# Patient Record
Sex: FEMALE | Race: WHITE | NOT HISPANIC OR LATINO | Employment: OTHER | URBAN - METROPOLITAN AREA
[De-identification: names, ages, dates, MRNs, and addresses within clinical notes are randomized per-mention and may not be internally consistent; named-entity substitution may affect disease eponyms.]

---

## 2018-03-01 ENCOUNTER — AMBULATORY CONVERSION ENCOUNTER (OUTPATIENT)
Dept: OBSTETRICS AND GYNECOLOGY | Facility: CLINIC | Age: 73
End: 2018-03-01

## 2018-09-12 ENCOUNTER — OFFICE VISIT (OUTPATIENT)
Dept: GYNECOLOGIC ONCOLOGY | Facility: CLINIC | Age: 73
End: 2018-09-12
Attending: OBSTETRICS & GYNECOLOGY
Payer: MEDICARE

## 2018-09-12 ENCOUNTER — OFFICE VISIT (OUTPATIENT)
Dept: OBSTETRICS AND GYNECOLOGY | Facility: CLINIC | Age: 73
End: 2018-09-12
Payer: MEDICARE

## 2018-09-12 VITALS
RESPIRATION RATE: 18 BRPM | DIASTOLIC BLOOD PRESSURE: 91 MMHG | HEIGHT: 60 IN | TEMPERATURE: 98 F | SYSTOLIC BLOOD PRESSURE: 156 MMHG | HEART RATE: 94 BPM | BODY MASS INDEX: 36.79 KG/M2 | WEIGHT: 187.4 LBS

## 2018-09-12 VITALS
HEIGHT: 60 IN | BODY MASS INDEX: 36.71 KG/M2 | DIASTOLIC BLOOD PRESSURE: 100 MMHG | WEIGHT: 187 LBS | SYSTOLIC BLOOD PRESSURE: 160 MMHG

## 2018-09-12 DIAGNOSIS — N89.8 VAGINAL MASS: ICD-10-CM

## 2018-09-12 DIAGNOSIS — N95.0 POSTMENOPAUSAL BLEEDING: Primary | ICD-10-CM

## 2018-09-12 DIAGNOSIS — N90.89 VULVAR MASS: Primary | ICD-10-CM

## 2018-09-12 PROCEDURE — 10021 FNA BX W/O IMG GDN 1ST LES: CPT | Performed by: OBSTETRICS & GYNECOLOGY

## 2018-09-12 PROCEDURE — 88305 TISSUE EXAM BY PATHOLOGIST: CPT | Performed by: OBSTETRICS & GYNECOLOGY

## 2018-09-12 PROCEDURE — 99213 OFFICE O/P EST LOW 20 MIN: CPT | Performed by: OBSTETRICS & GYNECOLOGY

## 2018-09-12 PROCEDURE — 99205 OFFICE O/P NEW HI 60 MIN: CPT | Mod: 25 | Performed by: OBSTETRICS & GYNECOLOGY

## 2018-09-12 RX ORDER — PANTOPRAZOLE SODIUM 40 MG/1
40 TABLET, DELAYED RELEASE ORAL DAILY
COMMUNITY
Start: 2016-06-07 | End: 2022-05-02 | Stop reason: ENTERED-IN-ERROR

## 2018-09-12 RX ORDER — BENAZEPRIL HYDROCHLORIDE 10 MG/1
10 TABLET ORAL DAILY
COMMUNITY
End: 2023-03-22 | Stop reason: ALTCHOICE

## 2018-09-12 RX ORDER — BUMETANIDE 1 MG/1
1 TABLET ORAL DAILY
COMMUNITY
End: 2018-12-12 | Stop reason: ENTERED-IN-ERROR

## 2018-09-12 RX ORDER — OMEGA-3-ACID ETHYL ESTERS 1 G/1
CAPSULE, LIQUID FILLED ORAL
COMMUNITY
Start: 2016-06-07 | End: 2021-07-06 | Stop reason: ENTERED-IN-ERROR

## 2018-09-12 RX ORDER — LEVOTHYROXINE SODIUM 88 UG/1
CAPSULE ORAL
COMMUNITY
Start: 2016-06-07 | End: 2018-11-07 | Stop reason: SDUPTHER

## 2018-09-12 RX ORDER — AMLODIPINE AND BENAZEPRIL HYDROCHLORIDE 5; 10 MG/1; MG/1
CAPSULE ORAL
COMMUNITY
Start: 2016-06-07 | End: 2018-09-12 | Stop reason: ENTERED-IN-ERROR

## 2018-09-12 NOTE — PROGRESS NOTES
Raegan Young presents to the office for  second opinion.     Problem List Items Addressed This Visit     Vaginal mass - Primary    Overview     1 year left vulvar mass, no pain, no itching. Spotting with valsalva (BM).          Current Assessment & Plan     4-5 cm L vaginal mass with normal overlying epithelium, but friable. Biopsy take.  Concerning for primary Bartholin's malignancy. Will send for MRI pelvis to assist in diagnosis.  Frontal diagnosis however includes rectal carcinomas.         Relevant Orders    Cytology, Fine Needle Aspiration    MRI PELVIS WITH AND WITHOUT CONTRAST            Past Medical History:   Diagnosis Date   • Disease of thyroid gland    • Diverticulitis    • Hypertension      Past Surgical History:   Procedure Laterality Date   • CHOLECYSTECTOMY     • DILATION AND CURETTAGE OF UTERUS  02/2018   • ROTATOR CUFF REPAIR Right        Current Outpatient Prescriptions:   •  benazepril (LOTENSIN) 10 mg tablet, Take 10 mg by mouth daily., Disp: , Rfl:   •  bumetanide (BUMEX) 1 mg tablet, Take 1 mg by mouth daily., Disp: , Rfl:   •  cranberry conc-C-bacillus coag (AZO CRANBERRY + PROBIOTIC) 250-30-50 mg-mg-million tablet, Take by mouth daily., Disp: , Rfl:   •  levothyroxine sodium (TIROSINT) 88 mcg capsule, take 1 capsule by oral route  every day, Disp: , Rfl:   •  omega-3 acid ethyl esters (LOVAZA) 1 gram capsule, take 2 capsule by oral route 2 times every day, Disp: , Rfl:   •  pantoprazole (PROTONIX) 40 mg EC tablet, 40 mg., Disp: , Rfl:   No known allergies  Cancer-related family history is negative for Breast cancer, Cancer, Colon cancer, and Ovarian cancer.   reports that she quit smoking about 20 years ago. She started smoking about 61 years ago. She has a 40.00 pack-year smoking history. She has never used smokeless tobacco. She reports that she does not drink alcohol or use drugs.  ROS: negative in all systems with the exception of the above    Physical examination: Well-developed  female in no apparent distress  Vitals:   Vitals:    09/12/18 1250   BP: (!) 156/91   Pulse: 94   Resp: 18   Temp: 36.7 °C (98 °F)     Body mass index is 36.6 kg/m².    HEENT: Normocephalic, atraumatic, nonicteric sclera  Neck: Supple no masses, thyroid normal nontender  Lymphatic: No inguinal or supraclavicular adenopathy  Heart: Regular rate no murmurs  Lungs: Clear to auscultation with good respiratory effort  Extremities: No clubbing, cyanosis, edema  Neuro: Oriented ×3 with normal mood and affect  Abdomen: Soft, nontender, nondistended. No hepatosplenomegaly. No rebound or guarding.  Gynecologic: Normal vulva urethra meatus bladder.  Normal cervix uterus neymar l, no cervical motion tenderness, normal bilateral adnexa 5 cm fixed firm area left posterior vaginal area and introitus.  Overlying epithelium appears normal, but bleeding ensued with speculum examination.  Core needle biopsy with 18-gauge needle was performed.  Rectal: no masses      Assessment/Plan   Assesment:  Problem List Items Addressed This Visit     Vaginal mass - Primary    Overview     1 year left vulvar mass, no pain, no itching. Spotting with valsalva (BM).          Current Assessment & Plan     4-5 cm L vaginal mass with normal overlying epithelium, but friable. Biopsy take.  Concerning for primary Bartholin's malignancy. Will send for MRI pelvis to assist in diagnosis.  Frontal diagnosis however includes rectal carcinomas.         Relevant Orders    Cytology, Fine Needle Aspiration    MRI PELVIS WITH AND WITHOUT CONTRAST

## 2018-09-12 NOTE — PATIENT INSTRUCTIONS
See Dr. Trev Jimenez today at 3 PM.  I called for an appointment.  He has swelling and tenderness in the left Bartholin gland.  Possible you may have a hematoma or extension from the rectum.

## 2018-09-12 NOTE — LETTER
September 17, 2018    Patient: Raegan Young   YOB: 1945   Date of Visit: 9/12/2018       Dear Dr. Travis:    The patient is seen back at the [facility] today in follow up with regard to her   1. Vulvar mass    2. Vaginal mass    . Below is my assessment and plan of care.             Assesment:  Problem List Items Addressed This Visit     Vaginal mass - Primary    Overview     1 year left vulvar mass, no pain, no itching. Spotting with valsalva (BM).          Current Assessment & Plan     4-5 cm L vaginal mass with normal overlying epithelium, but friable. Biopsy take.  Concerning for primary Bartholin's malignancy. Will send for MRI pelvis to assist in diagnosis.  Frontal diagnosis however includes rectal carcinomas.         Relevant Orders    Cytology, Fine Needle Aspiration    MRI PELVIS WITH AND WITHOUT CONTRAST                 Sincerely,    Trev Jimenez MD  CC: Luan Lockhart MD

## 2018-09-12 NOTE — ASSESSMENT & PLAN NOTE
4-5 cm L vaginal mass with normal overlying epithelium, but friable. Biopsy take.  Concerning for primary Bartholin's malignancy. Will send for MRI pelvis to assist in diagnosis.  Frontal diagnosis however includes rectal carcinomas.

## 2018-09-12 NOTE — PROGRESS NOTES
Patient has undergone various D&Cs for postmenopausal bleeding but now complains that when she bears down during a bowel movement she sees some light blood when she puts her finger into the vagina the blood is on the finger..  On examination there is no blood in the vagina but on digital exam there is a thickness about 2 cm wide and 3 cm long with tenderness in the Bartholin gland area.  This is on the left side of the patient.  This may be a hematoma in the Bartholin gland but also could be cancer of the Bartholin gland.    I called Dr. Trev Jimenez office and the patient  will be seen today at 3 PM.  Patient was given appropriate instructions how to get the and she will call if she needs any other directions.

## 2018-09-17 ENCOUNTER — TELEPHONE (OUTPATIENT)
Dept: GYNECOLOGIC ONCOLOGY | Facility: CLINIC | Age: 73
End: 2018-09-17

## 2018-09-17 LAB
CASE RPRT: NORMAL
IMMUNE STAIN STUDY: NORMAL
PATH REPORT.FINAL DX SPEC: NORMAL
PATH REPORT.FINAL DX SPEC: NORMAL
PATH REPORT.GROSS SPEC: NORMAL

## 2018-09-18 ENCOUNTER — TELEPHONE (OUTPATIENT)
Dept: GYNECOLOGIC ONCOLOGY | Facility: CLINIC | Age: 73
End: 2018-09-18

## 2018-09-18 DIAGNOSIS — C52 VAGINAL CANCER (CMS/HCC): Primary | ICD-10-CM

## 2018-09-18 DIAGNOSIS — C77.2 SECONDARY MALIGNANT NEOPLASM OF RETROPERITONEAL LYMPH NODE (CMS/HCC): ICD-10-CM

## 2018-09-19 ENCOUNTER — TELEPHONE (OUTPATIENT)
Dept: OBSTETRICS AND GYNECOLOGY | Facility: CLINIC | Age: 73
End: 2018-09-19

## 2018-09-19 NOTE — TELEPHONE ENCOUNTER
Telephone conversation with the patient.  She did have an MRI and does have some lymph node involvement.  I do not have the results.  Patient is scheduled for a PET scan on Thursday.  She will call me with the results.  Biopsy did show squamous cell cancer.

## 2018-09-21 ENCOUNTER — APPOINTMENT (OUTPATIENT)
Dept: LAB | Facility: HOSPITAL | Age: 73
End: 2018-09-21
Attending: OBSTETRICS & GYNECOLOGY
Payer: MEDICARE

## 2018-09-21 ENCOUNTER — HOSPITAL ENCOUNTER (OUTPATIENT)
Dept: RADIATION ONCOLOGY | Facility: HOSPITAL | Age: 73
Setting detail: RADIATION/ONCOLOGY SERIES
Discharge: HOME | End: 2018-09-21
Attending: RADIOLOGY
Payer: MEDICARE

## 2018-09-21 ENCOUNTER — APPOINTMENT (OUTPATIENT)
Dept: RADIATION ONCOLOGY | Facility: HOSPITAL | Age: 73
Setting detail: RADIATION/ONCOLOGY SERIES
End: 2018-09-21
Payer: MEDICARE

## 2018-09-21 ENCOUNTER — OFFICE VISIT (OUTPATIENT)
Dept: GYNECOLOGIC ONCOLOGY | Facility: CLINIC | Age: 73
End: 2018-09-21
Attending: OBSTETRICS & GYNECOLOGY
Payer: MEDICARE

## 2018-09-21 VITALS
HEART RATE: 96 BPM | SYSTOLIC BLOOD PRESSURE: 164 MMHG | BODY MASS INDEX: 35.74 KG/M2 | WEIGHT: 183 LBS | DIASTOLIC BLOOD PRESSURE: 82 MMHG

## 2018-09-21 VITALS
BODY MASS INDEX: 36.01 KG/M2 | HEIGHT: 60 IN | DIASTOLIC BLOOD PRESSURE: 82 MMHG | RESPIRATION RATE: 16 BRPM | WEIGHT: 183.4 LBS | HEART RATE: 89 BPM | SYSTOLIC BLOOD PRESSURE: 157 MMHG

## 2018-09-21 DIAGNOSIS — E03.8 OTHER SPECIFIED HYPOTHYROIDISM: Primary | ICD-10-CM

## 2018-09-21 DIAGNOSIS — C52 VAGINAL CANCER (CMS/HCC): ICD-10-CM

## 2018-09-21 DIAGNOSIS — E03.8 OTHER SPECIFIED HYPOTHYROIDISM: ICD-10-CM

## 2018-09-21 DIAGNOSIS — C52 VAGINAL CANCER (CMS/HCC): Primary | ICD-10-CM

## 2018-09-21 DIAGNOSIS — D64.81 ANTINEOPLASTIC CHEMOTHERAPY INDUCED ANEMIA: Primary | ICD-10-CM

## 2018-09-21 DIAGNOSIS — T45.1X5A ANTINEOPLASTIC CHEMOTHERAPY INDUCED ANEMIA: Primary | ICD-10-CM

## 2018-09-21 PROBLEM — N89.8 VAGINAL MASS: Status: RESOLVED | Noted: 2018-09-12 | Resolved: 2018-09-21

## 2018-09-21 LAB
ALBUMIN SERPL-MCNC: 4 G/DL (ref 3.4–5)
ALP SERPL-CCNC: 65 U/L (ref 35–126)
ALT SERPL-CCNC: 19 U/L (ref 11–54)
ANION GAP SERPL CALC-SCNC: 12 MEQ/L (ref 3–15)
AST SERPL-CCNC: 22 U/L (ref 15–41)
BASOPHILS # BLD: 0.04 K/UL (ref 0.01–0.1)
BASOPHILS NFR BLD: 0.3 %
BILIRUB SERPL-MCNC: 0.7 MG/DL (ref 0.3–1.2)
BUN SERPL-MCNC: 9 MG/DL (ref 8–20)
CALCIUM SERPL-MCNC: 9.4 MG/DL (ref 8.9–10.3)
CHLORIDE SERPL-SCNC: 106 MEQ/L (ref 98–109)
CO2 SERPL-SCNC: 23 MEQ/L (ref 22–32)
CREAT SERPL-MCNC: 0.8 MG/DL (ref 0.6–1.1)
DIFFERENTIAL METHOD BLD: ABNORMAL
EOSINOPHIL # BLD: 0.05 K/UL (ref 0.04–0.36)
EOSINOPHIL NFR BLD: 0.4 %
ERYTHROCYTE [DISTWIDTH] IN BLOOD BY AUTOMATED COUNT: 12.9 % (ref 11.7–14.4)
GFR SERPL CREATININE-BSD FRML MDRD: >60 ML/MIN/1.73M*2
GLUCOSE SERPL-MCNC: 75 MG/DL (ref 70–99)
HCT VFR BLDCO AUTO: 41.6 % (ref 35–45)
HGB BLD-MCNC: 14.3 G/DL (ref 11.8–15.7)
IMM GRANULOCYTES # BLD AUTO: 0.05 K/UL (ref 0–0.08)
IMM GRANULOCYTES NFR BLD AUTO: 0.4 %
LYMPHOCYTES # BLD: 3.14 K/UL (ref 1.2–3.5)
LYMPHOCYTES NFR BLD: 24.6 %
MCH RBC QN AUTO: 30.6 PG (ref 28–33.2)
MCHC RBC AUTO-ENTMCNC: 34.4 G/DL (ref 32.2–35.5)
MCV RBC AUTO: 88.9 FL (ref 83–98)
MONOCYTES # BLD: 0.87 K/UL (ref 0.28–0.8)
MONOCYTES NFR BLD: 6.8 %
NEUTROPHILS # BLD: 8.59 K/UL (ref 1.7–7)
NEUTS SEG NFR BLD: 67.5 %
NRBC BLD-RTO: 0 %
PDW BLD AUTO: 9.6 FL (ref 9.4–12.3)
PLATELET # BLD AUTO: 332 K/UL (ref 150–369)
POTASSIUM SERPL-SCNC: 4.6 MEQ/L (ref 3.6–5.1)
PROT SERPL-MCNC: 6.9 G/DL (ref 6–8.2)
RBC # BLD AUTO: 4.68 M/UL (ref 3.93–5.22)
SODIUM SERPL-SCNC: 141 MEQ/L (ref 136–144)
T3 SERPL-MCNC: 79 NG/DL (ref 87–178)
T4 FREE SERPL-MCNC: 1.11 NG/DL (ref 0.58–1.64)
TSH SERPL DL<=0.05 MIU/L-ACNC: 1.03 MIU/L (ref 0.34–5.6)
WBC # BLD AUTO: 12.74 K/UL (ref 3.8–10.5)

## 2018-09-21 PROCEDURE — 80053 COMPREHEN METABOLIC PANEL: CPT

## 2018-09-21 PROCEDURE — 99215 OFFICE O/P EST HI 40 MIN: CPT | Performed by: OBSTETRICS & GYNECOLOGY

## 2018-09-21 PROCEDURE — 84480 ASSAY TRIIODOTHYRONINE (T3): CPT

## 2018-09-21 PROCEDURE — 84439 ASSAY OF FREE THYROXINE: CPT

## 2018-09-21 PROCEDURE — 84443 ASSAY THYROID STIM HORMONE: CPT

## 2018-09-21 PROCEDURE — 36415 COLL VENOUS BLD VENIPUNCTURE: CPT

## 2018-09-21 PROCEDURE — 85027 COMPLETE CBC AUTOMATED: CPT

## 2018-09-21 RX ORDER — FAMOTIDINE 10 MG/ML
20 INJECTION INTRAVENOUS ONCE AS NEEDED
Status: CANCELLED | OUTPATIENT
Start: 2018-09-25

## 2018-09-21 RX ORDER — DIPHENHYDRAMINE HCL 50 MG/ML
25 VIAL (ML) INJECTION ONCE AS NEEDED
Status: CANCELLED | OUTPATIENT
Start: 2018-09-25

## 2018-09-21 RX ORDER — PALONOSETRON 0.05 MG/ML
250 INJECTION, SOLUTION INTRAVENOUS ONCE
Status: CANCELLED | OUTPATIENT
Start: 2018-09-25

## 2018-09-21 RX ORDER — PROCHLORPERAZINE MALEATE 10 MG
10 TABLET ORAL EVERY 6 HOURS PRN
Qty: 30 TABLET | Refills: 2 | Status: SHIPPED | OUTPATIENT
Start: 2018-09-21 | End: 2018-12-12 | Stop reason: ENTERED-IN-ERROR

## 2018-09-21 RX ORDER — SODIUM CHLORIDE 9 MG/ML
500 INJECTION, SOLUTION INTRAVENOUS ONCE
Status: CANCELLED | OUTPATIENT
Start: 2018-09-25

## 2018-09-21 ASSESSMENT — PAIN SCALES - GENERAL: PAINLEVEL: 2

## 2018-09-21 NOTE — PROGRESS NOTES
Patient ID:  Raegan Young is a 72 y.o. female.  1945    Referring Physician:   Trev Jimenez MD  [address]  [place] [id number]    Primary Care Provider:  Luan Lockhart MD     Cancer Staging Information:  Cancer Staging  Vaginal cancer (CMS/HCC) (Formerly [facility])  Staging form: Vagina, AJCC 8th Edition  - Clinical stage from 9/12/2018: FIGO Stage III (cT3, cN1, cM0) - Signed by Trev Jimenez MD on 9/21/2018      Problem List:  Patient Active Problem List    Diagnosis Date Noted   • Vaginal cancer (CMS/HCC) (Formerly [facility]) 09/21/2018       Chief Complaint  Chief Complaint   Patient presents with   • Cervical Cancer   • Consult       Subjective      History of Present Illness:     HPI  Raegan  Is being sent from Dr. Jimenez office . Raegan has had vaginal spotting off and on x 1 year. D and C x  were neg. the patient had seen Dr. Travis on examination and found to have extensive left-sided vaginal cancer with palpable groin nodes on the left.Raegan had a pet scan positive for left vaginal cancer also with 4 cm necrotic left groin node.S/P BX on 9/14.  I had demonstrated a 4.6 x 2.4 x 3.5 cm enhancing mass in left inguinal adenopathy measuring 3.9 x 3.7 cm.  Examination your office demonstrated a fixed tumor to the left pelvic sidewall.  Is now being sent here to discuss potential concurrent radiation chemo as definitive therapy.  Denies bleeding pain or discharge.  She had biopsy/cytology showing squamous cell carcinoma      Review of Systems:  Review of Systems - Oncology     Past Medical/Surgical History  Past Medical History:   Diagnosis Date   • Disease of thyroid gland    • Diverticulitis    • Hypertension      Past Surgical History:   Procedure Laterality Date   • CHOLECYSTECTOMY     • DILATION AND CURETTAGE OF UTERUS  02/2018   • ROTATOR CUFF REPAIR Right         Current Medications  Current Outpatient Prescriptions   Medication Sig Dispense Refill   • benazepril (LOTENSIN) 10 mg tablet Take 10  mg by mouth daily.     • bumetanide (BUMEX) 1 mg tablet Take 1 mg by mouth daily.     • cranberry conc-C-bacillus coag (AZO CRANBERRY + PROBIOTIC) 250-30-50 mg-mg-million tablet Take by mouth daily.     • levothyroxine sodium (TIROSINT) 88 mcg capsule take 1 capsule by oral route  every day     • omega-3 acid ethyl esters (LOVAZA) 1 gram capsule take 2 capsule by oral route 2 times every day     • pantoprazole (PROTONIX) 40 mg EC tablet 40 mg.       No current facility-administered medications for this encounter.        Allergies  Allergies   Allergen Reactions   • No Known Allergies        Social History  Social History     Social History   • Marital status:      Spouse name: rom   • Number of children: 4   • Years of education: N/A     Occupational History   • retired/        Social History Main Topics   • Smoking status: Former Smoker     Packs/day: 1.00     Years: 40.00     Start date: 1957     Quit date: 1998   • Smokeless tobacco: Never Used   • Alcohol use No   • Drug use: No   • Sexual activity: No     Other Topics Concern   • None     Social History Narrative   • None       Family History  Family History   Problem Relation Age of Onset   • Breast cancer Neg Hx    • Cancer Neg Hx    • Colon cancer Neg Hx    • Ovarian cancer Neg Hx       Family Status   Relation Status   • Neg Hx (Not Specified)               Objective      Physical Exam:  Vital Signs for this encounter:  BP: 164/82  Heart Rate: 96  Resp: 16  Height: 152.4 cm (5')  Weight: 83 kg (183 lb) (09/21 0854-09/21 0940)    Physical Exam  Performance Status:     PAIN ASSESSMENT:  Score Two  Location VAGINA  Pain Intervention      LABS:  Recent Results (from the past 1008 hour(s))   Cytology, Fine Needle Aspiration    Collection Time: 09/12/18  4:02 PM   Result Value Ref Range Status    Case Report  No results found Final     Non-gynecologic Cytology                          Case: [id number]                                   Authorizing  Provider:  Trev Jimenez MD         Collected:           09/12/2018 1602              Ordering Location:     [facility]       Received:            09/12/2018 2315                                     Gynecologic Oncology at                                                                             [facility]                                                      Pathologist:           Maciel Saunders DO                                                         Specimen:    Other (add Src to Desc/Comment), left vagina                                               INTERPRETATION  No results found Final     A. Left vagina, FNA With Cell Block:     Squamous cell carcinoma.  See comment.          Comment  No results found Final     p40 is strongly and diffusely positive, supporting the diagnosis.    Dr Jimenez was notified on September 17, 2018.      Immunohistochemistry  No results found Final     The positive control is adequate.    Some immunohistochemical tests used in this lab were developed and their performance characteristics determined by [facility].  They have not been cleared or approved by the U.S. Food and Drug Administration.  The FDA has determined that such clearance or approval is not necessary.  These tests are used for clinical purposes.  Control slide(s) are reviewed and are adequate.  Immunostains are performed in the Immunohistochemistry Lab at [facility], [address] [place], [place] [address], Lexa Hu MD, Medical Director.      Gross Description  No results found Final     Hazy peach-tinged aspriate with coarse particulate in CytoLyt  Slides: 1 ThinPrep, 1 Cell Block             Assessment/Plan     Diagnoses and Orders Associated With This Visit:  There are no diagnoses linked to this encounter.  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  The patient will be treated over several weeks to a dose of 7000 cGy along with weekly cisplatin.  The logistics goals risks benefits transient permanent side effects were discussed and she is agreeable.  I discussed the potential complete response rate of 80% with a 60% recurrence rate.  We will evaluate her posttreatment for potential surgery to remove any persistent these.  The consultation

## 2018-09-21 NOTE — PROGRESS NOTES
35 minutes of face-to-face time were spent 100% in consultation and reviewing the images from patient's pelvic MRI and PET.  She has a PET positive, biopsy positive left vaginal cancer versus Bartholin's cancer.  MRI shows a 4 cm necrotic left groin node.  This node is PET positive.    We discussed that vaginal cancers that are not stage I or treated with radiation therapy.  I discussed with Dr. Peng consultation and treatment.  She would need a medical oncologist closer to home to provide concurrent cis-platinum.    We also signed consent for left groin node excision if Dr. Peng feels that the groin node should be resected prior radiation therapy.

## 2018-09-21 NOTE — LETTER
September 21, 2018    Patient: Raegan Young   YOB: 1945   Date of Visit: 9/21/2018       Dear Dr. Romero:    The patient is seen back at the [facility] today in follow up with regard to her No diagnosis found.. Below is my assessment and plan of care.     35 minutes of face-to-face time were spent 100% in consultation and reviewing the images from patient's pelvic MRI and PET.  She has a PET positive, biopsy positive left vaginal cancer versus Bartholin's cancer.  MRI shows a 4 cm necrotic left groin node.  This node is PET positive.     We discussed that vaginal cancers that are not stage I or treated with radiation therapy.  I discussed with Dr. Peng consultation and treatment.  She would need a medical oncologist closer to home to provide concurrent cis-platinum.     We also signed consent for left groin node excision if Dr. Peng feels that the groin node should be resected prior radiation therapy.               Sincerely,    Trev Jimenez MD  CC: MD Venkatesh Ruiz MD

## 2018-09-24 PROBLEM — C77.4: Status: ACTIVE | Noted: 2018-09-24

## 2018-09-24 PROCEDURE — 77470 SPECIAL RADIATION TREATMENT: CPT | Performed by: RADIOLOGY

## 2018-09-25 ENCOUNTER — OFFICE VISIT (OUTPATIENT)
Dept: GYNECOLOGIC ONCOLOGY | Facility: CLINIC | Age: 73
End: 2018-09-25
Payer: MEDICARE

## 2018-09-25 ENCOUNTER — HOSPITAL ENCOUNTER (OUTPATIENT)
Dept: RADIATION ONCOLOGY | Facility: HOSPITAL | Age: 73
Setting detail: RADIATION/ONCOLOGY SERIES
Discharge: HOME | End: 2018-09-25
Attending: RADIOLOGY
Payer: MEDICARE

## 2018-09-25 ENCOUNTER — TELEPHONE (OUTPATIENT)
Dept: OBSTETRICS AND GYNECOLOGY | Facility: CLINIC | Age: 73
End: 2018-09-25

## 2018-09-25 ENCOUNTER — HOSPITAL ENCOUNTER (OUTPATIENT)
Dept: INPATIENT UNIT | Facility: HOSPITAL | Age: 73
Setting detail: INFUSION SERIES
Discharge: HOME | End: 2018-09-25
Attending: OBSTETRICS & GYNECOLOGY
Payer: MEDICARE

## 2018-09-25 VITALS
WEIGHT: 184 LBS | HEIGHT: 60 IN | SYSTOLIC BLOOD PRESSURE: 147 MMHG | BODY MASS INDEX: 36.12 KG/M2 | RESPIRATION RATE: 18 BRPM | TEMPERATURE: 97.7 F | HEART RATE: 82 BPM | DIASTOLIC BLOOD PRESSURE: 72 MMHG

## 2018-09-25 VITALS
OXYGEN SATURATION: 96 % | TEMPERATURE: 97.5 F | RESPIRATION RATE: 18 BRPM | HEART RATE: 79 BPM | BODY MASS INDEX: 36.12 KG/M2 | WEIGHT: 184 LBS | HEIGHT: 60 IN | DIASTOLIC BLOOD PRESSURE: 69 MMHG | SYSTOLIC BLOOD PRESSURE: 142 MMHG

## 2018-09-25 DIAGNOSIS — C77.4 SECONDARY MALIGNANCY OF INGUINAL LYMPH NODES (CMS/HCC): ICD-10-CM

## 2018-09-25 DIAGNOSIS — Z51.11 CHEMOTHERAPY MANAGEMENT, ENCOUNTER FOR: Primary | ICD-10-CM

## 2018-09-25 DIAGNOSIS — T45.1X5A ANTINEOPLASTIC CHEMOTHERAPY INDUCED ANEMIA: Primary | ICD-10-CM

## 2018-09-25 DIAGNOSIS — C52 VAGINAL CANCER (CMS/HCC): ICD-10-CM

## 2018-09-25 DIAGNOSIS — D64.81 ANTINEOPLASTIC CHEMOTHERAPY INDUCED ANEMIA: Primary | ICD-10-CM

## 2018-09-25 PROCEDURE — 25800000 HC PHARMACY IV SOLUTIONS: Performed by: PHYSICIAN ASSISTANT

## 2018-09-25 PROCEDURE — C9463 INJECTION, APREPITANT: HCPCS | Performed by: PHYSICIAN ASSISTANT

## 2018-09-25 PROCEDURE — 63600000 HC DRUGS/DETAIL CODE: Performed by: PHYSICIAN ASSISTANT

## 2018-09-25 PROCEDURE — 99215 OFFICE O/P EST HI 40 MIN: CPT | Performed by: OBSTETRICS & GYNECOLOGY

## 2018-09-25 PROCEDURE — 77300 RADIATION THERAPY DOSE PLAN: CPT

## 2018-09-25 PROCEDURE — 96361 HYDRATE IV INFUSION ADD-ON: CPT

## 2018-09-25 PROCEDURE — 77301 RADIOTHERAPY DOSE PLAN IMRT: CPT | Performed by: RADIOLOGY

## 2018-09-25 PROCEDURE — 96375 TX/PRO/DX INJ NEW DRUG ADDON: CPT

## 2018-09-25 PROCEDURE — 96360 HYDRATION IV INFUSION INIT: CPT

## 2018-09-25 PROCEDURE — 77338 DESIGN MLC DEVICE FOR IMRT: CPT | Performed by: RADIOLOGY

## 2018-09-25 PROCEDURE — 96374 THER/PROPH/DIAG INJ IV PUSH: CPT

## 2018-09-25 PROCEDURE — 96413 CHEMO IV INFUSION 1 HR: CPT

## 2018-09-25 RX ORDER — PALONOSETRON 0.05 MG/ML
250 INJECTION, SOLUTION INTRAVENOUS ONCE
Status: COMPLETED | OUTPATIENT
Start: 2018-09-25 | End: 2018-09-25

## 2018-09-25 RX ORDER — SODIUM CHLORIDE 9 MG/ML
500 INJECTION, SOLUTION INTRAVENOUS ONCE
Status: COMPLETED | OUTPATIENT
Start: 2018-09-25 | End: 2018-09-25

## 2018-09-25 RX ADMIN — POTASSIUM CHLORIDE 500 ML/HR: 2 INJECTION, SOLUTION, CONCENTRATE INTRAVENOUS at 10:17

## 2018-09-25 RX ADMIN — APREPITANT 130 MG: 130 INJECTION, EMULSION INTRAVENOUS at 12:15

## 2018-09-25 RX ADMIN — SODIUM CHLORIDE 500 ML: 9 INJECTION, SOLUTION INTRAVENOUS at 14:00

## 2018-09-25 RX ADMIN — CISPLATIN 75 MG: 1 INJECTION, SOLUTION INTRAVENOUS at 12:53

## 2018-09-25 RX ADMIN — DEXAMETHASONE SODIUM PHOSPHATE 12 MG: 4 INJECTION, SOLUTION INTRA-ARTICULAR; INTRALESIONAL; INTRAMUSCULAR; INTRAVENOUS; SOFT TISSUE at 12:15

## 2018-09-25 RX ADMIN — PALONOSETRON 250 MCG: 0.05 INJECTION, SOLUTION INTRAVENOUS at 12:15

## 2018-09-25 NOTE — PROGRESS NOTES
(0900) Pt to floor to receive cisplatin (concurrent w/ XRT) for vaginal CA/ vulvar mass. VSS, pt was assessed by RN. Darnell #22 placed @ LFA, w/ +flush/ +blood return noted. Labs drawn on 9/21/18, results reviewed, wdl as ok to proceed w/ tx as is ordered. Pre-chemo IVF, premeds, and chemo ordered via pharmacy.     Chemo education reinforced, including potential s/e, indications to alert RN for during tx, and indications to seek medical attention for after D/C. Pt verbalizes understanding, and received copy of reviewed info from [employer] website to take home as reference.    (1017) Pre-chemo IVF arrived to floor, 1L NSS w/ 20 mEq KCl and 1gram mag up/2hrs, per pre-hydration orders.    (1215) Pt received ordered premeds: Aloxi 0.25mg IV, Cinvanti 130mg IV, and Decadron 12mg IV.    (1253) Pre-chemo IVF complete and line flushed thoroughly. Cisplatin 75mg/ NSS up per orders, set to infuse over 1hr. Prior to infusing, +F/ +BR noted via ML, and pt/ BSA/ chemo/ dose/ orders/ consent were verified by 2 chemo RNS. Pt reminded to alert RN for any discomfort/ change in sensation @ IV ML site, or for any change in condition, s/a pruritis, flushing, SOB, pain.    (1400) Cisplatin infusion complete w/o incident, pt w/o c/o. No s/sx of adverse reaction noted. Pt ambulated to BR multiple times t/o tx, w/ adequate urine output noted. +F/ +BR noted via ML. 500cc NSS (post chemo IVF) up/ 1hr, per orders.    (1510) Post chemo IVF complete w/o incident. +F/ +BR noted via ML. Darnell flushed and removed, gauze w/ tape placed @ site, and pressure applied until site CDI.    Chemo D/C instructions were reviewed, pt verbalizes understanding, and received copy of AVS to take home as reference. Pt D/C'd to home, w/ her daughter, in stable condirtion and w/o c/o.

## 2018-09-25 NOTE — TELEPHONE ENCOUNTER
Message was left for the patient that I am aware of chemotherapy treatment plan.  She can call me anytime with questions.

## 2018-09-25 NOTE — PROGRESS NOTES
Ms. Raegan Young is a 72 y.o.yo lady with Cancer Staging  Vaginal cancer (CMS/HCC) (Formerly [facility])  Staging form: Vagina, AJCC 8th Edition  - Clinical stage from 9/12/2018: FIGO Stage III (cT3, cN1, cM0) - Signed by Trev Jimenez MD on 9/21/2018  . She presents for chemotherapy.    No history exists.       She denies nausea/vomiting, denies neuropathy, denies excessive fatigue, denies constipation.    CHEMOTHERAPY REGIMEN: Weekly Cisplatin                CYCLE 1     PROBLEM LIST:  Patient Active Problem List    Diagnosis Date Noted   • Chemotherapy management, encounter for 09/25/2018   • Secondary malignancy of inguinal lymph nodes (CMS/HCC) (Formerly [facility]) 09/24/2018   • Vaginal cancer (CMS/HCC) (Formerly [facility]) 09/21/2018        Medical History:   Past Medical History:   Diagnosis Date   • Disease of thyroid gland    • Diverticulitis    • Hypertension        Surgical History:   Past Surgical History:   Procedure Laterality Date   • CHOLECYSTECTOMY     • DILATION AND CURETTAGE OF UTERUS  02/2018   • ROTATOR CUFF REPAIR Right        Social History:   Social History     Social History   • Marital status:      Spouse name: rom   • Number of children: 4   • Years of education: N/A     Occupational History   • retired/        Social History Main Topics   • Smoking status: Former Smoker     Packs/day: 1.00     Years: 40.00     Start date: 1957     Quit date: 1998   • Smokeless tobacco: Never Used   • Alcohol use No   • Drug use: No   • Sexual activity: No     Other Topics Concern   • Not on file     Social History Narrative   • No narrative on file       Family History:   Family History   Problem Relation Age of Onset   • Breast cancer Neg Hx    • Cancer Neg Hx    • Colon cancer Neg Hx    • Ovarian cancer Neg Hx        Allergies: No known allergies    Medications:      Medication List          Accurate as of 9/25/18  8:34 AM. If you have any questions, ask your nurse or doctor.               CONTINUE taking these medications     benazepril 10 mg tablet  Commonly known as:  LOTENSIN  Take 10 mg by mouth daily.     bumetanide 1 mg tablet  Commonly known as:  BUMEX  Take 1 mg by mouth daily.     cranberry conc-C-bacillus coag 250-30-50 mg-mg-million tablet  Commonly known as:  AZO CRANBERRY + PROBIOTIC  Take by mouth daily.     LOVAZA 1 gram capsule  Generic drug:  omega-3 acid ethyl esters  take 2 capsule by oral route 2 times every day     prochlorperazine 10 mg tablet  Commonly known as:  COMPAZINE  Take 1 tablet (10 mg total) by mouth every 6 (six) hours as needed for nausea or vomiting for up to 7 days.     PROTONIX 40 mg EC tablet  Generic drug:  pantoprazole  40 mg.     TIROSINT 88 mcg capsule  Generic drug:  levothyroxine sodium  take 1 capsule by oral route  every day            Review of Systems  All other systems reviewed and negative except as noted in the HPI.    Labs  I have reviewed the patient's labs.  Current labs are within normal limits.      Physical Exam:  Vitals:    09/25/18 0818   BP: (!) 147/72   Pulse: 82   Resp: 18   Temp: 36.5 °C (97.7 °F)     Body mass index is 35.94 kg/m².  General: well-developed, well-nourished, no apparent distress  Neck: supple, no masses  Lymphatic: no supraclavicular, cervical, or inguinal adenopathy  Respiratory: lungs clear to auscultation bilaterally, normal respiratory effort  Cardiovascular: regular rate and rhythm, no murmurs, rubs, gallops.   Extremity: no clubbing, cyanosis, edema  GI: abdomen soft, non-distended, non-tender, no hepatosplenomegaly, no masses  Neurologic: alert & oriented x3, no gross deficits  Psychiatric: mood and affect normal  Musculoskeletal: no deformity or gross strength deficit  Gynecologic: deferred      ASSESSMENT/PLAN:  Assessment   72 y.o. lady with  Cancer Staging  Vaginal cancer (CMS/HCC) (HCC)  Staging form: Vagina, AJCC 8th Edition  - Clinical stage from 9/12/2018: FIGO Stage III (cT3, cN1, cM0) - Signed by Trev Jimenez MD on 9/21/2018   who  presents for chemotherapy.  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

## 2018-09-25 NOTE — PATIENT INSTRUCTIONS
**When to notify your Doctor**    Contact your doctor if you experience any of the following:    Pain, redness, swelling, or blistering near or at your administration site    Fever 100.4 or higher with or without chills    Nausea/ vomiting/ diarrhea not relieved with meds or is persistent    Develop mouth sores that prevent you from eating or drinking    Have black bowel movements, bruise easily, new rash, new headache, abdominal pain, swelling, or any signs of bleeding or new onset/ worsening shortness of breath    Have any new or worsening pain, numbness or tingling    Have any changes in your ability to perform daily activities, change in memory, or dizziness    Have any unexplained problems, questions, or concerns    Chemotherapy is in your body fluids for 48 hours- use precautions    It is safe for your family to have contact with you during treatment    Flush toilet twice after using, with lid closed    Your caregiver should wear gloves if handling your body fluids for 48 hours after your treatment, followed by thorough handwashing    Special precautions may be necessary if you are sexually active during your treatment (see additional info under “Chemotherapy” section)    [employer] is a helpful website for information    Patient Education     Chemotherapy  Chemotherapy is the use of medicines to stop or slow the growth of cancer cells. Depending on the type and stage of your cancer, you may have chemotherapy to:  · Cure your cancer.  · Slow the progression of your cancer.  · Ease your cancer symptoms.  · Improve the benefits of radiation treatment.  · Shrink a tumor before surgery.  · Rid the body of cancer cells that remain after a tumor is surgically removed.  How is chemotherapy given?  Chemotherapy may be given:  · By mouth in liquid or pill form.  · Through a thin tube that is inserted into a vein or artery.  · By getting a shot.  · By rubbing a cream or ointment on your skin.  · Through liquids that  are placed directly into various areas of the body, such as the abdomen, chest, or bladder.  How often is chemotherapy given?  Chemotherapy may be given continuously over time, or it may be given in cycles. For example, you may take the medicine for one week out of every month.  For how long will I need chemotherapy treatments?  The length of treatment depends on many factors, including:  · The type of cancer.  · Whether the cancer has spread.  · How you respond to the chemotherapy.  · Whether you develop side effects.  Some types of chemotherapy medicine are given only one time. Others are given for months, years, or for life.  What safety precautions must I take while on chemotherapy?  Chemotherapy medicines are very strong. They will be in all of your bodily fluids, including your urine, stool, saliva, sweat, tears, vaginal secretions, and semen. You must carefully follow some safety precautions to prevent harm to others while you are using these medicines. Here are some recommended precautions:  · Make sure that people who help care for you wear disposable gloves if they are going to come into contact with any of your bodily fluids. Women who are pregnant or breastfeeding should not handle any of your bodily fluids.  · Wash any clothes, towels, and linens that may have your bodily fluids on them twice in a washing machine using very hot water.  · Dispose of adult diapers, tampons, and sanitary napkins by first sealing them in a plastic bag.  · Use a condom when having sex for at least 2 weeks after receiving your chemotherapy.  · Do not share beverages or food.  · Keep your chemotherapy medicines in their original bottles. Keep them in a high, safe location, away from children. Do not expose them to heat or moisture. Do not put them in containers with other types of medicines.  · Dispose of all wrappers for your chemotherapy medicines by sealing them in a separate plastic bag.  · Do not throw away extra  medicine, and do not flush it down the toilet. Take medicine that you are not going to use to your health care provider's office where it can be disposed of properly.  · Follow your health care provider’s directions for the proper disposal of needles, IV tubing, and other medical supplies that have come into contact with your chemotherapy medicines.  · If you are issued a hazardous waste container, make sure you understand the directions for using it.  · Wash your hands thoroughly with warm water and soap after using the bathroom. Dry your hands with disposable paper towels.  · When using the toilet:  ¨ Flush it twice after each use, including after vomiting.  ¨ Close the lid of the toilet prior to flushing. This helps to avoid splashing.  ¨ Both men and women should sit to use the toilet. This helps avoid splashing.  What are the side effects of chemotherapy?  Side effects depend on a variety of factors, including:  · The specific type of chemotherapy medicine used.  · The dosage.  · How long the medicine is used for.  · Your overall health.  Some of the side effects you may experience include:  · Fatigue and decreased energy.  · Decreased appetite.  · Changes in your sense of smell or taste.  · Nausea.  · Vomiting.  · Constipation or diarrhea.  · Hair loss.  · Increased susceptibility to infection.  · Easy bleeding.  · Mouth sores.  · Burning or tingling in the hands or feet.  · Memory problems.  This information is not intended to replace advice given to you by your health care provider. Make sure you discuss any questions you have with your health care provider.  Document Released: 10/14/2008 Document Revised: 07/07/2017 Document Reviewed: 05/26/2015  Elsevier Interactive Patient Education © 2018 [employer]       Patient Education   Cisplatin injection  What is this medicine?  CISPLATIN (SIS broderick tin) is a chemotherapy drug. It targets fast dividing cells, like cancer cells, and causes these cells to die. This  medicine is used to treat many types of cancer like bladder, ovarian, and testicular cancers.  This medicine may be used for other purposes; ask your health care provider or pharmacist if you have questions.  COMMON BRAND NAME(S): Platinol, Platinol -AQ  What should I tell my health care provider before I take this medicine?  They need to know if you have any of these conditions:  -blood disorders  -hearing problems  -kidney disease  -recent or ongoing radiation therapy  -an unusual or allergic reaction to cisplatin, carboplatin, other chemotherapy, other medicines, foods, dyes, or preservatives  -pregnant or trying to get pregnant  -breast-feeding  How should I use this medicine?  This drug is given as an infusion into a vein. It is administered in a hospital or clinic by a specially trained health care professional.  Talk to your pediatrician regarding the use of this medicine in children. Special care may be needed.  Overdosage: If you think you have taken too much of this medicine contact a poison control center or emergency room at once.  NOTE: This medicine is only for you. Do not share this medicine with others.  What if I miss a dose?  It is important not to miss a dose. Call your doctor or health care professional if you are unable to keep an appointment.  What may interact with this medicine?  -dofetilide  -foscarnet  -medicines for seizures  -medicines to increase blood counts like filgrastim, pegfilgrastim, sargramostim  -probenecid  -pyridoxine used with altretamine  -rituximab  -some antibiotics like amikacin, gentamicin, neomycin, polymyxin B, streptomycin, tobramycin  -sulfinpyrazone  -vaccines  -zalcitabine  Talk to your doctor or health care professional before taking any of these medicines:  -acetaminophen  -aspirin  -ibuprofen  -ketoprofen  -naproxen  This list may not describe all possible interactions. Give your health care provider a list of all the medicines, herbs, non-prescription drugs, or  dietary supplements you use. Also tell them if you smoke, drink alcohol, or use illegal drugs. Some items may interact with your medicine.  What should I watch for while using this medicine?  Your condition will be monitored carefully while you are receiving this medicine. You will need important blood work done while you are taking this medicine.  This drug may make you feel generally unwell. This is not uncommon, as chemotherapy can affect healthy cells as well as cancer cells. Report any side effects. Continue your course of treatment even though you feel ill unless your doctor tells you to stop.  In some cases, you may be given additional medicines to help with side effects. Follow all directions for their use.  Call your doctor or health care professional for advice if you get a fever, chills or sore throat, or other symptoms of a cold or flu. Do not treat yourself. This drug decreases your body's ability to fight infections. Try to avoid being around people who are sick.  This medicine may increase your risk to bruise or bleed. Call your doctor or health care professional if you notice any unusual bleeding.  Be careful brushing and flossing your teeth or using a toothpick because you may get an infection or bleed more easily. If you have any dental work done, tell your dentist you are receiving this medicine.  Avoid taking products that contain aspirin, acetaminophen, ibuprofen, naproxen, or ketoprofen unless instructed by your doctor. These medicines may hide a fever.  Do not become pregnant while taking this medicine. Women should inform their doctor if they wish to become pregnant or think they might be pregnant. There is a potential for serious side effects to an unborn child. Talk to your health care professional or pharmacist for more information. Do not breast-feed an infant while taking this medicine.  Drink fluids as directed while you are taking this medicine. This will help protect your  kidneys.  Call your doctor or health care professional if you get diarrhea. Do not treat yourself.  What side effects may I notice from receiving this medicine?  Side effects that you should report to your doctor or health care professional as soon as possible:  -allergic reactions like skin rash, itching or hives, swelling of the face, lips, or tongue  -signs of infection - fever or chills, cough, sore throat, pain or difficulty passing urine  -signs of decreased platelets or bleeding - bruising, pinpoint red spots on the skin, black, tarry stools, nosebleeds  -signs of decreased red blood cells - unusually weak or tired, fainting spells, lightheadedness  -breathing problems  -changes in hearing  -gout pain  -low blood counts - This drug may decrease the number of white blood cells, red blood cells and platelets. You may be at increased risk for infections and bleeding.  -nausea and vomiting  -pain, swelling, redness or irritation at the injection site  -pain, tingling, numbness in the hands or feet  -problems with balance, movement  -trouble passing urine or change in the amount of urine  Side effects that usually do not require medical attention (report to your doctor or health care professional if they continue or are bothersome):  -changes in vision  -loss of appetite  -metallic taste in the mouth or changes in taste  This list may not describe all possible side effects. Call your doctor for medical advice about side effects. You may report side effects to FDA at 4-045-FDA-2750.  Where should I keep my medicine?  This drug is given in a hospital or clinic and will not be stored at home.  NOTE: This sheet is a summary. It may not cover all possible information. If you have questions about this medicine, talk to your doctor, pharmacist, or health care provider.  © 2018 Elsevier/[employer] (2009-03-24 14:40:54)

## 2018-09-25 NOTE — LETTER
September 25, 2018    Patient: Raegan Young   YOB: 1945   Date of Visit: 9/25/2018       Dear Dr. Lockhart:    The patient is seen back at the [facility] today in follow up with regard to her   1. Chemotherapy management, encounter for    2. Vaginal cancer (CMS/HCC) (HCC)    3. 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 Below is my assessment and plan of care.        72 y.o. lady with  Cancer Staging  Vaginal cancer (CMS/HCC) (HCC)  Staging form: Vagina, AJCC 8th Edition  - Clinical stage from 9/12/2018: FIGO Stage III (cT3, cN1, cM0) - Signed by Trev Jimenez MD on 9/21/2018   who presents for chemotherapy.  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

## 2018-09-26 ENCOUNTER — HOSPITAL ENCOUNTER (OUTPATIENT)
Dept: RADIATION ONCOLOGY | Facility: HOSPITAL | Age: 73
Setting detail: RADIATION/ONCOLOGY SERIES
Discharge: HOME | End: 2018-09-26
Attending: RADIOLOGY
Payer: MEDICARE

## 2018-09-26 LAB
ARIA ZRC COURSE ID: 1
ARIA ZRC COURSE INTENT: NORMAL
ARIA ZRC COURSE START DATE: NORMAL
ARIA ZRC TREATMENT DATES: NORMAL
ARIA ZRC TREATMENT ELAPSED DAYS: NORMAL
ARIA ZRP FRACTIONS TREATED TO DATE: NORMAL
ARIA ZRP PLAN ID: NORMAL
ARIA ZRP PLAN ID: NORMAL
ARIA ZRP PRESCRIBED DOSE CGY: 2000
ARIA ZRP PRESCRIBED DOSE CGY: 5000
ARIA ZRP PRESCRIBED DOSE PER FRACTION: 2
ARIA ZRP PRESCRIBED DOSE PER FRACTION: 2
ARIA ZRR DOSAGE GIVEN TO DATE: 2
ARIA ZRR REFERENCE POINT ID: NORMAL
ARIA ZRR SESSION DOSAGE GIVEN: 2

## 2018-09-26 PROCEDURE — 77386 HC IMRT DELIVERY COMPLEX: CPT | Performed by: RADIOLOGY

## 2018-09-27 ENCOUNTER — HOSPITAL ENCOUNTER (OUTPATIENT)
Dept: RADIATION ONCOLOGY | Facility: HOSPITAL | Age: 73
Setting detail: RADIATION/ONCOLOGY SERIES
Discharge: HOME | End: 2018-09-27
Attending: RADIOLOGY
Payer: MEDICARE

## 2018-09-27 LAB
ARIA ZRC COURSE ID: 1
ARIA ZRC COURSE INTENT: NORMAL
ARIA ZRC COURSE START DATE: NORMAL
ARIA ZRC TREATMENT DATES: NORMAL
ARIA ZRC TREATMENT ELAPSED DAYS: NORMAL
ARIA ZRP FRACTIONS TREATED TO DATE: NORMAL
ARIA ZRP PLAN ID: NORMAL
ARIA ZRP PLAN ID: NORMAL
ARIA ZRP PRESCRIBED DOSE CGY: 2000
ARIA ZRP PRESCRIBED DOSE CGY: 5000
ARIA ZRP PRESCRIBED DOSE PER FRACTION: 2
ARIA ZRP PRESCRIBED DOSE PER FRACTION: 2
ARIA ZRR DOSAGE GIVEN TO DATE: 4
ARIA ZRR REFERENCE POINT ID: NORMAL
ARIA ZRR SESSION DOSAGE GIVEN: 2

## 2018-09-27 PROCEDURE — 77386 HC IMRT DELIVERY COMPLEX: CPT

## 2018-09-28 ENCOUNTER — HOSPITAL ENCOUNTER (OUTPATIENT)
Dept: RADIATION ONCOLOGY | Facility: HOSPITAL | Age: 73
Setting detail: RADIATION/ONCOLOGY SERIES
Discharge: HOME | End: 2018-09-28
Attending: RADIOLOGY
Payer: MEDICARE

## 2018-09-28 LAB
ARIA ZRC COURSE ID: 1
ARIA ZRC COURSE INTENT: NORMAL
ARIA ZRC COURSE START DATE: NORMAL
ARIA ZRC TREATMENT DATES: NORMAL
ARIA ZRC TREATMENT ELAPSED DAYS: NORMAL
ARIA ZRP FRACTIONS TREATED TO DATE: NORMAL
ARIA ZRP PLAN ID: NORMAL
ARIA ZRP PLAN ID: NORMAL
ARIA ZRP PRESCRIBED DOSE CGY: 2000
ARIA ZRP PRESCRIBED DOSE CGY: 5000
ARIA ZRP PRESCRIBED DOSE PER FRACTION: 2
ARIA ZRP PRESCRIBED DOSE PER FRACTION: 2
ARIA ZRR DOSAGE GIVEN TO DATE: 6
ARIA ZRR REFERENCE POINT ID: NORMAL
ARIA ZRR SESSION DOSAGE GIVEN: 2

## 2018-09-28 PROCEDURE — 77386 HC IMRT DELIVERY COMPLEX: CPT | Performed by: RADIOLOGY

## 2018-10-01 ENCOUNTER — HOSPITAL ENCOUNTER (OUTPATIENT)
Dept: RADIATION ONCOLOGY | Facility: HOSPITAL | Age: 73
Setting detail: RADIATION/ONCOLOGY SERIES
Discharge: HOME | End: 2018-10-01
Attending: RADIOLOGY
Payer: MEDICARE

## 2018-10-01 LAB
ARIA ZRC COURSE ID: 1
ARIA ZRC COURSE INTENT: NORMAL
ARIA ZRC COURSE START DATE: NORMAL
ARIA ZRC TREATMENT DATES: NORMAL
ARIA ZRC TREATMENT ELAPSED DAYS: NORMAL
ARIA ZRP FRACTIONS TREATED TO DATE: NORMAL
ARIA ZRP PLAN ID: NORMAL
ARIA ZRP PLAN ID: NORMAL
ARIA ZRP PRESCRIBED DOSE CGY: 2000
ARIA ZRP PRESCRIBED DOSE CGY: 5000
ARIA ZRP PRESCRIBED DOSE PER FRACTION: 2
ARIA ZRP PRESCRIBED DOSE PER FRACTION: 2
ARIA ZRR DOSAGE GIVEN TO DATE: 8
ARIA ZRR REFERENCE POINT ID: NORMAL
ARIA ZRR SESSION DOSAGE GIVEN: 2

## 2018-10-01 PROCEDURE — 77386 HC IMRT DELIVERY COMPLEX: CPT | Performed by: RADIOLOGY

## 2018-10-02 ENCOUNTER — HOSPITAL ENCOUNTER (OUTPATIENT)
Dept: RADIATION ONCOLOGY | Facility: HOSPITAL | Age: 73
Setting detail: RADIATION/ONCOLOGY SERIES
Discharge: HOME | End: 2018-10-02
Attending: RADIOLOGY
Payer: MEDICARE

## 2018-10-02 ENCOUNTER — HOSPITAL ENCOUNTER (OUTPATIENT)
Dept: INPATIENT UNIT | Facility: HOSPITAL | Age: 73
Setting detail: INFUSION SERIES
Discharge: HOME | End: 2018-10-02
Attending: OBSTETRICS & GYNECOLOGY
Payer: MEDICARE

## 2018-10-02 ENCOUNTER — OFFICE VISIT (OUTPATIENT)
Dept: GYNECOLOGIC ONCOLOGY | Facility: CLINIC | Age: 73
End: 2018-10-02
Payer: MEDICARE

## 2018-10-02 VITALS
WEIGHT: 182 LBS | RESPIRATION RATE: 16 BRPM | TEMPERATURE: 98.3 F | HEART RATE: 90 BPM | DIASTOLIC BLOOD PRESSURE: 81 MMHG | HEIGHT: 60 IN | BODY MASS INDEX: 35.73 KG/M2 | SYSTOLIC BLOOD PRESSURE: 134 MMHG

## 2018-10-02 VITALS
RESPIRATION RATE: 18 BRPM | TEMPERATURE: 98.1 F | SYSTOLIC BLOOD PRESSURE: 143 MMHG | HEART RATE: 88 BPM | OXYGEN SATURATION: 96 % | DIASTOLIC BLOOD PRESSURE: 78 MMHG | BODY MASS INDEX: 35.73 KG/M2 | WEIGHT: 182 LBS | HEIGHT: 60 IN

## 2018-10-02 DIAGNOSIS — R30.0 DYSURIA: ICD-10-CM

## 2018-10-02 DIAGNOSIS — Z51.11 CHEMOTHERAPY MANAGEMENT, ENCOUNTER FOR: Primary | ICD-10-CM

## 2018-10-02 DIAGNOSIS — C52 VAGINAL CANCER (CMS/HCC): ICD-10-CM

## 2018-10-02 DIAGNOSIS — C77.4 SECONDARY MALIGNANCY OF INGUINAL LYMPH NODES (CMS/HCC): ICD-10-CM

## 2018-10-02 LAB
ARIA ZRC COURSE ID: 1
ARIA ZRC COURSE INTENT: NORMAL
ARIA ZRC COURSE START DATE: NORMAL
ARIA ZRC TREATMENT DATES: NORMAL
ARIA ZRC TREATMENT ELAPSED DAYS: NORMAL
ARIA ZRP FRACTIONS TREATED TO DATE: NORMAL
ARIA ZRP PLAN ID: NORMAL
ARIA ZRP PLAN ID: NORMAL
ARIA ZRP PRESCRIBED DOSE CGY: 2000
ARIA ZRP PRESCRIBED DOSE CGY: 5000
ARIA ZRP PRESCRIBED DOSE PER FRACTION: 2
ARIA ZRP PRESCRIBED DOSE PER FRACTION: 2
ARIA ZRR DOSAGE GIVEN TO DATE: 10
ARIA ZRR REFERENCE POINT ID: NORMAL
ARIA ZRR SESSION DOSAGE GIVEN: 2
BACTERIA URNS QL MICRO: ABNORMAL /HPF
BILIRUB UR QL STRIP.AUTO: NEGATIVE MG/DL
CLARITY UR REFRACT.AUTO: CLEAR
COLOR UR AUTO: YELLOW
GLUCOSE UR STRIP.AUTO-MCNC: NEGATIVE MG/DL
HGB UR QL STRIP.AUTO: ABNORMAL
HYALINE CASTS #/AREA URNS LPF: ABNORMAL /LPF
KETONES UR STRIP.AUTO-MCNC: NEGATIVE MG/DL
LEUKOCYTE ESTERASE UR QL STRIP.AUTO: 1
NITRITE UR QL STRIP.AUTO: NEGATIVE
PH UR STRIP.AUTO: 6 [PH]
PROT UR QL STRIP.AUTO: NEGATIVE
RBC #/AREA URNS HPF: ABNORMAL /HPF
SP GR UR REFRACT.AUTO: 1.01
SQUAMOUS URNS QL MICRO: 1 /HPF
UROBILINOGEN UR STRIP-ACNC: 0.2 EU/DL
WBC #/AREA URNS HPF: ABNORMAL /HPF

## 2018-10-02 PROCEDURE — 25800000 HC PHARMACY IV SOLUTIONS: Performed by: OBSTETRICS & GYNECOLOGY

## 2018-10-02 PROCEDURE — 96374 THER/PROPH/DIAG INJ IV PUSH: CPT

## 2018-10-02 PROCEDURE — 96375 TX/PRO/DX INJ NEW DRUG ADDON: CPT

## 2018-10-02 PROCEDURE — 96413 CHEMO IV INFUSION 1 HR: CPT

## 2018-10-02 PROCEDURE — C9463 INJECTION, APREPITANT: HCPCS | Performed by: OBSTETRICS & GYNECOLOGY

## 2018-10-02 PROCEDURE — 99215 OFFICE O/P EST HI 40 MIN: CPT | Performed by: OBSTETRICS & GYNECOLOGY

## 2018-10-02 PROCEDURE — 96365 THER/PROPH/DIAG IV INF INIT: CPT

## 2018-10-02 PROCEDURE — 81001 URINALYSIS AUTO W/SCOPE: CPT | Performed by: OBSTETRICS & GYNECOLOGY

## 2018-10-02 PROCEDURE — 96361 HYDRATE IV INFUSION ADD-ON: CPT

## 2018-10-02 PROCEDURE — 87086 URINE CULTURE/COLONY COUNT: CPT | Performed by: OBSTETRICS & GYNECOLOGY

## 2018-10-02 PROCEDURE — 96360 HYDRATION IV INFUSION INIT: CPT

## 2018-10-02 PROCEDURE — 63600000 HC DRUGS/DETAIL CODE: Performed by: OBSTETRICS & GYNECOLOGY

## 2018-10-02 PROCEDURE — 77386 HC IMRT DELIVERY COMPLEX: CPT | Performed by: RADIOLOGY

## 2018-10-02 RX ORDER — SODIUM CHLORIDE 9 MG/ML
500 INJECTION, SOLUTION INTRAVENOUS ONCE
Status: CANCELLED | OUTPATIENT
Start: 2018-10-02

## 2018-10-02 RX ORDER — SODIUM CHLORIDE 9 MG/ML
500 INJECTION, SOLUTION INTRAVENOUS ONCE
Status: COMPLETED | OUTPATIENT
Start: 2018-10-02 | End: 2018-10-02

## 2018-10-02 RX ORDER — FAMOTIDINE 10 MG/ML
20 INJECTION INTRAVENOUS ONCE AS NEEDED
Status: CANCELLED | OUTPATIENT
Start: 2018-10-02

## 2018-10-02 RX ORDER — PALONOSETRON 0.05 MG/ML
250 INJECTION, SOLUTION INTRAVENOUS ONCE
Status: COMPLETED | OUTPATIENT
Start: 2018-10-02 | End: 2018-10-02

## 2018-10-02 RX ORDER — PALONOSETRON 0.05 MG/ML
250 INJECTION, SOLUTION INTRAVENOUS ONCE
Status: CANCELLED | OUTPATIENT
Start: 2018-10-02

## 2018-10-02 RX ADMIN — POTASSIUM CHLORIDE 500 ML/HR: 2 INJECTION, SOLUTION, CONCENTRATE INTRAVENOUS at 08:42

## 2018-10-02 RX ADMIN — DEXAMETHASONE SODIUM PHOSPHATE 12 MG: 4 INJECTION, SOLUTION INTRA-ARTICULAR; INTRALESIONAL; INTRAMUSCULAR; INTRAVENOUS; SOFT TISSUE at 10:56

## 2018-10-02 RX ADMIN — PALONOSETRON 250 MCG: 0.05 INJECTION, SOLUTION INTRAVENOUS at 10:56

## 2018-10-02 RX ADMIN — CISPLATIN 75 MG: 1 INJECTION, SOLUTION INTRAVENOUS at 11:36

## 2018-10-02 RX ADMIN — APREPITANT 130 MG: 130 INJECTION, EMULSION INTRAVENOUS at 10:53

## 2018-10-02 RX ADMIN — SODIUM CHLORIDE 250 ML: 9 INJECTION, SOLUTION INTRAVENOUS at 13:27

## 2018-10-02 NOTE — PROGRESS NOTES
0800 Patient arrived to [facility] for chemotherapy treatment for cervical cancer. IV placed left forearm 22g positive blood return. No labs required. MD ordered UA with C&S. Urine sent. IV fluids started NSS with 20 meq KCL and Mag 1 gram @ 500ml/hr over two hours. Patient resting comfortably. Will continue to monitor.     1050 Patient pre-medicated with Cinvanti 130 mg IV, decadron 12mg IV, Aloxi 0.25mg. Will monitor.     1140 Patient now receiving Cisplatin 75 mg in NSS over one hour. IV flushed with NSS and with positive blood return. Chemotherapy checked and verified by two RN's, consent and order also verified. Patient resting comfortably. Will continue to monitor.     1240 Patient tolerated Cisplatin without difficulty.  Patient is now receiving post hydration NSS 500ml over one hour. Will continue to monitor.     1400 Post hydration complete. Patient tolerated without difficulty. IV flushed and discontinued. Patient teaching complete and instructions given. Patient discharged to home with her son.

## 2018-10-02 NOTE — PATIENT INSTRUCTIONS
Patient Education     Chemotherapy  Chemotherapy is the use of medicines to stop or slow the growth of cancer cells. Depending on the type and stage of your cancer, you may have chemotherapy to:  · Cure your cancer.  · Slow the progression of your cancer.  · Ease your cancer symptoms.  · Improve the benefits of radiation treatment.  · Shrink a tumor before surgery.  · Rid the body of cancer cells that remain after a tumor is surgically removed.  How is chemotherapy given?  Chemotherapy may be given:  · By mouth in liquid or pill form.  · Through a thin tube that is inserted into a vein or artery.  · By getting a shot.  · By rubbing a cream or ointment on your skin.  · Through liquids that are placed directly into various areas of the body, such as the abdomen, chest, or bladder.  How often is chemotherapy given?  Chemotherapy may be given continuously over time, or it may be given in cycles. For example, you may take the medicine for one week out of every month.  For how long will I need chemotherapy treatments?  The length of treatment depends on many factors, including:  · The type of cancer.  · Whether the cancer has spread.  · How you respond to the chemotherapy.  · Whether you develop side effects.  Some types of chemotherapy medicine are given only one time. Others are given for months, years, or for life.  What safety precautions must I take while on chemotherapy?  Chemotherapy medicines are very strong. They will be in all of your bodily fluids, including your urine, stool, saliva, sweat, tears, vaginal secretions, and semen. You must carefully follow some safety precautions to prevent harm to others while you are using these medicines. Here are some recommended precautions:  · Make sure that people who help care for you wear disposable gloves if they are going to come into contact with any of your bodily fluids. Women who are pregnant or breastfeeding should not handle any of your bodily fluids.  · Wash any  clothes, towels, and linens that may have your bodily fluids on them twice in a washing machine using very hot water.  · Dispose of adult diapers, tampons, and sanitary napkins by first sealing them in a plastic bag.  · Use a condom when having sex for at least 2 weeks after receiving your chemotherapy.  · Do not share beverages or food.  · Keep your chemotherapy medicines in their original bottles. Keep them in a high, safe location, away from children. Do not expose them to heat or moisture. Do not put them in containers with other types of medicines.  · Dispose of all wrappers for your chemotherapy medicines by sealing them in a separate plastic bag.  · Do not throw away extra medicine, and do not flush it down the toilet. Take medicine that you are not going to use to your health care provider's office where it can be disposed of properly.  · Follow your health care provider’s directions for the proper disposal of needles, IV tubing, and other medical supplies that have come into contact with your chemotherapy medicines.  · If you are issued a hazardous waste container, make sure you understand the directions for using it.  · Wash your hands thoroughly with warm water and soap after using the bathroom. Dry your hands with disposable paper towels.  · When using the toilet:  ¨ Flush it twice after each use, including after vomiting.  ¨ Close the lid of the toilet prior to flushing. This helps to avoid splashing.  ¨ Both men and women should sit to use the toilet. This helps avoid splashing.  What are the side effects of chemotherapy?  Side effects depend on a variety of factors, including:  · The specific type of chemotherapy medicine used.  · The dosage.  · How long the medicine is used for.  · Your overall health.  Some of the side effects you may experience include:  · Fatigue and decreased energy.  · Decreased appetite.  · Changes in your sense of smell or taste.  · Nausea.  · Vomiting.  · Constipation or  diarrhea.  · Hair loss.  · Increased susceptibility to infection.  · Easy bleeding.  · Mouth sores.  · Burning or tingling in the hands or feet.  · Memory problems.  This information is not intended to replace advice given to you by your health care provider. Make sure you discuss any questions you have with your health care provider.  Document Released: 10/14/2008 Document Revised: 07/07/2017 Document Reviewed: 05/26/2015  [employer] Interactive Patient Education © 2018 [employer]

## 2018-10-02 NOTE — PROGRESS NOTES
Ms. Raegan Young is a 72 y.o.yo lady with Cancer Staging  Vaginal cancer (CMS/HCC) ([facility])  Staging form: Vagina, AJCC 8th Edition  - Clinical stage from 9/12/2018: FIGO Stage III (cT3, cN1, cM0) - Signed by Trev Jimenez MD on 9/21/2018  . She presents for chemotherapy.    No history exists.       She denies nausea/vomiting, denies neuropathy, denies excessive fatigue, denies constipation.  Burning with urniation, L vaginal pain    CHEMOTHERAPY REGIMEN: Weekly Cisplatin                CYCLE 1     PROBLEM LIST:  Patient Active Problem List    Diagnosis Date Noted   • Dysuria 10/02/2018   • Chemotherapy management, encounter for 09/25/2018   • Secondary malignancy of inguinal lymph nodes (CMS/HCC) ([facility]) 09/24/2018   • Vaginal cancer (CMS/[facility]) ([facility]) 09/21/2018        Medical History:   Past Medical History:   Diagnosis Date   • Disease of thyroid gland    • Diverticulitis    • Hypertension        Surgical History:   Past Surgical History:   Procedure Laterality Date   • CHOLECYSTECTOMY     • DILATION AND CURETTAGE OF UTERUS  02/2018   • ROTATOR CUFF REPAIR Right        Social History:   Social History     Social History   • Marital status:      Spouse name: rom   • Number of children: 4   • Years of education: N/A     Occupational History   • retired/        Social History Main Topics   • Smoking status: Former Smoker     Packs/day: 1.00     Years: 40.00     Start date: 1957     Quit date: 1998   • Smokeless tobacco: Never Used   • Alcohol use No   • Drug use: No   • Sexual activity: No     Other Topics Concern   • Not on file     Social History Narrative   • No narrative on file       Family History:   Family History   Problem Relation Age of Onset   • Breast cancer Neg Hx    • Cancer Neg Hx    • Colon cancer Neg Hx    • Ovarian cancer Neg Hx        Allergies: No known allergies    Medications:      Medication List          Accurate as of 10/2/18  7:29 AM. If you have any questions, ask your  nurse or doctor.               CONTINUE taking these medications    benazepril 10 mg tablet  Commonly known as:  LOTENSIN  Take 10 mg by mouth daily.     BIOFLEX ORAL  Take 2 capsules by mouth daily.     bumetanide 1 mg tablet  Commonly known as:  BUMEX  Take 1 mg by mouth daily.     cranberry conc-C-bacillus coag 250-30-50 mg-mg-million tablet  Commonly known as:  AZO CRANBERRY + PROBIOTIC  Take by mouth daily.     LOVAZA 1 gram capsule  Generic drug:  omega-3 acid ethyl esters  take 2 capsule by oral route 2 times every day     PROTONIX 40 mg EC tablet  Generic drug:  pantoprazole  40 mg.     TIROSINT 88 mcg capsule  Generic drug:  levothyroxine sodium  take 1 capsule by oral route  every day            Review of Systems  All other systems reviewed and negative except as noted in the HPI.    Labs  I have reviewed the patient's labs.  Current labs are within normal limits.      Physical Exam:  Vitals:    10/02/18 0721   BP: 134/81   Pulse: 90   Resp: 16   Temp: 36.8 °C (98.3 °F)     Body mass index is 35.54 kg/m².  General: well-developed, well-nourished, no apparent distress  Neck: supple, no masses  Lymphatic: no supraclavicular, cervical, or inguinal adenopathy  Respiratory: lungs clear to auscultation bilaterally, normal respiratory effort  Cardiovascular: regular rate and rhythm, no murmurs, rubs, gallops.   Extremity: no clubbing, cyanosis, edema  GI: abdomen soft, non-distended, non-tender, no hepatosplenomegaly, no masses  Neurologic: alert & oriented x3, no gross deficits  Psychiatric: mood and affect normal  Musculoskeletal: no deformity or gross strength deficit  Gynecologic: mild erythema, tend at biopsy sight.No abcess      ASSESSMENT/PLAN:  Assessment   72 y.o. lady with  Cancer Staging  Vaginal cancer (CMS/HCC) (HCC)  Staging form: Vagina, AJCC 8th Edition  - Clinical stage from 9/12/2018: FIGO Stage III (cT3, cN1, cM0) - Signed by Trev Jimenez MD on 9/21/2018   who presents for  chemotherapy.  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               Trev Jimenez MD

## 2018-10-03 ENCOUNTER — HOSPITAL ENCOUNTER (OUTPATIENT)
Dept: RADIATION ONCOLOGY | Facility: HOSPITAL | Age: 73
Setting detail: RADIATION/ONCOLOGY SERIES
Discharge: HOME | End: 2018-10-03
Attending: RADIOLOGY
Payer: MEDICARE

## 2018-10-03 VITALS
DIASTOLIC BLOOD PRESSURE: 77 MMHG | BODY MASS INDEX: 36.13 KG/M2 | SYSTOLIC BLOOD PRESSURE: 152 MMHG | WEIGHT: 185 LBS | HEART RATE: 101 BPM

## 2018-10-03 DIAGNOSIS — C52 VAGINAL CANCER (CMS/HCC): Primary | ICD-10-CM

## 2018-10-03 LAB
ARIA ZRC COURSE ID: 1
ARIA ZRC COURSE INTENT: NORMAL
ARIA ZRC COURSE START DATE: NORMAL
ARIA ZRC TREATMENT DATES: NORMAL
ARIA ZRC TREATMENT ELAPSED DAYS: NORMAL
ARIA ZRP FRACTIONS TREATED TO DATE: NORMAL
ARIA ZRP PLAN ID: NORMAL
ARIA ZRP PLAN ID: NORMAL
ARIA ZRP PRESCRIBED DOSE CGY: 2000
ARIA ZRP PRESCRIBED DOSE CGY: 5000
ARIA ZRP PRESCRIBED DOSE PER FRACTION: 2
ARIA ZRP PRESCRIBED DOSE PER FRACTION: 2
ARIA ZRR DOSAGE GIVEN TO DATE: 12
ARIA ZRR REFERENCE POINT ID: NORMAL
ARIA ZRR SESSION DOSAGE GIVEN: 2
BACTERIA UR CULT: NORMAL

## 2018-10-03 PROCEDURE — 77386 HC IMRT DELIVERY COMPLEX: CPT | Performed by: RADIOLOGY

## 2018-10-03 ASSESSMENT — PAIN SCALES - GENERAL: PAINLEVEL: 0-NO PAIN

## 2018-10-03 NOTE — PROGRESS NOTES
Patient ID:  Raeagn Young is a 72 y.o. female.    Referring Physician:   Trev Jimenez MD    Primary Care Provider:  Luan Lockhart MD     [facility]   Patient Care Team     Relationship Specialty Notifications Start End   Luan Lockhart MD PCP - General   12/30/17    Venkatesh Travis MD Consulting Physician Gynecology  9/12/18    Akbar Romero MD Radiation Oncologist Radiation Oncology  9/21/18    Trev Jimenez MD Consulting Physician Gynecologic Oncology  9/21/18    Tereza Dhillon RN Registered Nurse   10/1/18          Cancer Staging Information:  Cancer Staging  Vaginal cancer (CMS/HCC) ([facility])  Staging form: Vagina, AJCC 8th Edition  - Clinical stage from 9/12/2018: FIGO Stage III (cT3, cN1, cM0) - Signed by Trev Jimenez MD on 9/21/2018            TREATMENT PLAN SUMMARY:    Radiation Therapy   Component Value Date    ARIATXDATES  10/03/2018     Course End Date: : @ --  First Treatment Date: 2018-09-26 @ 10:38  Last Treatment Date: 2018-10-03 @ 10:48      TXELAPSEDDAY 7 days as of [id number] 10/03/2018    COURSEID 1 10/03/2018    PLANID PELVIS 10/03/2018    PLANID CD PELVIS 10/03/2018    PRESCRIBEDDO 2 10/03/2018    PRESCRIBEDDO 2 10/03/2018    DOSAGEGIVENT 12.0000 10/03/2018    FRACTIONSTRE 6 of 25 10/03/2018       Subjective:      Raegan is tolerating chemo and radiation very well.  Weekly cisplatin every Teusday. She was seen by Dr. Jimenez yesterday and he did a visual exam. There is no erythema or desquamation to the vagina.          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     • bumetanide (BUMEX) 1 mg tablet Take 1 mg by mouth daily.     • cranberry conc-C-bacillus coag (AZO CRANBERRY + PROBIOTIC) 250-30-50 mg-mg-million tablet Take by mouth daily.     • levothyroxine sodium (TIROSINT) 88 mcg capsule take 1 capsule by oral route  every day     • omega-3 acid ethyl esters (LOVAZA) 1 gram capsule take 2 capsule by oral route 2 times every day     • pantoprazole (PROTONIX) 40 mg EC tablet 40 mg.     • rutin/hesp/bioflav/C/zslorg528 (BIOFLEX ORAL) Take 2 capsules by mouth daily.       No current facility-administered medications for this encounter.        Allergies  Allergies   Allergen Reactions   • No Known Allergies        Physical Exam:    BP (!) 152/77 (BP Location: Left upper arm, Patient Position: Sitting)   Pulse (!) 101   Wt 83.9 kg (185 lb)   BMI 36.13 kg/m²       Performance Status:       PAIN ASSESSMENT:  Score Zero  Location    Pain Intervention        Plan:    Continue per plan    Akbar Romero MD

## 2018-10-04 ENCOUNTER — HOSPITAL ENCOUNTER (OUTPATIENT)
Dept: RADIATION ONCOLOGY | Facility: HOSPITAL | Age: 73
Setting detail: RADIATION/ONCOLOGY SERIES
Discharge: HOME | End: 2018-10-04
Attending: RADIOLOGY
Payer: MEDICARE

## 2018-10-04 LAB
ARIA ZRC COURSE ID: 1
ARIA ZRC COURSE INTENT: NORMAL
ARIA ZRC COURSE START DATE: NORMAL
ARIA ZRC TREATMENT DATES: NORMAL
ARIA ZRC TREATMENT ELAPSED DAYS: NORMAL
ARIA ZRP FRACTIONS TREATED TO DATE: NORMAL
ARIA ZRP PLAN ID: NORMAL
ARIA ZRP PLAN ID: NORMAL
ARIA ZRP PRESCRIBED DOSE CGY: 2000
ARIA ZRP PRESCRIBED DOSE CGY: 5000
ARIA ZRP PRESCRIBED DOSE PER FRACTION: 2
ARIA ZRP PRESCRIBED DOSE PER FRACTION: 2
ARIA ZRR DOSAGE GIVEN TO DATE: 14
ARIA ZRR REFERENCE POINT ID: NORMAL
ARIA ZRR SESSION DOSAGE GIVEN: 2

## 2018-10-04 PROCEDURE — 77386 HC IMRT DELIVERY COMPLEX: CPT | Performed by: RADIOLOGY

## 2018-10-04 PROCEDURE — 77336 RADIATION PHYSICS CONSULT: CPT | Performed by: RADIOLOGY

## 2018-10-05 ENCOUNTER — HOSPITAL ENCOUNTER (OUTPATIENT)
Dept: RADIATION ONCOLOGY | Facility: HOSPITAL | Age: 73
Setting detail: RADIATION/ONCOLOGY SERIES
Discharge: HOME | End: 2018-10-05
Attending: RADIOLOGY
Payer: MEDICARE

## 2018-10-05 LAB
ARIA ZRC COURSE ID: 1
ARIA ZRC COURSE INTENT: NORMAL
ARIA ZRC COURSE START DATE: NORMAL
ARIA ZRC TREATMENT DATES: NORMAL
ARIA ZRC TREATMENT ELAPSED DAYS: NORMAL
ARIA ZRP FRACTIONS TREATED TO DATE: NORMAL
ARIA ZRP PLAN ID: NORMAL
ARIA ZRP PLAN ID: NORMAL
ARIA ZRP PRESCRIBED DOSE CGY: 2000
ARIA ZRP PRESCRIBED DOSE CGY: 5000
ARIA ZRP PRESCRIBED DOSE PER FRACTION: 2
ARIA ZRP PRESCRIBED DOSE PER FRACTION: 2
ARIA ZRR DOSAGE GIVEN TO DATE: 16
ARIA ZRR REFERENCE POINT ID: NORMAL
ARIA ZRR SESSION DOSAGE GIVEN: 2

## 2018-10-05 PROCEDURE — 77386 HC IMRT DELIVERY COMPLEX: CPT | Performed by: RADIOLOGY

## 2018-10-08 ENCOUNTER — DOCUMENTATION (OUTPATIENT)
Dept: RADIATION ONCOLOGY | Facility: HOSPITAL | Age: 73
End: 2018-10-08

## 2018-10-08 ENCOUNTER — APPOINTMENT (OUTPATIENT)
Dept: LAB | Facility: HOSPITAL | Age: 73
End: 2018-10-08
Attending: OBSTETRICS & GYNECOLOGY
Payer: MEDICARE

## 2018-10-08 ENCOUNTER — HOSPITAL ENCOUNTER (OUTPATIENT)
Dept: RADIATION ONCOLOGY | Facility: HOSPITAL | Age: 73
Setting detail: RADIATION/ONCOLOGY SERIES
End: 2018-10-08
Attending: RADIOLOGY
Payer: MEDICARE

## 2018-10-08 DIAGNOSIS — D64.81 ANTINEOPLASTIC CHEMOTHERAPY INDUCED ANEMIA: ICD-10-CM

## 2018-10-08 DIAGNOSIS — T45.1X5A ANTINEOPLASTIC CHEMOTHERAPY INDUCED ANEMIA: ICD-10-CM

## 2018-10-08 LAB
ALBUMIN SERPL-MCNC: 3.7 G/DL (ref 3.4–5)
ALP SERPL-CCNC: 62 IU/L (ref 35–126)
ALT SERPL-CCNC: 17 IU/L (ref 11–54)
ANION GAP SERPL CALC-SCNC: 9 MEQ/L (ref 3–15)
AST SERPL-CCNC: 21 IU/L (ref 15–41)
BASOPHILS # BLD: 0.02 K/UL (ref 0.01–0.1)
BASOPHILS NFR BLD: 0.2 %
BILIRUB SERPL-MCNC: 0.5 MG/DL (ref 0.3–1.2)
BUN SERPL-MCNC: 14 MG/DL (ref 8–20)
CALCIUM SERPL-MCNC: 8.8 MG/DL (ref 8.9–10.3)
CHLORIDE SERPL-SCNC: 103 MEQ/L (ref 98–109)
CO2 SERPL-SCNC: 24 MEQ/L (ref 22–32)
CREAT SERPL-MCNC: 0.9 MG/DL (ref 0.6–1.1)
DIFFERENTIAL METHOD BLD: ABNORMAL
EOSINOPHIL # BLD: 0.22 K/UL (ref 0.04–0.36)
EOSINOPHIL NFR BLD: 2.3 %
ERYTHROCYTE [DISTWIDTH] IN BLOOD BY AUTOMATED COUNT: 12.4 % (ref 11.7–14.4)
GFR SERPL CREATININE-BSD FRML MDRD: >60 ML/MIN/1.73M*2
GLUCOSE SERPL-MCNC: 115 MG/DL (ref 70–99)
HCT VFR BLDCO AUTO: 39.8 % (ref 35–45)
HGB BLD-MCNC: 13.6 G/DL (ref 11.8–15.7)
IMM GRANULOCYTES # BLD AUTO: 0.04 K/UL (ref 0–0.08)
IMM GRANULOCYTES NFR BLD AUTO: 0.4 %
LYMPHOCYTES # BLD: 0.72 K/UL (ref 1.2–3.5)
LYMPHOCYTES NFR BLD: 7.4 %
MAGNESIUM SERPL-MCNC: 2 MG/DL (ref 1.8–2.5)
MCH RBC QN AUTO: 30.1 PG (ref 28–33.2)
MCHC RBC AUTO-ENTMCNC: 34.2 G/DL (ref 32.2–35.5)
MCV RBC AUTO: 88.1 FL (ref 83–98)
MONOCYTES # BLD: 0.9 K/UL (ref 0.28–0.8)
MONOCYTES NFR BLD: 9.3 %
NEUTROPHILS # BLD: 7.82 K/UL (ref 1.7–7)
NEUTS SEG NFR BLD: 80.4 %
NRBC BLD-RTO: 0 %
PDW BLD AUTO: 9.2 FL (ref 9.4–12.3)
PLATELET # BLD AUTO: 184 K/UL (ref 150–369)
POTASSIUM SERPL-SCNC: 3.7 MEQ/L (ref 3.6–5.1)
PROT SERPL-MCNC: 7 G/DL (ref 6–8.2)
RBC # BLD AUTO: 4.52 M/UL (ref 3.93–5.22)
SODIUM SERPL-SCNC: 136 MEQ/L (ref 136–144)
WBC # BLD AUTO: 9.72 K/UL (ref 3.8–10.5)

## 2018-10-08 PROCEDURE — 85025 COMPLETE CBC W/AUTO DIFF WBC: CPT

## 2018-10-08 PROCEDURE — 80053 COMPREHEN METABOLIC PANEL: CPT

## 2018-10-08 PROCEDURE — 83735 ASSAY OF MAGNESIUM: CPT

## 2018-10-08 PROCEDURE — 36415 COLL VENOUS BLD VENIPUNCTURE: CPT

## 2018-10-08 NOTE — PROGRESS NOTES
Patient called in this am with significant dairrhea over the weekend. She took 4 Immodium yesterday, and has been up since 4 am moving her bowels. She is pushing fluids as best as she can, but is nauseous and has no taste buds. RN called script for Lomotil, and instructed patient on correct usage.  RN contacted Dr. Romero to see if treatment should be held.

## 2018-10-09 ENCOUNTER — HOSPITAL ENCOUNTER (OUTPATIENT)
Dept: RADIATION ONCOLOGY | Facility: HOSPITAL | Age: 73
Setting detail: RADIATION/ONCOLOGY SERIES
Discharge: HOME | End: 2018-10-09
Attending: RADIOLOGY
Payer: MEDICARE

## 2018-10-09 ENCOUNTER — OFFICE VISIT (OUTPATIENT)
Dept: GYNECOLOGIC ONCOLOGY | Facility: CLINIC | Age: 73
End: 2018-10-09
Attending: OBSTETRICS & GYNECOLOGY
Payer: MEDICARE

## 2018-10-09 ENCOUNTER — HOSPITAL ENCOUNTER (OUTPATIENT)
Dept: INPATIENT UNIT | Facility: HOSPITAL | Age: 73
Setting detail: INFUSION SERIES
Discharge: HOME | End: 2018-10-09
Attending: OBSTETRICS & GYNECOLOGY
Payer: MEDICARE

## 2018-10-09 VITALS
DIASTOLIC BLOOD PRESSURE: 74 MMHG | HEIGHT: 60 IN | RESPIRATION RATE: 16 BRPM | TEMPERATURE: 98 F | WEIGHT: 178.6 LBS | HEART RATE: 90 BPM | SYSTOLIC BLOOD PRESSURE: 119 MMHG | BODY MASS INDEX: 35.06 KG/M2

## 2018-10-09 VITALS
SYSTOLIC BLOOD PRESSURE: 119 MMHG | RESPIRATION RATE: 16 BRPM | TEMPERATURE: 97.6 F | HEART RATE: 82 BPM | OXYGEN SATURATION: 99 % | DIASTOLIC BLOOD PRESSURE: 57 MMHG

## 2018-10-09 DIAGNOSIS — Z51.11 CHEMOTHERAPY MANAGEMENT, ENCOUNTER FOR: Primary | ICD-10-CM

## 2018-10-09 DIAGNOSIS — C77.4 SECONDARY MALIGNANCY OF INGUINAL LYMPH NODES (CMS/HCC): ICD-10-CM

## 2018-10-09 DIAGNOSIS — C52 VAGINAL CANCER (CMS/HCC): ICD-10-CM

## 2018-10-09 LAB
ARIA ZRC COURSE ID: 1
ARIA ZRC COURSE INTENT: NORMAL
ARIA ZRC COURSE START DATE: NORMAL
ARIA ZRC TREATMENT DATES: NORMAL
ARIA ZRC TREATMENT ELAPSED DAYS: NORMAL
ARIA ZRP FRACTIONS TREATED TO DATE: NORMAL
ARIA ZRP PLAN ID: NORMAL
ARIA ZRP PLAN ID: NORMAL
ARIA ZRP PRESCRIBED DOSE CGY: 2000
ARIA ZRP PRESCRIBED DOSE CGY: 5000
ARIA ZRP PRESCRIBED DOSE PER FRACTION: 2
ARIA ZRP PRESCRIBED DOSE PER FRACTION: 2
ARIA ZRR REFERENCE POINT ID: NORMAL

## 2018-10-09 PROCEDURE — 99215 OFFICE O/P EST HI 40 MIN: CPT | Performed by: OBSTETRICS & GYNECOLOGY

## 2018-10-09 PROCEDURE — 63600000 HC DRUGS/DETAIL CODE: Performed by: OBSTETRICS & GYNECOLOGY

## 2018-10-09 PROCEDURE — 25800000 HC PHARMACY IV SOLUTIONS: Performed by: OBSTETRICS & GYNECOLOGY

## 2018-10-09 PROCEDURE — C9463 INJECTION, APREPITANT: HCPCS | Performed by: OBSTETRICS & GYNECOLOGY

## 2018-10-09 PROCEDURE — 96375 TX/PRO/DX INJ NEW DRUG ADDON: CPT

## 2018-10-09 PROCEDURE — 96374 THER/PROPH/DIAG INJ IV PUSH: CPT

## 2018-10-09 PROCEDURE — 96360 HYDRATION IV INFUSION INIT: CPT

## 2018-10-09 PROCEDURE — 96361 HYDRATE IV INFUSION ADD-ON: CPT

## 2018-10-09 PROCEDURE — 77386 HC IMRT DELIVERY COMPLEX: CPT | Performed by: RADIOLOGY

## 2018-10-09 PROCEDURE — 96413 CHEMO IV INFUSION 1 HR: CPT

## 2018-10-09 RX ORDER — SODIUM CHLORIDE 9 MG/ML
500 INJECTION, SOLUTION INTRAVENOUS ONCE
Status: COMPLETED | OUTPATIENT
Start: 2018-10-09 | End: 2018-10-09

## 2018-10-09 RX ORDER — FAMOTIDINE 10 MG/ML
20 INJECTION INTRAVENOUS ONCE AS NEEDED
Status: CANCELLED | OUTPATIENT
Start: 2018-10-09

## 2018-10-09 RX ORDER — PALONOSETRON 0.05 MG/ML
250 INJECTION, SOLUTION INTRAVENOUS ONCE
Status: CANCELLED | OUTPATIENT
Start: 2018-10-09

## 2018-10-09 RX ORDER — SODIUM CHLORIDE 9 MG/ML
500 INJECTION, SOLUTION INTRAVENOUS ONCE
Status: CANCELLED | OUTPATIENT
Start: 2018-10-09

## 2018-10-09 RX ORDER — PALONOSETRON 0.05 MG/ML
250 INJECTION, SOLUTION INTRAVENOUS ONCE
Status: COMPLETED | OUTPATIENT
Start: 2018-10-09 | End: 2018-10-09

## 2018-10-09 RX ADMIN — POTASSIUM CHLORIDE 500 ML/HR: 2 INJECTION, SOLUTION, CONCENTRATE INTRAVENOUS at 10:55

## 2018-10-09 RX ADMIN — DEXAMETHASONE SODIUM PHOSPHATE 12 MG: 4 INJECTION, SOLUTION INTRA-ARTICULAR; INTRALESIONAL; INTRAMUSCULAR; INTRAVENOUS; SOFT TISSUE at 12:02

## 2018-10-09 RX ADMIN — APREPITANT 130 MG: 130 INJECTION, EMULSION INTRAVENOUS at 12:02

## 2018-10-09 RX ADMIN — PALONOSETRON HYDROCHLORIDE 250 MCG: 0.25 INJECTION, SOLUTION INTRAVENOUS at 12:02

## 2018-10-09 RX ADMIN — CISPLATIN 75 MG: 1 INJECTION, SOLUTION INTRAVENOUS at 13:19

## 2018-10-09 RX ADMIN — SODIUM CHLORIDE 500 ML: 9 INJECTION, SOLUTION INTRAVENOUS at 14:27

## 2018-10-09 NOTE — PATIENT INSTRUCTIONS
**When to notify your Doctor**    Contact your doctor if you experience any of the following:    Pain, redness, swelling, or blistering near or at your administration site    Fever 100.4 or higher with or without chills    Nausea/ vomiting/ diarrhea not relieved with meds or is persistent    Develop mouth sores that prevent you from eating or drinking    Have black bowel movements, bruise easily, new rash, new headache, abdominal pain, swelling, or any signs of bleeding or new onset/ worsening shortness of breath    Have any new or worsening pain, numbness or tingling    Have any changes in your ability to perform daily activities, change in memory, or dizziness    Have any unexplained problems, questions, or concerns    Chemotherapy is in your body fluids for 48 hours- use precautions    It is safe for your family to have contact with you during treatment    Flush toilet twice after using, with lid closed    Your caregiver should wear gloves if handling your body fluids for 48 hours after your treatment, followed by thorough handwashing    Special precautions may be necessary if you are sexually active during your treatment (see additional info under “Chemotherapy” section)    [email or web address] is a helpful website for information    Patient Education   Cisplatin injection  What is this medicine?  CISPLATIN (SIS broderick tin) is a chemotherapy drug. It targets fast dividing cells, like cancer cells, and causes these cells to die. This medicine is used to treat many types of cancer like bladder, ovarian, and testicular cancers.  This medicine may be used for other purposes; ask your health care provider or pharmacist if you have questions.  COMMON BRAND NAME(S): Platinol, Platinol -AQ  What should I tell my health care provider before I take this medicine?  They need to know if you have any of these conditions:  -blood disorders  -hearing problems  -kidney disease  -recent or ongoing radiation therapy  -an unusual or  allergic reaction to cisplatin, carboplatin, other chemotherapy, other medicines, foods, dyes, or preservatives  -pregnant or trying to get pregnant  -breast-feeding  How should I use this medicine?  This drug is given as an infusion into a vein. It is administered in a hospital or clinic by a specially trained health care professional.  Talk to your pediatrician regarding the use of this medicine in children. Special care may be needed.  Overdosage: If you think you have taken too much of this medicine contact a poison control center or emergency room at once.  NOTE: This medicine is only for you. Do not share this medicine with others.  What if I miss a dose?  It is important not to miss a dose. Call your doctor or health care professional if you are unable to keep an appointment.  What may interact with this medicine?  -dofetilide  -foscarnet  -medicines for seizures  -medicines to increase blood counts like filgrastim, pegfilgrastim, sargramostim  -probenecid  -pyridoxine used with altretamine  -rituximab  -some antibiotics like amikacin, gentamicin, neomycin, polymyxin B, streptomycin, tobramycin  -sulfinpyrazone  -vaccines  -zalcitabine  Talk to your doctor or health care professional before taking any of these medicines:  -acetaminophen  -aspirin  -ibuprofen  -ketoprofen  -naproxen  This list may not describe all possible interactions. Give your health care provider a list of all the medicines, herbs, non-prescription drugs, or dietary supplements you use. Also tell them if you smoke, drink alcohol, or use illegal drugs. Some items may interact with your medicine.  What should I watch for while using this medicine?  Your condition will be monitored carefully while you are receiving this medicine. You will need important blood work done while you are taking this medicine.  This drug may make you feel generally unwell. This is not uncommon, as chemotherapy can affect healthy cells as well as cancer cells.  Report any side effects. Continue your course of treatment even though you feel ill unless your doctor tells you to stop.  In some cases, you may be given additional medicines to help with side effects. Follow all directions for their use.  Call your doctor or health care professional for advice if you get a fever, chills or sore throat, or other symptoms of a cold or flu. Do not treat yourself. This drug decreases your body's ability to fight infections. Try to avoid being around people who are sick.  This medicine may increase your risk to bruise or bleed. Call your doctor or health care professional if you notice any unusual bleeding.  Be careful brushing and flossing your teeth or using a toothpick because you may get an infection or bleed more easily. If you have any dental work done, tell your dentist you are receiving this medicine.  Avoid taking products that contain aspirin, acetaminophen, ibuprofen, naproxen, or ketoprofen unless instructed by your doctor. These medicines may hide a fever.  Do not become pregnant while taking this medicine. Women should inform their doctor if they wish to become pregnant or think they might be pregnant. There is a potential for serious side effects to an unborn child. Talk to your health care professional or pharmacist for more information. Do not breast-feed an infant while taking this medicine.  Drink fluids as directed while you are taking this medicine. This will help protect your kidneys.  Call your doctor or health care professional if you get diarrhea. Do not treat yourself.  What side effects may I notice from receiving this medicine?  Side effects that you should report to your doctor or health care professional as soon as possible:  -allergic reactions like skin rash, itching or hives, swelling of the face, lips, or tongue  -signs of infection - fever or chills, cough, sore throat, pain or difficulty passing urine  -signs of decreased platelets or bleeding -  bruising, pinpoint red spots on the skin, black, tarry stools, nosebleeds  -signs of decreased red blood cells - unusually weak or tired, fainting spells, lightheadedness  -breathing problems  -changes in hearing  -gout pain  -low blood counts - This drug may decrease the number of white blood cells, red blood cells and platelets. You may be at increased risk for infections and bleeding.  -nausea and vomiting  -pain, swelling, redness or irritation at the injection site  -pain, tingling, numbness in the hands or feet  -problems with balance, movement  -trouble passing urine or change in the amount of urine  Side effects that usually do not require medical attention (report to your doctor or health care professional if they continue or are bothersome):  -changes in vision  -loss of appetite  -metallic taste in the mouth or changes in taste  This list may not describe all possible side effects. Call your doctor for medical advice about side effects. You may report side effects to FDA at 3-877-FDA-9885.  Where should I keep my medicine?  This drug is given in a hospital or clinic and will not be stored at home.  NOTE: This sheet is a summary. It may not cover all possible information. If you have questions about this medicine, talk to your doctor, pharmacist, or health care provider.  © 2018 Elsevier/Gold Standard (2009-03-24 14:40:54)     Patient Education     Chemotherapy  Chemotherapy is the use of medicines to stop or slow the growth of cancer cells. Depending on the type and stage of your cancer, you may have chemotherapy to:  · Cure your cancer.  · Slow the progression of your cancer.  · Ease your cancer symptoms.  · Improve the benefits of radiation treatment.  · Shrink a tumor before surgery.  · Rid the body of cancer cells that remain after a tumor is surgically removed.  How is chemotherapy given?  Chemotherapy may be given:  · By mouth in liquid or pill form.  · Through a thin tube that is inserted into a  vein or artery.  · By getting a shot.  · By rubbing a cream or ointment on your skin.  · Through liquids that are placed directly into various areas of the body, such as the abdomen, chest, or bladder.  How often is chemotherapy given?  Chemotherapy may be given continuously over time, or it may be given in cycles. For example, you may take the medicine for one week out of every month.  For how long will I need chemotherapy treatments?  The length of treatment depends on many factors, including:  · The type of cancer.  · Whether the cancer has spread.  · How you respond to the chemotherapy.  · Whether you develop side effects.  Some types of chemotherapy medicine are given only one time. Others are given for months, years, or for life.  What safety precautions must I take while on chemotherapy?  Chemotherapy medicines are very strong. They will be in all of your bodily fluids, including your urine, stool, saliva, sweat, tears, vaginal secretions, and semen. You must carefully follow some safety precautions to prevent harm to others while you are using these medicines. Here are some recommended precautions:  · Make sure that people who help care for you wear disposable gloves if they are going to come into contact with any of your bodily fluids. Women who are pregnant or breastfeeding should not handle any of your bodily fluids.  · Wash any clothes, towels, and linens that may have your bodily fluids on them twice in a washing machine using very hot water.  · Dispose of adult diapers, tampons, and sanitary napkins by first sealing them in a plastic bag.  · Use a condom when having sex for at least 2 weeks after receiving your chemotherapy.  · Do not share beverages or food.  · Keep your chemotherapy medicines in their original bottles. Keep them in a high, safe location, away from children. Do not expose them to heat or moisture. Do not put them in containers with other types of medicines.  · Dispose of all wrappers  for your chemotherapy medicines by sealing them in a separate plastic bag.  · Do not throw away extra medicine, and do not flush it down the toilet. Take medicine that you are not going to use to your health care provider's office where it can be disposed of properly.  · Follow your health care provider’s directions for the proper disposal of needles, IV tubing, and other medical supplies that have come into contact with your chemotherapy medicines.  · If you are issued a hazardous waste container, make sure you understand the directions for using it.  · Wash your hands thoroughly with warm water and soap after using the bathroom. Dry your hands with disposable paper towels.  · When using the toilet:  ¨ Flush it twice after each use, including after vomiting.  ¨ Close the lid of the toilet prior to flushing. This helps to avoid splashing.  ¨ Both men and women should sit to use the toilet. This helps avoid splashing.  What are the side effects of chemotherapy?  Side effects depend on a variety of factors, including:  · The specific type of chemotherapy medicine used.  · The dosage.  · How long the medicine is used for.  · Your overall health.  Some of the side effects you may experience include:  · Fatigue and decreased energy.  · Decreased appetite.  · Changes in your sense of smell or taste.  · Nausea.  · Vomiting.  · Constipation or diarrhea.  · Hair loss.  · Increased susceptibility to infection.  · Easy bleeding.  · Mouth sores.  · Burning or tingling in the hands or feet.  · Memory problems.  This information is not intended to replace advice given to you by your health care provider. Make sure you discuss any questions you have with your health care provider.  Document Released: 10/14/2008 Document Revised: 07/07/2017 Document Reviewed: 05/26/2015  light Interactive Patient Education © 2018 [employer]     Patient Education     Diarrhea, Adult  Diarrhea is frequent loose and watery bowel movements.  Diarrhea can make you feel weak and cause you to become dehydrated. Dehydration can make you tired and thirsty, cause you to have a dry mouth, and decrease how often you urinate. Diarrhea typically lasts 2-3 days. However, it can last longer if it is a sign of something more serious. It is important to treat your diarrhea as told by your health care provider.  Follow these instructions at home:  Eating and drinking    Follow these recommendations as told by your health care provider:  · Take an oral rehydration solution (ORS). This is a drink that is sold at pharmacies and retail stores.  · Drink clear fluids, such as water, ice chips, diluted fruit juice, and low-calorie sports drinks.  · Eat bland, easy-to-digest foods in small amounts as you are able. These foods include bananas, applesauce, rice, lean meats, toast, and crackers.  · Avoid drinking fluids that contain a lot of sugar or caffeine, such as energy drinks, sports drinks, and soda.  · Avoid alcohol.  · Avoid spicy or fatty foods.  General instructions  · Drink enough fluid to keep your urine clear or pale yellow.  · Wash your hands often. If soap and water are not available, use hand .  · Make sure that all people in your household wash their hands well and often.  · Take over-the-counter and prescription medicines only as told by your health care provider.  · Rest at home while you recover.  · Watch your condition for any changes.  · Take a warm bath to relieve any burning or pain from frequent diarrhea episodes.  · Keep all follow-up visits as told by your health care provider. This is important.  Contact a health care provider if:  · You have a fever.  · Your diarrhea gets worse.  · You have new symptoms.  · You cannot keep fluids down.  · You feel light-headed or dizzy.  · You have a headache  · You have muscle cramps.  Get help right away if:  · You have chest pain.  · You feel extremely weak or you faint.  · You have bloody or black  stools or stools that look like tar.  · You have severe pain, cramping, or bloating in your abdomen.  · You have trouble breathing or you are breathing very quickly.  · Your heart is beating very quickly.  · Your skin feels cold and clammy.  · You feel confused.  · You have signs of dehydration, such as:  ¨ Dark urine, very little urine, or no urine.  ¨ Cracked lips.  ¨ Dry mouth.  ¨ Sunken eyes.  ¨ Sleepiness.  ¨ Weakness.  This information is not intended to replace advice given to you by your health care provider. Make sure you discuss any questions you have with your health care provider.  Document Released: 12/08/2003 Document Revised: 04/27/2017 Document Reviewed: 08/23/2016  [employer]vier Interactive Patient Education © 2017 [employer]

## 2018-10-09 NOTE — PROGRESS NOTES
(1010) Pt to floor to receive cisplatin (concurrent w/ XRT) for vaginal CA. VSS, pt was assessed by RN. Darnell #22 placed @ RFA, w/ +flush/ +blood return noted. Pre-chemo IVF, premeds, and chemo ordered via pharmacy.     (1055) 1L NSS w/ 20mEqKCl and 1gram mag up/2hrs, per pre-hydration orders.    (1202) Pt received ordered premeds: Aloxi 0.25mg IV, Cinvanti 130mg IV, and Decadron 12mg IV.    (1319)  Pre-chemo IVF complete, line flushed thoroughly. Cisplatin 75mg/ NSS up per orders, set to infuse over 1hr. Prior to infusing, +F/ +BR noted via ML, and pt/ BSA/ chemo/ dose/ orders/ consent were verified by 2 chemo RNS.     (1427) Cisplatin infusion complete w/o incident, pt w/o c/o. +F/ +BR noted via ML. 500cc NSS (post chemo IVF) up/ 1hr, per orders.    (1535) Post chemo IVF complete w/o incident. +F/ +BR noted via ML. Darnell flushed and removed, gauze w/ tape placed @ site, and pressure applied until site CDI.    Chemo D/C instructions were reviewed, and pt verbalizes understanding, and received copy of AVS to take home as reference. Pt D/C'd to home, in stable condition and w/o c/o.

## 2018-10-09 NOTE — PROGRESS NOTES
Ms. Raegan Young is a 72 y.o.yo lady with Cancer Staging  Vaginal cancer (CMS/HCC) ([facility])  Staging form: Vagina, AJCC 8th Edition  - Clinical stage from 9/12/2018: FIGO Stage III (cT3, cN1, cM0) - Signed by Trev Jimenez MD on 9/21/2018  . She presents for chemotherapy.    No history exists.     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         Current Assessment & Plan     KS by exam  Labs reviewed OK for weekly cisplatin                 She denies nausea/vomiting, denies neuropathy, denies excessive fatigue, denies constipation.        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[facility]) 09/24/2018   • Vaginal cancer (CMS/HCC) ([facility]) 09/21/2018        Medical History:   Past Medical History:   Diagnosis Date   • Disease of thyroid gland    • Diverticulitis    • Hypertension        Surgical History:   Past Surgical History:   Procedure Laterality Date   • CHOLECYSTECTOMY     • DILATION AND CURETTAGE OF UTERUS  02/2018   • ROTATOR CUFF REPAIR Right        Social History:   Social History     Social History   • Marital status:      Spouse name: don   • Number of children: 4   • Years of education: N/A     Occupational History   • retired/        Social History Main Topics   • Smoking status: Former  Smoker     Packs/day: 1.00     Years: 40.00     Start date: 1957     Quit date: 1998   • Smokeless tobacco: Never Used   • Alcohol use No   • Drug use: No   • Sexual activity: No     Other Topics Concern   • Not on file     Social History Narrative   • No narrative on file       Family History:   Family History   Problem Relation Age of Onset   • Breast cancer Neg Hx    • Cancer Neg Hx    • Colon cancer Neg Hx    • Ovarian cancer Neg Hx        Allergies: No known allergies    Medications:      Medication List          Accurate as of 10/9/18  9:27 AM. If you have any questions, ask your nurse or doctor.               CONTINUE taking these medications    benazepril 10 mg tablet  Commonly known as:  LOTENSIN  Take 10 mg by mouth daily.     BIOFLEX ORAL  Take 2 capsules by mouth daily.     bumetanide 1 mg tablet  Commonly known as:  BUMEX  Take 1 mg by mouth daily.     cranberry conc-C-bacillus coag 250-30-50 mg-mg-million tablet  Commonly known as:  AZO CRANBERRY + PROBIOTIC  Take by mouth daily.     LOVAZA 1 gram capsule  Generic drug:  omega-3 acid ethyl esters  take 2 capsule by oral route 2 times every day     PROTONIX 40 mg EC tablet  Generic drug:  pantoprazole  40 mg.     TIROSINT 88 mcg capsule  Generic drug:  levothyroxine sodium  take 1 capsule by oral route  every day            Review of Systems  All other systems reviewed and negative except as noted in the HPI.    Labs  I have reviewed the patient's labs.  Current labs are within normal limits.      Physical Exam:  Vitals:    10/09/18 0853   BP: 119/74   Pulse: 90   Resp: 16   Temp: 36.7 °C (98 °F)     Body mass index is 34.88 kg/m².  General: well-developed, well-nourished, no apparent distress  Neck: supple, no masses  Lymphatic: no supraclavicular, cervical, or inguinal adenopathy  Respiratory: lungs clear to auscultation bilaterally, normal respiratory effort  Cardiovascular: regular rate and rhythm, no murmurs, rubs, gallops.   Extremity: no  clubbing, cyanosis, edema  GI: abdomen soft, non-distended, non-tender, no hepatosplenomegaly, no masses  Neurologic: alert & oriented x3, no gross deficits  Psychiatric: mood and affect normal  Musculoskeletal: no deformity or gross strength deficit  Gynecologic: mild erythema, tend at biopsy sight.No abcess. Lef vulvar mass 3-4cm      ASSESSMENT/PLAN:  Assessment   72 y.o. lady with  Cancer Staging  Vaginal cancer (CMS/HCC) ([facility])  Staging form: Vagina, AJCC 8th Edition  - Clinical stage from 9/12/2018: FIGO Stage III (cT3, cN1, cM0) - Signed by Trev Jimenez MD on 9/21/2018   who presents for chemotherapy.  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         Current Assessment & Plan     DC by exam  Labs reviewed OK for weekly cisplatin               Trev Jimenez MD

## 2018-10-10 ENCOUNTER — HOSPITAL ENCOUNTER (OUTPATIENT)
Dept: RADIATION ONCOLOGY | Facility: HOSPITAL | Age: 73
Setting detail: RADIATION/ONCOLOGY SERIES
Discharge: HOME | End: 2018-10-10
Attending: RADIOLOGY
Payer: MEDICARE

## 2018-10-10 VITALS
WEIGHT: 183 LBS | HEART RATE: 81 BPM | DIASTOLIC BLOOD PRESSURE: 67 MMHG | BODY MASS INDEX: 35.74 KG/M2 | SYSTOLIC BLOOD PRESSURE: 146 MMHG

## 2018-10-10 DIAGNOSIS — C52 VAGINAL CANCER (CMS/HCC): Primary | ICD-10-CM

## 2018-10-10 PROCEDURE — 77386 HC IMRT DELIVERY COMPLEX: CPT | Performed by: RADIOLOGY

## 2018-10-10 ASSESSMENT — PAIN SCALES - GENERAL: PAINLEVEL: 0-NO PAIN

## 2018-10-10 NOTE — PROGRESS NOTES
Patient ID:  Raegan Young is a 72 y.o. female.    Referring Physician:   Trev Jimenez MD    Primary Care Provider:  Luan Lockhart MD     [facility]   Patient Care Team     Relationship Specialty Notifications Start End   Luan Lockhart MD PCP - General   12/30/17    Venkatesh Travis MD Consulting Physician Gynecology  9/12/18    Akbar Romero MD Radiation Oncologist Radiation Oncology  9/21/18    Trev Jimenez MD Consulting Physician Gynecologic Oncology  9/21/18    Tereza Dhillon RN Registered Nurse   10/1/18          Cancer Staging Information:  Cancer Staging  Vaginal cancer (CMS/HCC) ([facility])  Staging form: Vagina, AJCC 8th Edition  - Clinical stage from 9/12/2018: FIGO Stage III (cT3, cN1, cM0) - Signed by Trev Jimenez MD on 9/21/2018            TREATMENT PLAN SUMMARY:    Radiation Therapy   Component Value Date    ARIATXDATES  10/10/2018     Course End Date: : @ --  First Treatment Date: 2018-09-26 @ 10:38  Last Treatment Date: 2018-10-10 @ 08:23      TXELAPSEDDAY 14 days as of [id number] 10/10/2018    COURSEID 1 10/10/2018    PLANID PELVIS 10/10/2018    PLANID CD PELVIS 10/10/2018    PRESCRIBEDDO 2 10/10/2018    PRESCRIBEDDO 2 10/10/2018    DOSAGEGIVENT 16.0000 10/05/2018    FRACTIONSTRE 10 of 25 10/10/2018       Subjective:    Raegan had a bout of bad diarrhea which is now resolved with Immodium. She received chemo yesterday, and was seen and examined by Dr. Jimenez.  She received full dose  The vaginal skin is without significant redness or irritation.        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     • bumetanide (BUMEX) 1 mg tablet Take 1 mg by mouth daily.     • cranberry conc-C-bacillus coag (AZO CRANBERRY + PROBIOTIC) 250-30-50 mg-mg-million tablet Take by mouth daily.     • levothyroxine sodium (TIROSINT) 88 mcg capsule take 1 capsule by oral route  every day     • omega-3 acid ethyl esters (LOVAZA) 1 gram capsule take 2 capsule by oral route 2 times every day     • pantoprazole (PROTONIX) 40 mg EC tablet 40 mg.     • rutin/hesp/bioflav/C/[id number] (BIOFLEX ORAL) Take 2 capsules by mouth daily.       No current facility-administered medications for this encounter.        Allergies  Allergies   Allergen Reactions   • No Known Allergies        Physical Exam:    BP (!) 146/67 (BP Location: Left upper arm, Patient Position: Sitting)   Pulse 81   Wt 83 kg (183 lb)   BMI 35.74 kg/m²       Performance Status:       PAIN ASSESSMENT:  Score Zero  Location    Pain Intervention        Plan:    Continue per plan.    Akbar Romero MD

## 2018-10-11 ENCOUNTER — HOSPITAL ENCOUNTER (OUTPATIENT)
Dept: RADIATION ONCOLOGY | Facility: HOSPITAL | Age: 73
Setting detail: RADIATION/ONCOLOGY SERIES
Discharge: HOME | End: 2018-10-11
Attending: RADIOLOGY
Payer: MEDICARE

## 2018-10-11 PROCEDURE — 77336 RADIATION PHYSICS CONSULT: CPT | Performed by: RADIOLOGY

## 2018-10-11 PROCEDURE — 77386 HC IMRT DELIVERY COMPLEX: CPT | Performed by: RADIOLOGY

## 2018-10-12 ENCOUNTER — HOSPITAL ENCOUNTER (OUTPATIENT)
Dept: RADIATION ONCOLOGY | Facility: HOSPITAL | Age: 73
Setting detail: RADIATION/ONCOLOGY SERIES
Discharge: HOME | End: 2018-10-12
Attending: RADIOLOGY
Payer: MEDICARE

## 2018-10-12 PROCEDURE — 77386 HC IMRT DELIVERY COMPLEX: CPT | Performed by: RADIOLOGY

## 2018-10-15 ENCOUNTER — HOSPITAL ENCOUNTER (OUTPATIENT)
Dept: RADIATION ONCOLOGY | Facility: HOSPITAL | Age: 73
Setting detail: RADIATION/ONCOLOGY SERIES
Discharge: HOME | End: 2018-10-15
Attending: RADIOLOGY
Payer: MEDICARE

## 2018-10-15 PROCEDURE — 77386 HC IMRT DELIVERY COMPLEX: CPT | Performed by: RADIOLOGY

## 2018-10-15 PROCEDURE — 77338 DESIGN MLC DEVICE FOR IMRT: CPT | Performed by: RADIOLOGY

## 2018-10-16 ENCOUNTER — HOSPITAL ENCOUNTER (OUTPATIENT)
Dept: RADIATION ONCOLOGY | Facility: HOSPITAL | Age: 73
Setting detail: RADIATION/ONCOLOGY SERIES
Discharge: HOME | End: 2018-10-16
Attending: RADIOLOGY
Payer: MEDICARE

## 2018-10-16 ENCOUNTER — HOSPITAL ENCOUNTER (OUTPATIENT)
Dept: INPATIENT UNIT | Facility: HOSPITAL | Age: 73
Setting detail: INFUSION SERIES
Discharge: HOME | End: 2018-10-16
Attending: OBSTETRICS & GYNECOLOGY
Payer: MEDICARE

## 2018-10-16 VITALS
OXYGEN SATURATION: 98 % | WEIGHT: 183 LBS | HEART RATE: 96 BPM | TEMPERATURE: 96.2 F | SYSTOLIC BLOOD PRESSURE: 149 MMHG | RESPIRATION RATE: 18 BRPM | HEIGHT: 60 IN | BODY MASS INDEX: 35.93 KG/M2 | DIASTOLIC BLOOD PRESSURE: 81 MMHG

## 2018-10-16 DIAGNOSIS — C52 VAGINAL CANCER (CMS/HCC): ICD-10-CM

## 2018-10-16 PROCEDURE — 96360 HYDRATION IV INFUSION INIT: CPT

## 2018-10-16 PROCEDURE — 96374 THER/PROPH/DIAG INJ IV PUSH: CPT

## 2018-10-16 PROCEDURE — C9463 INJECTION, APREPITANT: HCPCS | Performed by: OBSTETRICS & GYNECOLOGY

## 2018-10-16 PROCEDURE — 96375 TX/PRO/DX INJ NEW DRUG ADDON: CPT

## 2018-10-16 PROCEDURE — 25800000 HC PHARMACY IV SOLUTIONS: Performed by: OBSTETRICS & GYNECOLOGY

## 2018-10-16 PROCEDURE — 63600000 HC DRUGS/DETAIL CODE: Performed by: OBSTETRICS & GYNECOLOGY

## 2018-10-16 PROCEDURE — 96413 CHEMO IV INFUSION 1 HR: CPT

## 2018-10-16 PROCEDURE — 96361 HYDRATE IV INFUSION ADD-ON: CPT

## 2018-10-16 PROCEDURE — 77386 HC IMRT DELIVERY COMPLEX: CPT | Performed by: RADIOLOGY

## 2018-10-16 RX ORDER — PALONOSETRON 0.05 MG/ML
0.25 INJECTION, SOLUTION INTRAVENOUS ONCE
Status: COMPLETED | OUTPATIENT
Start: 2018-10-16 | End: 2018-10-16

## 2018-10-16 RX ORDER — PALONOSETRON 0.05 MG/ML
250 INJECTION, SOLUTION INTRAVENOUS ONCE
Status: DISCONTINUED | OUTPATIENT
Start: 2018-10-16 | End: 2018-10-16

## 2018-10-16 RX ORDER — SODIUM CHLORIDE 9 MG/ML
1000 INJECTION, SOLUTION INTRAVENOUS CONTINUOUS
Status: DISCONTINUED | OUTPATIENT
Start: 2018-10-16 | End: 2018-10-17 | Stop reason: HOSPADM

## 2018-10-16 RX ORDER — SODIUM CHLORIDE 9 MG/ML
500 INJECTION, SOLUTION INTRAVENOUS ONCE
Status: COMPLETED | OUTPATIENT
Start: 2018-10-16 | End: 2018-10-16

## 2018-10-16 RX ADMIN — SODIUM CHLORIDE 500 ML: 9 INJECTION, SOLUTION INTRAVENOUS at 12:43

## 2018-10-16 RX ADMIN — DEXAMETHASONE SODIUM PHOSPHATE 12 MG: 4 INJECTION, SOLUTION INTRA-ARTICULAR; INTRALESIONAL; INTRAMUSCULAR; INTRAVENOUS; SOFT TISSUE at 10:56

## 2018-10-16 RX ADMIN — CISPLATIN 75 MG: 1 INJECTION, SOLUTION INTRAVENOUS at 11:34

## 2018-10-16 RX ADMIN — SODIUM CHLORIDE 1000 ML: 9 INJECTION, SOLUTION INTRAVENOUS at 09:37

## 2018-10-16 RX ADMIN — PALONOSETRON 250 MCG: 0.05 INJECTION, SOLUTION INTRAVENOUS at 10:56

## 2018-10-16 RX ADMIN — APREPITANT 130 MG: 130 INJECTION, EMULSION INTRAVENOUS at 10:57

## 2018-10-16 ASSESSMENT — ENCOUNTER SYMPTOMS
OCCASIONAL FEELINGS OF UNSTEADINESS: 0
DEPRESSION: 0
LOSS OF SENSATION IN FEET: 0

## 2018-10-16 NOTE — PATIENT INSTRUCTIONS
**When to notify your Doctor**    Contact your doctor if you experience any of the following:    Pain, redness, swelling, or blistering near or at your administration site    Fever 100.4 or higher with or without chills    Nausea/ vomiting/ diarrhea not relieved with meds or is persistent    Develop mouth sores that prevent you from eating or drinking    Have black bowel movements, bruise easily, new rash, new headache, abdominal pain, swelling, or any signs of bleeding or new onset/ worsening shortness of breath    Have any new or worsening pain, numbness or tingling    Have any changes in your ability to perform daily activities, change in memory, or dizziness    Have any unexplained problems, questions, or concerns    Chemotherapy is in your body fluids for 48 hours- use precautions    It is safe for your family to have contact with you during treatment    Flush toilet twice after using, with lid closed    Your caregiver should wear gloves if handling your body fluids for 48 hours after your treatment, followed by thorough handwashing    Special precautions may be necessary if you are sexually active during your treatment (see additional info under “Chemotherapy” section)    [employer]com is a helpful website for information    Patient Education   Cisplatin injection  What is this medicine?  CISPLATIN (SIS broderick tin) is a chemotherapy drug. It targets fast dividing cells, like cancer cells, and causes these cells to die. This medicine is used to treat many types of cancer like bladder, ovarian, and testicular cancers.  This medicine may be used for other purposes; ask your health care provider or pharmacist if you have questions.  COMMON BRAND NAME(S): Platinol, Platinol -AQ  What should I tell my health care provider before I take this medicine?  They need to know if you have any of these conditions:  -blood disorders  -hearing problems  -kidney disease  -recent or ongoing radiation therapy  -an unusual or  allergic reaction to cisplatin, carboplatin, other chemotherapy, other medicines, foods, dyes, or preservatives  -pregnant or trying to get pregnant  -breast-feeding  How should I use this medicine?  This drug is given as an infusion into a vein. It is administered in a hospital or clinic by a specially trained health care professional.  Talk to your pediatrician regarding the use of this medicine in children. Special care may be needed.  Overdosage: If you think you have taken too much of this medicine contact a poison control center or emergency room at once.  NOTE: This medicine is only for you. Do not share this medicine with others.  What if I miss a dose?  It is important not to miss a dose. Call your doctor or health care professional if you are unable to keep an appointment.  What may interact with this medicine?  -dofetilide  -foscarnet  -medicines for seizures  -medicines to increase blood counts like filgrastim, pegfilgrastim, sargramostim  -probenecid  -pyridoxine used with altretamine  -rituximab  -some antibiotics like amikacin, gentamicin, neomycin, polymyxin B, streptomycin, tobramycin  -sulfinpyrazone  -vaccines  -zalcitabine  Talk to your doctor or health care professional before taking any of these medicines:  -acetaminophen  -aspirin  -ibuprofen  -ketoprofen  -naproxen  This list may not describe all possible interactions. Give your health care provider a list of all the medicines, herbs, non-prescription drugs, or dietary supplements you use. Also tell them if you smoke, drink alcohol, or use illegal drugs. Some items may interact with your medicine.  What should I watch for while using this medicine?  Your condition will be monitored carefully while you are receiving this medicine. You will need important blood work done while you are taking this medicine.  This drug may make you feel generally unwell. This is not uncommon, as chemotherapy can affect healthy cells as well as cancer cells.  Report any side effects. Continue your course of treatment even though you feel ill unless your doctor tells you to stop.  In some cases, you may be given additional medicines to help with side effects. Follow all directions for their use.  Call your doctor or health care professional for advice if you get a fever, chills or sore throat, or other symptoms of a cold or flu. Do not treat yourself. This drug decreases your body's ability to fight infections. Try to avoid being around people who are sick.  This medicine may increase your risk to bruise or bleed. Call your doctor or health care professional if you notice any unusual bleeding.  Be careful brushing and flossing your teeth or using a toothpick because you may get an infection or bleed more easily. If you have any dental work done, tell your dentist you are receiving this medicine.  Avoid taking products that contain aspirin, acetaminophen, ibuprofen, naproxen, or ketoprofen unless instructed by your doctor. These medicines may hide a fever.  Do not become pregnant while taking this medicine. Women should inform their doctor if they wish to become pregnant or think they might be pregnant. There is a potential for serious side effects to an unborn child. Talk to your health care professional or pharmacist for more information. Do not breast-feed an infant while taking this medicine.  Drink fluids as directed while you are taking this medicine. This will help protect your kidneys.  Call your doctor or health care professional if you get diarrhea. Do not treat yourself.  What side effects may I notice from receiving this medicine?  Side effects that you should report to your doctor or health care professional as soon as possible:  -allergic reactions like skin rash, itching or hives, swelling of the face, lips, or tongue  -signs of infection - fever or chills, cough, sore throat, pain or difficulty passing urine  -signs of decreased platelets or bleeding -  bruising, pinpoint red spots on the skin, black, tarry stools, nosebleeds  -signs of decreased red blood cells - unusually weak or tired, fainting spells, lightheadedness  -breathing problems  -changes in hearing  -gout pain  -low blood counts - This drug may decrease the number of white blood cells, red blood cells and platelets. You may be at increased risk for infections and bleeding.  -nausea and vomiting  -pain, swelling, redness or irritation at the injection site  -pain, tingling, numbness in the hands or feet  -problems with balance, movement  -trouble passing urine or change in the amount of urine  Side effects that usually do not require medical attention (report to your doctor or health care professional if they continue or are bothersome):  -changes in vision  -loss of appetite  -metallic taste in the mouth or changes in taste  This list may not describe all possible side effects. Call your doctor for medical advice about side effects. You may report side effects to FDA at 4-731-KBG-4110.  Where should I keep my medicine?  This drug is given in a hospital or clinic and will not be stored at home.  NOTE: This sheet is a summary. It may not cover all possible information. If you have questions about this medicine, talk to your doctor, pharmacist, or health care provider.  © 2018 Elsevier/Gold Standard (2009-03-24 14:40:54)       Patient Education     Diarrhea, Adult  Diarrhea is frequent loose and watery bowel movements. Diarrhea can make you feel weak and cause you to become dehydrated. Dehydration can make you tired and thirsty, cause you to have a dry mouth, and decrease how often you urinate. Diarrhea typically lasts 2-3 days. However, it can last longer if it is a sign of something more serious. It is important to treat your diarrhea as told by your health care provider.  Follow these instructions at home:  Eating and drinking    Follow these recommendations as told by your health care  provider:  · Take an oral rehydration solution (ORS). This is a drink that is sold at pharmacies and retail stores.  · Drink clear fluids, such as water, ice chips, diluted fruit juice, and low-calorie sports drinks.  · Eat bland, easy-to-digest foods in small amounts as you are able. These foods include bananas, applesauce, rice, lean meats, toast, and crackers.  · Avoid drinking fluids that contain a lot of sugar or caffeine, such as energy drinks, sports drinks, and soda.  · Avoid alcohol.  · Avoid spicy or fatty foods.  General instructions  · Drink enough fluid to keep your urine clear or pale yellow.  · Wash your hands often. If soap and water are not available, use hand .  · Make sure that all people in your household wash their hands well and often.  · Take over-the-counter and prescription medicines only as told by your health care provider.  · Rest at home while you recover.  · Watch your condition for any changes.  · Take a warm bath to relieve any burning or pain from frequent diarrhea episodes.  · Keep all follow-up visits as told by your health care provider. This is important.  Contact a health care provider if:  · You have a fever.  · Your diarrhea gets worse.  · You have new symptoms.  · You cannot keep fluids down.  · You feel light-headed or dizzy.  · You have a headache  · You have muscle cramps.  Get help right away if:  · You have chest pain.  · You feel extremely weak or you faint.  · You have bloody or black stools or stools that look like tar.  · You have severe pain, cramping, or bloating in your abdomen.  · You have trouble breathing or you are breathing very quickly.  · Your heart is beating very quickly.  · Your skin feels cold and clammy.  · You feel confused.  · You have signs of dehydration, such as:  ¨ Dark urine, very little urine, or no urine.  ¨ Cracked lips.  ¨ Dry mouth.  ¨ Sunken eyes.  ¨ Sleepiness.  ¨ Weakness.  This information is not intended to replace advice  given to you by your health care provider. Make sure you discuss any questions you have with your health care provider.  Document Released: 12/08/2003 Document Revised: 04/27/2017 Document Reviewed: 08/23/2016  Elsevier Interactive Patient Education © 2017 Elsevier Inc.

## 2018-10-16 NOTE — PROGRESS NOTES
(7646) Pt to floor to receive cisplatin (concurrent w/ XRT) for vaginal CA. VSS, pt was assessed by RN. Darnell #22 placed @ LFA, w/ +flush/ +blood return noted. Ordered labs drawn on 10/8/18: results reviewed: within defined parameters, and is ok to proceed w/ tx as is ordered. Premeds, pre chemo IVF,  and chemo ordered via pharmacy.    (4109) RN s/w Dr Trev Jimenez: received verbal order to change pre chemo IVF to 1L NSS (no K+, no Mg++), to infuse over 1hr. Prechemo IVF up per orders.    (4027) Pre chemo IVF complete. Pt received ordered premeds: Aloxi 0.25mg IV, Cinvanti 130mg IV, and Decadron 12mg IV.    (5904)  Cisplatin 75mg/ NSS up per orders, set to infuse over 1hr. Prior to infusing, +F/ +BR noted via ML, and pt/ BSA/ chemo/ dose/ orders/ consent were verified by 2 chemo RNS.     (0913) Cisplatin infusion complete w/o incident, pt w/o c/o. +F/ +BR noted via ML. 500cc NSS (post chemo IVF) up/ 1hr, per orders.    (0960) Post chemo IVF complete w/o incident. +F/ +BR noted via ML. Darnell flushed and removed, gauze w/ tape placed @ site, and pressure applied until site CDI.    Chemo D/C instructions were reviewed, and pt verbalizes understanding, and received copy of AVS to take home as reference. Pt D/C'd to home, w/ her granddaughter, in stable condition and w/o c/o.

## 2018-10-17 ENCOUNTER — HOSPITAL ENCOUNTER (OUTPATIENT)
Dept: RADIATION ONCOLOGY | Facility: HOSPITAL | Age: 73
Setting detail: RADIATION/ONCOLOGY SERIES
Discharge: HOME | End: 2018-10-17
Attending: RADIOLOGY
Payer: MEDICARE

## 2018-10-17 VITALS
DIASTOLIC BLOOD PRESSURE: 80 MMHG | WEIGHT: 183 LBS | HEART RATE: 93 BPM | SYSTOLIC BLOOD PRESSURE: 149 MMHG | BODY MASS INDEX: 35.74 KG/M2

## 2018-10-17 DIAGNOSIS — Z51.11 ENCOUNTER FOR ANTINEOPLASTIC CHEMOTHERAPY: Primary | ICD-10-CM

## 2018-10-17 DIAGNOSIS — C52 VAGINAL CANCER (CMS/HCC): Primary | ICD-10-CM

## 2018-10-17 PROCEDURE — 77386 HC IMRT DELIVERY COMPLEX: CPT | Performed by: RADIOLOGY

## 2018-10-17 ASSESSMENT — PAIN SCALES - GENERAL: PAINLEVEL: 2

## 2018-10-17 NOTE — PROGRESS NOTES
Patient ID:  Raegan Young is a 72 y.o. female.    Referring Physician:   Trev Jimenez MD    Primary Care Provider:  Luan Lockhart MD     [facility]   Patient Care Team     Relationship Specialty Notifications Start End   Luan Lockhart MD PCP - General   12/30/17    Venkatesh Travis MD Consulting Physician Gynecology  9/12/18    Akbar Romero MD Radiation Oncologist Radiation Oncology  9/21/18    Trev Jimenez MD Consulting Physician Gynecologic Oncology  9/21/18    Tereza Dhillon, RN Registered Nurse   10/1/18    Cary Oliver RN Registered Nurse   10/16/18          Cancer Staging Information:  Cancer Staging  Vaginal cancer (CMS/HCC) ([facility])  Staging form: Vagina, AJCC 8th Edition  - Clinical stage from 9/12/2018: FIGO Stage III (cT3, cN1, cM0) - Signed by Trev Jimenez MD on 9/21/2018            TREATMENT PLAN SUMMARY:    Radiation Therapy   Component Value Date    ARIATXDATES  10/17/2018     Course End Date: : @ --  First Treatment Date: 2018-09-26 @ 10:38  Last Treatment Date: 2018-10-17 @ 09:24      TXELAPSEDDAY 21 days as of [id number] 10/17/2018    COURSEID 1 10/17/2018    PLANID PELVIS 10/17/2018    PLANID CD PELVIS 10/17/2018    PRESCRIBEDDO 2 10/17/2018    PRESCRIBEDDO 2 10/17/2018    DOSAGEGIVENT 16.0000 10/05/2018    FRACTIONSTRE 15 of 25 10/17/2018       Subjective:    See Flowsheet for skin assessment        Problem List:  Patient Active Problem List    Diagnosis Date Noted   • Dysuria 10/02/2018   • Chemotherapy management, encounter for 09/25/2018   • 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     • bumetanide (BUMEX) 1 mg tablet Take 1 mg by mouth daily.     • cranberry conc-C-bacillus coag (AZO CRANBERRY + PROBIOTIC) 250-30-50 mg-mg-million tablet Take by mouth daily.     • levothyroxine sodium (TIROSINT) 88 mcg capsule take 1 capsule by oral route  every day     • omega-3 acid ethyl esters (LOVAZA) 1 gram capsule take 2 capsule by oral route 2 times every day     • pantoprazole (PROTONIX) 40 mg EC tablet 40 mg.     • rutin/hesp/bioflav/C/[id number] (BIOFLEX ORAL) Take 2 capsules by mouth daily.       No current facility-administered medications for this encounter.        Allergies  Allergies   Allergen Reactions   • No Known Allergies        Physical Exam:    BP (!) 149/80 (BP Location: Left upper arm, Patient Position: Sitting)   Pulse 93   Wt 83 kg (183 lb)   BMI 35.74 kg/m²       Performance Status:       PAIN ASSESSMENT:  Score Three  Location VAGINA  Pain Intervention Medication (See MAR)      Plan:    Pt doing well    Continue per plan    Akbar Romero MD

## 2018-10-18 ENCOUNTER — HOSPITAL ENCOUNTER (OUTPATIENT)
Dept: RADIATION ONCOLOGY | Facility: HOSPITAL | Age: 73
Setting detail: RADIATION/ONCOLOGY SERIES
Discharge: HOME | End: 2018-10-18
Attending: RADIOLOGY
Payer: MEDICARE

## 2018-10-18 PROCEDURE — 77386 HC IMRT DELIVERY COMPLEX: CPT | Performed by: RADIOLOGY

## 2018-10-19 ENCOUNTER — HOSPITAL ENCOUNTER (OUTPATIENT)
Dept: RADIATION ONCOLOGY | Facility: HOSPITAL | Age: 73
Setting detail: RADIATION/ONCOLOGY SERIES
Discharge: HOME | End: 2018-10-19
Attending: RADIOLOGY
Payer: MEDICARE

## 2018-10-19 PROCEDURE — 77386 HC IMRT DELIVERY COMPLEX: CPT | Performed by: RADIOLOGY

## 2018-10-19 PROCEDURE — 77336 RADIATION PHYSICS CONSULT: CPT | Performed by: RADIOLOGY

## 2018-10-22 ENCOUNTER — APPOINTMENT (OUTPATIENT)
Dept: LAB | Facility: HOSPITAL | Age: 73
End: 2018-10-22
Attending: PHYSICIAN ASSISTANT
Payer: MEDICARE

## 2018-10-22 ENCOUNTER — HOSPITAL ENCOUNTER (OUTPATIENT)
Dept: RADIATION ONCOLOGY | Facility: HOSPITAL | Age: 73
Setting detail: RADIATION/ONCOLOGY SERIES
Discharge: HOME | End: 2018-10-22
Attending: RADIOLOGY
Payer: MEDICARE

## 2018-10-22 DIAGNOSIS — Z51.11 ENCOUNTER FOR ANTINEOPLASTIC CHEMOTHERAPY: ICD-10-CM

## 2018-10-22 LAB
ALBUMIN SERPL-MCNC: 3.5 G/DL (ref 3.4–5)
ALP SERPL-CCNC: 57 IU/L (ref 35–126)
ALT SERPL-CCNC: 26 IU/L (ref 11–54)
ANION GAP SERPL CALC-SCNC: 12 MEQ/L (ref 3–15)
ARIA ZRC COURSE ID: 1
ARIA ZRC COURSE INTENT: NORMAL
ARIA ZRC COURSE START DATE: NORMAL
ARIA ZRC TREATMENT DATES: NORMAL
ARIA ZRC TREATMENT ELAPSED DAYS: NORMAL
ARIA ZRP FRACTIONS TREATED TO DATE: NORMAL
ARIA ZRP PLAN ID: NORMAL
ARIA ZRP PLAN ID: NORMAL
ARIA ZRP PRESCRIBED DOSE CGY: 2000
ARIA ZRP PRESCRIBED DOSE CGY: 5000
ARIA ZRP PRESCRIBED DOSE PER FRACTION: 2
ARIA ZRP PRESCRIBED DOSE PER FRACTION: 2
ARIA ZRR REFERENCE POINT ID: NORMAL
AST SERPL-CCNC: 24 IU/L (ref 15–41)
BASOPHILS # BLD: 0 K/UL (ref 0.01–0.1)
BASOPHILS NFR BLD: 0 %
BILIRUB SERPL-MCNC: 0.6 MG/DL (ref 0.3–1.2)
BUN SERPL-MCNC: 10 MG/DL (ref 8–20)
CALCIUM SERPL-MCNC: 8.4 MG/DL (ref 8.9–10.3)
CHLORIDE SERPL-SCNC: 98 MEQ/L (ref 98–109)
CO2 SERPL-SCNC: 27 MEQ/L (ref 22–32)
CREAT SERPL-MCNC: 1.3 MG/DL (ref 0.6–1.1)
DIFFERENTIAL METHOD BLD: ABNORMAL
EOSINOPHIL # BLD: 0.08 K/UL (ref 0.04–0.36)
EOSINOPHIL NFR BLD: 2 %
ERYTHROCYTE [DISTWIDTH] IN BLOOD BY AUTOMATED COUNT: 12.1 % (ref 11.7–14.4)
GFR SERPL CREATININE-BSD FRML MDRD: 40.2 ML/MIN/1.73M*2
GLUCOSE SERPL-MCNC: 89 MG/DL (ref 70–99)
HCT VFR BLDCO AUTO: 33.2 % (ref 35–45)
HGB BLD-MCNC: 11.6 G/DL (ref 11.8–15.7)
IMM GRANULOCYTES # BLD AUTO: 0.01 K/UL (ref 0–0.08)
IMM GRANULOCYTES NFR BLD AUTO: 0.3 %
LYMPHOCYTES # BLD: 0.43 K/UL (ref 1.2–3.5)
LYMPHOCYTES NFR BLD: 10.8 %
MAGNESIUM SERPL-MCNC: 1.3 MG/DL (ref 1.8–2.5)
MCH RBC QN AUTO: 30.1 PG (ref 28–33.2)
MCHC RBC AUTO-ENTMCNC: 34.9 G/DL (ref 32.2–35.5)
MCV RBC AUTO: 86 FL (ref 83–98)
MONOCYTES # BLD: 0.62 K/UL (ref 0.28–0.8)
MONOCYTES NFR BLD: 15.6 %
NEUTROPHILS # BLD: 2.83 K/UL (ref 1.7–7)
NEUTS SEG NFR BLD: 71.3 %
NRBC BLD-RTO: 0 %
PDW BLD AUTO: 9.1 FL (ref 9.4–12.3)
PLAT MORPH BLD: NORMAL
PLATELET # BLD AUTO: 101 K/UL (ref 150–369)
PLATELET # BLD EST: ABNORMAL 10*3/UL
POTASSIUM SERPL-SCNC: 3.7 MEQ/L (ref 3.6–5.1)
PROT SERPL-MCNC: 6 G/DL (ref 6–8.2)
RBC # BLD AUTO: 3.86 M/UL (ref 3.93–5.22)
RBC MORPH BLD: NORMAL
SODIUM SERPL-SCNC: 137 MEQ/L (ref 136–144)
WBC # BLD AUTO: 3.97 K/UL (ref 3.8–10.5)

## 2018-10-22 PROCEDURE — 80053 COMPREHEN METABOLIC PANEL: CPT

## 2018-10-22 PROCEDURE — 83735 ASSAY OF MAGNESIUM: CPT

## 2018-10-22 PROCEDURE — 77300 RADIATION THERAPY DOSE PLAN: CPT | Performed by: RADIOLOGY

## 2018-10-22 PROCEDURE — 85025 COMPLETE CBC W/AUTO DIFF WBC: CPT

## 2018-10-22 PROCEDURE — 36415 COLL VENOUS BLD VENIPUNCTURE: CPT

## 2018-10-22 PROCEDURE — 77386 HC IMRT DELIVERY COMPLEX: CPT | Performed by: RADIOLOGY

## 2018-10-22 RX ORDER — FAMOTIDINE 10 MG/ML
20 INJECTION INTRAVENOUS ONCE AS NEEDED
Status: CANCELLED | OUTPATIENT
Start: 2018-10-23

## 2018-10-22 RX ORDER — PALONOSETRON 0.05 MG/ML
250 INJECTION, SOLUTION INTRAVENOUS ONCE
Status: CANCELLED | OUTPATIENT
Start: 2018-10-23

## 2018-10-22 RX ORDER — SODIUM CHLORIDE 9 MG/ML
500 INJECTION, SOLUTION INTRAVENOUS ONCE
Status: CANCELLED | OUTPATIENT
Start: 2018-10-23

## 2018-10-23 ENCOUNTER — OFFICE VISIT (OUTPATIENT)
Dept: GYNECOLOGIC ONCOLOGY | Facility: CLINIC | Age: 73
End: 2018-10-23
Payer: MEDICARE

## 2018-10-23 ENCOUNTER — HOSPITAL ENCOUNTER (OUTPATIENT)
Dept: INPATIENT UNIT | Facility: HOSPITAL | Age: 73
Setting detail: INFUSION SERIES
Discharge: HOME | End: 2018-10-23
Attending: OBSTETRICS & GYNECOLOGY
Payer: MEDICARE

## 2018-10-23 ENCOUNTER — HOSPITAL ENCOUNTER (OUTPATIENT)
Dept: RADIATION ONCOLOGY | Facility: HOSPITAL | Age: 73
Setting detail: RADIATION/ONCOLOGY SERIES
Discharge: HOME | End: 2018-10-23
Attending: RADIOLOGY
Payer: MEDICARE

## 2018-10-23 VITALS
SYSTOLIC BLOOD PRESSURE: 131 MMHG | DIASTOLIC BLOOD PRESSURE: 74 MMHG | HEART RATE: 91 BPM | WEIGHT: 175.4 LBS | BODY MASS INDEX: 34.44 KG/M2 | TEMPERATURE: 98.5 F | HEIGHT: 60 IN

## 2018-10-23 VITALS
BODY MASS INDEX: 34.44 KG/M2 | TEMPERATURE: 96.3 F | SYSTOLIC BLOOD PRESSURE: 126 MMHG | WEIGHT: 175.4 LBS | HEART RATE: 91 BPM | DIASTOLIC BLOOD PRESSURE: 56 MMHG | RESPIRATION RATE: 18 BRPM | HEIGHT: 60 IN | OXYGEN SATURATION: 95 %

## 2018-10-23 DIAGNOSIS — B37.89 CANDIDA RASH OF GROIN: ICD-10-CM

## 2018-10-23 DIAGNOSIS — C52 VAGINAL CANCER (CMS/HCC): ICD-10-CM

## 2018-10-23 DIAGNOSIS — T50.905A DRUG-INDUCED NAUSEA AND VOMITING: Primary | ICD-10-CM

## 2018-10-23 DIAGNOSIS — Z51.11 CHEMOTHERAPY MANAGEMENT, ENCOUNTER FOR: ICD-10-CM

## 2018-10-23 DIAGNOSIS — R11.2 DRUG-INDUCED NAUSEA AND VOMITING: Primary | ICD-10-CM

## 2018-10-23 DIAGNOSIS — R79.0 LOW MAGNESIUM LEVEL: ICD-10-CM

## 2018-10-23 PROBLEM — R79.89 ELEVATED SERUM CREATININE: Status: ACTIVE | Noted: 2018-10-23

## 2018-10-23 PROCEDURE — 96361 HYDRATE IV INFUSION ADD-ON: CPT

## 2018-10-23 PROCEDURE — 96374 THER/PROPH/DIAG INJ IV PUSH: CPT

## 2018-10-23 PROCEDURE — 63600000 HC DRUGS/DETAIL CODE: Performed by: OBSTETRICS & GYNECOLOGY

## 2018-10-23 PROCEDURE — 96375 TX/PRO/DX INJ NEW DRUG ADDON: CPT

## 2018-10-23 PROCEDURE — 25800000 HC PHARMACY IV SOLUTIONS: Performed by: PHYSICIAN ASSISTANT

## 2018-10-23 PROCEDURE — 63600000 HC DRUGS/DETAIL CODE: Performed by: PHYSICIAN ASSISTANT

## 2018-10-23 PROCEDURE — C9463 INJECTION, APREPITANT: HCPCS | Performed by: OBSTETRICS & GYNECOLOGY

## 2018-10-23 PROCEDURE — 25800000 HC PHARMACY IV SOLUTIONS: Performed by: OBSTETRICS & GYNECOLOGY

## 2018-10-23 PROCEDURE — 77386 HC IMRT DELIVERY COMPLEX: CPT | Performed by: RADIOLOGY

## 2018-10-23 PROCEDURE — 96413 CHEMO IV INFUSION 1 HR: CPT

## 2018-10-23 PROCEDURE — 99215 OFFICE O/P EST HI 40 MIN: CPT | Performed by: OBSTETRICS & GYNECOLOGY

## 2018-10-23 PROCEDURE — 96360 HYDRATION IV INFUSION INIT: CPT

## 2018-10-23 RX ORDER — ONDANSETRON 8 MG/1
8 TABLET, ORALLY DISINTEGRATING ORAL EVERY 8 HOURS PRN
Qty: 20 TABLET | Refills: 0 | Status: ON HOLD | OUTPATIENT
Start: 2018-10-23 | End: 2018-11-05

## 2018-10-23 RX ORDER — SODIUM CHLORIDE 9 MG/ML
500 INJECTION, SOLUTION INTRAVENOUS ONCE
Status: COMPLETED | OUTPATIENT
Start: 2018-10-23 | End: 2018-10-23

## 2018-10-23 RX ORDER — SODIUM CHLORIDE 9 MG/ML
1000 INJECTION, SOLUTION INTRAVENOUS CONTINUOUS
Status: DISCONTINUED | OUTPATIENT
Start: 2018-10-23 | End: 2018-10-23

## 2018-10-23 RX ORDER — NYSTATIN AND TRIAMCINOLONE ACETONIDE 100000; 1 [USP'U]/G; MG/G
CREAM TOPICAL
Qty: 30 G | Refills: 0 | Status: SHIPPED | OUTPATIENT
Start: 2018-10-23 | End: 2018-12-12 | Stop reason: ENTERED-IN-ERROR

## 2018-10-23 RX ORDER — PALONOSETRON 0.05 MG/ML
250 INJECTION, SOLUTION INTRAVENOUS ONCE
Status: COMPLETED | OUTPATIENT
Start: 2018-10-23 | End: 2018-10-23

## 2018-10-23 RX ADMIN — SODIUM CHLORIDE 500 ML: 9 INJECTION, SOLUTION INTRAVENOUS at 14:25

## 2018-10-23 RX ADMIN — DEXAMETHASONE SODIUM PHOSPHATE 12 MG: 4 INJECTION, SOLUTION INTRA-ARTICULAR; INTRALESIONAL; INTRAMUSCULAR; INTRAVENOUS; SOFT TISSUE at 11:49

## 2018-10-23 RX ADMIN — APREPITANT 130 MG: 130 INJECTION, EMULSION INTRAVENOUS at 11:49

## 2018-10-23 RX ADMIN — POTASSIUM CHLORIDE: 2 INJECTION, SOLUTION, CONCENTRATE INTRAVENOUS at 10:50

## 2018-10-23 RX ADMIN — PALONOSETRON HYDROCHLORIDE 250 MCG: 0.25 INJECTION, SOLUTION INTRAVENOUS at 11:49

## 2018-10-23 RX ADMIN — CISPLATIN 75 MG: 1 INJECTION, SOLUTION INTRAVENOUS at 13:19

## 2018-10-23 NOTE — PROGRESS NOTES
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 VSS, pt was assessed by RN. Darnell #[id number] placed @ LFA, w/ +flush/ +blood return noted. Ordered labs drawn on 10/22/18: results reviewed by Dr Trev Jimenez (creat=1.3, GFR=40.2, mag=1.3), RN s/w ASAD Cummings: OK to proceed w/ Tx as ordered and an additional gram mag sulfate to be added to pre-chemo IVF (=1l NSS w/ 20mEqKCl and 2 grams Mag Sulfate/ 2hrs). Pre-chemo IVF, premeds, and chemo ordered via pharmacy.    (1050) 1L NSS w/ 20mEqKCl and 2gram mag up/2hrs, per orders. Additionally, [employer] lists of K+ and Mg++ rich foods reviewed w/ pt, who verbalizes understanding and received copy of lists to take home as reference.    (2226) Pt received ordered premeds: Aloxi 0.25mg IV, Cinvanti 130mg IV, and Decadron 12mg IV.    (1319) Pre chemo IVF completed. Cisplatin 75mg/ NSS up per orders, set to infuse over 1hr. Prior to infusing, +F/ +BR noted via ML, and pt/ BSA/ chemo/ dose/ orders/ consent were verified by 2 chemo RNS.     (1735) Cisplatin infusion complete w/o incident, pt w/o c/o. +F/ +BR noted via ML. 500cc NSS (post chemo IVF) up/ 1hr, per orders.    (1535) Post chemo IVF complete w/o incident. +F/ +BR noted via ML. Darnell flushed and removed, gauze w/ tape placed @ site, and pressure applied until site CDI.    Chemo D/C instructions were reviewed, including interventions to maintain hydration/ prevent dehydration, and pt verbalizes understanding. Pt received copy of AVS to take home as reference. Pt D/C'd to home, w/ her daughter, in stable condition and w/o c/o.

## 2018-10-23 NOTE — PATIENT INSTRUCTIONS
**When to notify your Doctor**    Contact your doctor if you experience any of the following:    Pain, redness, swelling, or blistering near or at your administration site    Fever 100.4 or higher with or without chills    Nausea/ vomiting/ diarrhea not relieved with meds or is persistent    Develop mouth sores that prevent you from eating or drinking    Have black bowel movements, bruise easily, new rash, new headache, abdominal pain, swelling, or any signs of bleeding or new onset/ worsening shortness of breath    Have any new or worsening pain, numbness or tingling    Have any changes in your ability to perform daily activities, change in memory, or dizziness    Have any unexplained problems, questions, or concerns    Chemotherapy is in your body fluids for 48 hours- use precautions    It is safe for your family to have contact with you during treatment    Flush toilet twice after using, with lid closed    Your caregiver should wear gloves if handling your body fluids for 48 hours after your treatment, followed by thorough handwashing    Special precautions may be necessary if you are sexually active during your treatment (see additional info under “Chemotherapy” section)    [employer] is a helpful website for information    Patient Education   Patient Education     Dehydration, Adult  Dehydration is a condition in which there is not enough fluid or water in the body. This happens when you lose more fluids than you take in. Important organs, such as the kidneys, brain, and heart, cannot function without a proper amount of fluids. Any loss of fluids from the body can lead to dehydration.  Dehydration can range from mild to severe. This condition should be treated right away to prevent it from becoming severe.  What are the causes?  This condition may be caused by:  · Vomiting.  · Diarrhea.  · Excessive sweating, such as from heat exposure or exercise.  · Not drinking enough fluid, especially:  ¨ When  ill.  ¨ While doing activity that requires a lot of energy.  · Excessive urination.  · Fever.  · Infection.  · Certain medicines, such as medicines that cause the body to lose excess fluid (diuretics).  · Inability to access safe drinking water.  · Reduced physical ability to get adequate water and food.  What increases the risk?  This condition is more likely to develop in people:  · Who have a poorly controlled long-term (chronic) illness, such as diabetes, heart disease, or kidney disease.  · Who are age 65 or older.  · Who are disabled.  · Who live in a place with high altitude.  · Who play endurance sports.  What are the signs or symptoms?  Symptoms of mild dehydration may include:  · Thirst.  · Dry lips.  · Slightly dry mouth.  · Dry, warm skin.  · Dizziness.  Symptoms of moderate dehydration may include:  · Very dry mouth.  · Muscle cramps.  · Dark urine. Urine may be the color of tea.  · Decreased urine production.  · Decreased tear production.  · Heartbeat that is irregular or faster than normal (palpitations).  · Headache.  · Light-headedness, especially when you stand up from a sitting position.  · Fainting (syncope).  Symptoms of severe dehydration may include:  · Changes in skin, such as:  ¨ Cold and clammy skin.  ¨ Blotchy (mottled) or pale skin.  ¨ Skin that does not quickly return to normal after being lightly pinched and released (poor skin turgor).  · Changes in body fluids, such as:  ¨ Extreme thirst.  ¨ No tear production.  ¨ Inability to sweat when body temperature is high, such as in hot weather.  ¨ Very little urine production.  · Changes in vital signs, such as:  ¨ Weak pulse.  ¨ Pulse that is more than 100 beats a minute when sitting still.  ¨ Rapid breathing.  ¨ Low blood pressure.  · Other changes, such as:  ¨ Sunken eyes.  ¨ Cold hands and feet.  ¨ Confusion.  ¨ Lack of energy (lethargy).  ¨ Difficulty waking up from sleep.  ¨ Short-term weight loss.  ¨ Unconsciousness.  How is this  diagnosed?  This condition is diagnosed based on your symptoms and a physical exam. Blood and urine tests may be done to help confirm the diagnosis.  How is this treated?  Treatment for this condition depends on the severity. Mild or moderate dehydration can often be treated at home. Treatment should be started right away. Do not wait until dehydration becomes severe. Severe dehydration is an emergency and it needs to be treated in a hospital.  Treatment for mild dehydration may include:  · Drinking more fluids.  · Replacing salts and minerals in your blood (electrolytes) that you may have lost.  Treatment for moderate dehydration may include:  · Drinking an oral rehydration solution (ORS). This is a drink that helps you replace fluids and electrolytes (rehydrate). It can be found at pharmacies and retail stores.  Treatment for severe dehydration may include:  · Receiving fluids through an IV tube.  · Receiving an electrolyte solution through a feeding tube that is passed through your nose and into your stomach (nasogastric tube, or NG tube).  · Correcting any abnormalities in electrolytes.  · Treating the underlying cause of dehydration.  Follow these instructions at home:  · If directed by your health care provider, drink an ORS:  ¨ Make an ORS by following instructions on the package.  ¨ Start by drinking small amounts, about ½ cup (120 mL) every 5-10 minutes.  ¨ Slowly increase how much you drink until you have taken the amount recommended by your health care provider.  · Drink enough clear fluid to keep your urine clear or pale yellow. If you were told to drink an ORS, finish the ORS first, then start slowly drinking other clear fluids. Drink fluids such as:  ¨ Water. Do not drink only water. Doing that can lead to having too little salt (sodium) in the body (hyponatremia).  ¨ Ice chips.  ¨ Fruit juice that you have added water to (diluted fruit juice).  ¨ Low-calorie sports  drinks.  · Avoid:  ¨ Alcohol.  ¨ Drinks that contain a lot of sugar. These include high-calorie sports drinks, fruit juice that is not diluted, and soda.  ¨ Caffeine.  ¨ Foods that are greasy or contain a lot of fat or sugar.  · Take over-the-counter and prescription medicines only as told by your health care provider.  · Do not take sodium tablets. This can lead to having too much sodium in the body (hypernatremia).  · Eat foods that contain a healthy balance of electrolytes, such as bananas, oranges, potatoes, tomatoes, and spinach.  · Keep all follow-up visits as told by your health care provider. This is important.  Contact a health care provider if:  · You have abdominal pain that:  ¨ Gets worse.  ¨ Stays in one area (localizes).  · You have a rash.  · You have a stiff neck.  · You are more irritable than usual.  · You are sleepier or more difficult to wake up than usual.  · You feel weak or dizzy.  · You feel very thirsty.  · You have urinated only a small amount of very dark urine over 6-8 hours.  Get help right away if:  · You have symptoms of severe dehydration.  · You cannot drink fluids without vomiting.  · Your symptoms get worse with treatment.  · You have a fever.  · You have a severe headache.  · You have vomiting or diarrhea that:  ¨ Gets worse.  ¨ Does not go away.  · You have blood or green matter (bile) in your vomit.  · You have blood in your stool. This may cause stool to look black and tarry.  · You have not urinated in 6-8 hours.  · You faint.  · Your heart rate while sitting still is over 100 beats a minute.  · You have trouble breathing.  This information is not intended to replace advice given to you by your health care provider. Make sure you discuss any questions you have with your health care provider.  Document Released: 12/18/2006 Document Revised: 07/14/2017 Document Reviewed: 02/10/2017  [employer] Interactive Patient Education © 2018 [email or web address]       Chemotherapy  Chemotherapy is  the use of medicines to stop or slow the growth of cancer cells. Depending on the type and stage of your cancer, you may have chemotherapy to:  · Cure your cancer.  · Slow the progression of your cancer.  · Ease your cancer symptoms.  · Improve the benefits of radiation treatment.  · Shrink a tumor before surgery.  · Rid the body of cancer cells that remain after a tumor is surgically removed.  How is chemotherapy given?  Chemotherapy may be given:  · By mouth in liquid or pill form.  · Through a thin tube that is inserted into a vein or artery.  · By getting a shot.  · By rubbing a cream or ointment on your skin.  · Through liquids that are placed directly into various areas of the body, such as the abdomen, chest, or bladder.  How often is chemotherapy given?  Chemotherapy may be given continuously over time, or it may be given in cycles. For example, you may take the medicine for one week out of every month.  For how long will I need chemotherapy treatments?  The length of treatment depends on many factors, including:  · The type of cancer.  · Whether the cancer has spread.  · How you respond to the chemotherapy.  · Whether you develop side effects.  Some types of chemotherapy medicine are given only one time. Others are given for months, years, or for life.  What safety precautions must I take while on chemotherapy?  Chemotherapy medicines are very strong. They will be in all of your bodily fluids, including your urine, stool, saliva, sweat, tears, vaginal secretions, and semen. You must carefully follow some safety precautions to prevent harm to others while you are using these medicines. Here are some recommended precautions:  · Make sure that people who help care for you wear disposable gloves if they are going to come into contact with any of your bodily fluids. Women who are pregnant or breastfeeding should not handle any of your bodily fluids.  · Wash any clothes, towels, and linens that may have your  bodily fluids on them twice in a washing machine using very hot water.  · Dispose of adult diapers, tampons, and sanitary napkins by first sealing them in a plastic bag.  · Use a condom when having sex for at least 2 weeks after receiving your chemotherapy.  · Do not share beverages or food.  · Keep your chemotherapy medicines in their original bottles. Keep them in a high, safe location, away from children. Do not expose them to heat or moisture. Do not put them in containers with other types of medicines.  · Dispose of all wrappers for your chemotherapy medicines by sealing them in a separate plastic bag.  · Do not throw away extra medicine, and do not flush it down the toilet. Take medicine that you are not going to use to your health care provider's office where it can be disposed of properly.  · Follow your health care provider’s directions for the proper disposal of needles, IV tubing, and other medical supplies that have come into contact with your chemotherapy medicines.  · If you are issued a hazardous waste container, make sure you understand the directions for using it.  · Wash your hands thoroughly with warm water and soap after using the bathroom. Dry your hands with disposable paper towels.  · When using the toilet:  ¨ Flush it twice after each use, including after vomiting.  ¨ Close the lid of the toilet prior to flushing. This helps to avoid splashing.  ¨ Both men and women should sit to use the toilet. This helps avoid splashing.  What are the side effects of chemotherapy?  Side effects depend on a variety of factors, including:  · The specific type of chemotherapy medicine used.  · The dosage.  · How long the medicine is used for.  · Your overall health.  Some of the side effects you may experience include:  · Fatigue and decreased energy.  · Decreased appetite.  · Changes in your sense of smell or taste.  · Nausea.  · Vomiting.  · Constipation or diarrhea.  · Hair loss.  · Increased susceptibility  to infection.  · Easy bleeding.  · Mouth sores.  · Burning or tingling in the hands or feet.  · Memory problems.  This information is not intended to replace advice given to you by your health care provider. Make sure you discuss any questions you have with your health care provider.  Document Released: 10/14/2008 Document Revised: 07/07/2017 Document Reviewed: 05/26/2015  Else[employer] Interactive Patient Education © 2018 [employer]

## 2018-10-23 NOTE — PROGRESS NOTES
PATIENT ID:  Raegan Young is a 72 y.o. female.      CHEMOTHERAPY REGIMEN: weekly cisplatin                CYCLE 4    Ms. Raegan Young is a 72 y.o.yo lady with Cancer Staging  Vaginal cancer (CMS/HCC) (Formerly [facility])  Staging form: Vagina, AJCC 8th Edition  - Clinical stage from 9/12/2018: FIGO Stage III (cT3, cN1, cM0) - Signed by Trev Jimenez MD on 9/21/2018  . She presents for chemotherapy. She denies nausea/vomiting( however does state at times she feels that she doesn't have as great as an appetite as she used to., denies neuropathy, denies excessive fatigue, denies constipation. Ms. Young does complain of left groin irritation and pruritus x 1 week.    PROBLEM LIST:  Patient Active Problem List    Diagnosis Date Noted   • Candida rash of groin 10/23/2018   • Elevated serum creatinine 10/23/2018   • Low magnesium level 10/23/2018   • Dysuria 10/02/2018   • Chemotherapy management, encounter for 09/25/2018   • Secondary malignancy of inguinal lymph nodes (CMS/HCC) ([facility]ly [facility]) 09/24/2018   • Vaginal cancer (CMS/HCC) (Formerly [facility]) 09/21/2018        Medical History:   Past Medical History:   Diagnosis Date   • Disease of thyroid gland    • Diverticulitis    • Hypertension        Surgical History:   Past Surgical History:   Procedure Laterality Date   • CHOLECYSTECTOMY     • DILATION AND CURETTAGE OF UTERUS  02/2018   • ROTATOR CUFF REPAIR Right        Social History:   Social History     Social History   • Marital status:      Spouse name: rom   • Number of children: 4   • Years of education: N/A     Occupational History   • retired/        Social History Main Topics   • Smoking status: Former Smoker     Packs/day: 1.00     Years: 40.00     Start date: 1957     Quit date: 1998   • Smokeless tobacco: Never Used   • Alcohol use No   • Drug use: No   • Sexual activity: No     Other Topics Concern   • None     Social History Narrative   • None       Family History:   Family History   Problem Relation Age of  Onset   • Breast cancer Neg Hx    • Cancer Neg Hx    • Colon cancer Neg Hx    • Ovarian cancer Neg Hx        Allergies: No known allergies    Medications: [unfilled]     Medication List          Accurate as of 10/23/18  9:04 AM. If you have any questions, ask your nurse or doctor.               START taking these medications    nystatin-triamcinolone 100,000-0.1 unit/g-% cream  Commonly known as:  MYCOLOG II  Apply to affected area twice a day for 7-14 days, then as needed for rash.  Started by:  Trev Jimenez MD        CONTINUE taking these medications    benazepril 10 mg tablet  Commonly known as:  LOTENSIN  Take 10 mg by mouth daily.     BIOFLEX ORAL  Take 2 capsules by mouth daily.     bumetanide 1 mg tablet  Commonly known as:  BUMEX  Take 1 mg by mouth daily.     cranberry conc-C-bacillus coag 250-30-50 mg-mg-million tablet  Commonly known as:  AZO CRANBERRY + PROBIOTIC  Take by mouth daily.     LOVAZA 1 gram capsule  Generic drug:  omega-3 acid ethyl esters  take 2 capsule by oral route 2 times every day     PROTONIX 40 mg EC tablet  Generic drug:  pantoprazole  40 mg.     TIROSINT 88 mcg capsule  Generic drug:  levothyroxine sodium  take 1 capsule by oral route  every day            Review of Systems  All other systems reviewed and negative except as noted in the HPI.    Labs  I have reviewed the patient's labs.  Current labs are within normal limits.      Physical Exam:  Vital Signs for the last 24 hours:  Vitals:    10/23/18 0844   BP: 131/74   Pulse: 91   Temp: 36.9 °C (98.5 °F)     Temp:  [36.9 °C (98.5 °F)] 36.9 °C (98.5 °F)  Heart Rate:  [91] 91  BP: (131)/(74) 131/74    General: well-developed, well-nourished, no apparent distress  Neck: supple, no masses  Lymphatic: no supraclavicular, cervical, or inguinal adenopathy  Respiratory: lungs clear to auscultation bilaterally, normal respiratory effort  Cardiovascular: regular rate and rhythm, no murmurs, rubs, gallops.   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 Will follow up in 10 days for Irina labs.         Relevant Orders    Clinic Appointment Request Chemo follow up (Completed)    Infusion Appointment Request (Completed)    Chemotherapy management, encounter for    Overview     Weekly Cisplatin concurrent with RT  Since Friday, diarrhea 6x/day improved with immodium 4 tab/day.         Candida rash of groin - Primary    Current Assessment & Plan     Appears to be related to fungal infection rx given for triamcinolone/nystatin ointment- patient to continue to monitor and follow up if no resolution.         Relevant Medications    nystatin-triamcinolone (MYCOLOG II) 100,000-0.1 unit/g-% cream    Low magnesium level    Current Assessment & Plan     Will rx 2g mag with treatment today and continue to monitor with irina labs                 ASSESSMENT/PLAN:    Assessment   72 y.o. lady with  Cancer Staging  Vaginal cancer (CMS/[facility]) ([facility])  Staging form: Vagina, AJCC 8th Edition  - Clinical stage from 9/12/2018: FIGO Stage III (cT3, cN1, cM0) - Signed by Trev Jimenez MD on 9/21/2018   who presents for chemotherapy.  ECOG performance status is :2    1. Cancer Staging  Vaginal cancer (CMS/[facility]) ([facility])  Staging form: Vagina, AJCC 8th Edition  - Clinical stage from 9/12/2018: FIGO Stage III (cT3, cN1, cM0) - Signed by Tony  Trev CHANG MD on 9/21/2018     Cycle 4 chemo today, labs reviewed and cleared for treatment. Check irina CBC w/diff in 10 days. Patient will return to office for follow up in one week.           ASAD Ramirez C

## 2018-10-23 NOTE — ASSESSMENT & PLAN NOTE
Appears to be related to fungal infection rx given for triamcinolone/nystatin ointment- patient to continue to monitor and follow up if no resolution.

## 2018-10-23 NOTE — ASSESSMENT & PLAN NOTE
Patient is here today for weekly Cisplatin with concurrent RT and is cleared for her therapy today. Will follow up in 10 days for Dain labs.

## 2018-10-24 ENCOUNTER — HOSPITAL ENCOUNTER (OUTPATIENT)
Dept: RADIATION ONCOLOGY | Facility: HOSPITAL | Age: 73
Setting detail: RADIATION/ONCOLOGY SERIES
Discharge: HOME | End: 2018-10-24
Attending: RADIOLOGY
Payer: MEDICARE

## 2018-10-24 VITALS
SYSTOLIC BLOOD PRESSURE: 153 MMHG | HEART RATE: 83 BPM | WEIGHT: 175 LBS | DIASTOLIC BLOOD PRESSURE: 83 MMHG | BODY MASS INDEX: 34.18 KG/M2

## 2018-10-24 DIAGNOSIS — C52 VAGINAL CANCER (CMS/HCC): Primary | ICD-10-CM

## 2018-10-24 PROCEDURE — 77386 HC IMRT DELIVERY COMPLEX: CPT | Performed by: RADIOLOGY

## 2018-10-24 ASSESSMENT — PAIN SCALES - GENERAL: PAINLEVEL: 0-NO PAIN

## 2018-10-24 NOTE — PROGRESS NOTES
Patient ID:  Raegan Young is a 72 y.o. female.    Referring Physician:   Trev Jimenez MD    Primary Care Provider:  Luan Lockhart MD     [facility]   Patient Care Team     Relationship Specialty Notifications Start End   Luan Lockhart MD PCP - General   12/30/17    Venkatesh Travis MD Consulting Physician Gynecology  9/12/18    Akbar Romero MD Radiation Oncologist Radiation Oncology  9/21/18    Trev Jimenez MD Consulting Physician Gynecologic Oncology  9/21/18    Tereza Dhillon RN Registered Nurse   10/1/18    Cary Oliver RN Registered Nurse   10/16/18          Cancer Staging Information:  Cancer Staging  Vaginal cancer (CMS/HCC) ([facility])  Staging form: Vagina, AJCC 8th Edition  - Clinical stage from 9/12/2018: FIGO Stage III (cT3, cN1, cM0) - Signed by Trev Jimenez MD on 9/21/2018            TREATMENT PLAN SUMMARY:    Radiation Therapy   Component Value Date    ARIATXDATES  10/24/2018     Course End Date: : @ --  First Treatment Date: 2018-09-26 @ 10:38  Last Treatment Date: 2018-10-24 @ 09:27      TXELAPSEDDAY 28 days as of [id number] 10/24/2018    COURSEID 1 10/24/2018    PLANID PELVIS 10/24/2018    PLANID CD PELVIS 10/24/2018    PRESCRIBEDDO 2 10/24/2018    PRESCRIBEDDO 2 10/24/2018    DOSAGEGIVENT 16.0000 10/05/2018    FRACTIONSTRE 20 of 25 10/24/2018       Subjective:    Raegan continues to tolerate the radiation well. She is having grade one dermititis and small area desquamation in left groin fold. She was given Mycolog cream by Dr. Jimenez.  Some nausea which is controlled.        Problem List:  Patient Active Problem List    Diagnosis Date Noted   • Candida rash of groin 10/23/2018   • Elevated serum creatinine 10/23/2018   • Low magnesium level 10/23/2018   • Dysuria 10/02/2018   • Chemotherapy management, encounter for 09/25/2018   • Secondary malignancy of inguinal lymph nodes (CMS/HCC) (HCC) 09/24/2018   • Vaginal cancer (CMS/HCC) ([facility]) 09/21/2018          Past  Medical/Surgical History  Past Medical History:   Diagnosis Date   • Disease of thyroid gland    • Diverticulitis    • Hypertension      Past Surgical History:   Procedure Laterality Date   • CHOLECYSTECTOMY     • DILATION AND CURETTAGE OF UTERUS  02/2018   • ROTATOR CUFF REPAIR Right         Current Medications  Current Outpatient Prescriptions   Medication Sig Dispense Refill   • benazepril (LOTENSIN) 10 mg tablet Take 10 mg by mouth daily.     • bumetanide (BUMEX) 1 mg tablet Take 1 mg by mouth daily.     • cranberry conc-C-bacillus coag (AZO CRANBERRY + PROBIOTIC) 250-30-50 mg-mg-million tablet Take by mouth daily.     • levothyroxine sodium (TIROSINT) 88 mcg capsule take 1 capsule by oral route  every day     • nystatin-triamcinolone (MYCOLOG II) 100,000-0.1 unit/g-% cream Apply to affected area twice a day for 7-14 days, then as needed for rash. 30 g 0   • omega-3 acid ethyl esters (LOVAZA) 1 gram capsule take 2 capsule by oral route 2 times every day     • ondansetron ODT (ZOFRAN ODT) 8 mg disintegrating tablet Take 1 tablet (8 mg total) by mouth every 8 (eight) hours as needed for nausea or vomiting for up to 7 days. 20 tablet 0   • pantoprazole (PROTONIX) 40 mg EC tablet 40 mg.     • rutin/hesp/bioflav/C/upyqmp672 (BIOFLEX ORAL) Take 2 capsules by mouth daily.       No current facility-administered medications for this encounter.        Allergies  Allergies   Allergen Reactions   • No Known Allergies        Physical Exam:    BP (!) 153/83 (BP Location: Left upper arm, Patient Position: Sitting)   Pulse 83   Wt 79.4 kg (175 lb)   BMI 34.18 kg/m²       Performance Status:       PAIN ASSESSMENT:  Score Zero  Location    Pain Intervention        Plan:    Pt tired but doing well  Continue per plan    Akbar Romero MD

## 2018-10-25 ENCOUNTER — HOSPITAL ENCOUNTER (OUTPATIENT)
Dept: RADIATION ONCOLOGY | Facility: HOSPITAL | Age: 73
Setting detail: RADIATION/ONCOLOGY SERIES
Discharge: HOME | End: 2018-10-25
Attending: RADIOLOGY
Payer: MEDICARE

## 2018-10-25 PROCEDURE — 77336 RADIATION PHYSICS CONSULT: CPT | Performed by: RADIOLOGY

## 2018-10-25 PROCEDURE — 77386 HC IMRT DELIVERY COMPLEX: CPT | Performed by: RADIOLOGY

## 2018-10-26 ENCOUNTER — HOSPITAL ENCOUNTER (OUTPATIENT)
Dept: RADIATION ONCOLOGY | Facility: HOSPITAL | Age: 73
Setting detail: RADIATION/ONCOLOGY SERIES
Discharge: HOME | End: 2018-10-26
Attending: RADIOLOGY
Payer: MEDICARE

## 2018-10-26 PROCEDURE — 77386 HC IMRT DELIVERY COMPLEX: CPT | Performed by: RADIOLOGY

## 2018-10-29 ENCOUNTER — HOSPITAL ENCOUNTER (INPATIENT)
Dept: INPATIENT UNIT | Facility: HOSPITAL | Age: 73
LOS: 6 days | Discharge: HOME | DRG: 640 | End: 2018-11-05
Attending: OBSTETRICS & GYNECOLOGY | Admitting: OBSTETRICS & GYNECOLOGY
Payer: MEDICARE

## 2018-10-29 ENCOUNTER — HOSPITAL ENCOUNTER (OUTPATIENT)
Dept: RADIATION ONCOLOGY | Facility: HOSPITAL | Age: 73
Setting detail: RADIATION/ONCOLOGY SERIES
Discharge: HOME | End: 2018-10-29
Attending: RADIOLOGY
Payer: MEDICARE

## 2018-10-29 ENCOUNTER — DOCUMENTATION (OUTPATIENT)
Dept: OBSTETRICS AND GYNECOLOGY | Facility: HOSPITAL | Age: 73
End: 2018-10-29

## 2018-10-29 ENCOUNTER — OFFICE VISIT (OUTPATIENT)
Dept: GYNECOLOGIC ONCOLOGY | Facility: CLINIC | Age: 73
End: 2018-10-29
Attending: OBSTETRICS & GYNECOLOGY
Payer: MEDICARE

## 2018-10-29 VITALS
RESPIRATION RATE: 18 BRPM | SYSTOLIC BLOOD PRESSURE: 132 MMHG | BODY MASS INDEX: 33.32 KG/M2 | HEART RATE: 86 BPM | DIASTOLIC BLOOD PRESSURE: 71 MMHG | WEIGHT: 170.6 LBS

## 2018-10-29 DIAGNOSIS — C52 VAGINAL CANCER (CMS/HCC): Primary | ICD-10-CM

## 2018-10-29 DIAGNOSIS — D64.81 ANTINEOPLASTIC CHEMOTHERAPY INDUCED ANEMIA: Primary | ICD-10-CM

## 2018-10-29 DIAGNOSIS — E86.0 DEHYDRATION: ICD-10-CM

## 2018-10-29 DIAGNOSIS — Z51.11 CHEMOTHERAPY MANAGEMENT, ENCOUNTER FOR: ICD-10-CM

## 2018-10-29 DIAGNOSIS — R11.2 DRUG-INDUCED NAUSEA AND VOMITING: ICD-10-CM

## 2018-10-29 DIAGNOSIS — T45.1X5A ANTINEOPLASTIC CHEMOTHERAPY INDUCED ANEMIA: Primary | ICD-10-CM

## 2018-10-29 DIAGNOSIS — T50.905A DRUG-INDUCED NAUSEA AND VOMITING: ICD-10-CM

## 2018-10-29 DIAGNOSIS — C52 VAGINAL CANCER (CMS/HCC): ICD-10-CM

## 2018-10-29 LAB
ALBUMIN SERPL-MCNC: 3.5 G/DL (ref 3.4–5)
ALP SERPL-CCNC: 58 IU/L (ref 35–126)
ALT SERPL-CCNC: 26 IU/L (ref 11–54)
ANION GAP SERPL CALC-SCNC: 13 MEQ/L (ref 3–15)
ARIA ZRC COURSE ID: 1
ARIA ZRC COURSE INTENT: NORMAL
ARIA ZRC COURSE START DATE: NORMAL
ARIA ZRC TREATMENT DATES: NORMAL
ARIA ZRC TREATMENT ELAPSED DAYS: NORMAL
ARIA ZRP FRACTIONS TREATED TO DATE: NORMAL
ARIA ZRP PLAN ID: NORMAL
ARIA ZRP PLAN ID: NORMAL
ARIA ZRP PRESCRIBED DOSE CGY: 2000
ARIA ZRP PRESCRIBED DOSE CGY: 5000
ARIA ZRP PRESCRIBED DOSE PER FRACTION: 2
ARIA ZRP PRESCRIBED DOSE PER FRACTION: 2
ARIA ZRR REFERENCE POINT ID: NORMAL
AST SERPL-CCNC: 28 IU/L (ref 15–41)
BASOPHILS # BLD: 0 K/UL (ref 0.01–0.1)
BASOPHILS NFR BLD: 0 %
BILIRUB SERPL-MCNC: 0.7 MG/DL (ref 0.3–1.2)
BUN SERPL-MCNC: 20 MG/DL (ref 8–20)
CALCIUM SERPL-MCNC: 7.5 MG/DL (ref 8.9–10.3)
CHLORIDE SERPL-SCNC: 93 MEQ/L (ref 98–109)
CO2 SERPL-SCNC: 27 MEQ/L (ref 22–32)
CREAT SERPL-MCNC: 1.8 MG/DL (ref 0.6–1.1)
DIFFERENTIAL METHOD BLD: ABNORMAL
EOSINOPHIL # BLD: 0.02 K/UL (ref 0.04–0.36)
EOSINOPHIL NFR BLD: 1 %
ERYTHROCYTE [DISTWIDTH] IN BLOOD BY AUTOMATED COUNT: 11.8 % (ref 11.7–14.4)
GFR SERPL CREATININE-BSD FRML MDRD: 27.6 ML/MIN/1.73M*2
GLUCOSE SERPL-MCNC: 122 MG/DL (ref 70–99)
HCT VFR BLDCO AUTO: 27.9 % (ref 35–45)
HGB BLD-MCNC: 10.3 G/DL (ref 11.8–15.7)
LYMPHOCYTES # BLD: 0.27 K/UL (ref 1.2–3.5)
LYMPHOCYTES NFR BLD: 11 %
MAGNESIUM SERPL-MCNC: 0.9 MG/DL (ref 1.8–2.5)
MCH RBC QN AUTO: 30.1 PG (ref 28–33.2)
MCHC RBC AUTO-ENTMCNC: 36.9 G/DL (ref 32.2–35.5)
MCV RBC AUTO: 81.6 FL (ref 83–98)
MONOCYTES # BLD: 0.17 K/UL (ref 0.28–0.8)
MONOCYTES NFR BLD: 7 %
NEUTS BAND # BLD: 0.12 K/UL
NEUTS BAND # BLD: 1.85 K/UL (ref 1.7–7)
NEUTS BAND NFR BLD: 5 %
NEUTS SEG NFR BLD: 76 %
PDW BLD AUTO: 10 FL (ref 9.4–12.3)
PLAT MORPH BLD: NORMAL
PLATELET # BLD AUTO: 38 K/UL (ref 150–369)
PLATELET # BLD EST: ABNORMAL 10*3/UL
POLYCHROMASIA BLD QL SMEAR: ABNORMAL
POTASSIUM SERPL-SCNC: 3.2 MEQ/L (ref 3.6–5.1)
PROT SERPL-MCNC: 5.9 G/DL (ref 6–8.2)
RBC # BLD AUTO: 3.42 M/UL (ref 3.93–5.22)
SODIUM SERPL-SCNC: 133 MEQ/L (ref 136–144)
TOXIC GRANULES BLD QL SMEAR: ABNORMAL
WBC # BLD AUTO: 2.43 K/UL (ref 3.8–10.5)

## 2018-10-29 PROCEDURE — 36415 COLL VENOUS BLD VENIPUNCTURE: CPT | Performed by: OBSTETRICS & GYNECOLOGY

## 2018-10-29 PROCEDURE — 96374 THER/PROPH/DIAG INJ IV PUSH: CPT

## 2018-10-29 PROCEDURE — 25800000 HC PHARMACY IV SOLUTIONS: Performed by: OBSTETRICS & GYNECOLOGY

## 2018-10-29 PROCEDURE — 99214 OFFICE O/P EST MOD 30 MIN: CPT | Performed by: OBSTETRICS & GYNECOLOGY

## 2018-10-29 PROCEDURE — G0378 HOSPITAL OBSERVATION PER HR: HCPCS

## 2018-10-29 PROCEDURE — 77386 HC IMRT DELIVERY COMPLEX: CPT | Performed by: RADIOLOGY

## 2018-10-29 PROCEDURE — 63700000 HC SELF-ADMINISTRABLE DRUG: Performed by: OBSTETRICS & GYNECOLOGY

## 2018-10-29 PROCEDURE — 85025 COMPLETE CBC W/AUTO DIFF WBC: CPT | Performed by: OBSTETRICS & GYNECOLOGY

## 2018-10-29 PROCEDURE — 63600000 HC DRUGS/DETAIL CODE: Performed by: OBSTETRICS & GYNECOLOGY

## 2018-10-29 PROCEDURE — 96361 HYDRATE IV INFUSION ADD-ON: CPT

## 2018-10-29 PROCEDURE — 63600000 HC DRUGS/DETAIL CODE: Performed by: PHYSICIAN ASSISTANT

## 2018-10-29 PROCEDURE — 96360 HYDRATION IV INFUSION INIT: CPT

## 2018-10-29 PROCEDURE — 80053 COMPREHEN METABOLIC PANEL: CPT | Performed by: OBSTETRICS & GYNECOLOGY

## 2018-10-29 PROCEDURE — 83735 ASSAY OF MAGNESIUM: CPT | Performed by: OBSTETRICS & GYNECOLOGY

## 2018-10-29 PROCEDURE — 25800000 HC PHARMACY IV SOLUTIONS: Performed by: PHYSICIAN ASSISTANT

## 2018-10-29 PROCEDURE — 96366 THER/PROPH/DIAG IV INF ADDON: CPT

## 2018-10-29 RX ORDER — DEXTROSE 40 %
15-30 GEL (GRAM) ORAL AS NEEDED
Status: DISCONTINUED | OUTPATIENT
Start: 2018-10-29 | End: 2018-10-29

## 2018-10-29 RX ORDER — METOCLOPRAMIDE HYDROCHLORIDE 5 MG/ML
10 INJECTION INTRAMUSCULAR; INTRAVENOUS EVERY 6 HOURS PRN
Status: DISCONTINUED | OUTPATIENT
Start: 2018-10-29 | End: 2018-10-31

## 2018-10-29 RX ORDER — DEXTROSE 50 % IN WATER (D50W) INTRAVENOUS SYRINGE
25 AS NEEDED
Status: DISCONTINUED | OUTPATIENT
Start: 2018-10-29 | End: 2018-10-29

## 2018-10-29 RX ORDER — ONDANSETRON HYDROCHLORIDE 2 MG/ML
INJECTION, SOLUTION INTRAVENOUS
Status: DISPENSED
Start: 2018-10-29 | End: 2018-10-30

## 2018-10-29 RX ORDER — LOPERAMIDE HYDROCHLORIDE 2 MG/1
4 CAPSULE ORAL ONCE
Status: COMPLETED | OUTPATIENT
Start: 2018-10-29 | End: 2018-10-29

## 2018-10-29 RX ORDER — DEXTROSE 50 % IN WATER (D50W) INTRAVENOUS SYRINGE
25 AS NEEDED
Status: DISCONTINUED | OUTPATIENT
Start: 2018-10-29 | End: 2018-11-05 | Stop reason: HOSPADM

## 2018-10-29 RX ORDER — IBUPROFEN 200 MG
16-32 TABLET ORAL AS NEEDED
Status: DISCONTINUED | OUTPATIENT
Start: 2018-10-29 | End: 2018-10-29

## 2018-10-29 RX ORDER — METOCLOPRAMIDE 10 MG/1
10 TABLET ORAL EVERY 6 HOURS PRN
Status: DISCONTINUED | OUTPATIENT
Start: 2018-10-29 | End: 2018-10-31

## 2018-10-29 RX ORDER — IBUPROFEN 200 MG
16-32 TABLET ORAL AS NEEDED
Status: DISCONTINUED | OUTPATIENT
Start: 2018-10-29 | End: 2018-11-05 | Stop reason: HOSPADM

## 2018-10-29 RX ORDER — SODIUM CHLORIDE 9 MG/ML
1000 INJECTION, SOLUTION INTRAVENOUS CONTINUOUS
Status: DISCONTINUED | OUTPATIENT
Start: 2018-10-29 | End: 2018-11-02

## 2018-10-29 RX ORDER — DEXTROSE 40 %
15-30 GEL (GRAM) ORAL AS NEEDED
Status: DISCONTINUED | OUTPATIENT
Start: 2018-10-29 | End: 2018-11-05 | Stop reason: HOSPADM

## 2018-10-29 RX ADMIN — PROMETHAZINE HYDROCHLORIDE 25 MG: 25 INJECTION INTRAMUSCULAR; INTRAVENOUS at 16:20

## 2018-10-29 RX ADMIN — ONDANSETRON HYDROCHLORIDE: 2 INJECTION, SOLUTION INTRAMUSCULAR; INTRAVENOUS at 13:22

## 2018-10-29 RX ADMIN — POTASSIUM CHLORIDE 500 ML/HR: 2 INJECTION, SOLUTION, CONCENTRATE INTRAVENOUS at 12:11

## 2018-10-29 RX ADMIN — POTASSIUM CHLORIDE: 2 INJECTION, SOLUTION, CONCENTRATE INTRAVENOUS at 22:24

## 2018-10-29 RX ADMIN — PROMETHAZINE HYDROCHLORIDE 12.5 MG: 25 INJECTION INTRAMUSCULAR; INTRAVENOUS at 22:23

## 2018-10-29 RX ADMIN — SODIUM CHLORIDE 1000 ML: 9 INJECTION, SOLUTION INTRAVENOUS at 16:20

## 2018-10-29 RX ADMIN — LOPERAMIDE HYDROCHLORIDE 4 MG: 2 CAPSULE ORAL at 14:52

## 2018-10-29 NOTE — PROGRESS NOTES
Gynecology History and Physical    HPI     Patient is a 72 y.o. female with a stage 3 vaginal cancer, receiving chemotherapy and radiation therapy, admitted with emesis, diarrhea and electrolyte abnormalities. She was seen in the office today were severe nausea and emesis were noted. She was then sent to the infusion center where labs were drawn and she received IVF's. Ms. Young continued to suffer from emesis while in the infusion center after receiving zofran. As labs also revealed multiple electrolyte abnormalities, she will be admitted to the GYN ONC service.     OB History:   Obstetric History       T0      L4     SAB0   TAB0   Ectopic0   Multiple0   Live Births4       # Outcome Date GA Lbr Pérez/2nd Weight Sex Delivery Anes PTL Lv   4 Para      Vag-Spont      3 Para      Vag-Spont      2 Para      Vag-Spont      1 Para      Vag-Spont             Medical History:   Past Medical History:   Diagnosis Date   • Disease of thyroid gland    • Diverticulitis    • Hypertension        Surgical History:   Past Surgical History:   Procedure Laterality Date   • CHOLECYSTECTOMY     • DILATION AND CURETTAGE OF UTERUS  2018   • ROTATOR CUFF REPAIR Right        Social History:   Social History     Social History Narrative   • No narrative on file       Family History:   Family History   Problem Relation Age of Onset   • Breast cancer Neg Hx    • Cancer Neg Hx    • Colon cancer Neg Hx    • Ovarian cancer Neg Hx        Allergies: No known allergies    Prior to Admission medications    Medication Sig Start Date End Date Taking? Authorizing Provider   benazepril (LOTENSIN) 10 mg tablet Take 10 mg by mouth daily.    ProviderPadma MD   bumetanide (BUMEX) 1 mg tablet Take 1 mg by mouth daily.    ProviderPadma MD   cranberry conc-C-bacillus coag (AZO CRANBERRY + PROBIOTIC) 250-30-50 mg-mg-million tablet Take by mouth daily.    ProviderPadma MD   levothyroxine sodium (TIROSINT) 88 mcg capsule  take 1 capsule by oral route  every day 6/7/16   Padma Mccarty MD   magnesium chloride 71.5 mg tablet,delayed release (DR/EC) Take 1 tablet by mouth 2 (two) times a day. 10/29/18   Trev Jimenez MD   nystatin-triamcinolone (MYCOLOG II) 100,000-0.1 unit/g-% cream Apply to affected area twice a day for 7-14 days, then as needed for rash. 10/23/18 11/17/18  Edda Cummings PA C   omega-3 acid ethyl esters (LOVAZA) 1 gram capsule take 2 capsule by oral route 2 times every day 6/7/16   Padma Mccarty MD   ondansetron ODT (ZOFRAN ODT) 8 mg disintegrating tablet Take 1 tablet (8 mg total) by mouth every 8 (eight) hours as needed for nausea or vomiting for up to 7 days. 10/23/18 10/30/18  Edda Cummings PA C   pantoprazole (PROTONIX) 40 mg EC tablet 40 mg. 6/7/16   Padma Mccarty MD   prochlorperazine (COMPAZINE) 10 mg tablet Take 1 tablet (10 mg total) by mouth every 6 (six) hours as needed for nausea or vomiting for up to 7 days. 9/21/18 9/28/18  Trev Jimenez MD   rutin/hesp/bioflav/C/xcdmfy597 (BIOFLEX ORAL) Take 2 capsules by mouth daily.    Corey Simmons,        Review of Systems  Gastrointestinal: positive for diarrhea, nausea and vomiting    Objective     Vital Signs for the last 24 hours:  Temp:  [35.7 °C (96.2 °F)] 35.7 °C (96.2 °F)  Heart Rate:  [80-86] 80  Resp:  [18] 18  BP: (114-132)/(60-71) 114/60    Exam  General Appearance: Alert, cooperative, no acute distress  Head: Normocephalic, without obvious abnormality, atraumatic  Throat: Dry mucosa  Neck: no adenopathy  Thyroid: no enlargement/tenderness/nodules  Lungs: Clear to auscultation bilaterally, respirations unlabored  Heart: Regular rate and rhythm, S1 and S2 normal, no murmur, rub or gallop  Breast: Deferred  Abdomen: Soft, nontender, nondistended,   Extremities: no edema   Musculoskeletal: No injury or deformity  Neurologic: Deferred    Labs  I have reviewed the patient's labs.  Significant abnormals are listed  below.     CBC Results       10/29/18 10/22/18 10/08/18                    1119 1237 0945         WBC 2.43 (LL) 3.97 9.72         RBC 3.42 (L) 3.86 (L) 4.52         HGB 10.3 (L) 11.6 (L) 13.6         HCT 27.9 (L) 33.2 (L) 39.8         MCV 81.6 (L) 86.0 88.1         MCH 30.1 30.1 30.1         MCHC 36.9 (H) 34.9 34.2         PLT 38 (LL) 101 (L) 184         Comment for WBC at 1119 on 10/29/18:  ALL RESULTS HAVE BEEN CHECKED. This result has been called to JOSTIN BEARD by Camila Grewal on 10 29 18 at 12:51, and has been read back.     Comment for PLT at 1119 on 10/29/18:  SPECIMEN QUALITY CHECKED. RESULTS OBTAINED AFTER VORTEXING TO ELIMINATE PLT CLUMPS. This result has been called to JOSTIN BEARD by Camila Grewal on 10 29 18 at 12:51, and has been read back.         BMP Results       10/29/18 10/22/18 10/08/18                    1119 1237 0945          (L) 137 136         K 3.2 (L) 3.7 3.7         Cl 93 (L) 98 103         CO2 27 27 24         Glucose 122 (H) 89 115 (H)         BUN 20 10 14         Creatinine 1.8 (H) 1.3 (H) 0.9         Calcium 7.5 (L) 8.4 (L) 8.8 (L)         Anion Gap 13 12 9         EGFR 27.6 (L) 40.2 (L) &gt;60.0                             Assessment/Plan     71yo F w/ stage 3 vaginal cancer, suffering from persistent emesis and diarrhea, resolving in electrolyte abnormalities.   - AFVSS  - Electrolyte abnormalities as above: Secondary to persistent emesis and diarrhea. EKG on admission does not demonstrate any abnormalities. Telemetry not indicated at this time. Patient asymptomatic. Will intravascularly replete w/ normal saline with K and Mg repletion. Patient states she already feels some improvement in nausea after receiving phenergan. Will plan to continue with phenergan or compazine for antiemetics as zofran provided little relief. Can order imodium if diarrhea persists. She received it once in the infusion center but believes it may of been expelled in emesis. Will order  CBC/CMP/Mg for the AM. Patient to remain NPO at this time. Could consider clears if nausea resolves. Monitor I/O's. If severe emesis persists, would consider C/A/P imaging and potentially brain imaging to rule out metastatic disease.  - VTE ppx: SCDs, ambulation as tolerated.   - Plan was discussed with Dr. Jimenez

## 2018-10-29 NOTE — H&P
Raegan Young presents to the office for followup of nausea and vomiting x 6 days.     Medical History        Past Medical History:   Diagnosis Date   • Disease of thyroid gland     • Diverticulitis     • Hypertension           Surgical History         Past Surgical History:   Procedure Laterality Date   • CHOLECYSTECTOMY       • DILATION AND CURETTAGE OF UTERUS   02/2018   • ROTATOR CUFF REPAIR Right              Current Outpatient Prescriptions:   •  benazepril (LOTENSIN) 10 mg tablet, Take 10 mg by mouth daily., Disp: , Rfl:   •  bumetanide (BUMEX) 1 mg tablet, Take 1 mg by mouth daily., Disp: , Rfl:   •  cranberry conc-C-bacillus coag (AZO CRANBERRY + PROBIOTIC) 250-30-50 mg-mg-million tablet, Take by mouth daily., Disp: , Rfl:   •  levothyroxine sodium (TIROSINT) 88 mcg capsule, take 1 capsule by oral route  every day, Disp: , Rfl:   •  nystatin-triamcinolone (MYCOLOG II) 100,000-0.1 unit/g-% cream, Apply to affected area twice a day for 7-14 days, then as needed for rash., Disp: 30 g, Rfl: 0  •  omega-3 acid ethyl esters (LOVAZA) 1 gram capsule, take 2 capsule by oral route 2 times every day, Disp: , Rfl:   •  ondansetron ODT (ZOFRAN ODT) 8 mg disintegrating tablet, Take 1 tablet (8 mg total) by mouth every 8 (eight) hours as needed for nausea or vomiting for up to 7 days., Disp: 20 tablet, Rfl: 0  •  pantoprazole (PROTONIX) 40 mg EC tablet, 40 mg., Disp: , Rfl:   •  rutin/hesp/bioflav/C/kzngta803 (BIOFLEX ORAL), Take 2 capsules by mouth daily., Disp: , Rfl:   No known allergies  Cancer-related family history is negative for Breast cancer, Cancer, Colon cancer, and Ovarian cancer.   reports that she quit smoking about 20 years ago. She started smoking about 61 years ago. She has a 40.00 pack-year smoking history. She has never used smokeless tobacco. She reports that she does not drink alcohol or use drugs.  ROS:negative in all systems with the exception of the above     Physical examination:  Well-developed female in no apparent distress  Vitals:     10/29/18 1016   BP: 132/71   Pulse: 86   Resp: 18      Body mass index is 33.32 kg/m².     Vitals: BP: 132/71  Heart Rate: 86  Resp: 18  Weight: 77.4 kg (170 lb 9.6 oz) (10/29 1016)  HEENT: Normocephalic, atraumatic, nonicteric sclera  Neck: Supple no masses, thyroid normal nontender  Lymphatic: No inguinal or supraclavicular adenopathy  Heart: Regular rate no murmurs  Lungs: Clear to auscultation with good respiratory effort  Extremities: No clubbing, cyanosis, edema  Neuro: Oriented ×3 with normal mood and affect  Abdomen: Soft, nontender, nondistended. No hepatosplenomegaly. No rebound or guarding.  Gynecologic: Decreased mass left vulva, some redness, no induration or tenderness            Results for orders placed or performed in visit on 10/29/18   Rad Onc Aria Session Summary   Result Value Ref Range     Course ID 1       Course Start Date 9/21/2018 12:47 PM       Treatment Elapsed Days 33 days as of [id number]       Course Intent Unknown       Treatment Dates           Course End Date: : @ --  First Treatment Date: 2018-09-26 @ 10:38  Last Treatment Date: 2018-10-29 @ 09:34        Reference Point ID PELVIS       PLAN ID CD PELVIS       Prescribed Dose Per Fraction Gy 2       Prescribed Dose cGy 2,000       PLAN ID PELVIS       Fractions Treated To Date 23 of 25       Prescribed Dose Per Fraction Gy 2       Prescribed Dose cGy 5,000        Assessment/Plan            Assessment/Plan       Assesment:       Problem List Items Addressed This Visit      Vaginal cancer (CMS/HCC) (HCC) - Primary     Overview       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 WIll admit to observation for hydration,  antiemetics. CT abd/pelvis pos with brain if vomiting persists.

## 2018-10-29 NOTE — PROGRESS NOTES
(1050) Pt to floor to receive IVF/ antiemetics/ labs, has had poor appetite w/ very low intake since this past Tuesday. VSS, pt was assessed by RN.    Darnell #22 placed @ [facility], w/ +flush/ +blood return noted. Ordered labs drawn/ sent. IVF and zofran were ordered via pharmacy.    (1211) IVF arrived to floor: 1L NSS w/ 20mEqKCl and 1 gram mag sulfate up, per orders, set to infuse over 2hrs.     (1322) Zofran 16mg IV/ 50ml NSS up per orders, set to infuse over 20min. Pt able to tolerate some chocolate pudding, ginger ale for lunch.    (1330) Labs back, RN s/w Dr Trev Jimenez: MD aware Mg++=0.9, K+=3.2, creat=1.8, Ca++=7.5, platelets=38,000, and WBC=2.43. Per orders, pt will return to [facility]W tomorrow for additional labs, & IVF w/ electrolytes, and MD ordered for OTC mag supplements. No further orders @ this time.    (1430) IVF complete. Pt ambulated to , w/ one episode diarrhea. (Pt states had an additional episode earlier this am). Dr Tony arthur, RN received VO for Immodium 4mg PO X 1.    (1452) Pt received ordered immodium, and w/in approx 2minutes, vomited approx 40cc (appears to be chocolate pudding/ clear fluid). Dr Jimenez called, awaiting further orders.    (1530) RN s/w Dr Jimenez/ Dr Jimenez to see pt: pt will be admitted. Per orders, pt will receive Phenergan 25mg IV, which was ordered via pharmacy.    (1620) Phenergan arrived to floor: Phenergan 25mg IV up/ 30min. RN s/w admittting resident (Jennifer): EKG taken per order, showing NSR @ 76BPM, w/ KHi=716an. 1L NSS up @ 120cc/hr.     RN s/w bed coordinator: pt will go to room 288, which is being STAT cleaned. Report signed off to evening shift RN. Pt in stable condition.

## 2018-10-29 NOTE — PROGRESS NOTES
Raegan Young presents to the office for followup of nausea and vomiting x 6 days.    Past Medical History:   Diagnosis Date   • Disease of thyroid gland    • Diverticulitis    • Hypertension      Past Surgical History:   Procedure Laterality Date   • CHOLECYSTECTOMY     • DILATION AND CURETTAGE OF UTERUS  02/2018   • ROTATOR CUFF REPAIR Right        Current Outpatient Prescriptions:   •  benazepril (LOTENSIN) 10 mg tablet, Take 10 mg by mouth daily., Disp: , Rfl:   •  bumetanide (BUMEX) 1 mg tablet, Take 1 mg by mouth daily., Disp: , Rfl:   •  cranberry conc-C-bacillus coag (AZO CRANBERRY + PROBIOTIC) 250-30-50 mg-mg-million tablet, Take by mouth daily., Disp: , Rfl:   •  levothyroxine sodium (TIROSINT) 88 mcg capsule, take 1 capsule by oral route  every day, Disp: , Rfl:   •  nystatin-triamcinolone (MYCOLOG II) 100,000-0.1 unit/g-% cream, Apply to affected area twice a day for 7-14 days, then as needed for rash., Disp: 30 g, Rfl: 0  •  omega-3 acid ethyl esters (LOVAZA) 1 gram capsule, take 2 capsule by oral route 2 times every day, Disp: , Rfl:   •  ondansetron ODT (ZOFRAN ODT) 8 mg disintegrating tablet, Take 1 tablet (8 mg total) by mouth every 8 (eight) hours as needed for nausea or vomiting for up to 7 days., Disp: 20 tablet, Rfl: 0  •  pantoprazole (PROTONIX) 40 mg EC tablet, 40 mg., Disp: , Rfl:   •  rutin/hesp/bioflav/C/ylheea982 (BIOFLEX ORAL), Take 2 capsules by mouth daily., Disp: , Rfl:   No known allergies  Cancer-related family history is negative for Breast cancer, Cancer, Colon cancer, and Ovarian cancer.   reports that she quit smoking about 20 years ago. She started smoking about 61 years ago. She has a 40.00 pack-year smoking history. She has never used smokeless tobacco. She reports that she does not drink alcohol or use drugs.  ROS:negative in all systems with the exception of the above    Physical examination: Well-developed female in no apparent distress  Vitals:    10/29/18 1016   BP:  132/71   Pulse: 86   Resp: 18     Body mass index is 33.32 kg/m².    Vitals: BP: 132/71  Heart Rate: 86  Resp: 18  Weight: 77.4 kg (170 lb 9.6 oz) (10/29 1016)  HEENT: Normocephalic, atraumatic, nonicteric sclera  Neck: Supple no masses, thyroid normal nontender  Lymphatic: No inguinal or supraclavicular adenopathy  Heart: Regular rate no murmurs  Lungs: Clear to auscultation with good respiratory effort  Extremities: No clubbing, cyanosis, edema  Neuro: Oriented ×3 with normal mood and affect  Abdomen: Soft, nontender, nondistended. No hepatosplenomegaly. No rebound or guarding.  Gynecologic: Decreased mass left vulva, some redness, no induration or tenderness    Results for orders placed or performed in visit on 10/29/18   Rad Onc Aria Session Summary   Result Value Ref Range    Course ID 1     Course Start Date 9/21/2018 12:47 PM     Treatment Elapsed Days 33 days as of [id number]     Course Intent Unknown     Treatment Dates       Course End Date: : @ --  First Treatment Date: 2018-09-26 @ 10:38  Last Treatment Date: 2018-10-29 @ 09:34      Reference Point ID PELVIS     PLAN ID CD PELVIS     Prescribed Dose Per Fraction Gy 2     Prescribed Dose cGy 2,000     PLAN ID PELVIS     Fractions Treated To Date 23 of 25     Prescribed Dose Per Fraction Gy 2     Prescribed Dose cGy 5,000    Assessment/Plan     Assessment/Plan    Assesment:   Problem List Items Addressed This Visit     Vaginal cancer (CMS/HCC) (HCC) - Primary    Overview     9/12/18 biopsy L vagina squamous cell ca  MRI pelvis shows L vaginal cancer with 4-5cm L inguinal node necrotic  PET shows uptake in vagina and L inguinal node         Dehydration    Current Assessment & Plan     Appears to be dehydrated today will rx iv fluids 1l and return tomorrow for another hydration session. Patient is aware to follow up if no improvement.      Edda Cummings PA-C           I personally examined the patient and formulated the plan as transcribed by my  PA.  Trev Jimenez MD

## 2018-10-29 NOTE — ASSESSMENT & PLAN NOTE
Appears to be dehydrated today will rx iv fluids 1l and return tomorrow for another hydration session. Patient is aware to follow up if no improvement.      Edda Cummings PA-C

## 2018-10-29 NOTE — PATIENT INSTRUCTIONS
Discharge Instructions [facility]    1. Review your medication information sheet and call your provider if you have any questions    2. Contact your provider if signs of redness, drainage, swelling, or new onset of pain at the site of your IV.    3.  If you experience any of the following symptoms, you should call your family physician of if he/she is unavailable, go to the Emergency Room:     A) Trouble breathing   B) Rash/hives   C) Chills or temperature above 100.5 degrees   D) Pain, redness or drainage at any injection site    Patient Education     Dehydration, Adult  Dehydration is a condition in which there is not enough fluid or water in the body. This happens when you lose more fluids than you take in. Important organs, such as the kidneys, brain, and heart, cannot function without a proper amount of fluids. Any loss of fluids from the body can lead to dehydration.  Dehydration can range from mild to severe. This condition should be treated right away to prevent it from becoming severe.  What are the causes?  This condition may be caused by:  · Vomiting.  · Diarrhea.  · Excessive sweating, such as from heat exposure or exercise.  · Not drinking enough fluid, especially:  ¨ When ill.  ¨ While doing activity that requires a lot of energy.  · Excessive urination.  · Fever.  · Infection.  · Certain medicines, such as medicines that cause the body to lose excess fluid (diuretics).  · Inability to access safe drinking water.  · Reduced physical ability to get adequate water and food.  What increases the risk?  This condition is more likely to develop in people:  · Who have a poorly controlled long-term (chronic) illness, such as diabetes, heart disease, or kidney disease.  · Who are age 65 or older.  · Who are disabled.  · Who live in a place with high altitude.  · Who play endurance sports.  What are the signs or symptoms?  Symptoms of mild dehydration may include:  · Thirst.  · Dry lips.  · Slightly dry  mouth.  · Dry, warm skin.  · Dizziness.  Symptoms of moderate dehydration may include:  · Very dry mouth.  · Muscle cramps.  · Dark urine. Urine may be the color of tea.  · Decreased urine production.  · Decreased tear production.  · Heartbeat that is irregular or faster than normal (palpitations).  · Headache.  · Light-headedness, especially when you stand up from a sitting position.  · Fainting (syncope).  Symptoms of severe dehydration may include:  · Changes in skin, such as:  ¨ Cold and clammy skin.  ¨ Blotchy (mottled) or pale skin.  ¨ Skin that does not quickly return to normal after being lightly pinched and released (poor skin turgor).  · Changes in body fluids, such as:  ¨ Extreme thirst.  ¨ No tear production.  ¨ Inability to sweat when body temperature is high, such as in hot weather.  ¨ Very little urine production.  · Changes in vital signs, such as:  ¨ Weak pulse.  ¨ Pulse that is more than 100 beats a minute when sitting still.  ¨ Rapid breathing.  ¨ Low blood pressure.  · Other changes, such as:  ¨ Sunken eyes.  ¨ Cold hands and feet.  ¨ Confusion.  ¨ Lack of energy (lethargy).  ¨ Difficulty waking up from sleep.  ¨ Short-term weight loss.  ¨ Unconsciousness.  How is this diagnosed?  This condition is diagnosed based on your symptoms and a physical exam. Blood and urine tests may be done to help confirm the diagnosis.  How is this treated?  Treatment for this condition depends on the severity. Mild or moderate dehydration can often be treated at home. Treatment should be started right away. Do not wait until dehydration becomes severe. Severe dehydration is an emergency and it needs to be treated in a hospital.  Treatment for mild dehydration may include:  · Drinking more fluids.  · Replacing salts and minerals in your blood (electrolytes) that you may have lost.  Treatment for moderate dehydration may include:  · Drinking an oral rehydration solution (ORS). This is a drink that helps you replace  fluids and electrolytes (rehydrate). It can be found at pharmacies and retail stores.  Treatment for severe dehydration may include:  · Receiving fluids through an IV tube.  · Receiving an electrolyte solution through a feeding tube that is passed through your nose and into your stomach (nasogastric tube, or NG tube).  · Correcting any abnormalities in electrolytes.  · Treating the underlying cause of dehydration.  Follow these instructions at home:  · If directed by your health care provider, drink an ORS:  ¨ Make an ORS by following instructions on the package.  ¨ Start by drinking small amounts, about ½ cup (120 mL) every 5-10 minutes.  ¨ Slowly increase how much you drink until you have taken the amount recommended by your health care provider.  · Drink enough clear fluid to keep your urine clear or pale yellow. If you were told to drink an ORS, finish the ORS first, then start slowly drinking other clear fluids. Drink fluids such as:  ¨ Water. Do not drink only water. Doing that can lead to having too little salt (sodium) in the body (hyponatremia).  ¨ Ice chips.  ¨ Fruit juice that you have added water to (diluted fruit juice).  ¨ Low-calorie sports drinks.  · Avoid:  ¨ Alcohol.  ¨ Drinks that contain a lot of sugar. These include high-calorie sports drinks, fruit juice that is not diluted, and soda.  ¨ Caffeine.  ¨ Foods that are greasy or contain a lot of fat or sugar.  · Take over-the-counter and prescription medicines only as told by your health care provider.  · Do not take sodium tablets. This can lead to having too much sodium in the body (hypernatremia).  · Eat foods that contain a healthy balance of electrolytes, such as bananas, oranges, potatoes, tomatoes, and spinach.  · Keep all follow-up visits as told by your health care provider. This is important.  Contact a health care provider if:  · You have abdominal pain that:  ¨ Gets worse.  ¨ Stays in one area (localizes).  · You have a rash.  · You  have a stiff neck.  · You are more irritable than usual.  · You are sleepier or more difficult to wake up than usual.  · You feel weak or dizzy.  · You feel very thirsty.  · You have urinated only a small amount of very dark urine over 6-8 hours.  Get help right away if:  · You have symptoms of severe dehydration.  · You cannot drink fluids without vomiting.  · Your symptoms get worse with treatment.  · You have a fever.  · You have a severe headache.  · You have vomiting or diarrhea that:  ¨ Gets worse.  ¨ Does not go away.  · You have blood or green matter (bile) in your vomit.  · You have blood in your stool. This may cause stool to look black and tarry.  · You have not urinated in 6-8 hours.  · You faint.  · Your heart rate while sitting still is over 100 beats a minute.  · You have trouble breathing.  This information is not intended to replace advice given to you by your health care provider. Make sure you discuss any questions you have with your health care provider.  Document Released: 12/18/2006 Document Revised: 07/14/2017 Document Reviewed: 02/10/2017  [employer] Interactive Patient Education © 2018 [employer]       Patient Education     Nausea, Adult  Nausea is the feeling of an upset stomach or having to vomit. Nausea on its own is not usually a serious concern, but it may be an early sign of a more serious medical problem. As nausea gets worse, it can lead to vomiting. If vomiting develops, or if you are not able to drink enough fluids, you are at risk of becoming dehydrated. Dehydration can make you tired and thirsty, cause you to have a dry mouth, and decrease how often you urinate. Older adults and people with other diseases or a weak immune system are at higher risk for dehydration. The main goals of treating your nausea are:  · To limit repeated nausea episodes.  · To prevent vomiting and dehydration.  Follow these instructions at home:  Follow instructions from your health care provider about  how to care for yourself at home.  Eating and drinking  Follow these recommendations as told by your health care provider:  · Take an oral rehydration solution (ORS). This is a drink that is sold at pharmacies and retail stores.  · Drink clear fluids in small amounts as you are able. Clear fluids include water, ice chips, diluted fruit juice, and low-calorie sports drinks.  · Eat bland, easy-to-digest foods in small amounts as you are able. These foods include bananas, applesauce, rice, lean meats, toast, and crackers.  · Avoid drinking fluids that contain a lot of sugar or caffeine, such as energy drinks, sports drinks, and soda.  · Avoid alcohol.  · Avoid spicy or fatty foods.  General instructions  · Drink enough fluid to keep your urine clear or pale yellow.  · Wash your hands often. If soap and water are not available, use hand .  · Make sure that all people in your household wash their hands well and often.  · Rest at home while you recover.  · Take over-the-counter and prescription medicines only as told by your health care provider.  · Breathe slowly and deeply when you feel nauseous.  · Watch your condition for any changes.  · Keep all follow-up visits as told by your health care provider. This is important.  Contact a health care provider if:  · You have a headache.  · You have new symptoms.  · Your nausea gets worse.  · You have a fever.  · You feel light-headed or dizzy.  · You vomit.  · You cannot keep fluids down.  Get help right away if:  · You have pain in your chest, neck, arm, or jaw.  · You feel extremely weak or you faint.  · You have vomit that is bright red or looks like coffee grounds.  · You have bloody or black stools or stools that look like tar.  · You have a severe headache, a stiff neck, or both.  · You have severe pain, cramping, or bloating in your abdomen.  · You have a rash.  · You have difficulty breathing or are breathing very quickly.  · Your heart is beating very  quickly.  · Your skin feels cold and clammy.  · You feel confused.  · You have pain when you urinate.  · You have signs of dehydration, such as:  ¨ Dark urine, very little, or no urine.  ¨ Cracked lips.  ¨ Dry mouth.  ¨ Sunken eyes.  ¨ Sleepiness.  ¨ Weakness.  These symptoms may represent a serious problem that is an emergency. Do not wait to see if the symptoms will go away. Get medical help right away. Call your local emergency services (911 in the [place] Do not drive yourself to the hospital.  This information is not intended to replace advice given to you by your health care provider. Make sure you discuss any questions you have with your health care provider.  Document Released: 01/25/2006 Document Revised: 05/22/2017 Document Reviewed: 08/23/2016  [employer] Interactive Patient Education © 2017 [employer]     Patient Education   Hypomagnesemia (LOW MAGNESIUM)  Hypomagnesemia is a condition in which the level of magnesium in the blood is low. Magnesium is a mineral that is found in many foods. It is used in many different processes in the body. Hypomagnesemia can affect every organ in the body. It can cause life-threatening problems.  What are the causes?  Causes of hypomagnesemia include:  Not getting enough magnesium in your diet.  Malnutrition.  Problems with absorbing magnesium from the intestines.  Dehydration.  Alcohol abuse.  Vomiting.  Severe diarrhea.  Some medicines, including medicines that make you urinate more.  Certain diseases, such as kidney disease, diabetes, and overactive thyroid.  What are the signs or symptoms?  Involuntary shaking or trembling of a body part (tremor).  Confusion.  Muscle weakness.  Sensitivity to light, sound, and touch.  Psychiatric issues, such as depression, irritability, or psychosis.  Sudden tightening of muscles (muscle spasms).  Tingling in the arms and legs.  A feeling of fluttering of the heart.  These symptoms are more severe if magnesium levels drop  suddenly.  How is this diagnosed?  To make a diagnosis, your health care provider will do a physical exam and order blood and urine tests.  How is this treated?  Treatment will depend on the cause and the severity of your condition. It may involve:  A magnesium supplement. This can be taken in pill form. It can also be given through an IV tube. This is usually done if the condition is severe.  Changes to your diet. You may be directed to eat foods that have a lot of magnesium, such as green leafy vegetables, peas, beans, and nuts.  Eliminating alcohol from your diet.  Follow these instructions at home:  Include foods with magnesium in your diet. Foods that are rich in magnesium include green vegetables, beans, nuts and seeds, and whole grains.  Take medicines only as directed by your health care provider.  Take magnesium supplements if your health care provider instructs you to do that. Take them as directed.  Have your magnesium levels monitored as directed by your health care provider.  When you are active, drink fluids that contain electrolytes.  Keep all follow-up visits as directed by your health care provider. This is important.  Contact a health care provider if:  You get worse instead of better.  Your symptoms return.  Get help right away if:  Your symptoms are severe.  This information is not intended to replace advice given to you by your health care provider. Make sure you discuss any questions you have with your health care provider.  Document Released: 09/13/2006 Document Revised: 05/25/2017 Document Reviewed: 08/03/2015  [employer] Interactive Patient Education © 2018 [employer]     Patient Education     Thrombocytopenia (LOW PLATELETS)  Thrombocytopenia is a condition in which you have an abnormally decreased number of platelets in your blood. Platelets are also called thrombocytes. Platelets are needed for blood clotting. Some cases of thrombocytopenia are mild while others are more severe.  What  are the causes?  This condition may be caused by:  · Decreased production of platelets. This can be caused by:  ¨ Aplastic anemia, in which your bone marrow quits making blood cells.  ¨ Cancer in the bone marrow.  ¨ Use of certain medicines, including chemotherapy.  ¨ Infection in the bone marrow.  ¨ Heavy alcohol consumption.  · Increased destruction of platelets. This can be caused by:  ¨ Certain immune diseases.  ¨ Use of certain drugs.  ¨ Certain blood clotting disorders.  ¨ Certain inherited disorders.  ¨ Certain bleeding disorders.  ¨ Pregnancy.  · Having an enlarged spleen (hypersplenism). In hypersplenism, the spleen gathers up platelets from circulation. This means that the platelets are not available to help with blood clotting. The spleen can be enlarged because of cirrhosis or other conditions.  What are the signs or symptoms?  Symptoms of this condition are side effects of poor blood clotting. They will vary depending on how low the platelet counts are. Symptoms may include:  · Abnormal bleeding.  · Nosebleeds.  · Heavy menstrual periods.  · Blood in the urine or stool (feces).  · A purplish discoloration in the skin (purpura).  · Bruising.  · A rash that looks like pinpoint, purplish-red spots (petechiae) on the skin and mucous membranes.  How is this diagnosed?  This condition may be diagnosed with blood tests and a physical exam. Sometimes, a sample of bone marrow may be removed to look for the original cells (megakaryocytes) that make platelets.  How is this treated?  Treatment for this condition depends on the cause. Treatment options may include:  · Treatment of another condition that is causing the low platelet count.  · Medicines to help protect your platelets from being destroyed.  · A replacement (transfusion) of platelets to stop or prevent bleeding.  · Surgery to remove the spleen.  Follow these instructions at home:  General instructions  · Check your skin and the linings inside your mouth  for bruising or bleeding as told by your health care provider.  · Check your sputum, urine, and stool for blood as told by your health care provider.  · Ask your health care provider if it is okay for you to drink alcohol.  · Take over-the-counter and prescription medicines only as told by your health care provider.  · Tell all of your health care providers, including dentists and eye doctors, about your condition.  Activity  · Until your health care provider says it is okay.  ¨ Do not return to any activities that could cause bumps or bruises.  · Take extra care not to cut yourself when you shave or when you use scissors, needles, knives, and other tools.  · Take extra care not to burn yourself when ironing or cooking.  Contact a health care provider if:  · You have unexplained bruising.  Get help right away if:  · You have active bleeding from anywhere on your body.  · You have blood in your sputum, urine, or stool.  This information is not intended to replace advice given to you by your health care provider. Make sure you discuss any questions you have with your health care provider.  Document Released: 12/18/2006 Document Revised: 08/20/2017 Document Reviewed: 06/20/2016  [employer] Interactive Patient Education © 2018 [employer]

## 2018-10-30 LAB
ALBUMIN SERPL-MCNC: 2.9 G/DL (ref 3.4–5)
ALP SERPL-CCNC: 50 IU/L (ref 35–126)
ALT SERPL-CCNC: 20 IU/L (ref 11–54)
ANION GAP SERPL CALC-SCNC: 8 MEQ/L (ref 3–15)
AST SERPL-CCNC: 18 IU/L (ref 15–41)
BASOPHILS # BLD: 0 K/UL (ref 0.01–0.1)
BASOPHILS NFR BLD: 0 %
BILIRUB SERPL-MCNC: 0.5 MG/DL (ref 0.3–1.2)
BUN SERPL-MCNC: 14 MG/DL (ref 8–20)
BURR CELLS BLD QL SMEAR: ABNORMAL
CALCIUM SERPL-MCNC: 6.7 MG/DL (ref 8.9–10.3)
CHLORIDE SERPL-SCNC: 105 MEQ/L (ref 98–109)
CO2 SERPL-SCNC: 23 MEQ/L (ref 22–32)
CREAT SERPL-MCNC: 1.4 MG/DL (ref 0.6–1.1)
DIFFERENTIAL METHOD BLD: ABNORMAL
EOSINOPHIL # BLD: 0.03 K/UL (ref 0.04–0.36)
EOSINOPHIL NFR BLD: 1.7 %
ERYTHROCYTE [DISTWIDTH] IN BLOOD BY AUTOMATED COUNT: 11.8 % (ref 11.7–14.4)
GFR SERPL CREATININE-BSD FRML MDRD: 36.9 ML/MIN/1.73M*2
GLUCOSE SERPL-MCNC: 113 MG/DL (ref 70–99)
HCT VFR BLDCO AUTO: 24.5 % (ref 35–45)
HGB BLD-MCNC: 9 G/DL (ref 11.8–15.7)
IMM GRANULOCYTES # BLD AUTO: 0.01 K/UL (ref 0–0.08)
IMM GRANULOCYTES NFR BLD AUTO: 0.6 %
LYMPHOCYTES # BLD: 0.14 K/UL (ref 1.2–3.5)
LYMPHOCYTES NFR BLD: 7.7 %
MAGNESIUM SERPL-MCNC: 1.7 MG/DL (ref 1.8–2.5)
MCH RBC QN AUTO: 30.2 PG (ref 28–33.2)
MCHC RBC AUTO-ENTMCNC: 36.7 G/DL (ref 32.2–35.5)
MCV RBC AUTO: 82.2 FL (ref 83–98)
MONOCYTES # BLD: 0.17 K/UL (ref 0.28–0.8)
MONOCYTES NFR BLD: 9.4 %
NEUTROPHILS # BLD: 1.46 K/UL (ref 1.7–7)
NEUTS SEG NFR BLD: 80.6 %
NRBC BLD-RTO: 0 %
OVALOCYTES BLD QL SMEAR: ABNORMAL
PDW BLD AUTO: 10 FL (ref 9.4–12.3)
PLATELET # BLD AUTO: 30 K/UL (ref 150–369)
PLATELET # BLD EST: ABNORMAL 10*3/UL
POTASSIUM SERPL-SCNC: 3.5 MEQ/L (ref 3.6–5.1)
PROT SERPL-MCNC: 4.9 G/DL (ref 6–8.2)
RBC # BLD AUTO: 2.98 M/UL (ref 3.93–5.22)
SODIUM SERPL-SCNC: 136 MEQ/L (ref 136–144)
WBC # BLD AUTO: 1.76 K/UL (ref 3.8–10.5)

## 2018-10-30 PROCEDURE — 63600000 HC DRUGS/DETAIL CODE: Performed by: OBSTETRICS & GYNECOLOGY

## 2018-10-30 PROCEDURE — 99225 PR SBSQ OBSERVATION CARE/DAY 25 MINUTES: CPT | Performed by: OBSTETRICS & GYNECOLOGY

## 2018-10-30 PROCEDURE — 85027 COMPLETE CBC AUTOMATED: CPT | Performed by: OBSTETRICS & GYNECOLOGY

## 2018-10-30 PROCEDURE — 25800000 HC PHARMACY IV SOLUTIONS: Performed by: OBSTETRICS & GYNECOLOGY

## 2018-10-30 PROCEDURE — G0378 HOSPITAL OBSERVATION PER HR: HCPCS

## 2018-10-30 PROCEDURE — 63700000 HC SELF-ADMINISTRABLE DRUG: Performed by: OBSTETRICS & GYNECOLOGY

## 2018-10-30 PROCEDURE — 83735 ASSAY OF MAGNESIUM: CPT | Performed by: OBSTETRICS & GYNECOLOGY

## 2018-10-30 PROCEDURE — 36415 COLL VENOUS BLD VENIPUNCTURE: CPT | Performed by: OBSTETRICS & GYNECOLOGY

## 2018-10-30 PROCEDURE — 80053 COMPREHEN METABOLIC PANEL: CPT | Performed by: OBSTETRICS & GYNECOLOGY

## 2018-10-30 PROCEDURE — 12000000 HC ROOM AND CARE MED/SURG

## 2018-10-30 RX ORDER — ENOXAPARIN SODIUM 100 MG/ML
40 INJECTION SUBCUTANEOUS
Status: DISCONTINUED | OUTPATIENT
Start: 2018-10-30 | End: 2018-10-31

## 2018-10-30 RX ORDER — LANOLIN ALCOHOL/MO/W.PET/CERES
200 CREAM (GRAM) TOPICAL DAILY
Status: DISCONTINUED | OUTPATIENT
Start: 2018-10-30 | End: 2018-10-30

## 2018-10-30 RX ORDER — LOPERAMIDE HYDROCHLORIDE 2 MG/1
2 CAPSULE ORAL AS NEEDED
Status: DISCONTINUED | OUTPATIENT
Start: 2018-10-30 | End: 2018-11-05 | Stop reason: HOSPADM

## 2018-10-30 RX ADMIN — WHITE PETROLATUM: 1.75 OINTMENT TOPICAL at 14:30

## 2018-10-30 RX ADMIN — PROMETHAZINE HYDROCHLORIDE 12.5 MG: 25 INJECTION INTRAMUSCULAR; INTRAVENOUS at 12:46

## 2018-10-30 RX ADMIN — POTASSIUM CHLORIDE: 2 INJECTION, SOLUTION, CONCENTRATE INTRAVENOUS at 14:30

## 2018-10-30 RX ADMIN — SODIUM CHLORIDE 1000 ML: 9 INJECTION, SOLUTION INTRAVENOUS at 05:15

## 2018-10-30 RX ADMIN — PROMETHAZINE HYDROCHLORIDE 12.5 MG: 25 INJECTION INTRAMUSCULAR; INTRAVENOUS at 05:15

## 2018-10-30 RX ADMIN — SODIUM CHLORIDE 1000 ML: 9 INJECTION, SOLUTION INTRAVENOUS at 23:51

## 2018-10-30 RX ADMIN — LOPERAMIDE HYDROCHLORIDE 2 MG: 2 CAPSULE ORAL at 20:47

## 2018-10-30 RX ADMIN — ENOXAPARIN SODIUM 40 MG: 100 INJECTION SUBCUTANEOUS at 18:32

## 2018-10-30 RX ADMIN — PROMETHAZINE HYDROCHLORIDE 12.5 MG: 25 INJECTION INTRAMUSCULAR; INTRAVENOUS at 18:31

## 2018-10-30 ASSESSMENT — COGNITIVE AND FUNCTIONAL STATUS - GENERAL
CLIMB 3 TO 5 STEPS WITH RAILING: 4 - NONE
DRESSING REGULAR UPPER BODY CLOTHING: 4 - NONE
TOILETING: 4 - NONE
MOVING TO AND FROM BED TO CHAIR: 4 - NONE
WALKING IN HOSPITAL ROOM: 4 - NONE
HELP NEEDED FOR BATHING: 4 - NONE
STANDING UP FROM CHAIR USING ARMS: 4 - NONE
EATING MEALS: 4 - NONE
DRESSING REGULAR LOWER BODY CLOTHING: 4 - NONE
HELP NEEDED FOR PERSONAL GROOMING: 4 - NONE

## 2018-10-30 NOTE — PLAN OF CARE
Problem: Patient Care Overview  Goal: Plan of Care Review  Outcome: Ongoing (interventions implemented as appropriate)   10/30/18 0810   Coping/Psychosocial   Plan Of Care Reviewed With patient   Plan of Care Review   Progress improving   Outcome Summary Pt feels better this AM. denies nausea. Ready to try to eat breakfast this Am. Pt due for radiation therpy this AM. Labs completed. May need some replacement and most likely d/c later tody.        Problem: Nausea/Vomiting (Adult)  Goal: Symptom Relief  Outcome: Ongoing (interventions implemented as appropriate)    Goal: Adequate Hydration  Outcome: Ongoing (interventions implemented as appropriate)

## 2018-10-30 NOTE — PROGRESS NOTES
Patient seen with Dr. Jimenez this afternoon. As patient is not tolerating much PO and labs continue to demonstrate electrolyte imbalances, will keep overnight with plans to continue IV hydration and check labs in AM. Lovenox was ordered (CrCl >30) for VTE ppx. Differential was added on to today's CBC, to determine if neutropenic precautions are indicated. Plan for CBC/BMP/Mg tomorrow AM.

## 2018-10-30 NOTE — [facility] PHYSICIAN REVIEW NOTE
Utilization Secondary Review Note      Patient Name: Raegan Young      MRN: [id number]  Insurance: MEDICARE  Admission date: 10/29/2018  Initial order:    Inpatient  Planned admission: No  Post Discharge Review: No            Observation services are appropriate for this 72 y.o. year old female who presented with nausea and vomiting and dehydration requiring IVF. Still on IVF. Observation currently appropriate.     Yin Alegre,   10/30/2018

## 2018-10-30 NOTE — PLAN OF CARE
Problem: Patient Care Overview  Goal: Plan of Care Review  Outcome: Ongoing (interventions implemented as appropriate)   10/30/18 0141   Coping/Psychosocial   Plan Of Care Reviewed With patient   Plan of Care Review   Progress improving   Outcome Summary PT reports decreased nausea this shift. VSS. IVF's infusing.     Goal: Individualization & Mutuality  Outcome: Ongoing (interventions implemented as appropriate)      Problem: Nausea/Vomiting (Adult)  Goal: Identify Related Risk Factors and Signs and Symptoms  Outcome: Outcome(s) Achieved Date Met: 10/30/18    Goal: Symptom Relief  Outcome: Ongoing (interventions implemented as appropriate)    Goal: Adequate Hydration  Outcome: Ongoing (interventions implemented as appropriate)

## 2018-10-30 NOTE — PROGRESS NOTES
Late Entry Note    Patient seen by myself at 2000. Feeling significant improved. She reports no episodes of emesis since arrival to the floor (last episode at 1530), and her nausea has resolved. She has also had no further episodes of diarrhea. Denies CP, SOB, HA. No ongoing complaints or concerns at this time.     /61 (Patient Position: Sitting)   Pulse 76   Temp 36.8 °C (98.2 °F) (Oral)   Resp 18   Ht 1.524 m (5')   Wt 77.1 kg (170 lb)   SpO2 99%   BMI 33.20 kg/m²   NAD, RRR CTAB Abd: soft, ND, NT Ext No edema    Plan to keep NPO overnight, continue electrolyte repletion with IVF (D5NS with 40K, 2gMG) @ 125cc/hr overnight. Plan to repeat CBC, CMP, Mg in am    Pat Moise MD

## 2018-10-30 NOTE — ASSESSMENT & PLAN NOTE
- Patient did well overnight. No further episodes of emesis or diarrhea.   - Tolerated sips and chips overnight, will plan to advance diet to clears this AM.   - D5NS @ 125 cc/hr w/ 40 K + 2g Mg for repletion. UOP yesterday evening wnl (130cc/hr). Not yet recorded for the overnight shift, but patient denies abnormalities in UOP.   - CMP, Mg pending this AM. Labs yesterday demonstrated hypokalemia and hypomagnesium. Patient remains asymptomatic. EKG on admission NSR without abnormalities.   - Thrombocytopenia 2/2 radiation. Repeat CBC pending this AM.   - Awaiting labs this AM, will replete as necessary. Will also continue to advance diet as tolerated as patient denies recurrent emesis or nausea.   - Will discuss plan of care with Dr. Jimenez.

## 2018-10-30 NOTE — [facility] PHYSICIAN REVIEW NOTE
Pt. Still not tolerating PO, given this and lyte abnormalities and worsening pancytopenia, plan is for second MN for ongoing lyte repletion and lab monitoring and advanced diet.    IP is appropriate.    Hayley Eldridge,   10/30/2018  2:35 PM

## 2018-10-30 NOTE — PROGRESS NOTES
Gynecologic Oncology Daily Progress Note    HD #2, admitted for emesis, diarrhea, dehydration    Subjective     Patient did well overnight. Denied any further episodes of emesis since admission, states nausea has resolved. Denies any episodes of diarrhea. Tolerated sips and chips overnight. Reports minimal vaginal bleeding from cancer. Denies CP/SOB/heart palpitations.     Objective     Vital signs in last 24 hours:  Temp:  [35.7 °C (96.2 °F)-37.5 °C (99.5 °F)] 37.5 °C (99.5 °F)  Heart Rate:  [76-86] 86  Resp:  [18] 18  BP: (109-133)/(55-71) 109/55      Intake/Output Summary (Last 24 hours) at 10/30/18 0631  Last data filed at 10/29/18 2300   Gross per 24 hour   Intake              850 ml   Output              650 ml   Net              200 ml     Intake/Output this shift:  I/O this shift:  In: 850 [I.V.:800; IV Piggyback:50]  Out: 650 [Urine:650]    General: well-developed, well-nourished, no apparent distress  Respiratory: lungs clear to auscultation bilaterally, normal respiratory effort  Cardiovascular: regular rate and rhythm, no murmurs, rubs, gallops.   Extremity: no clubbing, cyanosis, edema  GI: abdomen soft, non-distended, non-tender  Neurologic: alert & oriented x3, no gross deficits  Psychiatric: mood and affect normal  Musculoskeletal: no deformity or gross strength deficit    Labs - pending        Assessment/Plan    Dehydration  - Patient did well overnight. No further episodes of emesis or diarrhea.   - Tolerated sips and chips overnight, will plan to advance diet to clears this AM.   - D5NS @ 125 cc/hr w/ 40 K + 2g Mg for repletion. UOP yesterday evening wnl (130cc/hr). Not yet recorded for the overnight shift, but patient denies abnormalities in UOP.   - CMP, Mg pending this AM. Labs yesterday demonstrated hypokalemia and hypomagnesium. Patient remains asymptomatic. EKG on admission NSR without abnormalities.   - Thrombocytopenia 2/2 radiation. Repeat CBC pending this AM.   - Awaiting labs this AM,  will replete as necessary. Will also continue to advance diet as tolerated as patient denies recurrent emesis or nausea.   - Will discuss plan of care with Dr. Jimenez.         Jennifer Renteria MD

## 2018-10-30 NOTE — PROGRESS NOTES
Pt discussed @ the Care Progression Rounds today. Pt lives with spouse/2 story home. Discussed D/C plan home with pt. D/C plan home; no skilled needs.

## 2018-10-31 ENCOUNTER — TRANSCRIBE ORDERS (OUTPATIENT)
Dept: GYNECOLOGIC ONCOLOGY | Facility: CLINIC | Age: 73
End: 2018-10-31

## 2018-10-31 ENCOUNTER — APPOINTMENT (OUTPATIENT)
Dept: RADIATION ONCOLOGY | Facility: HOSPITAL | Age: 73
Setting detail: RADIATION/ONCOLOGY SERIES
End: 2018-10-31
Attending: RADIOLOGY
Payer: MEDICARE

## 2018-10-31 LAB
ABO + RH BLD: NORMAL
ANION GAP SERPL CALC-SCNC: 9 MEQ/L (ref 3–15)
BACTERIA URNS QL MICRO: 3 /HPF
BASOPHILS # BLD: 0 K/UL (ref 0.01–0.1)
BASOPHILS # BLD: 0.01 K/UL (ref 0.01–0.1)
BASOPHILS NFR BLD: 0 %
BASOPHILS NFR BLD: 1 %
BILIRUB UR QL STRIP.AUTO: NEGATIVE MG/DL
BLD GP AB SCN SERPL QL: NEGATIVE
BUN SERPL-MCNC: 10 MG/DL (ref 8–20)
CALCIUM SERPL-MCNC: 7.1 MG/DL (ref 8.9–10.3)
CHLORIDE SERPL-SCNC: 106 MEQ/L (ref 98–109)
CLARITY UR REFRACT.AUTO: ABNORMAL
CO2 SERPL-SCNC: 20 MEQ/L (ref 22–32)
COLOR UR AUTO: YELLOW
CREAT SERPL-MCNC: 1.4 MG/DL (ref 0.6–1.1)
D AG BLD QL: NEGATIVE
DIFFERENTIAL METHOD BLD: ABNORMAL
DIFFERENTIAL METHOD BLD: ABNORMAL
EOSINOPHIL # BLD: 0 K/UL (ref 0.04–0.36)
EOSINOPHIL # BLD: 0.03 K/UL (ref 0.04–0.36)
EOSINOPHIL NFR BLD: 0 %
EOSINOPHIL NFR BLD: 2 %
ERYTHROCYTE [DISTWIDTH] IN BLOOD BY AUTOMATED COUNT: 11.8 % (ref 11.7–14.4)
ERYTHROCYTE [DISTWIDTH] IN BLOOD BY AUTOMATED COUNT: 11.9 % (ref 11.7–14.4)
GFR SERPL CREATININE-BSD FRML MDRD: 36.9 ML/MIN/1.73M*2
GLUCOSE SERPL-MCNC: 101 MG/DL (ref 70–99)
GLUCOSE UR STRIP.AUTO-MCNC: NEGATIVE MG/DL
HCT VFR BLDCO AUTO: 23.4 % (ref 35–45)
HCT VFR BLDCO AUTO: 23.5 % (ref 35–45)
HGB BLD-MCNC: 8.1 G/DL (ref 11.8–15.7)
HGB BLD-MCNC: 8.5 G/DL (ref 11.8–15.7)
HGB UR QL STRIP.AUTO: 3
HYALINE CASTS #/AREA URNS LPF: ABNORMAL /LPF
HYPOCHROMIA BLD QL SMEAR: ABNORMAL
KETONES UR STRIP.AUTO-MCNC: NEGATIVE MG/DL
LABORATORY COMMENT REPORT: NORMAL
LEUKOCYTE ESTERASE UR QL STRIP.AUTO: 2
LYMPHOCYTES # BLD: 0.14 K/UL (ref 1.2–3.5)
LYMPHOCYTES # BLD: 0.17 K/UL (ref 1.2–3.5)
LYMPHOCYTES NFR BLD: 10 %
LYMPHOCYTES NFR BLD: 12 %
MAGNESIUM SERPL-MCNC: 1.6 MG/DL (ref 1.8–2.5)
MCH RBC QN AUTO: 28.9 PG (ref 28–33.2)
MCH RBC QN AUTO: 30.1 PG (ref 28–33.2)
MCHC RBC AUTO-ENTMCNC: 34.6 G/DL (ref 32.2–35.5)
MCHC RBC AUTO-ENTMCNC: 36.2 G/DL (ref 32.2–35.5)
MCV RBC AUTO: 83.3 FL (ref 83–98)
MCV RBC AUTO: 83.6 FL (ref 83–98)
MONOCYTES # BLD: 0.06 K/UL (ref 0.28–0.8)
MONOCYTES # BLD: 0.16 K/UL (ref 0.28–0.8)
MONOCYTES NFR BLD: 12 %
MONOCYTES NFR BLD: 4 %
NEUTS BAND # BLD: 0.08 K/UL
NEUTS BAND # BLD: 0.12 K/UL
NEUTS BAND # BLD: 0.9 K/UL (ref 1.7–7)
NEUTS BAND # BLD: 1.09 K/UL (ref 1.7–7)
NEUTS BAND NFR BLD: 6 %
NEUTS BAND NFR BLD: 9 %
NEUTS SEG NFR BLD: 66 %
NEUTS SEG NFR BLD: 78 %
NITRITE UR QL STRIP.AUTO: POSITIVE
PDW BLD AUTO: 9.5 FL (ref 9.4–12.3)
PDW BLD AUTO: 9.5 FL (ref 9.4–12.3)
PH UR STRIP.AUTO: 6 [PH]
PLAT MORPH BLD: NORMAL
PLAT MORPH BLD: NORMAL
PLATELET # BLD AUTO: 20 K/UL (ref 150–369)
PLATELET # BLD AUTO: 23 K/UL (ref 150–369)
PLATELET # BLD EST: ABNORMAL 10*3/UL
PLATELET # BLD EST: ABNORMAL 10*3/UL
POTASSIUM SERPL-SCNC: 3.9 MEQ/L (ref 3.6–5.1)
PROT UR QL STRIP.AUTO: 1
RBC # BLD AUTO: 2.8 M/UL (ref 3.93–5.22)
RBC # BLD AUTO: 2.82 M/UL (ref 3.93–5.22)
RBC #/AREA URNS HPF: ABNORMAL /HPF
RBC MORPH BLD: NORMAL
SODIUM SERPL-SCNC: 135 MEQ/L (ref 136–144)
SP GR UR REFRACT.AUTO: 1.01
SQUAMOUS URNS QL MICRO: ABNORMAL /HPF
TOXIC GRANULES BLD QL SMEAR: ABNORMAL
UROBILINOGEN UR STRIP-ACNC: 0.2 EU/DL
WBC # BLD AUTO: 1.36 K/UL (ref 3.8–10.5)
WBC # BLD AUTO: 1.4 K/UL (ref 3.8–10.5)
WBC #/AREA URNS HPF: ABNORMAL /HPF

## 2018-10-31 PROCEDURE — 87186 SC STD MICRODIL/AGAR DIL: CPT | Performed by: OBSTETRICS & GYNECOLOGY

## 2018-10-31 PROCEDURE — 86850 RBC ANTIBODY SCREEN: CPT

## 2018-10-31 PROCEDURE — 63600000 HC DRUGS/DETAIL CODE: Performed by: OBSTETRICS & GYNECOLOGY

## 2018-10-31 PROCEDURE — 80048 BASIC METABOLIC PNL TOTAL CA: CPT | Performed by: OBSTETRICS & GYNECOLOGY

## 2018-10-31 PROCEDURE — 99238 HOSP IP/OBS DSCHRG MGMT 30/<: CPT | Performed by: OBSTETRICS & GYNECOLOGY

## 2018-10-31 PROCEDURE — 63700000 HC SELF-ADMINISTRABLE DRUG: Performed by: OBSTETRICS & GYNECOLOGY

## 2018-10-31 PROCEDURE — 25800000 HC PHARMACY IV SOLUTIONS: Performed by: OBSTETRICS & GYNECOLOGY

## 2018-10-31 PROCEDURE — P9035 PLATELET PHERES LEUKOREDUCED: HCPCS

## 2018-10-31 PROCEDURE — 81001 URINALYSIS AUTO W/SCOPE: CPT | Performed by: OBSTETRICS & GYNECOLOGY

## 2018-10-31 PROCEDURE — 25000000 HC PHARMACY GENERAL: Performed by: OBSTETRICS & GYNECOLOGY

## 2018-10-31 PROCEDURE — 85025 COMPLETE CBC W/AUTO DIFF WBC: CPT | Performed by: OBSTETRICS & GYNECOLOGY

## 2018-10-31 PROCEDURE — 86920 COMPATIBILITY TEST SPIN: CPT

## 2018-10-31 PROCEDURE — 36415 COLL VENOUS BLD VENIPUNCTURE: CPT | Performed by: OBSTETRICS & GYNECOLOGY

## 2018-10-31 PROCEDURE — 87040 BLOOD CULTURE FOR BACTERIA: CPT | Performed by: OBSTETRICS & GYNECOLOGY

## 2018-10-31 PROCEDURE — 83735 ASSAY OF MAGNESIUM: CPT | Performed by: OBSTETRICS & GYNECOLOGY

## 2018-10-31 PROCEDURE — 12000000 HC ROOM AND CARE MED/SURG

## 2018-10-31 PROCEDURE — 87150 DNA/RNA AMPLIFIED PROBE: CPT | Performed by: OBSTETRICS & GYNECOLOGY

## 2018-10-31 RX ORDER — METRONIDAZOLE 500 MG/100ML
500 INJECTION, SOLUTION INTRAVENOUS
Status: DISCONTINUED | OUTPATIENT
Start: 2018-10-31 | End: 2018-11-01

## 2018-10-31 RX ORDER — ACETAMINOPHEN 325 MG/1
975 TABLET ORAL ONCE
Status: ACTIVE | OUTPATIENT
Start: 2018-10-31 | End: 2018-11-01

## 2018-10-31 RX ORDER — SULFAMETHOXAZOLE AND TRIMETHOPRIM 800; 160 MG/1; MG/1
1 TABLET ORAL EVERY 12 HOURS
Status: DISCONTINUED | OUTPATIENT
Start: 2018-10-31 | End: 2018-10-31

## 2018-10-31 RX ORDER — ACETAMINOPHEN 650 MG/20.3ML
650 LIQUID ORAL ONCE
Status: COMPLETED | OUTPATIENT
Start: 2018-10-31 | End: 2018-10-31

## 2018-10-31 RX ORDER — SODIUM CHLORIDE 9 MG/ML
5 INJECTION, SOLUTION INTRAVENOUS AS NEEDED
Status: ACTIVE | OUTPATIENT
Start: 2018-10-31 | End: 2018-11-01

## 2018-10-31 RX ADMIN — LOPERAMIDE HYDROCHLORIDE 2 MG: 2 CAPSULE ORAL at 08:36

## 2018-10-31 RX ADMIN — METRONIDAZOLE 500 MG: 500 INJECTION, SOLUTION INTRAVENOUS at 20:27

## 2018-10-31 RX ADMIN — CEFEPIME 2 G: 1 INJECTION, POWDER, FOR SOLUTION INTRAMUSCULAR; INTRAVENOUS at 20:27

## 2018-10-31 RX ADMIN — ACETAMINOPHEN 650 MG: 650 SOLUTION ORAL at 20:28

## 2018-10-31 RX ADMIN — SODIUM CHLORIDE 1000 ML: 9 INJECTION, SOLUTION INTRAVENOUS at 20:28

## 2018-10-31 NOTE — CONSULTS
Akbar Romero MD   Radiation Oncology   Expand All Collapse All    []Hide copied text  Patient ID:  Raegan Young is a 72 y.o. female.  1945     Referring Physician:   Trev Jimenez MD  [address]  [place] [address]     Primary Care Provider:  Luan Lockhart MD      Cancer Staging Information:  Cancer Staging  Vaginal cancer (CMS/HCC) ([facility])  Staging form: Vagina, AJCC 8th Edition  - Clinical stage from 9/12/2018: FIGO Stage III (cT3, cN1, cM0) - Signed by Trev Jimenez MD on 9/21/2018        Problem List:       Patient Active Problem List     Diagnosis Date Noted   • Vaginal cancer (CMS/[facility]) ([facility]) 09/21/2018         Chief Complaint      Chief Complaint   Patient presents with   • Cervical Cancer   • Consult         Subjective       History of Present Illness:     HPI  Raegan  Is being sent from Dr. Jimenez office . Raegan has had vaginal spotting off and on x 1 year. D and C x  were neg. the patient had seen Dr. Travis on examination and found to have extensive left-sided vaginal cancer with palpable groin nodes on the left.Raegan had a pet scan positive for left vaginal cancer also with 4 cm necrotic left groin node.S/P BX on 9/14.  I had demonstrated a 4.6 x 2.4 x 3.5 cm enhancing mass in left inguinal adenopathy measuring 3.9 x 3.7 cm.  Examination your office demonstrated a fixed tumor to the left pelvic sidewall.  Is now being sent here to discuss potential concurrent radiation chemo as definitive therapy.  Denies bleeding pain or discharge.  She had biopsy/cytology showing squamous cell carcinoma.  She has been receiving radiation chemotherapy and routine blood draw in our office had shown decreasing blood cell count since she has been admitted.  Today her platelets are still trending downward at 28,000 from 38,000 yesterday.        Review of Systems:  Review of Systems - Oncology      Past Medical/Surgical History  Medical History        Past Medical History:    Diagnosis Date   • Disease of thyroid gland     • Diverticulitis     • Hypertension           Surgical History         Past Surgical History:   Procedure Laterality Date   • CHOLECYSTECTOMY       • DILATION AND CURETTAGE OF UTERUS   02/2018   • ROTATOR CUFF REPAIR Right                         Current Medications  Current Medications          Current Outpatient Prescriptions   Medication Sig Dispense Refill   • benazepril (LOTENSIN) 10 mg tablet Take 10 mg by mouth daily.       • bumetanide (BUMEX) 1 mg tablet Take 1 mg by mouth daily.       • cranberry conc-C-bacillus coag (AZO CRANBERRY + PROBIOTIC) 250-30-50 mg-mg-million tablet Take by mouth daily.       • levothyroxine sodium (TIROSINT) 88 mcg capsule take 1 capsule by oral route  every day       • omega-3 acid ethyl esters (LOVAZA) 1 gram capsule take 2 capsule by oral route 2 times every day       • pantoprazole (PROTONIX) 40 mg EC tablet 40 mg.          No current facility-administered medications for this encounter.             Allergies       Allergies   Allergen Reactions   • No Known Allergies           Social History  Social History   Social History            Social History   • Marital status:        Spouse name: rom   • Number of children: 4   • Years of education: N/A           Occupational History   • retired/                Social History Main Topics   • Smoking status: Former Smoker       Packs/day: 1.00       Years: 40.00       Start date: 1957       Quit date: 1998   • Smokeless tobacco: Never Used   • Alcohol use No   • Drug use: No   • Sexual activity: No           Other Topics Concern   • None          Social History Narrative   • None            Family History        Family History   Problem Relation Age of Onset   • Breast cancer Neg Hx     • Cancer Neg Hx     • Colon cancer Neg Hx     • Ovarian cancer Neg Hx             Family Status   Relation Status   • Neg Hx (Not Specified)                                       Objective       Physical Exam:  Vital Signs for this encounter:  BP: 164/82  Heart Rate: 96  Resp: 16  Height: 152.4 cm (5')  Weight: 83 kg (183 lb) (09/21 0854-09/21 0940)     Physical Exam  Performance Status:  PAIN ASSESSMENT:  Score Two  Location VAGINA  Pain Intervention       LABS:          Recent Results (from the past 1008 hour(s))   Cytology, Fine Needle Aspiration     Collection Time: 09/12/18  4:02 PM   Result Value Ref Range Status     Case Report   No results found Final       Non-gynecologic Cytology                          Case: [id number]                                   Authorizing Provider:  Trev Jimenez MD         Collected:           09/12/2018 1602              Ordering Location:     [facility]       Received:            09/12/2018 2316                                     Gynecologic Oncology at                                                                             [facility]                                                      Pathologist:           Maciel Saunders DO                                                         Specimen:    Other (add Src to Desc/Comment), left vagina                                                 INTERPRETATION   No results found Final       A. Left vagina, FNA With Cell Block:      Squamous cell carcinoma.  See comment.              Comment   No results found Final       p40 is strongly and diffusely positive, supporting the diagnosis.     Dr Jimenez was notified on September 17, 2018.        Immunohistochemistry   No results found Final       The positive control is adequate.     Some immunohistochemical tests used in this lab were developed and their performance characteristics determined by [facility].  They have not been cleared or approved by the U.S. Food and Drug Administration.  The FDA has determined that such clearance or approval is not necessary.  These tests are used for clinical purposes.  Control  slide(s) are reviewed and are adequate.  Immunostains are performed in the Immunohistochemistry Lab at [facility], [address] [place], [place] [address], Lexa Hu MD, Medical Director.        Gross Description   No results found Final       Hazy peach-tinged aspriate with coarse particulate in CytoLyt  Slides: 1 ThinPrep, 1 Cell Block             Assessment/Plan      Diagnoses and Orders Associated With This Visit:  There are no diagnoses linked to this encounter.  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  The patient will be treated over several weeks to a dose of 7000 cGy along with weekly cisplatin.    She will be evaluated posttreatment for potential surgery to remove any persistent these.    We will resume radiation once the platelets start trending upwards           Akbar Zabala MD

## 2018-10-31 NOTE — PROGRESS NOTES
R4 Gyn Note    Patient now afebrile T 99.8    CBC with Diff resulted - ANC is 0.90. Therefore, suspect likely neutropenic fever.  Will start Cefepime 2g q8 (dose adjusted for a calculated CrCl of 44) and Flagyl 500q8. ID consulted placed.  Plt count 20,000, will additionally transfuse 1U Plt    dw Dr Tony Moise MD

## 2018-10-31 NOTE — PLAN OF CARE
Problem: Patient Care Overview  Goal: Plan of Care Review  Outcome: Ongoing (interventions implemented as appropriate)   10/31/18 1147   Coping/Psychosocial   Plan Of Care Reviewed With patient   Plan of Care Review   Progress improving   Outcome Summary no complaints of nausea, tolerating diet       Problem: Nausea/Vomiting (Adult)  Goal: Symptom Relief  Outcome: Ongoing (interventions implemented as appropriate)   10/31/18 1147   Nausea/Vomiting (Adult)   Symptom Relief making progress toward outcome       Problem: Fall Risk (Adult)  Goal: Absence of Falls  Outcome: Ongoing (interventions implemented as appropriate)   10/31/18 1147   Fall Risk (Adult)   Absence of Falls making progress toward outcome

## 2018-10-31 NOTE — PROGRESS NOTES
Patient seen with Dr. Jimenez. Given drop in plts (30-->23 this AM), will plan to keep patient overnight and recheck CBC in AM. If plts <20,000, will plan to transfuse. Will discontinue lovenox at this time given drop in Plts, SCDs ordered instead. Bactrim started as UA suggestive of UTI, will plan for 7 days total. Continue to follow Ucx. Patient is tolerating small amount of regular diet + fluids. Will switch phenergan to PRN rather than standing.  Finally, I notified Dr. Zabala of our plans to keep Ms. Young overnight. Plan d/w Dr. Jimenez.

## 2018-10-31 NOTE — PROGRESS NOTES
Notified patient is febrile is 102.2. On evaluation, patient only reports urinary complaints of which she mentioned this morning including dysuria and frequency. Denies CP/SOB/cough/myaglias.     /62 (Patient Position: Sitting)   Pulse 89   Temp (!) 39 °C (102.2 °F) (Oral)   Resp 18   Ht 1.524 m (5')   Wt 77.1 kg (170 lb)   SpO2 96%   BMI 33.20 kg/m²       NAD  RRR  CTAB, no wheezes, rales or crackles.   Abd: soft, NT, ND. No suprapubic pain.   MSK: No calf tenderness b/l. No CVAT.  Extremities: No TEJ b/l    CBC Results       10/31/18 10/30/18 10/29/18                    0534 0612 1119         WBC 1.40 (LL) 1.76 (LL) 2.43 (LL)         RBC 2.82 (L) 2.98 (L) 3.42 (L)         HGB 8.5 (L) 9.0 (L) 10.3 (L)         HCT 23.5 (L) 24.5 (L) 27.9 (L)         MCV 83.3 82.2 (L) 81.6 (L)         MCH 30.1 30.2 30.1         MCHC 36.2 (H) 36.7 (H) 36.9 (H)         PLT 23 (LL) 30 (LL) 38 (LL)         Comment for WBC at 0534 on 10/31/18:  ALL RESULTS HAVE BEEN CHECKED. This result has been called to MARK ANTHONY HUSSEIN by Meryl Ruiz on 10 31 18 at 06:38, and has been read back.     Comment for WBC at 0612 on 10/30/18:  ALL RESULTS HAVE BEEN CHECKED. This result has been called to DANIELLA REYES by Lucero Aleln on 10 30 18 at 07:30, and has been read back.     Comment for WBC at 1119 on 10/29/18:  ALL RESULTS HAVE BEEN CHECKED. This result has been called to JOSTIN BEARD by Camila Grewal on 10 29 18 at 12:51, and has been read back.     Comment for PLT at 0534 on 10/31/18:  RESULTS OBTAINED AFTER VORTEXING TO ELIMINATE PLT CLUMPS. This result has been called to MARK ANTHONY HUSSEIN by Meryl Ruiz on 10 31 18 at 06:38, and has been read back.     Comment for PLT at 0612 on 10/30/18:  This result has been called to DANIELLA REYES by Lucero Allen on 10 30 18 at 07:30, and has been read back.     Comment for PLT at 1119 on 10/29/18:  SPECIMEN QUALITY CHECKED. RESULTS OBTAINED AFTER VORTEXING TO ELIMINATE PLT CLUMPS. This  result has been called to JOSTIN BEARD by Camila Grewal on 10 29 18 at 12:51, and has been read back.               A/P: 73 yo F admitted with dehydration (resolved), thrombocytopenia and leukopenia, with newly diagnosed UTI, now febrile  1. Febrile to 102.2. Other vital signs stable.  2. Fever: Suspect urine etiology given patient's symptoms. Physical exam reveals no evidence of pulmonary or other source of infection.  Will obtain stat cbc w/ differential + blood cultures x 2. Will hold on cxray as pulmonary exam is benign and patient is asymptomatic in that regard. If neutropenic (ANC <1.0), then plan to treat as neutropenic fever with plan to start cefepime and flagyl. Will also consult ID at that time to confirm antibiotic regimen. Plan was D/W Dr. Jimenez, who will also be notified when CBC w/ diff results.

## 2018-10-31 NOTE — PROGRESS NOTES
Gynecologic Oncology Daily Progress Note    HD #3, admitted for dehydration, electrolyte imbalance    Subjective      Patient tolerated water ice for dinner yesterday. Denies any episodes of emesis or diarrhea. Does complain of dysuria and frequency, is concerning she has a UTI.       Objective     Vital signs in last 24 hours:  Temp:  [36.9 °C (98.5 °F)-37.4 °C (99.4 °F)] 37.4 °C (99.4 °F)  Heart Rate:  [78-84] 84  Resp:  [18] 18  BP: (115-136)/(55-63) 115/55      Intake/Output Summary (Last 24 hours) at 10/31/18 0638  Last data filed at 10/30/18 1449   Gross per 24 hour   Intake          1937.33 ml   Output                0 ml   Net          1937.33 ml     Intake/Output this shift:  No intake/output data recorded.    General: well-developed, well-nourished, no apparent distress  Respiratory: lungs clear to auscultation bilaterally, normal respiratory effort  Cardiovascular: regular rate and rhythm, no murmurs, rubs, gallops.   Extremity: no clubbing, cyanosis, edema  GI: abdomen soft, non-distended, non-tender  Neurologic: alert & oriented x3, no gross deficits  Psychiatric: mood and affect normal  Musculoskeletal: no deformity or gross strength deficit    Labs  CBC Results       10/31/18 10/30/18 10/29/18                    0534 0612 1119         WBC 1.40 (LL) 1.76 (LL) 2.43 (LL)         RBC 2.82 (L) 2.98 (L) 3.42 (L)         HGB 8.5 (L) 9.0 (L) 10.3 (L)         HCT 23.5 (L) 24.5 (L) 27.9 (L)         MCV 83.3 82.2 (L) 81.6 (L)         MCH 30.1 30.2 30.1         MCHC 36.2 (H) 36.7 (H) 36.9 (H)         PLT 23 (LL) 30 (LL) 38 (LL)         Comment for WBC at 0534 on 10/31/18:  ALL RESULTS HAVE BEEN CHECKED. This result has been called to MARK ANTHONY HUSSEIN by Meryl Ruiz on 10 31 18 at 06:38, and has been read back.     Comment for WBC at 0612 on 10/30/18:  ALL RESULTS HAVE BEEN CHECKED. This result has been called to DANIELLA REYES by Lucero Allen on 10 30 18 at 07:30, and has been read back.     Comment for WBC at  1119 on 10/29/18:  ALL RESULTS HAVE BEEN CHECKED. This result has been called to JOSTIN BEARD by Camila Grewal on 10 29 18 at 12:51, and has been read back.     Comment for PLT at 0534 on 10/31/18:  RESULTS OBTAINED AFTER VORTEXING TO ELIMINATE PLT CLUMPS. This result has been called to MARK ANTHONY HUSSEIN by Meryl Ruiz on 10 31 18 at 06:38, and has been read back.     Comment for PLT at 0612 on 10/30/18:  This result has been called to DANIELLA REYES by Lucero Allen on 10 30 18 at 07:30, and has been read back.     Comment for PLT at 1119 on 10/29/18:  SPECIMEN QUALITY CHECKED. RESULTS OBTAINED AFTER VORTEXING TO ELIMINATE PLT CLUMPS. This result has been called to JOSTIN BEARD by Camila Grewal on 10 29 18 at 12:51, and has been read back.         BMP Results       10/31/18 10/30/18 10/29/18                    0534 0612 1119          (L) 136 133 (L)         K 3.9 3.5 (L) 3.2 (L)         Cl 106 105 93 (L)         CO2 20 (L) 23 27         Glucose 101 (H) 113 (H) 122 (H)         BUN 10 14 20         Creatinine 1.4 (H) 1.4 (H) 1.8 (H)         Calcium 7.1 (L) 6.7 (L) 7.5 (L)         Anion Gap 9 8 13         EGFR 36.9 (L) 36.9 (L) 27.6 (L)                     Magnesium 10/29 0.9-->10/30 1.7--> 10/31 1.6          Assessment/Plan    Dehydration  - Patient tolerating water ice thus far, has not attempt solid foods. No further episodes of emesis or diarrhea. Declines standing phenergan as she feels like she is too dry and it's making it difficult to swallow. Encouraged her to attempt more regular diet for breakfast.   - s/p NS @ 100 cc/hr w/ 40 K + 2g Mg for repletion.    - BMP and Mg demonstrated improved hypomagnesium and hypokalemia, stable on AM labs.  Patient remains asymptomatic. EKG on admission NSR without abnormalities.   - Leukopenia: 10/30 ANC 1.4. Differential pending this AM. Does not necessitate neutropenic precautions at this time.   - Thrombocytopenia 2/2 radiation. Plt 30-->23.  - VTE ppx:  Lovenox 40 Qday while hospitalized.   - Awaiting labs this AM, will replete as necessary. Encouraged patient to continue to advance diet as tolerated as patient denies recurrent emesis or nausea.       Dysuria  - Will order UA w/ reflex culture and treat appropriately.       - Will discuss plan of care with Dr. Jimenez.          Jennifer Renteria MD

## 2018-10-31 NOTE — PLAN OF CARE
Problem: Patient Care Overview  Goal: Plan of Care Review   10/31/18 0029   Coping/Psychosocial   Plan Of Care Reviewed With patient   Plan of Care Review   Progress improving   Outcome Summary VSS. Reports decreased nausea. Diarrhea this shift. Immodium given       Problem: Nausea/Vomiting (Adult)  Goal: Symptom Relief  Outcome: Ongoing (interventions implemented as appropriate)      Problem: Fall Risk (Adult)  Goal: Identify Related Risk Factors and Signs and Symptoms  Outcome: Outcome(s) Achieved Date Met: 10/31/18    Goal: Absence of Falls  Outcome: Ongoing (interventions implemented as appropriate)

## 2018-11-01 ENCOUNTER — APPOINTMENT (OUTPATIENT)
Dept: RADIATION ONCOLOGY | Facility: HOSPITAL | Age: 73
Setting detail: RADIATION/ONCOLOGY SERIES
End: 2018-11-01
Attending: RADIOLOGY
Payer: MEDICARE

## 2018-11-01 PROBLEM — D70.9 NEUTROPENIC FEVER (CMS/HCC)  (CMS/HCC): Status: ACTIVE | Noted: 2018-11-01

## 2018-11-01 PROBLEM — R50.81 NEUTROPENIC FEVER (CMS/HCC)  (CMS/HCC): Status: ACTIVE | Noted: 2018-11-01

## 2018-11-01 PROBLEM — D64.9 ANEMIA: Status: ACTIVE | Noted: 2018-11-01

## 2018-11-01 PROBLEM — D69.6 THROMBOCYTOPENIA (CMS/HCC): Status: ACTIVE | Noted: 2018-11-01

## 2018-11-01 LAB
A BAUMANNII DNA SPEC QL NAA+PROBE: NOT DETECTED
ANION GAP SERPL CALC-SCNC: 8 MEQ/L (ref 3–15)
ANISOCYTOSIS BLD QL SMEAR: ABNORMAL
ARIA ZRC COURSE ID: 1
ARIA ZRC COURSE INTENT: NORMAL
ARIA ZRC COURSE START DATE: NORMAL
ARIA ZRC TREATMENT DATES: NORMAL
ARIA ZRC TREATMENT ELAPSED DAYS: NORMAL
ARIA ZRP FRACTIONS TREATED TO DATE: NORMAL
ARIA ZRP PLAN ID: NORMAL
ARIA ZRP PLAN ID: NORMAL
ARIA ZRP PRESCRIBED DOSE CGY: 2000
ARIA ZRP PRESCRIBED DOSE CGY: 5000
ARIA ZRP PRESCRIBED DOSE PER FRACTION: 2
ARIA ZRP PRESCRIBED DOSE PER FRACTION: 2
ARIA ZRR REFERENCE POINT ID: NORMAL
BASO STIPL BLD QL SMEAR: ABNORMAL
BASOPHILS # BLD: 0.01 K/UL (ref 0.01–0.1)
BASOPHILS NFR BLD: 1 %
BUN SERPL-MCNC: 13 MG/DL (ref 8–20)
C ALBICANS DNA BLD QL NAA+PROBE: NOT DETECTED
C DIFF STL QL: NEGATIVE
C GLABRATA DNA BLD QL NAA+PROBE: NOT DETECTED
C KRUSEI DNA BLD QL NAA+PROBE: NOT DETECTED
C PARAP DNA BLD QL NAA+PROBE: NOT DETECTED
C TROPICLS DNA BLD QL NAA+PROBE: NOT DETECTED
CALCIUM SERPL-MCNC: 7.7 MG/DL (ref 8.9–10.3)
CHLORIDE SERPL-SCNC: 106 MEQ/L (ref 98–109)
CO2 SERPL-SCNC: 23 MEQ/L (ref 22–32)
CPR GENES ISLT NAA+PROBE: NOT DETECTED
CREAT SERPL-MCNC: 1.5 MG/DL (ref 0.6–1.1)
DIFFERENTIAL METHOD BLD: ABNORMAL
DOHLE BOD BLD QL SMEAR: ABNORMAL
E CLOAC COMP DNA BLD POS NAA+NON-PROBE: NOT DETECTED
E COLI DNA SPEC QL NAA+PROBE: DETECTED
ENTEROCOC DNA SPEC QL NAA+PROBE: NOT DETECTED
EOSINOPHIL # BLD: 0 K/UL (ref 0.04–0.36)
EOSINOPHIL NFR BLD: 0 %
ERYTHROCYTE [DISTWIDTH] IN BLOOD BY AUTOMATED COUNT: 11.8 % (ref 11.7–14.4)
GFR SERPL CREATININE-BSD FRML MDRD: 33.9 ML/MIN/1.73M*2
GLUCOSE SERPL-MCNC: 83 MG/DL (ref 70–99)
GP B STREP DNA SPEC QL NAA+PROBE: NOT DETECTED
HAEM INFLU DNA SPEC QL NAA+PROBE: NOT DETECTED
HCT VFR BLDCO AUTO: 20.4 % (ref 35–45)
HGB BLD-MCNC: 7.6 G/DL (ref 11.8–15.7)
K OXYTOCA DNA BLD QL NAA+PROBE: NOT DETECTED
K PNEUMON DNA SPEC QL NAA+PROBE: NOT DETECTED
L MONOCYTOG DNA SPEC QL NAA+PROBE: NOT DETECTED
LYMPHOCYTES # BLD: 0.07 K/UL (ref 1.2–3.5)
LYMPHOCYTES NFR BLD: 8 %
MCH RBC QN AUTO: 30 PG (ref 28–33.2)
MCHC RBC AUTO-ENTMCNC: 37.3 G/DL (ref 32.2–35.5)
MCV RBC AUTO: 80.6 FL (ref 83–98)
METAMYELOCYTES # BLD MANUAL: 0.01 K/UL
METAMYELOCYTES NFR BLD MANUAL: 1 %
MONOCYTES # BLD: 0.03 K/UL (ref 0.28–0.8)
MONOCYTES NFR BLD: 3 %
N MEN DNA BLD QL NAA+PROBE: NOT DETECTED
NEUTS BAND # BLD: 0.02 K/UL
NEUTS BAND # BLD: 0.77 K/UL (ref 1.7–7)
NEUTS BAND NFR BLD: 2 %
NEUTS SEG NFR BLD: 85 %
P AERUGINOSA DNA SPEC QL NAA+PROBE: NOT DETECTED
PDW BLD AUTO: 10.3 FL (ref 9.4–12.3)
PLAT MORPH BLD: NORMAL
PLATELET # BLD AUTO: 48 K/UL (ref 150–369)
PLATELET # BLD EST: ABNORMAL 10*3/UL
POLYCHROMASIA BLD QL SMEAR: ABNORMAL
POTASSIUM SERPL-SCNC: 3.8 MEQ/L (ref 3.6–5.1)
PROTEUS SP DNA BLD POS QL NAA+NON-PROBE: NOT DETECTED
RBC # BLD AUTO: 2.53 M/UL (ref 3.93–5.22)
RBC MORPH BLD: NORMAL
S AUREUS DNA SPEC QL NAA+PROBE: NOT DETECTED
S AUREUS+CONS DNA BLD POS NAA+NON-PROBE: NOT DETECTED
S MARCESCENS DNA SPEC QL NAA+PROBE: NOT DETECTED
S PNEUM DNA BLD QL NAA+PROBE: NOT DETECTED
S PYO DNA SPEC NAA+PROBE: NOT DETECTED
SODIUM SERPL-SCNC: 137 MEQ/L (ref 136–144)
STREPTOCOCCUS DNA BLD QL NAA+PROBE: NOT DETECTED
TEST PERFORMANCE INFO SPEC: ABNORMAL
TOXIC GRANULES BLD QL SMEAR: ABNORMAL
WBC # BLD AUTO: 0.9 K/UL (ref 3.8–10.5)

## 2018-11-01 PROCEDURE — 63700000 HC SELF-ADMINISTRABLE DRUG: Performed by: OBSTETRICS & GYNECOLOGY

## 2018-11-01 PROCEDURE — 80048 BASIC METABOLIC PNL TOTAL CA: CPT | Performed by: OBSTETRICS & GYNECOLOGY

## 2018-11-01 PROCEDURE — 25800000 HC PHARMACY IV SOLUTIONS: Performed by: OBSTETRICS & GYNECOLOGY

## 2018-11-01 PROCEDURE — 87324 CLOSTRIDIUM AG IA: CPT | Performed by: OBSTETRICS & GYNECOLOGY

## 2018-11-01 PROCEDURE — 63600000 HC DRUGS/DETAIL CODE: Performed by: OBSTETRICS & GYNECOLOGY

## 2018-11-01 PROCEDURE — 36430 TRANSFUSION BLD/BLD COMPNT: CPT

## 2018-11-01 PROCEDURE — 87040 BLOOD CULTURE FOR BACTERIA: CPT | Performed by: OBSTETRICS & GYNECOLOGY

## 2018-11-01 PROCEDURE — 25000000 HC PHARMACY GENERAL: Performed by: OBSTETRICS & GYNECOLOGY

## 2018-11-01 PROCEDURE — 36415 COLL VENOUS BLD VENIPUNCTURE: CPT | Performed by: OBSTETRICS & GYNECOLOGY

## 2018-11-01 PROCEDURE — P9016 RBC LEUKOCYTES REDUCED: HCPCS

## 2018-11-01 PROCEDURE — 77386 HC IMRT DELIVERY COMPLEX: CPT | Performed by: RADIOLOGY

## 2018-11-01 PROCEDURE — 12000000 HC ROOM AND CARE MED/SURG

## 2018-11-01 PROCEDURE — 85025 COMPLETE CBC W/AUTO DIFF WBC: CPT | Performed by: OBSTETRICS & GYNECOLOGY

## 2018-11-01 PROCEDURE — 99223 1ST HOSP IP/OBS HIGH 75: CPT | Performed by: OBSTETRICS & GYNECOLOGY

## 2018-11-01 RX ORDER — NYSTATIN 100000 [USP'U]/G
POWDER TOPICAL 2 TIMES DAILY
Status: DISCONTINUED | OUTPATIENT
Start: 2018-11-01 | End: 2018-11-05 | Stop reason: HOSPADM

## 2018-11-01 RX ORDER — PROMETHAZINE HYDROCHLORIDE 25 MG/ML
INJECTION, SOLUTION INTRAMUSCULAR; INTRAVENOUS
Status: DISPENSED
Start: 2018-11-01 | End: 2018-11-02

## 2018-11-01 RX ORDER — SODIUM CHLORIDE 9 MG/ML
5 INJECTION, SOLUTION INTRAVENOUS AS NEEDED
Status: ACTIVE | OUTPATIENT
Start: 2018-11-01 | End: 2018-11-02

## 2018-11-01 RX ADMIN — CEFEPIME 2 G: 1 INJECTION, POWDER, FOR SOLUTION INTRAMUSCULAR; INTRAVENOUS at 20:45

## 2018-11-01 RX ADMIN — METRONIDAZOLE 500 MG: 500 INJECTION, SOLUTION INTRAVENOUS at 13:25

## 2018-11-01 RX ADMIN — PROMETHAZINE HYDROCHLORIDE 12.5 MG: 25 INJECTION INTRAMUSCULAR; INTRAVENOUS at 23:30

## 2018-11-01 RX ADMIN — LOPERAMIDE HYDROCHLORIDE 2 MG: 2 CAPSULE ORAL at 16:29

## 2018-11-01 RX ADMIN — PROMETHAZINE HYDROCHLORIDE 12.5 MG: 25 INJECTION INTRAMUSCULAR; INTRAVENOUS at 03:16

## 2018-11-01 RX ADMIN — LOPERAMIDE HYDROCHLORIDE 2 MG: 2 CAPSULE ORAL at 00:24

## 2018-11-01 RX ADMIN — METRONIDAZOLE 500 MG: 500 INJECTION, SOLUTION INTRAVENOUS at 04:45

## 2018-11-01 RX ADMIN — NYSTATIN: 100000 POWDER TOPICAL at 13:25

## 2018-11-01 RX ADMIN — SODIUM CHLORIDE 1000 ML: 9 INJECTION, SOLUTION INTRAVENOUS at 23:33

## 2018-11-01 RX ADMIN — SODIUM CHLORIDE 1000 ML: 9 INJECTION, SOLUTION INTRAVENOUS at 13:25

## 2018-11-01 RX ADMIN — NYSTATIN: 100000 POWDER TOPICAL at 20:40

## 2018-11-01 RX ADMIN — LOPERAMIDE HYDROCHLORIDE 2 MG: 2 CAPSULE ORAL at 03:16

## 2018-11-01 RX ADMIN — CEFEPIME 2 G: 1 INJECTION, POWDER, FOR SOLUTION INTRAMUSCULAR; INTRAVENOUS at 07:53

## 2018-11-01 RX ADMIN — TBO-FILGRASTIM 480 MCG: 480 INJECTION, SOLUTION SUBCUTANEOUS at 15:50

## 2018-11-01 NOTE — PROGRESS NOTES
Gynecologic Oncology Daily Progress Note    HD #4, admitted for dehydration, neutropenic fever    Subjective     Patient afebrile overnight, last fever @ 1552 10/31 (102.2). Had one episode of emesis and diarrhea overnight. Patient believes emesis was triggered by eating a warm, soft banana which caused her to gag. Denies recent antibiotic use prior to hospital admission. Denies any vaginal bleeding. Denies CP/SOB/cough. States UTI symptoms are resolving.       Objective     Vital signs in last 24 hours:  Temp:  [36.3 °C (97.3 °F)-39 °C (102.2 °F)] 37.2 °C (98.9 °F)  Heart Rate:  [78-91] 91  Resp:  [16-18] 18  BP: ()/(52-67) 94/52      Intake/Output Summary (Last 24 hours) at 11/01/18 0651  Last data filed at 11/01/18 0604   Gross per 24 hour   Intake             1685 ml   Output                0 ml   Net             1685 ml     Intake/Output this shift:  I/O this shift:  In: 1445 [I.V.:800; Blood:295; IV Piggyback:350]  Out: -     General: well-developed, well-nourished, no apparent distress  Respiratory: lungs clear to auscultation bilaterally, normal respiratory effort  Cardiovascular: regular rate and rhythm, no murmurs, rubs, gallops.   Extremity: no clubbing, cyanosis, edema  GI: abdomen soft, non-distended, non-tender, no hepatosplenomegaly, no masses  Neurologic: alert & oriented x3, no gross deficits  Psychiatric: mood and affect normal  Musculoskeletal: no deformity or gross strength deficit    Labs    CBC Results       11/01/18 10/31/18 10/31/18                    0510 1732 0534         WBC 0.90 (LL) 1.36 (LL) 1.40 (LL)         RBC 2.53 (L) 2.80 (L) 2.82 (L)         HGB 7.6 (L) 8.1 (L) 8.5 (L)         HCT 20.4 (L) 23.4 (L) 23.5 (L)         MCV 80.6 (L) 83.6 83.3         MCH 30.0 28.9 30.1         MCHC 37.3 (H) 34.6 36.2 (H)         PLT 48 (LL) 20 (LL) 23 (LL)         Comment for WBC at 0510 on 11/01/18:  ALL RESULTS HAVE BEEN CHECKED. This result has been called to MARK ANTHONY REMY by Joe Tafoya on  11 01 18 at 06:06, and has been read back.     Comment for WBC at 1732 on 10/31/18:  ALL RESULTS HAVE BEEN CHECKED. This result has been called to DANIELLA LY by Praveen Wade on 10 31 18 at 17:58, and has been read back.     Comment for WBC at 0534 on 10/31/18:  ALL RESULTS HAVE BEEN CHECKED. This result has been called to MARK ANTHONY HUSSEIN by Meryl Ruiz on 10 31 18 at 06:38, and has been read back.     Comment for PLT at 0510 on 11/01/18:  CONFIRMED WITH SMEAR ESTIMATE. RESULTS OBTAINED AFTER VORTEXING TO ELIMINATE PLT CLUMPS. This result has been called to MARK ANTHONY REMY by Joe Tafoya on 11 01 18 at 06:06, and has been read back.     Comment for PLT at 1732 on 10/31/18:  This result has been called to DANIELLA LY by Praveen Wade on 10 31 18 at 17:58, and has been read back.     Comment for PLT at 0534 on 10/31/18:  RESULTS OBTAINED AFTER VORTEXING TO ELIMINATE PLT CLUMPS. This result has been called to MARK ANTHONY HUSSEIN by Meryl Ruiz on 10 31 18 at 06:38, and has been read back.             Assessment/Plan      Neutropenic fever (CMS/HCC) ([facility])  - Afebrile overnight, last fever yesterday afternoon.   - ANC yesterday 0.9. Differential pending this AM.  - Currently receiving Cefepime/flagyl - day 1  - ID consult placed, appreciate recommendation.  - BCx and UCx pending. UA suspicious for UTI, which will be covered by cefepime.   - Patient does report diarrhea but denies any recent antibiotic use. Will order c. Diff.   - Continue to monitor closely and follow up on differential     Thrombocytopenia (CMS/HCC) ([facility])  - After receiving 1u plts, improvement from 20,000 --> 48,000; which is appropriate.  - Likely secondary to radiation  - No signs of active bleed  - Holding lovenox at this time in setting of thrombocytopenia. Continue with SCDs for VTE ppx.     Anemia  - Hgb 10.3 on admission, now 7.6 this AM.  - Patient was likely heme-concentrated on admission secondary to dehydration. Has since received 4.5L of  IVF, therefore anemia is likely secondary to a dilutional and radiation effect.   - Patient is asymptomatic at this time. Bps 's/50's overnight. HR wnl. Will likely plan to transfuse 1upRBC, irradiated today.     Dehydration  - Reports another episode of emesis and diarrhea overnight.  - Phenergan + imodium prn.   - Hypomagnesium and hypokalemia improved yesterday. Consider repeat BMP, IV if emesis continues.   - Continues to tolerate clears.       - Will discuss plan of care with Dr. Jimenez.         Jennifer Renteria MD

## 2018-11-01 NOTE — PROGRESS NOTES
Father Aimee visited pt and provided Anointing of the sick.- Rev. Curt Chacko, Spiritual Care Coordinator   [phone]

## 2018-11-01 NOTE — ASSESSMENT & PLAN NOTE
- After receiving 1u plts, improvement from 20,000 --> 48,000; which is appropriate.  - Likely secondary to radiation  - No signs of active bleed  - Holding lovenox at this time in setting of thrombocytopenia. Continue with SCDs for VTE ppx.

## 2018-11-01 NOTE — ASSESSMENT & PLAN NOTE
- Afebrile overnight, last fever yesterday afternoon.   - ANC yesterday 0.9. Differential pending this AM.  - Currently receiving Cefepime/flagyl - day 1  - ID consult placed, appreciate recommendation.  - BCx and UCx pending. UA suspicious for UTI, which will be covered by cefepime.   - Patient does report diarrhea but denies any recent antibiotic use. Will defer to ID with regards to stool culture, etc.   - Continue to monitor closely and follow up on differential

## 2018-11-01 NOTE — CONSULTS
Infectious Disease Consult Note    Patient Name: Raegan Young  MR#: [id number]  : 1945  Admission Date: 10/29/2018  Consult Date: 18 1:47 PM   Consultant: Kamlesh Rizzo MD    Reason for Consult: neutropenic fever  Referring Provider: Trev Jimenez MD    History of Present Illness     Raegan Young is a 73 y.o. female who was admitted on 10/29/2018.  She has a past medical history significant for vaginal cancer on therapy with weekly cisplatin and XRT.  Last dose of cisplatin was on 10/23/18.  She came to hospital after she was referred by Dr. Jimenez after she was found to have persistent nausea and vomiting.  On admission, she was found dehydrated, pancytopenic and febrile.  She was a started empirically on therapy with cefepime and Flagyl and we were consulted to provide additional recommendations.    She received platelets transfusion last night and describes episode of Reiger's soon after.  She also described a brief episode of bilateral hand tingling which resolved.  She denies cough, sputum production, shortness of breath, abdominal pain, diarrhea, vaginal bleeding.  She describes some urinary symptoms consistent with frequency and dysuria for a couple of days before admission.  Of note, she has been receiving radiotherapy and developed skin lesions in both inguinal folds.    Outside records were reviewed.    Allergies:   Allergies   Allergen Reactions   • No Known Allergies        Medical History:   Past Medical History:   Diagnosis Date   • Disease of thyroid gland    • Diverticulitis    • Hypertension        Surgical History:   Past Surgical History:   Procedure Laterality Date   • CHOLECYSTECTOMY     • DILATION AND CURETTAGE OF UTERUS  2018   • ROTATOR CUFF REPAIR Right        Social History:   Social History     Social History   • Marital status:      Spouse name: don   • Number of children: 4   • Years of education: N/A     Occupational History   • retired/         Social History Main Topics   • Smoking status: Former Smoker     Packs/day: 1.00     Years: 40.00     Start date: 1957     Quit date: 1998   • Smokeless tobacco: Never Used   • Alcohol use No   • Drug use: No   • Sexual activity: No     Other Topics Concern   • None     Social History Narrative   • None          Travel Exposure:   Travel and Exposure Screening      Most Recent Value   Travel Screening   Traveled outside the [place] in the last month?  No Filed On: 10/30/2018 1512   Exposure Screening   Contact with someone with a communicable disease in the last month?  No Filed On: 10/30/2018 1512   Symptoms          Animal Exposure: Denies    Other Exposure: Denies    Family History:   Family History   Problem Relation Age of Onset   • Breast cancer Neg Hx    • Cancer Neg Hx    • Colon cancer Neg Hx    • Ovarian cancer Neg Hx        Review of Systems    All other systems reviewed and negative except as noted in the HPI.    Medications:    Current IP Meds         Frequency     nystatin (MYCOSTATIN) 100,000 unit/gram topical powder      2 times daily     sodium chloride 0.9 % infusion      As needed     sulfamethoxazole-trimethoprim (BACTRIM DS) 800-160 mg per tablet 1 tablet  Status:  Discontinued      Every 12 hours     cefepime (MAXIPIME) 2 g in sodium chloride 0.9 % 100 mL IVPB  (cefepime (MAXIPIME) IV and consult to infectious disease)      Every 12 hours interval     metroNIDAZOLE in NaCl (iso-os) (FLAGYL) IVPB 500 mg  Status:  Discontinued      Every 8 hours interval     acetaminophen (TYLENOL) 650 mg/20.3 mL solution 650 mg      Once     sodium chloride 0.9 % infusion      As needed     acetaminophen (TYLENOL) tablet 975 mg      Once     promethazine (PHENERGAN) 12.5 mg in sodium chloride 0.9 % 50 mL IVPB      Every 6 hours PRN     enoxaparin (LOVENOX) syringe 40 mg  (Medical and Surgical VTE Prophylaxis Orders)  Status:  Discontinued      Daily (6p)     sodium chloride 0.9 % 1,000 mL with  magnesium sulfate 2 g, potassium chloride 40 mEq infusion      Continuous     white petrolatum (AQUAPHOR) ointment      As needed     loperamide (IMODIUM) capsule 2 mg      As needed     promethazine (PHENERGAN) 12.5 mg in sodium chloride 0.9 % 50 mL IVPB  Status:  Discontinued      Every 6 hours     glucose chewable tablet 16-32 g of dextrose  (Hypoglycemia Treatment Protocol and Hyperglycemia Validation Protocol)      As needed     dextrose 40 % oral gel 15-30 g of dextrose  (Hypoglycemia Treatment Protocol and Hyperglycemia Validation Protocol)      As needed     glucagon (GLUCAGEN) injection 1 mg  (Hypoglycemia Treatment Protocol and Hyperglycemia Validation Protocol)      As needed     dextrose in water injection 12.5 g  (Hypoglycemia Treatment Protocol and Hyperglycemia Validation Protocol)      As needed     metoclopramide (REGLAN) tablet 10 mg  (Nausea/Vomiting)  Status:  Discontinued      Every 6 hours PRN     metoclopramide (REGLAN) injection 10 mg  (Nausea/Vomiting)  Status:  Discontinued      Every 6 hours PRN     sodium chloride 0.9 % infusion 1,000 mL      Continuous     promethazine (PHENERGAN) 25 mg in sodium chloride 0.9 % 50 mL IVPB      Every 6 hours PRN          Anti-infectives     Start     Dose/Rate Route Frequency Ordered Stop    18 1115  nystatin (MYCOSTATIN) 100,000 unit/gram topical powder       Topical 2 times daily 18 1015      10/31/18 2015  cefepime (MAXIPIME) 2 g in sodium chloride 0.9 % 100 mL IVPB      2 g  200 mL/hr over 30 Minutes intravenous Every 12 hours interval 10/31/18 1927              Objective     Vital Signs:    BP (!) 106/59 (BP Location: Left upper arm, Patient Position: Lying)   Pulse 74   Temp 36.8 °C (98.3 °F) (Oral)   Resp 18   Ht 1.524 m (5')   Wt 77.1 kg (170 lb)   SpO2 96%   BMI 33.20 kg/m²   Temp (72hrs), Av.3 °C (99.1 °F), Min:36.3 °C (97.3 °F), Max:39 °C (102.2 °F)      Physical Exam:    General: Ill-appearing, alert and oriented,  well-hydrated   HEENT: NC/AT/ anicteric sclera, pharynx clear, poor dentition with some carious teeth  Neck: supple, no meningismus or lymphadenopathy  Cardiovascular: regular S1/S2  No murmur, rub , or gallop  Respiratory: clear to auscultation, no rales  GI/Abdomen: Obese, soft, NT/ND,  no peritoneal signs no hepatosplenomegaly  Extremities: no clubbing, cyanosis, or edema, there are diffuse ecchymotic lesions  JOINTS:  No swelling, erythema, or pain  Lymphatics: no lymphadenopathy  Neurology: no focal deficits  Psych: cooperative with exam  Skin: Diffuse ecchymotic lesions but there is bilateral inguinal erythema and skin ulceration  G.U.: no lesions no CVA tenderness    Lines, Drains, Airways, Wounds:  Peripheral IV 10/29/18 Left Forearm (Active)   Number of days: 3       Labs:    CBC Results       11/01/18 10/31/18 10/31/18                    0510 1732 0534         WBC 0.90 (LL) 1.36 (LL) 1.40 (LL)         RBC 2.53 (L) 2.80 (L) 2.82 (L)         HGB 7.6 (L) 8.1 (L) 8.5 (L)         HCT 20.4 (L) 23.4 (L) 23.5 (L)         MCV 80.6 (L) 83.6 83.3         MCH 30.0 28.9 30.1         MCHC 37.3 (H) 34.6 36.2 (H)         PLT 48 (LL) 20 (LL) 23 (LL)         Comment for WBC at 0510 on 11/01/18:  ALL RESULTS HAVE BEEN CHECKED. This result has been called to MARK ANTHONY REMY by Joe Tafoya on 11 01 18 at 06:06, and has been read back.     Comment for WBC at 1732 on 10/31/18:  ALL RESULTS HAVE BEEN CHECKED. This result has been called to DANIELLA LY by rPaveen Wade on 10 31 18 at 17:58, and has been read back.     Comment for WBC at 0534 on 10/31/18:  ALL RESULTS HAVE BEEN CHECKED. This result has been called to MARK ANTHONY HUSSEIN by Meryl Ruiz on 10 31 18 at 06:38, and has been read back.     Comment for PLT at 0510 on 11/01/18:  CONFIRMED WITH SMEAR ESTIMATE. RESULTS OBTAINED AFTER VORTEXING TO ELIMINATE PLT CLUMPS. This result has been called to MARK ANTHONY REMY by Joe Tafoya on 11 01 18 at 06:06, and has been read back.      Comment for PLT at 1732 on 10/31/18:  This result has been called to DANIELLA LY by Praveen Wade on 10 31 18 at 17:58, and has been read back.     Comment for PLT at 0534 on 10/31/18:  RESULTS OBTAINED AFTER VORTEXING TO ELIMINATE PLT CLUMPS. This result has been called to MARK ANTHONY HUSSEIN by Meryl Ruiz on 10 31 18 at 06:38, and has been read back.       BMP Results       10/31/18 10/30/18 10/29/18                    0534 0612 1119          (L) 136 133 (L)         K 3.9 3.5 (L) 3.2 (L)         Cl 106 105 93 (L)         CO2 20 (L) 23 27         Glucose 101 (H) 113 (H) 122 (H)         BUN 10 14 20         Creatinine 1.4 (H) 1.4 (H) 1.8 (H)         Calcium 7.1 (L) 6.7 (L) 7.5 (L)         Anion Gap 9 8 13         EGFR 36.9 (L) 36.9 (L) 27.6 (L)                   PT/PTT Results     No lab values to display.      UA Results       10/31/18                          0701           Color Yellow           Clarity Cloudy (A)           Glucose Negative           Bilirubin Negative           Ketones Negative           Sp Grav 1.010           Blood +3 (A)           Ph 6.0           Protein +1 (A)           Urobilinogen 0.2           Nitrite Positive (A)           Leuk Est +2 (A)           WBC Too Numerous To Count (A)           RBC 20 TO 35 (A)           Bacteria +3 (A)           Comment for Blood at 0701 on 10/31/18:    The sensitivity of the occult blood test is equivalent to approximately 4 intact RBC/HPF.      Lactate Results     No lab values to display.          Microbiology Results     Procedure Component Value Units Date/Time    Blood Culture [[id number]]  (Abnormal) Collected:  10/31/18 1847    Specimen:  Blood from Blood, Venous Updated:  11/01/18 1056     Culture **Positive Culture** (AA)      Escherichia coli     Gram Stain Result Gram negative bacilli      Gram negative bacilli    Blood Culture PCR Panel [[id number]]  (Abnormal) Collected:  10/31/18 1847    Specimen:  Blood from Blood, Venous Updated:   11/01/18 1052     Acinetobacter baumannii Not Detected     Candida albicans Not Detected     Candida glabrata Not Detected     Candida krusei Not Detected     Candida parapsilosis Not Detected     Candida tropicalis Not Detected     Escherichia coli Detected (A)     Enterobacter cloacae complex Not Detected     Pseudomonas aeruginosa Not Detected     Klebsiella oxytoca Not Detected     Klebsiella pneumoniae Not Detected     Proteus Not Detected     Serratia marcescens Not Detected     Haemophilus influenzae Not Detected     Neiseria meningitidis Not Detected     Listeria monocytogenes Not Detected     Staphylococcus Not Detected     Staphylococcus aureus Not Detected     Enterococcus Not Detected     Streptococcus Not Detected     Streptococcus agalactiae (Group B) Not Detected     Streptococcus pneumoniae Not Detected     Streptococcus pyogenes (Group A) Not Detected     KPC (Carbapenem Resistance Gene) Not Detected     Comment: See below    Narrative:       This positive blood culture was tested with a rapid molecular panel that detects Enterococcus species, Listeria monocytogenes, Staphylococcus species, Staphylococcus aureus, Streptococcus agalactiae (Group B), Streptococcus pneumonia, Streptococcus pyogenes (Group A), Acinetobacter baumannii, Haemophilus influenza, Neisseria meningitides, Pseudomonas aeruginosa, Enterobacter cloacae complex, Escherichia coli, Klebsiella oxytoca, Klebsiella pneumonia, Proteus species, Serratia marcescens, Candida albicans, Candida glabrata, Candida krusei, Candida parapsilosis, and Candida tropicalis.          Pathology Results     ** No results found for the last 720 hours. **          Assessment   73 years old white female with    1.  Neutropenic fevers  2.  E. coli bacteremia  3.  Dysuria and concern for UTI  4.  Bilateral inguinal Candida intertrigo           Plan   1.  Continue therapy with cefepime  2.  Discontinue Flagyl  3.  Start therapy with topical nystatin powder in  inguinal areas  4.  Follow blood culture for final susceptibilities  5.  Follow CBC/ANC, consider G-CSF  6.  Repeat blood cultures  7.  Discussed with gynecology service.  8.  We will follow with you.

## 2018-11-01 NOTE — ASSESSMENT & PLAN NOTE
- Hgb 10.3 on admission, now 7.6 this AM.  - Patient was likely heme-concentrated on admission secondary to dehydration. Has since received 4.5L of IVF, therefore anemia is likely dilutional.   - Patient is asymptomatic at this time. Bps 's/50's overnight. HR wnl. If patient becomes symptomatic, or Hgb <7, will consider transfusion of pRBC.

## 2018-11-01 NOTE — PROGRESS NOTES
Pt discussed @ the Care Progression Rounds today. Discussed D/C plan home with pt. D/C plan home; no skilled needs.

## 2018-11-01 NOTE — PLAN OF CARE
Problem: Patient Care Overview  Goal: Plan of Care Review  Outcome: Ongoing (interventions implemented as appropriate)   11/01/18 0512   Coping/Psychosocial   Plan Of Care Reviewed With patient   Plan of Care Review   Progress improving   Outcome Summary Platelets infused overnight. Phenergan x1. Loose stools persist.     Goal: Individualization & Mutuality  Outcome: Ongoing (interventions implemented as appropriate)      Problem: Nausea/Vomiting (Adult)  Goal: Symptom Relief  Outcome: Ongoing (interventions implemented as appropriate)      Problem: Fall Risk (Adult)  Goal: Absence of Falls  Outcome: Ongoing (interventions implemented as appropriate)

## 2018-11-02 ENCOUNTER — APPOINTMENT (INPATIENT)
Dept: RADIOLOGY | Facility: HOSPITAL | Age: 73
DRG: 640 | End: 2018-11-02
Attending: OBSTETRICS & GYNECOLOGY
Payer: MEDICARE

## 2018-11-02 ENCOUNTER — APPOINTMENT (OUTPATIENT)
Dept: RADIATION ONCOLOGY | Facility: HOSPITAL | Age: 73
Setting detail: RADIATION/ONCOLOGY SERIES
End: 2018-11-02
Attending: RADIOLOGY
Payer: MEDICARE

## 2018-11-02 LAB
ARIA ZRC COURSE ID: 1
ARIA ZRC COURSE INTENT: NORMAL
ARIA ZRC COURSE START DATE: NORMAL
ARIA ZRC TREATMENT DATES: NORMAL
ARIA ZRC TREATMENT ELAPSED DAYS: NORMAL
ARIA ZRP FRACTIONS TREATED TO DATE: NORMAL
ARIA ZRP PLAN ID: NORMAL
ARIA ZRP PLAN ID: NORMAL
ARIA ZRP PRESCRIBED DOSE CGY: 2000
ARIA ZRP PRESCRIBED DOSE CGY: 5000
ARIA ZRP PRESCRIBED DOSE PER FRACTION: 2
ARIA ZRP PRESCRIBED DOSE PER FRACTION: 2
ARIA ZRR REFERENCE POINT ID: NORMAL
BACTERIA UR CULT: ABNORMAL
BACTERIA UR CULT: ABNORMAL
BASOPHILS # BLD: 0 K/UL (ref 0.01–0.1)
BASOPHILS NFR BLD: 0 %
DIFFERENTIAL METHOD BLD: ABNORMAL
DOHLE BOD BLD QL SMEAR: ABNORMAL
EOSINOPHIL # BLD: 0 K/UL (ref 0.04–0.36)
EOSINOPHIL NFR BLD: 0 %
ERYTHROCYTE [DISTWIDTH] IN BLOOD BY AUTOMATED COUNT: 12.3 % (ref 11.7–14.4)
HCT VFR BLDCO AUTO: 22.8 % (ref 35–45)
HGB BLD-MCNC: 7.9 G/DL (ref 11.8–15.7)
ISBT CODE: 6200
LYMPHOCYTES # BLD: 0.27 K/UL (ref 1.2–3.5)
LYMPHOCYTES NFR BLD: 21 %
MCH RBC QN AUTO: 29.2 PG (ref 28–33.2)
MCHC RBC AUTO-ENTMCNC: 34.6 G/DL (ref 32.2–35.5)
MCV RBC AUTO: 84.1 FL (ref 83–98)
METAMYELOCYTES # BLD MANUAL: 0.01 K/UL
METAMYELOCYTES NFR BLD MANUAL: 1 %
MONOCYTES # BLD: 0.15 K/UL (ref 0.28–0.8)
MONOCYTES NFR BLD: 12 %
NEUTS BAND # BLD: 0.1 K/UL
NEUTS BAND # BLD: 0.74 K/UL (ref 1.7–7)
NEUTS BAND NFR BLD: 8 %
NEUTS SEG NFR BLD: 58 %
NEUTS VAC BLD QL SMEAR: ABNORMAL
PDW BLD AUTO: 11.3 FL (ref 9.4–12.3)
PLAT MORPH BLD: NORMAL
PLATELET # BLD AUTO: 31 K/UL (ref 150–369)
PLATELET # BLD EST: ABNORMAL 10*3/UL
POLYCHROMASIA BLD QL SMEAR: ABNORMAL
PRODUCT CODE: NORMAL
PRODUCT STATUS: NORMAL
RBC # BLD AUTO: 2.71 M/UL (ref 3.93–5.22)
RBC MORPH BLD: NORMAL
SPECIMEN EXP DATE BLD: NORMAL
TOXIC GRANULES BLD QL SMEAR: ABNORMAL
UNIT ABO: NORMAL
UNIT ID: NORMAL
UNIT RH: POSITIVE
WBC # BLD AUTO: 1.28 K/UL (ref 3.8–10.5)

## 2018-11-02 PROCEDURE — 36415 COLL VENOUS BLD VENIPUNCTURE: CPT | Performed by: OBSTETRICS & GYNECOLOGY

## 2018-11-02 PROCEDURE — 25000000 HC PHARMACY GENERAL: Performed by: OBSTETRICS & GYNECOLOGY

## 2018-11-02 PROCEDURE — 63600000 HC DRUGS/DETAIL CODE: Performed by: OBSTETRICS & GYNECOLOGY

## 2018-11-02 PROCEDURE — 77386 HC IMRT DELIVERY COMPLEX: CPT | Performed by: RADIOLOGY

## 2018-11-02 PROCEDURE — 85025 COMPLETE CBC W/AUTO DIFF WBC: CPT | Performed by: OBSTETRICS & GYNECOLOGY

## 2018-11-02 PROCEDURE — 99233 SBSQ HOSP IP/OBS HIGH 50: CPT | Performed by: OBSTETRICS & GYNECOLOGY

## 2018-11-02 PROCEDURE — 74176 CT ABD & PELVIS W/O CONTRAST: CPT

## 2018-11-02 PROCEDURE — 77336 RADIATION PHYSICS CONSULT: CPT | Performed by: RADIOLOGY

## 2018-11-02 PROCEDURE — 25800000 HC PHARMACY IV SOLUTIONS: Performed by: OBSTETRICS & GYNECOLOGY

## 2018-11-02 PROCEDURE — 12000000 HC ROOM AND CARE MED/SURG

## 2018-11-02 PROCEDURE — 63700000 HC SELF-ADMINISTRABLE DRUG: Performed by: OBSTETRICS & GYNECOLOGY

## 2018-11-02 RX ORDER — DIATRIZOATE MEGLUMINE AND DIATRIZOATE SODIUM 660; 100 MG/ML; MG/ML
120 SOLUTION ORAL; RECTAL ONCE
Status: DISCONTINUED | OUTPATIENT
Start: 2018-11-02 | End: 2018-11-02

## 2018-11-02 RX ADMIN — NYSTATIN: 100000 POWDER TOPICAL at 08:18

## 2018-11-02 RX ADMIN — CEFEPIME 2 G: 1 INJECTION, POWDER, FOR SOLUTION INTRAMUSCULAR; INTRAVENOUS at 21:31

## 2018-11-02 RX ADMIN — TBO-FILGRASTIM 480 MCG: 480 INJECTION, SOLUTION SUBCUTANEOUS at 16:37

## 2018-11-02 RX ADMIN — SODIUM CHLORIDE 1000 ML: 9 INJECTION, SOLUTION INTRAVENOUS at 09:53

## 2018-11-02 RX ADMIN — LOPERAMIDE HYDROCHLORIDE 2 MG: 2 CAPSULE ORAL at 15:31

## 2018-11-02 RX ADMIN — CEFEPIME 2 G: 1 INJECTION, POWDER, FOR SOLUTION INTRAMUSCULAR; INTRAVENOUS at 08:11

## 2018-11-02 RX ADMIN — WATER: 1 IRRIGANT IRRIGATION at 14:08

## 2018-11-02 RX ADMIN — NYSTATIN: 100000 POWDER TOPICAL at 21:31

## 2018-11-02 RX ADMIN — LOPERAMIDE HYDROCHLORIDE 2 MG: 2 CAPSULE ORAL at 08:10

## 2018-11-02 NOTE — PLAN OF CARE
Problem: Patient Care Overview  Goal: Plan of Care Review   11/02/18 1415   Plan of Care Review   Outcome Summary no complaints of nausea this shift       Problem: Nausea/Vomiting (Adult)  Goal: Symptom Relief  Outcome: Ongoing (interventions implemented as appropriate)   11/02/18 1415   Nausea/Vomiting (Adult)   Symptom Relief making progress toward outcome       Problem: Fall Risk (Adult)  Goal: Absence of Falls   11/02/18 1415   Fall Risk (Adult)   Absence of Falls making progress toward outcome

## 2018-11-02 NOTE — PROGRESS NOTES
Gynecologic Oncology Daily Progress Note    HD #5, admitted for dehydration,  neutropenic fever, urosepsis     Subjective     Patient afebrile overnight, last fever @ 1552 10/31 (102.2). No episodes of emesis or diarrhea overnight. Continues to tolerate clears, and was able to keep down 2 bits of a sandwich last night. States it is difficult to swallow solid foods at times because her mouth feels so dry. Denies any vaginal bleeding. Denies CP/SOB/cough. States UTI symptoms are resolving.       Objective     Vital signs in last 24 hours:  Temp:  [36.8 °C (98.2 °F)-37.2 °C (98.9 °F)] 37.2 °C (98.9 °F)  Heart Rate:  [74-96] 96  Resp:  [16-18] 16  BP: (100-137)/(43-80) 112/80      Intake/Output Summary (Last 24 hours) at 11/02/18 0611  Last data filed at 11/01/18 1845   Gross per 24 hour   Intake             1300 ml   Output                0 ml   Net             1300 ml     Intake/Output this shift:  No intake/output data recorded.    General: well-developed, well-nourished, no apparent distress  Respiratory: lungs clear to auscultation bilaterally, normal respiratory effort  Cardiovascular: regular rate and rhythm, no murmurs, rubs, gallops.   Extremity: no clubbing, cyanosis, edema  GI: abdomen soft, non-distended, non-tender, no hepatosplenomegaly, no masses  Neurologic: alert & oriented x3, no gross deficits  Psychiatric: mood and affect normal  Musculoskeletal: no deformity or gross strength deficit    Labs  CBC w/ diff for today needs to be collected.   CBC Results       11/01/18 10/31/18 10/31/18                    0510 1732 0534         WBC 0.90 (LL) 1.36 (LL) 1.40 (LL)         RBC 2.53 (L) 2.80 (L) 2.82 (L)         HGB 7.6 (L) 8.1 (L) 8.5 (L)         HCT 20.4 (L) 23.4 (L) 23.5 (L)         MCV 80.6 (L) 83.6 83.3         MCH 30.0 28.9 30.1         MCHC 37.3 (H) 34.6 36.2 (H)         PLT 48 (LL) 20 (LL) 23 (LL)         Comment for WBC at 0510 on 11/01/18:  ALL RESULTS HAVE BEEN CHECKED. This result has been called  to MARK ANTHONY REMY by Joe Tafoya on 11 01 18 at 06:06, and has been read back.     Comment for WBC at 1732 on 10/31/18:  ALL RESULTS HAVE BEEN CHECKED. This result has been called to DANIELLA LY by Praveen Wade on 10 31 18 at 17:58, and has been read back.     Comment for WBC at 0534 on 10/31/18:  ALL RESULTS HAVE BEEN CHECKED. This result has been called to MARK ANTHONY HUSSEIN by Meryl Ruiz on 10 31 18 at 06:38, and has been read back.     Comment for PLT at 0510 on 11/01/18:  CONFIRMED WITH SMEAR ESTIMATE. RESULTS OBTAINED AFTER VORTEXING TO ELIMINATE PLT CLUMPS. This result has been called to MARK ANTHONY REMY by Joe Tafoya on 11 01 18 at 06:06, and has been read back.     Comment for PLT at 1732 on 10/31/18:  This result has been called to DANIELLA LY by Praveen Wade on 10 31 18 at 17:58, and has been read back.     Comment for PLT at 0534 on 10/31/18:  RESULTS OBTAINED AFTER VORTEXING TO ELIMINATE PLT CLUMPS. This result has been called to MARK ANTHONY HUSSEIN by Meryl Ruiz on 10 31 18 at 06:38, and has been read back.             Assessment/Plan      Neutropenic fever (CMS/HCC) (HCC) 2/2 Urosepsis  - Afebrile overnight, last fever 10/31 @ 1545  - ANC yesterday 0.8. CBC w/ Differential pending this AM.  - Currently receiving Cefepime- day 2  - Also receiving nystatin powder for inguinal yeast per ID recs - Day 2  - Continue neupogen qday while in hospital   - ID consulted, appreciate recommendationd.  - BCx 10/31 and UCx - E.coli. Sensitivities pending. BCx 11/1 pending.  .   - C Diff negative   - Continue to monitor closely and follow up on differential     Thrombocytopenia (CMS/HCC) (HCC)  - After receiving 1u plts, improvement from 20,000 --> 48,000 yesterday; which was appropriate. Will follow up on plt count today.  - Likely secondary to radiation  - No signs of active bleed  - Holding lovenox at this time in setting of thrombocytopenia. Continue with SCDs for VTE ppx.     Anemia  - Hgb 10.3-->7.6 on  11/1. Received 1upRBC yesterday. CBC pending  - Vital signs remain stable Patient remains asymptomatic   - Patient was likely heme-concentrated on admission secondary to dehydration. Has since received 4.5L of IVF, therefore anemia is likely secondary to a dilutional and radiation effect.       Dehydration  - No further episodes of diarrhea or emesis overnight, but not tolerating much of a regular diet.   - Phenergan + imodium prn.   - Hypomagnesium and hypokalemia resolved yesterday.    - Continues to tolerate clears, some regular diet  - Will order CT w/ Gastrogaffin today. Hold on IV contrast given Cr of 1.5 yesterday.     Vaginal Carcinoma  - s/p rad onc consult and radiation yesterday      - Will discuss plan of care with Dr. Jimenez.         Jennifer Renteria MD

## 2018-11-02 NOTE — PLAN OF CARE
Problem: Patient Care Overview  Goal: Plan of Care Review  Outcome: Ongoing (interventions implemented as appropriate)   11/01/18 0512 11/01/18 2200 11/02/18 0311   Coping/Psychosocial   Plan Of Care Reviewed With --  patient --    Plan of Care Review   Progress improving --  --    Outcome Summary --  --  Phenergan x1 this shift. Nausea improved but no appetite. Slept well overnight.       Problem: Nausea/Vomiting (Adult)  Goal: Symptom Relief  Outcome: Ongoing (interventions implemented as appropriate)      Problem: Fall Risk (Adult)  Goal: Absence of Falls  Outcome: Ongoing (interventions implemented as appropriate)

## 2018-11-02 NOTE — PROGRESS NOTES
Infectious Disease Progress Note    Patient Name: Raegan Young  MR#: [id number]  : 1945  Admission Date: 10/29/2018  Date: 18   Time: 2:32 PM   Author: Kamlesh Rizzo MD        Antibiotics:    Anti-infectives     Start     Dose/Rate Route Frequency Ordered Stop    18 1115  nystatin (MYCOSTATIN) 100,000 unit/gram topical powder       Topical 2 times daily 18 1015      10/31/18 2015  cefepime (MAXIPIME) 2 g in sodium chloride 0.9 % 100 mL IVPB      2 g  200 mL/hr over 30 Minutes intravenous Every 12 hours interval 10/31/18 1927            Subjective   Feeling much better today.  Afebrile for the last 24 hours.  Received Granix yesterday.    Objective     Vital Signs:    BP (!) 143/70 (BP Location: Left upper arm, Patient Position: Lying)   Pulse 89   Temp 37.2 °C (99 °F) (Oral)   Resp 18   Ht 1.524 m (5')   Wt 77.1 kg (170 lb)   SpO2 96%   BMI 33.20 kg/m²     Temp (72hrs), Av.2 °C (99 °F), Min:36.3 °C (97.3 °F), Max:39 °C (102.2 °F)      Physical Exam:  General:  In no acute distress, alert and oriented.    HEENT: NC/AT/ anicteric sclera, pharynx clear, poor dentition with some carious teeth  Neck: supple, no meningismus or lymphadenopathy  Cardiovascular: regular S1/S2  No murmur, rub , or gallop  Respiratory: clear to auscultation, no rales  GI/Abdomen: Obese, soft, NT/ND,  no peritoneal signs no hepatosplenomegaly  Extremities: no clubbing, cyanosis, or edema, there are diffuse ecchymotic lesions  JOINTS:  No swelling, erythema, or pain  Lymphatics: no lymphadenopathy  Neurology: no focal deficits  Psych: cooperative with exam  Skin: Diffuse ecchymotic lesions but there is bilateral inguinal erythema and skin ulceration  G.U.: no lesions no CVA tenderness    Lines, Drains, Airways, Wounds:  Peripheral IV 10/29/18 Left Forearm (Active)   Number of days: 4       Labs:    CBC Results       11/02/18 11/01/18 10/31/18                    0612 0510 1732         WBC 1.28  (LL) 0.90 (LL) 1.36 (LL)         RBC 2.71 (L) 2.53 (L) 2.80 (L)         HGB 7.9 (L) 7.6 (L) 8.1 (L)         HCT 22.8 (L) 20.4 (L) 23.4 (L)         MCV 84.1 80.6 (L) 83.6         MCH 29.2 30.0 28.9         MCHC 34.6 37.3 (H) 34.6         PLT 31 (LL) 48 (LL) 20 (LL)         Comment for WBC at 0612 on 11/02/18:  This result has been called to RUKHSANA BLACK by Karlie Ojeda on 11 02 18 at 07:42, and has been read back.     Comment for WBC at 0510 on 11/01/18:  ALL RESULTS HAVE BEEN CHECKED. This result has been called to MARK ANTHONY REMY by Joe Tafoya on 11 01 18 at 06:06, and has been read back.     Comment for WBC at 1732 on 10/31/18:  ALL RESULTS HAVE BEEN CHECKED. This result has been called to DANIELLA LY by Praveen Wade on 10 31 18 at 17:58, and has been read back.     Comment for PLT at 0612 on 11/02/18:  CONSISTENT WITH PREVIOUS RESULTS. This result has been called to RUKHSANA BLACK by aKrlie Ojeda on 11 02 18 at 07:42, and has been read back.     Comment for PLT at 0510 on 11/01/18:  CONFIRMED WITH SMEAR ESTIMATE. RESULTS OBTAINED AFTER VORTEXING TO ELIMINATE PLT CLUMPS. This result has been called to MARK ANTHONY REMY by Joe Tafoya on 11 01 18 at 06:06, and has been read back.     Comment for PLT at 1732 on 10/31/18:  This result has been called to DANIELLA LY by Praveen Wade on 10 31 18 at 17:58, and has been read back.         CMP Results       11/01/18 10/31/18 10/30/18                    1439 0534 0612          135 (L) 136         K 3.8 3.9 3.5 (L)         Cl 106 106 105         CO2 23 20 (L) 23         Glucose 83 101 (H) 113 (H)         BUN 13 10 14         Creatinine 1.5 (H) 1.4 (H) 1.4 (H)         Calcium 7.7 (L) 7.1 (L) 6.7 (L)         Anion Gap 8 9 8         AST - - 18         ALT - - 20         Albumin - - 2.9 (L)         EGFR 33.9 (L) 36.9 (L) 36.9 (L)                         Micro:  10/31 urine culture-E. Coli  10/31 blood cultures-E. coli  11/1 culture-no growth to date  11/1 C.  difficile-negative    Assessment   73 years old white female with     1.  Neutropenic fevers  2.  E. coli bacteremia  3.    Urinary tract infection  4.  Bilateral inguinal Candida intertrigo          Plan   1.  Continue therapy with cefepime.  Trend ANC  2.  She will need continue therapy with cefepime until ANC more than 1000 and afebrile for 24 hours.  3.  Plan will be to transition to p.o. ciprofloxacin and complete 2 weeks of antibiotic therapy from last positive blood culture once afebrile and is seen more than 1000.  4.  Continue therapy with topical nystatin powder.  5.  We will follow with you.

## 2018-11-03 LAB
AMYLASE SERPL-CCNC: 31 U/L (ref 28–100)
ANION GAP SERPL CALC-SCNC: 11 MEQ/L (ref 3–15)
BACTERIA BLD CULT: ABNORMAL
BACTERIA BLD CULT: ABNORMAL
BASOPHILS # BLD: 0 K/UL (ref 0.01–0.1)
BASOPHILS NFR BLD: 0 %
BUN SERPL-MCNC: 9 MG/DL (ref 8–20)
CALCIUM SERPL-MCNC: 7.3 MG/DL (ref 8.9–10.3)
CHLORIDE SERPL-SCNC: 106 MEQ/L (ref 98–109)
CO2 SERPL-SCNC: 20 MEQ/L (ref 22–32)
CREAT SERPL-MCNC: 1.3 MG/DL (ref 0.6–1.1)
CROSSMATCH: NORMAL
DIFFERENTIAL METHOD BLD: ABNORMAL
EOSINOPHIL # BLD: 0 K/UL (ref 0.04–0.36)
EOSINOPHIL NFR BLD: 0 %
ERYTHROCYTE [DISTWIDTH] IN BLOOD BY AUTOMATED COUNT: 12.1 % (ref 11.7–14.4)
GFR SERPL CREATININE-BSD FRML MDRD: 40 ML/MIN/1.73M*2
GLUCOSE SERPL-MCNC: 82 MG/DL (ref 70–99)
GRAM STN SPEC: ABNORMAL
GRAM STN SPEC: ABNORMAL
HCT VFR BLDCO AUTO: 23 % (ref 35–45)
HGB BLD-MCNC: 8.5 G/DL (ref 11.8–15.7)
ISBT CODE: 9500
LIPASE SERPL-CCNC: 19 U/L (ref 20–51)
LYMPHOCYTES # BLD: 0.47 K/UL (ref 1.2–3.5)
LYMPHOCYTES NFR BLD: 31 %
MCH RBC QN AUTO: 30 PG (ref 28–33.2)
MCHC RBC AUTO-ENTMCNC: 37 G/DL (ref 32.2–35.5)
MCV RBC AUTO: 81.3 FL (ref 83–98)
MONOCYTES # BLD: 0.05 K/UL (ref 0.28–0.8)
MONOCYTES NFR BLD: 3 %
MYELOCYTES # BLD MANUAL: 0.02 K/UL
MYELOCYTES NFR BLD MANUAL: 1 %
NEUTS BAND # BLD: 0.08 K/UL
NEUTS BAND # BLD: 0.91 K/UL (ref 1.7–7)
NEUTS BAND NFR BLD: 5 %
NEUTS SEG NFR BLD: 60 %
PDW BLD AUTO: 11.1 FL (ref 9.4–12.3)
PLAT MORPH BLD: NORMAL
PLATELET # BLD AUTO: 31 K/UL (ref 150–369)
PLATELET # BLD EST: ABNORMAL 10*3/UL
POLYCHROMASIA BLD QL SMEAR: ABNORMAL
POTASSIUM SERPL-SCNC: 3.2 MEQ/L (ref 3.6–5.1)
PRODUCT CODE: NORMAL
PRODUCT STATUS: NORMAL
RBC # BLD AUTO: 2.83 M/UL (ref 3.93–5.22)
SODIUM SERPL-SCNC: 137 MEQ/L (ref 136–144)
SPECIMEN EXP DATE BLD: NORMAL
TOXIC GRANULES BLD QL SMEAR: ABNORMAL
UNIT ABO: NORMAL
UNIT ID: NORMAL
UNIT RH: NEGATIVE
WBC # BLD AUTO: 1.52 K/UL (ref 3.8–10.5)

## 2018-11-03 PROCEDURE — 80048 BASIC METABOLIC PNL TOTAL CA: CPT | Performed by: OBSTETRICS & GYNECOLOGY

## 2018-11-03 PROCEDURE — 63700000 HC SELF-ADMINISTRABLE DRUG: Performed by: OBSTETRICS & GYNECOLOGY

## 2018-11-03 PROCEDURE — 25800000 HC PHARMACY IV SOLUTIONS: Performed by: OBSTETRICS & GYNECOLOGY

## 2018-11-03 PROCEDURE — 63600000 HC DRUGS/DETAIL CODE: Performed by: OBSTETRICS & GYNECOLOGY

## 2018-11-03 PROCEDURE — 99232 SBSQ HOSP IP/OBS MODERATE 35: CPT | Performed by: OBSTETRICS & GYNECOLOGY

## 2018-11-03 PROCEDURE — 82150 ASSAY OF AMYLASE: CPT | Performed by: OBSTETRICS & GYNECOLOGY

## 2018-11-03 PROCEDURE — 85025 COMPLETE CBC W/AUTO DIFF WBC: CPT | Performed by: OBSTETRICS & GYNECOLOGY

## 2018-11-03 PROCEDURE — 36415 COLL VENOUS BLD VENIPUNCTURE: CPT | Performed by: OBSTETRICS & GYNECOLOGY

## 2018-11-03 PROCEDURE — 12000000 HC ROOM AND CARE MED/SURG

## 2018-11-03 PROCEDURE — 83690 ASSAY OF LIPASE: CPT | Performed by: OBSTETRICS & GYNECOLOGY

## 2018-11-03 RX ORDER — POTASSIUM CHLORIDE 14.9 MG/ML
20 INJECTION INTRAVENOUS AS NEEDED
Status: DISCONTINUED | OUTPATIENT
Start: 2018-11-03 | End: 2018-11-05 | Stop reason: HOSPADM

## 2018-11-03 RX ORDER — POTASSIUM CHLORIDE 1.5 G/1.58G
20 POWDER, FOR SOLUTION ORAL AS NEEDED
Status: DISCONTINUED | OUTPATIENT
Start: 2018-11-03 | End: 2018-11-05 | Stop reason: HOSPADM

## 2018-11-03 RX ORDER — POTASSIUM CHLORIDE 750 MG/1
40 TABLET, FILM COATED, EXTENDED RELEASE ORAL AS NEEDED
Status: DISCONTINUED | OUTPATIENT
Start: 2018-11-03 | End: 2018-11-05 | Stop reason: HOSPADM

## 2018-11-03 RX ORDER — POTASSIUM CHLORIDE 1.5 G/1.58G
40 POWDER, FOR SOLUTION ORAL AS NEEDED
Status: DISCONTINUED | OUTPATIENT
Start: 2018-11-03 | End: 2018-11-05 | Stop reason: HOSPADM

## 2018-11-03 RX ORDER — POTASSIUM CHLORIDE 750 MG/1
20 TABLET, FILM COATED, EXTENDED RELEASE ORAL AS NEEDED
Status: DISCONTINUED | OUTPATIENT
Start: 2018-11-03 | End: 2018-11-05 | Stop reason: HOSPADM

## 2018-11-03 RX ADMIN — CEFEPIME 2 G: 1 INJECTION, POWDER, FOR SOLUTION INTRAMUSCULAR; INTRAVENOUS at 20:33

## 2018-11-03 RX ADMIN — NYSTATIN: 100000 POWDER TOPICAL at 20:40

## 2018-11-03 RX ADMIN — LOPERAMIDE HYDROCHLORIDE 2 MG: 2 CAPSULE ORAL at 10:20

## 2018-11-03 RX ADMIN — LOPERAMIDE HYDROCHLORIDE 2 MG: 2 CAPSULE ORAL at 05:41

## 2018-11-03 RX ADMIN — TBO-FILGRASTIM 480 MCG: 480 INJECTION, SOLUTION SUBCUTANEOUS at 17:58

## 2018-11-03 RX ADMIN — NYSTATIN: 100000 POWDER TOPICAL at 09:14

## 2018-11-03 RX ADMIN — CEFEPIME 2 G: 1 INJECTION, POWDER, FOR SOLUTION INTRAMUSCULAR; INTRAVENOUS at 09:08

## 2018-11-03 NOTE — PROGRESS NOTES
Gynecology Progress Note    Subjective     Patient continues to remain afebrile overnight. Still having intermittent bouts of diarrhea with PRN imodium overnight. Denies abdominal pain, nausea or vomiting. Tolerating diet. Continues to note improvement in her symptoms of UTI. Denies any new onset of chest pain, shortness of breath or subjective fevers.    Objective     Vital signs in last 24 hours:  Temp:  [37.2 °C (99 °F)] 37.2 °C (99 °F)  Heart Rate:  [82-89] 85  Resp:  [18] 18  BP: (116-165)/(58-76) 116/58    Exam  General appearance: alert, appears stated age and cooperative  Lungs: clear to auscultation bilaterally  Heart: regular rate and rhythm  Abdomen: soft, non distended, non tender. +BS x 4 quadrants  Extremities: No LE edema edema    Labs  CBC, BMP, Amylase, lipase pending this morning    Assessment/Plan     72yo with diagnosis of stage 3 vaginal cancer admitted with nausea, diarrhea, urosepsis and neutropenic fever     1. Neutropenic Fever:   - Continues to remain afebrile since 10/31 at 1545  - Continue cefipime 2g BID for urosepsis --> Blood and urine cultures + E Coli  - AM CBC with differential pending  - Last ANC 0.74, continue daily Neupogen while inpatient  - Continue nystatin daily for inguinal yeast infection  - Remains on neutropenic precautions and await result of morning labs    2. Nausea, Emesis, diarrhea  - Improving since admission, starting to have some increase in appetite and tolerating small amount of intake   - No antiemetic requirements overnight, episodes of diarrhea spacing  - CT scan performed 11/2 showed no evidence of bowel obstruction but noted pancreatic fat stranding consistent with possible mild pancreatitis. Amylase, lipase pending this AM   - Holding IV fluids while she is tolerating PO intake, but should her symptoms worsen may consider restarting IV hydration     3. Anemia:   - AM CBC pending, last Hgb 7.6. May need addition transfusion of PRBCs if symptomatic or <7.    - Currently no signs or symptoms of hemodynamic instability    4. Thrombocytopenia:  - Last platelet count 31, pending morning labs  - Consider transfusion of additional platelets if <20 today  - As above, attributed to bone marrow suppression related to chemo and radiotherapy  - Holding lovenox in setting of thrombocytopenia    Will discuss plan of care today with Dr. Nadeen Montemayor MD

## 2018-11-03 NOTE — PLAN OF CARE
Problem: Patient Care Overview  Goal: Plan of Care Review  Outcome: Ongoing (interventions implemented as appropriate)   11/03/18 0632   Coping/Psychosocial   Plan Of Care Reviewed With patient   Plan of Care Review   Progress improving   Outcome Summary Pt slept well and only had a couple episodes of diarrhea overnight.       Problem: Nausea/Vomiting (Adult)  Goal: Symptom Relief  Outcome: Ongoing (interventions implemented as appropriate)    Goal: Adequate Hydration  Outcome: Ongoing (interventions implemented as appropriate)      Problem: Fall Risk (Adult)  Goal: Absence of Falls  Outcome: Outcome(s) Achieved Date Met: 11/03/18

## 2018-11-04 LAB
ANION GAP SERPL CALC-SCNC: 8 MEQ/L (ref 3–15)
BASOPHILS # BLD: 0 K/UL (ref 0.01–0.1)
BASOPHILS NFR BLD: 0 %
BUN SERPL-MCNC: 7 MG/DL (ref 8–20)
CALCIUM SERPL-MCNC: 7.3 MG/DL (ref 8.9–10.3)
CHLORIDE SERPL-SCNC: 106 MEQ/L (ref 98–109)
CO2 SERPL-SCNC: 23 MEQ/L (ref 22–32)
CREAT SERPL-MCNC: 1.3 MG/DL (ref 0.6–1.1)
DIFFERENTIAL METHOD BLD: ABNORMAL
DOHLE BOD BLD QL SMEAR: ABNORMAL
EOSINOPHIL # BLD: 0.02 K/UL (ref 0.04–0.36)
EOSINOPHIL NFR BLD: 1 %
ERYTHROCYTE [DISTWIDTH] IN BLOOD BY AUTOMATED COUNT: 12.1 % (ref 11.7–14.4)
GFR SERPL CREATININE-BSD FRML MDRD: 40 ML/MIN/1.73M*2
GLUCOSE SERPL-MCNC: 89 MG/DL (ref 70–99)
HCT VFR BLDCO AUTO: 22.7 % (ref 35–45)
HGB BLD-MCNC: 8.1 G/DL (ref 11.8–15.7)
LYMPHOCYTES # BLD: 0.48 K/UL (ref 1.2–3.5)
LYMPHOCYTES NFR BLD: 28 %
MCH RBC QN AUTO: 29.2 PG (ref 28–33.2)
MCHC RBC AUTO-ENTMCNC: 35.7 G/DL (ref 32.2–35.5)
MCV RBC AUTO: 81.9 FL (ref 83–98)
MONOCYTES # BLD: 0.19 K/UL (ref 0.28–0.8)
MONOCYTES NFR BLD: 11 %
NEUTS BAND # BLD: 0.14 K/UL
NEUTS BAND # BLD: 0.89 K/UL (ref 1.7–7)
NEUTS BAND NFR BLD: 8 %
NEUTS SEG NFR BLD: 52 %
PDW BLD AUTO: 10.8 FL (ref 9.4–12.3)
PLAT MORPH BLD: NORMAL
PLATELET # BLD AUTO: 27 K/UL (ref 150–369)
PLATELET # BLD EST: ABNORMAL 10*3/UL
POTASSIUM SERPL-SCNC: 3.5 MEQ/L (ref 3.6–5.1)
RBC # BLD AUTO: 2.77 M/UL (ref 3.93–5.22)
SODIUM SERPL-SCNC: 137 MEQ/L (ref 136–144)
TOXIC GRANULES BLD QL SMEAR: ABNORMAL
WBC # BLD AUTO: 1.72 K/UL (ref 3.8–10.5)

## 2018-11-04 PROCEDURE — 63600000 HC DRUGS/DETAIL CODE: Performed by: OBSTETRICS & GYNECOLOGY

## 2018-11-04 PROCEDURE — 99231 SBSQ HOSP IP/OBS SF/LOW 25: CPT | Performed by: OBSTETRICS & GYNECOLOGY

## 2018-11-04 PROCEDURE — 36415 COLL VENOUS BLD VENIPUNCTURE: CPT | Performed by: OBSTETRICS & GYNECOLOGY

## 2018-11-04 PROCEDURE — 63700000 HC SELF-ADMINISTRABLE DRUG: Performed by: OBSTETRICS & GYNECOLOGY

## 2018-11-04 PROCEDURE — 85025 COMPLETE CBC W/AUTO DIFF WBC: CPT | Performed by: OBSTETRICS & GYNECOLOGY

## 2018-11-04 PROCEDURE — 12000000 HC ROOM AND CARE MED/SURG

## 2018-11-04 PROCEDURE — 25800000 HC PHARMACY IV SOLUTIONS: Performed by: OBSTETRICS & GYNECOLOGY

## 2018-11-04 PROCEDURE — 80048 BASIC METABOLIC PNL TOTAL CA: CPT | Performed by: OBSTETRICS & GYNECOLOGY

## 2018-11-04 RX ORDER — POTASSIUM CHLORIDE 750 MG/1
TABLET, FILM COATED, EXTENDED RELEASE ORAL
Status: DISPENSED
Start: 2018-11-04 | End: 2018-11-04

## 2018-11-04 RX ADMIN — NYSTATIN: 100000 POWDER TOPICAL at 08:16

## 2018-11-04 RX ADMIN — LOPERAMIDE HYDROCHLORIDE 2 MG: 2 CAPSULE ORAL at 02:37

## 2018-11-04 RX ADMIN — NYSTATIN: 100000 POWDER TOPICAL at 21:29

## 2018-11-04 RX ADMIN — POTASSIUM CHLORIDE 40 MEQ: 750 TABLET, FILM COATED, EXTENDED RELEASE ORAL at 02:17

## 2018-11-04 RX ADMIN — POTASSIUM CHLORIDE 20 MEQ: 750 TABLET, FILM COATED, EXTENDED RELEASE ORAL at 21:31

## 2018-11-04 RX ADMIN — CEFEPIME 2 G: 1 INJECTION, POWDER, FOR SOLUTION INTRAMUSCULAR; INTRAVENOUS at 08:04

## 2018-11-04 RX ADMIN — LOPERAMIDE HYDROCHLORIDE 2 MG: 2 CAPSULE ORAL at 09:12

## 2018-11-04 RX ADMIN — CEFEPIME 2 G: 1 INJECTION, POWDER, FOR SOLUTION INTRAMUSCULAR; INTRAVENOUS at 21:28

## 2018-11-04 RX ADMIN — TBO-FILGRASTIM 480 MCG: 480 INJECTION, SOLUTION SUBCUTANEOUS at 16:21

## 2018-11-04 NOTE — PLAN OF CARE
Problem: Patient Care Overview  Goal: Plan of Care Review  Outcome: Ongoing (interventions implemented as appropriate)   11/04/18 1102   Coping/Psychosocial   Plan Of Care Reviewed With patient   Plan of Care Review   Progress improving   Outcome Summary no complaints of nausea or pain, poor apetite will start boost with meals       Problem: Nausea/Vomiting (Adult)  Goal: Symptom Relief  Outcome: Ongoing (interventions implemented as appropriate)   11/04/18 1102   Nausea/Vomiting (Adult)   Symptom Relief making progress toward outcome

## 2018-11-04 NOTE — PLAN OF CARE
Problem: Patient Care Overview  Goal: Plan of Care Review  Outcome: Ongoing (interventions implemented as appropriate)   11/04/18 0224   Coping/Psychosocial   Plan Of Care Reviewed With patient   Plan of Care Review   Progress improving   Outcome Summary Pt denies pain. +BS, pt reports loose stools. 40meq K replacement for low K+ of 3.2. Labwork ordered for this am. Will continue to monitor pt status.

## 2018-11-04 NOTE — PROGRESS NOTES
Progress Note    Events  Pt seen/examined. No acute events overnight.     Subjective  Patient feeling well this morning. She denies N/V but has a minimal appetite. She is tolerating soft foods and liquids. She stills complains of diarrhea and continues to use immodium PRN.     Vitals  Temp:  [37 °C (98.6 °F)-37.2 °C (98.9 °F)] 37.1 °C (98.7 °F)  Heart Rate:  [74-78] 74  Resp:  [18-20] 18  BP: (134-136)/(62-67) 135/62    I&O    Intake/Output Summary (Last 24 hours) at 11/04/18 0453  Last data filed at 11/04/18 0217   Gross per 24 hour   Intake              740 ml   Output                0 ml   Net              740 ml       Physical Exam  General: A&Ox3 and NAD   Heart: Regular rate and rhythm  Lungs: Clear to auscultation bilaterally  Abdomen: soft, nondistended, non-tender, no rebound/rigidity/guarding.  Extremities: symmetric and no edema      Assessment/Plan   Problem-based Assessment and Plan    72yo with diagnosis of stage 3 vaginal cancer admitted with nausea, diarrhea, urosepsis and neutropenic fever      1. Neutropenic Fever:   - Continues to remain afebrile since 10/31 at 1545  - Continue cefipime 2g BID for urosepsis --> Blood and urine cultures + E Coli  - ID following, recommend transition to po abx when ANC >1.0 and patient afebrile  - AM CBC with differential pending  - Last ANC 0.91, continue daily Neupogen while inpatient  - Continue nystatin daily for inguinal yeast infection  - Remains on neutropenic precautions and await result of morning labs     2. Nausea, Emesis, diarrhea  - Improving since admission, starting to have some increase in appetite and tolerating small amount of intake   - No antiemetic requirements overnight, episodes of diarrhea spacing  - CT scan performed 11/2 showed no evidence of bowel obstruction but noted pancreatic fat stranding consistent with possible mild pancreatitis. Amylase, lipase wnl  - Holding IV fluids while she is tolerating PO intake, but should her symptoms  worsen may consider restarting IV hydration  - Will order Boost for nutrition supplementation     3. Anemia:   - AM CBC pending, last Hgb 8.5. May need addition transfusion of PRBCs if symptomatic or <7.   - Currently no signs or symptoms of hemodynamic instability     4. Thrombocytopenia:  - Platelets stable at 31 yesterday, pending morning labs  - Consider transfusion of additional platelets if <20 today  - As above, attributed to bone marrow suppression related to chemo and radiotherapy  - Holding lovenox in setting of thrombocytopenia     Will discuss plan of care today with Dr. Senior.    Bianca Montemayor MD

## 2018-11-05 VITALS
SYSTOLIC BLOOD PRESSURE: 158 MMHG | BODY MASS INDEX: 33.38 KG/M2 | HEIGHT: 60 IN | HEART RATE: 89 BPM | TEMPERATURE: 98.6 F | DIASTOLIC BLOOD PRESSURE: 83 MMHG | WEIGHT: 170 LBS | RESPIRATION RATE: 18 BRPM | OXYGEN SATURATION: 96 %

## 2018-11-05 PROBLEM — E87.6 HYPOKALEMIA: Status: ACTIVE | Noted: 2018-11-05

## 2018-11-05 LAB
ANION GAP SERPL CALC-SCNC: 10 MEQ/L (ref 3–15)
BACTERIA BLD CULT: NORMAL
BASOPHILS # BLD: 0 K/UL (ref 0.01–0.1)
BASOPHILS NFR BLD: 0 %
BUN SERPL-MCNC: 8 MG/DL (ref 8–20)
BURR CELLS BLD QL SMEAR: ABNORMAL
CALCIUM SERPL-MCNC: 7.3 MG/DL (ref 8.9–10.3)
CHLORIDE SERPL-SCNC: 106 MEQ/L (ref 98–109)
CO2 SERPL-SCNC: 22 MEQ/L (ref 22–32)
CREAT SERPL-MCNC: 1.2 MG/DL (ref 0.6–1.1)
DIFFERENTIAL METHOD BLD: ABNORMAL
EOSINOPHIL # BLD: 0.09 K/UL (ref 0.04–0.36)
EOSINOPHIL NFR BLD: 4 %
ERYTHROCYTE [DISTWIDTH] IN BLOOD BY AUTOMATED COUNT: 12.3 % (ref 11.7–14.4)
GFR SERPL CREATININE-BSD FRML MDRD: 43.9 ML/MIN/1.73M*2
GLUCOSE SERPL-MCNC: 85 MG/DL (ref 70–99)
HCT VFR BLDCO AUTO: 24.5 % (ref 35–45)
HGB BLD-MCNC: 8.6 G/DL (ref 11.8–15.7)
LYMPHOCYTES # BLD: 0.71 K/UL (ref 1.2–3.5)
LYMPHOCYTES NFR BLD: 32 %
MCH RBC QN AUTO: 29.3 PG (ref 28–33.2)
MCHC RBC AUTO-ENTMCNC: 35.1 G/DL (ref 32.2–35.5)
MCV RBC AUTO: 83.3 FL (ref 83–98)
METAMYELOCYTES # BLD MANUAL: 0.02 K/UL
METAMYELOCYTES NFR BLD MANUAL: 1 %
MONOCYTES # BLD: 0.2 K/UL (ref 0.28–0.8)
MONOCYTES NFR BLD: 9 %
NEUTS BAND # BLD: 0.29 K/UL
NEUTS BAND # BLD: 0.91 K/UL (ref 1.7–7)
NEUTS BAND NFR BLD: 13 %
NEUTS SEG NFR BLD: 41 %
PDW BLD AUTO: 10.8 FL (ref 9.4–12.3)
PLAT MORPH BLD: NORMAL
PLATELET # BLD AUTO: 25 K/UL (ref 150–369)
PLATELET # BLD EST: ABNORMAL 10*3/UL
POTASSIUM SERPL-SCNC: 3.3 MEQ/L (ref 3.6–5.1)
RBC # BLD AUTO: 2.94 M/UL (ref 3.93–5.22)
SODIUM SERPL-SCNC: 138 MEQ/L (ref 136–144)
WBC # BLD AUTO: 2.22 K/UL (ref 3.8–10.5)

## 2018-11-05 PROCEDURE — 63700000 HC SELF-ADMINISTRABLE DRUG: Performed by: OBSTETRICS & GYNECOLOGY

## 2018-11-05 PROCEDURE — 85025 COMPLETE CBC W/AUTO DIFF WBC: CPT | Performed by: OBSTETRICS & GYNECOLOGY

## 2018-11-05 PROCEDURE — 63600000 HC DRUGS/DETAIL CODE: Performed by: OBSTETRICS & GYNECOLOGY

## 2018-11-05 PROCEDURE — 99222 1ST HOSP IP/OBS MODERATE 55: CPT | Performed by: OBSTETRICS & GYNECOLOGY

## 2018-11-05 PROCEDURE — 25800000 HC PHARMACY IV SOLUTIONS: Performed by: OBSTETRICS & GYNECOLOGY

## 2018-11-05 PROCEDURE — 36415 COLL VENOUS BLD VENIPUNCTURE: CPT | Performed by: OBSTETRICS & GYNECOLOGY

## 2018-11-05 PROCEDURE — 80048 BASIC METABOLIC PNL TOTAL CA: CPT | Performed by: OBSTETRICS & GYNECOLOGY

## 2018-11-05 RX ORDER — ONDANSETRON 8 MG/1
8 TABLET, ORALLY DISINTEGRATING ORAL EVERY 8 HOURS PRN
Qty: 20 TABLET | Refills: 0 | Status: SHIPPED | OUTPATIENT
Start: 2018-11-05 | End: 2018-12-12 | Stop reason: ENTERED-IN-ERROR

## 2018-11-05 RX ORDER — CIPROFLOXACIN 500 MG/1
500 TABLET ORAL EVERY 12 HOURS
Qty: 19 TABLET | Refills: 0 | Status: SHIPPED | OUTPATIENT
Start: 2018-11-05 | End: 2018-12-12 | Stop reason: ENTERED-IN-ERROR

## 2018-11-05 RX ORDER — CIPROFLOXACIN 500 MG/1
500 TABLET ORAL EVERY 12 HOURS
Status: DISCONTINUED | OUTPATIENT
Start: 2018-11-05 | End: 2018-11-05 | Stop reason: HOSPADM

## 2018-11-05 RX ORDER — ACETAMINOPHEN 650 MG/20.3ML
650 LIQUID ORAL ONCE
Status: DISCONTINUED | OUTPATIENT
Start: 2018-11-05 | End: 2018-11-05

## 2018-11-05 RX ORDER — ACETAMINOPHEN 650 MG/20.3ML
650 LIQUID ORAL EVERY 6 HOURS PRN
Status: DISCONTINUED | OUTPATIENT
Start: 2018-11-05 | End: 2018-11-05 | Stop reason: HOSPADM

## 2018-11-05 RX ORDER — POTASSIUM CHLORIDE 20 MEQ/1
20 TABLET, EXTENDED RELEASE ORAL 2 TIMES DAILY
Qty: 60 TABLET | Refills: 0 | Status: SHIPPED | OUTPATIENT
Start: 2018-11-05 | End: 2018-12-12 | Stop reason: ENTERED-IN-ERROR

## 2018-11-05 RX ORDER — ACETAMINOPHEN 650 MG/20.3ML
650 LIQUID ORAL ONCE
Status: DISPENSED | OUTPATIENT
Start: 2018-11-05 | End: 2018-11-05

## 2018-11-05 RX ORDER — LOPERAMIDE HYDROCHLORIDE 2 MG/1
2 CAPSULE ORAL AS NEEDED
Qty: 30 CAPSULE | Refills: 0 | Status: SHIPPED | OUTPATIENT
Start: 2018-11-05 | End: 2019-01-09 | Stop reason: ENTERED-IN-ERROR

## 2018-11-05 RX ORDER — PROMETHAZINE HYDROCHLORIDE 12.5 MG/1
12.5 TABLET ORAL EVERY 6 HOURS PRN
Qty: 30 TABLET | Refills: 0 | Status: SHIPPED | OUTPATIENT
Start: 2018-11-05 | End: 2018-12-12 | Stop reason: ENTERED-IN-ERROR

## 2018-11-05 RX ADMIN — NYSTATIN: 100000 POWDER TOPICAL at 08:06

## 2018-11-05 RX ADMIN — LOPERAMIDE HYDROCHLORIDE 2 MG: 2 CAPSULE ORAL at 11:54

## 2018-11-05 RX ADMIN — CEFEPIME 2 G: 1 INJECTION, POWDER, FOR SOLUTION INTRAMUSCULAR; INTRAVENOUS at 08:05

## 2018-11-05 RX ADMIN — POTASSIUM CHLORIDE 40 MEQ: 750 TABLET, FILM COATED, EXTENDED RELEASE ORAL at 10:07

## 2018-11-05 RX ADMIN — TBO-FILGRASTIM 480 MCG: 480 INJECTION, SOLUTION SUBCUTANEOUS at 10:39

## 2018-11-05 ASSESSMENT — ENCOUNTER SYMPTOMS
DIARRHEA: 1
NAUSEA: 1
FREQUENCY: 1
FEVER: 1
DYSURIA: 1

## 2018-11-05 NOTE — PROGRESS NOTES
R3GYN     AM labs resulted.   - BMP demonstrates hypokalemia, will replete with K 40mEQ PO. Continue repletion as an outpatient with a plan for repeat BMP as an outpatient next week.   - CBC demonstrates ANC of 1.2 and plts of 25,000 (stable from prior 27,000 yesterday). As Ms. Young has been afebrile since 10/31/2018 and ANC>1, will plan for discharge home with PO antibiotics and close follow up as an outpatient. On day 5/14 of antibiotic therapy. Patient to receive neupogen this AM instead of scheduled time at 1550.  Plan was d/w Dr. Jimenez and relayed to ASHLEY Torres.

## 2018-11-05 NOTE — PLAN OF CARE
Problem: Patient Care Overview  Goal: Plan of Care Review  Outcome: Ongoing (interventions implemented as appropriate)   11/05/18 0019   Coping/Psychosocial   Plan Of Care Reviewed With patient   Plan of Care Review   Progress improving

## 2018-11-05 NOTE — PLAN OF CARE
Problem: Patient Care Overview  Goal: Plan of Care Review  Outcome: Ongoing (interventions implemented as appropriate)   11/05/18 1306   Coping/Psychosocial   Plan Of Care Reviewed With patient   Plan of Care Review   Progress improving   Outcome Summary For discharge after 5p.       Problem: Nausea/Vomiting (Adult)  Goal: Symptom Relief  Outcome: Ongoing (interventions implemented as appropriate)    Goal: Adequate Hydration  Outcome: Ongoing (interventions implemented as appropriate)

## 2018-11-05 NOTE — PROGRESS NOTES
Infectious Disease Progress Note    Patient Name: Raegan Young  MR#: [id number]  : 1945  Admission Date: 10/29/2018  Date: 18   Time: 11:27 AM   Author: Kavon Lara MD      Antibiotics:    Anti-infectives     Start     Dose/Rate Route Frequency Ordered Stop    18 2100  ciprofloxacin (CIPRO) tablet 500 mg      500 mg oral Every 12 hours 18 0821      18 1115  nystatin (MYCOSTATIN) 100,000 unit/gram topical powder       Topical 2 times daily 18 1015            Subjective   No new complaints. Feels better.     Objective     Vital Signs:    BP (!) 150/70 (BP Location: Right upper arm, Patient Position: Lying)   Pulse 80   Temp 36.9 °C (98.4 °F) (Oral)   Resp 18   Ht 1.524 m (5')   Wt 77.1 kg (170 lb)   SpO2 96%   BMI 33.20 kg/m²     Temp (72hrs), Av.1 °C (98.7 °F), Min:36.9 °C (98.4 °F), Max:37.2 °C (99 °F)      Physical Exam:  General: non toxic, alert  HEENT: NC/AT/ anicteric sclera, pharynx clear   Neck: supple  Cardiovascular: regular S1/S2    Respiratory: clear to auscultation  GI/Abdomen: soft, NT/ND,  no peritoneal signs  Extremities: no  edema  MSK/JOINTS:  No swelling, erythema, or pain  Lymphatics:   Neurology and psych: no focal deficits, cooperative with exam  Skin: no rashes, lines clean dry intact  G.U.:     .  Lines, Drains, Airways, Wounds:  Peripheral IV 10/29/18 Left Forearm (Active)   Number of days: 7       Labs:    CBC Results       18                    0729 0614 0825         WBC 2.22 (LL) 1.72 (LL) 1.52 (LL)         RBC 2.94 (L) 2.77 (L) 2.83 (L)         HGB 8.6 (L) 8.1 (L) 8.5 (L)         HCT 24.5 (L) 22.7 (L) 23.0 (L)         MCV 83.3 81.9 (L) 81.3 (L)         MCH 29.3 29.2 30.0         MCHC 35.1 35.7 (H) 37.0 (H)         PLT 25 (LL) 27 (LL) 31 (LL)         Comment for WBC at 0729 on 18:  This result has been called to KIANA ODONNELL by Lucero Allen on 18 at 09:18, and has been read back.     Comment  for WBC at 0614 on 11/04/18:  ALL RESULTS HAVE BEEN CHECKED. CONSISTENT WITH PREVIOUS RESULTS. This result has been called to RY ESPARZA by Teresa Frias on 11 04 18 at 07:37, and has been read back.     Comment for WBC at 0825 on 11/03/18:  ALL RESULTS HAVE BEEN CHECKED. This result has been called to MICA GONZALEZ by Alyssa Manuel on 11 03 18 at 08:59, and has been read back.     Comment for PLT at 0729 on 11/05/18:  This result has been called to KIANA ODONNELL by Lucero Allen on 11 05 18 at 09:18, and has been read back.     Comment for PLT at 0614 on 11/04/18:  CONSISTENT WITH PREVIOUS RESULTS. This result has been called to RY ESPARZA by Teresa Frias on 11 04 18 at 07:37, and has been read back.     Comment for PLT at 0825 on 11/03/18:  CONSISTENT WITH PREVIOUS RESULTS. This result has been called to MICA GONZALEZ by Alyssa Manuel on 11 03 18 at 08:59, and has been read back.           CMP Results       11/05/18 11/04/18 11/03/18                    0729 0614 0825          137 137         K 3.3 (L) 3.5 (L) 3.2 (L)         Cl 106 106 106         CO2 22 23 20 (L)         Glucose 85 89 82         BUN 8 7 (L) 9         Creatinine 1.2 (H) 1.3 (H) 1.3 (H)         Calcium 7.3 (L) 7.3 (L) 7.3 (L)         Anion Gap 10 8 11         EGFR 43.9 (L) 40.0 (L) 40.0 (L)                       Microbiology Results     Procedure Component Value Units Date/Time    Clostridium difficile tox screen [[id number]]  (Normal) Collected:  11/01/18 1543    Specimen:  Stool from Stool Updated:  11/01/18 1633     C difficile Toxin Screen Negative    Blood Culture [[id number]]  (Normal) Collected:  11/01/18 1439    Specimen:  Blood from Blood, Venous Updated:  11/04/18 1501     Culture No growth at 72 hours    Blood Culture [[id number]]  (Normal) Collected:  11/01/18 1439    Specimen:  Blood from Blood, Venous Updated:  11/04/18 1501     Culture No growth at 72 hours    Blood Culture [[id number]] Collected:  10/31/18 1845     Specimen:  Blood from Blood, Venous Updated:  11/03/18 0743    Narrative:       The following orders were created for panel order Blood Culture.  Procedure                               Abnormality         Status                     ---------                               -----------         ------                     Blood Culture PCR Panel[[id number]]       Abnormal            Final result               Blood Culture[[id number]]                 Abnormal            Final result                 Please view results for these tests on the individual orders.    Blood Culture [[id number]]  (Abnormal)  (Susceptibility) Collected:  10/31/18 1847    Specimen:  Blood from Blood, Venous Updated:  11/03/18 0743     Culture **Positive Culture** (AA)      Escherichia coli     Gram Stain Result Gram negative bacilli      Gram negative bacilli    Blood Culture PCR Panel [[id number]]  (Abnormal) Collected:  10/31/18 1847    Specimen:  Blood from Blood, Venous Updated:  11/01/18 1052     Acinetobacter baumannii Not Detected     Candida albicans Not Detected     Candida glabrata Not Detected     Candida krusei Not Detected     Candida parapsilosis Not Detected     Candida tropicalis Not Detected     Escherichia coli Detected (A)     Enterobacter cloacae complex Not Detected     Pseudomonas aeruginosa Not Detected     Klebsiella oxytoca Not Detected     Klebsiella pneumoniae Not Detected     Proteus Not Detected     Serratia marcescens Not Detected     Haemophilus influenzae Not Detected     Neiseria meningitidis Not Detected     Listeria monocytogenes Not Detected     Staphylococcus Not Detected     Staphylococcus aureus Not Detected     Enterococcus Not Detected     Streptococcus Not Detected     Streptococcus agalactiae (Group B) Not Detected     Streptococcus pneumoniae Not Detected     Streptococcus pyogenes (Group A) Not Detected     KPC (Carbapenem Resistance Gene) Not Detected     Comment: See below    Narrative:       This positive  blood culture was tested with a rapid molecular panel that detects Enterococcus species, Listeria monocytogenes, Staphylococcus species, Staphylococcus aureus, Streptococcus agalactiae (Group B), Streptococcus pneumonia, Streptococcus pyogenes (Group A), Acinetobacter baumannii, Haemophilus influenza, Neisseria meningitides, Pseudomonas aeruginosa, Enterobacter cloacae complex, Escherichia coli, Klebsiella oxytoca, Klebsiella pneumonia, Proteus species, Serratia marcescens, Candida albicans, Candida glabrata, Candida krusei, Candida parapsilosis, and Candida tropicalis.    Blood Culture [[id number]]  (Normal) Collected:  10/31/18 1732    Specimen:  Blood from Blood, Venous Updated:  11/04/18 1800     Culture No growth at 96 hours    Urine culture [[id number]]  (Abnormal)  (Susceptibility) Collected:  10/31/18 0701    Specimen:  Urine from Urine, Clean Catch Updated:  11/02/18 0921     Urine Culture **Positive Culture** (A)      >1 x 10^5 CFU/mL Escherichia coli          Imaging:      Assessment   This is a 73 y.o. female  · Neutropenic sepsis  · UTI with ecoli and bacteremia  · qtc 430s              Plan   · Cipro 500 mg PO q12h. Side effects discussed at length.  · Day 5 of 14 of antibiotics.  · Thank you for allowing us to participate in her care. I will sign off. Call with questions        Plan Discussed with: primary team and patient

## 2018-11-05 NOTE — DISCHARGE SUMMARY
Inpatient Discharge Summary    BRIEF OVERVIEW  Admitting Provider:  H&P Notes 10/6/2018 to 11/5/2018     Date of Service Author Author Type Status Note Type File Time    10/29/18 1844 Trev Jimenez MD Physician Signed H&P 10/29/18 1847          Attending Provider: Trev Jimenez MD Attending phys phone: [phone]  Primary Care Physician at Discharge: Luan Lockhart [phone]    Admission Date: 10/29/2018     Discharge Date: 11/5/2018    Primary Discharge Diagnosis  Neutropenic fever (CMS/HCC) (HCC)    Secondary Discharge Diagnosis  Active Hospital Problems    Diagnosis Date Noted   • Hypokalemia 11/05/2018   • Neutropenic fever (CMS/HCC) (HCC) 11/01/2018   • Thrombocytopenia (CMS/HCC) (HCC) 11/01/2018   • Anemia 11/01/2018   • Dehydration 10/29/2018   • Dehydration due to radiation 10/29/2018   • Dysuria 10/02/2018      Resolved Hospital Problems    Diagnosis Date Noted Date Resolved   No resolved problems to display.       DETAILS OF HOSPITAL STAY    Operative Procedures Performed      Consults:   Consult Notes 10/6/2018 to 11/5/2018     Date of Service Author Author Type Status Note Type File Time    11/01/18 1346 Kamlesh Rizzo MD Physician Signed Consults 11/01/18 1424    10/31/18 0852 Akbar Romero MD Physician Signed Consults 10/31/18 0856          Consult Orders During Admission:  IP CONSULT TO RADIATION ONCOLOGY  IP CONSULT TO INFECTIOUS DISEASE  IP CONSULT TO NUTRITION SERVICES     Procedures: Radiation therapy  Pertinent Test Results: labs: see hospital notes for details regarding pancytopenia, hypokalemia and hypomagnesium.     Imaging  Ct Abdomen Pelvis Without Iv Contrast    Result Date: 11/2/2018  IMPRESSION: 1.  Enlarged 2.3 cm left inguinal lymph node, presumably malignant and sequela of patient's reported vaginal carcinoma, but should be correlated with tissue sampling.  No evidence of otherwise abdominopelvic metastatic disease (given the limitations of an  examination without intravenous contrast). 2.  Colonic diverticulosis without evidence of diverticulitis or bowel obstruction. 3.  Possible mild acute pancreatitis of the pancreatic tail, which can be correlated with serum amylase and lipase levels. COMMENT: LOWER CHEST: Mild linear left lower lobe subsegmental atelectasis and/or scarring without consolidation or pleural effusion.  Coronary artery atherosclerotic calcification. ABDOMEN: LIVER: normal morphology without suspicious mass. BILE DUCTS: normal caliber. GALLBLADDER: Surgically absent. PANCREAS: Mild fatty stranding about the pancreatic tail extending to the anterior left pararenal fascia, which may be seen as sequela of mild acute pancreatitis.  Correlation with serum amylase and lipase levels suggested. SPLEEN: within normal limits. ADRENALS: within normal limits. KIDNEYS: Two adjacent exophytic right upper pole cysts, the larger measuring 2.7 cm. PELVIS: REPRODUCTIVE ORGANS: Slight fullness of the vaginal region, concordant with patient's history. URETERS: within normal limits. BLADDER: within normal limits. BOWEL/PERITONEUM: Bowel demonstrates normal caliber without evidence of obstruction.  Sigmoid and descending colonic diverticulosis without evidence of acute diverticulitis.  Normal appendix.  Terminal ileum is normal.  Moderate hiatal hernia. No enlarged mesenteric lymph nodes. No ascites or free air, no fluid collection. VESSELS: atherosclerotic changes, without aneurysmal dilation. RETROPERITONEUM: no enlarged retroperitoneal or pelvic sidewall nodes. ABDOMINAL WALL: No ventral or inguinal hernia, noting 2.3 cm enlarged left inguinal lymph node, most compatible with a malignant lymph node from patient's reported vaginal carcinoma, but should be correlated with tissue sampling. BONES: degenerative changes, no suspicious lytic or blastic lesions.           Presenting Problem/History of Present Illness  Dehydration       Exam on Day of  Discharge  Patient seen and examined on day of discharge.      Hospital Course  Ms. Young was admitted on 10/29/2018 with complaints of emesis, diarrhea and dehydration. She was treated with antiemetics, imodium and IV hydration. She is now tolerating soft foods and has had no recent episodes of emesis. Her episodes of diarrhea have become less frequent.     Her hospital course was complicated by pancytopenia, and neutropenic fever associated with UTI/bacteremia. She was started on IV antibiotics (cefepime/flagyl), and narrowed to ciprofloxacin. Infectious disease was consulted to assist in management. She will be continued on ciprofloxacin and nystatin cream as an outpatient with close follow up by Dr. Jimenez. With regards to her neutropenia, she received Neupogen during her hospitalization. She also received one unit of platelets as well as packed red blood cells during her stay.     Ms. Young continued to receive radiation therapy during her hospitalization.     Discharge Orders     Medication List      START taking these medications    ciprofloxacin 500 mg tablet  Commonly known as:  CIPRO  Take 1 tablet (500 mg total) by mouth every 12 (twelve) hours.     loperamide 2 mg capsule  Commonly known as:  IMODIUM  Take 1 capsule (2 mg total) by mouth as needed for diarrhea.     potassium chloride 20 mEq CR tablet  Commonly known as:  KLOR-CON  Take 1 tablet (20 mEq total) by mouth 2 (two) times a day.     promethazine 12.5 mg tablet  Commonly known as:  PHENERGAN  Take 1 tablet (12.5 mg total) by mouth every 6 (six) hours as needed for nausea or vomiting for up to 7 days.        CONTINUE taking these medications    benazepril 10 mg tablet  Commonly known as:  LOTENSIN  Take 10 mg by mouth daily.     BIOFLEX ORAL  Take 2 capsules by mouth daily.     bumetanide 1 mg tablet  Commonly known as:  BUMEX  Take 1 mg by mouth daily.     cranberry conc-C-bacillus coag 250-30-50 mg-mg-million tablet  Commonly known as:   AZO CRANBERRY + PROBIOTIC  Take by mouth daily.     LOVAZA 1 gram capsule  Generic drug:  omega-3 acid ethyl esters  take 2 capsule by oral route 2 times every day     magnesium chloride 71.5 mg tablet,delayed release (DR/EC)  Take 1 tablet by mouth 2 (two) times a day.     nystatin-triamcinolone 100,000-0.1 unit/g-% cream  Commonly known as:  MYCOLOG II  Apply to affected area twice a day for 7-14 days, then as needed for rash.     ondansetron ODT 8 mg disintegrating tablet  Commonly known as:  ZOFRAN ODT  Take 1 tablet (8 mg total) by mouth every 8 (eight) hours as needed for nausea or vomiting for up to 7 days.     PROTONIX 40 mg EC tablet  Generic drug:  pantoprazole  40 mg.     TIROSINT 88 mcg capsule  Generic drug:  levothyroxine sodium  take 1 capsule by oral route  every day                  Outpatient Follow-Ups            Tomorrow Akbar Romero MD; Radiation [facility] Radiation Therapy    In 2 days Radiation [facility] Radiation Therapy    In 3 days Radiation [facility] Radiation Therapy    In 4 days Radiation [facility] Radiation Therapy    In 1 week Radiation [facility]iation Therapy    In 1 week Radiation [facility] Radiation Therapy    In 1 week Radiation [facility] Radiation Therapy    In 1 week Radiation [facility] Radiation Therapy    In 1 week Radiation [facility] Radiation Therapy    In 2 weeks Radiation [place] [facility] Radiation Therapy        Referrals:  No orders of the defined types were placed in this encounter.      Active Issues Requiring Follow-up  Issue: therapy follow up  What is Needed: Radiation therapy on 11/6/2018, follow up with Dr. Jimenez in one week      Test Results Pending at Discharge  Unresulted Labs     Start     Ordered    11/01/18 1338  Blood Culture  STAT      11/01/18 1339    11/01/18 1338  Blood Culture  STAT       11/01/18 1339    10/31/18 1628  Blood Culture  STAT      10/31/18 1627              Discharge Disposition  Final discharge disposition not confirmed  Code Status at Discharge: Full Code  Physician Order for Life-Sustaining Treatment Document Status      No documents found

## 2018-11-05 NOTE — DISCHARGE INSTRUCTIONS
[facility]   [phone]    Please report to Radiation Oncology tomorrow (11/6/2018) morning at 7:30am for labs and possible radiation (dependent on lab results).       Nausea/Vomiting/Diarrhea:     Here is some information about your condition as well as tips.    Symptoms of dehydration include:   Urinating less often than usual   Having dark yellow urine  Feeling dizzy when standing up  Weight loss    To feel better, you can try the following:  - Eat as soon as you feel hungry, or even before you feel hungry.  - Snack often and eat small meals. The best foods to eat are high in protein or carbohydrates, and low in fat. These include crackers, bread, pretzels, nuts, and low-fat yogurt. Although, whatever food/drink you can tolerate is ok.  - Avoid foods that are spicy, greasy, or acidic (such as oranges).   - Drink cold, clear beverages, such as sports drinks and ginger ale. Avoid coffee. Also, try to drink between meals, rather than with a meal.   - Suck on popsicles or ginger-flavored lollipops.  - Brush your teeth right after you eat.  - Avoid lying down right after you eat.g  - Avoid things in your environment that upset your stomach, such as stuffy rooms, strong smells, hot places, or loud noises.  - Have someone make your meals for you.      You will be discharged with an anti-nausea medication that you also received in the hospital called Phenergan. Please take as needed, as directed. You can continue to take imodium for relief of diarrhea. Both of these medications were sent to your pharmacy, although imodium is an over the counter medication if needed.       Neutropenic Diet (foods to avoid because your white blood cell count is low)    Basic Guidelines to Follow  Avoid raw or rare meat and fish and uncooked or undercooked eggs. Cook meat until it's well-done.   Thoroughly cook eggs (no runny yolks) and avoid foods containing raw eggs such as raw cookie dough or homemade mayonnaise.  "  Avoid unpasteurized beverages, such as fruit juice, milk and raw milk yogurt.   Avoid salad bars and buffets.   Refrigerate clark, cold hot dog or deli meat (including dry-cured salami and deli prepared salads containing these items), eggs or seafood.   Consume only pasteurized milk, yogurt, cheese and other dairy products.   Avoid soft mold-ripened and blue-veined cheeses such as Brie, Camembert, Roquefort, Stilton, Gorgonzola and Kevin or other soft, unpasteurized cheeses.   Avoid raw sprouts, such as alfalfa sprouts.   Wash fresh fruits and vegetables before peeling.   Avoid well water unless it has been tested, filtered, or boiled for one minute before drinking. At home, it's okay to drink tap water or bottled water.    It is important to remember, especially as we enter the \"flu season\", to ensure you are not around friends or family that are currently sick.     Neutropenic Fever (fever in setting of low white blood cell count) + UTI  You were started on antibiotics for a neutropenic fever while in the hospital. You will be discharged with ciprofloxacin, and that prescription was already sent to your pharmacy. Please complete the entire antibiotic course, last dose on 11/4/2018.     Low potassium  Continue to take potassium 20mEQ twice a day, a prescription was sent to your pharmacy.       Please call the office - [phone] - to schedule a follow up appointment in 1 week, and with any questions or concerns. This includes but is not limited to: fevers, chills, severe abdominal pain, persistent vomiting without the ability to keep down liquids, etc.   "

## 2018-11-05 NOTE — PROGRESS NOTES
Progress Note    Events  Pt seen/examined. No acute events overnight.     Subjective  Patient feeling well this morning. C/o headache last night that has since spontaneously resolved with sleep. Reports diarrhea yesterday that resolved with imodium. Tolerating soft diet, such as pudding, boost, etc. Denies nausea/emesis. Reports decreased appetite. Remains afebrile.     Vitals  Temp:  [37 °C (98.6 °F)-37.2 °C (99 °F)] 37.2 °C (99 °F)  Heart Rate:  [80-89] 89  Resp:  [18] 18  BP: (109-147)/(60-73) 132/60    I&O    Intake/Output Summary (Last 24 hours) at 11/05/18 0704  Last data filed at 11/04/18 1430   Gross per 24 hour   Intake              360 ml   Output                0 ml   Net              360 ml       Physical Exam  General: A&Ox3 and NAD   Heart: Regular rate and rhythm  Lungs: Clear to auscultation bilaterally  Abdomen: soft, nondistended, non-tender, no rebound/rigidity/guarding.  Extremities: symmetric and no edema      Assessment/Plan   Problem-based Assessment and Plan    74yo with diagnosis of stage 3 vaginal cancer admitted with nausea, diarrhea, urosepsis and neutropenic fever      1. Neutropenic Fever:   - Continues to remain afebrile since 10/31 at 1545  - Continue cefipime 2g BID for neutropenic fever + urosepsis, day 6 --> Blood and urine cultures + E Coli  - ID following, recommend transition to po abx when ANC >1.0 and patient afebrile  - AM CBC with differential pending  - Last ANC 0.89, continue daily Neupogen while inpatient  - Continue nystatin daily for inguinal yeast infection  - Remains on neutropenic precautions and await result of morning labs     2. Nausea, Emesis, diarrhea  - Improving since admission,appetite improved since admission and now tolerating small amount of intake   - No antiemetic requirements overnight, episodes of diarrhea spacing  - CT scan performed 11/2 showed no evidence of bowel obstruction but noted pancreatic fat stranding consistent with possible mild  pancreatitis. Amylase, lipase wnl  - Holding IV fluids while she is tolerating PO intake, but should her symptoms worsen may consider restarting IV hydration  -  Boost ordered for nutrition supplementation  - Nutrition consult today.      3. Anemia:   - AM CBC pending, last Hgb 8.5. May need addition transfusion of PRBCs if symptomatic or <7.   - Currently no signs or symptoms of hemodynamic instability  - s/p 1upRBC 10/31     4. Thrombocytopenia:  - Platelets stable at 27 yesterday, pending morning labs  - Consider transfusion of additional platelets if <20 today  - As above, attributed to bone marrow suppression related to chemo and radiotherapy  - Holding lovenox in setting of thrombocytopenia  - s/p 1u plts 10/30      5. Hypokalemia  - K 3.5 yesterday, received 60mEq yesterday  - For repeat BMP this AM    6. Increased Cr  - Cr peaked at 1.5, resolved, most recently 1.3 which appears to be patient's baseline Cr found in Epic system.   - BNP pending this AM.     Plan of care discussed with Dr. Tony Renteria MD

## 2018-11-06 ENCOUNTER — TELEPHONE (OUTPATIENT)
Dept: GYNECOLOGIC ONCOLOGY | Facility: CLINIC | Age: 73
End: 2018-11-06

## 2018-11-06 ENCOUNTER — HOSPITAL ENCOUNTER (OUTPATIENT)
Dept: RADIATION ONCOLOGY | Facility: HOSPITAL | Age: 73
Setting detail: RADIATION/ONCOLOGY SERIES
Discharge: HOME | End: 2018-11-06
Attending: RADIOLOGY
Payer: MEDICARE

## 2018-11-06 ENCOUNTER — DOCUMENTATION (OUTPATIENT)
Dept: RADIATION ONCOLOGY | Facility: HOSPITAL | Age: 73
End: 2018-11-06

## 2018-11-06 ENCOUNTER — TELEPHONE (OUTPATIENT)
Dept: RADIATION ONCOLOGY | Facility: HOSPITAL | Age: 73
End: 2018-11-06

## 2018-11-06 DIAGNOSIS — D70.9 NEUTROPENIC FEVER (CMS/HCC)  (CMS/HCC): Primary | ICD-10-CM

## 2018-11-06 DIAGNOSIS — D69.6 THROMBOCYTOPENIA (CMS/HCC): ICD-10-CM

## 2018-11-06 DIAGNOSIS — R50.81 NEUTROPENIC FEVER (CMS/HCC)  (CMS/HCC): Primary | ICD-10-CM

## 2018-11-06 LAB
BACTERIA BLD CULT: NORMAL
BACTERIA BLD CULT: NORMAL
BASOPHILS # BLD: 0 K/UL (ref 0.01–0.1)
BASOPHILS NFR BLD: 0 %
DIFFERENTIAL METHOD BLD: ABNORMAL
DOHLE BOD BLD QL SMEAR: ABNORMAL
EOSINOPHIL # BLD: 0.12 K/UL (ref 0.04–0.36)
EOSINOPHIL NFR BLD: 3 %
ERYTHROCYTE [DISTWIDTH] IN BLOOD BY AUTOMATED COUNT: 12.5 % (ref 11.7–14.4)
GIANT PLATELETS BLD QL SMEAR: ABNORMAL
HCT VFR BLDCO AUTO: 24.4 % (ref 35–45)
HGB BLD-MCNC: 8.9 G/DL (ref 11.8–15.7)
HYPOCHROMIA BLD QL SMEAR: ABNORMAL
LYMPHOCYTES # BLD: 1.1 K/UL (ref 1.2–3.5)
LYMPHOCYTES NFR BLD: 27 %
MANUAL DIF COMMENT BLD-IMP: ABNORMAL
MCH RBC QN AUTO: 29.9 PG (ref 28–33.2)
MCHC RBC AUTO-ENTMCNC: 36.5 G/DL (ref 32.2–35.5)
MCV RBC AUTO: 81.9 FL (ref 83–98)
MICROCYTES BLD QL SMEAR: ABNORMAL
MONOCYTES # BLD: 0.57 K/UL (ref 0.28–0.8)
MONOCYTES NFR BLD: 14 %
NEUTS BAND # BLD: 0.41 K/UL
NEUTS BAND # BLD: 1.87 K/UL (ref 1.7–7)
NEUTS BAND NFR BLD: 10 %
NEUTS SEG NFR BLD: 46 %
OVALOCYTES BLD QL SMEAR: ABNORMAL
PDW BLD AUTO: 11 FL (ref 9.4–12.3)
PLATELET # BLD AUTO: 24 K/UL (ref 150–369)
PLATELET # BLD EST: ABNORMAL 10*3/UL
POLYCHROMASIA BLD QL SMEAR: ABNORMAL
RBC # BLD AUTO: 2.98 M/UL (ref 3.93–5.22)
TOXIC GRANULES BLD QL SMEAR: ABNORMAL
WBC # BLD AUTO: 4.07 K/UL (ref 3.8–10.5)

## 2018-11-06 PROCEDURE — 85025 COMPLETE CBC W/AUTO DIFF WBC: CPT | Performed by: RADIOLOGY

## 2018-11-06 PROCEDURE — 36415 COLL VENOUS BLD VENIPUNCTURE: CPT | Performed by: RADIOLOGY

## 2018-11-06 NOTE — PROGRESS NOTES
Patient has been discharged from hospital.   STAT CBC drawn in department.  Platelets still only 24,000. Hold XRT today and re-check in am

## 2018-11-06 NOTE — TELEPHONE ENCOUNTER
Called patient and left message about the need for repeat cbc tomorrow on 11/7/2018 and the need for fluids as well. Patient to call back to discuss next steps    Edda Cummings PA-C

## 2018-11-06 NOTE — TELEPHONE ENCOUNTER
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

## 2018-11-07 ENCOUNTER — HOSPITAL ENCOUNTER (OUTPATIENT)
Dept: RADIATION ONCOLOGY | Facility: HOSPITAL | Age: 73
Setting detail: RADIATION/ONCOLOGY SERIES
Discharge: HOME | End: 2018-11-07
Attending: RADIOLOGY
Payer: MEDICARE

## 2018-11-07 ENCOUNTER — HOSPITAL ENCOUNTER (OUTPATIENT)
Dept: INPATIENT UNIT | Facility: HOSPITAL | Age: 73
Discharge: HOME | End: 2018-11-07
Attending: INTERNAL MEDICINE
Payer: MEDICARE

## 2018-11-07 VITALS
HEIGHT: 60 IN | SYSTOLIC BLOOD PRESSURE: 122 MMHG | HEART RATE: 79 BPM | BODY MASS INDEX: 34.36 KG/M2 | TEMPERATURE: 97.7 F | RESPIRATION RATE: 16 BRPM | WEIGHT: 175 LBS | OXYGEN SATURATION: 97 % | DIASTOLIC BLOOD PRESSURE: 58 MMHG

## 2018-11-07 VITALS
SYSTOLIC BLOOD PRESSURE: 142 MMHG | WEIGHT: 175 LBS | DIASTOLIC BLOOD PRESSURE: 80 MMHG | HEART RATE: 91 BPM | BODY MASS INDEX: 34.18 KG/M2

## 2018-11-07 DIAGNOSIS — R79.89 ELEVATED SERUM CREATININE: ICD-10-CM

## 2018-11-07 DIAGNOSIS — C52 VAGINAL CANCER (CMS/HCC): Primary | ICD-10-CM

## 2018-11-07 DIAGNOSIS — R30.0 DYSURIA: ICD-10-CM

## 2018-11-07 DIAGNOSIS — C77.4 SECONDARY MALIGNANCY OF INGUINAL LYMPH NODES (CMS/HCC): ICD-10-CM

## 2018-11-07 DIAGNOSIS — Z51.11 CHEMOTHERAPY MANAGEMENT, ENCOUNTER FOR: ICD-10-CM

## 2018-11-07 DIAGNOSIS — E86.0 DEHYDRATION: ICD-10-CM

## 2018-11-07 DIAGNOSIS — D69.6 THROMBOCYTOPENIA (CMS/HCC): ICD-10-CM

## 2018-11-07 DIAGNOSIS — C52 VAGINAL CANCER (CMS/HCC): ICD-10-CM

## 2018-11-07 LAB
ALBUMIN SERPL-MCNC: 3.1 G/DL (ref 3.4–5)
ALP SERPL-CCNC: 66 IU/L (ref 35–126)
ALT SERPL-CCNC: 12 IU/L (ref 11–54)
ANION GAP SERPL CALC-SCNC: 10 MEQ/L (ref 3–15)
ARIA ZRC COURSE ID: 1
ARIA ZRC COURSE INTENT: NORMAL
ARIA ZRC COURSE START DATE: NORMAL
ARIA ZRC TREATMENT DATES: NORMAL
ARIA ZRC TREATMENT ELAPSED DAYS: NORMAL
ARIA ZRP FRACTIONS TREATED TO DATE: NORMAL
ARIA ZRP FRACTIONS TREATED TO DATE: NORMAL
ARIA ZRP PLAN ID: NORMAL
ARIA ZRP PLAN ID: NORMAL
ARIA ZRP PRESCRIBED DOSE CGY: 2000
ARIA ZRP PRESCRIBED DOSE CGY: 5000
ARIA ZRP PRESCRIBED DOSE PER FRACTION: 2
ARIA ZRP PRESCRIBED DOSE PER FRACTION: 2
ARIA ZRR REFERENCE POINT ID: NORMAL
AST SERPL-CCNC: 20 IU/L (ref 15–41)
BASOPHILS # BLD: 0 K/UL (ref 0.01–0.1)
BASOPHILS NFR BLD: 0 %
BILIRUB SERPL-MCNC: 0.5 MG/DL (ref 0.3–1.2)
BUN SERPL-MCNC: 9 MG/DL (ref 8–20)
CALCIUM SERPL-MCNC: 7.5 MG/DL (ref 8.9–10.3)
CHLORIDE SERPL-SCNC: 102 MEQ/L (ref 98–109)
CO2 SERPL-SCNC: 25 MEQ/L (ref 22–32)
CREAT SERPL-MCNC: 1.4 MG/DL (ref 0.6–1.1)
DIFFERENTIAL METHOD BLD: ABNORMAL
EOSINOPHIL # BLD: 0 K/UL (ref 0.04–0.36)
EOSINOPHIL NFR BLD: 0 %
ERYTHROCYTE [DISTWIDTH] IN BLOOD BY AUTOMATED COUNT: 12.7 % (ref 11.7–14.4)
GFR SERPL CREATININE-BSD FRML MDRD: 36.8 ML/MIN/1.73M*2
GLUCOSE SERPL-MCNC: 116 MG/DL (ref 70–99)
HCT VFR BLDCO AUTO: 25 % (ref 35–45)
HGB BLD-MCNC: 9 G/DL (ref 11.8–15.7)
LYMPHOCYTES # BLD: 0.96 K/UL (ref 1.2–3.5)
LYMPHOCYTES NFR BLD: 17 %
MCH RBC QN AUTO: 29.8 PG (ref 28–33.2)
MCHC RBC AUTO-ENTMCNC: 36 G/DL (ref 32.2–35.5)
MCV RBC AUTO: 82.8 FL (ref 83–98)
MONOCYTES # BLD: 0.17 K/UL (ref 0.28–0.8)
MONOCYTES NFR BLD: 3 %
MYELOCYTES # BLD MANUAL: 0.06 K/UL
MYELOCYTES NFR BLD MANUAL: 1 %
NEUTS BAND # BLD: 0.39 K/UL
NEUTS BAND # BLD: 4.06 K/UL (ref 1.7–7)
NEUTS BAND NFR BLD: 7 %
NEUTS SEG NFR BLD: 72 %
OVALOCYTES BLD QL SMEAR: ABNORMAL
PDW BLD AUTO: 11.5 FL (ref 9.4–12.3)
PLAT MORPH BLD: NORMAL
PLATELET # BLD AUTO: 26 K/UL (ref 150–369)
PLATELET # BLD EST: ABNORMAL 10*3/UL
POTASSIUM SERPL-SCNC: 3.8 MEQ/L (ref 3.6–5.1)
PROT SERPL-MCNC: 6.1 G/DL (ref 6–8.2)
RBC # BLD AUTO: 3.02 M/UL (ref 3.93–5.22)
SODIUM SERPL-SCNC: 137 MEQ/L (ref 136–144)
WBC # BLD AUTO: 5.64 K/UL (ref 3.8–10.5)

## 2018-11-07 PROCEDURE — 77386 HC IMRT DELIVERY COMPLEX: CPT | Performed by: RADIOLOGY

## 2018-11-07 PROCEDURE — 36415 COLL VENOUS BLD VENIPUNCTURE: CPT | Performed by: RADIOLOGY

## 2018-11-07 PROCEDURE — 85025 COMPLETE CBC W/AUTO DIFF WBC: CPT | Performed by: RADIOLOGY

## 2018-11-07 PROCEDURE — 96361 HYDRATE IV INFUSION ADD-ON: CPT

## 2018-11-07 PROCEDURE — 96366 THER/PROPH/DIAG IV INF ADDON: CPT

## 2018-11-07 PROCEDURE — 25800000 HC PHARMACY IV SOLUTIONS: Performed by: PHYSICIAN ASSISTANT

## 2018-11-07 PROCEDURE — 63600000 HC DRUGS/DETAIL CODE: Performed by: PHYSICIAN ASSISTANT

## 2018-11-07 PROCEDURE — 96367 TX/PROPH/DG ADDL SEQ IV INF: CPT

## 2018-11-07 PROCEDURE — 96365 THER/PROPH/DIAG IV INF INIT: CPT

## 2018-11-07 PROCEDURE — 80053 COMPREHEN METABOLIC PANEL: CPT | Performed by: RADIOLOGY

## 2018-11-07 RX ORDER — LEVOTHYROXINE SODIUM 88 UG/1
88 TABLET ORAL
Refills: 3 | COMMUNITY
Start: 2018-10-22 | End: 2024-08-08 | Stop reason: DRUGHIGH

## 2018-11-07 RX ORDER — DIPHENOXYLATE HYDROCHLORIDE AND ATROPINE SULFATE 2.5; .025 MG/1; MG/1
TABLET ORAL
Refills: 0 | COMMUNITY
Start: 2018-10-08 | End: 2019-01-09 | Stop reason: ENTERED-IN-ERROR

## 2018-11-07 RX ADMIN — POTASSIUM CHLORIDE 500 ML/HR: 2 INJECTION, SOLUTION, CONCENTRATE INTRAVENOUS at 08:43

## 2018-11-07 RX ADMIN — ONDANSETRON HYDROCHLORIDE: 2 INJECTION, SOLUTION INTRAMUSCULAR; INTRAVENOUS at 08:43

## 2018-11-07 ASSESSMENT — PAIN SCALES - GENERAL: PAINLEVEL: 0-NO PAIN

## 2018-11-07 NOTE — PATIENT INSTRUCTIONS
Discharge Instructions [facility]    1. Review your medication information sheet and call your provider if you have any questions    2. If you have the following--MED lock, PICC line or Port: contact your provider if signs of redness, drainage, swelling, or new onset of pain at the site.    3. If you experience any of the following symptoms, you should call your family physician of if he/she is unavailable, go to the Emergency Room:     A) Trouble breathing   B) Rash/hives   C) Chills or temperature above 100.5 degrees   D) Pain, redness or drainage at any injection sitePatient Education     Dehydration, Adult  Dehydration is a condition in which there is not enough fluid or water in the body. This happens when you lose more fluids than you take in. Important organs, such as the kidneys, brain, and heart, cannot function without a proper amount of fluids. Any loss of fluids from the body can lead to dehydration.  Dehydration can range from mild to severe. This condition should be treated right away to prevent it from becoming severe.  What are the causes?  This condition may be caused by:  · Vomiting.  · Diarrhea.  · Excessive sweating, such as from heat exposure or exercise.  · Not drinking enough fluid, especially:  ¨ When ill.  ¨ While doing activity that requires a lot of energy.  · Excessive urination.  · Fever.  · Infection.  · Certain medicines, such as medicines that cause the body to lose excess fluid (diuretics).  · Inability to access safe drinking water.  · Reduced physical ability to get adequate water and food.  What increases the risk?  This condition is more likely to develop in people:  · Who have a poorly controlled long-term (chronic) illness, such as diabetes, heart disease, or kidney disease.  · Who are age 65 or older.  · Who are disabled.  · Who live in a place with high altitude.  · Who play endurance sports.  What are the signs or symptoms?  Symptoms of mild dehydration may  include:  · Thirst.  · Dry lips.  · Slightly dry mouth.  · Dry, warm skin.  · Dizziness.  Symptoms of moderate dehydration may include:  · Very dry mouth.  · Muscle cramps.  · Dark urine. Urine may be the color of tea.  · Decreased urine production.  · Decreased tear production.  · Heartbeat that is irregular or faster than normal (palpitations).  · Headache.  · Light-headedness, especially when you stand up from a sitting position.  · Fainting (syncope).  Symptoms of severe dehydration may include:  · Changes in skin, such as:  ¨ Cold and clammy skin.  ¨ Blotchy (mottled) or pale skin.  ¨ Skin that does not quickly return to normal after being lightly pinched and released (poor skin turgor).  · Changes in body fluids, such as:  ¨ Extreme thirst.  ¨ No tear production.  ¨ Inability to sweat when body temperature is high, such as in hot weather.  ¨ Very little urine production.  · Changes in vital signs, such as:  ¨ Weak pulse.  ¨ Pulse that is more than 100 beats a minute when sitting still.  ¨ Rapid breathing.  ¨ Low blood pressure.  · Other changes, such as:  ¨ Sunken eyes.  ¨ Cold hands and feet.  ¨ Confusion.  ¨ Lack of energy (lethargy).  ¨ Difficulty waking up from sleep.  ¨ Short-term weight loss.  ¨ Unconsciousness.  How is this diagnosed?  This condition is diagnosed based on your symptoms and a physical exam. Blood and urine tests may be done to help confirm the diagnosis.  How is this treated?  Treatment for this condition depends on the severity. Mild or moderate dehydration can often be treated at home. Treatment should be started right away. Do not wait until dehydration becomes severe. Severe dehydration is an emergency and it needs to be treated in a hospital.  Treatment for mild dehydration may include:  · Drinking more fluids.  · Replacing salts and minerals in your blood (electrolytes) that you may have lost.  Treatment for moderate dehydration may include:  · Drinking an oral rehydration solution  (ORS). This is a drink that helps you replace fluids and electrolytes (rehydrate). It can be found at pharmacies and retail stores.  Treatment for severe dehydration may include:  · Receiving fluids through an IV tube.  · Receiving an electrolyte solution through a feeding tube that is passed through your nose and into your stomach (nasogastric tube, or NG tube).  · Correcting any abnormalities in electrolytes.  · Treating the underlying cause of dehydration.  Follow these instructions at home:  · If directed by your health care provider, drink an ORS:  ¨ Make an ORS by following instructions on the package.  ¨ Start by drinking small amounts, about ½ cup (120 mL) every 5-10 minutes.  ¨ Slowly increase how much you drink until you have taken the amount recommended by your health care provider.  · Drink enough clear fluid to keep your urine clear or pale yellow. If you were told to drink an ORS, finish the ORS first, then start slowly drinking other clear fluids. Drink fluids such as:  ¨ Water. Do not drink only water. Doing that can lead to having too little salt (sodium) in the body (hyponatremia).  ¨ Ice chips.  ¨ Fruit juice that you have added water to (diluted fruit juice).  ¨ Low-calorie sports drinks.  · Avoid:  ¨ Alcohol.  ¨ Drinks that contain a lot of sugar. These include high-calorie sports drinks, fruit juice that is not diluted, and soda.  ¨ Caffeine.  ¨ Foods that are greasy or contain a lot of fat or sugar.  · Take over-the-counter and prescription medicines only as told by your health care provider.  · Do not take sodium tablets. This can lead to having too much sodium in the body (hypernatremia).  · Eat foods that contain a healthy balance of electrolytes, such as bananas, oranges, potatoes, tomatoes, and spinach.  · Keep all follow-up visits as told by your health care provider. This is important.  Contact a health care provider if:  · You have abdominal pain that:  ¨ Gets worse.  ¨ Stays in one  area (localizes).  · You have a rash.  · You have a stiff neck.  · You are more irritable than usual.  · You are sleepier or more difficult to wake up than usual.  · You feel weak or dizzy.  · You feel very thirsty.  · You have urinated only a small amount of very dark urine over 6-8 hours.  Get help right away if:  · You have symptoms of severe dehydration.  · You cannot drink fluids without vomiting.  · Your symptoms get worse with treatment.  · You have a fever.  · You have a severe headache.  · You have vomiting or diarrhea that:  ¨ Gets worse.  ¨ Does not go away.  · You have blood or green matter (bile) in your vomit.  · You have blood in your stool. This may cause stool to look black and tarry.  · You have not urinated in 6-8 hours.  · You faint.  · Your heart rate while sitting still is over 100 beats a minute.  · You have trouble breathing.  This information is not intended to replace advice given to you by your health care provider. Make sure you discuss any questions you have with your health care provider.  Document Released: 12/18/2006 Document Revised: 07/14/2017 Document Reviewed: 02/10/2017  [employer] Interactive Patient Education © 2018 [employer]

## 2018-11-07 NOTE — PROGRESS NOTES
0815 pt received in room 603 for IV hydration; assessment, VSS, daughter at bedside; labs drawn in radiation this am to determine if pt can receive treatment; RFA 22g ML inserted per policy with good blood return, no other labs needed. Pt c/o dry mouth, po fluids encouraged as tolerated and mouth moisturizer tubes given, which pt stated helped with dryness.  0843 1L NSS with 20 meq KCl and Magnesium sulfate 1 gram started via RFA ML, to infuse over 2 hours. Pt also given Zofran 16 mg IVPB for nausea. Resting in bed, denies any other complaints. Daughter at bedside.  0930 IVF infusing, pt resting in bed, denies complaints; stated relief of nausea.   1030 IVF infusing, pt watching TV in bed; platelet count 26K today, Edda Cummings PA-C aware and stated pt does not need platelet transfusion today. Pt and daughter aware.  1120 IVF complete; VSS; good blood return RFA ML, flushed with 10cc NSS and d/c'd. Pressure held at site and dry gauze dressing applied. Pt voided in BR. AVS printed and reviewed with pt and daughter, all questions answered. D/C'd with steady gait to XRT for treatment, and then home with daughter. Pt denied any complaints at time of d/c.

## 2018-11-07 NOTE — PROGRESS NOTES
Patient ID:  Raegan Young is a 73 y.o. female.    Referring Physician:   Trev Jimenez MD    Primary Care Provider:  Luan Lockhart MD     [facility]   Patient Care Team     Relationship Specialty Notifications Start End   Luan Lockhart MD PCP - General   12/30/17    Venkatesh Travis MD Consulting Physician Gynecology  9/12/18    Akbar Romero MD Radiation Oncologist Radiation Oncology  9/21/18    Trev Jimenez MD Consulting Physician Gynecologic Oncology  9/21/18    Tereza Dhillon, RN Registered Nurse   10/1/18    Cary Oliver, RN Registered Nurse   10/16/18    Radha Ko    11/6/18          Cancer Staging Information:  Cancer Staging  Vaginal cancer (CMS/HCC) ([facility])  Staging form: Vagina, AJCC 8th Edition  - Clinical stage from 9/12/2018: FIGO Stage III (cT3, cN1, cM0) - Signed by Trev Jimenez MD on 9/21/2018            TREATMENT PLAN SUMMARY:    Radiation Therapy   Component Value Date    ARIATXDATES  11/02/2018     Course End Date: : @ --  First Treatment Date: 2018-09-26 @ 10:38  Last Treatment Date: 2018-11-02 @ 13:31      TXELAPSEDDAY 37 days as of [id number] 11/02/2018    COURSEID 1 11/02/2018    PLANID PELVIS 11/02/2018    PLANID CD PELVIS 11/02/2018    PRESCRIBEDDO 2 11/02/2018    PRESCRIBEDDO 2 11/02/2018    DOSAGEGIVENT 16.0000 10/05/2018    FRACTIONSTRE 25 of 25 11/02/2018       Subjective:    Raegan continues to have intermittent diarrhea and thrombocytopenia.  She is scheduled for IVF's today, and we await results of STAT CBC and Comprehensive.  She has had radiation held for 2 days secondary to continued low platelets.   She states her vaginal area feels much better from the treatment break.      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     • bumetanide (BUMEX) 1 mg tablet Take 1 mg by mouth daily.     • ciprofloxacin (CIPRO) 500 mg tablet Take 1 tablet (500 mg total) by mouth every 12 (twelve) hours. 19 tablet 0   • cranberry conc-C-bacillus coag (AZO CRANBERRY + PROBIOTIC) 250-30-50 mg-mg-million tablet Take by mouth daily.     • levothyroxine sodium (TIROSINT) 88 mcg capsule take 1 capsule by oral route  every day     • loperamide (IMODIUM) 2 mg capsule Take 1 capsule (2 mg total) by mouth as needed for diarrhea. 30 capsule 0   • magnesium chloride 71.5 mg tablet,delayed release (DR/EC) Take 1 tablet by mouth 2 (two) times a day. 60 tablet 1   • nystatin-triamcinolone (MYCOLOG II) 100,000-0.1 unit/g-% cream Apply to affected area twice a day for 7-14 days, then as needed for rash. 30 g 0   • omega-3 acid ethyl esters (LOVAZA) 1 gram capsule take 2 capsule by oral route 2 times every day     • ondansetron ODT (ZOFRAN ODT) 8 mg disintegrating tablet Take 1 tablet (8 mg total) by mouth every 8 (eight) hours as needed for nausea or vomiting for up to 7 days. 20 tablet 0   • pantoprazole (PROTONIX) 40 mg EC tablet 40 mg.     • potassium chloride (KLOR-CON) 20 mEq CR  tablet Take 1 tablet (20 mEq total) by mouth 2 (two) times a day. 60 tablet 0   • promethazine (PHENERGAN) 12.5 mg tablet Take 1 tablet (12.5 mg total) by mouth every 6 (six) hours as needed for nausea or vomiting for up to 7 days. 30 tablet 0   • rutin/hesp/bioflav/C/ryecri915 (BIOFLEX ORAL) Take 2 capsules by mouth daily.       No current facility-administered medications for this encounter.      Facility-Administered Medications Ordered in Other Encounters   Medication Dose Route Frequency Provider Last Rate Last Dose   • sodium chloride 0.9 % 1,000 mL with potassium chloride 20 mEq, magnesium sulfate 1 g infusion  500 mL/hr intravenous Continuous PRN Edda Cummings PA C 500 mL/hr at 11/07/18 0843 500 mL/hr at 11/07/18 0843       Allergies  Allergies   Allergen Reactions   • No Known Allergies        Physical Exam:    BP (!) 142/80 (BP Location: Left upper arm, Patient Position: Sitting)   Pulse 91   Wt 79.4 kg (175 lb)   BMI 34.18 kg/m²       Performance Status:       PAIN ASSESSMENT:  Score Zero  Location    Pain Intervention        Plan:    Patient's platelets today were 26,000 and trending upward.  We had treated the patient today    Akbar Romero MD

## 2018-11-08 ENCOUNTER — HOSPITAL ENCOUNTER (OUTPATIENT)
Dept: RADIATION ONCOLOGY | Facility: HOSPITAL | Age: 73
Setting detail: RADIATION/ONCOLOGY SERIES
Discharge: HOME | End: 2018-11-08
Attending: RADIOLOGY
Payer: MEDICARE

## 2018-11-08 DIAGNOSIS — D69.6 THROMBOCYTOPENIA (CMS/HCC): Primary | ICD-10-CM

## 2018-11-08 LAB
ARIA ZRC COURSE ID: 1
ARIA ZRC COURSE INTENT: NORMAL
ARIA ZRC COURSE START DATE: NORMAL
ARIA ZRC TREATMENT DATES: NORMAL
ARIA ZRC TREATMENT ELAPSED DAYS: NORMAL
ARIA ZRP FRACTIONS TREATED TO DATE: NORMAL
ARIA ZRP FRACTIONS TREATED TO DATE: NORMAL
ARIA ZRP PLAN ID: NORMAL
ARIA ZRP PLAN ID: NORMAL
ARIA ZRP PRESCRIBED DOSE CGY: 2000
ARIA ZRP PRESCRIBED DOSE CGY: 5000
ARIA ZRP PRESCRIBED DOSE PER FRACTION: 2
ARIA ZRP PRESCRIBED DOSE PER FRACTION: 2
ARIA ZRR REFERENCE POINT ID: NORMAL
BASOPHILS # BLD: 0 K/UL (ref 0.01–0.1)
BASOPHILS NFR BLD: 0 %
DIFFERENTIAL METHOD BLD: ABNORMAL
EOSINOPHIL # BLD: 0.06 K/UL (ref 0.04–0.36)
EOSINOPHIL NFR BLD: 1 %
ERYTHROCYTE [DISTWIDTH] IN BLOOD BY AUTOMATED COUNT: 13 % (ref 11.7–14.4)
HCT VFR BLDCO AUTO: 24.3 % (ref 35–45)
HGB BLD-MCNC: 8.5 G/DL (ref 11.8–15.7)
HYPOCHROMIA BLD QL SMEAR: ABNORMAL
LYMPHOCYTES # BLD: 1.27 K/UL (ref 1.2–3.5)
LYMPHOCYTES NFR BLD: 22 %
MCH RBC QN AUTO: 29.6 PG (ref 28–33.2)
MCHC RBC AUTO-ENTMCNC: 35 G/DL (ref 32.2–35.5)
MCV RBC AUTO: 84.7 FL (ref 83–98)
MONOCYTES # BLD: 0.35 K/UL (ref 0.28–0.8)
MONOCYTES NFR BLD: 6 %
NEUTS BAND # BLD: 0.52 K/UL
NEUTS BAND # BLD: 3.58 K/UL (ref 1.7–7)
NEUTS BAND NFR BLD: 9 %
NEUTS SEG NFR BLD: 62 %
PDW BLD AUTO: 11.3 FL (ref 9.4–12.3)
PLAT MORPH BLD: NORMAL
PLATELET # BLD AUTO: 29 K/UL (ref 150–369)
PLATELET # BLD EST: ABNORMAL 10*3/UL
RBC # BLD AUTO: 2.87 M/UL (ref 3.93–5.22)
TOXIC GRANULES BLD QL SMEAR: ABNORMAL
WBC # BLD AUTO: 5.78 K/UL (ref 3.8–10.5)

## 2018-11-08 PROCEDURE — 77386 HC IMRT DELIVERY COMPLEX: CPT | Performed by: RADIOLOGY

## 2018-11-08 PROCEDURE — 36415 COLL VENOUS BLD VENIPUNCTURE: CPT

## 2018-11-08 PROCEDURE — 85025 COMPLETE CBC W/AUTO DIFF WBC: CPT

## 2018-11-09 ENCOUNTER — HOSPITAL ENCOUNTER (OUTPATIENT)
Dept: RADIATION ONCOLOGY | Facility: HOSPITAL | Age: 73
Setting detail: RADIATION/ONCOLOGY SERIES
Discharge: HOME | End: 2018-11-09
Attending: RADIOLOGY
Payer: MEDICARE

## 2018-11-09 LAB
ARIA ZRC COURSE ID: 1
ARIA ZRC COURSE INTENT: NORMAL
ARIA ZRC COURSE START DATE: NORMAL
ARIA ZRC TREATMENT DATES: NORMAL
ARIA ZRC TREATMENT ELAPSED DAYS: NORMAL
ARIA ZRP FRACTIONS TREATED TO DATE: NORMAL
ARIA ZRP FRACTIONS TREATED TO DATE: NORMAL
ARIA ZRP PLAN ID: NORMAL
ARIA ZRP PLAN ID: NORMAL
ARIA ZRP PRESCRIBED DOSE CGY: 2000
ARIA ZRP PRESCRIBED DOSE CGY: 5000
ARIA ZRP PRESCRIBED DOSE PER FRACTION: 2
ARIA ZRP PRESCRIBED DOSE PER FRACTION: 2
ARIA ZRR REFERENCE POINT ID: NORMAL

## 2018-11-09 PROCEDURE — 77336 RADIATION PHYSICS CONSULT: CPT | Performed by: RADIOLOGY

## 2018-11-09 PROCEDURE — 77386 HC IMRT DELIVERY COMPLEX: CPT | Performed by: RADIOLOGY

## 2018-11-12 ENCOUNTER — HOSPITAL ENCOUNTER (OUTPATIENT)
Dept: RADIATION ONCOLOGY | Facility: HOSPITAL | Age: 73
Setting detail: RADIATION/ONCOLOGY SERIES
Discharge: HOME | End: 2018-11-12
Attending: RADIOLOGY
Payer: MEDICARE

## 2018-11-12 DIAGNOSIS — D69.6 THROMBOCYTOPENIA (CMS/HCC): ICD-10-CM

## 2018-11-12 DIAGNOSIS — E87.6 HYPOKALEMIA: ICD-10-CM

## 2018-11-12 DIAGNOSIS — E86.0 DEHYDRATION: Primary | ICD-10-CM

## 2018-11-12 LAB
ALBUMIN SERPL-MCNC: 3.4 G/DL (ref 3.4–5)
ALP SERPL-CCNC: 79 IU/L (ref 35–126)
ALT SERPL-CCNC: 12 IU/L (ref 11–54)
ANION GAP SERPL CALC-SCNC: 10 MEQ/L (ref 3–15)
ANISOCYTOSIS BLD QL SMEAR: ABNORMAL
ARIA ZRC COURSE ID: 1
ARIA ZRC COURSE INTENT: NORMAL
ARIA ZRC COURSE START DATE: NORMAL
ARIA ZRC TREATMENT DATES: NORMAL
ARIA ZRC TREATMENT ELAPSED DAYS: NORMAL
ARIA ZRP FRACTIONS TREATED TO DATE: NORMAL
ARIA ZRP FRACTIONS TREATED TO DATE: NORMAL
ARIA ZRP PLAN ID: NORMAL
ARIA ZRP PLAN ID: NORMAL
ARIA ZRP PRESCRIBED DOSE CGY: 2000
ARIA ZRP PRESCRIBED DOSE CGY: 5000
ARIA ZRP PRESCRIBED DOSE PER FRACTION: 2
ARIA ZRP PRESCRIBED DOSE PER FRACTION: 2
ARIA ZRR REFERENCE POINT ID: NORMAL
AST SERPL-CCNC: 22 IU/L (ref 15–41)
BASOPHILS # BLD: 0 K/UL (ref 0.01–0.1)
BASOPHILS NFR BLD: 0 %
BILIRUB SERPL-MCNC: 0.6 MG/DL (ref 0.3–1.2)
BUN SERPL-MCNC: <5 MG/DL (ref 8–20)
CALCIUM SERPL-MCNC: 7.5 MG/DL (ref 8.9–10.3)
CHLORIDE SERPL-SCNC: 101 MEQ/L (ref 98–109)
CO2 SERPL-SCNC: 24 MEQ/L (ref 22–32)
CREAT SERPL-MCNC: 1.4 MG/DL (ref 0.6–1.1)
DIFFERENTIAL METHOD BLD: ABNORMAL
EOSINOPHIL # BLD: 0 K/UL (ref 0.04–0.36)
EOSINOPHIL NFR BLD: 0 %
ERYTHROCYTE [DISTWIDTH] IN BLOOD BY AUTOMATED COUNT: 14 % (ref 11.7–14.4)
GFR SERPL CREATININE-BSD FRML MDRD: 36.8 ML/MIN/1.73M*2
GIANT PLATELETS BLD QL SMEAR: ABNORMAL
GLUCOSE SERPL-MCNC: 105 MG/DL (ref 70–99)
HCT VFR BLDCO AUTO: 25.7 % (ref 35–45)
HGB BLD-MCNC: 9.1 G/DL (ref 11.8–15.7)
LYMPHOCYTES # BLD: 2.1 K/UL (ref 1.2–3.5)
LYMPHOCYTES NFR BLD: 30 %
MAGNESIUM SERPL-MCNC: 0.9 MG/DL (ref 1.8–2.5)
MCH RBC QN AUTO: 30.2 PG (ref 28–33.2)
MCHC RBC AUTO-ENTMCNC: 35.4 G/DL (ref 32.2–35.5)
MCV RBC AUTO: 85.4 FL (ref 83–98)
MONOCYTES # BLD: 1.26 K/UL (ref 0.28–0.8)
MONOCYTES NFR BLD: 18 %
MYELOCYTES # BLD MANUAL: 0.07 K/UL
MYELOCYTES NFR BLD MANUAL: 1 %
NEUTS BAND # BLD: 0.21 K/UL
NEUTS BAND # BLD: 3.36 K/UL (ref 1.7–7)
NEUTS BAND NFR BLD: 3 %
NEUTS SEG NFR BLD: 48 %
PDW BLD AUTO: 11.1 FL (ref 9.4–12.3)
PLAT MORPH BLD: NORMAL
PLATELET # BLD AUTO: 81 K/UL (ref 150–369)
PLATELET # BLD EST: ABNORMAL 10*3/UL
POLYCHROMASIA BLD QL SMEAR: ABNORMAL
POTASSIUM SERPL-SCNC: 4.4 MEQ/L (ref 3.6–5.1)
PROT SERPL-MCNC: 6.3 G/DL (ref 6–8.2)
RBC # BLD AUTO: 3.01 M/UL (ref 3.93–5.22)
RBC MORPH BLD: NORMAL
SODIUM SERPL-SCNC: 135 MEQ/L (ref 136–144)
TOXIC GRANULES BLD QL SMEAR: ABNORMAL
WBC # BLD AUTO: 6.99 K/UL (ref 3.8–10.5)

## 2018-11-12 PROCEDURE — 80053 COMPREHEN METABOLIC PANEL: CPT

## 2018-11-12 PROCEDURE — 83735 ASSAY OF MAGNESIUM: CPT

## 2018-11-12 PROCEDURE — 85025 COMPLETE CBC W/AUTO DIFF WBC: CPT

## 2018-11-12 PROCEDURE — 77386 HC IMRT DELIVERY COMPLEX: CPT | Performed by: RADIOLOGY

## 2018-11-12 PROCEDURE — 36415 COLL VENOUS BLD VENIPUNCTURE: CPT

## 2018-11-13 ENCOUNTER — HOSPITAL ENCOUNTER (OUTPATIENT)
Dept: RADIATION ONCOLOGY | Facility: HOSPITAL | Age: 73
Setting detail: RADIATION/ONCOLOGY SERIES
Discharge: HOME | End: 2018-11-13
Attending: RADIOLOGY
Payer: MEDICARE

## 2018-11-13 ENCOUNTER — HOSPITAL ENCOUNTER (OUTPATIENT)
Dept: INPATIENT UNIT | Facility: HOSPITAL | Age: 73
Setting detail: INFUSION SERIES
Discharge: HOME | End: 2018-11-13
Attending: OBSTETRICS & GYNECOLOGY
Payer: MEDICARE

## 2018-11-13 VITALS
OXYGEN SATURATION: 96 % | SYSTOLIC BLOOD PRESSURE: 124 MMHG | RESPIRATION RATE: 18 BRPM | HEART RATE: 80 BPM | TEMPERATURE: 98.5 F | HEIGHT: 60 IN | BODY MASS INDEX: 33.38 KG/M2 | DIASTOLIC BLOOD PRESSURE: 62 MMHG | WEIGHT: 170 LBS

## 2018-11-13 VITALS
BODY MASS INDEX: 33.2 KG/M2 | HEART RATE: 96 BPM | DIASTOLIC BLOOD PRESSURE: 65 MMHG | WEIGHT: 170 LBS | SYSTOLIC BLOOD PRESSURE: 130 MMHG

## 2018-11-13 DIAGNOSIS — E86.0 DEHYDRATION: ICD-10-CM

## 2018-11-13 DIAGNOSIS — C52 VAGINAL CANCER (CMS/HCC): ICD-10-CM

## 2018-11-13 DIAGNOSIS — C52 VAGINAL CANCER (CMS/HCC): Primary | ICD-10-CM

## 2018-11-13 PROCEDURE — 96367 TX/PROPH/DG ADDL SEQ IV INF: CPT

## 2018-11-13 PROCEDURE — 77386 HC IMRT DELIVERY COMPLEX: CPT | Performed by: RADIOLOGY

## 2018-11-13 PROCEDURE — 96360 HYDRATION IV INFUSION INIT: CPT

## 2018-11-13 PROCEDURE — 96374 THER/PROPH/DIAG INJ IV PUSH: CPT

## 2018-11-13 PROCEDURE — 63600000 HC DRUGS/DETAIL CODE: Performed by: PHYSICIAN ASSISTANT

## 2018-11-13 PROCEDURE — 96365 THER/PROPH/DIAG IV INF INIT: CPT

## 2018-11-13 PROCEDURE — 96361 HYDRATE IV INFUSION ADD-ON: CPT

## 2018-11-13 PROCEDURE — 25800000 HC PHARMACY IV SOLUTIONS: Performed by: PHYSICIAN ASSISTANT

## 2018-11-13 RX ADMIN — POTASSIUM CHLORIDE 500 ML/HR: 2 INJECTION, SOLUTION, CONCENTRATE INTRAVENOUS at 11:10

## 2018-11-13 RX ADMIN — ONDANSETRON HYDROCHLORIDE: 2 INJECTION, SOLUTION INTRAMUSCULAR; INTRAVENOUS at 11:10

## 2018-11-13 ASSESSMENT — PAIN SCALES - GENERAL: PAINLEVEL: 0-NO PAIN

## 2018-11-13 NOTE — PROGRESS NOTES
(1030) Pt to floor to receive IVF ordered for dehydration 2/2 XRT for Dx: vaginal CA. VSS, pt assessed by Rn. Lona #22 placed @ LAC, w/ +flush/ + blood return noted. Lab results from 11/12/18 noted. IVF and zofran ordered via pharmacy.    (1110) Zofran 16mg IV up per orders, set to infuse over 15 minutes. Prior to infusing, + flush/ + blood return noted via lona. Pt w/o c/o. Following zofran, 1L NSS w/ 20mEqKCL and 1 gram mag sulfate up/ 2hrs.    (1125) Zofran complete.    (1325) IVF infusion completed w/o incident, and line flushed thoroughly. +flush/ + blood return noted via loan. Lona flushed/ removed, gauze w/ tape place @ site, and pressure applied until site CDI.     D/C instructions reviewed w/ pt, including interventions for mgmnt of nausea and dehydration, and indications to contact Md/ seek medical attention. Pt verbalizes understanding, and received copy of reviewed info to take home as reference. Pt reminded to return for additional ordered labs and IVF this Friday 11/16/18. Pt was D/C'd to home, w/ her daughter, in stable condition, w/o c/o.

## 2018-11-13 NOTE — PATIENT INSTRUCTIONS
Discharge Instructions Medical [facility]    1. Review your medication information sheet and call your provider if you have any questions    2. If you have the following--MED lock, PICC line or Port: contact your provider if signs of redness, drainage, swelling, or new onset of pain at the site.    3. If you experience any of the following symptoms, you should call your family physician of if he/she is unavailable, go to the Emergency Room:     A) Trouble breathing   B) Rash/hives   C) Chills or temperature above 100.5 degrees   D) Pain, redness or drainage at any injection site    Patient Education     Dehydration, Adult  Dehydration is a condition in which there is not enough fluid or water in the body. This happens when you lose more fluids than you take in. Important organs, such as the kidneys, brain, and heart, cannot function without a proper amount of fluids. Any loss of fluids from the body can lead to dehydration.  Dehydration can range from mild to severe. This condition should be treated right away to prevent it from becoming severe.  What are the causes?  This condition may be caused by:  · Vomiting.  · Diarrhea.  · Excessive sweating, such as from heat exposure or exercise.  · Not drinking enough fluid, especially:  ¨ When ill.  ¨ While doing activity that requires a lot of energy.  · Excessive urination.  · Fever.  · Infection.  · Certain medicines, such as medicines that cause the body to lose excess fluid (diuretics).  · Inability to access safe drinking water.  · Reduced physical ability to get adequate water and food.  What increases the risk?  This condition is more likely to develop in people:  · Who have a poorly controlled long-term (chronic) illness, such as diabetes, heart disease, or kidney disease.  · Who are age 65 or older.  · Who are disabled.  · Who live in a place with high altitude.  · Who play endurance sports.  What are the signs or symptoms?  Symptoms of mild dehydration may  include:  · Thirst.  · Dry lips.  · Slightly dry mouth.  · Dry, warm skin.  · Dizziness.  Symptoms of moderate dehydration may include:  · Very dry mouth.  · Muscle cramps.  · Dark urine. Urine may be the color of tea.  · Decreased urine production.  · Decreased tear production.  · Heartbeat that is irregular or faster than normal (palpitations).  · Headache.  · Light-headedness, especially when you stand up from a sitting position.  · Fainting (syncope).  Symptoms of severe dehydration may include:  · Changes in skin, such as:  ¨ Cold and clammy skin.  ¨ Blotchy (mottled) or pale skin.  ¨ Skin that does not quickly return to normal after being lightly pinched and released (poor skin turgor).  · Changes in body fluids, such as:  ¨ Extreme thirst.  ¨ No tear production.  ¨ Inability to sweat when body temperature is high, such as in hot weather.  ¨ Very little urine production.  · Changes in vital signs, such as:  ¨ Weak pulse.  ¨ Pulse that is more than 100 beats a minute when sitting still.  ¨ Rapid breathing.  ¨ Low blood pressure.  · Other changes, such as:  ¨ Sunken eyes.  ¨ Cold hands and feet.  ¨ Confusion.  ¨ Lack of energy (lethargy).  ¨ Difficulty waking up from sleep.  ¨ Short-term weight loss.  ¨ Unconsciousness.  How is this diagnosed?  This condition is diagnosed based on your symptoms and a physical exam. Blood and urine tests may be done to help confirm the diagnosis.  How is this treated?  Treatment for this condition depends on the severity. Mild or moderate dehydration can often be treated at home. Treatment should be started right away. Do not wait until dehydration becomes severe. Severe dehydration is an emergency and it needs to be treated in a hospital.  Treatment for mild dehydration may include:  · Drinking more fluids.  · Replacing salts and minerals in your blood (electrolytes) that you may have lost.  Treatment for moderate dehydration may include:  · Drinking an oral rehydration solution  (ORS). This is a drink that helps you replace fluids and electrolytes (rehydrate). It can be found at pharmacies and retail stores.  Treatment for severe dehydration may include:  · Receiving fluids through an IV tube.  · Receiving an electrolyte solution through a feeding tube that is passed through your nose and into your stomach (nasogastric tube, or NG tube).  · Correcting any abnormalities in electrolytes.  · Treating the underlying cause of dehydration.  Follow these instructions at home:  · If directed by your health care provider, drink an ORS:  ¨ Make an ORS by following instructions on the package.  ¨ Start by drinking small amounts, about ½ cup (120 mL) every 5-10 minutes.  ¨ Slowly increase how much you drink until you have taken the amount recommended by your health care provider.  · Drink enough clear fluid to keep your urine clear or pale yellow. If you were told to drink an ORS, finish the ORS first, then start slowly drinking other clear fluids. Drink fluids such as:  ¨ Water. Do not drink only water. Doing that can lead to having too little salt (sodium) in the body (hyponatremia).  ¨ Ice chips.  ¨ Fruit juice that you have added water to (diluted fruit juice).  ¨ Low-calorie sports drinks.  · Avoid:  ¨ Alcohol.  ¨ Drinks that contain a lot of sugar. These include high-calorie sports drinks, fruit juice that is not diluted, and soda.  ¨ Caffeine.  ¨ Foods that are greasy or contain a lot of fat or sugar.  · Take over-the-counter and prescription medicines only as told by your health care provider.  · Do not take sodium tablets. This can lead to having too much sodium in the body (hypernatremia).  · Eat foods that contain a healthy balance of electrolytes, such as bananas, oranges, potatoes, tomatoes, and spinach.  · Keep all follow-up visits as told by your health care provider. This is important.  Contact a health care provider if:  · You have abdominal pain that:  ¨ Gets worse.  ¨ Stays in one  area (localizes).  · You have a rash.  · You have a stiff neck.  · You are more irritable than usual.  · You are sleepier or more difficult to wake up than usual.  · You feel weak or dizzy.  · You feel very thirsty.  · You have urinated only a small amount of very dark urine over 6-8 hours.  Get help right away if:  · You have symptoms of severe dehydration.  · You cannot drink fluids without vomiting.  · Your symptoms get worse with treatment.  · You have a fever.  · You have a severe headache.  · You have vomiting or diarrhea that:  ¨ Gets worse.  ¨ Does not go away.  · You have blood or green matter (bile) in your vomit.  · You have blood in your stool. This may cause stool to look black and tarry.  · You have not urinated in 6-8 hours.  · You faint.  · Your heart rate while sitting still is over 100 beats a minute.  · You have trouble breathing.  This information is not intended to replace advice given to you by your health care provider. Make sure you discuss any questions you have with your health care provider.  Document Released: 12/18/2006 Document Revised: 07/14/2017 Document Reviewed: 02/10/2017  [employer] Interactive Patient Education © 2018 [employer]

## 2018-11-14 ENCOUNTER — HOSPITAL ENCOUNTER (OUTPATIENT)
Dept: RADIATION ONCOLOGY | Facility: HOSPITAL | Age: 73
Setting detail: RADIATION/ONCOLOGY SERIES
Discharge: HOME | End: 2018-11-14
Attending: RADIOLOGY
Payer: MEDICARE

## 2018-11-14 PROCEDURE — 77386 HC IMRT DELIVERY COMPLEX: CPT | Performed by: RADIOLOGY

## 2018-11-15 ENCOUNTER — HOSPITAL ENCOUNTER (OUTPATIENT)
Dept: RADIATION ONCOLOGY | Facility: HOSPITAL | Age: 73
Setting detail: RADIATION/ONCOLOGY SERIES
Discharge: HOME | End: 2018-11-15
Attending: RADIOLOGY
Payer: MEDICARE

## 2018-11-15 PROCEDURE — 77386 HC IMRT DELIVERY COMPLEX: CPT | Performed by: RADIOLOGY

## 2018-11-16 ENCOUNTER — HOSPITAL ENCOUNTER (OUTPATIENT)
Dept: INPATIENT UNIT | Facility: HOSPITAL | Age: 73
Setting detail: INFUSION SERIES
Discharge: HOME | End: 2018-11-16
Attending: OBSTETRICS & GYNECOLOGY
Payer: MEDICARE

## 2018-11-16 ENCOUNTER — TELEPHONE (OUTPATIENT)
Dept: GYNECOLOGIC ONCOLOGY | Facility: CLINIC | Age: 73
End: 2018-11-16

## 2018-11-16 ENCOUNTER — HOSPITAL ENCOUNTER (OUTPATIENT)
Dept: RADIATION ONCOLOGY | Facility: HOSPITAL | Age: 73
Discharge: HOME | End: 2018-11-16
Payer: MEDICARE

## 2018-11-16 VITALS
OXYGEN SATURATION: 98 % | WEIGHT: 170 LBS | RESPIRATION RATE: 18 BRPM | HEIGHT: 60 IN | HEART RATE: 100 BPM | TEMPERATURE: 96.1 F | BODY MASS INDEX: 33.38 KG/M2 | DIASTOLIC BLOOD PRESSURE: 74 MMHG | SYSTOLIC BLOOD PRESSURE: 149 MMHG

## 2018-11-16 DIAGNOSIS — E86.0 DEHYDRATION DUE TO RADIATION: ICD-10-CM

## 2018-11-16 LAB
ANION GAP SERPL CALC-SCNC: 11 MEQ/L (ref 3–15)
ARIA ZRC COURSE ID: 1
ARIA ZRC COURSE INTENT: NORMAL
ARIA ZRC COURSE START DATE: NORMAL
ARIA ZRC TREATMENT DATES: NORMAL
ARIA ZRC TREATMENT ELAPSED DAYS: NORMAL
ARIA ZRP FRACTIONS TREATED TO DATE: NORMAL
ARIA ZRP FRACTIONS TREATED TO DATE: NORMAL
ARIA ZRP PLAN ID: NORMAL
ARIA ZRP PLAN ID: NORMAL
ARIA ZRP PRESCRIBED DOSE CGY: 2000
ARIA ZRP PRESCRIBED DOSE CGY: 5000
ARIA ZRP PRESCRIBED DOSE PER FRACTION: 2
ARIA ZRP PRESCRIBED DOSE PER FRACTION: 2
ARIA ZRR REFERENCE POINT ID: NORMAL
BUN SERPL-MCNC: 10 MG/DL (ref 8–20)
CALCIUM SERPL-MCNC: 8.3 MG/DL (ref 8.9–10.3)
CHLORIDE SERPL-SCNC: 104 MEQ/L (ref 98–109)
CO2 SERPL-SCNC: 24 MEQ/L (ref 22–32)
CREAT SERPL-MCNC: 1.7 MG/DL (ref 0.6–1.1)
GFR SERPL CREATININE-BSD FRML MDRD: 29.4 ML/MIN/1.73M*2
GLUCOSE SERPL-MCNC: 98 MG/DL (ref 70–99)
MAGNESIUM SERPL-MCNC: 1.2 MG/DL (ref 1.8–2.5)
POTASSIUM SERPL-SCNC: 4.7 MEQ/L (ref 3.6–5.1)
SODIUM SERPL-SCNC: 139 MEQ/L (ref 136–144)

## 2018-11-16 PROCEDURE — 96361 HYDRATE IV INFUSION ADD-ON: CPT

## 2018-11-16 PROCEDURE — 63600000 HC DRUGS/DETAIL CODE: Performed by: PHYSICIAN ASSISTANT

## 2018-11-16 PROCEDURE — 96365 THER/PROPH/DIAG IV INF INIT: CPT

## 2018-11-16 PROCEDURE — 25800000 HC PHARMACY IV SOLUTIONS: Performed by: PHYSICIAN ASSISTANT

## 2018-11-16 PROCEDURE — 83735 ASSAY OF MAGNESIUM: CPT | Performed by: PHYSICIAN ASSISTANT

## 2018-11-16 PROCEDURE — 77336 RADIATION PHYSICS CONSULT: CPT | Performed by: RADIOLOGY

## 2018-11-16 PROCEDURE — 77386 HC IMRT DELIVERY COMPLEX: CPT | Performed by: RADIOLOGY

## 2018-11-16 PROCEDURE — 36415 COLL VENOUS BLD VENIPUNCTURE: CPT | Performed by: PHYSICIAN ASSISTANT

## 2018-11-16 PROCEDURE — 80048 BASIC METABOLIC PNL TOTAL CA: CPT | Performed by: PHYSICIAN ASSISTANT

## 2018-11-16 PROCEDURE — 96374 THER/PROPH/DIAG INJ IV PUSH: CPT

## 2018-11-16 PROCEDURE — 96367 TX/PROPH/DG ADDL SEQ IV INF: CPT

## 2018-11-16 PROCEDURE — 96360 HYDRATION IV INFUSION INIT: CPT

## 2018-11-16 RX ADMIN — POTASSIUM CHLORIDE 500 ML/HR: 2 INJECTION, SOLUTION, CONCENTRATE INTRAVENOUS at 11:25

## 2018-11-16 RX ADMIN — ONDANSETRON HYDROCHLORIDE: 2 INJECTION, SOLUTION INTRAMUSCULAR; INTRAVENOUS at 11:25

## 2018-11-16 NOTE — PROGRESS NOTES
(1000) Pt to floor to receive IVF ordered for dehydration 2/2 XRT for Dx: vaginal CA. VSS, pt assessed by Rn. Darnell #22 placed @ [facility], w/ +flush/ + blood return noted. Labs drawn/sent per orders. IVF and zofran ordered via pharmacy.    (1059) Labs back, reviewed w/ Dr Trev Jimenez (Mg++=1.2, creat=1.7). No further orders for today. Pt will return on Monday for further labs and IVF/ lytes. Pt aware, verbalizes willingness to comply. 6W Appointment scheduled s/p pt's scheduled XRT.    (1125) Zofran 16mg IV up per orders, set to infuse over 15 minutes. Prior to infusing, + flush/ + blood return noted via darnell. Pt w/o c/o. Following zofran, 1L NSS w/ 20mEqKCL and 1 gram mag sulfate up/ 2hrs.    (1140) Zofran complete.    (1352) IVF infusion completed w/o incident, and line flushed thoroughly. +flush/ + blood return noted via darnell. Darnell flushed/ removed, gauze w/ tape place @ site, and pressure applied until site CDI.     [employer] Soft lists of  Mg++ rich foods reviewed w/ pt, who verbalizes understanding and received copy of list to take home as reference.     D/C instructions reviewed w/ pt, including interventions for mgmnt of nausea and dehydration, and indications to contact Md/ seek medical attention. Pt verbalizes understanding, and received copy of reviewed info to take home as reference. Pt reminded to return for additional ordered labs and IVF this coming Monday 11/19/18. Pt was D/C'd to home, in stable condition, w/o c/o.

## 2018-11-16 NOTE — PATIENT INSTRUCTIONS
Discharge Instructions Medical [facility]    1. Review your medication information sheet and call your provider if you have any questions    2. Contact your provider if signs of redness, drainage, swelling, or new onset of pain at the site of your iv infusion.    3.  If you experience any of the following symptoms, you should call your family physician of if he/she is unavailable, go to the Emergency Room:     A) Trouble breathing   B) Rash/hives   C) Chills or temperature above 100.5 degrees   D) Pain, redness or drainage at any injection site    Patient Education     Dehydration, Adult  Dehydration is a condition in which there is not enough fluid or water in the body. This happens when you lose more fluids than you take in. Important organs, such as the kidneys, brain, and heart, cannot function without a proper amount of fluids. Any loss of fluids from the body can lead to dehydration.  Dehydration can range from mild to severe. This condition should be treated right away to prevent it from becoming severe.  What are the causes?  This condition may be caused by:  · Vomiting.  · Diarrhea.  · Excessive sweating, such as from heat exposure or exercise.  · Not drinking enough fluid, especially:  ¨ When ill.  ¨ While doing activity that requires a lot of energy.  · Excessive urination.  · Fever.  · Infection.  · Certain medicines, such as medicines that cause the body to lose excess fluid (diuretics).  · Inability to access safe drinking water.  · Reduced physical ability to get adequate water and food.  What increases the risk?  This condition is more likely to develop in people:  · Who have a poorly controlled long-term (chronic) illness, such as diabetes, heart disease, or kidney disease.  · Who are age 65 or older.  · Who are disabled.  · Who live in a place with high altitude.  · Who play endurance sports.  What are the signs or symptoms?  Symptoms of mild dehydration may include:  · Thirst.  · Dry lips.  · Slightly  dry mouth.  · Dry, warm skin.  · Dizziness.  Symptoms of moderate dehydration may include:  · Very dry mouth.  · Muscle cramps.  · Dark urine. Urine may be the color of tea.  · Decreased urine production.  · Decreased tear production.  · Heartbeat that is irregular or faster than normal (palpitations).  · Headache.  · Light-headedness, especially when you stand up from a sitting position.  · Fainting (syncope).  Symptoms of severe dehydration may include:  · Changes in skin, such as:  ¨ Cold and clammy skin.  ¨ Blotchy (mottled) or pale skin.  ¨ Skin that does not quickly return to normal after being lightly pinched and released (poor skin turgor).  · Changes in body fluids, such as:  ¨ Extreme thirst.  ¨ No tear production.  ¨ Inability to sweat when body temperature is high, such as in hot weather.  ¨ Very little urine production.  · Changes in vital signs, such as:  ¨ Weak pulse.  ¨ Pulse that is more than 100 beats a minute when sitting still.  ¨ Rapid breathing.  ¨ Low blood pressure.  · Other changes, such as:  ¨ Sunken eyes.  ¨ Cold hands and feet.  ¨ Confusion.  ¨ Lack of energy (lethargy).  ¨ Difficulty waking up from sleep.  ¨ Short-term weight loss.  ¨ Unconsciousness.  How is this diagnosed?  This condition is diagnosed based on your symptoms and a physical exam. Blood and urine tests may be done to help confirm the diagnosis.  How is this treated?  Treatment for this condition depends on the severity. Mild or moderate dehydration can often be treated at home. Treatment should be started right away. Do not wait until dehydration becomes severe. Severe dehydration is an emergency and it needs to be treated in a hospital.  Treatment for mild dehydration may include:  · Drinking more fluids.  · Replacing salts and minerals in your blood (electrolytes) that you may have lost.  Treatment for moderate dehydration may include:  · Drinking an oral rehydration solution (ORS). This is a drink that helps you  replace fluids and electrolytes (rehydrate). It can be found at pharmacies and retail stores.  Treatment for severe dehydration may include:  · Receiving fluids through an IV tube.  · Receiving an electrolyte solution through a feeding tube that is passed through your nose and into your stomach (nasogastric tube, or NG tube).  · Correcting any abnormalities in electrolytes.  · Treating the underlying cause of dehydration.  Follow these instructions at home:  · If directed by your health care provider, drink an ORS:  ¨ Make an ORS by following instructions on the package.  ¨ Start by drinking small amounts, about ½ cup (120 mL) every 5-10 minutes.  ¨ Slowly increase how much you drink until you have taken the amount recommended by your health care provider.  · Drink enough clear fluid to keep your urine clear or pale yellow. If you were told to drink an ORS, finish the ORS first, then start slowly drinking other clear fluids. Drink fluids such as:  ¨ Water. Do not drink only water. Doing that can lead to having too little salt (sodium) in the body (hyponatremia).  ¨ Ice chips.  ¨ Fruit juice that you have added water to (diluted fruit juice).  ¨ Low-calorie sports drinks.  · Avoid:  ¨ Alcohol.  ¨ Drinks that contain a lot of sugar. These include high-calorie sports drinks, fruit juice that is not diluted, and soda.  ¨ Caffeine.  ¨ Foods that are greasy or contain a lot of fat or sugar.  · Take over-the-counter and prescription medicines only as told by your health care provider.  · Do not take sodium tablets. This can lead to having too much sodium in the body (hypernatremia).  · Eat foods that contain a healthy balance of electrolytes, such as bananas, oranges, potatoes, tomatoes, and spinach.  · Keep all follow-up visits as told by your health care provider. This is important.  Contact a health care provider if:  · You have abdominal pain that:  ¨ Gets worse.  ¨ Stays in one area (localizes).  · You have a  rash.  · You have a stiff neck.  · You are more irritable than usual.  · You are sleepier or more difficult to wake up than usual.  · You feel weak or dizzy.  · You feel very thirsty.  · You have urinated only a small amount of very dark urine over 6-8 hours.  Get help right away if:  · You have symptoms of severe dehydration.  · You cannot drink fluids without vomiting.  · Your symptoms get worse with treatment.  · You have a fever.  · You have a severe headache.  · You have vomiting or diarrhea that:  ¨ Gets worse.  ¨ Does not go away.  · You have blood or green matter (bile) in your vomit.  · You have blood in your stool. This may cause stool to look black and tarry.  · You have not urinated in 6-8 hours.  · You faint.  · Your heart rate while sitting still is over 100 beats a minute.  · You have trouble breathing.  This information is not intended to replace advice given to you by your health care provider. Make sure you discuss any questions you have with your health care provider.  Document Released: 12/18/2006 Document Revised: 07/14/2017 Document Reviewed: 02/10/2017  [employer] Interactive Patient Education © 2018 [employer]       Patient Education   Hypomagnesemia (Low Magnesium level)  Hypomagnesemia is a condition in which the level of magnesium in the blood is low. Magnesium is a mineral that is found in many foods. It is used in many different processes in the body. Hypomagnesemia can affect every organ in the body. It can cause life-threatening problems.  What are the causes?  Causes of hypomagnesemia include:  · Not getting enough magnesium in your diet.  · Malnutrition.  · Problems with absorbing magnesium from the intestines.  · Dehydration.  · Alcohol abuse.  · Vomiting.  · Severe diarrhea.  · Some medicines, including medicines that make you urinate more.  · Certain diseases, such as kidney disease, diabetes, and overactive thyroid.  What are the signs or symptoms?  · Involuntary shaking or  trembling of a body part (tremor).  · Confusion.  · Muscle weakness.  · Sensitivity to light, sound, and touch.  · Psychiatric issues, such as depression, irritability, or psychosis.  · Sudden tightening of muscles (muscle spasms).  · Tingling in the arms and legs.  · A feeling of fluttering of the heart.  These symptoms are more severe if magnesium levels drop suddenly.  How is this diagnosed?  To make a diagnosis, your health care provider will do a physical exam and order blood and urine tests.  How is this treated?  Treatment will depend on the cause and the severity of your condition. It may involve:  · A magnesium supplement. This can be taken in pill form. It can also be given through an IV tube. This is usually done if the condition is severe.  · Changes to your diet. You may be directed to eat foods that have a lot of magnesium, such as green leafy vegetables, peas, beans, and nuts.  · Eliminating alcohol from your diet.  Follow these instructions at home:  · Include foods with magnesium in your diet. Foods that are rich in magnesium include green vegetables, beans, nuts and seeds, and whole grains.  · Take medicines only as directed by your health care provider.  · Take magnesium supplements if your health care provider instructs you to do that. Take them as directed.  · Have your magnesium levels monitored as directed by your health care provider.  · When you are active, drink fluids that contain electrolytes.  · Keep all follow-up visits as directed by your health care provider. This is important.  Contact a health care provider if:  · You get worse instead of better.  · Your symptoms return.  Get help right away if:  · Your symptoms are severe.  This information is not intended to replace advice given to you by your health care provider. Make sure you discuss any questions you have with your health care provider.  Document Released: 09/13/2006 Document Revised: 05/25/2017 Document Reviewed:  08/03/2015  [employer] Interactive Patient Education © 2018 [employer]

## 2018-11-16 NOTE — TELEPHONE ENCOUNTER
Called patient with rising creatinine levels. Patient will get fluids next week again.      Edda Cummings PA-C

## 2018-11-18 PROBLEM — D70.9 NEUTROPENIC FEVER (CMS/HCC)  (CMS/HCC): Status: RESOLVED | Noted: 2018-11-01 | Resolved: 2018-11-18

## 2018-11-18 PROBLEM — R50.81 NEUTROPENIC FEVER (CMS/HCC)  (CMS/HCC): Status: RESOLVED | Noted: 2018-11-01 | Resolved: 2018-11-18

## 2018-11-18 PROBLEM — E87.6 HYPOKALEMIA: Status: RESOLVED | Noted: 2018-11-05 | Resolved: 2018-11-18

## 2018-11-19 ENCOUNTER — TELEPHONE (OUTPATIENT)
Dept: GYNECOLOGIC ONCOLOGY | Facility: CLINIC | Age: 73
End: 2018-11-19

## 2018-11-19 ENCOUNTER — OFFICE VISIT (OUTPATIENT)
Dept: GYNECOLOGIC ONCOLOGY | Facility: CLINIC | Age: 73
End: 2018-11-19
Attending: OBSTETRICS & GYNECOLOGY
Payer: MEDICARE

## 2018-11-19 ENCOUNTER — HOSPITAL ENCOUNTER (OUTPATIENT)
Dept: INPATIENT UNIT | Facility: HOSPITAL | Age: 73
Setting detail: INFUSION SERIES
Discharge: HOME | End: 2018-11-19
Attending: OBSTETRICS & GYNECOLOGY
Payer: MEDICARE

## 2018-11-19 ENCOUNTER — HOSPITAL ENCOUNTER (OUTPATIENT)
Dept: RADIATION ONCOLOGY | Facility: HOSPITAL | Age: 73
Discharge: HOME | End: 2018-11-19
Payer: MEDICARE

## 2018-11-19 VITALS
OXYGEN SATURATION: 97 % | DIASTOLIC BLOOD PRESSURE: 77 MMHG | HEART RATE: 101 BPM | RESPIRATION RATE: 18 BRPM | SYSTOLIC BLOOD PRESSURE: 161 MMHG | TEMPERATURE: 97.9 F

## 2018-11-19 VITALS
WEIGHT: 171.4 LBS | BODY MASS INDEX: 33.65 KG/M2 | DIASTOLIC BLOOD PRESSURE: 84 MMHG | SYSTOLIC BLOOD PRESSURE: 150 MMHG | HEIGHT: 60 IN | RESPIRATION RATE: 18 BRPM | HEART RATE: 88 BPM

## 2018-11-19 DIAGNOSIS — D69.6 THROMBOCYTOPENIA (CMS/HCC): ICD-10-CM

## 2018-11-19 DIAGNOSIS — C52 VAGINAL CANCER (CMS/HCC): ICD-10-CM

## 2018-11-19 DIAGNOSIS — C77.4 SECONDARY MALIGNANCY OF INGUINAL LYMPH NODES (CMS/HCC): ICD-10-CM

## 2018-11-19 DIAGNOSIS — E86.0 DEHYDRATION DUE TO RADIATION: ICD-10-CM

## 2018-11-19 DIAGNOSIS — C52 VAGINAL CANCER (CMS/HCC): Primary | ICD-10-CM

## 2018-11-19 DIAGNOSIS — E86.0 DEHYDRATION: ICD-10-CM

## 2018-11-19 DIAGNOSIS — R79.89 ELEVATED SERUM CREATININE: ICD-10-CM

## 2018-11-19 DIAGNOSIS — R79.0 LOW MAGNESIUM LEVEL: ICD-10-CM

## 2018-11-19 LAB
ANION GAP SERPL CALC-SCNC: 12 MEQ/L (ref 3–15)
ARIA ZRC COURSE ID: 1
ARIA ZRC COURSE INTENT: NORMAL
ARIA ZRC COURSE START DATE: NORMAL
ARIA ZRC TREATMENT DATES: NORMAL
ARIA ZRC TREATMENT ELAPSED DAYS: NORMAL
ARIA ZRP FRACTIONS TREATED TO DATE: NORMAL
ARIA ZRP FRACTIONS TREATED TO DATE: NORMAL
ARIA ZRP PLAN ID: NORMAL
ARIA ZRP PLAN ID: NORMAL
ARIA ZRP PRESCRIBED DOSE CGY: 2000
ARIA ZRP PRESCRIBED DOSE CGY: 5000
ARIA ZRP PRESCRIBED DOSE PER FRACTION: 2
ARIA ZRP PRESCRIBED DOSE PER FRACTION: 2
ARIA ZRR REFERENCE POINT ID: NORMAL
BASOPHILS # BLD: 0.01 K/UL (ref 0.01–0.1)
BASOPHILS NFR BLD: 0.2 %
BUN SERPL-MCNC: 13 MG/DL (ref 8–20)
CALCIUM SERPL-MCNC: 8.6 MG/DL (ref 8.9–10.3)
CHLORIDE SERPL-SCNC: 103 MEQ/L (ref 98–109)
CO2 SERPL-SCNC: 22 MEQ/L (ref 22–32)
CREAT SERPL-MCNC: 1.5 MG/DL (ref 0.6–1.1)
DIFFERENTIAL METHOD BLD: ABNORMAL
EOSINOPHIL # BLD: 0 K/UL (ref 0.04–0.36)
EOSINOPHIL NFR BLD: 0 %
ERYTHROCYTE [DISTWIDTH] IN BLOOD BY AUTOMATED COUNT: 16 % (ref 11.7–14.4)
GFR SERPL CREATININE-BSD FRML MDRD: 33.9 ML/MIN/1.73M*2
GLUCOSE SERPL-MCNC: 110 MG/DL (ref 70–99)
HCT VFR BLDCO AUTO: 22.2 % (ref 35–45)
HGB BLD-MCNC: 7.7 G/DL (ref 11.8–15.7)
IMM GRANULOCYTES # BLD AUTO: 0.16 K/UL (ref 0–0.08)
IMM GRANULOCYTES NFR BLD AUTO: 2.6 %
LYMPHOCYTES # BLD: 0.87 K/UL (ref 1.2–3.5)
LYMPHOCYTES NFR BLD: 13.9 %
MAGNESIUM SERPL-MCNC: 1.3 MG/DL (ref 1.8–2.5)
MCH RBC QN AUTO: 30.6 PG (ref 28–33.2)
MCHC RBC AUTO-ENTMCNC: 34.7 G/DL (ref 32.2–35.5)
MCV RBC AUTO: 88.1 FL (ref 83–98)
MONOCYTES # BLD: 0.79 K/UL (ref 0.28–0.8)
MONOCYTES NFR BLD: 12.6 %
NEUTROPHILS # BLD: 4.42 K/UL (ref 1.7–7)
NEUTS SEG NFR BLD: 70.7 %
NRBC BLD-RTO: 0 %
PDW BLD AUTO: 9.5 FL (ref 9.4–12.3)
PLATELET # BLD AUTO: 250 K/UL (ref 150–369)
POTASSIUM SERPL-SCNC: 4.7 MEQ/L (ref 3.6–5.1)
RBC # BLD AUTO: 2.52 M/UL (ref 3.93–5.22)
SODIUM SERPL-SCNC: 137 MEQ/L (ref 136–144)
WBC # BLD AUTO: 6.25 K/UL (ref 3.8–10.5)

## 2018-11-19 PROCEDURE — 96365 THER/PROPH/DIAG IV INF INIT: CPT

## 2018-11-19 PROCEDURE — 63600000 HC DRUGS/DETAIL CODE: Performed by: PHYSICIAN ASSISTANT

## 2018-11-19 PROCEDURE — 25800000 HC PHARMACY IV SOLUTIONS: Performed by: PHYSICIAN ASSISTANT

## 2018-11-19 PROCEDURE — 80048 BASIC METABOLIC PNL TOTAL CA: CPT

## 2018-11-19 PROCEDURE — 77386 HC IMRT DELIVERY COMPLEX: CPT | Performed by: RADIOLOGY

## 2018-11-19 PROCEDURE — 99215 OFFICE O/P EST HI 40 MIN: CPT | Performed by: OBSTETRICS & GYNECOLOGY

## 2018-11-19 PROCEDURE — 96360 HYDRATION IV INFUSION INIT: CPT

## 2018-11-19 PROCEDURE — 83735 ASSAY OF MAGNESIUM: CPT

## 2018-11-19 PROCEDURE — 36415 COLL VENOUS BLD VENIPUNCTURE: CPT

## 2018-11-19 PROCEDURE — 85025 COMPLETE CBC W/AUTO DIFF WBC: CPT

## 2018-11-19 PROCEDURE — 96367 TX/PROPH/DG ADDL SEQ IV INF: CPT

## 2018-11-19 PROCEDURE — 96361 HYDRATE IV INFUSION ADD-ON: CPT

## 2018-11-19 RX ADMIN — ONDANSETRON HYDROCHLORIDE: 2 INJECTION, SOLUTION INTRAMUSCULAR; INTRAVENOUS at 09:36

## 2018-11-19 RX ADMIN — POTASSIUM CHLORIDE 500 ML/HR: 2 INJECTION, SOLUTION, CONCENTRATE INTRAVENOUS at 10:01

## 2018-11-19 NOTE — LETTER
November 19, 2018    Patient: Raegan Young   YOB: 1945   Date of Visit: 11/19/2018       Dear Dr. Lockhart:    The patient is seen back at the [facility] today in follow up with regard to her   1. Vaginal cancer (CMS/HCC) (HCC)    2. Thrombocytopenia (CMS/HCC) (HCC)    3. Dehydration due to radiation    4. Low magnesium level    5. Elevated serum creatinine    6. 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 Below is my assessment and plan of care.               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  Check basic metabolic panel today.         Relevant Orders    Basic metabolic panel    Thrombocytopenia (CMS/HCC) (HCC)    Overview     Platelets 81 on 11/12/18         Current Assessment & Plan     CBC today         Relevant Orders    CBC and differential                 Sincerely,  Trev Jimenez MD  CC: MD Akbar Mueller MD

## 2018-11-19 NOTE — PROGRESS NOTES
Patient ambulated to infusion room. Patients lab sent to lab. Pharmacy notified to release zofran and IV fluids. Zofran hung. IV fluids with potassium and magnesium hung, when bag scanned it scanned to infuse 500ml, order double checked and pharmacy called to inform that 1000mls were ordered. Pharmacy stated they would change order and send new label. Prachi gleason [facility] called back and stated order was correct. Fluids were hung to patient will receive 1000mls over two hours.

## 2018-11-19 NOTE — ASSESSMENT & PLAN NOTE
Complete resolution of left vulvar mass on exam.  Patient will to return in 2 weeks for follow-up exam and decision regarding excision of the left groin nodes per

## 2018-11-19 NOTE — PROGRESS NOTES
Raegan Young presents to the office for followup of        Vaginal cancer (CMS/HCC) (HCC)    9/21/2018 Initial Diagnosis     Vaginal cancer (CMS/HCC) (HCC)       9/27/2018 -  Radiation Therapy     IMRT concurrent Cisplatin complicated by N/V, dehydration, thrombocytopenia febrile neutropenia        Finishing RT tomorrow.  Still with loose bowel movements, no nausea and vomiting.  No fevers or chills.    Past Medical History:   Diagnosis Date   • Disease of thyroid gland    • Diverticulitis    • Hypertension    • Vaginal cancer (CMS/HCC) ([facility])      Past Surgical History:   Procedure Laterality Date   • CHOLECYSTECTOMY     • DILATION AND CURETTAGE OF UTERUS  02/2018   • ROTATOR CUFF REPAIR Right      Patient is here today for follow up and reports no acute problem at today's visit. Denies any nashua, vomiting, diarrhea, constipation, neuropathy, vaginal bleeding,fevers, chills,,coughing, wheezing, sob, chest pain, palpitations, abd/pelvic pain, bloating, reports mild burning on vulva and is relieved with Aquaphor. Patient reports she is drinking more water and has a good appetite.    Current Outpatient Prescriptions:   •  benazepril (LOTENSIN) 10 mg tablet, Take 10 mg by mouth daily., Disp: , Rfl:   •  bumetanide (BUMEX) 1 mg tablet, Take 1 mg by mouth daily., Disp: , Rfl:   •  ciprofloxacin (CIPRO) 500 mg tablet, Take 1 tablet (500 mg total) by mouth every 12 (twelve) hours., Disp: 19 tablet, Rfl: 0  •  cranberry conc-C-bacillus coag (AZO CRANBERRY + PROBIOTIC) 250-30-50 mg-mg-million tablet, Take by mouth daily., Disp: , Rfl:   •  diphenoxylate-atropine (LOMOTIL) 2.5-0.025 mg per tablet, TAKE 1 TABLET 4 TIMES DAILY AS NEEDED FOR SEVERE DIARRHEA, Disp: , Rfl: 0  •  levothyroxine (SYNTHROID) 88 mcg tablet, Take 88 mcg by mouth once daily., Disp: , Rfl: 3  •  loperamide (IMODIUM) 2 mg capsule, Take 1 capsule (2 mg total) by mouth as needed for diarrhea., Disp: 30 capsule, Rfl: 0  •  magnesium chloride 71.5 mg  tablet,delayed release (DR/EC), Take 1 tablet by mouth 2 (two) times a day., Disp: 60 tablet, Rfl: 1  •  omega-3 acid ethyl esters (LOVAZA) 1 gram capsule, take 2 capsule by oral route 2 times every day, Disp: , Rfl:   •  pantoprazole (PROTONIX) 40 mg EC tablet, 40 mg., Disp: , Rfl:   •  potassium chloride (KLOR-CON) 20 mEq CR tablet, Take 1 tablet (20 mEq total) by mouth 2 (two) times a day., Disp: 60 tablet, Rfl: 0  •  rutin/hesp/bioflav/C/[id number] (BIOFLEX ORAL), Take 2 capsules by mouth daily., Disp: , Rfl:   No known allergies  Cancer-related family history is negative for Breast cancer, Cancer, Colon cancer, and Ovarian cancer.   reports that she quit smoking about 20 years ago. She started smoking about 61 years ago. She has a 40.00 pack-year smoking history. She has never used smokeless tobacco. She reports that she does not drink alcohol or use drugs.  ROS: Negative in all systems with the exception of the above    Physical examination: Well-developed female in no apparent distress  Vitals:    11/19/18 0830   BP: (!) 150/84   Pulse: 88   Resp: 18     Body mass index is 33.47 kg/m².    Vitals: BP: 150/84  Temp: 35.6 °C (96.1 °F)  Temp Source: Oral  Heart Rate: 88  Resp: 18  SpO2: 98 %  Height: 152.4 cm (5')  Weight: 77.7 kg (171 lb 6.4 oz) (11/16 1000-11/19 0830)  HEENT: Normocephalic, atraumatic, nonicteric sclera  Neck: Supple no masses, thyroid normal nontender  Lymphatic: No supraclavicular adenopathy, no palpable masses right groin, left groin with 2 cm firm lymph node (was 4 cm)  Heart: Regular rate no murmurs  Lungs: Clear to auscultation with good respiratory effort  Extremities: No clubbing, cyanosis, edema  Neuro: Oriented ×3 with normal mood and affect  Abdomen: Soft, nontender, nondistended. No hepatosplenomegaly. No rebound or guarding.  Gynecologic: Normal vulva vagina urethra meatus bladder. Normal cervix, uterus, no cervical motion tenderness, normal bilateral adnexa  Rectovaginal: no  masses/nodularity    CBC Results       11/12/18 11/08/18 11/07/18                    0829 0801 0743         WBC 6.99 5.78 5.64         RBC 3.01 (L) 2.87 (L) 3.02 (L)         HGB 9.1 (L) 8.5 (L) 9.0 (L)         HCT 25.7 (L) 24.3 (L) 25.0 (L)         MCV 85.4 84.7 82.8 (L)         MCH 30.2 29.6 29.8         MCHC 35.4 35.0 36.0 (H)         PLT 81 (L) 29 (LL) 26 (LL)         Comment for PLT at 0829 on 11/12/18:  SPECIMEN QUALITY CHECKED. RESULTS OBTAINED AFTER VORTEXING TO ELIMINATE PLT CLUMPS    Comment for PLT at 0801 on 11/08/18:  CONSISTENT WITH PREVIOUS RESULTS. This result has been called to NAYELY ROPER by Alyssa Manuel on 11 08 18 at 08:15, and has been read back.     Comment for PLT at 0743 on 11/07/18:  This result has been called to CATALINO WEI by Camila Grewal on 11 07 18 at 08:43, and has been read back.             Results for orders placed or performed in visit on 11/19/18   Rad Onc Aria Session Summary   Result Value Ref Range    Course ID 1     Course Start Date 9/21/2018 12:47 PM     Treatment Elapsed Days 54 days as of [id number]     Course Intent Unknown     Treatment Dates       Course End Date: : @ --  First Treatment Date: 2018-09-26 @ 10:38  Last Treatment Date: 2018-11-19 @ 08:09      Reference Point ID PELVIS     PLAN ID CD PELVIS     Fractions Treated To Date 9 of 10     Prescribed Dose Per Fraction Gy 2     Prescribed Dose cGy 2,000     PLAN ID PELVIS     Fractions Treated To Date 25 of 25     Prescribed Dose Per Fraction Gy 2     Prescribed Dose cGy 5,000        Assessment/Plan     Assessment/Plan   Assesment:   Problem List Items Addressed This Visit     Vaginal cancer (CMS/HCC) (HCC) - Primary    Overview     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  Check basic metabolic panel today.         Relevant Orders    Basic metabolic panel    Thrombocytopenia (CMS/HCC) (HCC)    Overview     Platelets 81 on 11/12/18         Current Assessment & Plan     CBC today         Relevant Orders    CBC and differential

## 2018-11-19 NOTE — PROGRESS NOTES
Patients infusion completed without difficulties. Renee  From Dr. Jimenez's office notified of lab results from today draw. Discharge instructions reviewed with patient. Patient ambulated to first floor

## 2018-11-20 ENCOUNTER — HOSPITAL ENCOUNTER (OUTPATIENT)
Dept: RADIATION ONCOLOGY | Facility: HOSPITAL | Age: 73
Setting detail: RADIATION/ONCOLOGY SERIES
Discharge: HOME | End: 2018-11-20
Attending: RADIOLOGY
Payer: MEDICARE

## 2018-11-20 DIAGNOSIS — C52 VAGINAL CANCER (CMS/HCC): Primary | ICD-10-CM

## 2018-11-20 PROCEDURE — 77386 HC IMRT DELIVERY COMPLEX: CPT | Performed by: RADIOLOGY

## 2018-11-20 NOTE — PROGRESS NOTES
Patient ID:  Raegan Young is a 73 y.o. female.    Referring Physician:   Trev Jimenez MD    Primary Care Provider:  Luan Lockhart MD     [facility]   Patient Care Team     Relationship Specialty Notifications Start End   Luan Lockhart MD PCP - General   12/30/17    Venkatesh Travis MD Consulting Physician Gynecology  9/12/18    Akbar Romero MD Radiation Oncologist Radiation Oncology  9/21/18    Trev Jimenez MD Consulting Physician Gynecologic Oncology  9/21/18    Tereza Dhillon, RN Registered Nurse   10/1/18    Cary Oliver, RN Registered Nurse   10/16/18    Radha Ko    11/6/18          Cancer Staging Information:  Cancer Staging  Vaginal cancer (CMS/HCC) (HCC)  Staging form: Vagina, AJCC 8th Edition  - Clinical stage from 9/12/2018: FIGO Stage III (cT3, cN1, cM0) - Signed by Trev Jimenez MD on 9/21/2018      Raegan  Is being sent from Dr. Jimenez office . Raegan has had vaginal spotting off and on x 1 year. D and C x  were neg. the patient had seen Dr. Travis on examination and found to have extensive left-sided vaginal cancer with palpable groin nodes on the left.Raegan had a pet scan positive for left vaginal cancer also with 4 cm necrotic left groin node.S/P BX on 9/14.  I had demonstrated a 4.6 x 2.4 x 3.5 cm enhancing mass in left inguinal adenopathy measuring 3.9 x 3.7 cm.  Examination your office demonstrated a fixed tumor to the left pelvic sidewall.  Is now being sent here to discuss potential concurrent radiation chemo as definitive therapy.  Denies bleeding pain or discharge.  She had biopsy/cytology showing squamous cell carcinoma.        TREATMENT PLAN SUMMARY:    Radiation Therapy   Component Value Date    ARIATXDATES  11/20/2018     Course End Date: : @ --  First Treatment Date: 2018-09-26 @ 10:38  Last Treatment Date: 2018-11-20 @ 08:55      TXELAPSEDDAY 55 days as of [id number] 11/20/2018    COURSEID 1 11/20/2018    PLANID PELVIS 11/20/2018    PLANID CD  PELVIS 11/20/2018    PRESCRIBEDDO 2 11/20/2018    PRESCRIBEDDO 2 11/20/2018    DOSAGEGIVENT 16.0000 10/05/2018    FRACTIONSTRE 25 of 25 11/20/2018    FRACTIONSTRE 10 of 10 11/20/2018       Subjective:        Patient doing well.  Saw Dr. Chu yesterday for speculum examination there was a complete response in the vagina.  She has a small residual lymph node in the left and I am going to boost this area with 3 fractions of electrons    Problem List:  Patient Active Problem List    Diagnosis Date Noted   • Thrombocytopenia (CMS/HCC) ([facility]) 11/01/2018   • Anemia 11/01/2018   • Dehydration 10/29/2018   • Dehydration due to radiation 10/29/2018   • Candida rash of groin 10/23/2018   • Elevated serum creatinine 10/23/2018   • Low magnesium level 10/23/2018   • Dysuria 10/02/2018   • Chemotherapy management, encounter for 09/25/2018   • Secondary malignancy of inguinal lymph nodes (CMS/HCC) ([facility]) 09/24/2018   • Vaginal cancer (CMS/HCC) ([facility]) 09/21/2018          Past Medical/Surgical History  Past Medical History:   Diagnosis Date   • Disease of thyroid gland    • Diverticulitis    • Hypertension    • Vaginal cancer (CMS/HCC) (HCC)      Past Surgical History:   Procedure Laterality Date   • CHOLECYSTECTOMY     • DILATION AND CURETTAGE OF UTERUS  02/2018   • ROTATOR CUFF REPAIR Right         Current Medications  Current Outpatient Prescriptions   Medication Sig Dispense Refill   • benazepril (LOTENSIN) 10 mg tablet Take 10 mg by mouth daily.     • bumetanide (BUMEX) 1 mg tablet Take 1 mg by mouth daily.     • ciprofloxacin (CIPRO) 500 mg tablet Take 1 tablet (500 mg total) by mouth every 12 (twelve) hours. 19 tablet 0   • cranberry conc-C-bacillus coag (AZO CRANBERRY + PROBIOTIC) 250-30-50 mg-mg-million tablet Take by mouth daily.     • diphenoxylate-atropine (LOMOTIL) 2.5-0.025 mg per tablet TAKE 1 TABLET 4 TIMES DAILY AS NEEDED FOR SEVERE DIARRHEA  0   • levothyroxine (SYNTHROID) 88 mcg tablet Take 88 mcg by mouth once  daily.  3   • loperamide (IMODIUM) 2 mg capsule Take 1 capsule (2 mg total) by mouth as needed for diarrhea. 30 capsule 0   • magnesium chloride 71.5 mg tablet,delayed release (DR/EC) Take 1 tablet by mouth 2 (two) times a day. 60 tablet 1   • omega-3 acid ethyl esters (LOVAZA) 1 gram capsule take 2 capsule by oral route 2 times every day     • pantoprazole (PROTONIX) 40 mg EC tablet 40 mg.     • potassium chloride (KLOR-CON) 20 mEq CR tablet Take 1 tablet (20 mEq total) by mouth 2 (two) times a day. 60 tablet 0   • rutin/hesp/bioflav/C/uwbgcx847 (BIOFLEX ORAL) Take 2 capsules by mouth daily.       No current facility-administered medications for this encounter.        Allergies  Allergies   Allergen Reactions   • No Known Allergies        Physical Exam:    There were no vitals taken for this visit.      Performance Status:       PAIN ASSESSMENT:  Score    Location    Pain Intervention        Plan:    Continue for 3 more treatments before ending    Akbar Romero MD

## 2018-11-21 ENCOUNTER — HOSPITAL ENCOUNTER (OUTPATIENT)
Dept: INPATIENT UNIT | Facility: HOSPITAL | Age: 73
Setting detail: INFUSION SERIES
Discharge: HOME | End: 2018-11-21
Attending: OBSTETRICS & GYNECOLOGY
Payer: MEDICARE

## 2018-11-21 VITALS
SYSTOLIC BLOOD PRESSURE: 159 MMHG | TEMPERATURE: 96 F | HEIGHT: 60 IN | DIASTOLIC BLOOD PRESSURE: 76 MMHG | WEIGHT: 171.4 LBS | RESPIRATION RATE: 16 BRPM | BODY MASS INDEX: 33.65 KG/M2 | HEART RATE: 89 BPM | OXYGEN SATURATION: 98 %

## 2018-11-21 DIAGNOSIS — C52 VAGINAL CANCER (CMS/HCC): ICD-10-CM

## 2018-11-21 DIAGNOSIS — E86.0 DEHYDRATION: ICD-10-CM

## 2018-11-21 PROCEDURE — 77321 SPECIAL TELETX PORT PLAN: CPT | Performed by: RADIOLOGY

## 2018-11-21 PROCEDURE — 96367 TX/PROPH/DG ADDL SEQ IV INF: CPT

## 2018-11-21 PROCEDURE — 96365 THER/PROPH/DIAG IV INF INIT: CPT

## 2018-11-21 PROCEDURE — 96366 THER/PROPH/DIAG IV INF ADDON: CPT

## 2018-11-21 PROCEDURE — 25800000 HC PHARMACY IV SOLUTIONS: Performed by: PHYSICIAN ASSISTANT

## 2018-11-21 PROCEDURE — 63600000 HC DRUGS/DETAIL CODE: Performed by: PHYSICIAN ASSISTANT

## 2018-11-21 PROCEDURE — 77334 RADIATION TREATMENT AID(S): CPT | Performed by: RADIOLOGY

## 2018-11-21 PROCEDURE — 96361 HYDRATE IV INFUSION ADD-ON: CPT

## 2018-11-21 RX ORDER — METHYLPREDNISOLONE 4 MG/1
TABLET ORAL SEE ADMIN INSTRUCTIONS
Refills: 0 | COMMUNITY
Start: 2018-11-16 | End: 2019-01-09

## 2018-11-21 RX ADMIN — ONDANSETRON HYDROCHLORIDE: 2 INJECTION, SOLUTION INTRAMUSCULAR; INTRAVENOUS at 09:48

## 2018-11-21 RX ADMIN — POTASSIUM CHLORIDE 500 ML/HR: 2 INJECTION, SOLUTION, CONCENTRATE INTRAVENOUS at 09:48

## 2018-11-21 NOTE — PROGRESS NOTES
0820 pt received in room 603 for IV hydration and IV Zofran for dehydration due to XRT; assessment, VSS; pt difficult IV stick; L wrist 24g ML inserted per policy by AMINATA Crawford RN, no labs needed. Resting in bed, awaiting meds from pharmacy.  0948 IVF 1L NSS with 20meq KCl and Mag sulfate 1 gram started, to infuse over 2 hours, and 16 mg Zofran IVPB started as well. Pt resting in bed, c/o nausea.  1015 pt stated relief of nausea, resting in bed, IVF infusing.  1100 IVF infusing, pt sleeping in bed  1200 IVF complete, pt denies complaints and stated she feels much better; L wrist ML with good blood return, flushed with 10cc NSS and d/c'd. Voided in BR. VSS. AVS printed and reviewed with pt, all questions answered. Pt d/c'd to home with steady gait, denied any complaints at time of d/c.

## 2018-11-21 NOTE — PATIENT INSTRUCTIONS
Discharge Instructions Medical SPU    1. Review your medication information sheet and call your provider if you have any questions    2. If you have the following--MED lock, PICC line or Port: contact your provider if signs of redness, drainage, swelling, or new onset of pain at the site.    3. If you experience any of the following symptoms, you should call your family physician of if he/she is unavailable, go to the Emergency Room:     A) Trouble breathing   B) Rash/hives   C) Chills or temperature above 100.5 degrees   D) Pain, redness or drainage at any injection site

## 2018-11-26 ENCOUNTER — HOSPITAL ENCOUNTER (OUTPATIENT)
Dept: RADIATION ONCOLOGY | Facility: HOSPITAL | Age: 73
Setting detail: RADIATION/ONCOLOGY SERIES
Discharge: HOME | End: 2018-11-26
Attending: RADIOLOGY
Payer: MEDICARE

## 2018-11-26 LAB
ARIA ZRC COURSE ID: 1
ARIA ZRC COURSE INTENT: NORMAL
ARIA ZRC COURSE START DATE: NORMAL
ARIA ZRC TREATMENT DATES: NORMAL
ARIA ZRC TREATMENT ELAPSED DAYS: NORMAL
ARIA ZRP FRACTIONS TREATED TO DATE: NORMAL
ARIA ZRP PLAN ID: NORMAL
ARIA ZRP PRESCRIBED DOSE CGY: 900
ARIA ZRP PRESCRIBED DOSE PER FRACTION: 3
ARIA ZRR DOSAGE GIVEN TO DATE: 3
ARIA ZRR REFERENCE POINT ID: NORMAL
ARIA ZRR SESSION DOSAGE GIVEN: 3

## 2018-11-26 PROCEDURE — 77280 THER RAD SIMULAJ FIELD SMPL: CPT | Performed by: RADIOLOGY

## 2018-11-26 PROCEDURE — 77412 RADIATION TX DELIVERY LVL 3: CPT | Performed by: RADIOLOGY

## 2018-11-27 ENCOUNTER — HOSPITAL ENCOUNTER (OUTPATIENT)
Dept: RADIATION ONCOLOGY | Facility: HOSPITAL | Age: 73
Setting detail: RADIATION/ONCOLOGY SERIES
Discharge: HOME | End: 2018-11-27
Attending: RADIOLOGY
Payer: MEDICARE

## 2018-11-27 LAB
ARIA ZRC COURSE ID: 1
ARIA ZRC COURSE INTENT: NORMAL
ARIA ZRC COURSE START DATE: NORMAL
ARIA ZRC TREATMENT DATES: NORMAL
ARIA ZRC TREATMENT ELAPSED DAYS: NORMAL
ARIA ZRP FRACTIONS TREATED TO DATE: NORMAL
ARIA ZRP PLAN ID: NORMAL
ARIA ZRP PRESCRIBED DOSE CGY: 900
ARIA ZRP PRESCRIBED DOSE PER FRACTION: 3
ARIA ZRR DOSAGE GIVEN TO DATE: 6
ARIA ZRR REFERENCE POINT ID: NORMAL
ARIA ZRR SESSION DOSAGE GIVEN: 3

## 2018-11-27 PROCEDURE — 77386 HC IMRT DELIVERY COMPLEX: CPT | Performed by: RADIOLOGY

## 2018-11-27 PROCEDURE — 77412 RADIATION TX DELIVERY LVL 3: CPT | Performed by: RADIOLOGY

## 2018-11-28 ENCOUNTER — HOSPITAL ENCOUNTER (OUTPATIENT)
Dept: INPATIENT UNIT | Facility: HOSPITAL | Age: 73
Setting detail: INFUSION SERIES
Discharge: HOME | End: 2018-11-28
Attending: OBSTETRICS & GYNECOLOGY
Payer: MEDICARE

## 2018-11-28 ENCOUNTER — HOSPITAL ENCOUNTER (OUTPATIENT)
Dept: RADIATION ONCOLOGY | Facility: HOSPITAL | Age: 73
Setting detail: RADIATION/ONCOLOGY SERIES
Discharge: HOME | End: 2018-11-28
Attending: RADIOLOGY
Payer: MEDICARE

## 2018-11-28 VITALS
WEIGHT: 171 LBS | HEIGHT: 60 IN | HEART RATE: 76 BPM | BODY MASS INDEX: 33.57 KG/M2 | RESPIRATION RATE: 16 BRPM | DIASTOLIC BLOOD PRESSURE: 69 MMHG | SYSTOLIC BLOOD PRESSURE: 136 MMHG | OXYGEN SATURATION: 97 % | TEMPERATURE: 98 F

## 2018-11-28 DIAGNOSIS — C52 VAGINAL CANCER (CMS/HCC): ICD-10-CM

## 2018-11-28 DIAGNOSIS — C52 VAGINAL CANCER (CMS/HCC): Primary | ICD-10-CM

## 2018-11-28 DIAGNOSIS — E86.0 DEHYDRATION: ICD-10-CM

## 2018-11-28 LAB
ARIA ZRC COURSE ID: 1
ARIA ZRC COURSE INTENT: NORMAL
ARIA ZRC COURSE START DATE: NORMAL
ARIA ZRC TREATMENT DATES: NORMAL
ARIA ZRC TREATMENT ELAPSED DAYS: NORMAL
ARIA ZRP FRACTIONS TREATED TO DATE: NORMAL
ARIA ZRP PLAN ID: NORMAL
ARIA ZRP PRESCRIBED DOSE CGY: 900
ARIA ZRP PRESCRIBED DOSE PER FRACTION: 3
ARIA ZRR DOSAGE GIVEN TO DATE: 9
ARIA ZRR REFERENCE POINT ID: NORMAL
ARIA ZRR SESSION DOSAGE GIVEN: 3

## 2018-11-28 PROCEDURE — 96361 HYDRATE IV INFUSION ADD-ON: CPT

## 2018-11-28 PROCEDURE — 77386 HC IMRT DELIVERY COMPLEX: CPT | Performed by: RADIOLOGY

## 2018-11-28 PROCEDURE — 96375 TX/PRO/DX INJ NEW DRUG ADDON: CPT

## 2018-11-28 PROCEDURE — 63600000 HC DRUGS/DETAIL CODE: Performed by: PHYSICIAN ASSISTANT

## 2018-11-28 PROCEDURE — 96365 THER/PROPH/DIAG IV INF INIT: CPT

## 2018-11-28 PROCEDURE — 96367 TX/PROPH/DG ADDL SEQ IV INF: CPT

## 2018-11-28 PROCEDURE — 96366 THER/PROPH/DIAG IV INF ADDON: CPT

## 2018-11-28 PROCEDURE — 25800000 HC PHARMACY IV SOLUTIONS: Performed by: PHYSICIAN ASSISTANT

## 2018-11-28 RX ADMIN — POTASSIUM CHLORIDE 500 ML/HR: 2 INJECTION, SOLUTION, CONCENTRATE INTRAVENOUS at 11:29

## 2018-11-28 RX ADMIN — ONDANSETRON HYDROCHLORIDE: 2 INJECTION, SOLUTION INTRAMUSCULAR; INTRAVENOUS at 12:28

## 2018-11-28 NOTE — PROGRESS NOTES
Referring physician    Trev Jimenez MD    Primary Care Provider    Luan Lockhart MD    Patient Care Team     Relationship Specialty Notifications Start End   Luan Lockhart MD PCP - General   12/30/17    Venkatesh Travis MD Consulting Physician Gynecology  9/12/18    Akbar Romero MD Radiation Oncologist Radiation Oncology  9/21/18    Trev Jimenez MD Consulting Physician Gynecologic Oncology  9/21/18    Tereza Dhillon, RN Registered Nurse   10/1/18    Cary Oliver, RN Registered Nurse   10/16/18    Radha Ko    11/6/18      aRegan  Is being sent from Dr. Jimenez office . Raegan has had vaginal spotting off and on x 1 year. D and C x  were neg. the patient had seen Dr. Travis on examination and found to have extensive left-sided vaginal cancer with palpable groin nodes on the left.Raegan had a pet scan positive for left vaginal cancer also with 4 cm necrotic left groin node.S/P BX on 9/14.  I had demonstrated a 4.6 x 2.4 x 3.5 cm enhancing mass in left inguinal adenopathy measuring 3.9 x 3.7 cm.  Examination your office demonstrated a fixed tumor to the left pelvic sidewall.  Is now being sent here to discuss potential concurrent radiation chemo as definitive therapy.  Denies bleeding pain or discharge.  She had biopsy/cytology showing squamous cell carcinoma.         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)   • Chemotherapy management, encounter for   • Dysuria   • Candida rash of groin   • Elevated serum creatinine   • Low magnesium level   • Dehydration   • Dehydration due to radiation   • Thrombocytopenia (CMS/HCC) (HCC)   • Anemia         Stage: Cancer Staging  Vaginal cancer (CMS/HCC) (HCC)  Staging form: Vagina,  AJCC 8th Edition  - Clinical stage from 9/12/2018: FIGO Stage III (cT3, cN1, cM0) - Signed by Trev Jimenez MD on 9/21/2018         Performance Status at the End of Treatment: No Data Recorded     Radiation Delivered  Radiation Therapy   Component Value Date    COURSEID 1 11/28/2018    PLANID LEFT NODE 11/28/2018    PRESCRIBEDDO 3 11/28/2018    DOSAGEGIVENT 9.0000 11/28/2018    FRACTIONSTRE 3 of 3 11/28/2018       We completed treatment to this nice patient to all gross tumor volume plus margin.  We did use 9 fields given the field size to treat her.  We had specifically avoided the bladder rectum femoral heads and small bowel.  Pretreatment verification using cone beam CT was utilized.    LABS:  Tumor MarkerNo results found for: , , PSA, , CEA, HCGQUANT       Chemotherapy  Treatment Details- Chemotherapy   Treatment goal Curative   Plan Name CISplatin with Concurrent Radiation, 7 Day Cycles - Cervical   Status Inactive   Start Date 9/25/2018   End Date 10/30/2018 (Planned)   Provider Trev Jimenez MD   IV Chemotherapy sodium chloride 0.9 % infusion 500 mL, 500 mL, intravenous, Once, 5 of 6 cycles    alteplase (CATHFLO ACTIVASE) injection 2 mg, 2 mg, intra-catheter, As needed, 5 of 6 cycles    sodium chloride 0.9 % 1,000 mL with potassium chloride 20 mEq, magnesium sulfate 1 g infusion, 500 mL/hr, intravenous, Once, 5 of 6 cycles    CISplatin (PLATINOL) 75 mg in sodium chloride 0.9 % 625 mL chemo IVPB, 40 mg/m2 = 75 mg, intravenous, Once, 5 of 6 cycles                       Treatment Details- Chemotherapy   Treatment goal [No plan goal]   Plan Name GENERAL INFUSION ORDERS FOR HYDRATION   Status Active   Start Date [No treatment day found]   End Date 11/21/2018   Provider Trev Jimenez MD   IV Chemotherapy sodium chloride 0.9 % 1,000 mL with potassium chloride 20 mEq, magnesium sulfate 1 g infusion, 500 mL/hr, intravenous, Continuous PRN, 1 of 1 cycle    ondansetron (ZOFRAN) 16 mg in sodium  chloride 0.9 % 50 mL IVPB, , intravenous, Once as needed, 1 of 1 cycle                       Treatment Details- Chemotherapy   Treatment goal [No plan goal]   Plan Name GENERAL INFUSION ORDERS FOR HYDRATION   Status Inactive   Start Date [No treatment day found]   End Date [No treatment day found]   Provider Trev Jimenez MD   IV Chemotherapy [No matching medication found in this treatment plan]                     Treatment Details- Chemotherapy   Treatment goal [No plan goal]   Plan Name GENERAL INFUSION ORDERS FOR HYDRATION   Status Inactive   Start Date [No treatment day found]   End Date [No treatment day found]   Provider Trev Jimenez MD   IV Chemotherapy [No matching medication found in this treatment plan]                     Treatment Details- Chemotherapy   Treatment goal [No plan goal]   Plan Name GENERAL INFUSION ORDERS FOR HYDRATION   Status Active   Start Date [No treatment day found]   End Date 11/19/2018   Provider Trev Jimenez MD   IV Chemotherapy sodium chloride 0.9 % 1,000 mL with potassium chloride 20 mEq, magnesium sulfate 1 g infusion, 500 mL/hr, intravenous, Continuous PRN, 1 of 1 cycle    ondansetron (ZOFRAN) 16 mg in sodium chloride 0.9 % 50 mL IVPB, , intravenous, Once as needed, 1 of 1 cycle                       Treatment Details- Chemotherapy   Treatment goal [No plan goal]   Plan Name GENERAL INFUSION ORDERS FOR HYDRATION   Status Active   Start Date [No treatment day found]   End Date 11/16/2018   Provider Trev Jimenez MD   IV Chemotherapy sodium chloride 0.9 % 1,000 mL with potassium chloride 20 mEq, magnesium sulfate 1 g infusion, 500 mL/hr, intravenous, Continuous PRN, 1 of 1 cycle    ondansetron (ZOFRAN) 16 mg in sodium chloride 0.9 % 50 mL IVPB, , intravenous, Once as needed, 1 of 1 cycle                           Plan for Follow-up:    We will see her back in 2 weeks.  The patient had complete response in the vagina on speculum examination and the left groin node has  shrunk considerably and will continue to respond.         Akbar Romero MD  11/28/2018 10:28 AM

## 2018-11-28 NOTE — PROGRESS NOTES
1045 pt received in room 604 for IVF and Zofran infusion due to radiation induced nausea/poor appetite/dehydration. Assessment, VSS. Family at bedside, today was pt's final radiation treatment. Pt states she is tired and nauseous, has been trying to increase her po intake as tolerated. No labs needed today. Pt difficult IV stick, L wrist 22 g ML inserted per policy with good blood return by DESIRAE Harrington RN@ 1125. IVF 1L NSS with 20 meq KCl and 1 gram Mag Sulfate started, to infuse over 2 hours. Pt resting in bed.  1228 Zofran 16 mg IVPB received from pharmacy and infused via L wr ML. Pt resting in bed.  1300 Pt resting in bed, tolerating po fluids well; stated nausea is relieved.  1350 IVF complete, VSS; pt stated she is feeling better. L wr ML with good blood return, flushed with 10cc NSS and d/c'd. AVS printed and reviewed with pt, all questions answered. D/c'd to home with steady gait, denied any complaints at time of d/c.

## 2018-11-29 ENCOUNTER — HOSPITAL ENCOUNTER (OUTPATIENT)
Dept: RADIOLOGY | Facility: HOSPITAL | Age: 73
Discharge: HOME | End: 2018-11-29
Attending: OBSTETRICS & GYNECOLOGY
Payer: MEDICARE

## 2018-11-29 VITALS — WEIGHT: 178 LBS | HEIGHT: 60 IN | BODY MASS INDEX: 34.95 KG/M2

## 2018-11-29 DIAGNOSIS — C77.4 SECONDARY MALIGNANCY OF INGUINAL LYMPH NODES (CMS/HCC): ICD-10-CM

## 2018-11-29 DIAGNOSIS — C52 VAGINAL CANCER (CMS/HCC): ICD-10-CM

## 2018-11-29 RX ORDER — FLUDEOXYGLUCOSE F18 300 MCI/ML
12.2 INJECTION INTRAVENOUS ONCE
Status: COMPLETED | OUTPATIENT
Start: 2018-11-29 | End: 2018-11-29

## 2018-11-29 RX ADMIN — FLUDEOXYGLUCOSE F18 12.2 MILLICURIE: 300 INJECTION INTRAVENOUS at 11:12

## 2018-11-30 ENCOUNTER — HOSPITAL ENCOUNTER (OUTPATIENT)
Dept: INPATIENT UNIT | Facility: HOSPITAL | Age: 73
Setting detail: INFUSION SERIES
Discharge: HOME | End: 2018-11-30
Attending: OBSTETRICS & GYNECOLOGY
Payer: MEDICARE

## 2018-11-30 VITALS
RESPIRATION RATE: 18 BRPM | OXYGEN SATURATION: 99 % | WEIGHT: 178 LBS | TEMPERATURE: 95.7 F | SYSTOLIC BLOOD PRESSURE: 146 MMHG | HEIGHT: 60 IN | DIASTOLIC BLOOD PRESSURE: 74 MMHG | HEART RATE: 92 BPM | BODY MASS INDEX: 34.95 KG/M2

## 2018-11-30 DIAGNOSIS — E86.0 DEHYDRATION: ICD-10-CM

## 2018-11-30 DIAGNOSIS — C52 VAGINAL CANCER (CMS/HCC): ICD-10-CM

## 2018-11-30 PROCEDURE — 96360 HYDRATION IV INFUSION INIT: CPT | Performed by: NURSE PRACTITIONER

## 2018-11-30 PROCEDURE — 96374 THER/PROPH/DIAG INJ IV PUSH: CPT | Performed by: NURSE PRACTITIONER

## 2018-11-30 PROCEDURE — 96367 TX/PROPH/DG ADDL SEQ IV INF: CPT | Performed by: NURSE PRACTITIONER

## 2018-11-30 PROCEDURE — 96365 THER/PROPH/DIAG IV INF INIT: CPT | Performed by: NURSE PRACTITIONER

## 2018-11-30 PROCEDURE — 96361 HYDRATE IV INFUSION ADD-ON: CPT | Performed by: NURSE PRACTITIONER

## 2018-11-30 PROCEDURE — 63600000 HC DRUGS/DETAIL CODE: Performed by: PHYSICIAN ASSISTANT

## 2018-11-30 PROCEDURE — 25800000 HC PHARMACY IV SOLUTIONS: Performed by: PHYSICIAN ASSISTANT

## 2018-11-30 RX ADMIN — POTASSIUM CHLORIDE 500 ML/HR: 2 INJECTION, SOLUTION, CONCENTRATE INTRAVENOUS at 12:33

## 2018-11-30 RX ADMIN — ONDANSETRON HYDROCHLORIDE: 2 INJECTION, SOLUTION INTRAMUSCULAR; INTRAVENOUS at 12:33

## 2018-12-03 LAB
ARIA ZRC COURSE ID: 1
ARIA ZRC COURSE INTENT: NORMAL
ARIA ZRC COURSE START DATE: NORMAL
ARIA ZRC TREATMENT DATES: NORMAL
ARIA ZRC TREATMENT ELAPSED DAYS: NORMAL
ARIA ZRP FRACTIONS TREATED TO DATE: NORMAL
ARIA ZRP PLAN ID: NORMAL
ARIA ZRP PRESCRIBED DOSE CGY: 2000
ARIA ZRP PRESCRIBED DOSE CGY: 5000
ARIA ZRP PRESCRIBED DOSE CGY: 900
ARIA ZRP PRESCRIBED DOSE PER FRACTION: 2
ARIA ZRP PRESCRIBED DOSE PER FRACTION: 2
ARIA ZRP PRESCRIBED DOSE PER FRACTION: 3
ARIA ZRR DOSAGE GIVEN TO DATE: 9
ARIA ZRR REFERENCE POINT ID: NORMAL
ARIA ZRR REFERENCE POINT ID: NORMAL
ARIA ZRR SESSION DOSAGE GIVEN: 3

## 2018-12-12 ENCOUNTER — HOSPITAL ENCOUNTER (OUTPATIENT)
Dept: RADIATION ONCOLOGY | Facility: HOSPITAL | Age: 73
Setting detail: RADIATION/ONCOLOGY SERIES
Discharge: HOME | End: 2018-12-12
Attending: RADIOLOGY
Payer: MEDICARE

## 2018-12-12 ENCOUNTER — OFFICE VISIT (OUTPATIENT)
Dept: GYNECOLOGIC ONCOLOGY | Facility: CLINIC | Age: 73
End: 2018-12-12
Attending: OBSTETRICS & GYNECOLOGY
Payer: MEDICARE

## 2018-12-12 VITALS
HEART RATE: 94 BPM | HEIGHT: 60 IN | BODY MASS INDEX: 33.1 KG/M2 | SYSTOLIC BLOOD PRESSURE: 150 MMHG | WEIGHT: 168.6 LBS | DIASTOLIC BLOOD PRESSURE: 83 MMHG

## 2018-12-12 DIAGNOSIS — C52 VAGINAL CANCER (CMS/HCC): Primary | ICD-10-CM

## 2018-12-12 DIAGNOSIS — Z12.39 BREAST CANCER SCREENING: ICD-10-CM

## 2018-12-12 PROCEDURE — 99215 OFFICE O/P EST HI 40 MIN: CPT | Performed by: OBSTETRICS & GYNECOLOGY

## 2018-12-12 NOTE — PROGRESS NOTES
Raegan Young presents to the office for followup of        Vaginal cancer (CMS/HCC) (HCC)    9/21/2018 Initial Diagnosis     Vaginal cancer (CMS/HCC) (HCC)       9/27/2018 -  Radiation Therapy     IMRT concurrent Cisplatin complicated by N/V, dehydration, thrombocytopenia febrile neutropenia        Patient recently completed radiation on 11/28/2018 with Dr. Romero.     Past Medical History:   Diagnosis Date   • Disease of thyroid gland    • Diverticulitis    • Hypertension    • Vaginal cancer (CMS/HCC) (HCC)      Past Surgical History:   Procedure Laterality Date   • CHOLECYSTECTOMY     • DILATION AND CURETTAGE OF UTERUS  02/2018   • ROTATOR CUFF REPAIR Right      Patient is here today for follow up post completion of radiation with Dr. Romero. Ms Young reports no acute problems at today's visit. Denies any nasuea, vomiting, diarrhea, constipation, neuropathy, vaginal bleeding,fevers, chills,coughing, wheezing, sob, chest pain, palpitations, abd/pelvic pain, bloating, reports mild burning on vulva and is relieved with Aquaphor. Patient reports she is drinking more water and has a good appetite.    Current Outpatient Prescriptions:   •  benazepril (LOTENSIN) 10 mg tablet, Take 10 mg by mouth daily., Disp: , Rfl:   •  diphenoxylate-atropine (LOMOTIL) 2.5-0.025 mg per tablet, TAKE 1 TABLET 4 TIMES DAILY AS NEEDED FOR SEVERE DIARRHEA, Disp: , Rfl: 0  •  levothyroxine (SYNTHROID) 88 mcg tablet, Take 88 mcg by mouth once daily., Disp: , Rfl: 3  •  methylPREDNISolone (MEDROL DOSEPACK) 4 mg tablet, See admin instr., Disp: , Rfl: 0  •  omega-3 acid ethyl esters (LOVAZA) 1 gram capsule, take 2 capsule by oral route 2 times every day, Disp: , Rfl:   •  pantoprazole (PROTONIX) 40 mg EC tablet, 40 mg., Disp: , Rfl:   No known allergies  Cancer-related family history is negative for Breast cancer, Cancer, Colon cancer, and Ovarian cancer.   reports that she quit smoking about 20 years ago. She started smoking about  61 years ago. She has a 40.00 pack-year smoking history. She has never used smokeless tobacco. She reports that she does not drink alcohol or use drugs.  ROS: Negative in all systems with the exception of the above    Physical examination: Well-developed female in no apparent distress  Vitals:    12/12/18 0925   BP: (!) 150/83   Pulse: 94     Body mass index is 32.93 kg/m².    Vitals: BP: 150/83  Heart Rate: 94  Height: 152.4 cm (5')  Weight: 76.5 kg (168 lb 9.6 oz) (12/12 0925)  HEENT: Normocephalic, atraumatic, nonicteric sclera  Neck: Supple no masses, thyroid normal nontender  Lymphatic: No supraclavicular adenopathy, no palpable masses right groin, left groin with 2 cm firm lymph node (was 4 cm)- feels dramatically smaller in size (grapesize)  Heart: Regular rate no murmurs  Lungs: Clear to auscultation with good respiratory effort  Extremities: No clubbing, cyanosis, edema  Neuro: Oriented ×3 with normal mood and affect  Abdomen: Soft, nontender, nondistended. No hepatosplenomegaly. No rebound or guarding  Gynecologic: Normal vulva vagina urethra meatus bladder. Normal cervix, uterus, no cervical motion tenderness, normal bilateral adnexa  Rectovaginal: no masses/nodularity    Imaging:  PET Scan 11/29/2018    The documented enlarged left inguinal node has decreased in size.  There is low  level metabolic uptake within the node, lesser than that of mediastinal blood  pool.  Clinical follow-up recommended.  Incidental 6 mm left upper lobe nodule noted, without associated hypermetabolism  but too small for accurate PET/CT characterization.  Recommend dedicated CT  imaging of the chest for further characterization/documentation.  Moderate coronary artery calcification.    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 Young is doing well reports no acute problems at todays visit. Discussed the use of possible vaginal dilator.  She has had a complete response of her vulva, and her lymph node is down to 1 cm   At this point lymph node appears and feels smaller(grapesize). Will re-examine in 4 weeks to reevaluate and discuss what next steps pending on results.             Other Visit Diagnoses     Breast cancer screening        Relevant Orders    BI SCREENING MAMMOGRAM BILATERAL               Trev Jimenez MD

## 2018-12-12 NOTE — PROGRESS NOTES
Consult Note      Referring Provider    Trev Jimenez MD    Primary Care Provider    Luan Lockhart MD    Care Team    Patient Care Team     Relationship Specialty Notifications Start End   Luan Lockhart MD PCP - General   12/30/17    Venkatesh Travis MD Consulting Physician Gynecology  9/12/18    Akbar Romero MD Radiation Oncologist Radiation Oncology  9/21/18    Trev Jimenez MD Consulting Physician Gynecologic Oncology  9/21/18    Tereza Dhillon, RN Registered Nurse   10/1/18    Cary Oliver, RN Registered Nurse   10/16/18    Radha Ko    11/6/18        Subjective    Raegan is doing well. She still has some fatigue and decreased appetite, but overall improved.  PET reviewed with patient and daughter, great response  Seeing Dr. Jimenez today for pelvic exam.    Raegan Young is a 73 y.o. female who was referred for Cancer Staging  Vaginal cancer (CMS/HCC) ([facility])  Staging form: Vagina, AJCC 8th Edition  - Clinical stage from 9/12/2018: FIGO Stage III (cT3, cN1, cM0) - Signed by Trev Jimenez MD on 9/21/2018      Radiation Therapy   Component Value Date    COURSEID 1 12/03/2018    PLANID PELVIS 12/03/2018    PLANID LEFT NODE 12/03/2018    PLANID CD PELVIS 12/03/2018    PRESCRIBEDDO 2 12/03/2018    PRESCRIBEDDO 3 12/03/2018    PRESCRIBEDDO 2 12/03/2018    DOSAGEGIVENT 9.0000 12/03/2018    FRACTIONSTRE 25 of 25 12/03/2018    FRACTIONSTRE 3 of 3 12/03/2018    FRACTIONSTRE 10 of 10 12/03/2018       HPI:     Raegan  Is being sent from Dr. Jimenez office . Raegan has had vaginal spotting off and on x 1 year. D and C x  were neg. the patient had seen Dr. Travis on examination and found to have extensive left-sided vaginal cancer with palpable groin nodes on the left.Raegan had a pet scan positive for left vaginal cancer also with 4 cm necrotic left groin node.S/P BX on 9/14.  I had demonstrated a 4.6 x 2.4 x 3.5 cm enhancing mass in left inguinal adenopathy measuring 3.9 x 3.7 cm.   Examination your office demonstrated a fixed tumor to the left pelvic sidewall.  Is now being sent here to discuss potential concurrent radiation chemo as definitive therapy.  Denies bleeding pain or discharge.  She had biopsy/cytology showing squamous cell carcinoma.    Medical History:   Past Medical History:   Diagnosis Date   • Disease of thyroid gland    • Diverticulitis    • Hypertension    • Vaginal cancer (CMS/HCC) (HCC)        Surgical History:   Past Surgical History:   Procedure Laterality Date   • CHOLECYSTECTOMY     • DILATION AND CURETTAGE OF UTERUS  02/2018   • ROTATOR CUFF REPAIR Right         Allergies: No known allergies      Current Outpatient Prescriptions:   •  benazepril (LOTENSIN) 10 mg tablet, Take 10 mg by mouth daily., Disp: , Rfl:   •  bumetanide (BUMEX) 1 mg tablet, Take 1 mg by mouth daily., Disp: , Rfl:   •  ciprofloxacin (CIPRO) 500 mg tablet, Take 1 tablet (500 mg total) by mouth every 12 (twelve) hours., Disp: 19 tablet, Rfl: 0  •  cranberry conc-C-bacillus coag (AZO CRANBERRY + PROBIOTIC) 250-30-50 mg-mg-million tablet, Take by mouth daily., Disp: , Rfl:   •  diphenoxylate-atropine (LOMOTIL) 2.5-0.025 mg per tablet, TAKE 1 TABLET 4 TIMES DAILY AS NEEDED FOR SEVERE DIARRHEA, Disp: , Rfl: 0  •  levothyroxine (SYNTHROID) 88 mcg tablet, Take 88 mcg by mouth once daily., Disp: , Rfl: 3  •  magnesium chloride 71.5 mg tablet,delayed release (DR/EC), Take 1 tablet by mouth 2 (two) times a day., Disp: 60 tablet, Rfl: 1  •  methylPREDNISolone (MEDROL DOSEPACK) 4 mg tablet, See admin instr., Disp: , Rfl: 0  •  omega-3 acid ethyl esters (LOVAZA) 1 gram capsule, take 2 capsule by oral route 2 times every day, Disp: , Rfl:   •  pantoprazole (PROTONIX) 40 mg EC tablet, 40 mg., Disp: , Rfl:   •  rutin/hesp/bioflav/C/zhmels142 (BIOFLEX ORAL), Take 2 capsules by mouth daily., Disp: , Rfl:      Social History:   Social History     Social History   • Marital status:      Spouse name: don   •  Number of children: 4   • Years of education: N/A     Occupational History   • retired/        Social History Main Topics   • Smoking status: Former Smoker     Packs/day: 1.00     Years: 40.00     Start date: 1957     Quit date: 1998   • Smokeless tobacco: Never Used   • Alcohol use No   • Drug use: No   • Sexual activity: No     Other Topics Concern   • Not on file     Social History Narrative   • No narrative on file       Family History:   Family History   Problem Relation Age of Onset   • Breast cancer Neg Hx    • Cancer Neg Hx    • Colon cancer Neg Hx    • Ovarian cancer Neg Hx        Review of Systems   Constitutional: negative for generalized weakness, weight gain, weight loss, fatigue and anorexia  Eyes: negative for contacts/glasses, blurred vision and visual disturbance  Ears, nose, mouth, throat, and face: negative for ear drainage, ear pain, ringing in the ears and bleeding gums  Respiratory: negative for shortness of breath, chest pain/tightness and wheezing  Cardiovascular: negative for chest pain, fatigue, lightheadedness and irregular heart beat  Gastrointestinal: negative for abdominal pain, trouble swallowing, change in bowel habits and constipation  Hematologic/lymphatic: negative for easy bruising, bleeding gums and swelling  Musculoskeletal:negative for generalized joint pain, stiff joints and muscle weakness  Neurological: negative for confusion, memory problems, lightheadedness and numbness  Behavioral/Psych: negative for anxiety, depression, irritability and sleep disturbance  Endocrine: negative for dry skin, alopecia and changes in hair        Vital signs in last 24 hours:         Objective       Physical Exam   General appearance: alert, appears stated age and cooperative  Head: normocephalic, without obvious abnormality, atraumatic  Eyes: conjunctivae clear. PERRL, EOM's intact.  Ears: normal TM exam and normal external ear  Nose: Nares normal. Septum midline. Mucosa  normal.  Throat: normal oropharynx  Neck: no JVD, no adenopathy, no carotid bruit, supple, symmetrical, trachea midline and thyroid not enlarged, symmetric, no tenderness/mass/nodules  Back: symmetric, no curvature. ROM normal  Lungs: clear to auscultation bilaterally  Chest wall: no tenderness  Heart: regular rate and rhythm, S1, S2 normal, no murmur, click, rub or gallop  Abdomen: soft, non-tender; bowel sounds normal; no masses, no organomegaly  Extremities: extremities normal, warm and well-perfused; no cyanosis, clubbing, or edema  Skin: Skin color, texture, turgor normal. No rashes or lesions  Lymph nodes: Cervical and supraclavicular nodes normal.  Neurologic: Grossly normal       Impression/Plan    Patient looks great.  Seems to have had a complete clinical response on PET scan even here in her left groin node.  She will see Dr. Jimenez today and most likely be set up for surgery to prove pathologically she is responded and has a complete response.      Akbar Romero MD

## 2018-12-12 NOTE — PATIENT INSTRUCTIONS
[email or web address]/[email or web address]-[email or web address]  Discussed with patient on instructions on use of vaginal dilator    Patient to follow up in 4 weeks.

## 2018-12-12 NOTE — ASSESSMENT & PLAN NOTE
Ms. Young is doing well reports no acute problems at todays visit. Discussed the use of possible vaginal dilator.  She has had a complete response of her vulva, and her lymph node is down to 1 cm   At this point lymph node appears and feels smaller(grapesize). Will re-examine in 4 weeks to reevaluate and discuss what next steps pending on results.

## 2018-12-12 NOTE — LETTER
December 17, 2018    Patient: Raegan Young   YOB: 1945   Date of Visit: 12/12/2018       Dear Dr. Lockhart:    The patient is seen back at the [facility] today in follow up with regard to her   1. Vaginal cancer (CMS/HCC) (HCC)    2. Breast cancer screening    . Below is my assessment and plan of care.               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 Young is doing well reports no acute problems at todays visit. Discussed the use of possible vaginal dilator.  She has had a complete response of her vulva, and her lymph node is down to 1 cm   At this point lymph node appears and feels smaller(grapesize). Will re-examine in 4 weeks to reevaluate and discuss what next steps pending on results.             Other Visit Diagnoses     Breast cancer screening        Relevant Orders    BI SCREENING MAMMOGRAM BILATERAL                 Sincerely,        Trev Jimenez MD  CC: MD Akbar Mueller MD

## 2019-01-09 ENCOUNTER — APPOINTMENT (OUTPATIENT)
Dept: PREADMISSION TESTING | Facility: HOSPITAL | Age: 74
End: 2019-01-09
Attending: OBSTETRICS & GYNECOLOGY
Payer: MEDICARE

## 2019-01-09 ENCOUNTER — OFFICE VISIT (OUTPATIENT)
Dept: GYNECOLOGIC ONCOLOGY | Facility: CLINIC | Age: 74
End: 2019-01-09
Attending: OBSTETRICS & GYNECOLOGY
Payer: MEDICARE

## 2019-01-09 VITALS
RESPIRATION RATE: 18 BRPM | DIASTOLIC BLOOD PRESSURE: 76 MMHG | WEIGHT: 163.4 LBS | HEIGHT: 60 IN | HEART RATE: 93 BPM | BODY MASS INDEX: 32.08 KG/M2 | SYSTOLIC BLOOD PRESSURE: 127 MMHG

## 2019-01-09 VITALS
RESPIRATION RATE: 18 BRPM | TEMPERATURE: 97.4 F | SYSTOLIC BLOOD PRESSURE: 119 MMHG | OXYGEN SATURATION: 99 % | DIASTOLIC BLOOD PRESSURE: 57 MMHG | BODY MASS INDEX: 31.8 KG/M2 | HEART RATE: 84 BPM | HEIGHT: 60 IN | WEIGHT: 162 LBS

## 2019-01-09 DIAGNOSIS — C52 VAGINAL CANCER (CMS/HCC): Primary | ICD-10-CM

## 2019-01-09 DIAGNOSIS — C77.4 SECONDARY MALIGNANCY OF INGUINAL LYMPH NODES (CMS/HCC): ICD-10-CM

## 2019-01-09 DIAGNOSIS — R11.0 NAUSEA: ICD-10-CM

## 2019-01-09 DIAGNOSIS — Z01.818 PREOP EXAMINATION: Primary | ICD-10-CM

## 2019-01-09 PROBLEM — Z51.11 CHEMOTHERAPY MANAGEMENT, ENCOUNTER FOR: Status: RESOLVED | Noted: 2018-09-25 | Resolved: 2019-01-09

## 2019-01-09 PROBLEM — E86.0 DEHYDRATION: Status: RESOLVED | Noted: 2018-10-29 | Resolved: 2019-01-09

## 2019-01-09 PROBLEM — E86.0 DEHYDRATION DUE TO RADIATION: Status: RESOLVED | Noted: 2018-10-29 | Resolved: 2019-01-09

## 2019-01-09 PROBLEM — D69.6 THROMBOCYTOPENIA (CMS/HCC): Status: RESOLVED | Noted: 2018-11-01 | Resolved: 2019-01-09

## 2019-01-09 LAB
ANION GAP SERPL CALC-SCNC: 9 MEQ/L (ref 3–15)
BUN SERPL-MCNC: 12 MG/DL (ref 8–20)
CALCIUM SERPL-MCNC: 9.1 MG/DL (ref 8.9–10.3)
CHLORIDE SERPL-SCNC: 107 MEQ/L (ref 98–109)
CO2 SERPL-SCNC: 26 MEQ/L (ref 22–32)
CREAT SERPL-MCNC: 1.4 MG/DL
ERYTHROCYTE [DISTWIDTH] IN BLOOD BY AUTOMATED COUNT: 12.8 % (ref 11.7–14.4)
GFR SERPL CREATININE-BSD FRML MDRD: 36.8 ML/MIN/1.73M*2
GLUCOSE SERPL-MCNC: 117 MG/DL (ref 70–99)
HCT VFR BLDCO AUTO: 25.1 %
HGB BLD-MCNC: 8.3 G/DL
MCH RBC QN AUTO: 33.9 PG (ref 28–33.2)
MCHC RBC AUTO-ENTMCNC: 33.1 G/DL (ref 32.2–35.5)
MCV RBC AUTO: 102.4 FL (ref 83–98)
PDW BLD AUTO: 9.8 FL (ref 9.4–12.3)
PLATELET # BLD AUTO: 192 K/UL
POTASSIUM SERPL-SCNC: 4.2 MEQ/L (ref 3.6–5.1)
RBC # BLD AUTO: 2.45 M/UL (ref 3.93–5.22)
SODIUM SERPL-SCNC: 142 MEQ/L (ref 136–144)
WBC # BLD AUTO: 5.39 K/UL

## 2019-01-09 PROCEDURE — 80048 BASIC METABOLIC PNL TOTAL CA: CPT

## 2019-01-09 PROCEDURE — 85027 COMPLETE CBC AUTOMATED: CPT

## 2019-01-09 PROCEDURE — 99215 OFFICE O/P EST HI 40 MIN: CPT | Performed by: OBSTETRICS & GYNECOLOGY

## 2019-01-09 PROCEDURE — 36415 COLL VENOUS BLD VENIPUNCTURE: CPT

## 2019-01-09 RX ORDER — PROMETHAZINE HYDROCHLORIDE 12.5 MG/1
12.5 TABLET ORAL EVERY 6 HOURS PRN
Qty: 30 TABLET | Refills: 0 | Status: SHIPPED | OUTPATIENT
Start: 2019-01-09 | End: 2019-04-26 | Stop reason: ALTCHOICE

## 2019-01-09 ASSESSMENT — PAIN SCALES - GENERAL: PAINLEVEL: 0-NO PAIN

## 2019-01-09 NOTE — PRE-PROCEDURE INSTRUCTIONS
1. We will call you between 3 pm and 7 pm on January 14, 2019 to determine that arrival time for your procedure. If you do not hear by 6PM. Please call [phone] for arrival time.    2. Please report to [facility] [place], walk into main [place] and report to the admission desk on first floor on the day of your procedure.   3. Please follow the following fasting guidelines:   Nothing to eat or drink after midnight unless otherwise instructed by  your physician.    4. Early on the morning of the procedure please take your usual dose of the listed medications Levothyroxine, pantoprazolewith a sip of water:    Per anesthesiologist, do not take benazepril on the morning of surgery.    AVOID ASPIRIN, ADVIL,  ALEVE, MOTRIN, LOVAZA, VITAMIN E 1 WEEK PRIOR TO SURGERY.    YOU MAY TAKE TYLENOL FOR PAIN     5. Other Instructions:    6. If you develop a cold, cough, fever, rash, or other symptom prior to the data of the procedure, please report it to your physician immediately.   7. If you need to cancel the procedure for any reason, please contact your physician or call the unit listed above.   8. Make arrangements to have someone drive you home from the procedure. If you have not arranged for transportation home, your surgery may be cancelled.    9. You may not take public transportation unless accompanied by a responsible person.   10. You may not drive a car or operate complex or potentially dangerous machinery for 24 hours following anesthesia and/or sedation.   11. If it is medically necessary for you to have a longer stay, you will be informed as soon as the decision is made.   12. Do not wear or being anything of value to the hospital including jewelry of any kind. Do not wear make-up or contact lenses. DO bring your glasses and hearing aid.   13. No lotion, creams, powders, or oils on skin the morning of procedure    14. Dress in comfortable clothes.   15.  If instructed, please bring a copy of your Advanced  Directive (Living Will/Durable Power of ) on the day of your procedure.      Pre operative instructions given as per protocol.  Form explained by: VICKI Martell     I have read and understand the above information. I have had sufficient opportunity to ask questions I might have and they have been answered to my satisfaction. I agree to comply with the Patient Responsibilities listed above and have received a copy of this form.

## 2019-01-09 NOTE — LETTER
January 16, 2019    Patient: Raegan Young   YOB: 1945   Date of Visit: 1/9/2019       Dear Dr. Lockhart:    The patient is seen back at the [facility] today in follow up with regard to her   1. Vaginal cancer (CMS/HCC) (HCC)    2. 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 Nausea    . Below is my assessment and plan of care.            Vital Signs for the last 24 hours:  /76   Pulse 93   Resp 18   Ht 1.524 m (5')   Wt 74.1 kg (163 lb 6.4 oz)   BMI 31.91 kg/m²      Physical Exam From Office:  General: well-developed, well-nourished, no apparent distress  Neck: supple, no masses  Lymphatic: no supraclavicular, cervical, or rightinguinal adenopathy. 1cm firm L groin node  Respiratory: lungs clear to auscultation bilaterally, normal respiratory effort  Cardiovascular: regular rate and rhythm, no murmurs, rubs, gallops.   Extremity: no clubbing, cyanosis, edema  GI: abdomen soft, non-distended, non-tender, no hepatosplenomegaly, no masses  Neurologic: alert & oriented x3, no gross deficits  Psychiatric: mood and affect normal  Musculoskeletal: no deformity or gross strength deficit  Gynecologic: well healed scar at site of prior vulvectomy, no suspicious new lesions           Ms. Raegan Young is a 73 y.o. 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 Young about the appropriate management of left groin node. Discussed the surgical option of laparoscopically removing the left groin lymph node. Discussed all risks  of this surgery including risk of infection, injury, and possible bleeding. Answered all of Ms. Bass questions and consent was signed. Ms. Young will schedule for surgery and complete PATs prior to surgery.    Edda Cummings PA-C           Other Visit Diagnoses     Nausea        Relevant Medications    promethazine (PHENERGAN) 12.5 mg tablet        I personally examined the patient, signed consent with her, and agree with the plan as scribed above by my PA.  Trev Jimenez MD            Sincerely,        Trev Jimenez MD  CC: MD Akbar Mueller MD

## 2019-01-09 NOTE — PROGRESS NOTES
HPI: Ms. Raegan Young is a 73 y.o. lady with a history of      Vaginal cancer (CMS/HCC) (HCC)    9/21/2018 Initial Diagnosis     Vaginal cancer (CMS/HCC) (HCC)       9/27/2018 -  Radiation Therapy     IMRT concurrent Cisplatin complicated by N/V, dehydration, thrombocytopenia febrile neutropenia          She returns today for surveillance. She denies new vulvar itching/burning, pain, denies groin swelling/lumps, and denies other suspicious symptoms.    Medical History:   Past Medical History:   Diagnosis Date   • Disease of thyroid gland    • Diverticulitis    • Hypertension    • Vaginal cancer (CMS/HCC) (HCC)        Surgical History:   Past Surgical History:   Procedure Laterality Date   • CHOLECYSTECTOMY     • DILATION AND CURETTAGE OF UTERUS  02/2018   • ROTATOR CUFF REPAIR Right        Social History:   Social History     Social History   • Marital status:      Spouse name: orm   • Number of children: 4   • Years of education: N/A     Occupational History   • retired/        Social History Main Topics   • Smoking status: Former Smoker     Packs/day: 1.00     Years: 40.00     Start date: 1957     Quit date: 1998   • Smokeless tobacco: Never Used   • Alcohol use No   • Drug use: No   • Sexual activity: No     Other Topics Concern   • Not on file     Social History Narrative   • No narrative on file       Family History:   Family History   Problem Relation Age of Onset   • Breast cancer Neg Hx    • Cancer Neg Hx    • Colon cancer Neg Hx    • Ovarian cancer Neg Hx        Allergies: No known allergies    Medications:   Current Outpatient Prescriptions   Medication Sig Dispense Refill   • benazepril (LOTENSIN) 10 mg tablet Take 10 mg by mouth daily.     • levothyroxine (SYNTHROID) 88 mcg tablet Take 88 mcg by mouth once daily.  3   • omega-3 acid ethyl esters (LOVAZA) 1 gram capsule take 2 capsule by oral route 2 times every day     • pantoprazole (PROTONIX) 40 mg EC tablet 40 mg.     •  promethazine (PHENERGAN) 12.5 mg tablet Take 1 tablet (12.5 mg total) by mouth every 6 (six) hours as needed for nausea or vomiting for up to 7 days. 30 tablet 0   • methylPREDNISolone (MEDROL DOSEPACK) 4 mg tablet See admin instr.  0     No current facility-administered medications for this visit.        Review of Systems  All other systems reviewed and negative except as noted in the HPI.    Objective     Vital Signs for the last 24 hours:  /76   Pulse 93   Resp 18   Ht 1.524 m (5')   Wt 74.1 kg (163 lb 6.4 oz)   BMI 31.91 kg/m²     Physical Exam From Office:  General: well-developed, well-nourished, no apparent distress  Neck: supple, no masses  Lymphatic: no supraclavicular, cervical, or rightinguinal adenopathy. 1cm firm L groin node  Respiratory: lungs clear to auscultation bilaterally, normal respiratory effort  Cardiovascular: regular rate and rhythm, no murmurs, rubs, gallops.   Extremity: no clubbing, cyanosis, edema  GI: abdomen soft, non-distended, non-tender, no hepatosplenomegaly, no masses  Neurologic: alert & oriented x3, no gross deficits  Psychiatric: mood and affect normal  Musculoskeletal: no deformity or gross strength deficit  Gynecologic: well healed scar at site of prior vulvectomy, no suspicious new lesions    Assessment/Plan     Ms. Raegan Young is a 73 y.o. 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 Young about the appropriate management of left groin node. Discussed the surgical option of laparoscopically removing the left groin lymph node. Discussed all risks of this surgery  including risk of infection, injury, and possible bleeding. Answered all of Ms. Bass questions and consent was signed. Ms. Young will schedule for surgery and complete PATs prior to surgery.    Edda Cummings PA-C           Other Visit Diagnoses     Nausea        Relevant Medications    promethazine (PHENERGAN) 12.5 mg tablet        I personally examined the patient, signed consent with her, and agree with the plan as scribed above by my PA.  Trev Jimenez MD

## 2019-01-09 NOTE — PROGRESS NOTES
Raegan Young presents to the office for followup of        Vaginal cancer (CMS/HCC) (HCC)    9/21/2018 Initial Diagnosis     Vaginal cancer (CMS/HCC) (HCC)       9/27/2018 -  Radiation Therapy     IMRT concurrent Cisplatin complicated by N/V, dehydration, thrombocytopenia febrile neutropenia        Patient recently completed radiation on 11/28/2018 with Dr. Romero.     Past Medical History:   Diagnosis Date   • Disease of thyroid gland    • Diverticulitis    • Hypertension    • Vaginal cancer (CMS/HCC) (HCC)      Past Surgical History:   Procedure Laterality Date   • CHOLECYSTECTOMY     • DILATION AND CURETTAGE OF UTERUS  02/2018   • ROTATOR CUFF REPAIR Right      Patient is here today for follow up post completion of radiation and surveillance of left groin lymph node. At last visit the node was noticeably smaller than previous exams. Ms. Young does report chronic feeling of nausea and would like to have a refill of her anti-emetic. Ms. Young denies any symptoms of vomiting, diarrhea, constipation, neuropathy, vaginal bleeding,fevers, chills,coughing, wheezing, sob, chest pain, palpitations, abd/pelvic pain, bloating.   Patient reports she is drinking more water and has a good appetite.    Current Outpatient Prescriptions:   •  benazepril (LOTENSIN) 10 mg tablet, Take 10 mg by mouth daily., Disp: , Rfl:   •  levothyroxine (SYNTHROID) 88 mcg tablet, Take 88 mcg by mouth once daily., Disp: , Rfl: 3  •  omega-3 acid ethyl esters (LOVAZA) 1 gram capsule, take 2 capsule by oral route 2 times every day, Disp: , Rfl:   •  pantoprazole (PROTONIX) 40 mg EC tablet, 40 mg., Disp: , Rfl:   •  promethazine (PHENERGAN) 12.5 mg tablet, Take 1 tablet (12.5 mg total) by mouth every 6 (six) hours as needed for nausea or vomiting for up to 7 days., Disp: 30 tablet, Rfl: 0  •  methylPREDNISolone (MEDROL DOSEPACK) 4 mg tablet, See admin instr., Disp: , Rfl: 0  No known allergies  Cancer-related family history is negative  for Breast cancer, Cancer, Colon cancer, and Ovarian cancer.   reports that she quit smoking about 21 years ago. She started smoking about 62 years ago. She has a 40.00 pack-year smoking history. She has never used smokeless tobacco. She reports that she does not drink alcohol or use drugs.  ROS: Negative in all systems with the exception of the above    Physical examination: Well-developed female in no apparent distress  Vitals:    01/09/19 0959   BP: 127/76   Pulse: 93   Resp: 18     Body mass index is 31.91 kg/m².    Vitals: BP: 127/76  Heart Rate: 93  Resp: 18  Height: 152.4 cm (5')  Weight: 74.1 kg (163 lb 6.4 oz) (01/09 0959)  HEENT: Normocephalic, atraumatic, nonicteric sclera  Neck: Supple no masses, thyroid normal nontender  Lymphatic: No supraclavicular adenopathy, no palpable masses right groin, left groin with 1-2 cm firm lymph node (was 4 cm)- feels dramatically smaller in size since last physical exam  (related to olive in size)  Heart: Regular rate no murmurs  Lungs: Clear to auscultation with good respiratory effort  Extremities: No clubbing, cyanosis, edema  Neuro: Oriented ×3 with normal mood and affect  Abdomen: Soft, nontender, nondistended. No hepatosplenomegaly. No rebound or guarding, Gynecologic: Normal vulva vagina urethra meatus bladder. Normal cervix, uterus, no cervical motion tenderness, normal bilateral adnexa  Rectovaginal: no masses/nodularity    Imaging:  PET Scan 11/29/2018    The documented enlarged left inguinal node has decreased in size.  There is low  level metabolic uptake within the node, lesser than that of mediastinal blood  pool.  Clinical follow-up recommended.  Incidental 6 mm left upper lobe nodule noted, without associated hypermetabolism  but too small for accurate PET/CT characterization.  Recommend dedicated CT  imaging of the chest for further characterization/documentation.  Moderate coronary artery calcification.    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 Young about the appropriate management of left groin node. Discussed the surgical option of laparoscopically removing the left groin lymph node. Discussed all risks of this surgery including risk of infection, injury, and possible bleeding. Answered all of Ms. Youngs questions and consent was signed. Ms. Young will schedule for surgery and complete PATs prior to surgery.    Edda Cummings PA-C           Other Visit Diagnoses     Nausea        Relevant Medications    promethazine (PHENERGAN) 12.5 mg tablet               ASAD Ramirez

## 2019-01-09 NOTE — ASSESSMENT & PLAN NOTE
Long discussion with Ms. Young about the appropriate management of left groin node. Discussed the surgical option of laparoscopically removing the left groin lymph node. Discussed all risks of this surgery including risk of infection, injury, and possible bleeding. Answered all of MsJimmy Hannahs questions and consent was signed. Ms. Young will schedule for surgery and complete PATs prior to surgery.    Edda Cummings PA-C

## 2019-01-13 ENCOUNTER — PREP FOR CASE (OUTPATIENT)
Dept: GYNECOLOGIC ONCOLOGY | Facility: CLINIC | Age: 74
End: 2019-01-13

## 2019-01-13 RX ORDER — SODIUM CHLORIDE, SODIUM GLUCONATE, SODIUM ACETATE, POTASSIUM CHLORIDE AND MAGNESIUM CHLORIDE 30; 37; 368; 526; 502 MG/100ML; MG/100ML; MG/100ML; MG/100ML; MG/100ML
80 INJECTION, SOLUTION INTRAVENOUS CONTINUOUS
Status: CANCELLED | OUTPATIENT
Start: 2019-01-15 | End: 2019-01-16

## 2019-01-13 RX ORDER — CELECOXIB 100 MG/1
400 CAPSULE ORAL ONCE
Status: CANCELLED | OUTPATIENT
Start: 2019-01-13 | End: 2019-01-13

## 2019-01-13 RX ORDER — ACETAMINOPHEN 325 MG/1
975 TABLET ORAL ONCE
Status: CANCELLED | OUTPATIENT
Start: 2019-01-13 | End: 2019-01-13

## 2019-01-13 NOTE — H&P
HPI: Ms. Raegan Young is a 73 y.o. lady with a history of   Patient's Oncology History documentation            Vaginal cancer (CMS/HCC) (HCC)     9/21/2018 Initial Diagnosis       Vaginal cancer (CMS/HCC) (HCC)         9/27/2018 -  Radiation Therapy       IMRT concurrent Cisplatin complicated by N/V, dehydration, thrombocytopenia febrile neutropenia                She returns today for surveillance. She denies new vulvar itching/burning, pain, denies groin swelling/lumps, and denies other suspicious symptoms.     Medical History:   Medical History        Past Medical History:   Diagnosis Date   • Disease of thyroid gland     • Diverticulitis     • Hypertension     • Vaginal cancer (CMS/HCC) (HCC)              Surgical History:   Surgical History         Past Surgical History:   Procedure Laterality Date   • CHOLECYSTECTOMY       • DILATION AND CURETTAGE OF UTERUS   02/2018   • ROTATOR CUFF REPAIR Right             Social History:   Social History   Social History      Social History   • Marital status:        Spouse name: rom   • Number of children: 4   • Years of education: N/A           Occupational History   • retired/                Social History Main Topics   • Smoking status: Former Smoker       Packs/day: 1.00       Years: 40.00       Start date: 1957       Quit date: 1998   • Smokeless tobacco: Never Used   • Alcohol use No   • Drug use: No   • Sexual activity: No           Other Topics Concern   • Not on file          Social History Narrative   • No narrative on file            Family History:         Family History   Problem Relation Age of Onset   • Breast cancer Neg Hx     • Cancer Neg Hx     • Colon cancer Neg Hx     • Ovarian cancer Neg Hx           Allergies: No known allergies     Medications:   Current Medications          Current Outpatient Prescriptions   Medication Sig Dispense Refill   • benazepril (LOTENSIN) 10 mg tablet Take 10 mg by mouth daily.       • levothyroxine  (SYNTHROID) 88 mcg tablet Take 88 mcg by mouth once daily.   3   • omega-3 acid ethyl esters (LOVAZA) 1 gram capsule take 2 capsule by oral route 2 times every day       • pantoprazole (PROTONIX) 40 mg EC tablet 40 mg.       • promethazine (PHENERGAN) 12.5 mg tablet Take 1 tablet (12.5 mg total) by mouth every 6 (six) hours as needed for nausea or vomiting for up to 7 days. 30 tablet 0   • methylPREDNISolone (MEDROL DOSEPACK) 4 mg tablet See admin instr.   0      No current facility-administered medications for this visit.             Review of Systems  All other systems reviewed and negative except as noted in the HPI.        Objective         Vital Signs for the last 24 hours:  /76   Pulse 93   Resp 18   Ht 1.524 m (5')   Wt 74.1 kg (163 lb 6.4 oz)   BMI 31.91 kg/m²      Physical Exam From Office:  General: well-developed, well-nourished, no apparent distress  Neck: supple, no masses  Lymphatic: no supraclavicular, cervical, or rightinguinal adenopathy. 1cm firm L groin node  Respiratory: lungs clear to auscultation bilaterally, normal respiratory effort  Cardiovascular: regular rate and rhythm, no murmurs, rubs, gallops.   Extremity: no clubbing, cyanosis, edema  GI: abdomen soft, non-distended, non-tender, no hepatosplenomegaly, no masses  Neurologic: alert & oriented x3, no gross deficits  Psychiatric: mood and affect normal  Musculoskeletal: no deformity or gross strength deficit  Gynecologic: well healed scar at site of prior vulvectomy, no suspicious new lesions        Assessment/Plan         Ms. Raegan Young is a 73 y.o. 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 Young about the appropriate management of left groin node. Discussed the surgical option of laparoscopically removing the left groin lymph node. Discussed all risks of this surgery including risk of infection, injury, and possible bleeding. Answered all of Ms. Youngs questions and consent was signed. Ms. Young will schedule for surgery and complete PATs prior to surgery.     Edda Cummings PA-C                 Other Visit Diagnoses      Nausea         Relevant Medications     promethazine (PHENERGAN) 12.5 mg tablet

## 2019-01-15 ENCOUNTER — HOSPITAL ENCOUNTER (OUTPATIENT)
Facility: HOSPITAL | Age: 74
Setting detail: HOSPITAL OUTPATIENT SURGERY
Discharge: HOME | End: 2019-01-15
Attending: OBSTETRICS & GYNECOLOGY | Admitting: OBSTETRICS & GYNECOLOGY
Payer: MEDICARE

## 2019-01-15 ENCOUNTER — ANESTHESIA EVENT (OUTPATIENT)
Dept: OPERATING ROOM | Facility: HOSPITAL | Age: 74
Setting detail: HOSPITAL OUTPATIENT SURGERY
End: 2019-01-15
Payer: MEDICARE

## 2019-01-15 VITALS
DIASTOLIC BLOOD PRESSURE: 57 MMHG | TEMPERATURE: 97.3 F | SYSTOLIC BLOOD PRESSURE: 101 MMHG | OXYGEN SATURATION: 100 % | HEIGHT: 60 IN | BODY MASS INDEX: 31.8 KG/M2 | RESPIRATION RATE: 18 BRPM | HEART RATE: 85 BPM | WEIGHT: 162 LBS

## 2019-01-15 DIAGNOSIS — C77.4: ICD-10-CM

## 2019-01-15 PROCEDURE — 88307 TISSUE EXAM BY PATHOLOGIST: CPT | Performed by: OBSTETRICS & GYNECOLOGY

## 2019-01-15 PROCEDURE — 71000001 HC PACU PHASE 1 INITIAL 30MIN: Performed by: OBSTETRICS & GYNECOLOGY

## 2019-01-15 PROCEDURE — 07BJ0ZX EXCISION OF LEFT INGUINAL LYMPHATIC, OPEN APPROACH, DIAGNOSTIC: ICD-10-PCS | Performed by: OBSTETRICS & GYNECOLOGY

## 2019-01-15 PROCEDURE — 71000002 HC PACU PHASE 2 INITIAL 30MIN: Performed by: OBSTETRICS & GYNECOLOGY

## 2019-01-15 PROCEDURE — 71000012 HC PACU PHASE 2 EA ADDL MIN: Performed by: OBSTETRICS & GYNECOLOGY

## 2019-01-15 PROCEDURE — 25000000 HC PHARMACY GENERAL: Performed by: OBSTETRICS & GYNECOLOGY

## 2019-01-15 PROCEDURE — 25000000 HC PHARMACY GENERAL: Performed by: NURSE ANESTHETIST, CERTIFIED REGISTERED

## 2019-01-15 PROCEDURE — 38531 OPEN BX/EXC INGUINOFEM NODES: CPT | Mod: LT | Performed by: OBSTETRICS & GYNECOLOGY

## 2019-01-15 PROCEDURE — 36000004 HC OR LEVEL 4 INITIAL 30MIN: Performed by: OBSTETRICS & GYNECOLOGY

## 2019-01-15 PROCEDURE — 63600000 HC DRUGS/DETAIL CODE: Performed by: OBSTETRICS & GYNECOLOGY

## 2019-01-15 PROCEDURE — 27200000 HC STERILE SUPPLY: Performed by: OBSTETRICS & GYNECOLOGY

## 2019-01-15 PROCEDURE — 36000014 HC OR LEVEL 4 EA ADDL MIN: Performed by: OBSTETRICS & GYNECOLOGY

## 2019-01-15 PROCEDURE — 25800000 HC PHARMACY IV SOLUTIONS: Performed by: NURSE ANESTHETIST, CERTIFIED REGISTERED

## 2019-01-15 PROCEDURE — 63700000 HC SELF-ADMINISTRABLE DRUG: Performed by: OBSTETRICS & GYNECOLOGY

## 2019-01-15 PROCEDURE — 63600000 HC DRUGS/DETAIL CODE: Performed by: NURSE ANESTHETIST, CERTIFIED REGISTERED

## 2019-01-15 PROCEDURE — 71000011 HC PACU PHASE 1 EA ADDL MIN: Performed by: OBSTETRICS & GYNECOLOGY

## 2019-01-15 PROCEDURE — 37000002 HC ANESTHESIA MAC: Performed by: OBSTETRICS & GYNECOLOGY

## 2019-01-15 RX ORDER — TRAMADOL HYDROCHLORIDE 50 MG/1
50-100 TABLET ORAL EVERY 6 HOURS PRN
Qty: 12 TABLET | Refills: 0 | Status: SHIPPED | OUTPATIENT
Start: 2019-01-15 | End: 2019-04-26 | Stop reason: ALTCHOICE

## 2019-01-15 RX ORDER — ONDANSETRON HYDROCHLORIDE 2 MG/ML
4 INJECTION, SOLUTION INTRAVENOUS
Status: DISCONTINUED | OUTPATIENT
Start: 2019-01-15 | End: 2019-01-15 | Stop reason: HOSPADM

## 2019-01-15 RX ORDER — DIPHENHYDRAMINE HCL 50 MG/ML
12.5 VIAL (ML) INJECTION
Status: DISCONTINUED | OUTPATIENT
Start: 2019-01-15 | End: 2019-01-15 | Stop reason: HOSPADM

## 2019-01-15 RX ORDER — MEPERIDINE HYDROCHLORIDE 50 MG/ML
12.5 INJECTION INTRAMUSCULAR; INTRAVENOUS; SUBCUTANEOUS EVERY 10 MIN PRN
Status: DISCONTINUED | OUTPATIENT
Start: 2019-01-15 | End: 2019-01-15 | Stop reason: HOSPADM

## 2019-01-15 RX ORDER — PROPOFOL 10 MG/ML
INJECTION, EMULSION INTRAVENOUS CONTINUOUS PRN
Status: DISCONTINUED | OUTPATIENT
Start: 2019-01-15 | End: 2019-01-15 | Stop reason: SURG

## 2019-01-15 RX ORDER — MIDAZOLAM HYDROCHLORIDE 2 MG/2ML
1 INJECTION, SOLUTION INTRAMUSCULAR; INTRAVENOUS AS NEEDED
Status: DISCONTINUED | OUTPATIENT
Start: 2019-01-15 | End: 2019-01-15 | Stop reason: HOSPADM

## 2019-01-15 RX ORDER — DEXAMETHASONE SODIUM PHOSPHATE 4 MG/ML
INJECTION, SOLUTION INTRA-ARTICULAR; INTRALESIONAL; INTRAMUSCULAR; INTRAVENOUS; SOFT TISSUE AS NEEDED
Status: DISCONTINUED | OUTPATIENT
Start: 2019-01-15 | End: 2019-01-15 | Stop reason: SURG

## 2019-01-15 RX ORDER — LIDOCAINE HYDROCHLORIDE 10 MG/ML
INJECTION, SOLUTION EPIDURAL; INFILTRATION; INTRACAUDAL; PERINEURAL AS NEEDED
Status: DISCONTINUED | OUTPATIENT
Start: 2019-01-15 | End: 2019-01-15 | Stop reason: HOSPADM

## 2019-01-15 RX ORDER — SODIUM CHLORIDE, SODIUM GLUCONATE, SODIUM ACETATE, POTASSIUM CHLORIDE AND MAGNESIUM CHLORIDE 30; 37; 368; 526; 502 MG/100ML; MG/100ML; MG/100ML; MG/100ML; MG/100ML
80 INJECTION, SOLUTION INTRAVENOUS CONTINUOUS
Status: DISCONTINUED | OUTPATIENT
Start: 2019-01-15 | End: 2019-01-15 | Stop reason: HOSPADM

## 2019-01-15 RX ORDER — ONDANSETRON 4 MG/1
4 TABLET, ORALLY DISINTEGRATING ORAL EVERY 8 HOURS PRN
Status: DISCONTINUED | OUTPATIENT
Start: 2019-01-15 | End: 2019-01-15 | Stop reason: HOSPADM

## 2019-01-15 RX ORDER — SODIUM CHLORIDE 9 MG/ML
INJECTION, SOLUTION INTRAVENOUS CONTINUOUS PRN
Status: DISCONTINUED | OUTPATIENT
Start: 2019-01-15 | End: 2019-01-15 | Stop reason: SURG

## 2019-01-15 RX ORDER — DEXTROSE 40 %
15-30 GEL (GRAM) ORAL AS NEEDED
Status: DISCONTINUED | OUTPATIENT
Start: 2019-01-15 | End: 2019-01-15 | Stop reason: HOSPADM

## 2019-01-15 RX ORDER — ACETAMINOPHEN 325 MG/1
650 TABLET ORAL EVERY 4 HOURS PRN
Status: DISCONTINUED | OUTPATIENT
Start: 2019-01-15 | End: 2019-01-15 | Stop reason: HOSPADM

## 2019-01-15 RX ORDER — TRAMADOL HYDROCHLORIDE 50 MG/1
50 TABLET ORAL ONCE
Status: COMPLETED | OUTPATIENT
Start: 2019-01-15 | End: 2019-01-15

## 2019-01-15 RX ORDER — LIDOCAINE HYDROCHLORIDE 10 MG/ML
INJECTION, SOLUTION EPIDURAL; INFILTRATION; INTRACAUDAL; PERINEURAL AS NEEDED
Status: DISCONTINUED | OUTPATIENT
Start: 2019-01-15 | End: 2019-01-15 | Stop reason: SURG

## 2019-01-15 RX ORDER — BUPIVACAINE HYDROCHLORIDE AND EPINEPHRINE 5; 5 MG/ML; UG/ML
INJECTION, SOLUTION EPIDURAL; INTRACAUDAL; PERINEURAL AS NEEDED
Status: DISCONTINUED | OUTPATIENT
Start: 2019-01-15 | End: 2019-01-15 | Stop reason: HOSPADM

## 2019-01-15 RX ORDER — ACETAMINOPHEN 325 MG/1
975 TABLET ORAL ONCE
Status: COMPLETED | OUTPATIENT
Start: 2019-01-15 | End: 2019-01-15

## 2019-01-15 RX ORDER — DEXTROSE 50 % IN WATER (D50W) INTRAVENOUS SYRINGE
25 AS NEEDED
Status: DISCONTINUED | OUTPATIENT
Start: 2019-01-15 | End: 2019-01-15 | Stop reason: HOSPADM

## 2019-01-15 RX ORDER — IBUPROFEN 200 MG
16-32 TABLET ORAL AS NEEDED
Status: DISCONTINUED | OUTPATIENT
Start: 2019-01-15 | End: 2019-01-15 | Stop reason: HOSPADM

## 2019-01-15 RX ORDER — ONDANSETRON HYDROCHLORIDE 2 MG/ML
INJECTION, SOLUTION INTRAVENOUS AS NEEDED
Status: DISCONTINUED | OUTPATIENT
Start: 2019-01-15 | End: 2019-01-15 | Stop reason: SURG

## 2019-01-15 RX ORDER — MIDAZOLAM HYDROCHLORIDE 2 MG/2ML
INJECTION, SOLUTION INTRAMUSCULAR; INTRAVENOUS AS NEEDED
Status: DISCONTINUED | OUTPATIENT
Start: 2019-01-15 | End: 2019-01-15 | Stop reason: SURG

## 2019-01-15 RX ORDER — PROPOFOL 200MG/20ML
SYRINGE (ML) INTRAVENOUS AS NEEDED
Status: DISCONTINUED | OUTPATIENT
Start: 2019-01-15 | End: 2019-01-15 | Stop reason: SURG

## 2019-01-15 RX ORDER — FENTANYL CITRATE 50 UG/ML
50 INJECTION, SOLUTION INTRAMUSCULAR; INTRAVENOUS
Status: DISCONTINUED | OUTPATIENT
Start: 2019-01-15 | End: 2019-01-15 | Stop reason: HOSPADM

## 2019-01-15 RX ORDER — FENTANYL CITRATE 50 UG/ML
INJECTION, SOLUTION INTRAMUSCULAR; INTRAVENOUS AS NEEDED
Status: DISCONTINUED | OUTPATIENT
Start: 2019-01-15 | End: 2019-01-15 | Stop reason: SURG

## 2019-01-15 RX ORDER — CELECOXIB 200 MG/1
400 CAPSULE ORAL ONCE
Status: COMPLETED | OUTPATIENT
Start: 2019-01-15 | End: 2019-01-15

## 2019-01-15 RX ADMIN — TRAMADOL HYDROCHLORIDE 50 MG: 50 TABLET, COATED ORAL at 10:30

## 2019-01-15 RX ADMIN — Medication 2 G: at 08:00

## 2019-01-15 RX ADMIN — ONDANSETRON 4 MG: 2 INJECTION INTRAMUSCULAR; INTRAVENOUS at 08:13

## 2019-01-15 RX ADMIN — DEXAMETHASONE SODIUM PHOSPHATE 4 MG: 4 INJECTION, SOLUTION INTRAMUSCULAR; INTRAVENOUS at 08:13

## 2019-01-15 RX ADMIN — ACETAMINOPHEN 975 MG: 325 TABLET ORAL at 07:46

## 2019-01-15 RX ADMIN — MIDAZOLAM HYDROCHLORIDE 2 MG: 1 INJECTION, SOLUTION INTRAMUSCULAR; INTRAVENOUS at 07:57

## 2019-01-15 RX ADMIN — PROPOFOL 50 MG: 10 INJECTION, EMULSION INTRAVENOUS at 08:03

## 2019-01-15 RX ADMIN — FENTANYL CITRATE 100 MCG: 50 INJECTION, SOLUTION INTRAMUSCULAR; INTRAVENOUS at 08:00

## 2019-01-15 RX ADMIN — CELECOXIB 400 MG: 200 CAPSULE ORAL at 07:46

## 2019-01-15 RX ADMIN — PROPOFOL 50 MCG/KG/MIN: 10 INJECTION, EMULSION INTRAVENOUS at 08:03

## 2019-01-15 RX ADMIN — LIDOCAINE HYDROCHLORIDE 5 ML: 10 INJECTION, SOLUTION EPIDURAL; INFILTRATION; INTRACAUDAL; PERINEURAL at 08:03

## 2019-01-15 RX ADMIN — SODIUM CHLORIDE: 9 INJECTION, SOLUTION INTRAVENOUS at 07:56

## 2019-01-15 ASSESSMENT — PAIN - FUNCTIONAL ASSESSMENT: PAIN_FUNCTIONAL_ASSESSMENT: NO/DENIES PAIN

## 2019-01-15 NOTE — DISCHARGE INSTRUCTIONS
[facility]  Removal of Groin Lymph Node  Discharge Instructions      What to Expect    This area heals well usually.  Sutures are used to close the operative site, which will dissolve. You also have a thin layer of skin glue overlying the incision. A drain was placed into the incision in order to help with healing. Please record output from the drain daily. You should see Dr Jimenez in the office on Friday 1/18, and please bring your log of drain outputs with you. He will use this to help him decide when to pull the drain out.    You may shower normally.      Use over the counter pain relievers.  Tylenol works very well. Take 1000mg every 6 hours as needed. Take with food to avoid stomach upset.    PLEASE CALL THE OFFICE [phone] TO SCHEDULE A FOLLOW-UP EXAM IN THREE DAYS TIME, on Friday 1/18.    PLEASE CALL THE OFFICE IF YOU EXPERIENCE ANY OF THE FOLLOWING:  - Heavy bleeding or large amount of drainage.  Heavy bleeding is soaking through one maxi-pad an hour.  - Fever greater than 101.0.  - Increasing redness or swelling at incision.  - Worsening pain or any other concerns.

## 2019-01-15 NOTE — ANESTHESIA POSTPROCEDURE EVALUATION
Patient: Raegan Young    Procedure Summary     Date:  01/15/19 Room / Location:  [facility] OR [facility] / [facility]    Anesthesia Start:  0756 Anesthesia Stop:  0846    Procedure:  OPEN BIOPSY LEFT GROIN NODE (Left ) Diagnosis:       Cancer of inguinal/lower limb lymph nodes, secondary (CMS/HCC) (HCC)      (cx)    Surgeon:  Trev Jimenez MD Responsible Provider:  Corey Cohen MD    Anesthesia Type:  MAC ASA Status:  2          Anesthesia Type: MAC  PACU Vitals  1/15/2019 0839 - 1/15/2019 0939      1/15/2019 0845 1/15/2019 0900 1/15/2019 0915 1/15/2019 0939    BP: (!)  100/49 (!)  101/54 (!)  111/57 90/69    Temp: 36.3 °C (97.3 °F) - - -    Pulse: 88 82 82 84    Resp: 13 (!)  10 (!)  8 18    SpO2: 98 % 96 % 96 % 100 %            Anesthesia Post Evaluation    Pain management: adequate  Patient location during evaluation: PACU  Patient participation: complete - patient participated  Level of consciousness: awake and alert  Cardiovascular status: acceptable  Airway Patency: adequate  Respiratory status: acceptable  Hydration status: acceptable  Anesthetic complications: no

## 2019-01-15 NOTE — OP NOTE
Date of Procedure: 1/15/2019  Procedure: Open Removal of Left Inguinal Lymph Node  Surgeon: Dr. Trev Jimenez MD  Assist: Pat Moise, PGY-4  Anesthesia: MAC, Local  EBL: 10cc  Drains: straight catheterized for 100cc clear urine at the beginning of procedure  Specimen: left inguinal lymph node  Complications: none  Sponge and needle count: correct x2  Indications for procedure: Stage III Vaginal Cancer with enlarged PET+ left inguinal lymph node s/p 3 treatments with external beam radiation + concurrent cisplatin   Intraoperative findings: palpable left inguinal lymph node ~2cm in size.  Disposition: stable    The patient was brought to the operating room and given MAC anethesia. She was placed in the supine position. Her bladder was straight catheterized for 100cc clear urine. She was prepped and draped in a sterile manner. A timeout was performed.     A palpably enlarged ~2cm left inguinal lymph node was palpated transdermally. We then used a marking pen to demarkate the skin overlying this node. 10cc of 0.5% Marcaine with Epinephrine was injected subdermally underneath.  A scalpal was used to create a 4cm skin incision overlying this area. The incision was carried down using both the bovie and blunt dissection until we reached the depth of the node. The node was grasped with pickups. It was firm, rubbery, and well demarcated. It was carefully dissected off of the surrounding fatty tissue and handed off for pathology.     Bleeding was examined and noted to be stable at the dissection site. Due to the depth of the dissection, a 17-F drain was placed into dissection site and brought out through the skin. Permanent suture was used to tie the drain into place. The subcutaneous fat was then closed with 3-0 Vicryl in an interrupted fashion. The skin was then closed with a 4-0 Monacryl suture and dermabond.    The patient was then awoken from anesthesia. She was transferred to the PACU in stable condition    Dr Jimenez  was present, scrubbed, and performed the entire procedure.    Pat Moise MD

## 2019-01-15 NOTE — ANESTHESIA PREPROCEDURE EVALUATION
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 Authorizing Provider   benazepril (LOTENSIN) 10 mg tablet Take 10 mg by mouth daily.   Yes Padma Mccarty MD   levothyroxine (SYNTHROID) 88 mcg tablet Take 88 mcg by mouth once daily. 10/22/18  Yes Corey Simmons,    pantoprazole (PROTONIX) 40 mg EC tablet Take 40 mg by mouth daily.   6/7/16  Yes Padma Mccarty MD   promethazine (PHENERGAN) 12.5 mg tablet Take 1 tablet (12.5 mg total) by mouth every 6 (six) hours as needed for nausea or vomiting for up to 7 days. 1/9/19 1/16/19 Yes Edda Cummings PA C   omega-3 acid ethyl esters (LOVAZA) 1 gram capsule take 2 capsule by oral route 2 times every day 6/7/16   ProviderPadma MD       CBC Results       01/09/19 11/19/18 11/12/18                    1320 0933 0829         WBC 5.39 6.25 6.99         RBC 2.45 (L) 2.52 (L) 3.01 (L)         HGB 8.3 (L) 7.7 (L) 9.1 (L)         HCT 25.1 (L) 22.2 (L) 25.7 (L)         .4 (H) 88.1 85.4         MCH 33.9 (H) 30.6 30.2         MCHC 33.1 34.7 35.4          250 81 (L)         Comment for PLT at 0829 on 11/12/18:  SPECIMEN QUALITY CHECKED. RESULTS OBTAINED AFTER VORTEXING TO ELIMINATE PLT  CLUMPS          BMP Results       01/09/19 11/19/18 11/16/18                    1320 0933 1002          137 139         K 4.2 4.7 4.7         Cl 107 103 104         CO2 26 22 24         Glucose 117 (H) 110 (H) 98         BUN 12 13 10         Creatinine 1.4 (H) 1.5 (H) 1.7 (H)         Calcium 9.1 8.6 (L) 8.3 (L)         Anion Gap 9 12 11         EGFR 36.8 (L) 33.9 (L) 29.4 (L)                       No results found for: HCGPREGUR, PREGSERUM, HCG, HCGQUANT          No orders to display       Physical Exam    Airway   Mallampati: II   TM distance: >3 FB   Neck ROM: full  Cardiovascular - normal   Rhythm: regular   Rate: normal  Pulmonary - normal   clear to auscultation  Dental - normal        Anesthesia Plan    Plan: MAC    Technique: MAC     Lines and Monitors: PIV     Airway: natural airway / supplemental oxygen   ASA 2  Anesthetic plan and risks discussed with: patient  Induction:    intravenous   Postop Plan:   Patient Disposition: phase II then home   Pain Management: IV analgesics

## 2019-01-15 NOTE — OR SURGEON
Pre-Procedure patient identification:  I am the primary operating surgeon/proceduralist and I have identified the patient and confirmed laterality is left on 01/15/19 at 7:33 AM Trev Jimenez MD  Phone Number: [phone]

## 2019-01-17 ENCOUNTER — TELEPHONE (OUTPATIENT)
Dept: GYNECOLOGIC ONCOLOGY | Facility: CLINIC | Age: 74
End: 2019-01-17

## 2019-01-17 LAB
CASE RPRT: NORMAL
CLINICAL INFO: NORMAL
IMMUNE STAIN STUDY: NORMAL
PATH REPORT.FINAL DX SPEC: NORMAL
PATH REPORT.FINAL DX SPEC: NORMAL
PATH REPORT.GROSS SPEC: NORMAL

## 2019-01-17 NOTE — TELEPHONE ENCOUNTER
Called Ms. Young with benign pathology results. She states she is doing well and will be in for her post-op follow up tomorrow.    Edda Cummings PA-C

## 2019-01-18 ENCOUNTER — OFFICE VISIT (OUTPATIENT)
Dept: GYNECOLOGIC ONCOLOGY | Facility: CLINIC | Age: 74
End: 2019-01-18
Attending: OBSTETRICS & GYNECOLOGY
Payer: MEDICARE

## 2019-01-18 VITALS
RESPIRATION RATE: 16 BRPM | DIASTOLIC BLOOD PRESSURE: 71 MMHG | HEART RATE: 71 BPM | SYSTOLIC BLOOD PRESSURE: 151 MMHG | HEIGHT: 60 IN | BODY MASS INDEX: 32.12 KG/M2 | WEIGHT: 163.6 LBS

## 2019-01-18 DIAGNOSIS — C77.4 SECONDARY MALIGNANCY OF INGUINAL LYMPH NODES (CMS/HCC): Primary | ICD-10-CM

## 2019-01-18 DIAGNOSIS — Z09 FOLLOW UP: ICD-10-CM

## 2019-01-18 PROCEDURE — 99024 POSTOP FOLLOW-UP VISIT: CPT | Performed by: OBSTETRICS & GYNECOLOGY

## 2019-04-26 ENCOUNTER — OFFICE VISIT (OUTPATIENT)
Dept: GYNECOLOGIC ONCOLOGY | Facility: CLINIC | Age: 74
End: 2019-04-26
Attending: OBSTETRICS & GYNECOLOGY
Payer: MEDICARE

## 2019-04-26 VITALS
DIASTOLIC BLOOD PRESSURE: 64 MMHG | HEIGHT: 60 IN | WEIGHT: 153.6 LBS | SYSTOLIC BLOOD PRESSURE: 128 MMHG | HEART RATE: 60 BPM | RESPIRATION RATE: 16 BRPM | BODY MASS INDEX: 30.15 KG/M2

## 2019-04-26 DIAGNOSIS — Z85.44 HISTORY OF CANCER OF VULVA: ICD-10-CM

## 2019-04-26 DIAGNOSIS — R39.198 VOIDING DIFFICULTY: Primary | ICD-10-CM

## 2019-04-26 PROBLEM — C77.4: Status: RESOLVED | Noted: 2018-09-24 | Resolved: 2019-04-26

## 2019-04-26 PROBLEM — R30.0 DYSURIA: Status: RESOLVED | Noted: 2018-10-02 | Resolved: 2019-04-26

## 2019-04-26 PROCEDURE — 99214 OFFICE O/P EST MOD 30 MIN: CPT | Performed by: OBSTETRICS & GYNECOLOGY

## 2019-04-26 NOTE — ASSESSMENT & PLAN NOTE
Ms. Young is doing well. She denies any new concerning symptoms. Recommended patient to follow up in 3 months or sooner if any new symptoms should arise.    She should also follow up with Dr. Lee for her urinary symptoms.

## 2019-04-26 NOTE — PROGRESS NOTES
Raegan Young presents to the office for followup of her history of vaginal cancer.       Vaginal cancer (CMS/HCC) (HCC)    9/21/2018 Initial Diagnosis     Vaginal cancer (CMS/HCC) (HCC)       9/27/2018 -  Radiation Therapy     IMRT concurrent Cisplatin complicated by N/V, dehydration, thrombocytopenia febrile neutropenia         1/15/2019 Surgery     Left groin node excision benign          She is here today for follow up. She was seen last for DILLON removal on 01/18/2019. She had a benign pathology of the biopsy of left inguinal node that was completed on 01/15/2019.  She is here today for 3 month check up. She reports that she is having issues with urination x 4 weeks. She states that she has the sensation and urge to urinate but while she is urinating she reports that she cannot feel the urine voiding. She denies any burning with urination, urgency, frequency, pelvic pressure, vaginal bleeding, vaginal discharge, vaginal/vulvar irritation/pruitus/ burning/masses/lesions/ulcers, she denies any fevers,chills, nausea, vomiting, diarrhea, constipation      Past Medical History:   Diagnosis Date   • Disease of thyroid gland    • Diverticulitis    • Diverticulitis of colon    • History of snoring    • Hypertension    • Hypothyroidism    • Vaginal cancer (CMS/HCC) (HCC)      Past Surgical History:   Procedure Laterality Date   • CHOLECYSTECTOMY     • COLONOSCOPY     • DILATION AND CURETTAGE OF UTERUS  02/2018   • ROTATOR CUFF REPAIR Right        Current Outpatient Prescriptions:   •  benazepril (LOTENSIN) 10 mg tablet, Take 10 mg by mouth daily., Disp: , Rfl:   •  levothyroxine (SYNTHROID) 88 mcg tablet, Take 88 mcg by mouth once daily., Disp: , Rfl: 3  •  omega-3 acid ethyl esters (LOVAZA) 1 gram capsule, take 2 capsule by oral route 2 times every day, Disp: , Rfl:   •  pantoprazole (PROTONIX) 40 mg EC tablet, Take 40 mg by mouth daily.  , Disp: , Rfl:   No known allergies  Cancer-related family history includes Lung  cancer in her father. There is no history of Breast cancer, Cancer, Colon cancer, or Ovarian cancer.   reports that she quit smoking about 21 years ago. She started smoking about 62 years ago. She has a 40.00 pack-year smoking history. She has never used smokeless tobacco. She reports that she does not drink alcohol or use drugs.  ROS:[negative in all systems with the exception of the above]    Physical examination: [Well-developed female in no apparent distress]  Vitals:    04/26/19 0949   BP: 128/64   Pulse: 60   Resp: 16     Body mass index is 30 kg/m².    Vitals: BP: 128/64  Heart Rate: 60  Resp: 16  Height: 152.4 cm (5')  Weight: 69.7 kg (153 lb 9.6 oz) (04/26 0949)  HEENT: Normocephalic, atraumatic, nonicteric sclera  Neck: Supple no masses, thyroid normal nontender  Lymphatic: No inguinal or supraclavicular adenopathy  Heart: Regular rate no murmurs  Lungs: Clear to auscultation with good respiratory effort  Extremities: No clubbing, cyanosis, edema  Neuro: Oriented ×3 with normal mood and affect  Abdomen: Soft, nontender, nondistended. No hepatosplenomegaly. No rebound or guarding.  Gynecologic: Normal vulva vagina urethra meatus bladder. Normal cervix, uterus, no cervical motion tenderness, normal bilateral adnexa  Rectovaginal: no masses/nodularity  Breast exam: complete at next visit  Assessment/Plan      Assesment:     Problem List Items Addressed This Visit     Voiding difficulty - Primary    Current Assessment & Plan     She describes that she is unable to feel the sensation of voiding towards the end of her void.    Recommended her to follow up with Dr. Lee for further evaluation.         History of cancer of vulva    Current Assessment & Plan     Ms. Young is doing well. She denies any new concerning symptoms. Recommended patient to follow up in 3 months or sooner if any new symptoms should arise.    She should also follow up with Dr. Lee for her urinary symptoms.

## 2019-04-26 NOTE — LETTER
April 29, 2019    Patient: Raegan Young   YOB: 1945   Date of Visit: 4/26/2019       Dear Dr. Lockhart:    The patient is seen back at the [facility] today in follow up with regard to her   1. Voiding difficulty    2. History of cancer of vulva    . Below is my assessment and plan of care.                Assesment:     Problem List Items Addressed This Visit     Voiding difficulty - Primary    Current Assessment & Plan     She describes that she is unable to feel the sensation of voiding towards the end of her void.    Recommended her to follow up with Dr. Lee for further evaluation.         History of cancer of vulva    Current Assessment & Plan     Ms. Young is doing well. She denies any new concerning symptoms. Recommended patient to follow up in 3 months or sooner if any new symptoms should arise.    She should also follow up with Dr. Lee for her urinary symptoms.              Sincerely,      Trev Jimenez MD  CC: Akbar Romero MD

## 2019-04-26 NOTE — ASSESSMENT & PLAN NOTE
She describes that she is unable to feel the sensation of voiding towards the end of her void.    Recommended her to follow up with Dr. Lee for further evaluation.

## 2019-07-29 ENCOUNTER — OFFICE VISIT (OUTPATIENT)
Dept: GYNECOLOGIC ONCOLOGY | Facility: CLINIC | Age: 74
End: 2019-07-29
Attending: OBSTETRICS & GYNECOLOGY
Payer: MEDICARE

## 2019-07-29 VITALS
SYSTOLIC BLOOD PRESSURE: 134 MMHG | WEIGHT: 152 LBS | HEIGHT: 60 IN | BODY MASS INDEX: 29.84 KG/M2 | DIASTOLIC BLOOD PRESSURE: 75 MMHG | HEART RATE: 91 BPM

## 2019-07-29 DIAGNOSIS — Z85.44 HISTORY OF CANCER OF VULVA: Primary | ICD-10-CM

## 2019-07-29 PROCEDURE — 99214 OFFICE O/P EST MOD 30 MIN: CPT | Performed by: OBSTETRICS & GYNECOLOGY

## 2019-07-29 RX ORDER — ALLOPURINOL 300 MG/1
TABLET ORAL DAILY
COMMUNITY
Start: 2019-05-17 | End: 2021-07-06 | Stop reason: ENTERED-IN-ERROR

## 2019-07-29 NOTE — LETTER
July 31, 2019    Patient: Raegan Young   YOB: 1945   Date of Visit: 7/29/2019       Dear Dr. Lockhart:    The patient is seen back at the [facility] today in follow up with regard to her   1. History of cancer of vulva    . Below is my assessment and plan of care.    Vital Signs for the last 24 hours:  /75 (BP Location: Left upper arm, Patient Position: Sitting)   Pulse 91   Ht 1.524 m (5')   Wt 68.9 kg (152 lb)   BMI 29.69 kg/m²      Physical Exam From Office:  General: well-developed, well-nourished, no apparent distress  Neck: supple, no masses  Lymphatic: no supraclavicular, cervical, or inguinal adenopathy  Respiratory: lungs clear to auscultation bilaterally, normal respiratory effort  Cardiovascular: regular rate and rhythm, no murmurs, rubs, gallops.   Extremity: no clubbing, cyanosis, edema  GI: abdomen soft, non-distended, non-tender, no hepatosplenomegaly, no masses  Neurologic: alert & oriented x3, no gross deficits  Psychiatric: mood and affect normal  Musculoskeletal: no deformity or gross strength deficit  Gynecologic: well healed scar at site of prior vulvectomy, no suspicious new lesions                 Ms. Raegan Young is a 73 y.o. lady with  Problem List Items Addressed This Visit     History of cancer of vulva - Primary         She will return in 3 months for her next surveillance visit.Call if back pain no improvement 2 weeks    Trev Jimenez MD          Sincerely,      Trev Jimenez MD  CC: Akbar Romero MD

## 2019-07-29 NOTE — PROGRESS NOTES
HPI: Ms. Raegan Young is a 73 y.o. lady with a history of   Cancer Staging  Vaginal cancer (CMS/HCC) (HCC)  Staging form: Vagina, AJCC 8th Edition  - Clinical stage from 9/12/2018: FIGO Stage III (cT3, cN1, cM0) - Signed by Trev Jimenez MD on 9/21/2018         Vaginal cancer (CMS/HCC) (HCC)    9/21/2018 Initial Diagnosis     Vaginal cancer (CMS/HCC) (Formerly [facility])       9/27/2018 -  Radiation Therapy     IMRT concurrent Cisplatin complicated by N/V, dehydration, thrombocytopenia febrile neutropenia         1/15/2019 Surgery     Left groin node excision benign            Back pain and emesis (just started allopurinol)  She returns today for surveillance. She denies new vulvar itching/burning, pain, denies groin swelling/lumps, and denies other suspicious symptoms.    Medical History:   Past Medical History:   Diagnosis Date   • Disease of thyroid gland    • Diverticulitis    • Diverticulitis of colon    • History of snoring    • Hypertension    • Hypothyroidism    • Vaginal cancer (CMS/HCC) (Formerly [facility])        Surgical History:   Past Surgical History:   Procedure Laterality Date   • CHOLECYSTECTOMY     • COLONOSCOPY     • DILATION AND CURETTAGE OF UTERUS  02/2018   • ROTATOR CUFF REPAIR Right        Social History:   Social History     Social History   • Marital status:      Spouse name: rom   • Number of children: 4   • Years of education: N/A     Occupational History   • retired/        Social History Main Topics   • Smoking status: Former Smoker     Packs/day: 1.00     Years: 40.00     Start date: 1957     Quit date: 1998   • Smokeless tobacco: Never Used   • Alcohol use No   • Drug use: No   • Sexual activity: No     Other Topics Concern   • None     Social History Narrative    Lives with  in a 2 story home       Family History:   Family History   Problem Relation Age of Onset   • Heart attack Mother    • Lung cancer Father    • Breast cancer Neg Hx    • Cancer Neg Hx    • Colon cancer Neg Hx     • Ovarian cancer Neg Hx        Allergies: No known allergies    Medications:   Current Outpatient Prescriptions   Medication Sig Dispense Refill   • allopurinol (ZYLOPRIM) 300 mg tablet      • benazepril (LOTENSIN) 10 mg tablet Take 10 mg by mouth daily.     • levothyroxine (SYNTHROID) 88 mcg tablet Take 88 mcg by mouth once daily.  3   • omega-3 acid ethyl esters (LOVAZA) 1 gram capsule take 2 capsule by oral route 2 times every day     • pantoprazole (PROTONIX) 40 mg EC tablet Take 40 mg by mouth daily.         No current facility-administered medications for this visit.        Review of Systems  All other systems reviewed and negative except as noted in the HPI.    Objective     Vital Signs for the last 24 hours:  /75 (BP Location: Left upper arm, Patient Position: Sitting)   Pulse 91   Ht 1.524 m (5')   Wt 68.9 kg (152 lb)   BMI 29.69 kg/m²     Physical Exam From Office:  General: well-developed, well-nourished, no apparent distress  Neck: supple, no masses  Lymphatic: no supraclavicular, cervical, or inguinal adenopathy  Respiratory: lungs clear to auscultation bilaterally, normal respiratory effort  Cardiovascular: regular rate and rhythm, no murmurs, rubs, gallops.   Extremity: no clubbing, cyanosis, edema  GI: abdomen soft, non-distended, non-tender, no hepatosplenomegaly, no masses  Neurologic: alert & oriented x3, no gross deficits  Psychiatric: mood and affect normal  Musculoskeletal: no deformity or gross strength deficit  Gynecologic: well healed scar at site of prior vulvectomy, no suspicious new lesions          Assessment/Plan     Ms. Raegan Young is a 73 y.o. lady with  Problem List Items Addressed This Visit     History of cancer of vulva - Primary           She will return in 3 months for her next surveillance visit.Call if back pain no improvement 2 weeks    Trev Jimenez MD

## 2019-11-01 ENCOUNTER — OFFICE VISIT (OUTPATIENT)
Dept: GYNECOLOGIC ONCOLOGY | Facility: CLINIC | Age: 74
End: 2019-11-01
Attending: OBSTETRICS & GYNECOLOGY
Payer: MEDICARE

## 2019-11-01 ENCOUNTER — APPOINTMENT (OUTPATIENT)
Dept: LAB | Facility: HOSPITAL | Age: 74
End: 2019-11-01
Attending: OBSTETRICS & GYNECOLOGY
Payer: MEDICARE

## 2019-11-01 VITALS
HEART RATE: 87 BPM | BODY MASS INDEX: 29.64 KG/M2 | DIASTOLIC BLOOD PRESSURE: 72 MMHG | SYSTOLIC BLOOD PRESSURE: 134 MMHG | HEIGHT: 60 IN | WEIGHT: 151 LBS

## 2019-11-01 DIAGNOSIS — C52 VAGINAL CANCER (CMS/HCC): Primary | ICD-10-CM

## 2019-11-01 DIAGNOSIS — E86.0 DEHYDRATION DUE TO RADIATION: ICD-10-CM

## 2019-11-01 DIAGNOSIS — Z01.812 PRE-OPERATIVE LABORATORY EXAMINATION: ICD-10-CM

## 2019-11-01 DIAGNOSIS — R79.89 ELEVATED SERUM CREATININE: ICD-10-CM

## 2019-11-01 LAB
ANION GAP SERPL CALC-SCNC: 10 MEQ/L (ref 3–15)
BUN SERPL-MCNC: 28 MG/DL (ref 8–20)
CALCIUM SERPL-MCNC: 9.2 MG/DL (ref 8.9–10.3)
CHLORIDE SERPL-SCNC: 104 MEQ/L (ref 98–109)
CO2 SERPL-SCNC: 26 MEQ/L (ref 22–32)
CREAT SERPL-MCNC: 1.4 MG/DL
GFR SERPL CREATININE-BSD FRML MDRD: 36.7 ML/MIN/1.73M*2
GLUCOSE SERPL-MCNC: 113 MG/DL (ref 70–99)
POTASSIUM SERPL-SCNC: 4.2 MEQ/L (ref 3.6–5.1)
SODIUM SERPL-SCNC: 140 MEQ/L (ref 136–144)

## 2019-11-01 PROCEDURE — 87624 HPV HI-RISK TYP POOLED RSLT: CPT | Performed by: PHYSICIAN ASSISTANT

## 2019-11-01 PROCEDURE — 88175 CYTOPATH C/V AUTO FLUID REDO: CPT | Performed by: PHYSICIAN ASSISTANT

## 2019-11-01 PROCEDURE — 36415 COLL VENOUS BLD VENIPUNCTURE: CPT

## 2019-11-01 PROCEDURE — 80048 BASIC METABOLIC PNL TOTAL CA: CPT

## 2019-11-01 PROCEDURE — 99214 OFFICE O/P EST MOD 30 MIN: CPT | Performed by: OBSTETRICS & GYNECOLOGY

## 2019-11-01 NOTE — PROGRESS NOTES
HPI: Ms. Raegan Young is a 74 y.o. lady with a history of   Cancer Staging  Vaginal cancer (CMS/HCC)  Staging form: Vagina, AJCC 8th Edition  - Clinical stage from 9/12/2018: FIGO Stage III (cT3, cN1, cM0) - Signed by Trev Jimenez MD on 9/21/2018         Vaginal cancer (CMS/HCC)    9/12/2018 Cancer Staged     Staging form: Vagina, AJCC 8th Edition  - Clinical stage from 9/12/2018: FIGO Stage III (cT3, cN1, cM0)      9/21/2018 Initial Diagnosis     Vaginal cancer (CMS/HCC) (HCC)      9/21/2018 - 10/29/2018 Chemotherapy     CISplatin with Concurrent Radiation, 7 Day Cycles - Cervical  Plan Provider: Trev Jimenez MD  Treatment goal: Curative  Line of treatment: First Line      9/27/2018 - 11/28/2018 Radiation Therapy     IMRT concurrent Cisplatin complicated by N/V, dehydration, thrombocytopenia febrile neutropenia      1/15/2019 Surgery     Left groin node excision benign        She returns today for surveillance.  She just saw Ms. Young just saw Dr. Zabala for her surveillance visit and per patient everything went well. He recommends CT scan for surveillance.  She denies new vulvar itching/burning, pain, denies groin swelling/lumps, and denies other suspicious symptoms. All review of systems have been reviewed and are negative unless otherwise noted.     Medical History:   Past Medical History:   Diagnosis Date   • Disease of thyroid gland    • Diverticulitis    • Diverticulitis of colon    • History of snoring    • Hypertension    • Hypothyroidism    • Vaginal cancer (CMS/HCC)        Surgical History:   Past Surgical History:   Procedure Laterality Date   • CHOLECYSTECTOMY     • COLONOSCOPY     • DILATION AND CURETTAGE OF UTERUS  02/2018   • ROTATOR CUFF REPAIR Right        Social History:   Social History     Socioeconomic History   • Marital status:      Spouse name: don   • Number of children: 4   • Years of education: None   • Highest education level: None   Occupational History   •  Occupation: retired/     Social Needs   • Financial resource strain: None   • Food insecurity:     Worry: None     Inability: None   • Transportation needs:     Medical: None     Non-medical: None   Tobacco Use   • Smoking status: Former Smoker     Packs/day: 1.00     Years: 40.00     Pack years: 40.00     Start date:      Last attempt to quit:      Years since quittin.8   • Smokeless tobacco: Never Used   Substance and Sexual Activity   • Alcohol use: No   • Drug use: No   • Sexual activity: Never   Lifestyle   • Physical activity:     Days per week: None     Minutes per session: None   • Stress: None   Relationships   • Social connections:     Talks on phone: None     Gets together: None     Attends Jew service: None     Active member of club or organization: None     Attends meetings of clubs or organizations: None     Relationship status: None   • Intimate partner violence:     Fear of current or ex partner: None     Emotionally abused: None     Physically abused: None     Forced sexual activity: None   Other Topics Concern   • None   Social History Narrative    Lives with  in a 2 story home       Family History:   Family History   Problem Relation Age of Onset   • Heart attack Biological Mother    • Lung cancer Biological Father    • Breast cancer Neg Hx    • Cancer Neg Hx    • Colon cancer Neg Hx    • Ovarian cancer Neg Hx        Allergies: No known allergies    Medications:   Current Outpatient Medications   Medication Sig Dispense Refill   • allopurinol (ZYLOPRIM) 300 mg tablet      • benazepril (LOTENSIN) 10 mg tablet Take 10 mg by mouth daily.     • levothyroxine (SYNTHROID) 88 mcg tablet Take 88 mcg by mouth once daily.  3   • omega-3 acid ethyl esters (LOVAZA) 1 gram capsule take 2 capsule by oral route 2 times every day     • pantoprazole (PROTONIX) 40 mg EC tablet Take 40 mg by mouth daily.         No current facility-administered medications for this visit.         Review of Systems  All other systems reviewed and negative except as noted in the HPI.    Objective     Vital Signs for the last 24 hours:  Visit Vitals  /72 (BP Location: Left upper arm, Patient Position: Sitting)   Pulse 87   Ht 1.524 m (5')   Wt 68.5 kg (151 lb)   BMI 29.49 kg/m²       Physical Exam From Office:  General: well-developed, well-nourished, no apparent distress  Neck: supple, no masses  Lymphatic: no supraclavicular, cervical, or inguinal adenopathy  Respiratory: lungs clear to auscultation bilaterally, normal respiratory effort  Cardiovascular: regular rate and rhythm, no murmurs, rubs, gallops.   Extremity: no clubbing, cyanosis, edema  GI: abdomen soft, non-distended, non-tender, no hepatosplenomegaly, no masses  Neurologic: alert & oriented x3, no gross deficits  Psychiatric: mood and affect normal  Musculoskeletal: no deformity or gross strength deficit  Gynecologic: well healed scar at site of prior vulvectomy, no suspicious new lesions          Assessment/Plan     Ms. Raegan Young is a 74 y.o. 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 She was advised to call should she experience any concerning symptoms such as vaginal bleeding, pelvic or abdominal pain, bloating, vulvar/vaginal irritation,burning, new lesions/masses, pruritus, or any other concerns.   Follow-up in 3 months for surveillance.  She will complete a CT chest/abd/pelvis at earliest convenience. Collected PAP today , will call pending results.          Relevant Orders    CT CHEST/ABDOMEN/PELVIS W IV CONTRAST    Basic metabolic panel    Cytology, Thinprep Pap    THINPREP PAP ([facility])      Other Visit Diagnoses      Pre-operative laboratory examination        Relevant Orders    Basic metabolic panel    Cytology, Thinprep Pap    THINPREP PAP ([facility])              I Edda Cummings PA-C, am scribing for all of the services performed by Dr. Jimenez today. Edda Cummings PA-C, acted as scribe for this encounter on 11/01/19  I personally interviewed and examined the patient, developed the plan as scribed by my PA.    Trev Jimenez MD

## 2019-11-01 NOTE — ASSESSMENT & PLAN NOTE
SHARON on physical exam today. She was advised to call should she experience any concerning symptoms such as vaginal bleeding, pelvic or abdominal pain, bloating, vulvar/vaginal irritation,burning, new lesions/masses, pruritus, or any other concerns.   Follow-up in 3 months for surveillance.  She will complete a CT chest/abd/pelvis at earliest convenience. Collected PAP today , will call pending results.

## 2019-11-01 NOTE — LETTER
November 15, 2019    Patient: Raegan Young   YOB: 1945   Date of Visit: 11/1/2019       Dear Dr. Lockhart:    The patient is seen back at the [facility] today in follow up with regard to her   1. Vaginal cancer (CMS/HCC)    2. Pre-operative laboratory examination    . Below is my assessment and plan of care.         Vital Signs for the last 24 hours:  Visit Vitals  /72 (BP Location: Left upper arm, Patient Position: Sitting)   Pulse 87   Ht 1.524 m (5')   Wt 68.5 kg (151 lb)   BMI 29.49 kg/m²       Physical Exam From Office:  General: well-developed, well-nourished, no apparent distress  Neck: supple, no masses  Lymphatic: no supraclavicular, cervical, or inguinal adenopathy  Respiratory: lungs clear to auscultation bilaterally, normal respiratory effort  Cardiovascular: regular rate and rhythm, no murmurs, rubs, gallops.   Extremity: no clubbing, cyanosis, edema  GI: abdomen soft, non-distended, non-tender, no hepatosplenomegaly, no masses  Neurologic: alert & oriented x3, no gross deficits  Psychiatric: mood and affect normal  Musculoskeletal: no deformity or gross strength deficit  Gynecologic: well healed scar at site of prior vulvectomy, no suspicious new lesions            Ms. Raegan Young is a 74 y.o. 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 She was advised to call should she experience any concerning symptoms such as vaginal bleeding, pelvic or abdominal pain, bloating, vulvar/vaginal irritation,burning, new lesions/masses, pruritus, or any other concerns.   Follow-up in 3 months for surveillance.  She will  complete a CT chest/abd/pelvis at earliest convenience. Collected PAP today , will call pending results.          Relevant Orders    CT CHEST/ABDOMEN/PELVIS W IV CONTRAST    Basic metabolic panel    Cytology, Thinprep Pap    THINPREP PAP ([facility])      Other Visit Diagnoses     Pre-operative laboratory examination        Relevant Orders    Basic metabolic panel    Cytology, Thinprep Pap    THINPREP PAP ([facility])                   Sincerely,        Trev Jimenez MD  CC: Akbar Romero MD

## 2019-11-11 ENCOUNTER — TELEPHONE (OUTPATIENT)
Dept: GYNECOLOGIC ONCOLOGY | Facility: CLINIC | Age: 74
End: 2019-11-11

## 2019-11-11 LAB
CASE RPRT: NORMAL
CLINICAL INFO: NORMAL
CLINICAL INFO: NORMAL
LMP START DATE: NORMAL
SPECIMEN PROCESSING COMMENT: NORMAL
THIN PREP CVX: NORMAL

## 2019-11-11 NOTE — TELEPHONE ENCOUNTER
----- Message from Trev Jimenez MD sent at 11/11/2019 12:27 PM EST -----  Please call patient with normal result. Thanks    Left message stating normal results. Instructed pt to call w/ any issues/concerns.

## 2019-11-12 LAB
HPV HR 12 DNA CVX QL NAA+PROBE: NEGATIVE
HPV16 DNA SPEC QL NAA+PROBE: NEGATIVE
HPV18 DNA SPEC QL NAA+PROBE: NEGATIVE

## 2019-11-13 ENCOUNTER — TELEPHONE (OUTPATIENT)
Dept: GYNECOLOGIC ONCOLOGY | Facility: CLINIC | Age: 74
End: 2019-11-13

## 2019-11-13 ENCOUNTER — HOSPITAL ENCOUNTER (OUTPATIENT)
Dept: RADIOLOGY | Facility: HOSPITAL | Age: 74
Discharge: HOME | End: 2019-11-13
Attending: OBSTETRICS & GYNECOLOGY
Payer: MEDICARE

## 2019-11-13 DIAGNOSIS — C52 VAGINAL CANCER (CMS/HCC): ICD-10-CM

## 2019-11-13 PROCEDURE — 63600105 HC IODINE BASED CONTRAST: Performed by: OBSTETRICS & GYNECOLOGY

## 2019-11-13 PROCEDURE — 74177 CT ABD & PELVIS W/CONTRAST: CPT

## 2019-11-13 RX ADMIN — IOHEXOL 100 ML: 350 INJECTION, SOLUTION INTRAVENOUS at 09:24

## 2019-11-13 NOTE — TELEPHONE ENCOUNTER
Called patient with normal CT scan results. No answer left detailed message.    Note from 1:16pm is an error   Edda Cummings PA-C

## 2019-11-14 ENCOUNTER — TELEPHONE (OUTPATIENT)
Dept: GYNECOLOGIC ONCOLOGY | Facility: CLINIC | Age: 74
End: 2019-11-14

## 2019-11-14 NOTE — TELEPHONE ENCOUNTER
Called patient back regarding previous message. No answer left detailed message again explaining her scan was reviewed and  CT scan was normal.

## 2019-11-14 NOTE — TELEPHONE ENCOUNTER
Pt received message yesterday through voicemail  from Edda about [employer] results - played the message a few times but still cannot understand it.  Please call her at [phone].

## 2020-02-24 ENCOUNTER — OFFICE VISIT (OUTPATIENT)
Dept: GYNECOLOGIC ONCOLOGY | Facility: CLINIC | Age: 75
End: 2020-02-24
Attending: OBSTETRICS & GYNECOLOGY
Payer: MEDICARE

## 2020-02-24 VITALS
HEIGHT: 60 IN | WEIGHT: 153 LBS | OXYGEN SATURATION: 98 % | BODY MASS INDEX: 30.04 KG/M2 | DIASTOLIC BLOOD PRESSURE: 72 MMHG | SYSTOLIC BLOOD PRESSURE: 122 MMHG | HEART RATE: 106 BPM

## 2020-02-24 DIAGNOSIS — C52 VAGINAL CANCER (CMS/HCC): Primary | ICD-10-CM

## 2020-02-24 DIAGNOSIS — Z85.44 HISTORY OF CANCER OF VULVA: ICD-10-CM

## 2020-02-24 PROCEDURE — 99213 OFFICE O/P EST LOW 20 MIN: CPT | Performed by: OBSTETRICS & GYNECOLOGY

## 2020-02-24 NOTE — PROGRESS NOTES
HPI: Ms. Raegan Young is a 74 y.o. lady with a history of   Cancer Staging  Vaginal cancer (CMS/HCC)  Staging form: Vagina, AJCC 8th Edition  - Clinical stage from 9/12/2018: FIGO Stage III (cT3, cN1, cM0) - Signed by Trev Jimenez MD on 9/21/2018         Vaginal cancer (CMS/HCC)    9/12/2018 Cancer Staged     Staging form: Vagina, AJCC 8th Edition  - Clinical stage from 9/12/2018: FIGO Stage III (cT3, cN1, cM0)      9/21/2018 Initial Diagnosis     Vaginal cancer (CMS/HCC) (HCC)      9/21/2018 - 10/29/2018 Chemotherapy     CISplatin with Concurrent Radiation, 7 Day Cycles - Cervical  Plan Provider: Trev Jimenez MD  Treatment goal: Curative  Line of treatment: First Line      9/27/2018 - 11/28/2018 Radiation Therapy     IMRT concurrent Cisplatin complicated by N/V, dehydration, thrombocytopenia febrile neutropenia      1/15/2019 Surgery     Left groin node excision benign        She returns today for surveillance.  Dr. Romero has recommended CT scan for surveillance.  Her last CT was 11/2019 and was SHARON. She also had a pap smear done 11/1/19 that was normal, hpv negative. She denies new vulvar itching/burning, pain, denies groin swelling/lumps, and denies other suspicious symptoms. All review of systems have been reviewed and are negative unless otherwise noted.     Medical History:   Past Medical History:   Diagnosis Date   • Disease of thyroid gland    • Diverticulitis    • Diverticulitis of colon    • History of snoring    • Hypertension    • Hypothyroidism    • Vaginal cancer (CMS/HCC)        Surgical History:   Past Surgical History:   Procedure Laterality Date   • CHOLECYSTECTOMY     • COLONOSCOPY     • DILATION AND CURETTAGE OF UTERUS  02/2018   • ROTATOR CUFF REPAIR Right        Social History:   Social History     Socioeconomic History   • Marital status:      Spouse name: don   • Number of children: 4   • Years of education: None   • Highest education level: None   Occupational History   •  Occupation: retired/     Social Needs   • Financial resource strain: None   • Food insecurity:     Worry: None     Inability: None   • Transportation needs:     Medical: None     Non-medical: None   Tobacco Use   • Smoking status: Former Smoker     Packs/day: 1.00     Years: 40.00     Pack years: 40.00     Start date:      Last attempt to quit:      Years since quittin.1   • Smokeless tobacco: Never Used   Substance and Sexual Activity   • Alcohol use: No   • Drug use: No   • Sexual activity: Never   Lifestyle   • Physical activity:     Days per week: None     Minutes per session: None   • Stress: None   Relationships   • Social connections:     Talks on phone: None     Gets together: None     Attends Rastafari service: None     Active member of club or organization: None     Attends meetings of clubs or organizations: None     Relationship status: None   • Intimate partner violence:     Fear of current or ex partner: None     Emotionally abused: None     Physically abused: None     Forced sexual activity: None   Other Topics Concern   • None   Social History Narrative    Lives with  in a 2 story home       Family History:   Family History   Problem Relation Age of Onset   • Heart attack Biological Mother    • Lung cancer Biological Father    • Breast cancer Neg Hx    • Cancer Neg Hx    • Colon cancer Neg Hx    • Ovarian cancer Neg Hx        Allergies: No known allergies    Medications:   Current Outpatient Medications   Medication Sig Dispense Refill   • allopurinol (ZYLOPRIM) 300 mg tablet      • benazepril (LOTENSIN) 10 mg tablet Take 10 mg by mouth daily.     • levothyroxine (SYNTHROID) 88 mcg tablet Take 88 mcg by mouth once daily.  3   • omega-3 acid ethyl esters (LOVAZA) 1 gram capsule take 2 capsule by oral route 2 times every day     • pantoprazole (PROTONIX) 40 mg EC tablet Take 40 mg by mouth daily.         No current facility-administered medications for this visit.         Review of Systems  All other systems reviewed and negative except as noted in the HPI.    Objective     Vital Signs for the last 24 hours:  Visit Vitals  /72 (BP Location: Left upper arm, Patient Position: Sitting)   Pulse (!) 106   Ht 1.524 m (5')   Wt 69.4 kg (153 lb)   SpO2 98%   BMI 29.88 kg/m²       Physical Exam From Office:  General: well-developed, well-nourished, no apparent distress  Neck: supple, no masses  Lymphatic: no supraclavicular, cervical, or inguinal adenopathy  Respiratory: lungs clear to auscultation bilaterally, normal respiratory effort  Cardiovascular: regular rate and rhythm, no murmurs, rubs, gallops.   Extremity: no clubbing, cyanosis, edema  GI: abdomen soft, non-distended, non-tender, no hepatosplenomegaly, no masses  Neurologic: alert & oriented x3, no gross deficits  Psychiatric: mood and affect normal  Musculoskeletal: no deformity or gross strength deficit  Gynecologic: Normal vulva, vagina, cervix, uterus.  No adnexal masses  Urethra with dilated vessels, no masses.  Normal bladder  Rectal: No masses      Assessment/Plan     Ms. Raegan Young is a 74 y.o. 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  Discussed the signs and symptoms of recurrence.  She will return again in 3 months.      Trev Jimenez MD

## 2020-03-02 ENCOUNTER — TELEPHONE (OUTPATIENT)
Dept: GYNECOLOGIC ONCOLOGY | Facility: CLINIC | Age: 75
End: 2020-03-02

## 2020-03-02 DIAGNOSIS — K64.9 HEMORRHOIDS, UNSPECIFIED HEMORRHOID TYPE: Primary | ICD-10-CM

## 2020-03-02 NOTE — TELEPHONE ENCOUNTER
Pt called reporting bleeding this a.m. States it is from rectum - she says this is more than a hemorrhoid. Bleeding started before bowel movement and has continued. Has changed pad once. Has tried to reach out to Dr. Zabala who is in [place] today but has not heard back yet. I reviewed with Dr. Jimenez and we will send Rx hemorrhoid cream to attempt to control/improve symptoms. Rx sent Proctofoam. Pt notified of plan. To schedule.

## 2020-03-03 ENCOUNTER — TELEPHONE (OUTPATIENT)
Dept: GYNECOLOGIC ONCOLOGY | Facility: CLINIC | Age: 75
End: 2020-03-03

## 2020-03-03 DIAGNOSIS — K64.9 HEMORRHOIDS, UNSPECIFIED HEMORRHOID TYPE: Primary | ICD-10-CM

## 2020-03-03 RX ORDER — HYDROCORTISONE ACETATE 25 MG/1
25 SUPPOSITORY RECTAL 2 TIMES DAILY
Qty: 24 SUPPOSITORY | Refills: 0 | Status: SHIPPED | OUTPATIENT
Start: 2020-03-03 | End: 2020-03-03 | Stop reason: SDUPTHER

## 2020-03-03 RX ORDER — HYDROCORTISONE ACETATE 25 MG/1
25 SUPPOSITORY RECTAL 2 TIMES DAILY
Qty: 24 SUPPOSITORY | Refills: 0 | Status: SHIPPED | OUTPATIENT
Start: 2020-03-03 | End: 2021-05-25 | Stop reason: SDUPTHER

## 2020-07-17 NOTE — PROGRESS NOTES
"HPI: Ms. Raegan Young is a 74 y.o. lady with a history of   Cancer Staging  Vaginal cancer (CMS/HCC)  Staging form: Vagina, AJCC 8th Edition  - Clinical stage from 9/12/2018: FIGO Stage III (cT3, cN1, cM0) - Signed by Trev Jimenez MD on 9/21/2018         Vaginal cancer (CMS/HCC)    9/12/2018 Cancer Staged     Staging form: Vagina, AJCC 8th Edition  - Clinical stage from 9/12/2018: FIGO Stage III (cT3, cN1, cM0)      9/21/2018 Initial Diagnosis     Vaginal cancer (CMS/HCC) (HCC)      9/21/2018 - 10/29/2018 Chemotherapy     CISplatin with Concurrent Radiation, 7 Day Cycles - Cervical  Plan Provider: Trev Jimenez MD  Treatment goal: Curative  Line of treatment: First Line      9/27/2018 - 11/28/2018 Radiation Therapy     IMRT concurrent Cisplatin complicated by N/V, dehydration, thrombocytopenia febrile neutropenia      1/15/2019 Surgery     Left groin node excision benign        She returns today for surveillance.  Dr. Romero has recommended CT scan for surveillance.  Her last CT was 11/2019 and was SHARON. She also had a pap smear done 11/1/19 that was normal, hpv negative. She denies new vulvar itching/burning, pain, reports groin swelling/lumps right groin nodes, and denies other suspicious symptoms. She does report intermittent light pink bleeding on tissue after straining from Bowel movement. She saw Dr. Zabala who recommended colonoscopy. Patient has not been able to schedule due to Covid-19. She is planning to get this done within the next 2 months. Ms. Young also mentions briefly right before leaving her appointment that she does have a little \"firmness\" over her left groin area that she has noticed since surgery. She denies any pain around this area but reports that it is noticeable. She overall feels well and denies any other associated symptoms. All review of systems have been reviewed and are negative unless otherwise noted.       Medical History:   Past Medical History:   Diagnosis Date   • " Disease of thyroid gland    • Diverticulitis    • Diverticulitis of colon    • History of snoring    • Hypertension    • Hypothyroidism    • Vaginal cancer (CMS/HCC)        Surgical History:   Past Surgical History:   Procedure Laterality Date   • CHOLECYSTECTOMY     • COLONOSCOPY     • DILATION AND CURETTAGE OF UTERUS  2018   • ROTATOR CUFF REPAIR Right        Social History:   Social History     Socioeconomic History   • Marital status:      Spouse name: rom   • Number of children: 4   • Years of education: None   • Highest education level: None   Occupational History   • Occupation: retired/     Social Needs   • Financial resource strain: None   • Food insecurity:     Worry: None     Inability: None   • Transportation needs:     Medical: None     Non-medical: None   Tobacco Use   • Smoking status: Former Smoker     Packs/day: 1.00     Years: 40.00     Pack years: 40.00     Start date:      Last attempt to quit:      Years since quittin.   • Smokeless tobacco: Never Used   Substance and Sexual Activity   • Alcohol use: No   • Drug use: No   • Sexual activity: Never   Lifestyle   • Physical activity:     Days per week: None     Minutes per session: None   • Stress: None   Relationships   • Social connections:     Talks on phone: None     Gets together: None     Attends Islam service: None     Active member of club or organization: None     Attends meetings of clubs or organizations: None     Relationship status: None   • Intimate partner violence:     Fear of current or ex partner: None     Emotionally abused: None     Physically abused: None     Forced sexual activity: None   Other Topics Concern   • None   Social History Narrative    Lives with  in a 2 story home       Family History:   Family History   Problem Relation Age of Onset   • Heart attack Biological Mother    • Lung cancer Biological Father    • Breast cancer Neg Hx    • Cancer Neg Hx    • Colon cancer Neg  Hx    • Ovarian cancer Neg Hx        Allergies: No known allergies    Medications:   Current Outpatient Medications   Medication Sig Dispense Refill   • allopurinol (ZYLOPRIM) 300 mg tablet      • benazepril (LOTENSIN) 10 mg tablet Take 10 mg by mouth daily.     • hydrocortisone (ANUSOL-HC) 25 mg suppository Insert 1 suppository (25 mg total) into the rectum 2 (two) times a day for 12 days. 24 suppository 0   • levothyroxine (SYNTHROID) 88 mcg tablet Take 88 mcg by mouth once daily.  3   • omega-3 acid ethyl esters (LOVAZA) 1 gram capsule take 2 capsule by oral route 2 times every day     • pantoprazole (PROTONIX) 40 mg EC tablet Take 40 mg by mouth daily.         No current facility-administered medications for this visit.        Review of Systems  All other systems reviewed and negative except as noted in the HPI.    Objective     Vital Signs for the last 24 hours:  Visit Vitals  /73 (BP Location: Left upper arm, Patient Position: Sitting)   Pulse 100   Ht 1.524 m (5')   Wt 73.9 kg (163 lb)   SpO2 97%   BMI 31.83 kg/m²       Physical Exam From Office:  General: well-developed, well-nourished, no apparent distress  Neck: supple, no masses  Lymphatic: no supraclavicular, cervical, or inguinal adenopathy  Respiratory: lungs clear to auscultation bilaterally, normal respiratory effort  Cardiovascular: regular rate and rhythm, no murmurs, rubs, gallops.   Extremity: no clubbing, cyanosis, edema  GI: abdomen soft, non-distended, non-tender, no hepatosplenomegaly, no masses  Neurologic: alert & oriented x3, no gross deficits  Psychiatric: mood and affect normal  Musculoskeletal: no deformity or gross strength deficit  Gynecologic:  Firmness of left groin node, Normal vulva, vagina with vaginal stenosis, cervix, uterus.  No adnexal masses  Urethra with dilated vessels, no masses.  Normal bladder  Rectal: No masses      Assessment/Plan     Ms. Raegan Young is a 74 y.o. lady with  Problem List Items Addressed  This Visit        Unprioritized    Vaginal cancer (CMS/HCC) - Primary    Overview     9/12/18 biopsy L vagina squamous cell ca  MRI pelvis shows L vaginal cancer with 4-5cm L inguinal node necrotic  PET shows uptake in vagina and L inguinal node         Current Assessment & Plan     SHARON on physical exam today. She was advised to call should she experience any concerning symptoms such as vaginal bleeding, pelvic or abdominal pain, bloating,vaginal/vulvar irritation/pruritus, pain, discharge, bleeding, masses,lesions or any other concerns.   Follow-up in 3 months for surveillance.    On the way out patient reported new complaints of left groin firmness. Rx for CT chest/abd/pelvis to further evaluate. Patient was instructed to complete BMP prior to scheduling.            Relevant Orders    CT CHEST/ABDOMEN/PELVIS W IV CONTRAST    Basic metabolic panel        I Edda Cummings PA-C, am scribing for all of the services performed by Dr. Jimenez today. Edda Cummings PA-C, acted as scribe for this encounter on 07/24/20    I, Trev Jimenez MD, personally performed the services described in this document as scribed by ASAD Keating in my presence and it is both accurate and complete.    Trev Jimenez MD

## 2020-07-24 ENCOUNTER — OFFICE VISIT (OUTPATIENT)
Dept: GYNECOLOGIC ONCOLOGY | Facility: CLINIC | Age: 75
End: 2020-07-24
Attending: OBSTETRICS & GYNECOLOGY
Payer: MEDICARE

## 2020-07-24 VITALS
HEART RATE: 100 BPM | SYSTOLIC BLOOD PRESSURE: 136 MMHG | BODY MASS INDEX: 32 KG/M2 | WEIGHT: 163 LBS | DIASTOLIC BLOOD PRESSURE: 73 MMHG | OXYGEN SATURATION: 97 % | HEIGHT: 60 IN

## 2020-07-24 DIAGNOSIS — C52 VAGINAL CANCER (CMS/HCC): Primary | ICD-10-CM

## 2020-07-24 PROCEDURE — 99214 OFFICE O/P EST MOD 30 MIN: CPT | Performed by: OBSTETRICS & GYNECOLOGY

## 2020-07-24 NOTE — LETTER
July 24, 2020    Patient: Raegan Young   YOB: 1945   Date of Visit: 7/24/2020       Dear Dr. Lockhart:    The patient is seen back at the [facility] today in follow up with regard to her   1. Vaginal cancer (CMS/HCC)    . Below is my assessment and plan of care.         Vital Signs for the last 24 hours:  Visit Vitals  /73 (BP Location: Left upper arm, Patient Position: Sitting)   Pulse 100   Ht 1.524 m (5')   Wt 73.9 kg (163 lb)   SpO2 97%   BMI 31.83 kg/m²       Physical Exam From Office:  General: well-developed, well-nourished, no apparent distress  Neck: supple, no masses  Lymphatic: no supraclavicular, cervical, or inguinal adenopathy  Respiratory: lungs clear to auscultation bilaterally, normal respiratory effort  Cardiovascular: regular rate and rhythm, no murmurs, rubs, gallops.   Extremity: no clubbing, cyanosis, edema  GI: abdomen soft, non-distended, non-tender, no hepatosplenomegaly, no masses  Neurologic: alert & oriented x3, no gross deficits  Psychiatric: mood and affect normal  Musculoskeletal: no deformity or gross strength deficit  Gynecologic:  Firmness of left groin node, Normal vulva, vagina with vaginal stenosis, cervix, uterus.  No adnexal masses  Urethra with dilated vessels, no masses.  Normal bladder  Rectal: No masses        Ms. Raegan Young is a 74 y.o. 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 She was advised to call should she experience any concerning symptoms such as vaginal bleeding, pelvic or abdominal pain, bloating,vaginal/vulvar irritation/pruritus, pain, discharge, bleeding,  masses,lesions or any other concerns.   Follow-up in 3 months for surveillance.    On the way out patient reported new complaints of left groin firmness. Rx for CT chest/abd/pelvis to further evaluate. Patient was instructed to complete BMP prior to scheduling.            Relevant Orders    CT CHEST/ABDOMEN/PELVIS W IV CONTRAST    Basic metabolic panel        I Edda Cummings PA-C, am scribing for all of the services performed by Dr. Jimenez today. Edda Cummings PA-C, acted as scribe for this encounter on 07/24/20    I, Trev Jimenez MD, personally performed the services described in this document as scribed by ASAD Keating in my presence and it is both accurate and complete.    Trev Jimenez MD                 Sincerely,        Trev Jimenez MD  CC: MD Akbar Mueller MD

## 2020-07-24 NOTE — ASSESSMENT & PLAN NOTE
SHARON on physical exam today. She was advised to call should she experience any concerning symptoms such as vaginal bleeding, pelvic or abdominal pain, bloating,vaginal/vulvar irritation/pruritus, pain, discharge, bleeding, masses,lesions or any other concerns.   Follow-up in 3 months for surveillance.    On the way out patient reported new complaints of left groin firmness. Rx for CT chest/abd/pelvis to further evaluate. Patient was instructed to complete BMP prior to scheduling.

## 2020-09-01 ENCOUNTER — TELEPHONE (OUTPATIENT)
Dept: GYNECOLOGIC ONCOLOGY | Facility: CLINIC | Age: 75
End: 2020-09-01

## 2020-09-01 NOTE — TELEPHONE ENCOUNTER
Called Raegan to check in regarding labs that were copied to our office hemoglobin 7.3    No answer left detailed message for patient to please call the office back to discuss. Looks like labs were ordered by KEO Matamoros      No answer left detailed message on home machine; mobile mailbox is full

## 2020-09-02 ENCOUNTER — HOSPITAL ENCOUNTER (OUTPATIENT)
Dept: RADIOLOGY | Facility: HOSPITAL | Age: 75
Discharge: HOME | End: 2020-09-02
Attending: PHYSICIAN ASSISTANT
Payer: MEDICARE

## 2020-09-02 DIAGNOSIS — C52 VAGINAL CANCER (CMS/HCC): ICD-10-CM

## 2020-09-02 PROCEDURE — 74177 CT ABD & PELVIS W/CONTRAST: CPT

## 2020-09-02 PROCEDURE — 63600105 HC IODINE BASED CONTRAST: Performed by: PHYSICIAN ASSISTANT

## 2020-09-02 RX ADMIN — IOHEXOL 120 ML: 300 INJECTION, SOLUTION INTRAVENOUS at 09:58

## 2020-10-22 NOTE — PROGRESS NOTES
HPI: Ms. Raegan Young is a 74 y.o. lady with a history of   Cancer Staging  History of cancer of vagina  Staging form: Vagina, AJCC 8th Edition  - Clinical stage from 9/12/2018: FIGO Stage III (cT3, cN1, cM0) - Signed by Trev Jimenez MD on 9/21/2018    Oncology History   History of cancer of vagina   9/12/2018 Cancer Staged    Staging form: Vagina, AJCC 8th Edition  - Clinical stage from 9/12/2018: FIGO Stage III (cT3, cN1, cM0)     9/21/2018 Initial Diagnosis    Vaginal cancer (CMS/HCC) (HCC)     9/21/2018 - 10/29/2018 Chemotherapy    CISplatin with Concurrent Radiation,        9/27/2018 - 11/28/2018 Radiation Therapy    IMRT concurrent Cisplatin complicated by N/V, dehydration, thrombocytopenia febrile neutropenia     1/15/2019 Surgery    Left groin node excision benign     9/20/2020 Imaging Significant Findings    CT: She had CT scan on 9/2/20: Significant sigmoid colonic wall thickening from chronic diverticulosis. Significant size hiatal hernia present.  Left upper lobe 5 mm lung nodule is stable. There is no grossly enlarged lymph node noted in the left groin on the CT.         She returns today for surveillance.  Dr. Zabala has recommended CT scan for surveillance. She had CT scan on 9/2/20: Significant sigmoid colonic wall thickening from chronic diverticulosis. Significant size hiatal hernia present.  Left upper lobe 5 mm lung nodule is stable.  Right apical nodular scarring stable.There is no grossly enlarged lymph node noted in the left groin on the CT. Right-sided upper pole renal cysts. She also had a pap smear done 11/1/19 that was normal, hpv negative.     Today she returns for surveillance. She denies new vulvar itching/burning, pain, reports groin swelling/lumps right groin nodes, and denies other suspicious symptoms. She does report intermittent light pink bleeding on tissue after straining from Bowel movement. She was told to schedule for colonoscopy but has not done so as of yet. She  overall is feeling well today.    Medical History:   Past Medical History:   Diagnosis Date   • Disease of thyroid gland    • Diverticulitis    • Diverticulitis of colon    • History of snoring    • Hypertension    • Hypothyroidism    • Vaginal cancer (CMS/HCC)        Surgical History:   Past Surgical History:   Procedure Laterality Date   • CHOLECYSTECTOMY     • COLONOSCOPY     • DILATION AND CURETTAGE OF UTERUS  2018   • ROTATOR CUFF REPAIR Right        Social History:   Social History     Socioeconomic History   • Marital status:      Spouse name: rom   • Number of children: 4   • Years of education: None   • Highest education level: None   Occupational History   • Occupation: retired/     Social Needs   • Financial resource strain: None   • Food insecurity     Worry: None     Inability: None   • Transportation needs     Medical: None     Non-medical: None   Tobacco Use   • Smoking status: Former Smoker     Packs/day: 1.00     Years: 40.00     Pack years: 40.00     Start date:      Quit date:      Years since quittin.8   • Smokeless tobacco: Never Used   Substance and Sexual Activity   • Alcohol use: No   • Drug use: No   • Sexual activity: Never   Lifestyle   • Physical activity     Days per week: None     Minutes per session: None   • Stress: None   Relationships   • Social connections     Talks on phone: None     Gets together: None     Attends Church service: None     Active member of club or organization: None     Attends meetings of clubs or organizations: None     Relationship status: None   • Intimate partner violence     Fear of current or ex partner: None     Emotionally abused: None     Physically abused: None     Forced sexual activity: None   Other Topics Concern   • None   Social History Narrative    Lives with  in a 2 story home       Family History:   Family History   Problem Relation Age of Onset   • Heart attack Biological Mother    • Lung cancer  Biological Father    • Breast cancer Neg Hx    • Cancer Neg Hx    • Colon cancer Neg Hx    • Ovarian cancer Neg Hx        Allergies: No known allergies    Medications:   Current Outpatient Medications   Medication Sig Dispense Refill   • allopurinol (ZYLOPRIM) 300 mg tablet daily.      • benazepril (LOTENSIN) 10 mg tablet Take 10 mg by mouth daily.     • bumetanide (BUMEX) 1 mg tablet Take 1 mg by mouth once daily.     • levothyroxine (SYNTHROID) 88 mcg tablet Take 88 mcg by mouth once daily.  3   • omega-3 acid ethyl esters (LOVAZA) 1 gram capsule take 2 capsule by oral route 2 times every day     • pantoprazole (PROTONIX) 40 mg EC tablet Take 40 mg by mouth daily.       • hydrocortisone (ANUSOL-HC) 25 mg suppository Insert 1 suppository (25 mg total) into the rectum 2 (two) times a day for 12 days. 24 suppository 0     No current facility-administered medications for this visit.        Review of Systems  All other systems reviewed and negative except as noted in the HPI.    Objective     Vital Signs for the last 24 hours:  Visit Vitals  /76   Pulse 98   Wt 73 kg (161 lb)   SpO2 97%   BMI 31.44 kg/m²       Physical Exam From Office:  General: well-developed, well-nourished, no apparent distress  Neck: supple, no masses  Lymphatic: no supraclavicular, cervical, or inguinal adenopathy  Respiratory: lungs clear to auscultation bilaterally, normal respiratory effort  Cardiovascular: regular rate and rhythm, no murmurs, rubs, gallops.   Extremity: no clubbing, cyanosis, edema  GI: abdomen soft, non-distended, non-tender, no hepatosplenomegaly, no masses  Neurologic: alert & oriented x3, no gross deficits  Psychiatric: mood and affect normal  Musculoskeletal: no deformity or gross strength deficit  Gynecologic:  Firmness of left groin node, Normal vulva, vagina with vaginal stenosis, cervix, uterus.  No adnexal masses  Urethra with dilated vessels, no masses.  Normal bladder  Rectal: No masses      Assessment/Plan      Ms. Raegan Young is a 74 y.o. lady with  Problem List Items Addressed This Visit        Unprioritized    History of cancer of vagina - Primary    Overview     9/12/18 biopsy L vagina squamous cell ca  MRI pelvis shows L vaginal cancer with 4-5cm L inguinal node necrotic  PET shows uptake in vagina and L inguinal node         Current Assessment & Plan     Reviewed recent CT scan. SHARON on physical exam today. She was advised to call should she experience any concerning symptoms such as vaginal bleeding, vulvar/vaginal discharge/irritation/pruritus, masses/lesions, pelvic or abdominal pain, bloating, enlarged groin nodes, or any other concerns.   Vaginal Pap collected today; will call with the results.  Follow-up in 6 months for surveillance.  Instructed patient to schedule for colonoscopy                  Edda Cummings PA-C

## 2020-10-23 ENCOUNTER — OFFICE VISIT (OUTPATIENT)
Dept: GYNECOLOGIC ONCOLOGY | Facility: CLINIC | Age: 75
End: 2020-10-23
Attending: OBSTETRICS & GYNECOLOGY
Payer: MEDICARE

## 2020-10-23 VITALS
DIASTOLIC BLOOD PRESSURE: 76 MMHG | BODY MASS INDEX: 31.44 KG/M2 | WEIGHT: 161 LBS | SYSTOLIC BLOOD PRESSURE: 119 MMHG | HEART RATE: 98 BPM | OXYGEN SATURATION: 97 %

## 2020-10-23 DIAGNOSIS — Z85.44 HISTORY OF CANCER OF VAGINA: Primary | ICD-10-CM

## 2020-10-23 PROCEDURE — 99213 OFFICE O/P EST LOW 20 MIN: CPT | Performed by: OBSTETRICS & GYNECOLOGY

## 2020-10-23 RX ORDER — BUMETANIDE 1 MG/1
1 TABLET ORAL DAILY
COMMUNITY
Start: 2020-09-29

## 2020-10-23 NOTE — ASSESSMENT & PLAN NOTE
Reviewed recent CT scan. SHARON on physical exam today. She was advised to call should she experience any concerning symptoms such as vaginal bleeding, vulvar/vaginal discharge/irritation/pruritus, masses/lesions, pelvic or abdominal pain, bloating, enlarged groin nodes, or any other concerns.   Vaginal Pap collected today; will call with the results.  Follow-up in 6 months for surveillance.  Instructed patient to schedule for colonoscopy

## 2020-10-28 ENCOUNTER — TELEPHONE (OUTPATIENT)
Dept: GYNECOLOGIC ONCOLOGY | Facility: CLINIC | Age: 75
End: 2020-10-28

## 2020-10-28 LAB
CYTOLOGIST CVX/VAG CYTO: NORMAL
CYTOLOGY CVX/VAG DOC CYTO: NORMAL
CYTOLOGY CVX/VAG DOC THIN PREP: NORMAL
DX ICD CODE: NORMAL
LAB CORP NOTE:: NORMAL
LAB CORP QC REVIEWED BY:: NORMAL
OTHER STN SPEC: NORMAL
STAT OF ADQ CVX/VAG CYTO-IMP: NORMAL

## 2021-05-21 ENCOUNTER — OFFICE VISIT (OUTPATIENT)
Dept: GYNECOLOGIC ONCOLOGY | Facility: CLINIC | Age: 76
End: 2021-05-21
Attending: OBSTETRICS & GYNECOLOGY
Payer: MEDICARE

## 2021-05-21 VITALS
DIASTOLIC BLOOD PRESSURE: 78 MMHG | BODY MASS INDEX: 32.34 KG/M2 | SYSTOLIC BLOOD PRESSURE: 136 MMHG | WEIGHT: 165.6 LBS | OXYGEN SATURATION: 98 % | HEART RATE: 98 BPM

## 2021-05-21 DIAGNOSIS — Z12.11 SCREENING FOR COLORECTAL CANCER: ICD-10-CM

## 2021-05-21 DIAGNOSIS — Z12.12 SCREENING FOR COLORECTAL CANCER: ICD-10-CM

## 2021-05-21 DIAGNOSIS — Z85.44 HISTORY OF CANCER OF VAGINA: Primary | ICD-10-CM

## 2021-05-21 PROBLEM — B37.89 CANDIDA RASH OF GROIN: Status: RESOLVED | Noted: 2018-10-23 | Resolved: 2021-05-21

## 2021-05-21 PROBLEM — R39.198 VOIDING DIFFICULTY: Status: RESOLVED | Noted: 2019-04-26 | Resolved: 2021-05-21

## 2021-05-21 PROCEDURE — 99213 OFFICE O/P EST LOW 20 MIN: CPT | Performed by: OBSTETRICS & GYNECOLOGY

## 2021-05-21 NOTE — PROGRESS NOTES
"HPI: Ms. Raegan Young is a 75 y.o. lady with a history of   Cancer Staging  History of cancer of vagina  Staging form: Vagina, AJCC 8th Edition  - Clinical stage from 9/12/2018: FIGO Stage III (cT3, cN1, cM0) - Signed by Trev Jimenez MD on 9/21/2018    Oncology History   History of cancer of vagina   9/12/2018 Cancer Staged    Staging form: Vagina, AJCC 8th Edition  - Clinical stage from 9/12/2018: FIGO Stage III (cT3, cN1, cM0)     9/21/2018 Initial Diagnosis    Vaginal cancer (CMS/HCC) (HCC)     9/21/2018 - 10/29/2018 Chemotherapy    CISplatin with Concurrent Radiation,        9/27/2018 - 11/28/2018 Radiation Therapy    IMRT concurrent Cisplatin complicated by N/V, dehydration, thrombocytopenia febrile neutropenia     1/15/2019 Surgery    Left groin node excision benign     9/20/2020 Imaging Significant Findings    CT: She had CT scan on 9/2/20: Significant sigmoid colonic wall thickening from chronic diverticulosis. Significant size hiatal hernia present.  Left upper lobe 5 mm lung nodule is stable. There is no grossly enlarged lymph node noted in the left groin on the CT.         She returns today for surveillance.  Dr. Zabala has recommended CT scan for surveillance. She had CT scan on 9/2/20: Significant sigmoid colonic wall thickening from chronic diverticulosis. Significant size hiatal hernia present.  Left upper lobe 5 mm lung nodule is stable.  Right apical nodular scarring stable.There is no grossly enlarged lymph node noted in the left groin on the CT. Right-sided upper pole renal cysts. She also had a pap smear done 11/1/19 that was normal, hpv negative.     Today she returns for surveillance. Reports \"irritable bowel issues\" with intermittent abdominal bloating after certain foods, loose bowels, with some blood straining in stools. She denies new vulvar itching/burning, pain, denies groin swelling/lumps, she denies other suspicious symptoms. Patient is scheduling colonoscopy with Dr. Bruno " within the next few months. She is hesitant to have colonoscopy performed due to transportation and fear of bowel perforation. She otherwise reports feeling well.    Medical History:   Past Medical History:   Diagnosis Date   • Disease of thyroid gland    • Diverticulitis    • Diverticulitis of colon    • History of snoring    • Hypertension    • Hypothyroidism    • Vaginal cancer (CMS/HCC)        Surgical History:   Past Surgical History:   Procedure Laterality Date   • CHOLECYSTECTOMY     • COLONOSCOPY     • DILATION AND CURETTAGE OF UTERUS  02/2018   • ROTATOR CUFF REPAIR Right       reports that she quit smoking about 23 years ago. She started smoking about 64 years ago. She has a 40.00 pack-year smoking history. She has never used smokeless tobacco. She reports that she does not drink alcohol and does not use drugs.    Cancer-related family history includes Lung cancer in her biological father. There is no history of Breast cancer, Cancer, Colon cancer, or Ovarian cancer.    Allergies: No known allergies    Medications:   Current Outpatient Medications   Medication Sig Dispense Refill   • allopurinol (ZYLOPRIM) 300 mg tablet daily.      • benazepril (LOTENSIN) 10 mg tablet Take 10 mg by mouth daily.     • bumetanide (BUMEX) 1 mg tablet Take 1 mg by mouth once daily.     • hydrocortisone (ANUSOL-HC) 25 mg suppository Insert 1 suppository (25 mg total) into the rectum 2 (two) times a day for 12 days. 24 suppository 0   • levothyroxine (SYNTHROID) 88 mcg tablet Take 88 mcg by mouth once daily.  3   • omega-3 acid ethyl esters (LOVAZA) 1 gram capsule take 2 capsule by oral route 2 times every day     • pantoprazole (PROTONIX) 40 mg EC tablet Take 40 mg by mouth daily.         No current facility-administered medications for this visit.       Review of Systems  All other systems reviewed and negative except as noted in the HPI.    Objective     Vital Signs for the last 24 hours:  Visit Vitals  /78   Pulse 98    Wt 75.1 kg (165 lb 9.6 oz)   SpO2 98%   BMI 32.34 kg/m²       Physical Exam From Office:  General: well-developed, well-nourished, no apparent distress  Neck: supple, no masses  Lymphatic: no supraclavicular, cervical, or inguinal adenopathy  Respiratory: lungs clear to auscultation bilaterally, normal respiratory effort  Cardiovascular: regular rate and rhythm, no murmurs, rubs, gallops.   Extremity: no clubbing, cyanosis, edema  GI: abdomen soft, non-distended, non-tender, no hepatosplenomegaly, no masses  Neurologic: alert & oriented x3, no gross deficits  Psychiatric: mood and affect normal  Musculoskeletal: no deformity or gross strength deficit  Gynecologic:  Firmness of left groin node, Normal vulva, vagina with vaginal stenosis, cervix, uterus.  No adnexal masses  Urethra with dilated vessels, no masses.  Normal bladder  Rectal: No masses  Breast: no masses, skin changes, nipple discharge    Assessment/Plan     Ms. Raegan Young is a 75 y.o. 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 She was advised to call should she experience any concerning symptoms such as vaginal bleeding, vaginal/vulvar irritation, lumps,masses, pruritus, pelvic or abdominal pain, bloating, or any other concerns.  Follow-up in 6 months for surveillance. Instructed patient to complete CT chest/abd/pelvis for surveillance as well as follow-up on lung nodule.            Relevant Orders    CT CHEST/ABDOMEN/PELVIS W IV CONTRAST    Basic metabolic panel    Lung nodule < 6cm on CT    Current Assessment & Plan     Will plan to recheck CT chest/abd/pelvis          Relevant Orders    CT CHEST/ABDOMEN/PELVIS W IV CONTRAST    Basic  metabolic panel    Screening for colorectal cancer    Current Assessment & Plan     Encouraged patient to schedule colonoscopy for further evaluation.              Edda Cummings PA-C ;I spent time on this date of service performing the following activities: obtaining history, performing examination, entering orders, documenting, preparing for visit, obtaining / reviewing records, providing counseling and education, communicating results and coordinating care.

## 2021-05-21 NOTE — ASSESSMENT & PLAN NOTE
SHARON on physical exam today. She was advised to call should she experience any concerning symptoms such as vaginal bleeding, vaginal/vulvar irritation, lumps,masses, pruritus, pelvic or abdominal pain, bloating, or any other concerns.  Follow-up in 6 months for surveillance. Instructed patient to complete CT chest/abd/pelvis for surveillance as well as follow-up on lung nodule.

## 2021-05-25 ENCOUNTER — TELEPHONE (OUTPATIENT)
Dept: GYNECOLOGIC ONCOLOGY | Facility: CLINIC | Age: 76
End: 2021-05-25

## 2021-05-25 DIAGNOSIS — K64.9 HEMORRHOIDS, UNSPECIFIED HEMORRHOID TYPE: ICD-10-CM

## 2021-05-25 RX ORDER — HYDROCORTISONE ACETATE 25 MG/1
25 SUPPOSITORY RECTAL 2 TIMES DAILY
Qty: 24 SUPPOSITORY | Refills: 0 | Status: SHIPPED | OUTPATIENT
Start: 2021-05-25 | End: 2021-05-25 | Stop reason: SDUPTHER

## 2021-05-25 RX ORDER — HYDROCORTISONE ACETATE 25 MG/1
25 SUPPOSITORY RECTAL 2 TIMES DAILY
Qty: 24 SUPPOSITORY | Refills: 0 | Status: SHIPPED | OUTPATIENT
Start: 2021-05-25 | End: 2021-06-04

## 2021-05-25 NOTE — TELEPHONE ENCOUNTER
Called patient to check in; she reports pain after rectal examination Agreed to send over hydrocortisone suppository. Instructed her to call if no resolution by Friday.    Encouraged her to take stool softener + laxative as well.

## 2021-05-25 NOTE — TELEPHONE ENCOUNTER
Pt called asking for a prescription on hydrocortisone suppository. Pt reports discomfort after office visit on 5/21/21

## 2021-06-03 ENCOUNTER — TELEPHONE (OUTPATIENT)
Dept: GYNECOLOGIC ONCOLOGY | Facility: CLINIC | Age: 76
End: 2021-06-03

## 2021-06-03 NOTE — TELEPHONE ENCOUNTER
Called Raegan back. She reports diarrhea she is scheduled to be seen tomorrow at 930. She is afraid to take anything to improve diarrhea. Encouraged her to try imodium and call if symptoms worsen or do not improve with imodium. Denies any other associated symptoms.

## 2021-06-03 NOTE — TELEPHONE ENCOUNTER
Pt called stating she has diarrhea and needs to make it back from [place] to [place]. What can she take to help. Pt can be reached her mobile.   Please advise.

## 2021-06-04 ENCOUNTER — PREP FOR CASE (OUTPATIENT)
Dept: GYNECOLOGIC ONCOLOGY | Facility: CLINIC | Age: 76
End: 2021-06-04

## 2021-06-04 ENCOUNTER — OFFICE VISIT (OUTPATIENT)
Dept: GYNECOLOGIC ONCOLOGY | Facility: CLINIC | Age: 76
End: 2021-06-04
Attending: OBSTETRICS & GYNECOLOGY
Payer: MEDICARE

## 2021-06-04 VITALS
OXYGEN SATURATION: 97 % | HEART RATE: 109 BPM | SYSTOLIC BLOOD PRESSURE: 159 MMHG | WEIGHT: 160 LBS | DIASTOLIC BLOOD PRESSURE: 80 MMHG | BODY MASS INDEX: 31.25 KG/M2

## 2021-06-04 DIAGNOSIS — Z01.812 PRE-OPERATIVE LABORATORY EXAMINATION: ICD-10-CM

## 2021-06-04 DIAGNOSIS — Z11.59 SPECIAL SCREENING EXAMINATION FOR VIRAL DISEASE: ICD-10-CM

## 2021-06-04 DIAGNOSIS — N82.4 COLOVAGINAL FISTULA: ICD-10-CM

## 2021-06-04 DIAGNOSIS — K57.21 DIVERTICULITIS OF LARGE INTESTINE WITH PERFORATION AND ABSCESS WITH BLEEDING: Primary | ICD-10-CM

## 2021-06-04 DIAGNOSIS — Z85.44 HISTORY OF CANCER OF VAGINA: ICD-10-CM

## 2021-06-04 DIAGNOSIS — N82.4 FISTULA, COLOVAGINAL: Primary | ICD-10-CM

## 2021-06-04 PROBLEM — Z12.11 SCREENING FOR COLORECTAL CANCER: Status: RESOLVED | Noted: 2021-05-21 | Resolved: 2021-06-04

## 2021-06-04 PROBLEM — Z12.12 SCREENING FOR COLORECTAL CANCER: Status: RESOLVED | Noted: 2021-05-21 | Resolved: 2021-06-04

## 2021-06-04 PROCEDURE — 99214 OFFICE O/P EST MOD 30 MIN: CPT | Performed by: PHYSICIAN ASSISTANT

## 2021-06-04 RX ORDER — METRONIDAZOLE 250 MG/1
250 TABLET ORAL 3 TIMES DAILY
Qty: 21 TABLET | Refills: 0 | Status: SHIPPED | OUTPATIENT
Start: 2021-06-04 | End: 2021-06-11

## 2021-06-04 RX ORDER — PHENAZOPYRIDINE HYDROCHLORIDE 95 MG/1
95 TABLET ORAL ONCE
Status: CANCELLED | OUTPATIENT
Start: 2021-06-04 | End: 2021-06-04

## 2021-06-04 RX ORDER — LEVOFLOXACIN 750 MG/1
750 TABLET ORAL DAILY
Qty: 7 TABLET | Refills: 0 | Status: SHIPPED | OUTPATIENT
Start: 2021-06-04 | End: 2021-06-11

## 2021-06-04 RX ORDER — SODIUM CHLORIDE, SODIUM GLUCONATE, SODIUM ACETATE, POTASSIUM CHLORIDE AND MAGNESIUM CHLORIDE 30; 37; 368; 526; 502 MG/100ML; MG/100ML; MG/100ML; MG/100ML; MG/100ML
80 INJECTION, SOLUTION INTRAVENOUS CONTINUOUS
Status: CANCELLED | OUTPATIENT
Start: 2021-06-04 | End: 2021-06-05

## 2021-06-04 RX ORDER — ACETAMINOPHEN 325 MG/1
975 TABLET ORAL ONCE
Status: CANCELLED | OUTPATIENT
Start: 2021-06-04 | End: 2021-06-04

## 2021-06-04 NOTE — ASSESSMENT & PLAN NOTE
Will have the office upload patient's recent CT into the EMR system.  Will plan to perform EUA, possible biopsies at the time of colorectal surgery. Will coordinate with Dr. Mandel's office.

## 2021-06-04 NOTE — ASSESSMENT & PLAN NOTE
Confirmed on CT scan and pelvic examination today. Referral for patient to see Colorectal for further evaluation/ surgical management. Will coordinate with Dr. Mandel to perform EUA at the time of surgery. Refilled both flagyl + Levaquin for another week ( total of 2 weeks). Instructed patient to call with any new concerning symptoms such as fevers, chills, constipation, nausea/vomiting, severe abdominal pain. Encouraged her to remain on stool softener (colace daily).

## 2021-06-04 NOTE — LETTER
June 24, 2021    Patient: Raegan Young   YOB: 1945   Date of Visit: 6/4/2021       Dear Dr. Valadez:    The patient is seen back at the [facility] today in follow up with regard to her   1. Diverticulitis of large intestine with perforation and abscess with bleeding    2. Colovaginal fistula    3. History of cancer of vagina    . Below is my assessment and plan of care.         Vital Signs for the last 24 hours:  Visit Vitals  BP (!) 159/80   Pulse (!) 109   Wt 72.6 kg (160 lb)   SpO2 97%   BMI 31.25 kg/m²       Physical Exam From Office:  General: well-developed, well-nourished, no apparent distress  Neck: supple, no masses  Lymphatic: no supraclavicular, cervical, or inguinal adenopathy  Respiratory: lungs clear to auscultation bilaterally, normal respiratory effort  Cardiovascular: regular rate and rhythm, no murmurs, rubs, gallops.   Extremity: no clubbing, cyanosis, edema  GI: abdomen soft, non-distended, non-tender, no hepatosplenomegaly, no masses  Neurologic: alert & oriented x3, no gross deficits  Psychiatric: mood and affect normal  Musculoskeletal: no deformity or gross strength deficit  Gynecologic:  Firmness of left groin node, Normal vulva, vagina with vaginal stenosis, stool seen at vaginal apex.  No adnexal masses  Urethra with dilated vessels, no masses.  Normal bladder  Rectal: deferred; will plan to perform EUA      Ms. Raegan Young is a 75 y.o. lady with  Problem List Items Addressed This Visit        Unprioritized    Colovaginal fistula    Current Assessment & Plan     Confirmed on CT scan and pelvic examination today. Referral for patient to see Colorectal for further evaluation/ surgical management. Will coordinate with Dr. Mandel to perform EUA at the time of surgery. Refilled both flagyl + Levaquin for another week ( total of 2 weeks). Instructed patient to call with any new concerning symptoms  such as fevers, chills, constipation, nausea/vomiting, severe abdominal pain. Encouraged her to remain on stool softener (colace daily).          Relevant Orders    Ambulatory referral to Colorectal Surgery    Diverticulitis of large intestine with perforation and abscess with bleeding - Primary    Current Assessment & Plan     Referral to colorectal.          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  Will plan to perform EUA, possible biopsies at the time of colorectal surgery. Will coordinate with Dr. Mandel's office.         Relevant Orders    Ambulatory referral to Colorectal Surgery                 Sincerely,        Trev Jimenez MD  CC: MD Geovani Mac MD

## 2021-06-04 NOTE — PROGRESS NOTES
"HPI: Ms. Raegan Young is a 75 y.o. lady with a history of   Cancer Staging  History of cancer of vagina  Staging form: Vagina, AJCC 8th Edition  - Clinical stage from 9/12/2018: FIGO Stage III (cT3, cN1, cM0) - Signed by Trev Jimenez MD on 9/21/2018    Oncology History   History of cancer of vagina   9/12/2018 Cancer Staged    Staging form: Vagina, AJCC 8th Edition  - Clinical stage from 9/12/2018: FIGO Stage III (cT3, cN1, cM0)     9/21/2018 Initial Diagnosis    Vaginal cancer (CMS/HCC) (HCC)     9/21/2018 - 10/29/2018 Chemotherapy    CISplatin with Concurrent Radiation,        9/27/2018 - 11/28/2018 Radiation Therapy    IMRT concurrent Cisplatin complicated by N/V, dehydration, thrombocytopenia febrile neutropenia     1/15/2019 Surgery    Left groin node excision benign     9/20/2020 Imaging Significant Findings    CT: She had CT scan on 9/2/20: Significant sigmoid colonic wall thickening from chronic diverticulosis. Significant size hiatal hernia present.  Left upper lobe 5 mm lung nodule is stable. There is no grossly enlarged lymph node noted in the left groin on the CT.         She returns today for recent ED visit with findings of colitis possible diverticulitis and colonic uterine fistula. Patient went to ED on 5/30/2021 for diarrhea and vaginal discharge. CT done at this time showed concerns for colonic uterine fistula due to diverticulitis. Patient was place don 7 day course of lovofloxacin+ flagyl. She reports that she is very anxious today and continues to feel \"shakiness\" all over her body as well as bilateral lower extremity edema. She continues to report brown vaginal discharge. Denies any constipation, nausea/vomtiing, moderate abd/pelvic pain. She is accompanied by her daughter today.     Medical History:   Past Medical History:   Diagnosis Date   • Disease of thyroid gland    • Diverticulitis    • Diverticulitis of colon    • History of snoring    • Hypertension    • Hypothyroidism    • " Vaginal cancer (CMS/HCC)        Surgical History:   Past Surgical History:   Procedure Laterality Date   • CHOLECYSTECTOMY     • COLONOSCOPY     • DILATION AND CURETTAGE OF UTERUS  02/2018   • ROTATOR CUFF REPAIR Right       reports that she quit smoking about 23 years ago. She started smoking about 64 years ago. She has a 40.00 pack-year smoking history. She has never used smokeless tobacco. She reports that she does not drink alcohol and does not use drugs.    Cancer-related family history includes Lung cancer in her biological father. There is no history of Breast cancer, Cancer, Colon cancer, or Ovarian cancer.    Allergies: No known allergies    Medications:   Current Outpatient Medications   Medication Sig Dispense Refill   • allopurinol (ZYLOPRIM) 300 mg tablet daily.      • benazepril (LOTENSIN) 10 mg tablet Take 10 mg by mouth daily.     • bumetanide (BUMEX) 1 mg tablet Take 1 mg by mouth once daily.     • levoFLOXacin (LEVAQUIN) 750 mg tablet Take 1 tablet (750 mg total) by mouth daily for 7 days. 7 tablet 0   • levothyroxine (SYNTHROID) 88 mcg tablet Take 88 mcg by mouth once daily.  3   • metroNIDAZOLE (FLAGYL) 250 mg tablet Take 1 tablet (250 mg total) by mouth 3 (three) times a day for 7 days. 21 tablet 0   • omega-3 acid ethyl esters (LOVAZA) 1 gram capsule take 2 capsule by oral route 2 times every day     • pantoprazole (PROTONIX) 40 mg EC tablet Take 40 mg by mouth daily.         No current facility-administered medications for this visit.       Review of Systems  All other systems reviewed and negative except as noted in the HPI.    Objective     Vital Signs for the last 24 hours:  Visit Vitals  BP (!) 159/80   Pulse (!) 109   Wt 72.6 kg (160 lb)   SpO2 97%   BMI 31.25 kg/m²       Physical Exam From Office:  General: well-developed, well-nourished, no apparent distress  Neck: supple, no masses  Lymphatic: no supraclavicular, cervical, or inguinal adenopathy  Respiratory: lungs clear to  auscultation bilaterally, normal respiratory effort  Cardiovascular: regular rate and rhythm, no murmurs, rubs, gallops.   Extremity: no clubbing, cyanosis, edema  GI: abdomen soft, non-distended, non-tender, no hepatosplenomegaly, no masses  Neurologic: alert & oriented x3, no gross deficits  Psychiatric: mood and affect normal  Musculoskeletal: no deformity or gross strength deficit  Gynecologic:  Firmness of left groin node, Normal vulva, vagina with vaginal stenosis, stool seen at vaginal apex.  No adnexal masses  Urethra with dilated vessels, no masses.  Normal bladder  Rectal: deferred; will plan to perform EUA    Assessment/Plan     Ms. Raegan Young is a 75 y.o. lady with  Problem List Items Addressed This Visit        Unprioritized    Colovaginal fistula    Current Assessment & Plan     Confirmed on CT scan and pelvic examination today. Referral for patient to see Colorectal for further evaluation/ surgical management. Will coordinate with Dr. Mandel to perform EUA at the time of surgery. Refilled both flagyl + Levaquin for another week ( total of 2 weeks). Instructed patient to call with any new concerning symptoms such as fevers, chills, constipation, nausea/vomiting, severe abdominal pain. Encouraged her to remain on stool softener (colace daily).          Relevant Orders    Ambulatory referral to Colorectal Surgery    Diverticulitis of large intestine with perforation and abscess with bleeding - Primary    Current Assessment & Plan     Referral to colorectal.          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  Will plan to perform EUA, possible biopsies at the time of colorectal surgery. Will coordinate with Dr. Mandel's office.         Relevant Orders    Ambulatory referral to Colorectal Surgery             Edda Cummings PA-C  ;I spent time on this date of service performing the following activities: obtaining history, documenting and preparing for visit.  I, Trev Jimenez MD, personally performed the services described in this document as scribed by ASAD Keating in my presence and it is both accurate and complete.  New acute problem of uncertain prognosis with 1 chronic problem.  I personally reviewed images as well as her laboratory studies from her hospitalization at [facility] (greater than 3), moderate risk of complications from management  Trev Jimenez MD

## 2021-06-06 ENCOUNTER — TELEPHONE (OUTPATIENT)
Dept: GYNECOLOGIC ONCOLOGY | Facility: CLINIC | Age: 76
End: 2021-06-06

## 2021-06-06 NOTE — PROGRESS NOTES
Patient called answering service complaining of sore tongue, bilateral leg swelling, for the last few days. Started on levaquin and flagyl 5 days ago. Symptoms did not immediately begin after starting meds. Unable to quantity swelling. Denies tendon or joint pain. Tongue soreness associated with taking flagyl, while it is in the mouth. Also has dry mouth, possibly with palpitations. Could not specify timing of symptoms. Advised to go to  ER for eval. She did not want to go to [facility]. Wanted to go to [place]. Report called into [facility].

## 2021-06-07 ENCOUNTER — HOSPITAL ENCOUNTER (EMERGENCY)
Facility: HOSPITAL | Age: 76
Discharge: HOME | End: 2021-06-07
Attending: EMERGENCY MEDICINE
Payer: MEDICARE

## 2021-06-07 VITALS
WEIGHT: 160 LBS | OXYGEN SATURATION: 99 % | HEART RATE: 104 BPM | SYSTOLIC BLOOD PRESSURE: 148 MMHG | RESPIRATION RATE: 16 BRPM | DIASTOLIC BLOOD PRESSURE: 73 MMHG | BODY MASS INDEX: 31.41 KG/M2 | HEIGHT: 60 IN | TEMPERATURE: 98 F

## 2021-06-07 DIAGNOSIS — R53.1 GENERALIZED WEAKNESS: ICD-10-CM

## 2021-06-07 DIAGNOSIS — E86.0 DEHYDRATION: ICD-10-CM

## 2021-06-07 DIAGNOSIS — R42 LIGHTHEADEDNESS: Primary | ICD-10-CM

## 2021-06-07 LAB
ALBUMIN SERPL-MCNC: 3.3 G/DL (ref 3.4–5)
ALP SERPL-CCNC: 73 IU/L (ref 35–126)
ALT SERPL-CCNC: 12 IU/L (ref 11–54)
ANION GAP SERPL CALC-SCNC: 14 MEQ/L (ref 3–15)
AST SERPL-CCNC: 21 IU/L (ref 15–41)
ATRIAL RATE: 93
BACTERIA #/AREA URNS HPF: 1 /HPF
BASOPHILS # BLD: 0.04 K/UL (ref 0.01–0.1)
BASOPHILS NFR BLD: 0.3 %
BILIRUB SERPL-MCNC: 0.4 MG/DL (ref 0.3–1.2)
BILIRUB UR QL STRIP.AUTO: NEGATIVE MG/DL
BUN SERPL-MCNC: 12 MG/DL (ref 8–20)
CALCIUM SERPL-MCNC: 8.9 MG/DL (ref 8.9–10.3)
CHLORIDE SERPL-SCNC: 99 MEQ/L (ref 98–109)
CLARITY UR REFRACT.AUTO: ABNORMAL
CO2 SERPL-SCNC: 25 MEQ/L (ref 22–32)
COLOR UR AUTO: YELLOW
CREAT SERPL-MCNC: 1.2 MG/DL (ref 0.6–1.1)
DIFFERENTIAL METHOD BLD: ABNORMAL
EOSINOPHIL # BLD: 0.04 K/UL (ref 0.04–0.36)
EOSINOPHIL NFR BLD: 0.3 %
ERYTHROCYTE [DISTWIDTH] IN BLOOD BY AUTOMATED COUNT: 15.3 % (ref 11.7–14.4)
GFR SERPL CREATININE-BSD FRML MDRD: 43.7 ML/MIN/1.73M*2
GLUCOSE SERPL-MCNC: 91 MG/DL (ref 70–99)
GLUCOSE UR STRIP.AUTO-MCNC: NEGATIVE MG/DL
HCT VFR BLDCO AUTO: 33.1 % (ref 35–45)
HGB BLD-MCNC: 11.3 G/DL (ref 11.8–15.7)
HGB UR QL STRIP.AUTO: 3
HYALINE CASTS #/AREA URNS LPF: ABNORMAL /LPF
IMM GRANULOCYTES # BLD AUTO: 0.1 K/UL (ref 0–0.08)
IMM GRANULOCYTES NFR BLD AUTO: 0.9 %
KETONES UR STRIP.AUTO-MCNC: NEGATIVE MG/DL
LACTATE SERPL-SCNC: 1.3 MMOL/L (ref 0.4–2)
LEUKOCYTE ESTERASE UR QL STRIP.AUTO: 3
LYMPHOCYTES # BLD: 1.18 K/UL (ref 1.2–3.5)
LYMPHOCYTES NFR BLD: 10.2 %
MCH RBC QN AUTO: 30.9 PG (ref 28–33.2)
MCHC RBC AUTO-ENTMCNC: 34.1 G/DL (ref 32.2–35.5)
MCV RBC AUTO: 90.4 FL (ref 83–98)
MONOCYTES # BLD: 1.13 K/UL (ref 0.28–0.8)
MONOCYTES NFR BLD: 9.8 %
NEUTROPHILS # BLD: 9.03 K/UL (ref 1.7–7)
NEUTS SEG NFR BLD: 78.5 %
NITRITE UR QL STRIP.AUTO: NEGATIVE
NRBC BLD-RTO: 0 %
P AXIS: 93
PDW BLD AUTO: 10.5 FL (ref 9.4–12.3)
PH UR STRIP.AUTO: 6.5 [PH]
PLATELET # BLD AUTO: 429 K/UL (ref 150–369)
POTASSIUM SERPL-SCNC: 3.5 MEQ/L (ref 3.6–5.1)
PR INTERVAL: 148
PROT SERPL-MCNC: 7.5 G/DL (ref 6–8.2)
PROT UR QL STRIP.AUTO: 1
QRS DURATION: 66
QT INTERVAL: 350
QTC CALCULATION(BAZETT): 435
R AXIS: 29
RBC # BLD AUTO: 3.66 M/UL (ref 3.93–5.22)
RBC #/AREA URNS HPF: ABNORMAL /HPF
SODIUM SERPL-SCNC: 138 MEQ/L (ref 136–144)
SP GR UR REFRACT.AUTO: 1.01
SQUAMOUS #/AREA URNS HPF: 1 /HPF
T WAVE AXIS: 8
UROBILINOGEN UR STRIP-ACNC: 0.2 EU/DL
VENTRICULAR RATE: 93
WBC # BLD AUTO: 11.52 K/UL (ref 3.8–10.5)
WBC #/AREA URNS HPF: ABNORMAL /HPF

## 2021-06-07 PROCEDURE — 93010 ELECTROCARDIOGRAM REPORT: CPT | Performed by: INTERNAL MEDICINE

## 2021-06-07 PROCEDURE — 80053 COMPREHEN METABOLIC PANEL: CPT | Performed by: EMERGENCY MEDICINE

## 2021-06-07 PROCEDURE — 3E0337Z INTRODUCTION OF ELECTROLYTIC AND WATER BALANCE SUBSTANCE INTO PERIPHERAL VEIN, PERCUTANEOUS APPROACH: ICD-10-PCS | Performed by: EMERGENCY MEDICINE

## 2021-06-07 PROCEDURE — 87086 URINE CULTURE/COLONY COUNT: CPT | Performed by: EMERGENCY MEDICINE

## 2021-06-07 PROCEDURE — 25800000 HC PHARMACY IV SOLUTIONS: Performed by: EMERGENCY MEDICINE

## 2021-06-07 PROCEDURE — 93005 ELECTROCARDIOGRAM TRACING: CPT | Performed by: EMERGENCY MEDICINE

## 2021-06-07 PROCEDURE — 87040 BLOOD CULTURE FOR BACTERIA: CPT | Mod: 91 | Performed by: EMERGENCY MEDICINE

## 2021-06-07 PROCEDURE — 85025 COMPLETE CBC W/AUTO DIFF WBC: CPT | Performed by: EMERGENCY MEDICINE

## 2021-06-07 PROCEDURE — 83605 ASSAY OF LACTIC ACID: CPT | Performed by: EMERGENCY MEDICINE

## 2021-06-07 PROCEDURE — 99284 EMERGENCY DEPT VISIT MOD MDM: CPT | Mod: 25

## 2021-06-07 PROCEDURE — 96360 HYDRATION IV INFUSION INIT: CPT

## 2021-06-07 PROCEDURE — 81003 URINALYSIS AUTO W/O SCOPE: CPT | Performed by: EMERGENCY MEDICINE

## 2021-06-07 PROCEDURE — 36415 COLL VENOUS BLD VENIPUNCTURE: CPT | Performed by: EMERGENCY MEDICINE

## 2021-06-07 PROCEDURE — 87077 CULTURE AEROBIC IDENTIFY: CPT | Performed by: EMERGENCY MEDICINE

## 2021-06-07 RX ADMIN — SODIUM CHLORIDE 1000 ML: 9 INJECTION, SOLUTION INTRAVENOUS at 15:38

## 2021-06-07 ASSESSMENT — ENCOUNTER SYMPTOMS
ABDOMINAL PAIN: 0
VOMITING: 0
DIARRHEA: 1
BLOOD IN STOOL: 0
HEADACHES: 0
FEVER: 0
DYSURIA: 0
ABDOMINAL DISTENTION: 0
COUGH: 0
SHORTNESS OF BREATH: 0
DIZZINESS: 1
PALPITATIONS: 0
COLOR CHANGE: 0
CHILLS: 0
NAUSEA: 0
LIGHT-HEADEDNESS: 1
UNEXPECTED WEIGHT CHANGE: 0
CHEST TIGHTNESS: 0
FATIGUE: 1
DIAPHORESIS: 0
WEAKNESS: 0
FLANK PAIN: 0
SYNCOPE: 0

## 2021-06-07 NOTE — TELEPHONE ENCOUNTER
Called patient back this am to check in. No answer left detailed message for her to please give me a call back regarding symptoms over the weekend.

## 2021-06-07 NOTE — ED PROVIDER NOTES
Emergency Medicine Note  HPI   HISTORY OF PRESENT ILLNESS       History provided by:  Patient  Dizziness  Quality:  Lightheadedness (diarrhea)  Severity:  Moderate  Onset quality:  Gradual  Duration:  3 days  Timing:  Constant  Progression:  Partially resolved  Context comment:  Pt with hx vag CA, has been on antibiotics for 10 days for diverticulitis/colitis and concomitant uterine fistula and UTI.  Has scheduled consultation with colorectal surgery tomorrow at [facility] to plan a dual procedure with Gyn/onc for biopsy and repair.  Relieved by: rest.  Exacerbated by: activity.  Associated symptoms: diarrhea (last 3 days although resolved with imodium today.)    Associated symptoms: no blood in stool, no chest pain, no headaches, no nausea, no palpitations, no shortness of breath, no syncope, no vomiting and no weakness          Patient History   PAST HISTORY     Reviewed from Nursing Triage:      Past Medical History:   Diagnosis Date   • Disease of thyroid gland    • Diverticulitis    • Diverticulitis of colon    • History of snoring    • Hypertension    • Hypothyroidism    • Vaginal cancer (CMS/HCC)        Past Surgical History:   Procedure Laterality Date   • CHOLECYSTECTOMY     • COLONOSCOPY     • DILATION AND CURETTAGE OF UTERUS  2018   • ROTATOR CUFF REPAIR Right        Family History   Problem Relation Age of Onset   • Heart attack Biological Mother    • Lung cancer Biological Father    • Breast cancer Neg Hx    • Cancer Neg Hx    • Colon cancer Neg Hx    • Ovarian cancer Neg Hx        Social History     Tobacco Use   • Smoking status: Former Smoker     Packs/day: 1.00     Years: 40.00     Pack years: 40.00     Start date:      Quit date:      Years since quittin.4   • Smokeless tobacco: Never Used   Substance Use Topics   • Alcohol use: No   • Drug use: No         Review of Systems   REVIEW OF SYSTEMS     Review of Systems   Constitutional: Positive for fatigue. Negative for chills,  diaphoresis, fever and unexpected weight change.   Respiratory: Negative for cough, chest tightness and shortness of breath.    Cardiovascular: Negative for chest pain, palpitations, leg swelling and syncope.   Gastrointestinal: Positive for diarrhea (last 3 days although resolved with imodium today.). Negative for abdominal distention, abdominal pain, blood in stool, nausea and vomiting.   Genitourinary: Negative for dysuria and flank pain.        Foul smelling brownish urine (as expected with fistula)   Skin: Negative for color change, pallor and rash.   Neurological: Positive for light-headedness. Negative for weakness and headaches.         VITALS     ED Vitals    Date/Time Temp Pulse Resp BP SpO2 [facility]   06/07/21 1631 -- 104 16 148/73 99 %    06/07/21 1539 -- 102 17 150/72 98 % BM   06/07/21 1442 -- 93 21 150/72 98 %    06/07/21 1249 36.7 °C (98 °F) 93 18 135/67 99 % EW        Pulse Ox %: 99 % (06/07/21 1420)  Pulse Ox Interpretation: Normal (06/07/21 1420)  Heart Rate: 93 (06/07/21 1420)  Rhythm Strip Interpretation: Normal Sinus Rhythm (06/07/21 1420)     Physical Exam   PHYSICAL EXAM     Physical Exam  Vitals and nursing note reviewed.   Constitutional:       Appearance: Normal appearance.   HENT:      Head: Normocephalic.      Mouth/Throat:      Mouth: Mucous membranes are moist.      Pharynx: Oropharynx is clear.   Eyes:      General: No scleral icterus.     Conjunctiva/sclera: Conjunctivae normal.      Pupils: Pupils are equal, round, and reactive to light.   Cardiovascular:      Rate and Rhythm: Normal rate and regular rhythm.      Pulses: Normal pulses.      Heart sounds: Normal heart sounds. No murmur heard.     Pulmonary:      Effort: Pulmonary effort is normal. No respiratory distress.      Breath sounds: Normal breath sounds. No wheezing or rales.   Abdominal:      General: Abdomen is flat. Bowel sounds are normal. There is no distension.      Palpations: Abdomen is soft. There is no mass.       Tenderness: There is no abdominal tenderness. There is no right CVA tenderness or left CVA tenderness.      Hernia: No hernia is present.   Musculoskeletal:         General: No swelling or tenderness.      Cervical back: Neck supple.      Right lower leg: No edema.      Left lower leg: No edema.   Skin:     General: Skin is warm and dry.      Capillary Refill: Capillary refill takes less than 2 seconds.      Coloration: Skin is not jaundiced or pale.      Findings: No bruising, erythema, lesion or rash.   Neurological:      General: No focal deficit present.      Mental Status: She is alert.           PROCEDURES     Procedures     DATA     Results     Procedure Component Value Units Date/Time    UA with reflex culture [[id number]]  (Abnormal) Collected: 06/07/21 1538    Specimen: Urine, Clean Catch Updated: 06/07/21 1642    Narrative:      The following orders were created for panel order UA with reflex culture.  Procedure                               Abnormality         Status                     ---------                               -----------         ------                     UA Reflex to Culture (Ma...[[id number]]  Abnormal            Final result               UA Microscopic[[id number]]               Abnormal            Final result                 Please view results for these tests on the individual orders.    UA Microscopic [[id number]]  (Abnormal) Collected: 06/07/21 1538    Specimen: Urine, Clean Catch Updated: 06/07/21 1642     Hyaline Cast 3 TO 9 /lpf      RBC, Urine 5 TO 9 /HPF      WBC, Urine Too Numerous To Count /HPF      Squamous Epithelial +1 /hpf      Bacteria, Urine +1 /HPF     UA Reflex to Culture (Macroscopic) [[id number]]  (Abnormal) Collected: 06/07/21 1538    Specimen: Urine, Clean Catch Updated: 06/07/21 1546     Color, Urine Yellow     Clarity, Urine Turbid     Specific Gravity, Urine 1.009     pH, Urine 6.5     Leukocyte Esterase +3     Nitrite, Urine Negative     Protein, Urine +1      Glucose, Urine Negative mg/dL      Ketones, Urine Negative mg/dL      Urobilinogen, Urine 0.2 EU/dL      Bilirubin, Urine Negative mg/dL      Blood, Urine +3     Comment: The sensitivity of the occult blood test is equivalent to approximately 4 intact RBC/HPF.       CBC and differential [[id number]]  (Abnormal) Collected: 06/07/21 1439    Specimen: Blood, Venous Updated: 06/07/21 1534     WBC 11.52 K/uL      RBC 3.66 M/uL      Hemoglobin 11.3 g/dL      Hematocrit 33.1 %      MCV 90.4 fL      MCH 30.9 pg      MCHC 34.1 g/dL      RDW 15.3 %      Platelets 429 K/uL      Comment: ALL RESULTS HAVE BEEN RECHECKED. SPECIMEN QUALITY CHECKED        MPV 10.5 fL      Differential Type Auto     nRBC 0.0 %      Immature Granulocytes 0.9 %      Neutrophils 78.5 %      Lymphocytes 10.2 %      Monocytes 9.8 %      Eosinophils 0.3 %      Basophils 0.3 %      Immature Granulocytes, Absolute 0.10 K/uL      Neutrophils, Absolute 9.03 K/uL      Lymphocytes, Absolute 1.18 K/uL      Monocytes, Absolute 1.13 K/uL      Eosinophils, Absolute 0.04 K/uL      Basophils, Absolute 0.04 K/uL     Comprehensive metabolic panel [[id number]]  (Abnormal) Collected: 06/07/21 1439    Specimen: Blood, Venous Updated: 06/07/21 1513     Sodium 138 mEQ/L      Potassium 3.5 mEQ/L      Comment: Results obtained on plasma. Plasma Potassium values may be up to 0.4 mEQ/L less than serum values. The differences may be greater for patients with high platelet or white cell counts.        Chloride 99 mEQ/L      CO2 25 mEQ/L      BUN 12 mg/dL      Creatinine 1.2 mg/dL      Glucose 91 mg/dL      Calcium 8.9 mg/dL      AST (SGOT) 21 IU/L      ALT (SGPT) 12 IU/L      Alkaline Phosphatase 73 IU/L      Total Protein 7.5 g/dL      Comment: Test performed on plasma which typically contains approximately 0.4 g/dL more protein than serum.        Albumin 3.3 g/dL      Bilirubin, Total 0.4 mg/dL      eGFR 43.7 mL/min/1.73m*2      Anion Gap 14 mEQ/L     Blood Culture Blood, Venous  [[id number]] Collected: 06/07/21 1455    Specimen: Blood, Venous Updated: 06/07/21 1500    Blood Culture Blood, Venous [[id number]] Collected: 06/07/21 1455    Specimen: Blood, Venous Updated: 06/07/21 1500    Lactate, w/ reflex repeat if > 2.0 [[id number]]  (Normal) Collected: 06/07/21 1439    Specimen: Blood, Venous Updated: 06/07/21 1451     Lactate 1.3 mmol/L           Imaging Results    None         ECG 12 lead   ED Interpretation   Sinus rhythm 93Nonspecific ST-T wave abnormality                Scoring tools                                 ED Course & MDM   MDM / ED COURSE and CLINICAL IMPRESSIONS     MDM    ED Course as of Jun 07 1716   Mon Jun 07, 2021 1711 Patient reports feeling better.  Tolerating p.o.'s.  Vitals stable.  Abdomen benign.  Likely mild dehydration in setting of possibly antibiotic related diarrhea.  Patient unable to provide sample to send for C. difficile and culture.  No signs of sepsis or infection at this time other than mildly elevated white blood cell count.  Continue antibiotics as prescribed pending patient follow-up with colorectal surgery at [facility] in 2 days.  Return if symptoms progress/change/worsen prior to follow-up    [JK]      ED Course User Index  [JK] Antonio Flores MD         Clinical Impressions as of Jun 07 1716   Lightheadedness   Dehydration   Generalized weakness            Antonio Flores MD  06/07/21 1716

## 2021-06-07 NOTE — TELEPHONE ENCOUNTER
Called Raegan back she reports extreme shakiness, unsteadiness with diarrhea. She denies any fevers, chills, body aches, abd/pelvic pain, distension. Instructed patient to go to [place] ED immediately for further eval rule out infection, dehydration etc. Patient is aware and understands. She is going to [place] ED. Called ED to notify them.

## 2021-06-09 ENCOUNTER — OFFICE VISIT (OUTPATIENT)
Dept: COLORECTAL SURGERY | Facility: CLINIC | Age: 76
End: 2021-06-09
Payer: MEDICARE

## 2021-06-09 VITALS
BODY MASS INDEX: 31.41 KG/M2 | WEIGHT: 160 LBS | DIASTOLIC BLOOD PRESSURE: 80 MMHG | HEIGHT: 60 IN | SYSTOLIC BLOOD PRESSURE: 110 MMHG

## 2021-06-09 DIAGNOSIS — N82.4 COLOUTERINE FISTULA: Primary | ICD-10-CM

## 2021-06-09 DIAGNOSIS — N82.4 COLOVAGINAL FISTULA: ICD-10-CM

## 2021-06-09 LAB
BACTERIA UR CULT: ABNORMAL

## 2021-06-09 PROCEDURE — 99205 OFFICE O/P NEW HI 60 MIN: CPT | Performed by: COLON & RECTAL SURGERY

## 2021-06-09 RX ORDER — METRONIDAZOLE 500 MG/1
TABLET ORAL
COMMUNITY
Start: 2021-06-02 | End: 2021-07-06 | Stop reason: ENTERED-IN-ERROR

## 2021-06-09 RX ORDER — AMOXICILLIN AND CLAVULANATE POTASSIUM 875; 125 MG/1; MG/1
1 TABLET, FILM COATED ORAL 2 TIMES DAILY
Qty: 28 TABLET | Refills: 0 | Status: SHIPPED | OUTPATIENT
Start: 2021-06-09 | End: 2021-06-23

## 2021-06-09 NOTE — LETTER
June 9, 2021     Javon Valadez DO  [address]  [place] [address]    Patient: Raegan Young  YOB: 1945  Date of Visit: 6/9/2021      Dear Dr. Valadez:    Thank you for referring Raegan Young to me for evaluation. Below are my notes for this consultation.    If you have questions, please do not hesitate to call me. I look forward to following your patient along with you.         Sincerely,        Geovani Mandel MD        CC: MD Akbar Mueller MD David O Holtz, MD Xiaomang Stickles, MD Schoonyoung, Geovani CISNEROS MD  6/9/2021  9:38 PM  Signed  HISTORY & PHYSICAL EXAM      CC: Colouterine and colovaginal fistula    HPI: Patient is a very nice 75-year-old woman who was referred by Dr. 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  She has had diverticular disease that has been known for quite some time in the past seen on imaging.  Her last colonoscopy was age 49 which showed diverticulosis.  Recently she started having significant abdominal pain and then shortly thereafter within the last few weeks started having discharge from her vagina.  This ultimately prompted a trip to her local hospital in [place] where she had a CT scan and I reviewed the images personally today demonstrating significant diverticulitis of the sigmoid colon, a fistulous communication between the sigmoid colon and the uterus with air seen in the uterus as well as a fistulous communication between the distal sigmoid colon/upper rectum and the vagina.  The patient has been caring for the vagina with the discharge using frequent irrigations.  She has been having however significant yellow discharge on an ongoing basis.  She has not any fevers or chills.  She has no nausea however does have abdominal pain in the lower abdomen and has had a 5 pound weight loss she notes due to a severe metallic taste in her mouth from the antibiotics that she  is been.  She only has 3 bowel meds per day, has no fecal accidents or urinary issues.  She is  4 para 4, had 4 vaginal deliveries with children age 47, 49, 51, and 56.  She notes history of cervical cancer diagnosed in 2018 which was treated by radiation therapy.  Additionally she had a left inguinal lymph node that was excised and was reportedly normal.  She is currently retired, used to work as a  and [place] with her  Royal.    Past Medical History:   Diagnosis Date   • Disease of thyroid gland    • Diverticulitis    • Diverticulitis of colon    • History of snoring    • Hypertension    • Hypothyroidism    • Vaginal cancer (CMS/HCC)      Past Surgical History:   Procedure Laterality Date   • CHOLECYSTECTOMY     • COLONOSCOPY     • DILATION AND CURETTAGE OF UTERUS  2018   • ROTATOR CUFF REPAIR Right        Current Outpatient Medications:   •  benazepril (LOTENSIN) 10 mg tablet, Take 10 mg by mouth daily., Disp: , Rfl:   •  levoFLOXacin (LEVAQUIN) 750 mg tablet, Take 1 tablet (750 mg total) by mouth daily for 7 days., Disp: 7 tablet, Rfl: 0  •  levothyroxine (SYNTHROID) 88 mcg tablet, Take 88 mcg by mouth once daily., Disp: , Rfl: 3  •  metroNIDAZOLE (FLAGYL) 250 mg tablet, Take 1 tablet (250 mg total) by mouth 3 (three) times a day for 7 days., Disp: 21 tablet, Rfl: 0  •  metroNIDAZOLE (FLAGYL) 500 mg tablet, , Disp: , Rfl:   •  pantoprazole (PROTONIX) 40 mg EC tablet, Take 40 mg by mouth daily.  , Disp: , Rfl:   •  allopurinol (ZYLOPRIM) 300 mg tablet, daily. , Disp: , Rfl:   •  amoxicillin-pot clavulanate (AUGMENTIN) 875-125 mg per tablet, Take 1 tablet by mouth 2 (two) times a day for 14 days., Disp: 28 tablet, Rfl: 0  •  bumetanide (BUMEX) 1 mg tablet, Take 1 mg by mouth once daily., Disp: , Rfl:   •  omega-3 acid ethyl esters (LOVAZA) 1 gram capsule, take 2 capsule by oral route 2 times every day, Disp: , Rfl:    Allergies   Allergen  Reactions   • No Known Allergies      Social History     Socioeconomic History   • Marital status:      Spouse name: rom   • Number of children: 4   • Years of education: Not on file   • Highest education level: Not on file   Occupational History   • Occupation: retired/     Tobacco Use   • Smoking status: Former Smoker     Packs/day: 1.00     Years: 40.00     Pack years: 40.00     Start date:      Quit date:      Years since quittin.4   • Smokeless tobacco: Never Used   Substance and Sexual Activity   • Alcohol use: No   • Drug use: No   • Sexual activity: Never   Other Topics Concern   • Not on file   Social History Narrative    Lives with  in a 2 story home     Social Determinants of Health     Financial Resource Strain:    • Difficulty of Paying Living Expenses:    Food Insecurity: No Food Insecurity   • Worried About Running Out of Food in the Last Year: Never true   • Ran Out of Food in the Last Year: Never true   Transportation Needs:    • Lack of Transportation (Medical):    • Lack of Transportation (Non-Medical):    Physical Activity:    • Days of Exercise per Week:    • Minutes of Exercise per Session:    Stress:    • Feeling of Stress :    Social Connections:    • Frequency of Communication with Friends and Family:    • Frequency of Social Gatherings with Friends and Family:    • Attends [employer] Services:    • Active Member of Clubs or Organizations:    • Attends Club or Organization Meetings:    • Marital Status:    Intimate Partner Violence:    • Fear of Current or Ex-Partner:    • Emotionally Abused:    • Physically Abused:    • Sexually Abused:       Family History   Problem Relation Age of Onset   • Heart attack Biological Mother    • Lung cancer Biological Father    • Breast cancer Neg Hx    • Cancer Neg Hx    • Colon cancer Neg Hx    • Ovarian cancer Neg Hx        REVIEW OF SYSTEMS: A complete review of systems was performed.  Pertinent positive include:  Constitutional-weakness, tired; neurological deficit dizziness; HEENT-capped teeth; GI-abdominal pain, diarrhea, soilage; genitourinary-weeks menopause.  All other systems are reviewed and are negative except as noted with in HPI.    PHYSICAL EXAM:  GEN: On examination the patient is resting comfortably.  NEURO/PSYCH: Alert and oriented ×3  HEENT: Eyes: Sclera anicteric  CHEST: Equal rise and fall the chest.  No tenderness bilaterally.  SKIN: Warm and moist  NODES: No groin or cervical lymphadenopathy  EXT: No palpable edema of the bilateral lower extremities.  No clubbing.  GI: Abdomen soft, nontender, nondistended.  No palpable liver or spleen edge.  No ventral hernias, mass, or tenderness.  RECTAL: Perianal skin demonstrates significant irritation of the perineal skin and perivaginal skin.  Digital vaginal exam demonstrated no mass however I could not palpate all the way to the cervix.  Digital rectal exam revealed normal sphincter tone, again no masses are palpable.  However this was limited to the patient's discomfort.    XRAYS: I personally reviewed the current X rays and the pertinent findings are: CT images listed above in HPI images reviewed by me.      ASSESSMENT/PLAN:     Colouterine fistula  Patient has a colouterine fistula seen on most recent CT imaging which I reviewed today.  This is most likely representative of  a complication of diverticulitis.  We are going to treat her at this point with course of Augmentin, have her on a bland diet, and have her return to the office in 2 weeks time to reassess her progress.  Ultimately she will need to have an examination under anesthesia as well as combination case with .  At that point we will plan to examine matters, a colonoscopy to be performed, and decide how best to proceed surgically at managing this issue.    Colovaginal fistula  Separate fistulous seen in the distal sigmoid colon/upper rectum to the vagina.  Is unclear as to whether this is  related to previous radiation therapy for cervical cancer, whether this represents a complication of diverticulitis, or recurrent cervical cancer.  After discussing the case with Dr. Jimenez we will plan for examination under anesthesia in a combined fashion we will also perform a colonoscopy.  We will need to hold off until the patient recovers from the acute diverticular disease and diverticulitis that she has.  She is going to place on 2-week course of Augmentin and have close outpatient follow-up in our office in 2 weeks.

## 2021-06-10 NOTE — ASSESSMENT & PLAN NOTE
Patient has a colouterine fistula seen on most recent CT imaging which I reviewed today.  This is most likely representative of  a complication of diverticulitis.  We are going to treat her at this point with course of Augmentin, have her on a bland diet, and have her return to the office in 2 weeks time to reassess her progress.  Ultimately she will need to have an examination under anesthesia as well as combination case with .  At that point we will plan to examine matters, a colonoscopy to be performed, and decide how best to proceed surgically at managing this issue.

## 2021-06-10 NOTE — ASSESSMENT & PLAN NOTE
Separate fistulous seen in the distal sigmoid colon/upper rectum to the vagina.  Is unclear as to whether this is related to previous radiation therapy for cervical cancer, whether this represents a complication of diverticulitis, or recurrent cervical cancer.  After discussing the case with Dr. Jimenez we will plan for examination under anesthesia in a combined fashion we will also perform a colonoscopy.  We will need to hold off until the patient recovers from the acute diverticular disease and diverticulitis that she has.  She is going to place on 2-week course of Augmentin and have close outpatient follow-up in our office in 2 weeks.

## 2021-06-10 NOTE — PROGRESS NOTES
HISTORY & PHYSICAL EXAM      CC: Colouterine and colovaginal fistula    HPI: Patient is a very nice 75-year-old woman who was referred by Dr. 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  She has had diverticular disease that has been known for quite some time in the past seen on imaging.  Her last colonoscopy was age 49 which showed diverticulosis.  Recently she started having significant abdominal pain and then shortly thereafter within the last few weeks started having discharge from her vagina.  This ultimately prompted a trip to her local hospital in [place] where she had a CT scan and I reviewed the images personally today demonstrating significant diverticulitis of the sigmoid colon, a fistulous communication between the sigmoid colon and the uterus with air seen in the uterus as well as a fistulous communication between the distal sigmoid colon/upper rectum and the vagina.  The patient has been caring for the vagina with the discharge using frequent irrigations.  She has been having however significant yellow discharge on an ongoing basis.  She has not any fevers or chills.  She has no nausea however does have abdominal pain in the lower abdomen and has had a 5 pound weight loss she notes due to a severe metallic taste in her mouth from the antibiotics that she is been.  She only has 3 bowel meds per day, has no fecal accidents or urinary issues.  She is  4 para 4, had 4 vaginal deliveries with children age 47, 49, 51, and 56.  She notes history of cervical cancer diagnosed in 2018 which was treated by radiation therapy.  Additionally she had a left inguinal lymph node that was excised and was reportedly normal.  She is currently retired, used to work as a  and [place] with her  Royal.    Past Medical History:   Diagnosis Date   • Disease of thyroid gland    • Diverticulitis    • Diverticulitis of colon     • History of snoring    • Hypertension    • Hypothyroidism    • Vaginal cancer (CMS/HCC)      Past Surgical History:   Procedure Laterality Date   • CHOLECYSTECTOMY     • COLONOSCOPY     • DILATION AND CURETTAGE OF UTERUS  2018   • ROTATOR CUFF REPAIR Right        Current Outpatient Medications:   •  benazepril (LOTENSIN) 10 mg tablet, Take 10 mg by mouth daily., Disp: , Rfl:   •  levoFLOXacin (LEVAQUIN) 750 mg tablet, Take 1 tablet (750 mg total) by mouth daily for 7 days., Disp: 7 tablet, Rfl: 0  •  levothyroxine (SYNTHROID) 88 mcg tablet, Take 88 mcg by mouth once daily., Disp: , Rfl: 3  •  metroNIDAZOLE (FLAGYL) 250 mg tablet, Take 1 tablet (250 mg total) by mouth 3 (three) times a day for 7 days., Disp: 21 tablet, Rfl: 0  •  metroNIDAZOLE (FLAGYL) 500 mg tablet, , Disp: , Rfl:   •  pantoprazole (PROTONIX) 40 mg EC tablet, Take 40 mg by mouth daily.  , Disp: , Rfl:   •  allopurinol (ZYLOPRIM) 300 mg tablet, daily. , Disp: , Rfl:   •  amoxicillin-pot clavulanate (AUGMENTIN) 875-125 mg per tablet, Take 1 tablet by mouth 2 (two) times a day for 14 days., Disp: 28 tablet, Rfl: 0  •  bumetanide (BUMEX) 1 mg tablet, Take 1 mg by mouth once daily., Disp: , Rfl:   •  omega-3 acid ethyl esters (LOVAZA) 1 gram capsule, take 2 capsule by oral route 2 times every day, Disp: , Rfl:    Allergies   Allergen Reactions   • No Known Allergies      Social History     Socioeconomic History   • Marital status:      Spouse name: don   • Number of children: 4   • Years of education: Not on file   • Highest education level: Not on file   Occupational History   • Occupation: retired/     Tobacco Use   • Smoking status: Former Smoker     Packs/day: 1.00     Years: 40.00     Pack years: 40.00     Start date:      Quit date:      Years since quittin.4   • Smokeless tobacco: Never Used   Substance and Sexual Activity   • Alcohol use: No   • Drug use: No   • Sexual activity: Never   Other Topics Concern   •  Not on file   Social History Narrative    Lives with  in a 2 story home     Social Determinants of Health     Financial Resource Strain:    • Difficulty of Paying Living Expenses:    Food Insecurity: No Food Insecurity   • Worried About Running Out of Food in the Last Year: Never true   • Ran Out of Food in the Last Year: Never true   Transportation Needs:    • Lack of Transportation (Medical):    • Lack of Transportation (Non-Medical):    Physical Activity:    • Days of Exercise per Week:    • Minutes of Exercise per Session:    Stress:    • Feeling of Stress :    Social Connections:    • Frequency of Communication with Friends and Family:    • Frequency of Social Gatherings with Friends and Family:    • Attends Evangelical Services:    • Active Member of Clubs or Organizations:    • Attends Club or Organization Meetings:    • Marital Status:    Intimate Partner Violence:    • Fear of Current or Ex-Partner:    • Emotionally Abused:    • Physically Abused:    • Sexually Abused:       Family History   Problem Relation Age of Onset   • Heart attack Biological Mother    • Lung cancer Biological Father    • Breast cancer Neg Hx    • Cancer Neg Hx    • Colon cancer Neg Hx    • Ovarian cancer Neg Hx        REVIEW OF SYSTEMS: A complete review of systems was performed.  Pertinent positive include: Constitutional-weakness, tired; neurological deficit dizziness; HEENT-capped teeth; GI-abdominal pain, diarrhea, soilage; genitourinary-weeks menopause.  All other systems are reviewed and are negative except as noted with in HPI.    PHYSICAL EXAM:  GEN: On examination the patient is resting comfortably.  NEURO/PSYCH: Alert and oriented ×3  HEENT: Eyes: Sclera anicteric  CHEST: Equal rise and fall the chest.  No tenderness bilaterally.  SKIN: Warm and moist  NODES: No groin or cervical lymphadenopathy  EXT: No palpable edema of the bilateral lower extremities.  No clubbing.  GI: Abdomen soft, nontender, nondistended.  No  palpable liver or spleen edge.  No ventral hernias, mass, or tenderness.  RECTAL: Perianal skin demonstrates significant irritation of the perineal skin and perivaginal skin.  Digital vaginal exam demonstrated no mass however I could not palpate all the way to the cervix.  Digital rectal exam revealed normal sphincter tone, again no masses are palpable.  However this was limited to the patient's discomfort.    XRAYS: I personally reviewed the current X rays and the pertinent findings are: CT images listed above in HPI images reviewed by me.      ASSESSMENT/PLAN:     Colouterine fistula  Patient has a colouterine fistula seen on most recent CT imaging which I reviewed today.  This is most likely representative of  a complication of diverticulitis.  We are going to treat her at this point with course of Augmentin, have her on a bland diet, and have her return to the office in 2 weeks time to reassess her progress.  Ultimately she will need to have an examination under anesthesia as well as combination case with .  At that point we will plan to examine matters, a colonoscopy to be performed, and decide how best to proceed surgically at managing this issue.    Colovaginal fistula  Separate fistulous seen in the distal sigmoid colon/upper rectum to the vagina.  Is unclear as to whether this is related to previous radiation therapy for cervical cancer, whether this represents a complication of diverticulitis, or recurrent cervical cancer.  After discussing the case with Dr. Jimenez we will plan for examination under anesthesia in a combined fashion we will also perform a colonoscopy.  We will need to hold off until the patient recovers from the acute diverticular disease and diverticulitis that she has.  She is going to place on 2-week course of Augmentin and have close outpatient follow-up in our office in 2 weeks.

## 2021-06-12 LAB
BACTERIA BLD CULT: NORMAL
BACTERIA BLD CULT: NORMAL

## 2021-06-23 ENCOUNTER — DOCUMENTATION (OUTPATIENT)
Dept: COLORECTAL SURGERY | Facility: CLINIC | Age: 76
End: 2021-06-23

## 2021-06-23 NOTE — PROGRESS NOTES
1       2             3              4             5     LAST APPOINTMENT: 06/09/2021     TIME ARRIVED:     TIME ROOMED:     VISIT TYPE:   NP       POV     EPV     DISC:             YES         NO                                   PREPPED:  YES  NO     LABS:  [facility]  [facility]  [facility]  OTHER      PHARMACY ENTERED:  YES   NO     DIAGNOSIS:  Colouterine fistula   SURGERY  :     EUA with biopsies, colonoscopy                                                                                     DATE OF SURGERY:07/09/2021        PATHOLOGY STAGE:   RADIATION :    Yes   No                                           DOSAGE:      LOCATION:   Anterior   Right Lateral   Left Lateral   Posterior   Circumferential      LEVEL:                        SIZE:     CT SCAN:   FFC:    FFS:    CEA:    PET:    MRI:      OTHER:       FIBER:  NO  METAMUCIL  KONSYL  CITYRUCEL   FIBERCON  BENEFIBER OTHER  LOMOTIL:    NO    1  VS   2    QD    BID    TID    QID  ZANTAC:       Y / N  AMPHOGEL:    Y / N                                                                          START / STOP                                                 RAD ONC:                              N / A        MED ONC:                             N / A

## 2021-06-24 ENCOUNTER — DOCUMENTATION (OUTPATIENT)
Dept: COLORECTAL SURGERY | Facility: CLINIC | Age: 76
End: 2021-06-24

## 2021-06-24 NOTE — PROGRESS NOTES
1       2             3              4             5     LAST APPOINTMENT: 06/09/2021     TIME ARRIVED:     TIME ROOMED:     VISIT TYPE:   NP       POV     EPV     DISC:             YES         NO                                   PREPPED:  YES  NO     LABS:  [facility]  [facility]  [facility]  OTHER      PHARMACY ENTERED:  YES   NO     DIAGNOSIS:  Colouterine fistula     SURGERY  : EUA with biopsies, colonoscopy                                                                                             DATE OF SURGERY:07/09/2021        PATHOLOGY STAGE:   RADIATION :    Yes   No                                           DOSAGE:      LOCATION:   Anterior   Right Lateral   Left Lateral   Posterior   Circumferential      LEVEL:                        SIZE:     CT SCAN: 09/02/2020  FFC:    FFS:    CEA:    PET:    MRI:      OTHER:       FIBER:  NO  METAMUCIL  KONSYL  CITYRUCEL   FIBERCON  BENEFIBER OTHER  LOMOTIL:    NO    1  VS   2    QD    BID    TID    QID  ZANTAC:       Y / N  AMPHOGEL:    Y / N                                                                          START / STOP                                                 RAD ONC:                              N / A        MED ONC:                             N / A

## 2021-06-25 ENCOUNTER — PREP FOR CASE (OUTPATIENT)
Dept: COLORECTAL SURGERY | Facility: CLINIC | Age: 76
End: 2021-06-25

## 2021-06-25 DIAGNOSIS — Z11.59 SCREENING FOR VIRAL DISEASE: Primary | ICD-10-CM

## 2021-06-25 DIAGNOSIS — Z01.818 PRE-OP TESTING: ICD-10-CM

## 2021-06-28 ENCOUNTER — OFFICE VISIT (OUTPATIENT)
Dept: COLORECTAL SURGERY | Facility: CLINIC | Age: 76
End: 2021-06-28
Payer: MEDICARE

## 2021-06-28 VITALS — HEIGHT: 60 IN | BODY MASS INDEX: 31.41 KG/M2 | WEIGHT: 160 LBS

## 2021-06-28 DIAGNOSIS — K57.21 DIVERTICULITIS OF LARGE INTESTINE WITH PERFORATION AND ABSCESS WITH BLEEDING: ICD-10-CM

## 2021-06-28 DIAGNOSIS — N82.4 COLOVAGINAL FISTULA: ICD-10-CM

## 2021-06-28 DIAGNOSIS — N82.4 COLOUTERINE FISTULA: Primary | ICD-10-CM

## 2021-06-28 PROCEDURE — 99214 OFFICE O/P EST MOD 30 MIN: CPT | Performed by: COLON & RECTAL SURGERY

## 2021-06-28 RX ORDER — FLUCONAZOLE 150 MG/1
150 TABLET ORAL DAILY
Qty: 3 TABLET | Refills: 0 | Status: SHIPPED | OUTPATIENT
Start: 2021-06-28 | End: 2021-07-01

## 2021-06-28 RX ORDER — DOXYCYCLINE 100 MG/1
CAPSULE ORAL
Qty: 2 CAPSULE | Refills: 0 | Status: SHIPPED | OUTPATIENT
Start: 2021-06-28 | End: 2021-07-09 | Stop reason: HOSPADM

## 2021-06-28 RX ORDER — DICYCLOMINE HYDROCHLORIDE 10 MG/1
10 CAPSULE ORAL
Qty: 360 CAPSULE | Refills: 1 | Status: SHIPPED | OUTPATIENT
Start: 2021-06-28 | End: 2021-07-21

## 2021-06-28 NOTE — ASSESSMENT & PLAN NOTE
Fistula likely multifactorial related to diverticulitis and history of radiation for cervical cancer.  Plan EUA for biopsies to rule out recurrence.  Once this is done she will likely need to have a low anterior resection with ALYSSA/BSO and removal of portion of her vagina, takedown of fistula and repair of this.  This should be done robotically and a combination fashion with .  We will discuss this more once we have colonoscopy and biopsies.  I do asked the patient to obtain cardiac clearance and [facility] for [facility].

## 2021-06-28 NOTE — H&P (VIEW-ONLY)
HISTORY & PHYSICAL EXAM    HPI: Patient is here to be seen today for follow-up.  She is feeling much better, noted she is been having some gas from the vaginal side but has not had as much discharge.  She does feel like she has a yeast infection there however after the Augmentin finished.  Her energy level is better and her pain is diminished.    Past Medical History:   Diagnosis Date   • Disease of thyroid gland    • Diverticulitis    • Diverticulitis of colon    • History of snoring    • Hypertension    • Hypothyroidism    • Vaginal cancer (CMS/HCC)      Past Surgical History:   Procedure Laterality Date   • CHOLECYSTECTOMY     • COLONOSCOPY     • DILATION AND CURETTAGE OF UTERUS  2018   • ROTATOR CUFF REPAIR Right        Current Outpatient Medications:   •  allopurinol (ZYLOPRIM) 300 mg tablet, daily. , Disp: , Rfl:   •  benazepril (LOTENSIN) 10 mg tablet, Take 10 mg by mouth daily., Disp: , Rfl:   •  bumetanide (BUMEX) 1 mg tablet, Take 1 mg by mouth once daily., Disp: , Rfl:   •  levothyroxine (SYNTHROID) 88 mcg tablet, Take 88 mcg by mouth once daily., Disp: , Rfl: 3  •  metroNIDAZOLE (FLAGYL) 500 mg tablet, , Disp: , Rfl:   •  omega-3 acid ethyl esters (LOVAZA) 1 gram capsule, take 2 capsule by oral route 2 times every day, Disp: , Rfl:   •  pantoprazole (PROTONIX) 40 mg EC tablet, Take 40 mg by mouth daily.  , Disp: , Rfl:    Allergies   Allergen Reactions   • No Known Allergies      Social History     Socioeconomic History   • Marital status:      Spouse name: don   • Number of children: 4   • Years of education: Not on file   • Highest education level: Not on file   Occupational History   • Occupation: retired/     Tobacco Use   • Smoking status: Former Smoker     Packs/day: 1.00     Years: 40.00     Pack years: 40.00     Start date:      Quit date:      Years since quittin.5   • Smokeless tobacco: Never Used   Substance and Sexual Activity   • Alcohol use: No   • Drug use:  Writer spoke with pt. Pt stated that numbness is only in right hand. Has not taken more than 10 TUMS. Explained the importance of following d/c instructions of taking 5 TUMS every 5 minutes for 30min. Will f/up with pt in ~30min. If no improvement, recommend she get labs drawn. Pt expressed understanding.   No   • Sexual activity: Never   Other Topics Concern   • Not on file   Social History Narrative    Lives with  in a 2 story home     Social Determinants of Health     Financial Resource Strain:    • Difficulty of Paying Living Expenses:    Food Insecurity: No Food Insecurity   • Worried About Running Out of Food in the Last Year: Never true   • Ran Out of Food in the Last Year: Never true   Transportation Needs:    • Lack of Transportation (Medical):    • Lack of Transportation (Non-Medical):    Physical Activity:    • Days of Exercise per Week:    • Minutes of Exercise per Session:    Stress:    • Feeling of Stress :    Social Connections:    • Frequency of Communication with Friends and Family:    • Frequency of Social Gatherings with Friends and Family:    • Attends Voodoo Services:    • Active Member of Clubs or Organizations:    • Attends Club or Organization Meetings:    • Marital Status:    Intimate Partner Violence:    • Fear of Current or Ex-Partner:    • Emotionally Abused:    • Physically Abused:    • Sexually Abused:       Family History   Problem Relation Age of Onset   • Heart attack Biological Mother    • Lung cancer Biological Father    • Breast cancer Neg Hx    • Cancer Neg Hx    • Colon cancer Neg Hx    • Ovarian cancer Neg Hx        REVIEW OF SYSTEMS: Unchanged    PHYSICAL EXAM:  GEN: On examination the patient is resting comfortably.  NEURO/PSYCH: Alert and oriented ×3  HEENT: Eyes: Sclera anicteric  CHEST: Equal rise and fall the chest.  No tenderness bilaterally.  SKIN: Warm and moist  NODES: No groin or cervical lymphadenopathy  EXT: No palpable edema of the bilateral lower extremities.  No clubbing.  GI: Abdomen soft, nontender, nondistended.  No palpable liver or spleen edge.  No ventral hernias, mass, or rebound tenderness.  RECTAL: Deferred      ASSESSMENT/PLAN:     Colouterine fistula  Based on imaging: Uterine fistula likely related to radiation therapy and diverticulitis.   Will plan for exam under anesthesia and colonoscopy on July ninth.  I discussed with the patient if she has any further symptoms she should call right away.    Diverticulitis of large intestine with perforation and abscess with bleeding  Markedly improved symptoms basically resolved.  She has some cramps occasionally.  When I tried her on Bentyl for that occasionally as needed.  Additionally she feels that she has a yeast infection were going to give her Diflucan 150 mg tablets to help with this.  She will call the office if she feels any discomfort down her left lower quadrant.    Colovaginal fistula  Fistula likely multifactorial related to diverticulitis and history of radiation for cervical cancer.  Plan EUA for biopsies to rule out recurrence.  Once this is done she will likely need to have a low anterior resection with ALYSSA/BSO and removal of portion of her vagina, takedown of fistula and repair of this.  This should be done robotically and a combination fashion with .  We will discuss this more once we have colonoscopy and biopsies.  I do asked the patient to obtain cardiac clearance and refer [facility] for this.

## 2021-06-28 NOTE — LETTER
June 28, 2021     Javon Valadez DO  [address]  [facility] [address]    Patient: Raegan Young  YOB: 1945  Date of Visit: 6/28/2021      Dear Dr. Valadez:    Thank you for referring Raegan Young to me for evaluation. Below are my notes for this consultation.    If you have questions, please do not hesitate to call me. I look forward to following your patient along with you.         Sincerely,        Geovani Mandel MD        CC: MD Akbar Mueller MD David O Holtz, MD Schoonyoung, Geovani CISNEROS MD  6/28/2021 10:29 AM  Signed  HISTORY & PHYSICAL EXAM    HPI: Patient is here to be seen today for follow-up.  She is feeling much better, noted she is been having some gas from the vaginal side but has not had as much discharge.  She does feel like she has a yeast infection there however after the Augmentin finished.  Her energy level is better and her pain is diminished.    Past Medical History:   Diagnosis Date   • Disease of thyroid gland    • Diverticulitis    • Diverticulitis of colon    • History of snoring    • Hypertension    • Hypothyroidism    • Vaginal cancer (CMS/HCC)      Past Surgical History:   Procedure Laterality Date   • CHOLECYSTECTOMY     • COLONOSCOPY     • DILATION AND CURETTAGE OF UTERUS  02/2018   • ROTATOR CUFF REPAIR Right        Current Outpatient Medications:   •  allopurinol (ZYLOPRIM) 300 mg tablet, daily. , Disp: , Rfl:   •  benazepril (LOTENSIN) 10 mg tablet, Take 10 mg by mouth daily., Disp: , Rfl:   •  bumetanide (BUMEX) 1 mg tablet, Take 1 mg by mouth once daily., Disp: , Rfl:   •  levothyroxine (SYNTHROID) 88 mcg tablet, Take 88 mcg by mouth once daily., Disp: , Rfl: 3  •  metroNIDAZOLE (FLAGYL) 500 mg tablet, , Disp: , Rfl:   •  omega-3 acid ethyl esters (LOVAZA) 1 gram capsule, take 2 capsule by oral route 2 times every day, Disp: , Rfl:   •  pantoprazole (PROTONIX) 40 mg EC tablet, Take 40 mg by mouth daily.  ,  Disp: , Rfl:    Allergies   Allergen Reactions   • No Known Allergies      Social History     Socioeconomic History   • Marital status:      Spouse name: rom   • Number of children: 4   • Years of education: Not on file   • Highest education level: Not on file   Occupational History   • Occupation: retired/     Tobacco Use   • Smoking status: Former Smoker     Packs/day: 1.00     Years: 40.00     Pack years: 40.00     Start date:      Quit date:      Years since quittin.5   • Smokeless tobacco: Never Used   Substance and Sexual Activity   • Alcohol use: No   • Drug use: No   • Sexual activity: Never   Other Topics Concern   • Not on file   Social History Narrative    Lives with  in a 2 story home     Social Determinants of Health     Financial Resource Strain:    • Difficulty of Paying Living Expenses:    Food Insecurity: No Food Insecurity   • Worried About Running Out of Food in the Last Year: Never true   • Ran Out of Food in the Last Year: Never true   Transportation Needs:    • Lack of Transportation (Medical):    • Lack of Transportation (Non-Medical):    Physical Activity:    • Days of Exercise per Week:    • Minutes of Exercise per Session:    Stress:    • Feeling of Stress :    Social Connections:    • Frequency of Communication with Friends and Family:    • Frequency of Social Gatherings with Friends and Family:    • Attends Restorationist Services:    • Active Member of Clubs or Organizations:    • Attends Club or Organization Meetings:    • Marital Status:    Intimate Partner Violence:    • Fear of Current or Ex-Partner:    • Emotionally Abused:    • Physically Abused:    • Sexually Abused:       Family History   Problem Relation Age of Onset   • Heart attack Biological Mother    • Lung cancer Biological Father    • Breast cancer Neg Hx    • Cancer Neg Hx    • Colon cancer Neg Hx    • Ovarian cancer Neg Hx        REVIEW OF SYSTEMS: Unchanged    PHYSICAL EXAM:  GEN: On  examination the patient is resting comfortably.  NEURO/PSYCH: Alert and oriented ×3  HEENT: Eyes: Sclera anicteric  CHEST: Equal rise and fall the chest.  No tenderness bilaterally.  SKIN: Warm and moist  NODES: No groin or cervical lymphadenopathy  EXT: No palpable edema of the bilateral lower extremities.  No clubbing.  GI: Abdomen soft, nontender, nondistended.  No palpable liver or spleen edge.  No ventral hernias, mass, or rebound tenderness.  RECTAL: Deferred      ASSESSMENT/PLAN:     Colouterine fistula  Based on imaging: Uterine fistula likely related to radiation therapy and diverticulitis.  Will plan for exam under anesthesia and colonoscopy on July ninth.  I discussed with the patient if she has any further symptoms she should call right away.    Diverticulitis of large intestine with perforation and abscess with bleeding  Markedly improved symptoms basically resolved.  She has some cramps occasionally.  When I tried her on Bentyl for that occasionally as needed.  Additionally she feels that she has a yeast infection were going to give her Diflucan 150 mg tablets to help with this.  She will call the office if she feels any discomfort down her left lower quadrant.    Colovaginal fistula  Fistula likely multifactorial related to diverticulitis and history of radiation for cervical cancer.  Plan EUA for biopsies to rule out recurrence.  Once this is done she will likely need to have a low anterior resection with ALYSSA/BSO and removal of portion of her vagina, takedown of fistula and repair of this.  This should be done robotically and a combination fashion with .  We will discuss this more once we have colonoscopy and biopsies.  I do asked the patient to obtain cardiac clearance and refer [facility] for [facility].

## 2021-06-28 NOTE — ASSESSMENT & PLAN NOTE
Based on imaging: Uterine fistula likely related to radiation therapy and diverticulitis.  Will plan for exam under anesthesia and colonoscopy on July ninth.  I discussed with the patient if she has any further symptoms she should call right away.

## 2021-06-28 NOTE — ASSESSMENT & PLAN NOTE
Markedly improved symptoms basically resolved.  She has some cramps occasionally.  When I tried her on Bentyl for that occasionally as needed.  Additionally she feels that she has a yeast infection were going to give her Diflucan 150 mg tablets to help with this.  She will call the office if she feels any discomfort down her left lower quadrant.

## 2021-06-30 ENCOUNTER — TELEPHONE (OUTPATIENT)
Dept: COLORECTAL SURGERY | Facility: CLINIC | Age: 76
End: 2021-06-30

## 2021-07-01 NOTE — TELEPHONE ENCOUNTER
Pt returned call to discuss surgery. Went over all info with pt in detail, all questions answered. Emailed pt Sutab instruction packet, asked her to call with any concerns ASAP.

## 2021-07-06 ENCOUNTER — APPOINTMENT (OUTPATIENT)
Dept: PREADMISSION TESTING | Facility: HOSPITAL | Age: 76
End: 2021-07-06
Attending: COLON & RECTAL SURGERY
Payer: MEDICARE

## 2021-07-06 ENCOUNTER — APPOINTMENT (OUTPATIENT)
Dept: LAB | Facility: HOSPITAL | Age: 76
End: 2021-07-06
Attending: COLON & RECTAL SURGERY
Payer: MEDICARE

## 2021-07-06 ENCOUNTER — HOSPITAL ENCOUNTER (OUTPATIENT)
Dept: CARDIOLOGY | Facility: HOSPITAL | Age: 76
Discharge: HOME | End: 2021-07-06
Attending: COLON & RECTAL SURGERY
Payer: MEDICARE

## 2021-07-06 VITALS
HEART RATE: 90 BPM | TEMPERATURE: 98.4 F | WEIGHT: 156 LBS | DIASTOLIC BLOOD PRESSURE: 72 MMHG | RESPIRATION RATE: 18 BRPM | SYSTOLIC BLOOD PRESSURE: 130 MMHG | OXYGEN SATURATION: 98 % | BODY MASS INDEX: 30.63 KG/M2 | HEIGHT: 60 IN

## 2021-07-06 DIAGNOSIS — Z01.818 ENCOUNTER FOR PREADMISSION TESTING: Primary | ICD-10-CM

## 2021-07-06 DIAGNOSIS — Z11.59 SCREENING FOR VIRAL DISEASE: ICD-10-CM

## 2021-07-06 DIAGNOSIS — Z01.818 PRE-OP TESTING: ICD-10-CM

## 2021-07-06 LAB
ANION GAP SERPL CALC-SCNC: 12 MEQ/L (ref 3–15)
ATRIAL RATE: 90
BASOPHILS # BLD: 0.05 K/UL (ref 0.01–0.1)
BASOPHILS NFR BLD: 0.7 %
BUN SERPL-MCNC: 14 MG/DL (ref 8–20)
CALCIUM SERPL-MCNC: 9 MG/DL (ref 8.9–10.3)
CHLORIDE SERPL-SCNC: 108 MEQ/L (ref 98–109)
CO2 SERPL-SCNC: 22 MEQ/L (ref 22–32)
CREAT SERPL-MCNC: 1.2 MG/DL (ref 0.6–1.1)
DIFFERENTIAL METHOD BLD: ABNORMAL
EOSINOPHIL # BLD: 0.14 K/UL (ref 0.04–0.36)
EOSINOPHIL NFR BLD: 2 %
ERYTHROCYTE [DISTWIDTH] IN BLOOD BY AUTOMATED COUNT: 15.7 % (ref 11.7–14.4)
GFR SERPL CREATININE-BSD FRML MDRD: 43.7 ML/MIN/1.73M*2
GLUCOSE SERPL-MCNC: 93 MG/DL (ref 70–99)
HCT VFR BLDCO AUTO: 33.1 % (ref 35–45)
HGB BLD-MCNC: 10.6 G/DL (ref 11.8–15.7)
IMM GRANULOCYTES # BLD AUTO: 0.03 K/UL (ref 0–0.08)
IMM GRANULOCYTES NFR BLD AUTO: 0.4 %
LYMPHOCYTES # BLD: 1.69 K/UL (ref 1.2–3.5)
LYMPHOCYTES NFR BLD: 24.6 %
MCH RBC QN AUTO: 30.1 PG (ref 28–33.2)
MCHC RBC AUTO-ENTMCNC: 32 G/DL (ref 32.2–35.5)
MCV RBC AUTO: 94 FL (ref 83–98)
MONOCYTES # BLD: 0.64 K/UL (ref 0.28–0.8)
MONOCYTES NFR BLD: 9.3 %
NEUTROPHILS # BLD: 4.32 K/UL (ref 1.7–7)
NEUTS SEG NFR BLD: 63 %
NRBC BLD-RTO: 0 %
P AXIS: 64
PDW BLD AUTO: 9.1 FL (ref 9.4–12.3)
PLATELET # BLD AUTO: 316 K/UL (ref 150–369)
POTASSIUM SERPL-SCNC: 4.3 MEQ/L (ref 3.6–5.1)
PR INTERVAL: 160
QRS DURATION: 66
QT INTERVAL: 360
QTC CALCULATION(BAZETT): 440
R AXIS: 36
RBC # BLD AUTO: 3.52 M/UL (ref 3.93–5.22)
SODIUM SERPL-SCNC: 142 MEQ/L (ref 136–144)
T WAVE AXIS: 22
VENTRICULAR RATE: 90
WBC # BLD AUTO: 6.87 K/UL (ref 3.8–10.5)

## 2021-07-06 PROCEDURE — 85025 COMPLETE CBC W/AUTO DIFF WBC: CPT

## 2021-07-06 PROCEDURE — 36415 COLL VENOUS BLD VENIPUNCTURE: CPT

## 2021-07-06 PROCEDURE — 80048 BASIC METABOLIC PNL TOTAL CA: CPT

## 2021-07-06 PROCEDURE — 93005 ELECTROCARDIOGRAM TRACING: CPT

## 2021-07-06 PROCEDURE — U0003 INFECTIOUS AGENT DETECTION BY NUCLEIC ACID (DNA OR RNA); SEVERE ACUTE RESPIRATORY SYNDROME CORONAVIRUS 2 (SARS-COV-2) (CORONAVIRUS DISEASE [COVID-19]), AMPLIFIED PROBE TECHNIQUE, MAKING USE OF HIGH THROUGHPUT TECHNOLOGIES AS DESCRIBED BY CMS-2020-01-R: HCPCS

## 2021-07-06 PROCEDURE — 93010 ELECTROCARDIOGRAM REPORT: CPT | Performed by: INTERNAL MEDICINE

## 2021-07-06 ASSESSMENT — ENCOUNTER SYMPTOMS
ALLERGIC/IMMUNOLOGIC NEGATIVE: 1
CONSTITUTIONAL NEGATIVE: 1
ABDOMINAL PAIN: 1
EYES NEGATIVE: 1
CARDIOVASCULAR NEGATIVE: 1
RESPIRATORY NEGATIVE: 1
PSYCHIATRIC NEGATIVE: 1
MUSCULOSKELETAL NEGATIVE: 1
ENDOCRINE NEGATIVE: 1
HEMATOLOGIC/LYMPHATIC NEGATIVE: 1
NEUROLOGICAL NEGATIVE: 1

## 2021-07-06 ASSESSMENT — PAIN SCALES - GENERAL: PAINLEVEL: 0-NO PAIN

## 2021-07-06 NOTE — H&P
History and Physical  Pre-admission testing         HISTORY OF PRESENT ILLNESS      Raegan Young is an 75 y.o. female with a past medical history of HTN, diverticulitis, vaginal cancer s/p chemo and radiation, hypothyroidism, and GERD who presents with c/o colovaginal fistula. Pt states end of May 2021 she developed abdominal pain and vaginal discharge. She went to the ER in [place]. Ct abdomen/ pelvis obtained- showed diverticulitis of sigmoid colon, and a fistulous communication between the sigmoid colon and the uterus with air seen in the uterus as well as a fistulous communication between the distal sigmoid colon/upper rectum and the vagina. Pt was treated w/ antibiotics. Denies fever, chills, nausea, or vomiting. Evaluated by Dr. Mandel who recommends EUA with biopsies, colonoscopy, COLONOSCOPY DIAGNOSTIC, POSS POLYPECTOMY, scheduled for 7/9/21.   PAST MEDICAL AND SURGICAL HISTORY      Past Medical History:   Diagnosis Date   • Colovaginal fistula    • COVID-19 vaccine series completed 02/26/2021   • Disease of thyroid gland    • Diverticulitis of colon    • GERD (gastroesophageal reflux disease)    • History of snoring    • Hypertension    • Hypothyroidism    • Vaginal cancer (CMS/HCC)     s/p XRT x 7 weeks and chemo weekly x 5 weeks         Past Surgical History:   Procedure Laterality Date   • CHOLECYSTECTOMY     • COLONOSCOPY     • DILATION AND CURETTAGE OF UTERUS  02/2018   • ROTATOR CUFF REPAIR Right        PROBLEM LIST     Patient Active Problem List   Diagnosis   • History of cancer of vagina   • Elevated serum creatinine   • Low magnesium level   • Anemia   • Lung nodule < 6cm on CT   • Colovaginal fistula   • Diverticulitis of large intestine with perforation and abscess with bleeding   • Colouterine fistula   • Diverticulitis of colon   • Hypertension   • Hypothyroidism   • GERD (gastroesophageal reflux disease)       MEDICATIONS        Current Outpatient Medications:   •  benazepril  (LOTENSIN) 10 mg tablet, Take 10 mg by mouth daily., Disp: , Rfl:   •  bumetanide (BUMEX) 1 mg tablet, Take 1 mg by mouth as needed.  , Disp: , Rfl:   •  dicyclomine (BENTYL) 10 mg capsule, Take 1 capsule (10 mg total) by mouth 4 (four) times a day (with meals and nightly)., Disp: 360 capsule, Rfl: 1  •  doxycycline hyclate (VIBRAMYCIN) 100 mg capsule, Take one capsule at 8 am and one capsule at 8 pm the day before surgery., Disp: 2 capsule, Rfl: 0  •  levothyroxine (SYNTHROID) 88 mcg tablet, Take 88 mcg by mouth daily.  , Disp: , Rfl: 3  •  pantoprazole (PROTONIX) 40 mg EC tablet, Take 40 mg by mouth daily.  , Disp: , Rfl:     ALLERGIES      Allergies   Allergen Reactions   • No Known Allergies        FAMILY HISTORY      Family History   Problem Relation Age of Onset   • Heart attack Biological Mother    • Lung cancer Biological Father    • Breast cancer Neg Hx    • Cancer Neg Hx    • Colon cancer Neg Hx    • Ovarian cancer Neg Hx        SOCIAL HISTORY      Social History     Socioeconomic History   • Marital status:      Spouse name: Royal    • Number of children: 4   • Years of education: Not on file   • Highest education level: Not on file   Occupational History   • Occupation: retired/     Tobacco Use   • Smoking status: Former Smoker     Packs/day: 1.00     Years: 40.00     Pack years: 40.00     Types: Cigarettes     Start date:      Quit date:      Years since quittin.5   • Smokeless tobacco: Never Used   Vaping Use   • Vaping Use: Never used   Substance and Sexual Activity   • Alcohol use: Yes     Comment: occassional    • Drug use: No   • Sexual activity: Never   Other Topics Concern   • Not on file   Social History Narrative    Lives with  in a 2 story home     Social Determinants of Health     Financial Resource Strain:    • Difficulty of Paying Living Expenses:    Food Insecurity: No Food Insecurity   • Worried About Running Out of Food in the Last Year: Never true    • Ran Out of Food in the Last Year: Never true   Transportation Needs:    • Lack of Transportation (Medical):    • Lack of Transportation (Non-Medical):    Physical Activity:    • Days of Exercise per Week:    • Minutes of Exercise per Session:    Stress:    • Feeling of Stress :    Social Connections:    • Frequency of Communication with Friends and Family:    • Frequency of Social Gatherings with Friends and Family:    • Attends [employer] Services:    • Active Member of Clubs or Organizations:    • Attends Club or Organization Meetings:    • Marital Status:    Intimate Partner Violence:    • Fear of Current or Ex-Partner:    • Emotionally Abused:    • Physically Abused:    • Sexually Abused:        REVIEW OF SYSTEMS      Review of Systems   Constitutional: Negative.    HENT: Negative.    Eyes: Negative.    Respiratory: Negative.    Cardiovascular: Negative.    Gastrointestinal: Positive for abdominal pain (resolved ).   Endocrine: Negative.    Genitourinary: Positive for vaginal discharge (not currently ).   Musculoskeletal: Negative.    Skin: Negative.    Allergic/Immunologic: Negative.    Neurological: Negative.    Hematological: Negative.    Psychiatric/Behavioral: Negative.        PHYSICAL EXAMINATION      Visit Vitals  /72 (BP Location: Right upper arm, Patient Position: Sitting)   Pulse 90   Temp 36.9 °C (98.4 °F) (Temporal)   Resp 18   Ht 1.524 m (5')   Wt 70.8 kg (156 lb)   SpO2 98%   BMI 30.47 kg/m²     Body mass index is 30.47 kg/m².    Physical Exam  Vitals reviewed.   Constitutional:       Appearance: Normal appearance.   HENT:      Head: Normocephalic and atraumatic.      Right Ear: External ear normal.      Left Ear: External ear normal.   Eyes:      Pupils: Pupils are equal, round, and reactive to light.   Cardiovascular:      Rate and Rhythm: Normal rate and regular rhythm.      Heart sounds: Normal heart sounds.   Pulmonary:      Effort: Pulmonary effort is normal.      Breath sounds:  Normal breath sounds.   Abdominal:      General: Bowel sounds are normal.      Palpations: Abdomen is soft.   Musculoskeletal:         General: Normal range of motion.      Cervical back: Normal range of motion and neck supple.   Skin:     General: Skin is warm and dry.   Neurological:      General: No focal deficit present.      Mental Status: She is alert and oriented to person, place, and time.   Psychiatric:         Mood and Affect: Mood normal.         Behavior: Behavior normal.         Thought Content: Thought content normal.         Judgment: Judgment normal.            VICKI Orellana  7/6/2021

## 2021-07-06 NOTE — PRE-PROCEDURE INSTRUCTIONS
1. We will call you between 3 pm and 7 pm on Thursday July 8, 2021 to determine that arrival time for your procedure. If you do not hear by 6PM. Please call [phone] for arrival time.    2. Please report to Main Entrance near Parking lot A, walk into main lobby and report to the admission desk on the first floor on the day of your procedure.       3.    Follow Dr. Mandel's instructions for when to stop eating solids/ drinking liquids prior to surgery      4. On the morning of the procedure please take your usual dose of the listed medications with a sip of water:    Levothyroxine, Pantoprazole      5. Other Instructions: You may brush your teeth the morning of the procedure. Rinse and spit, do not swallow.  Bring a list of your medications with dosages with you.    Please follow the hospital provided surgical wash instructions given to you today. If surgical procedure indicates the need for CHG anti-bacterial wash, please use provided soap and follow instructions.      6. If you develop a cold, cough, fever, rash, or other symptom prior to the data of the procedure, please report it to your physician immediately.   7. If you need to cancel the procedure for any reason, please contact your physician or call the unit listed above.   8. Make arrangements to have someone drive you home from the procedure. If you have not arranged for transportation home, your surgery may be cancelled.    9. You may not take public transportation unless you are accompanied by a responsible person.   10. You may not drive a car or operate complex or potentially dangerous machinery for 24 hours following anesthesia and/or sedation.   11. If it is medically necessary for you to have a longer stay, you will be informed as soon as the decision is made.   12. Do not wear or bring anything of value to the hospital including jewelry of any kind, money, or wallet. Do not wear make-up. DO bring your glasses and a case. DO NOT BRING  MEDICATIONS FROM HOME.   13. No lotion, creams, powders, or oils on skin the morning of procedure    14. Dress in comfortable clothes.   15.  If instructed, please bring a copy of your Advanced Directive (Living Will/Durable Power of ) on the day of your procedure.      Pre operative instructions given as per protocol.  Form explained by: VICKI Orellana     I have read and understand the above information. I have had sufficient opportunity to ask questions I might have and they have been answered to my satisfaction. I agree to comply with the Patient Responsibilities listed above and have received a copy of this form.

## 2021-07-07 LAB — SARS-COV-2 RNA RESP QL NAA+PROBE: NEGATIVE

## 2021-07-07 RX ORDER — METRONIDAZOLE 500 MG/100ML
500 INJECTION, SOLUTION INTRAVENOUS
Status: CANCELLED | OUTPATIENT
Start: 2021-07-09

## 2021-07-07 RX ORDER — CEFAZOLIN SODIUM 2 G/50ML
2 SOLUTION INTRAVENOUS
Status: CANCELLED | OUTPATIENT
Start: 2021-07-09

## 2021-07-08 ENCOUNTER — TELEPHONE (OUTPATIENT)
Dept: CARDIOLOGY | Facility: CLINIC | Age: 76
End: 2021-07-08

## 2021-07-08 ENCOUNTER — ANESTHESIA EVENT (OUTPATIENT)
Dept: OPERATING ROOM | Facility: HOSPITAL | Age: 76
Setting detail: HOSPITAL OUTPATIENT SURGERY
End: 2021-07-08
Payer: MEDICARE

## 2021-07-08 NOTE — ANESTHESIA PREPROCEDURE EVALUATION
Relevant Problems   CARDIOVASCULAR   (+) Hypertension      GASTROINTESTINAL   (+) GERD (gastroesophageal reflux disease)      HEMATOLOGY   (+) Anemia      NEUROLOGY   (+) History of cancer of vagina      URINARY SYSTEM   (+) Low magnesium level      Other   (+) Hypothyroidism       Anesthesia ROS/MED HX      Cardiovascular   hypertension  GI/Hepatic   GERD  Endo/Other   Hypothyroidism       Past Surgical History:   Procedure Laterality Date   • CHOLECYSTECTOMY     • COLONOSCOPY     • DILATION AND CURETTAGE OF UTERUS  02/2018   • ROTATOR CUFF REPAIR Right        Physical Exam    Anesthesia Plan    Plan: general    Technique: general endotracheal     Lines and Monitors: PIV     Airway: oral intubation   ASA 3  Induction:    intravenous

## 2021-07-08 NOTE — TELEPHONE ENCOUNTER
Called PCP and asked for records to be sent over.     Called pt and she states that she has not had a cardiologist before.

## 2021-07-09 ENCOUNTER — HOSPITAL ENCOUNTER (OUTPATIENT)
Facility: HOSPITAL | Age: 76
Setting detail: HOSPITAL OUTPATIENT SURGERY
Discharge: HOME | End: 2021-07-09
Attending: COLON & RECTAL SURGERY | Admitting: COLON & RECTAL SURGERY
Payer: MEDICARE

## 2021-07-09 ENCOUNTER — ANESTHESIA (OUTPATIENT)
Dept: OPERATING ROOM | Facility: HOSPITAL | Age: 76
Setting detail: HOSPITAL OUTPATIENT SURGERY
End: 2021-07-09
Payer: MEDICARE

## 2021-07-09 VITALS
HEART RATE: 90 BPM | TEMPERATURE: 98 F | BODY MASS INDEX: 30.23 KG/M2 | HEIGHT: 60 IN | DIASTOLIC BLOOD PRESSURE: 69 MMHG | OXYGEN SATURATION: 99 % | SYSTOLIC BLOOD PRESSURE: 147 MMHG | WEIGHT: 154 LBS | RESPIRATION RATE: 16 BRPM

## 2021-07-09 PROCEDURE — 36000012 HC OR LEVEL 2 EA ADDL MIN: Performed by: COLON & RECTAL SURGERY

## 2021-07-09 PROCEDURE — 0DJD8ZZ INSPECTION OF LOWER INTESTINAL TRACT, VIA NATURAL OR ARTIFICIAL OPENING ENDOSCOPIC: ICD-10-PCS | Performed by: COLON & RECTAL SURGERY

## 2021-07-09 PROCEDURE — 71000002 HC PACU PHASE 2 INITIAL 30MIN: Performed by: COLON & RECTAL SURGERY

## 2021-07-09 PROCEDURE — 36000002 HC OR LEVEL 2 INITIAL 30MIN: Performed by: COLON & RECTAL SURGERY

## 2021-07-09 PROCEDURE — 57410 PELVIC EXAMINATION: CPT | Performed by: OBSTETRICS & GYNECOLOGY

## 2021-07-09 PROCEDURE — 37000001 HC ANESTHESIA GENERAL: Performed by: COLON & RECTAL SURGERY

## 2021-07-09 PROCEDURE — 63600000 HC DRUGS/DETAIL CODE: Mod: JW

## 2021-07-09 PROCEDURE — 25800000 HC PHARMACY IV SOLUTIONS

## 2021-07-09 PROCEDURE — 71000012 HC PACU PHASE 2 EA ADDL MIN: Performed by: COLON & RECTAL SURGERY

## 2021-07-09 PROCEDURE — 71000011 HC PACU PHASE 1 EA ADDL MIN: Performed by: COLON & RECTAL SURGERY

## 2021-07-09 PROCEDURE — 8E0UXY7 EXAMINATION OF FEMALE REPRODUCTIVE SYSTEM: ICD-10-PCS | Performed by: OBSTETRICS & GYNECOLOGY

## 2021-07-09 PROCEDURE — 25000000 HC PHARMACY GENERAL

## 2021-07-09 PROCEDURE — 63600000 HC DRUGS/DETAIL CODE: Performed by: PHYSICIAN ASSISTANT

## 2021-07-09 PROCEDURE — 63700000 HC SELF-ADMINISTRABLE DRUG: Performed by: STUDENT IN AN ORGANIZED HEALTH CARE EDUCATION/TRAINING PROGRAM

## 2021-07-09 PROCEDURE — 45330 DIAGNOSTIC SIGMOIDOSCOPY: CPT | Performed by: COLON & RECTAL SURGERY

## 2021-07-09 PROCEDURE — 25000000 HC PHARMACY GENERAL: Performed by: PHYSICIAN ASSISTANT

## 2021-07-09 PROCEDURE — 71000001 HC PACU PHASE 1 INITIAL 30MIN: Performed by: COLON & RECTAL SURGERY

## 2021-07-09 RX ORDER — ONDANSETRON HYDROCHLORIDE 2 MG/ML
INJECTION, SOLUTION INTRAVENOUS AS NEEDED
Status: DISCONTINUED | OUTPATIENT
Start: 2021-07-09 | End: 2021-07-09 | Stop reason: SURG

## 2021-07-09 RX ORDER — IBUPROFEN 200 MG
16-32 TABLET ORAL AS NEEDED
Status: DISCONTINUED | OUTPATIENT
Start: 2021-07-09 | End: 2021-07-09 | Stop reason: HOSPADM

## 2021-07-09 RX ORDER — LIDOCAINE HYDROCHLORIDE 10 MG/ML
INJECTION, SOLUTION INFILTRATION; PERINEURAL AS NEEDED
Status: DISCONTINUED | OUTPATIENT
Start: 2021-07-09 | End: 2021-07-09 | Stop reason: SURG

## 2021-07-09 RX ORDER — ACETAMINOPHEN 325 MG/1
650 TABLET ORAL ONCE AS NEEDED
Status: DISCONTINUED | OUTPATIENT
Start: 2021-07-09 | End: 2021-07-09 | Stop reason: HOSPADM

## 2021-07-09 RX ORDER — FENTANYL CITRATE 50 UG/ML
50 INJECTION, SOLUTION INTRAMUSCULAR; INTRAVENOUS
Status: DISCONTINUED | OUTPATIENT
Start: 2021-07-09 | End: 2021-07-09 | Stop reason: HOSPADM

## 2021-07-09 RX ORDER — CEFAZOLIN SODIUM 2 G/50ML
2 SOLUTION INTRAVENOUS
Status: COMPLETED | OUTPATIENT
Start: 2021-07-09 | End: 2021-07-09

## 2021-07-09 RX ORDER — PROPOFOL 10 MG/ML
INJECTION, EMULSION INTRAVENOUS AS NEEDED
Status: DISCONTINUED | OUTPATIENT
Start: 2021-07-09 | End: 2021-07-09 | Stop reason: SURG

## 2021-07-09 RX ORDER — METRONIDAZOLE 500 MG/100ML
500 INJECTION, SOLUTION INTRAVENOUS
Status: COMPLETED | OUTPATIENT
Start: 2021-07-09 | End: 2021-07-09

## 2021-07-09 RX ORDER — ESMOLOL HYDROCHLORIDE 10 MG/ML
INJECTION INTRAVENOUS AS NEEDED
Status: DISCONTINUED | OUTPATIENT
Start: 2021-07-09 | End: 2021-07-09 | Stop reason: SURG

## 2021-07-09 RX ORDER — HYDROMORPHONE HYDROCHLORIDE 1 MG/ML
0.5 INJECTION, SOLUTION INTRAMUSCULAR; INTRAVENOUS; SUBCUTANEOUS
Status: DISCONTINUED | OUTPATIENT
Start: 2021-07-09 | End: 2021-07-09 | Stop reason: HOSPADM

## 2021-07-09 RX ORDER — DEXTROSE 50 % IN WATER (D50W) INTRAVENOUS SYRINGE
25 AS NEEDED
Status: DISCONTINUED | OUTPATIENT
Start: 2021-07-09 | End: 2021-07-09 | Stop reason: HOSPADM

## 2021-07-09 RX ORDER — ONDANSETRON HYDROCHLORIDE 2 MG/ML
4 INJECTION, SOLUTION INTRAVENOUS
Status: DISCONTINUED | OUTPATIENT
Start: 2021-07-09 | End: 2021-07-09 | Stop reason: HOSPADM

## 2021-07-09 RX ORDER — MIDAZOLAM HYDROCHLORIDE 2 MG/2ML
INJECTION, SOLUTION INTRAMUSCULAR; INTRAVENOUS AS NEEDED
Status: DISCONTINUED | OUTPATIENT
Start: 2021-07-09 | End: 2021-07-09 | Stop reason: SURG

## 2021-07-09 RX ORDER — SODIUM CHLORIDE 9 MG/ML
INJECTION, SOLUTION INTRAVENOUS CONTINUOUS PRN
Status: DISCONTINUED | OUTPATIENT
Start: 2021-07-09 | End: 2021-07-09 | Stop reason: SURG

## 2021-07-09 RX ORDER — FENTANYL CITRATE 50 UG/ML
INJECTION, SOLUTION INTRAMUSCULAR; INTRAVENOUS AS NEEDED
Status: DISCONTINUED | OUTPATIENT
Start: 2021-07-09 | End: 2021-07-09 | Stop reason: SURG

## 2021-07-09 RX ORDER — DEXTROSE 40 %
15-30 GEL (GRAM) ORAL AS NEEDED
Status: DISCONTINUED | OUTPATIENT
Start: 2021-07-09 | End: 2021-07-09 | Stop reason: HOSPADM

## 2021-07-09 RX ORDER — ROCURONIUM BROMIDE 10 MG/ML
INJECTION, SOLUTION INTRAVENOUS AS NEEDED
Status: DISCONTINUED | OUTPATIENT
Start: 2021-07-09 | End: 2021-07-09 | Stop reason: SURG

## 2021-07-09 RX ADMIN — CEFAZOLIN 2 G: 330 INJECTION, POWDER, FOR SOLUTION INTRAMUSCULAR; INTRAVENOUS at 13:51

## 2021-07-09 RX ADMIN — ONDANSETRON HYDROCHLORIDE 4 MG: 2 SOLUTION INTRAMUSCULAR; INTRAVENOUS at 14:28

## 2021-07-09 RX ADMIN — MIDAZOLAM HYDROCHLORIDE 2 MG: 1 INJECTION, SOLUTION INTRAMUSCULAR; INTRAVENOUS at 13:51

## 2021-07-09 RX ADMIN — ACETAMINOPHEN 650 MG: 325 TABLET ORAL at 16:21

## 2021-07-09 RX ADMIN — ESMOLOL HYDROCHLORIDE 20 MG: 100 INJECTION, SOLUTION INTRAVENOUS at 14:20

## 2021-07-09 RX ADMIN — SODIUM CHLORIDE: 9 INJECTION, SOLUTION INTRAVENOUS at 13:51

## 2021-07-09 RX ADMIN — ESMOLOL HYDROCHLORIDE 20 MG: 100 INJECTION, SOLUTION INTRAVENOUS at 14:11

## 2021-07-09 RX ADMIN — ROCURONIUM BROMIDE 50 MG: 10 INJECTION, SOLUTION INTRAVENOUS at 13:59

## 2021-07-09 RX ADMIN — FENTANYL CITRATE 100 MCG: 50 INJECTION, SOLUTION INTRAMUSCULAR; INTRAVENOUS at 13:59

## 2021-07-09 RX ADMIN — PROPOFOL INJECTABLE EMULSION 100 MG: 10 INJECTION, EMULSION INTRAVENOUS at 13:59

## 2021-07-09 RX ADMIN — LIDOCAINE HYDROCHLORIDE 8 ML: 10 INJECTION, SOLUTION INFILTRATION; PERINEURAL at 13:59

## 2021-07-09 RX ADMIN — METRONIDAZOLE 500 MG: 500 INJECTION, SOLUTION INTRAVENOUS at 13:51

## 2021-07-09 RX ADMIN — SUGAMMADEX 279.6 MG: 100 INJECTION, SOLUTION INTRAVENOUS at 14:28

## 2021-07-09 NOTE — OR SURGEON
Pre-Procedure patient identification:  I am the primary operating surgeon/proceduralist and I have identified the patient on 07/09/21 at 1:32 PM Geovani Mandel MD  Phone Number: [phone]

## 2021-07-09 NOTE — ANESTHESIA POSTPROCEDURE EVALUATION
Patient: Raegan Young    Procedure Summary     Date: 07/09/21 Room / Location: [facility] OR 10 / [facility] OR    Anesthesia Start: 1351 Anesthesia Stop: 1448    Procedure: Rectal EUA and flexible sigmoidoscopy (N/A ) Diagnosis:       Colovaginal fistula      (n82.4)    Surgeons: Geovani Mandel MD Responsible Provider: Sole Felix MD    Anesthesia Type: general ASA Status: 3          Anesthesia Type: general  PACU Vitals  7/9/2021 1439 - 7/9/2021 1512      7/9/2021  1445             BP:  137/62    Temp:  36.7 °C (98.1 °F)    Pulse:  100    Resp:  16    SpO2:  100 %            Anesthesia Post Evaluation    Pain management: adequate  Patient location during evaluation: PACU  Patient participation: complete - patient participated  Level of consciousness: awake and alert  Cardiovascular status: acceptable  Airway Patency: adequate  Respiratory status: acceptable  Hydration status: acceptable  Anesthetic complications: no

## 2021-07-09 NOTE — OP NOTE
Exam Under Anesthesia Procedure Note    Pre-op Diagnosis:  Vaginal Cancer with Colovaginal Fistula    Post-op Diagnosis: same    Procedure: Exam under anesthesia    Surgeon: Trev Jimenez MD    Assistant: Brad Galaviz MD PGY3    Findings: stenotic vagina with a small orifice roughly 6cm from the introitus. Stenotic rectum with fullness roughly 6cm proximal to the anus.    Estimated Blood Loss:  None           Drains: N/A           Specimens: * No specimens in log *           Complications:  none           Disposition:  stable        Raegan Young presented to [facility] on [facility]9/[facility]21 The surgeon and patient were mutually identified in the preoperative area. Her consents were reconfirmed and her questions were again answered. She was taken to the operating suite and administered GETA anesthesia. She was placed in dorsal lithotomy where exam under anesthesia revealed a stenotic vagina and rectum.  A rader and salbador retractor were placed and a small orifice was noted, presumably the cervix or the fistula. The vagina was noted to be extremely friable.     A rectovaginal exam was performed. Roughly 6 cm from the vaginal introitus, a small orifice could be palpated but was not directly immediately communicating with the rectum. A firm mass was palpable in the rectum roughly 6cm proximal to the anus.     The patient was then turned over to Dr. Mandel in stable condition. Please see his operative report for further details.    Dr. Jimenez was present and scrubbed for the entire procedure.     Brad Galaviz MD PGY3  Obstetrics and Gynecology  Beeper #[id number]

## 2021-07-09 NOTE — BRIEF OP NOTE
Rectal EUA and flexible sigmoidoscopy Procedure Note    Procedure:    Rectal EUA and flexible sigmoidoscopy  CPT(R) Code:  10586 - DE COLONOSCOPY FLX DX W/COLLJ SPEC WHEN PFRMD      Pre-op Diagnosis     * Colovaginal fistula [N82.4]       Post-op Diagnosis     * Colovaginal fistula [N82.4]    Surgeon(s) and Role:     * Geovani Mandel MD - Primary     * Bard Galaviz MD - Resident - Assisting     * Trev Jimenez MD    Anesthesia: Monitor Anesthesia Care    Staff:   Circulator: Shira Ch RN; Mary Enciso RN  Scrub Person: Domenico Street    Procedure Details   Rectal EUA and flexible sigmoidoscopy    Estimated Blood Loss: No blood loss documented.    Specimens:                No specimens collected during this procedure.      Drains:   Closed/Suction Drain 1 Left Groin 7 Fr. (Active)       Implants: * No implants in log *           Complications:  None; patient tolerated the procedure well.           Disposition: PACU - hemodynamically stable.           Condition: stable    Geovani Mandel MD  Phone Number: [phone]

## 2021-07-09 NOTE — ANESTHESIA PROCEDURE NOTES
Airway  Urgency: elective    Start Time: 7/9/2021 2:01 PM  Airway not difficult    General Information and Staff    Patient location during procedure: OR  Resident/CRNA: Shantel Gonzalez CRNA  Performed: resident/CRNA     Indications and Patient Condition  Indications for airway management: anesthesia  Sedation level: general  Preoxygenated: yes  Patient position: sniffing  MILS maintained throughout  Mask difficulty assessment: 1 - vent by mask    Final Airway Details  Final airway type: endotracheal airway      Successful airway: ETT  Cuffed: yes   Successful intubation technique: direct laryngoscopy  Endotracheal tube insertion site: oral  Blade: Hayley  Blade size: #4  ETT size (mm): 7.0  Cormack-Lehane Classification: grade I - full view of glottis  Placement verified by: capnometry   Measured from: lips  ETT to lips (cm): 21  Number of attempts at approach: 1  Number of other approaches attempted: 0  Atraumatic airway insertion

## 2021-07-09 NOTE — ANESTHESIOLOGIST PRE-PROCEDURE ATTESTATION
Pre-Procedure Patient Identification:  I am the Primary Anesthesiologist and have identified the patient on 07/09/21 at 1:29 PM.   I have confirmed the procedure(s) will be performed by the following surgeon/proceduralist Geovani Mandel MD.

## 2021-07-09 NOTE — DISCHARGE INSTRUCTIONS
[facility] Discharge Instructions    You had an exam under anesthesia and a flexible sigmoidoscopy with Adwoa Mandel and Tony    Activity:  · No restriction   Nutrition:  · No restrictions   Medications:  · Resume all home medications unless otherwise directed by doctor or nurse practitioner.   · Take tylenol 650mg every 4 hours as needed for mild or moderate pain.  Reasons to Call the Surgeon:  · Severe and persistent pain, nausea, vomiting, diarrhea, constipation   · Fever above 101.5 oF   · Redness, swelling or drainage from incision   Follow Up Appointment:  · Call to schedule an appointment with your surgeon after discharge  · [facility] [address]

## 2021-07-09 NOTE — OR SURGEON
Pre-Procedure patient identification:  I am the primary operating surgeon/proceduralist and I have identified the patient on 07/09/21 at 1:45 PM Geovani Mandel MD  Phone Number: [phone]

## 2021-07-09 NOTE — H&P
"HPI: Ms. Raegan Young is a 75 y.o. lady with a history of   Cancer Staging  History of cancer of vagina  Staging form: Vagina, AJCC 8th Edition  - Clinical stage from 9/12/2018: FIGO Stage III (cT3, cN1, cM0) - Signed by Trev Jimenez MD on 9/21/2018     Patient's Oncology History documentation       Oncology History   History of cancer of vagina   9/12/2018 Cancer Staged     Staging form: Vagina, AJCC 8th Edition  - Clinical stage from 9/12/2018: FIGO Stage III (cT3, cN1, cM0)      9/21/2018 Initial Diagnosis     Vaginal cancer (CMS/HCC) (HCC)      9/21/2018 - 10/29/2018 Chemotherapy     CISplatin with Concurrent Radiation,          9/27/2018 - 11/28/2018 Radiation Therapy     IMRT concurrent Cisplatin complicated by N/V, dehydration, thrombocytopenia febrile neutropenia      1/15/2019 Surgery     Left groin node excision benign      9/20/2020 Imaging Significant Findings     CT: She had CT scan on 9/2/20: Significant sigmoid colonic wall thickening from chronic diverticulosis. Significant size hiatal hernia present.  Left upper lobe 5 mm lung nodule is stable. There is no grossly enlarged lymph node noted in the left groin on the CT.             She returns today for recent ED visit with findings of colitis possible diverticulitis and colonic uterine fistula. Patient went to ED on 5/30/2021 for diarrhea and vaginal discharge. CT done at this time showed concerns for colonic uterine fistula due to diverticulitis. Patient was place don 7 day course of lovofloxacin+ flagyl. She reports that she is very anxious today and continues to feel \"shakiness\" all over her body as well as bilateral lower extremity edema. She continues to report brown vaginal discharge. Denies any constipation, nausea/vomtiing, moderate abd/pelvic pain. She is accompanied by her daughter today.      Medical History:   Medical History        Past Medical History:   Diagnosis Date   • Disease of thyroid gland     • Diverticulitis     • " Diverticulitis of colon     • History of snoring     • Hypertension     • Hypothyroidism     • Vaginal cancer (CMS/HCC)              Surgical History:   Surgical History         Past Surgical History:   Procedure Laterality Date   • CHOLECYSTECTOMY       • COLONOSCOPY       • DILATION AND CURETTAGE OF UTERUS   02/2018   • ROTATOR CUFF REPAIR Right           reports that she quit smoking about 23 years ago. She started smoking about 64 years ago. She has a 40.00 pack-year smoking history. She has never used smokeless tobacco. She reports that she does not drink alcohol and does not use drugs.     Cancer-related family history includes Lung cancer in her biological father. There is no history of Breast cancer, Cancer, Colon cancer, or Ovarian cancer.     Allergies: No known allergies     Medications:   Current Medications          Current Outpatient Medications   Medication Sig Dispense Refill   • allopurinol (ZYLOPRIM) 300 mg tablet daily.        • benazepril (LOTENSIN) 10 mg tablet Take 10 mg by mouth daily.       • bumetanide (BUMEX) 1 mg tablet Take 1 mg by mouth once daily.       • levoFLOXacin (LEVAQUIN) 750 mg tablet Take 1 tablet (750 mg total) by mouth daily for 7 days. 7 tablet 0   • levothyroxine (SYNTHROID) 88 mcg tablet Take 88 mcg by mouth once daily.   3   • metroNIDAZOLE (FLAGYL) 250 mg tablet Take 1 tablet (250 mg total) by mouth 3 (three) times a day for 7 days. 21 tablet 0   • omega-3 acid ethyl esters (LOVAZA) 1 gram capsule take 2 capsule by oral route 2 times every day       • pantoprazole (PROTONIX) 40 mg EC tablet Take 40 mg by mouth daily.            No current facility-administered medications for this visit.            Review of Systems  All other systems reviewed and negative except as noted in the HPI.        Objective         Vital Signs for the last 24 hours:  Visit Vitals  BP (!) 159/80   Pulse (!) 109   Wt 72.6 kg (160 lb)   SpO2 97%   BMI 31.25 kg/m²         Physical Exam From  Office:  General: well-developed, well-nourished, no apparent distress  Neck: supple, no masses  Lymphatic: no supraclavicular, cervical, or inguinal adenopathy  Respiratory: lungs clear to auscultation bilaterally, normal respiratory effort  Cardiovascular: regular rate and rhythm, no murmurs, rubs, gallops.   Extremity: no clubbing, cyanosis, edema  GI: abdomen soft, non-distended, non-tender, no hepatosplenomegaly, no masses  Neurologic: alert & oriented x3, no gross deficits  Psychiatric: mood and affect normal  Musculoskeletal: no deformity or gross strength deficit  Gynecologic:  Firmness of left groin node, Normal vulva, vagina with vaginal stenosis, stool seen at vaginal apex.  No adnexal masses  Urethra with dilated vessels, no masses.  Normal bladder  Rectal: deferred; will plan to perform EUA           Assessment/Plan         Ms. Raegan Young is a 75 y.o. lady with       Problem List Items Addressed This Visit                 Unprioritized      Colovaginal fistula      Current Assessment & Plan        Confirmed on CT scan and pelvic examination today. Referral for patient to see Colorectal for further evaluation/ surgical management. Will coordinate with Dr. Mandel to perform EUA at the time of surgery. Refilled both flagyl + Levaquin for another week ( total of 2 weeks). Instructed patient to call with any new concerning symptoms such as fevers, chills, constipation, nausea/vomiting, severe abdominal pain. Encouraged her to remain on stool softener (colace daily).             Relevant Orders      Ambulatory referral to Colorectal Surgery      Diverticulitis of large intestine with perforation and abscess with bleeding - Primary      Current Assessment & Plan        Referral to colorectal.             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  Will plan to perform EUA, possible biopsies at the time of colorectal surgery. Will coordinate with Dr. Mandel's office.

## 2021-07-10 NOTE — OP NOTE
Raegan Young  1945  [id number]  7/9/2021        SURGEON: Geovani Mandel MD    ASSISTANT: Cathy Mack MD    PREOPERATIVE DIAGNOSIS: Colovaginal and colouterine fistula    POSTOPERATIVE DIAGNOSIS: Same, incomplete colonoscopy due to tortuosity of the colon    PROCEDURE PERFORMED:    1) rectal examination under anesthesia  2. Flexible sigmoidoscopy    SPECIMENS:    1) none    ANESTHESIA: General.    COMPLICATIONS: None.    EBL: None    OPERATIVE INDICATIONS: Patient is a very nice 75-year-old woman who presents with a history of a colovaginal and colouterine fistula in the background setting of having radiation therapy for cervical cancer in the past and diverticulitis. A combine rectal examination and vaginal examination under anesthesia was performed with Dr. Jimenez and myself today in order to evaluate matters and see if she has any evidence for recurrent cancer or if this is likely related to diverticular disease/radiation therapy. I discussed risk of the procedure in detail including bleeding, infection, perforation of the bowel. She understood and would like to proceed.    OPERATIVE DETAIL: The patient was brought to the operating table and placed in supine position. She was identified and general anesthesia was induced. She was and placed in the following position and her anal area and perineum was prepped in usual fashion using Betadine. A timeout was performed and Dr. Jimenez and his team performed a gynecologic examination. Once this was completed, the colonoscope was inserted to the anus and brought as far cephalad as possible. Unfortunately this was just the distal sigmoid colon where was limited because the colon was taken a hairpin turn that the colonoscope could not navigate safely. As a result, a pediatric anoscope was attempted and ultimately a pediatric bronchoscope was attempted and unfortunately none of the scopes were able to navigate the:. As result the scope was withdrawn fully the  patient tolerated this well. She was then transferred to the PACU in extubated and stable condition.

## 2021-07-12 NOTE — PROGRESS NOTES
Sonny Sanchez MD [facility]  Cardiology    [facility]    [facility]  [address]  [address] [place] [address]    TEL  [phone]  [facility]     2021    Re:  Raegan Young  : 1945    Primary care: Dr. Javon Valadez    Referring Physician: Dr Geovani Mandel    Gynecology: Dr Trev Oh    Dear Althea Redd and Rory     It was a pleasure to meet Raegan Young in the office for the first time today.  Tania is a 75-year-old former smoker with no significant cardiovascular history who is scheduled to undergo surgery for chronic colouterine and colovaginal fistulae as well as diverticular disease that is complicated her life recently.  She is here for preoperative cardiovascular risk stratification given her comorbidities of essential hypertension, hyperlipidemia and former tobacco use.    Tania tells me that she is doing well, she remains active in her life, she remains independent with her activities of daily living, is able to climb 2 flights of stairs, carry groceries and do gardening without any challenges.  She has no exertional chest pain exertional intolerance or shortness of breath with activity.  She has no orthopnea, PND, lightheadedness dizziness or history of syncope.  She was previously admitted for dehydration and associated lightheadedness at [place] which has since resolved.  She is chronic left leg greater than right leg lower extremity edema discretely below the knee for which she takes as needed bumetanide and has done so for the last 3 years.  She does report occasional palpitations and skipped beats almost all her life which have never been a concern.  She splits her time between the [facility] and the [facility] to stay with family. She currently does not actively exercise but pre-COVID was a daily gym-goer.     Her appetite remains good, she urinates normally  and tells me that the bumetanide leads to a good diuresis when taken.     PAST MEDICAL HISTORY:  History of vaginal cancer s/p chemotherapy and radiation. 2018.  Essential HTN   Unilateral lower extremity edema of his left leg  Left knee osteoarthritis   Hypothyroidism   Coronary artery calcification on CT (2020)    FAMILY HISTORY:   There is a family history of premature coronary artery disease. Mother had a MI and sudden death at age 51. Otherwise there is no sudden cardiac death or cardiomyopathy / need for transplant / LVAD     SOCIAL HISTORY:   She is a former smoker. She smoked a pack and half a day for about 30 years. She doesn't drink alcohol to excess. She is a retired  in [employer].   She lives with her  and has 4 healthy children.     MEDICATIONS:    Outpatient Encounter Medications as of 2021:   •  benazepril (LOTENSIN) 10 mg tablet, Take 10 mg by mouth daily.  •  bumetanide (BUMEX) 1 mg tablet, Take 1 mg by mouth as needed.    •  dicyclomine (BENTYL) 10 mg capsule, Take 1 capsule (10 mg total) by mouth 4 (four) times a day (with meals and nightly).  •  [] fluconazole (DIFLUCAN) 150 mg tablet, Take 1 tablet (150 mg total) by mouth daily for 3 days.  •  levothyroxine (SYNTHROID) 88 mcg tablet, Take 88 mcg by mouth daily.    •  pantoprazole (PROTONIX) 40 mg EC tablet, Take 40 mg by mouth daily.    •  [DISCONTINUED] allopurinol (ZYLOPRIM) 300 mg tablet, daily.   •  [DISCONTINUED] doxycycline hyclate (VIBRAMYCIN) 100 mg capsule, Take one capsule at 8 am and one capsule at 8 pm the day before surgery.  •  [DISCONTINUED] metroNIDAZOLE (FLAGYL) 500 mg tablet,   •  [DISCONTINUED] omega-3 acid ethyl esters (LOVAZA) 1 gram capsule, take 2 capsule by oral route 2 times every day  •  [] ceFAZolin (ANCEF) IVPB 2 g/50 mL D5W,   •  [] metroNIDAZOLE in NaCl (iso-os) (FLAGYL) IVPB 500 mg,   •  [DISCONTINUED] acetaminophen (TYLENOL) tablet 650 mg,   •  [DISCONTINUED]  dextrose 40 % oral gel 15-30 g of dextrose,   •  [DISCONTINUED] dextrose in water injection 12.5 g,   •  [DISCONTINUED] esmoloL (BREVIBLOC) injection,   •  [DISCONTINUED] fentaNYL (PF) (SUBLIMAZE) injection 50 mcg,   •  [DISCONTINUED] fentaNYL (PF) (SUBLIMAZE) injection,   •  [DISCONTINUED] glucagon (GLUCAGEN) injection 1 mg,   •  [DISCONTINUED] glucose chewable tablet 16-32 g of dextrose,   •  [DISCONTINUED] HYDROmorphone (DILAUDID) injection 0.5 mg,   •  [DISCONTINUED] lidocaine (XYLOCAINE) 10 mg/mL (1 %) injection,   •  [DISCONTINUED] midazolam (VERSED) 1 mg/mL injection,   •  [DISCONTINUED] ondansetron (ZOFRAN) injection 4 mg,   •  [DISCONTINUED] ondansetron (ZOFRAN) injection,   •  [DISCONTINUED] propofoL (DIPRIVAN) 10 mg/mL continuous infusion,   •  [DISCONTINUED] rocuronium (ZEMURON) injection,   •  [DISCONTINUED] sodium chloride 0.9 % infusion,   •  [DISCONTINUED] sugammadex (BRIDION) injection,     REVIEW OF SYSTEMS: Aside from what is mentioned above, a relevant complete review of systems was performed and was negative.    Visit Vitals  /80 (BP Location: Left upper arm, Patient Position: Sitting)   Pulse 94   Ht 1.524 m (5')   Wt 70.7 kg (155 lb 12.8 oz)   SpO2 99%   BMI 30.43 kg/m²   Body surface area is 1.73 meters squared.     PHYSICAL EXAMINATION:  General: Pleasant and in no acute distress.  HEENT: No corneal arcus or xanthelasmas.  Sclerae are anicteric.  Dentition not examined as the patient is wearing a mask/face covering.  Neck: Supple.  JVP is 6 cm/H2O.  Right carotid bruit.  No lymphadenopathy or thyromegaly.  Heart: Normal S1 and S2.  Soft systolic murmur, mid peaking at the RUSB.  Chest: Symmetrical.  Lungs: Clear bilaterally without rales, wheezes nor rhonchi.  Abdomen: Soft, nontender.   Extremities: Changes consistent with osteoarthritis in fingers.  Lower extremity edema left leg >right leg.  No cyanosis, clubbing.  Distal pulses are easily palpable.  Skin: Warm and dry and well  perfused.  Neurologic: Alert and appropriate, moving all four extremities. Gait is normal  Psychiatric: Normal mood, affect & judgment.    DIAGNOSTIC DATA:     ECG: Normal sinus rhythm, premature atrial complexes     ECG on 7/6/2021 was a normal ECG     ECG on 6/7/2021 also showed premature atrial complexes.    Labs:   Lab Results   Component Value Date     07/06/2021    K 4.3 07/06/2021    MG 1.3 (L) 11/19/2018    BUN 14 07/06/2021    CREATININE 1.2 (H) 07/06/2021    EGFR 43.7 (L) 07/06/2021    WBC 6.87 07/06/2021    HGB 10.6 (L) 07/06/2021     07/06/2021    ALT 12 06/07/2021    AST 21 06/07/2021    GLUCOSE 93 07/06/2021    TSH 1.03 09/21/2018       Lipid Profile 12/17/2020:    Total cholesterol 200   Triglycerides 210      HDL 51    Imaging:     CT chest abdomen pelvis 9/2/2020: There is moderate coronary calcification in the left coronary tree.  There is a moderate sized hiatal hernia.    ASSESSMENT/PLAN:    1. Coronary calcification on CT  2. Right carotid bruit  3. Pure hypercholesterolemia  4. Hypertriglyceridemia    Lifetime ASCVD risk is 27.3% based on the pooled cohort equation.  I think it is reasonable to have her start on a moderate intensity statin in the lead up to her surgery.  I think it is also reasonable for her to start low-dose aspirin therapy, I will communicate this with her surgical team to ensure that there are no surgical contraindications to doing so before getting her started.  I would like to recheck a lipid panel 6 to 8 weeks after she has started her moderate intensity statin.  Hopefully her hypertriglyceridemia will have improved on follow-up testing.  Her goal LDL should be less than 70 mg/dL.    The right carotid bruit finding is new.  I would like to have her have bilateral carotid ultrasounds.    5. Premature atrial complexes    Noted on ECG, lifetime history of occasional skipped beats likely represent PACs in the context of hypothyroidism.  She is clinically  euthyroid.  Most recent TSH is well within normal limits.  We will continue to monitor.    6. Essential hypertension    She remains on benazepril 10 mg daily, I have made no changes today.  I would like her to monitor her home blood pressures using a brachial blood pressure cuff and to communicate either to me or Dr. Valadez findings over the next 3 to 4 weeks to ensure that her blood pressures are optimally controlled    7. Bilateral lower extremity edema    Left > Right thought to be in the setting of significant left knee osteoarthritis, improved with bumetanide.  In the lead up to surgery I would like to check a BNP I have also requested a transthoracic echocardiogram.    8. Cardiac murmur    Transthoracic echocardiogram requested as above.    9.  Preoperative cardiovascular risk stratification    Raegan demonstrates > 4 METS exercise capacity based on her report of her day to day activities. She has no active cardiovascular condition (namely, acute or recent coronary syndrome, decompensated heart failure or valve disease, malignant ventricular arrhythmias) and her ECG does not demonstrate evidence of prior infarction. She has no history of a CVA, significant chronic kidney disease (creatinine <2 mg/dL) or diabetes requiring insulin.     Her Subramanian Perioperative Risk for Myocardial Infarction or Cardiac Arrest (WATSON) is 0.7% for risk of myocardial infarction or cardiac arrest, intraoperatively or up to 30 days post-operatively.    From a cardiovascular standpoint she is at acceptable risk to undergo her planned procedure.  I do think that there are some areas that can be optimized in terms of lipid-lowering and initiation of aspirin therapy.  I have made plans as detailed above to start these so that her overall risk is as optimal as possible and delayed after her surgery.      Thank you for allowing me to share in the care of your patient.  I asked Raegan  to return in 6 months or sooner. I will call her with the  results of her testing..  If you have any further questions, please do not hesitate to contact me.    Sincerely,        Sonny Sanchez MD, [facility]     The total time for this visit was 45 minutes and was comprised of chart preparation/review, office visit (gathering HPI, examination, testing review, plan formulation/discussion and patient education), processing orders and completing documentation.

## 2021-07-15 ENCOUNTER — DOCUMENTATION (OUTPATIENT)
Dept: COLORECTAL SURGERY | Facility: CLINIC | Age: 76
End: 2021-07-15

## 2021-07-15 NOTE — PROGRESS NOTES
1       2             3              4             5     LAST APPOINTMENT: 6/28/2021     TIME ARRIVED:     TIME ROOMED:     VISIT TYPE:   NP       POV     EPV     DISC:             YES         NO                                   PREPPED:  YES  NO     LABS:  [facility]  [facility]  [facility]  OTHER      PHARMACY ENTERED:  YES   NO     DIAGNOSIS:Colouterine fistula +2 more      SCHEDULED SURGERY        DATE OF SURGERY:9/7/2021  Rectal EUA and flexible sigmoidoscopy       PATHOLOGY STAGE:   RADIATION :    Yes   No                                           DOSAGE:      LOCATION:   Anterior   Right Lateral   Left Lateral   Posterior   Circumferential      LEVEL:                        SIZE:     CT SCAN:   FFC:    FFS:    CEA:    PET:    MRI:      OTHER:       FIBER:  NO  METAMUCIL  KONSYL  CITYRUCEL   FIBERCON  BENEFIBER OTHER  LOMOTIL:    NO    1  VS   2    QD    BID    TID    QID  ZANTAC:       Y / N  AMPHOGEL:    Y / N                                                                          START / STOP                                                 RAD ONC:                              N / A        MED ONC:                             N / A

## 2021-07-19 ENCOUNTER — HOSPITAL ENCOUNTER (OUTPATIENT)
Facility: HOSPITAL | Age: 76
Setting detail: SURGERY ADMIT
End: 2021-07-19
Attending: COLON & RECTAL SURGERY | Admitting: COLON & RECTAL SURGERY
Payer: MEDICARE

## 2021-07-19 ENCOUNTER — OFFICE VISIT (OUTPATIENT)
Dept: COLORECTAL SURGERY | Facility: CLINIC | Age: 76
End: 2021-07-19
Payer: MEDICARE

## 2021-07-19 VITALS
TEMPERATURE: 97.7 F | BODY MASS INDEX: 30.23 KG/M2 | SYSTOLIC BLOOD PRESSURE: 125 MMHG | HEIGHT: 60 IN | DIASTOLIC BLOOD PRESSURE: 60 MMHG | WEIGHT: 154 LBS

## 2021-07-19 DIAGNOSIS — N82.4 COLOUTERINE FISTULA: ICD-10-CM

## 2021-07-19 DIAGNOSIS — K57.21 DIVERTICULITIS OF LARGE INTESTINE WITH PERFORATION AND ABSCESS WITH BLEEDING: Primary | ICD-10-CM

## 2021-07-19 DIAGNOSIS — C57.7 MALIGNANT NEOPLASM OF OTHER SPECIFIED FEMALE GENITAL ORGANS: ICD-10-CM

## 2021-07-19 DIAGNOSIS — N82.4 COLOVAGINAL FISTULA: ICD-10-CM

## 2021-07-19 PROCEDURE — 99213 OFFICE O/P EST LOW 20 MIN: CPT | Performed by: COLON & RECTAL SURGERY

## 2021-07-19 PROCEDURE — G8754 DIAS BP LESS 90: HCPCS | Performed by: COLON & RECTAL SURGERY

## 2021-07-19 PROCEDURE — G8752 SYS BP LESS 140: HCPCS | Performed by: COLON & RECTAL SURGERY

## 2021-07-19 NOTE — LETTER
July 23, 2021     Javon Valadez DO  [address]  [place] [address]    Patient: Raegan Young  YOB: 1945  Date of Visit: 7/19/2021      Dear Dr. Valadez:    Thank you for referring Raegan Young to me for evaluation. Below are my notes for this consultation.    If you have questions, please do not hesitate to call me. I look forward to following your patient along with you.         Sincerely,        Geovani Mandel MD        CC: MD Trev Mac MD Schoonyoung, Geovani CISNEROS MD  7/23/2021  9:09 PM  Signed  HISTORY & PHYSICAL EXAM    HPI: Patient is here be seen today in follow-up.  She is been doing fair, was very nervous about things.  We did a rectal exam under anesthesia as well as a planned colonoscopy which turned to be a flexible sigmoidoscopy due to the tortuosity of her colon.  There is no evidence of tumor seen in the vaginal exam or on the colonic exam at this point.  She has been having minimal discharge from her vagina however over the last day had some looser stool and had some output.  She has been caring for this with the irrigations.    Past Medical History:   Diagnosis Date   • Colovaginal fistula    • COVID-19 vaccine series completed 02/26/2021   • Disease of thyroid gland    • Diverticulitis of colon    • GERD (gastroesophageal reflux disease)    • History of snoring    • Hypertension    • Hypothyroidism    • Vaginal cancer (CMS/HCC)     s/p XRT x 7 weeks and chemo weekly x 5 weeks       Past Surgical History:   Procedure Laterality Date   • CHOLECYSTECTOMY     • COLONOSCOPY     • DILATION AND CURETTAGE OF UTERUS  02/2018   • ROTATOR CUFF REPAIR Right        Current Outpatient Medications:   •  benazepril (LOTENSIN) 10 mg tablet, Take 10 mg by mouth daily., Disp: , Rfl:   •  bumetanide (BUMEX) 1 mg tablet, Take 1 mg by mouth as needed.  , Disp: , Rfl:   •  levothyroxine (SYNTHROID) 88 mcg tablet, Take 88 mcg by mouth daily.  ,  Disp: , Rfl: 3  •  pantoprazole (PROTONIX) 40 mg EC tablet, Take 40 mg by mouth daily.  , Disp: , Rfl:   •  atorvastatin (LIPITOR) 40 mg tablet, Take 1 tablet (40 mg total) by mouth daily., Disp: 90 tablet, Rfl: 1  •  fluconazole (DIFLUCAN) 150 mg tablet, Take 1 tablet (150 mg total) by mouth daily for 3 days., Disp: 3 tablet, Rfl: 0  •  Lactobacillus acidophilus (PROBIOTIC ORAL), Take by mouth., Disp: , Rfl:    Allergies   Allergen Reactions   • No Known Allergies      Social History     Socioeconomic History   • Marital status:      Spouse name: Royal    • Number of children: 4   • Years of education: Not on file   • Highest education level: Not on file   Occupational History   • Occupation: retired/     Tobacco Use   • Smoking status: Former Smoker     Packs/day: 1.00     Years: 40.00     Pack years: 40.00     Types: Cigarettes     Start date:      Quit date:      Years since quittin.5   • Smokeless tobacco: Never Used   Vaping Use   • Vaping Use: Never used   Substance and Sexual Activity   • Alcohol use: Yes     Comment: occassional    • Drug use: No   • Sexual activity: Never   Other Topics Concern   • Not on file   Social History Narrative    Lives with  in a 2 story home     Social Determinants of Health     Financial Resource Strain:    • Difficulty of Paying Living Expenses:    Food Insecurity: No Food Insecurity   • Worried About Running Out of Food in the Last Year: Never true   • Ran Out of Food in the Last Year: Never true   Transportation Needs:    • Lack of Transportation (Medical):    • Lack of Transportation (Non-Medical):    Physical Activity:    • Days of Exercise per Week:    • Minutes of Exercise per Session:    Stress:    • Feeling of Stress :    Social Connections:    • Frequency of Communication with Friends and Family:    • Frequency of Social Gatherings with Friends and Family:    • Attends [employer] Services:    • Active Member of Clubs or  Organizations:    • Attends Club or Organization Meetings:    • Marital Status:    Intimate Partner Violence:    • Fear of Current or Ex-Partner:    • Emotionally Abused:    • Physically Abused:    • Sexually Abused:       Family History   Problem Relation Age of Onset   • Heart attack Biological Mother    • Lung cancer Biological Father    • Breast cancer Neg Hx    • Cancer Neg Hx    • Colon cancer Neg Hx    • Ovarian cancer Neg Hx        REVIEW OF SYSTEMS: Unchanged    PHYSICAL EXAM:  GEN: On examination the patient is resting comfortably.  NEURO/PSYCH: Alert and oriented ×3  HEENT: Eyes: Sclera anicteric  CHEST: Equal rise and fall the chest.  No tenderness bilaterally.  SKIN: Warm and moist  NODES: No groin or cervical lymphadenopathy  EXT: No palpable edema of the bilateral lower extremities.  No clubbing.  GI: Abdomen soft, nontender, nondistended.  No palpable liver or spleen edge.  No ventral hernias, mass, or tenderness.  RECTAL: Deferred      ASSESSMENT/PLAN:     Colovaginal fistula  Fistulous of the uterus as well as the vagina seen on imaging studies.  We are not able to appreciate any mass on the vaginal exam and we are not able to make it past the distal sigmoid colon on the flexible sigmoidoscopy/attempted colonoscopy due to tortuosity of the colon and severe inflammation.  On examination today the diverticulitis is fully resolved her abdomen is soft without any tenderness.  Will order a PET/CT in order to further evaluate for recurrent cervical cancer, and will coordinate with Dr. Jimenez regarding timing for surgical intervention which is going to be a robotic low anterior resection with colorectal anastomosis    Diverticulitis of large intestine with perforation and abscess with bleeding  Diverticulitis appears to be quiesced and at this point.  We are going to order a PET CT to evaluate and see if there is any evidence for recurrence of her cervical cancer here.  She is going to follow with   Tony in the office in the interim I will see her back in the office for follow-up.  Ultimately will plan to coordinate intervention with robotic low anterior resection combined ALYSSA/BSO and partial vaginectomy to remove the fistulas and repair matters.

## 2021-07-20 NOTE — ASSESSMENT & PLAN NOTE
Fistulous of the uterus as well as the vagina seen on imaging studies.  We are not able to appreciate any mass on the vaginal exam and we are not able to make it past the distal sigmoid colon on the flexible sigmoidoscopy/attempted colonoscopy due to tortuosity of the colon and severe inflammation.  On examination today the diverticulitis is fully resolved her abdomen is soft without any tenderness.  Will order a PET/CT in order to further evaluate for recurrent cervical cancer, and will coordinate with Dr. Jimenez regarding timing for surgical intervention which is going to be a robotic low anterior resection with colorectal anastomosis

## 2021-07-21 ENCOUNTER — HOSPITAL ENCOUNTER (OUTPATIENT)
Dept: CARDIOLOGY | Facility: HOSPITAL | Age: 76
Discharge: HOME | End: 2021-07-21
Attending: INTERNAL MEDICINE
Payer: MEDICARE

## 2021-07-21 ENCOUNTER — OFFICE VISIT (OUTPATIENT)
Dept: CARDIOLOGY | Facility: CLINIC | Age: 76
End: 2021-07-21
Payer: MEDICARE

## 2021-07-21 VITALS
WEIGHT: 155.8 LBS | HEIGHT: 60 IN | DIASTOLIC BLOOD PRESSURE: 80 MMHG | HEART RATE: 94 BPM | SYSTOLIC BLOOD PRESSURE: 140 MMHG | BODY MASS INDEX: 30.59 KG/M2 | OXYGEN SATURATION: 99 %

## 2021-07-21 VITALS
HEART RATE: 100 BPM | HEIGHT: 60 IN | WEIGHT: 155.87 LBS | SYSTOLIC BLOOD PRESSURE: 155 MMHG | BODY MASS INDEX: 30.6 KG/M2 | DIASTOLIC BLOOD PRESSURE: 71 MMHG

## 2021-07-21 DIAGNOSIS — R09.89 RIGHT CAROTID BRUIT: ICD-10-CM

## 2021-07-21 DIAGNOSIS — R01.1 CARDIAC MURMUR: ICD-10-CM

## 2021-07-21 DIAGNOSIS — E78.1 HYPERTRIGLYCERIDEMIA: ICD-10-CM

## 2021-07-21 DIAGNOSIS — I10 BENIGN ESSENTIAL HTN: ICD-10-CM

## 2021-07-21 DIAGNOSIS — I25.10 CORONARY ARTERY CALCIFICATION SEEN ON CT SCAN: Primary | ICD-10-CM

## 2021-07-21 DIAGNOSIS — Z01.810 PRE-OPERATIVE CARDIOVASCULAR EXAMINATION: ICD-10-CM

## 2021-07-21 DIAGNOSIS — I49.1 PREMATURE ATRIAL COMPLEX: ICD-10-CM

## 2021-07-21 PROBLEM — M17.12 PRIMARY OSTEOARTHRITIS OF LEFT KNEE: Status: ACTIVE | Noted: 2021-04-21

## 2021-07-21 LAB
AORTIC ROOT ANNULUS - M-MODE: 3 CM
ASCENDING AORTA: 3 CM
BSA FOR ECHO PROCEDURE: 1.73 M2
BSA FOR ECHO PROCEDURE: 1.73 M2
CUSP SEPARATION: 1.8 CM
DOP CALC LVOT STROKE VOLUME: 76.75 ML
E WAVE DECELERATION TIME: 191 MS
E/A RATIO: 0.9
E/E' RATIO: 11.1
E/LAT E' RATIO: 12.8
EDV (BP): 54.9 CM3
EF (A4C): 64.1 %
EF A2C: 58 %
EJECTION FRACTION: 61 %
ESV (BP): 21.4 CM3
FRACTIONAL SHORTENING: 40.4 %
INTERVENTRICULAR SEPTUM: 1 CM
LA ESV (BP): 83 CM3
LA ESV INDEX (A2C): 48.55 CM3/M2
LA ESV INDEX (BP): 47.98 CM3/M2
LAAS-AP2: 17.8 CM2
LAAS-AP4: 19.5 CM2
LAD 2D: 3.8 CM
LALD A4C: 6.01 CM
LALD A4C: 6.26 CM
LAV-S: 84 CM3
LEFT ATRIUM VOLUME INDEX: 46.24 CM3/M2
LEFT ATRIUM VOLUME: 80 CM3
LEFT CCA DIST DIAS: 12.6 CM/S
LEFT CCA DIST SYS: 64.12 CM/S
LEFT CCA MID DIAS: 12.61 CM/S
LEFT CCA MID SYS: 58.91 CM/S
LEFT CCA PROX DIAS: 8.95 CM/S
LEFT CCA PROX SYS: 56.31 CM/S
LEFT ICA DIST DIAS: 15.77 CM/S
LEFT ICA DIST SYS: 45.79 CM/S
LEFT ICA MID DIAS: 23.03 CM/S
LEFT ICA MID SYS: 95.29 CM/S
LEFT ICA PROX DIAS: 9.48 CM/S
LEFT ICA PROX SYS: 71.35 CM/S
LEFT ICA/CCA SYS: 1.49
LEFT INTERNAL DIMENSION IN SYSTOLE: 2.61 CM (ref 2.36–3.57)
LEFT VENTRICLE DIASTOLIC VOLUME INDEX: 31.04 CM3/M2
LEFT VENTRICLE DIASTOLIC VOLUME: 53.7 CM3
LEFT VENTRICLE SYSTOLIC VOLUME INDEX: 11.21 CM3/M2
LEFT VENTRICLE SYSTOLIC VOLUME: 19.4 CM3
LEFT VENTRICULAR INTERNAL DIMENSION IN DIASTOLE: 4.38 CM (ref 3.98–5.52)
LEFT VENTRICULAR POSTERIOR WALL IN END DIASTOLE: 1.03 CM (ref 0.52–0.96)
LT BULB EDV: 12.98 CM/S
LT BULB PSV: 86.94 CM/S
LT ECA PROX EDV: 4.06 CM/S
LT ECA PROX PSV: 94.84 CM/S
LT VERTEBRAL MID EDV: 15.36 CM/S
LT VERTEBRAL MID PSV: 38.97 CM/S
LV DIASTOLIC VOLUME: 56.1 CM3
LV ESV (APICAL 2 CHAMBER): 23.7 CM3
LVAD-AP2: 21.3 CM2
LVAD-AP4: 20.7 CM2
LVAS-AP2: 13.1 CM2
LVAS-AP4: 11.8 CM2
LVEDVI(A2C): 32.43 CM3/M2
LVEDVI(BP): 31.73 CM3/M2
LVESVI(A2C): 13.7 CM3/M2
LVESVI(BP): 12.37 CM3/M2
LVLD-AP2: 6.64 CM
LVLD-AP4: 6.7 CM
LVLS-AP2: 6.15 CM
LVLS-AP4: 6.23 CM
LVOT 2D: 2.2 CM
LVOT A: 3.8 CM2
LVOT MG: 3 MMHG
LVOT MV: 0.74 M/S
LVOT PEAK VELOCITY: 1.09 M/S
LVOT PG: 5 MMHG
LVOT VTI: 20.2 CM
MV E'TISSUE VEL-LAT: 0.07 M/S
MV E'TISSUE VEL-MED: 0.08 M/S
MV PEAK A VEL: 1 M/S
MV PEAK E VEL: 0.89 M/S
MV VALVE AREA P 1/2 METHOD: 3.93 CM2
POSTERIOR WALL: 1.03 CM
RIGHT CCA DIST DIAS: 15.43 CM/S
RIGHT CCA DIST SYS: 77.53 CM/S
RIGHT CCA MID DIAS: 11.2 CM/S
RIGHT CCA MID SYS: 65.63 CM/S
RIGHT CCA PROX DIAS: 11.52 CM/S
RIGHT CCA PROX SYS: 81.83 CM/S
RIGHT ECA SYS: 88.52 CM/S
RIGHT ICA DIST DIAS: 18.7 CM/S
RIGHT ICA DIST SYS: 73.9 CM/S
RIGHT ICA MID DIAS: 21.45 CM/S
RIGHT ICA MID SYS: 65.77 CM/S
RIGHT ICA PROX DIAS: 14.11 CM/S
RIGHT ICA PROX SYS: 54.19 CM/S
RIGHT ICA/CCA SYS: 0.95
RT BULB EDV: 11.85 CM/S
RT BULB PSV: 63.23 CM/S
RT ECA PROC EDV: 6.32 CM/S
RT VERTEBRAL MID EDV: 7.31 CM/S
RT VERTEBRAL MID PSV: 34.34 CM/S
RVOT VMAX: 0.78 M/S
RVOT VTI: 14.9 CM
TR MAX PG: 31 MMHG
TRICUSPID VALVE PEAK REGURGITATION VELOCITY: 2.8 M/S
Z-SCORE OF LEFT VENTRICULAR DIMENSION IN END DIASTOLE: -0.68
Z-SCORE OF LEFT VENTRICULAR DIMENSION IN END SYSTOLE: -0.84
Z-SCORE OF LEFT VENTRICULAR POSTERIOR WALL IN END DIASTOLE: 1.97

## 2021-07-21 PROCEDURE — 93000 ELECTROCARDIOGRAM COMPLETE: CPT | Performed by: INTERNAL MEDICINE

## 2021-07-21 PROCEDURE — 25000000 HC PHARMACY GENERAL: Performed by: INTERNAL MEDICINE

## 2021-07-21 PROCEDURE — C8929 TTE W OR WO FOL WCON,DOPPLER: HCPCS

## 2021-07-21 PROCEDURE — G8753 SYS BP > OR = 140: HCPCS | Performed by: INTERNAL MEDICINE

## 2021-07-21 PROCEDURE — 93880 EXTRACRANIAL BILAT STUDY: CPT | Mod: 26 | Performed by: INTERNAL MEDICINE

## 2021-07-21 PROCEDURE — G8754 DIAS BP LESS 90: HCPCS | Performed by: INTERNAL MEDICINE

## 2021-07-21 PROCEDURE — 25500000 HC DRUGS/INCIDENT RAD: Mod: GZ | Performed by: INTERNAL MEDICINE

## 2021-07-21 PROCEDURE — 93306 TTE W/DOPPLER COMPLETE: CPT | Mod: 26 | Performed by: INTERNAL MEDICINE

## 2021-07-21 PROCEDURE — 93880 EXTRACRANIAL BILAT STUDY: CPT

## 2021-07-21 PROCEDURE — 99204 OFFICE O/P NEW MOD 45 MIN: CPT | Performed by: INTERNAL MEDICINE

## 2021-07-21 RX ORDER — ATORVASTATIN CALCIUM 40 MG/1
40 TABLET, FILM COATED ORAL DAILY
Qty: 90 TABLET | Refills: 1 | Status: SHIPPED | OUTPATIENT
Start: 2021-07-21 | End: 2021-08-31

## 2021-07-21 RX ADMIN — PERFLUTREN: 6.52 INJECTION, SUSPENSION INTRAVENOUS at 13:30

## 2021-07-21 NOTE — PATIENT INSTRUCTIONS
Sonny Sanchez MD  Cardiology    [facility]    [facility]  [address]  [address] [place] [address]    TEL  [phone]  [facility].org/[facility]         Raegan, it was a pleasure to see you in the clinic today. To recap, we discussed the following major points:     1. I have arranged for you to have an echocardiogram and a carotid ultrasound     2.   I have no concerns about your upcoming surgery and I will convey this to Jessica Mandel and Dr Oh       I will speak with you on the phone with any results of tests we have done.     All things being well I will follow up with you in the office on an as-needed basis    My general advice on well-being and self care:     1. Try to find time in the week to do things you enjoy.    2. At a minimum, if you are able to, try to walk at least 10,000 steps in a day. If you currently don't do any walking, build yourself up to this goal     3.   Do your best to maintain a balanced diet. An ideal diet should contain mostly fruits, vegetables, nuts, beans and whole grain foods. Try to minimize meat products and refined sugars in your diet. As a general rule, eating a mostly whole grain, high fiber, vegetable/fruit based diet for the majority of the week will have beneficial effects to your cardiovascular health.     4.   Take time in your day/week to focus on yourself, even if it is for a few minutes a day; especially if you are part of a busy household.      If you have any questions in the meantime please feel free to call on [phone] and I will get back to you as soon as I am able.     Best wishes         Sonny Sanchez MD

## 2021-07-23 ENCOUNTER — TELEPHONE (OUTPATIENT)
Dept: COLORECTAL SURGERY | Facility: CLINIC | Age: 76
End: 2021-07-23

## 2021-07-23 RX ORDER — FLUCONAZOLE 150 MG/1
150 TABLET ORAL DAILY
Qty: 3 TABLET | Refills: 0 | Status: SHIPPED | OUTPATIENT
Start: 2021-07-23 | End: 2021-07-26

## 2021-07-23 NOTE — TELEPHONE ENCOUNTER
Pt called the service this morning advising she has a sever UTI and would like a prescription called into [facility], [phone], [address], please call pt [phone].

## 2021-07-23 NOTE — TELEPHONE ENCOUNTER
Spoke with pt about symptoms she has been having urgency and pressure. She says it's the same as the last yeast infection she had in the end of June. When Dr. Mandel gave her diflucan it went away. Per ASAD Moseley, sent pt diflucan to pharmacy for yeast infection. Instructed to call back for any other questions. Pt verbalized understanding and thanked me for the call.

## 2021-07-24 NOTE — PROGRESS NOTES
HISTORY & PHYSICAL EXAM    HPI: Patient is here be seen today in follow-up.  She is been doing fair, was very nervous about things.  We did a rectal exam under anesthesia as well as a planned colonoscopy which turned to be a flexible sigmoidoscopy due to the tortuosity of her colon.  There is no evidence of tumor seen in the vaginal exam or on the colonic exam at this point.  She has been having minimal discharge from her vagina however over the last day had some looser stool and had some output.  She has been caring for this with the irrigations.    Past Medical History:   Diagnosis Date   • Colovaginal fistula    • COVID-19 vaccine series completed 02/26/2021   • Disease of thyroid gland    • Diverticulitis of colon    • GERD (gastroesophageal reflux disease)    • History of snoring    • Hypertension    • Hypothyroidism    • Vaginal cancer (CMS/HCC)     s/p XRT x 7 weeks and chemo weekly x 5 weeks       Past Surgical History:   Procedure Laterality Date   • CHOLECYSTECTOMY     • COLONOSCOPY     • DILATION AND CURETTAGE OF UTERUS  02/2018   • ROTATOR CUFF REPAIR Right        Current Outpatient Medications:   •  benazepril (LOTENSIN) 10 mg tablet, Take 10 mg by mouth daily., Disp: , Rfl:   •  bumetanide (BUMEX) 1 mg tablet, Take 1 mg by mouth as needed.  , Disp: , Rfl:   •  levothyroxine (SYNTHROID) 88 mcg tablet, Take 88 mcg by mouth daily.  , Disp: , Rfl: 3  •  pantoprazole (PROTONIX) 40 mg EC tablet, Take 40 mg by mouth daily.  , Disp: , Rfl:   •  atorvastatin (LIPITOR) 40 mg tablet, Take 1 tablet (40 mg total) by mouth daily., Disp: 90 tablet, Rfl: 1  •  fluconazole (DIFLUCAN) 150 mg tablet, Take 1 tablet (150 mg total) by mouth daily for 3 days., Disp: 3 tablet, Rfl: 0  •  Lactobacillus acidophilus (PROBIOTIC ORAL), Take by mouth., Disp: , Rfl:    Allergies   Allergen Reactions   • No Known Allergies      Social History     Socioeconomic History   • Marital status:      Spouse name: Royal    • Number of  children: 4   • Years of education: Not on file   • Highest education level: Not on file   Occupational History   • Occupation: retired/     Tobacco Use   • Smoking status: Former Smoker     Packs/day: 1.00     Years: 40.00     Pack years: 40.00     Types: Cigarettes     Start date:      Quit date:      Years since quittin.5   • Smokeless tobacco: Never Used   Vaping Use   • Vaping Use: Never used   Substance and Sexual Activity   • Alcohol use: Yes     Comment: occassional    • Drug use: No   • Sexual activity: Never   Other Topics Concern   • Not on file   Social History Narrative    Lives with  in a 2 story home     Social Determinants of Health     Financial Resource Strain:    • Difficulty of Paying Living Expenses:    Food Insecurity: No Food Insecurity   • Worried About Running Out of Food in the Last Year: Never true   • Ran Out of Food in the Last Year: Never true   Transportation Needs:    • Lack of Transportation (Medical):    • Lack of Transportation (Non-Medical):    Physical Activity:    • Days of Exercise per Week:    • Minutes of Exercise per Session:    Stress:    • Feeling of Stress :    Social Connections:    • Frequency of Communication with Friends and Family:    • Frequency of Social Gatherings with Friends and Family:    • Attends Sikhism Services:    • Active Member of Clubs or Organizations:    • Attends Club or Organization Meetings:    • Marital Status:    Intimate Partner Violence:    • Fear of Current or Ex-Partner:    • Emotionally Abused:    • Physically Abused:    • Sexually Abused:       Family History   Problem Relation Age of Onset   • Heart attack Biological Mother    • Lung cancer Biological Father    • Breast cancer Neg Hx    • Cancer Neg Hx    • Colon cancer Neg Hx    • Ovarian cancer Neg Hx        REVIEW OF SYSTEMS: Unchanged    PHYSICAL EXAM:  GEN: On examination the patient is resting comfortably.  NEURO/PSYCH: Alert and oriented ×3  HEENT:  Eyes: Sclera anicteric  CHEST: Equal rise and fall the chest.  No tenderness bilaterally.  SKIN: Warm and moist  NODES: No groin or cervical lymphadenopathy  EXT: No palpable edema of the bilateral lower extremities.  No clubbing.  GI: Abdomen soft, nontender, nondistended.  No palpable liver or spleen edge.  No ventral hernias, mass, or tenderness.  RECTAL: Deferred      ASSESSMENT/PLAN:     Colovaginal fistula  Fistulous of the uterus as well as the vagina seen on imaging studies.  We are not able to appreciate any mass on the vaginal exam and we are not able to make it past the distal sigmoid colon on the flexible sigmoidoscopy/attempted colonoscopy due to tortuosity of the colon and severe inflammation.  On examination today the diverticulitis is fully resolved her abdomen is soft without any tenderness.  Will order a PET/CT in order to further evaluate for recurrent cervical cancer, and will coordinate with Dr. Jimenez regarding timing for surgical intervention which is going to be a robotic low anterior resection with colorectal anastomosis    Diverticulitis of large intestine with perforation and abscess with bleeding  Diverticulitis appears to be quiesced and at this point.  We are going to order a PET CT to evaluate and see if there is any evidence for recurrence of her cervical cancer here.  She is going to follow with Dr. Jimenez in the office in the interim I will see her back in the office for follow-up.  Ultimately will plan to coordinate intervention with robotic low anterior resection combined ALYSSA/BSO and partial vaginectomy to remove the fistulas and repair matters.

## 2021-07-24 NOTE — ASSESSMENT & PLAN NOTE
Diverticulitis appears to be quiesced and at this point.  We are going to order a PET CT to evaluate and see if there is any evidence for recurrence of her cervical cancer here.  She is going to follow with Dr. Jimenez in the office in the interim I will see her back in the office for follow-up.  Ultimately will plan to coordinate intervention with robotic low anterior resection combined ALYSSA/BSO and partial vaginectomy to remove the fistulas and repair matters.

## 2021-07-26 DIAGNOSIS — N39.0 URINARY TRACT INFECTION WITHOUT HEMATURIA, SITE UNSPECIFIED: Primary | ICD-10-CM

## 2021-07-26 RX ORDER — CIPROFLOXACIN 500 MG/1
500 TABLET ORAL 2 TIMES DAILY
Qty: 14 TABLET | Refills: 0 | Status: SHIPPED | OUTPATIENT
Start: 2021-07-26 | End: 2021-08-02

## 2021-07-26 NOTE — TELEPHONE ENCOUNTER
Pt called the call service stating that she has not had any relief after starting her prescription for her UTI on Friday.

## 2021-07-26 NOTE — TELEPHONE ENCOUNTER
Spoke with pt on the phone who says she is now having burning while urinating and is still feeling pressure and urgency. Per ASAD Ingram, ordered pt Cipro for 7 days to treat UTI. Pt is going to lab louise today before starting antibiotics for a urinalysis. Instructed pt to call back tomorrow to see if there is any improvement. Pt verbalized understanding.

## 2021-07-29 LAB
APPEARANCE UR: ABNORMAL
BACTERIA #/AREA URNS HPF: ABNORMAL /[HPF]
BACTERIA UR CULT: ABNORMAL
BACTERIA UR CULT: ABNORMAL
BILIRUB UR QL STRIP: NEGATIVE
CASTS URNS QL MICRO: ABNORMAL /LPF
COLOR UR: YELLOW
EPI CELLS #/AREA URNS HPF: ABNORMAL /HPF (ref 0–10)
GLUCOSE UR QL: NEGATIVE
HGB UR QL STRIP: ABNORMAL
KETONES UR QL STRIP: NEGATIVE
LAB CORP URINALYSIS REFLEX: ABNORMAL
LEUKOCYTE ESTERASE UR QL STRIP: ABNORMAL
MICRO URNS: ABNORMAL
NITRITE UR QL STRIP: NEGATIVE
OTHER ANTIBIOTIC SUSC ISLT: ABNORMAL
PH UR STRIP: 5.5 [PH] (ref 5–7.5)
PROT UR QL STRIP: ABNORMAL
RBC #/AREA URNS HPF: ABNORMAL /HPF (ref 0–2)
SP GR UR: 1.02 (ref 1–1.03)
UROBILINOGEN UR STRIP-MCNC: 0.2 MG/DL (ref 0.2–1)
WBC #/AREA URNS HPF: >30 /HPF (ref 0–5)

## 2021-08-12 ENCOUNTER — HOSPITAL ENCOUNTER (OUTPATIENT)
Dept: RADIOLOGY | Facility: CLINIC | Age: 76
Discharge: HOME | End: 2021-08-12
Attending: COLON & RECTAL SURGERY
Payer: MEDICARE

## 2021-08-12 VITALS — HEIGHT: 60 IN | BODY MASS INDEX: 30.23 KG/M2 | WEIGHT: 154 LBS

## 2021-08-12 DIAGNOSIS — C57.7 MALIGNANT NEOPLASM OF OTHER SPECIFIED FEMALE GENITAL ORGANS: ICD-10-CM

## 2021-08-12 DIAGNOSIS — N82.4 COLOVAGINAL FISTULA: ICD-10-CM

## 2021-08-12 RX ORDER — FLUDEOXYGLUCOSE F18 300 MCI/ML
8.98 INJECTION INTRAVENOUS ONCE
Status: COMPLETED | OUTPATIENT
Start: 2021-08-12 | End: 2021-08-12

## 2021-08-12 RX ADMIN — FLUDEOXYGLUCOSE F18 8.98 MILLICURIE: 300 INJECTION INTRAVENOUS at 11:10

## 2021-08-16 ENCOUNTER — DOCUMENTATION (OUTPATIENT)
Dept: COLORECTAL SURGERY | Facility: CLINIC | Age: 76
End: 2021-08-16

## 2021-08-16 ENCOUNTER — OFFICE VISIT (OUTPATIENT)
Dept: COLORECTAL SURGERY | Facility: CLINIC | Age: 76
End: 2021-08-16
Payer: MEDICARE

## 2021-08-16 VITALS
WEIGHT: 154 LBS | SYSTOLIC BLOOD PRESSURE: 120 MMHG | TEMPERATURE: 97.7 F | BODY MASS INDEX: 30.08 KG/M2 | DIASTOLIC BLOOD PRESSURE: 80 MMHG

## 2021-08-16 DIAGNOSIS — K57.21 DIVERTICULITIS OF LARGE INTESTINE WITH PERFORATION AND ABSCESS WITH BLEEDING: Primary | ICD-10-CM

## 2021-08-16 PROCEDURE — G8752 SYS BP LESS 140: HCPCS | Performed by: COLON & RECTAL SURGERY

## 2021-08-16 PROCEDURE — G8754 DIAS BP LESS 90: HCPCS | Performed by: COLON & RECTAL SURGERY

## 2021-08-16 PROCEDURE — 99214 OFFICE O/P EST MOD 30 MIN: CPT | Performed by: COLON & RECTAL SURGERY

## 2021-08-16 NOTE — LETTER
August 22, 2021     Javon Valadez DO  [address]  [place] [facility] [address]    Patient: Raegan Young  YOB: 1945  Date of Visit: 8/16/2021      Dear Dr. Valadez:    Thank you for referring Raegan Young to me for evaluation. Below are my notes for this consultation.    If you have questions, please do not hesitate to call me. I look forward to following your patient along with you.         Sincerely,        Geovani Mandel MD        CC: MD Akbar Mueller MD David O Holtz, MD Xiaomang Stickles, MD Patrick Ross, MD PhD  Tequila, Geovani CISNEROS MD  8/22/2021  6:22 PM  Signed  HISTORY & PHYSICAL EXAM    HPI: Patient is here to be seen today for follow-up.  She is been doing fairly well.  She had a PET/CT which I reviewed personally today demonstrates activity in the elysia by laterally as well as in the lung.  She also has activity in the pelvis which is unclear as to whether this is inflammatory or infectious versus neoplasia in etiology.  The patient is in good spirits today.    Past Medical History:   Diagnosis Date   • Colovaginal fistula    • COVID-19 vaccine series completed 02/26/2021   • Disease of thyroid gland    • Diverticulitis of colon    • GERD (gastroesophageal reflux disease)    • History of snoring    • Hypertension    • Hypothyroidism    • Vaginal cancer (CMS/HCC)     s/p XRT x 7 weeks and chemo weekly x 5 weeks       Past Surgical History:   Procedure Laterality Date   • CHOLECYSTECTOMY     • COLONOSCOPY     • DILATION AND CURETTAGE OF UTERUS  02/2018   • ROTATOR CUFF REPAIR Right        Current Outpatient Medications:   •  atorvastatin (LIPITOR) 40 mg tablet, Take 1 tablet (40 mg total) by mouth daily., Disp: 90 tablet, Rfl: 1  •  benazepril (LOTENSIN) 10 mg tablet, Take 10 mg by mouth daily., Disp: , Rfl:   •  bumetanide (BUMEX) 1 mg tablet, Take 1 mg by mouth as needed.  , Disp: , Rfl:   •  Lactobacillus acidophilus (PROBIOTIC  ORAL), Take by mouth., Disp: , Rfl:   •  levothyroxine (SYNTHROID) 88 mcg tablet, Take 88 mcg by mouth daily.  , Disp: , Rfl: 3  •  pantoprazole (PROTONIX) 40 mg EC tablet, Take 40 mg by mouth daily.  , Disp: , Rfl:    Allergies   Allergen Reactions   • No Known Allergies      Social History     Socioeconomic History   • Marital status:      Spouse name: Royal    • Number of children: 4   • Years of education: Not on file   • Highest education level: Not on file   Occupational History   • Occupation: retired/     Tobacco Use   • Smoking status: Former Smoker     Packs/day: 1.00     Years: 40.00     Pack years: 40.00     Types: Cigarettes     Start date:      Quit date:      Years since quittin.6   • Smokeless tobacco: Never Used   Vaping Use   • Vaping Use: Never used   Substance and Sexual Activity   • Alcohol use: Yes     Comment: occassional    • Drug use: No   • Sexual activity: Never   Other Topics Concern   • Not on file   Social History Narrative    Lives with  in a 2 story home     Social Determinants of Health     Financial Resource Strain:    • Difficulty of Paying Living Expenses:    Food Insecurity: No Food Insecurity   • Worried About Running Out of Food in the Last Year: Never true   • Ran Out of Food in the Last Year: Never true   Transportation Needs:    • Lack of Transportation (Medical):    • Lack of Transportation (Non-Medical):    Physical Activity:    • Days of Exercise per Week:    • Minutes of Exercise per Session:    Stress:    • Feeling of Stress :    Social Connections:    • Frequency of Communication with Friends and Family:    • Frequency of Social Gatherings with Friends and Family:    • Attends Gnosticism Services:    • Active Member of Clubs or Organizations:    • Attends Club or Organization Meetings:    • Marital Status:    Intimate Partner Violence:    • Fear of Current or Ex-Partner:    • Emotionally Abused:    • Physically Abused:    • Sexually  Abused:       Family History   Problem Relation Age of Onset   • Heart attack Biological Mother    • Lung cancer Biological Father    • Breast cancer Neg Hx    • Cancer Neg Hx    • Colon cancer Neg Hx    • Ovarian cancer Neg Hx        REVIEW OF SYSTEMS: Unchanged    PHYSICAL EXAM:  GEN: On examination the patient is resting comfortably.  NEURO/PSYCH: Alert and oriented ×3  HEENT: Eyes: Sclera anicteric  CHEST: Equal rise and fall the chest.  No tenderness bilaterally.  SKIN: Warm and moist  NODES: No groin or cervical lymphadenopathy  EXT: No palpable edema of the bilateral lower extremities.  No clubbing.  GI: Abdomen soft, nontender, nondistended.  No palpable liver or spleen edge.  No ventral hernias, mass, or tenderness.  RECTAL: Deferred      ASSESSMENT/PLAN:     Diverticulitis of large intestine with perforation and abscess with bleeding  Patient has a history of diverticulitis resultant and fistulization to the sigmoid colon as well as the cervical/top of the vagina area.  At this point, her PET/CT demonstrates some activity in the lung as well as some carinal lymph nodes.  Though this could be infectious versus inflammatory nature neoplasia needs to be excluded prior to undergoing our plan to low anterior resection and takedown of fistulas with combination Dr. Jimenez and myself.  I spoke to with Dr. Gamez today.  We are going to refer the patient to see Dr. Gamez for evaluation and management of possibly having a biopsy done with Ion robot.  Once we have an answer as to whether or not she has metastatic disease, will see her back in the office to make a determination about how to proceed from there.  It is possible that if she does have metastatic disease then the next best step may be to divert with a loop colostomy.

## 2021-08-16 NOTE — PROGRESS NOTES
1       2             3              4             5     LAST APPOINTMENT: 7/19/2021     TIME ARRIVED:     TIME ROOMED:     VISIT TYPE:   NP       POV     EPV     DISC:             YES         NO                                   PREPPED:  YES  NO     LABS:  [facility]  [facility]  [facility]  OTHER      PHARMACY ENTERED:  YES   NO     DIAGNOSIS: Diverticulitis of large intestine with perforation and abscess with bleeding     SCHEDULED SURGERY        DATE OF SURGERY:9/7/2021  Rectal EUA and flexible sigmoidoscopy       PATHOLOGY STAGE:   RADIATION :    Yes   No                                           DOSAGE:      LOCATION:   Anterior   Right Lateral   Left Lateral   Posterior   Circumferential      LEVEL:                        SIZE:     CT SCAN: 7/19/2021  FFC:    FFS:    CEA:    PET:    MRI:      OTHER:       FIBER:  NO  METAMUCIL  KONSYL  CITYRUCEL   FIBERCON  BENEFIBER OTHER  LOMOTIL:    NO    1  VS   2    QD    BID    TID    QID  ZANTAC:       Y / N  AMPHOGEL:    Y / N                                                                          START / STOP                                                 RAD ONC:                              N / A        MED ONC:                             N / A

## 2021-08-16 NOTE — PROGRESS NOTES
1       2             3              4             5     LAST APPOINTMENT: 7/19/2021     TIME ARRIVED:     TIME ROOMED:     VISIT TYPE:   NP       POV     EPV     DISC:             YES         NO                                   PREPPED:  YES  NO     LABS:  [facility]  [facility]  [facility]  OTHER      PHARMACY ENTERED:  YES   NO     DIAGNOSIS: Diverticulitis of large intestine with perforation and abscess with bleeding      SCHEDULED SURGERY:9/10/2021  xi robotic LAR, c+s, Tony panel 2      DATE OF SURGERY:9/7/2021  Rectal EUA and flexible sigmoidoscopy          PATHOLOGY STAGE:   RADIATION :    Yes   No                                           DOSAGE:      LOCATION:   Anterior   Right Lateral   Left Lateral   Posterior   Circumferential      LEVEL:                        SIZE:     CT SCAN: 8/12/2021  FFC:    FFS:    CEA:    PET:    MRI:      OTHER:       FIBER:  NO  METAMUCIL  KONSYL  CITYRUCEL   FIBERCON  BENEFIBER OTHER  LOMOTIL:    NO    1  VS   2    QD    BID    TID    QID  ZANTAC:       Y / N  AMPHOGEL:    Y / N                                                                          START / STOP                                                 RAD ONC:                              N / A        MED ONC:                             N / A

## 2021-08-21 DIAGNOSIS — R94.2 ABNORMAL PET SCAN OF LUNG: ICD-10-CM

## 2021-08-21 DIAGNOSIS — R91.8 MULTIPLE LUNG NODULES ON CT: Primary | ICD-10-CM

## 2021-08-21 DIAGNOSIS — Z01.812 PRE-PROCEDURE LAB EXAM: ICD-10-CM

## 2021-08-21 DIAGNOSIS — R59.0 MEDIASTINAL ADENOPATHY: ICD-10-CM

## 2021-08-21 DIAGNOSIS — Z01.818 PREOP TESTING: ICD-10-CM

## 2021-08-22 NOTE — ASSESSMENT & PLAN NOTE
Patient has a history of diverticulitis resultant and fistulization to the sigmoid colon as well as the cervical/top of the vagina area.  At this point, her PET/CT demonstrates some activity in the lung as well as some carinal lymph nodes.  Though this could be infectious versus inflammatory nature neoplasia needs to be excluded prior to undergoing our plan to low anterior resection and takedown of fistulas with combination Dr. Jimenez and myself.  I spoke to with Dr. Gamez today.  We are going to refer the patient to see Dr. Gamez for evaluation and management of possibly having a biopsy done with Ion robot.  Once we have an answer as to whether or not she has metastatic disease, will see her back in the office to make a determination about how to proceed from there.  It is possible that if she does have metastatic disease then the next best step may be to divert with a loop colostomy.

## 2021-08-22 NOTE — PROGRESS NOTES
HISTORY & PHYSICAL EXAM    HPI: Patient is here to be seen today for follow-up.  She is been doing fairly well.  She had a PET/CT which I reviewed personally today demonstrates activity in the elysia by laterally as well as in the lung.  She also has activity in the pelvis which is unclear as to whether this is inflammatory or infectious versus neoplasia in etiology.  The patient is in good spirits today.    Past Medical History:   Diagnosis Date   • Colovaginal fistula    • COVID-19 vaccine series completed 02/26/2021   • Disease of thyroid gland    • Diverticulitis of colon    • GERD (gastroesophageal reflux disease)    • History of snoring    • Hypertension    • Hypothyroidism    • Vaginal cancer (CMS/HCC)     s/p XRT x 7 weeks and chemo weekly x 5 weeks       Past Surgical History:   Procedure Laterality Date   • CHOLECYSTECTOMY     • COLONOSCOPY     • DILATION AND CURETTAGE OF UTERUS  02/2018   • ROTATOR CUFF REPAIR Right        Current Outpatient Medications:   •  atorvastatin (LIPITOR) 40 mg tablet, Take 1 tablet (40 mg total) by mouth daily., Disp: 90 tablet, Rfl: 1  •  benazepril (LOTENSIN) 10 mg tablet, Take 10 mg by mouth daily., Disp: , Rfl:   •  bumetanide (BUMEX) 1 mg tablet, Take 1 mg by mouth as needed.  , Disp: , Rfl:   •  Lactobacillus acidophilus (PROBIOTIC ORAL), Take by mouth., Disp: , Rfl:   •  levothyroxine (SYNTHROID) 88 mcg tablet, Take 88 mcg by mouth daily.  , Disp: , Rfl: 3  •  pantoprazole (PROTONIX) 40 mg EC tablet, Take 40 mg by mouth daily.  , Disp: , Rfl:    Allergies   Allergen Reactions   • No Known Allergies      Social History     Socioeconomic History   • Marital status:      Spouse name: Royal    • Number of children: 4   • Years of education: Not on file   • Highest education level: Not on file   Occupational History   • Occupation: retired/     Tobacco Use   • Smoking status: Former Smoker     Packs/day: 1.00     Years: 40.00     Pack years: 40.00     Types:  Cigarettes     Start date:      Quit date:      Years since quittin.6   • Smokeless tobacco: Never Used   Vaping Use   • Vaping Use: Never used   Substance and Sexual Activity   • Alcohol use: Yes     Comment: occassional    • Drug use: No   • Sexual activity: Never   Other Topics Concern   • Not on file   Social History Narrative    Lives with  in a 2 story home     Social Determinants of Health     Financial Resource Strain:    • Difficulty of Paying Living Expenses:    Food Insecurity: No Food Insecurity   • Worried About Running Out of Food in the Last Year: Never true   • Ran Out of Food in the Last Year: Never true   Transportation Needs:    • Lack of Transportation (Medical):    • Lack of Transportation (Non-Medical):    Physical Activity:    • Days of Exercise per Week:    • Minutes of Exercise per Session:    Stress:    • Feeling of Stress :    Social Connections:    • Frequency of Communication with Friends and Family:    • Frequency of Social Gatherings with Friends and Family:    • Attends Orthodoxy Services:    • Active Member of Clubs or Organizations:    • Attends Club or Organization Meetings:    • Marital Status:    Intimate Partner Violence:    • Fear of Current or Ex-Partner:    • Emotionally Abused:    • Physically Abused:    • Sexually Abused:       Family History   Problem Relation Age of Onset   • Heart attack Biological Mother    • Lung cancer Biological Father    • Breast cancer Neg Hx    • Cancer Neg Hx    • Colon cancer Neg Hx    • Ovarian cancer Neg Hx        REVIEW OF SYSTEMS: Unchanged    PHYSICAL EXAM:  GEN: On examination the patient is resting comfortably.  NEURO/PSYCH: Alert and oriented ×3  HEENT: Eyes: Sclera anicteric  CHEST: Equal rise and fall the chest.  No tenderness bilaterally.  SKIN: Warm and moist  NODES: No groin or cervical lymphadenopathy  EXT: No palpable edema of the bilateral lower extremities.  No clubbing.  GI: Abdomen soft, nontender,  nondistended.  No palpable liver or spleen edge.  No ventral hernias, mass, or tenderness.  RECTAL: Deferred      ASSESSMENT/PLAN:     Diverticulitis of large intestine with perforation and abscess with bleeding  Patient has a history of diverticulitis resultant and fistulization to the sigmoid colon as well as the cervical/top of the vagina area.  At this point, her PET/CT demonstrates some activity in the lung as well as some carinal lymph nodes.  Though this could be infectious versus inflammatory nature neoplasia needs to be excluded prior to undergoing our plan to low anterior resection and takedown of fistulas with combination Dr. Jimenez and myself.  I spoke to with Dr. Gamez today.  We are going to refer the patient to see Dr. Gamez for evaluation and management of possibly having a biopsy done with Ion robot.  Once we have an answer as to whether or not she has metastatic disease, will see her back in the office to make a determination about how to proceed from there.  It is possible that if she does have metastatic disease then the next best step may be to divert with a loop colostomy.

## 2021-08-26 ENCOUNTER — HOSPITAL ENCOUNTER (OUTPATIENT)
Dept: RADIOLOGY | Facility: HOSPITAL | Age: 76
Discharge: HOME | End: 2021-08-26
Attending: PHYSICIAN ASSISTANT
Payer: MEDICARE

## 2021-08-26 DIAGNOSIS — Z85.44 HISTORY OF CANCER OF VAGINA: ICD-10-CM

## 2021-08-26 PROCEDURE — 74177 CT ABD & PELVIS W/CONTRAST: CPT | Mod: MG

## 2021-08-26 PROCEDURE — 25500000 HC DRUGS/INCIDENT RAD: Performed by: PHYSICIAN ASSISTANT

## 2021-08-26 PROCEDURE — 63600105 HC IODINE BASED CONTRAST: Mod: JW | Performed by: PHYSICIAN ASSISTANT

## 2021-08-26 RX ADMIN — BARIUM SULFATE 900 ML: 21 SUSPENSION ORAL at 13:07

## 2021-08-26 RX ADMIN — IOHEXOL 90 ML: 350 INJECTION, SOLUTION INTRAVENOUS at 13:08

## 2021-08-27 RX ORDER — NITROFURANTOIN 25; 75 MG/1; MG/1
100 CAPSULE ORAL
COMMUNITY
Start: 2021-08-06 | End: 2021-08-31

## 2021-08-30 ENCOUNTER — APPOINTMENT (OUTPATIENT)
Dept: LAB | Facility: HOSPITAL | Age: 76
End: 2021-08-30
Attending: PHYSICIAN ASSISTANT
Payer: MEDICARE

## 2021-08-30 ENCOUNTER — OFFICE VISIT (OUTPATIENT)
Dept: THORACIC SURGERY | Facility: CLINIC | Age: 76
End: 2021-08-30
Payer: MEDICARE

## 2021-08-30 ENCOUNTER — APPOINTMENT (OUTPATIENT)
Dept: PREADMISSION TESTING | Facility: HOSPITAL | Age: 76
End: 2021-08-30
Payer: MEDICARE

## 2021-08-30 VITALS
HEART RATE: 90 BPM | WEIGHT: 154 LBS | DIASTOLIC BLOOD PRESSURE: 74 MMHG | SYSTOLIC BLOOD PRESSURE: 130 MMHG | BODY MASS INDEX: 30.23 KG/M2 | OXYGEN SATURATION: 97 % | TEMPERATURE: 95.4 F | RESPIRATION RATE: 16 BRPM | HEIGHT: 60 IN

## 2021-08-30 DIAGNOSIS — R94.2 ABNORMAL PET SCAN, LUNG: ICD-10-CM

## 2021-08-30 DIAGNOSIS — Z01.818 PREOP TESTING: ICD-10-CM

## 2021-08-30 DIAGNOSIS — R91.8 MULTIPLE LUNG NODULES ON CT: Primary | ICD-10-CM

## 2021-08-30 DIAGNOSIS — R91.8 MULTIPLE LUNG NODULES ON CT: ICD-10-CM

## 2021-08-30 LAB
ANION GAP SERPL CALC-SCNC: 8 MEQ/L (ref 3–15)
BUN SERPL-MCNC: 22 MG/DL (ref 8–20)
CALCIUM SERPL-MCNC: 9.4 MG/DL (ref 8.9–10.3)
CHLORIDE SERPL-SCNC: 106 MEQ/L (ref 98–109)
CO2 SERPL-SCNC: 26 MEQ/L (ref 22–32)
CREAT SERPL-MCNC: 1.3 MG/DL (ref 0.6–1.1)
ERYTHROCYTE [DISTWIDTH] IN BLOOD BY AUTOMATED COUNT: 13.7 % (ref 11.7–14.4)
GFR SERPL CREATININE-BSD FRML MDRD: 39.8 ML/MIN/1.73M*2
GLUCOSE SERPL-MCNC: 94 MG/DL (ref 70–99)
HCT VFR BLDCO AUTO: 36.4 % (ref 35–45)
HGB BLD-MCNC: 11.6 G/DL (ref 11.8–15.7)
MCH RBC QN AUTO: 29.5 PG (ref 28–33.2)
MCHC RBC AUTO-ENTMCNC: 31.9 G/DL (ref 32.2–35.5)
MCV RBC AUTO: 92.6 FL (ref 83–98)
PDW BLD AUTO: 9.5 FL (ref 9.4–12.3)
PLATELET # BLD AUTO: 309 K/UL (ref 150–369)
POTASSIUM SERPL-SCNC: 5 MEQ/L (ref 3.6–5.1)
RBC # BLD AUTO: 3.93 M/UL (ref 3.93–5.22)
SODIUM SERPL-SCNC: 140 MEQ/L (ref 136–144)
WBC # BLD AUTO: 7 K/UL (ref 3.8–10.5)

## 2021-08-30 PROCEDURE — 36415 COLL VENOUS BLD VENIPUNCTURE: CPT

## 2021-08-30 PROCEDURE — 99204 OFFICE O/P NEW MOD 45 MIN: CPT | Performed by: THORACIC SURGERY (CARDIOTHORACIC VASCULAR SURGERY)

## 2021-08-30 PROCEDURE — G8752 SYS BP LESS 140: HCPCS | Performed by: THORACIC SURGERY (CARDIOTHORACIC VASCULAR SURGERY)

## 2021-08-30 PROCEDURE — G8754 DIAS BP LESS 90: HCPCS | Performed by: THORACIC SURGERY (CARDIOTHORACIC VASCULAR SURGERY)

## 2021-08-30 PROCEDURE — 80048 BASIC METABOLIC PNL TOTAL CA: CPT

## 2021-08-30 PROCEDURE — 85027 COMPLETE CBC AUTOMATED: CPT

## 2021-08-30 RX ORDER — SODIUM CHLORIDE 9 MG/ML
INJECTION, SOLUTION INTRAVENOUS CONTINUOUS
Status: CANCELLED | OUTPATIENT
Start: 2021-08-30 | End: 2021-09-06

## 2021-08-30 RX ORDER — IPRATROPIUM BROMIDE 42 UG/1
1 SPRAY, METERED NASAL
COMMUNITY
Start: 2021-08-23 | End: 2021-08-31

## 2021-08-30 NOTE — PROGRESS NOTES
Thoracic Surgery Consultation      Patient ID: Raegan Young                              : 1945  MRN: [id number]    Clinic:  [facility] [address]                                            Visit Date: 2021  Encounter Provider: Owen Gamez  Referring Provider: Geovani Mandel MD    Subjective       Raegan Young is a delightful 75-year-old female former smoker (quit 23 years ago) who presents today for initial consultation for left upper lobe nodules and mediastinal lymph nodes with PET avidity.  She had vaginal cancer in  treated with concurrent chemoradiation.  In 2019 she had negative left groin excisional node biopsy. .  She also has a history of diverticulosis/itis and was recently diagnosed with colouterine fistula.  CT chest/abdomen/pelvis noted 2 small left upper lobe nodules of GGO's.  These appear to have slightly increased in size.  PET scan was performed on 2021 noting activity in the larger, more inferior left upper lobe nodule as well as in  a paratracheal lymph node.  Surgical management of her fistula will be influenced by the lung nodule pathology.    We were originally scheduled younger than her to see her 10 days ago with planned procedure around this time.  However, she and her  were exposed to Covid.  Her  has subsequently been diagnosed with Covid with bronchitis type symptoms.  He did not require hospitalization.  She obtained Covid testing which was negative and has remained asymptomatic.    Her review of systems is notable for acid reflux and known large hiatal hernia.  This is well controlled with Protonix.  She has no dysphagia, nausea or vomiting.  She has mild, chronic low back pain and left knee pain.  She gets lower extremity edema more so on the left than right.  This is chronic in nature.  She has had a few urinary tract infections given the above fistula but no current dysuria.  She has a vaginal discharge that appears to be old  blood and brownish in color.  There is occasional vaginal air expulsion.           Past Medical History:  has a past medical history of Colovaginal fistula, COVID-19 vaccine series completed (02/26/2021), Disease of thyroid gland, Diverticulitis of colon, GERD (gastroesophageal reflux disease), History of snoring, Hypertension, Hypothyroidism, and Vaginal cancer (CMS/HCC). She also has no past medical history of Diabetes mellitus (CMS/HCC) or Myocardial infarction (CMS/HCC).  Past Surgical History:  has a past surgical history that includes Cholecystectomy; Dilation and curettage of uterus (02/2018); Rotator cuff repair (Right); and Colonoscopy.  Social History:  reports that she quit smoking about 23 years ago. Her smoking use included cigarettes. She started smoking about 64 years ago. She has a 40.00 pack-year smoking history. She has never used smokeless tobacco. She reports current alcohol use. She reports that she does not use drugs.  Family History: family history includes Heart attack in her biological mother; Hypertension in her biological mother; Lung cancer in her biological father.  Medications:   Current Outpatient Medications:   •  benazepril (LOTENSIN) 10 mg tablet, Take 10 mg by mouth daily., Disp: , Rfl:   •  bumetanide (BUMEX) 1 mg tablet, Take 1 mg by mouth as needed.  , Disp: , Rfl:   •  Lactobacillus acidophilus (PROBIOTIC ORAL), Take by mouth., Disp: , Rfl:   •  levothyroxine (SYNTHROID) 88 mcg tablet, Take 88 mcg by mouth daily.  , Disp: , Rfl: 3  •  pantoprazole (PROTONIX) 40 mg EC tablet, Take 40 mg by mouth daily.  , Disp: , Rfl:   •  atorvastatin (LIPITOR) 40 mg tablet, Take 1 tablet (40 mg total) by mouth daily., Disp: 90 tablet, Rfl: 1  •  ipratropium (ATROVENT) 42 mcg (0.06 %) nasal spray, Administer 1 spray into each nostril 2 (two) times a day., Disp: , Rfl:   •  nitrofurantoin, macrocrystal-monohydrate, (MACROBID) 100 mg capsule, Take 100 mg by mouth 2 (two) times a day., Disp: ,  Rfl:   Allergies:   Allergies   Allergen Reactions   • No Known Allergies        E-cigarette/Vaping     Questions Responses    E-cigarette/Vaping Use Never User            Objective   Physical Exam:  Visit Vitals  /74 (BP Location: Right upper arm, Patient Position: Sitting)   Pulse 90   Temp (!) 35.2 °C (95.4 °F) (Temporal)   Resp 16   Ht 1.524 m (5')   Wt 69.9 kg (154 lb)   SpO2 97%   BMI 30.08 kg/m²       Constitutional: No acute distress. Appears younger than her stated age.   Neurologic: Alert and Oriented with no  deficit or asymmetry.  Cooperative affect  Head: NCAT  Eyes: Antiicteric, EOMI.  Neck: . Trachea is midline. No cervical or supraclavicular adenopathy.   Respiratory: Clear to auscultation bilaterally   Cardiovascular: Regular rate and rhythm. No murmurs, gallops, or rubs.    Abdomen: Soft, Non-tender non-distended.  Musculoskeletal: Ambulates withoutdeficit. Normal and symmetric strength.  Extremities: No cyanosis or clubbing.  Mild, nonpitting edema on the right ankle and foot.  Moderate pitting edema in the left ankle.  There is no discoloration  Skin: She has multiple areas of scar on her lower arms, hands and legs. She states she gets wounds easily and has had multiple falls. There are small areas of ecchymosis on her forearms.     Performance Status:   ECO    We have personally reviewed her images and reports from her PET/CT from 2021 and her CT chest abdomen pelvis 2021.  We have also compared these to her images from 2019.  The 8 mm left upper lobe nodule has increased in size and density and has an SUV of 3.2 compared to no prior activity.  The 6 mm apical left upper lobe nodule has also increased in prominence with an SUV of 1.4 compared to 1.1.  There is an 8 mm precarinal node with SUV of 4.9, prior 1.8.  There is max SUV 6.8 within the endometrial canal with wall thickening and associated sigmoid activity with SUV up to 7.3.  There is a left inguinal area of stranding  with SUV of 3.1.  There is no abdominal adenopathy.  She has a large hiatal hernia which appears stable    Assessment   We have had detailed conversation with her and her daughter in our office today.  We recommend proceeding with Ion navigational bronchoscopy and endobronchial ultrasound to attempt to establish definitive tissue diagnosis.  She understands that these lesions are small and that there is a possibility that the pathology will be indeterminate.

## 2021-08-30 NOTE — LETTER
Dear Tequila, Geovani CISNEROS MD    I appreciate the opportunity to evaluate  your patient Raegan Young in the office today.    Raegan Young is a delightful 75-year-old female former smoker (quit 23 years ago) who presents today for initial consultation for left upper lobe nodules and mediastinal lymph nodes with PET avidity.  She had vaginal cancer in 2018 treated with concurrent chemoradiation.  In January 2019 she had negative left groin excisional node biopsy. .  She also has a history of diverticulosis/itis and was recently diagnosed with colouterine fistula.  CT chest/abdomen/pelvis noted 2 small left upper lobe nodules of GGO's.  These appear to have slightly increased in size.  PET scan was performed on 8/12/2021 noting activity in the larger, more inferior left upper lobe nodule as well as in  a paratracheal lymph node.  Surgical management of her fistula will be influenced by the lung nodule pathology.    We were originally scheduled younger than her to see her 10 days ago with planned procedure around this time.  However, she and her  were exposed to Covid.  Her  has subsequently been diagnosed with Covid with bronchitis type symptoms.  He did not require hospitalization.  She obtained Covid testing which was negative and has remained asymptomatic.    Her review of systems is notable for acid reflux and known large hiatal hernia.  This is well controlled with Protonix.  She has no dysphagia, nausea or vomiting.  She has mild, chronic low back pain and left knee pain.  She gets lower extremity edema more so on the left than right.  This is chronic in nature.  She has had a few urinary tract infections given the above fistula but no current dysuria.  She has a vaginal discharge that appears to be old blood and brownish in color.  There is occasional vaginal air expulsion.        We have had detailed conversation with her and her daughter in our office today.  We recommend proceeding  with Ion navigational bronchoscopy and endobronchial ultrasound to attempt to establish definitive tissue diagnosis.  She understands that these lesions are small and that there is a possibility that the pathology will be indeterminate.      Thank you for the opportunity to participate in her care.    Owen Gamez MD PhD

## 2021-08-30 NOTE — H&P (VIEW-ONLY)
Thoracic Surgery Consultation      Patient ID: Raegan Young                              : 1945  MRN: [id number]    Clinic:  [facility]                                            Visit Date: 2021  Encounter Provider: Owen Gamez  Referring Provider: Geovani Mandel MD    Subjective       Raegan Young is a delightful 75-year-old female former smoker (quit 23 years ago) who presents today for initial consultation for left upper lobe nodules and mediastinal lymph nodes with PET avidity.  She had vaginal cancer in  treated with concurrent chemoradiation.  In 2019 she had negative left groin excisional node biopsy. .  She also has a history of diverticulosis/itis and was recently diagnosed with colouterine fistula.  CT chest/abdomen/pelvis noted 2 small left upper lobe nodules of GGO's.  These appear to have slightly increased in size.  PET scan was performed on 2021 noting activity in the larger, more inferior left upper lobe nodule as well as in  a paratracheal lymph node.  Surgical management of her fistula will be influenced by the lung nodule pathology.    We were originally scheduled younger than her to see her 10 days ago with planned procedure around this time.  However, she and her  were exposed to Covid.  Her  has subsequently been diagnosed with Covid with bronchitis type symptoms.  He did not require hospitalization.  She obtained Covid testing which was negative and has remained asymptomatic.    Her review of systems is notable for acid reflux and known large hiatal hernia.  This is well controlled with Protonix.  She has no dysphagia, nausea or vomiting.  She has mild, chronic low back pain and left knee pain.  She gets lower extremity edema more so on the left than right.  This is chronic in nature.  She has had a few urinary tract infections given the above fistula but no current dysuria.  She has a vaginal discharge that appears to be old  blood and brownish in color.  There is occasional vaginal air expulsion.           Past Medical History:  has a past medical history of Colovaginal fistula, COVID-19 vaccine series completed (02/26/2021), Disease of thyroid gland, Diverticulitis of colon, GERD (gastroesophageal reflux disease), History of snoring, Hypertension, Hypothyroidism, and Vaginal cancer (CMS/HCC). She also has no past medical history of Diabetes mellitus (CMS/HCC) or Myocardial infarction (CMS/HCC).  Past Surgical History:  has a past surgical history that includes Cholecystectomy; Dilation and curettage of uterus (02/2018); Rotator cuff repair (Right); and Colonoscopy.  Social History:  reports that she quit smoking about 23 years ago. Her smoking use included cigarettes. She started smoking about 64 years ago. She has a 40.00 pack-year smoking history. She has never used smokeless tobacco. She reports current alcohol use. She reports that she does not use drugs.  Family History: family history includes Heart attack in her biological mother; Hypertension in her biological mother; Lung cancer in her biological father.  Medications:   Current Outpatient Medications:   •  benazepril (LOTENSIN) 10 mg tablet, Take 10 mg by mouth daily., Disp: , Rfl:   •  bumetanide (BUMEX) 1 mg tablet, Take 1 mg by mouth as needed.  , Disp: , Rfl:   •  Lactobacillus acidophilus (PROBIOTIC ORAL), Take by mouth., Disp: , Rfl:   •  levothyroxine (SYNTHROID) 88 mcg tablet, Take 88 mcg by mouth daily.  , Disp: , Rfl: 3  •  pantoprazole (PROTONIX) 40 mg EC tablet, Take 40 mg by mouth daily.  , Disp: , Rfl:   •  atorvastatin (LIPITOR) 40 mg tablet, Take 1 tablet (40 mg total) by mouth daily., Disp: 90 tablet, Rfl: 1  •  ipratropium (ATROVENT) 42 mcg (0.06 %) nasal spray, Administer 1 spray into each nostril 2 (two) times a day., Disp: , Rfl:   •  nitrofurantoin, macrocrystal-monohydrate, (MACROBID) 100 mg capsule, Take 100 mg by mouth 2 (two) times a day., Disp: ,  Rfl:   Allergies:   Allergies   Allergen Reactions   • No Known Allergies        E-cigarette/Vaping     Questions Responses    E-cigarette/Vaping Use Never User            Objective   Physical Exam:  Visit Vitals  /74 (BP Location: Right upper arm, Patient Position: Sitting)   Pulse 90   Temp (!) 35.2 °C (95.4 °F) (Temporal)   Resp 16   Ht 1.524 m (5')   Wt 69.9 kg (154 lb)   SpO2 97%   BMI 30.08 kg/m²       Constitutional: No acute distress. Appears younger than her stated age.   Neurologic: Alert and Oriented with no  deficit or asymmetry.  Cooperative affect  Head: NCAT  Eyes: Antiicteric, EOMI.  Neck: . Trachea is midline. No cervical or supraclavicular adenopathy.   Respiratory: Clear to auscultation bilaterally   Cardiovascular: Regular rate and rhythm. No murmurs, gallops, or rubs.    Abdomen: Soft, Non-tender non-distended.  Musculoskeletal: Ambulates withoutdeficit. Normal and symmetric strength.  Extremities: No cyanosis or clubbing.  Mild, nonpitting edema on the right ankle and foot.  Moderate pitting edema in the left ankle.  There is no discoloration  Skin: She has multiple areas of scar on her lower arms, hands and legs. She states she gets wounds easily and has had multiple falls. There are small areas of ecchymosis on her forearms.     Performance Status:   ECO    We have personally reviewed her images and reports from her PET/CT from 2021 and her CT chest abdomen pelvis 2021.  We have also compared these to her images from 2019.  The 8 mm left upper lobe nodule has increased in size and density and has an SUV of 3.2 compared to no prior activity.  The 6 mm apical left upper lobe nodule has also increased in prominence with an SUV of 1.4 compared to 1.1.  There is an 8 mm precarinal node with SUV of 4.9, prior 1.8.  There is max SUV 6.8 within the endometrial canal with wall thickening and associated sigmoid activity with SUV up to 7.3.  There is a left inguinal area of stranding  with SUV of 3.1.  There is no abdominal adenopathy.  She has a large hiatal hernia which appears stable    Assessment   We have had detailed conversation with her and her daughter in our office today.  We recommend proceeding with Ion navigational bronchoscopy and endobronchial ultrasound to attempt to establish definitive tissue diagnosis.  She understands that these lesions are small and that there is a possibility that the pathology will be indeterminate.

## 2021-08-31 ENCOUNTER — TELEPHONE (OUTPATIENT)
Dept: GYNECOLOGIC ONCOLOGY | Facility: CLINIC | Age: 76
End: 2021-08-31

## 2021-08-31 ENCOUNTER — APPOINTMENT (OUTPATIENT)
Dept: LAB | Facility: HOSPITAL | Age: 76
End: 2021-08-31
Attending: PHYSICIAN ASSISTANT
Payer: MEDICARE

## 2021-08-31 ENCOUNTER — ANESTHESIA EVENT (OUTPATIENT)
Dept: OPERATING ROOM | Facility: HOSPITAL | Age: 76
Setting detail: HOSPITAL OUTPATIENT SURGERY
End: 2021-08-31
Payer: MEDICARE

## 2021-08-31 ENCOUNTER — APPOINTMENT (OUTPATIENT)
Dept: PREADMISSION TESTING | Facility: HOSPITAL | Age: 76
End: 2021-08-31
Payer: MEDICARE

## 2021-08-31 VITALS — BODY MASS INDEX: 30.23 KG/M2 | HEIGHT: 60 IN | WEIGHT: 154 LBS

## 2021-08-31 DIAGNOSIS — Z01.818 PREOP TESTING: ICD-10-CM

## 2021-08-31 LAB — SARS-COV-2 RNA RESP QL NAA+PROBE: NEGATIVE

## 2021-08-31 PROCEDURE — U0003 INFECTIOUS AGENT DETECTION BY NUCLEIC ACID (DNA OR RNA); SEVERE ACUTE RESPIRATORY SYNDROME CORONAVIRUS 2 (SARS-COV-2) (CORONAVIRUS DISEASE [COVID-19]), AMPLIFIED PROBE TECHNIQUE, MAKING USE OF HIGH THROUGHPUT TECHNOLOGIES AS DESCRIBED BY CMS-2020-01-R: HCPCS

## 2021-08-31 NOTE — ANESTHESIA PREPROCEDURE EVALUATION
Relevant Problems   CARDIOVASCULAR   (+) Hypertension      GASTROINTESTINAL   (+) GERD (gastroesophageal reflux disease)      HEMATOLOGY   (+) Anemia      MUSCULOSKELETAL   (+) Primary osteoarthritis of left knee      NEUROLOGY   (+) History of cancer of vagina      URINARY SYSTEM   (+) Low magnesium level      Other   (+) Hypothyroidism     Past Medical History:   Diagnosis Date   • Colovaginal fistula    • COVID-19 vaccine series completed 2021   • Disease of thyroid gland    • Diverticulitis of colon    • GERD (gastroesophageal reflux disease)    • History of snoring    • Hypertension    • Hypothyroidism    • Vaginal cancer (CMS/HCC)     s/p XRT x 7 weeks and chemo weekly x 5 weeks         Past Surgical History:   Procedure Laterality Date   • CHOLECYSTECTOMY     • COLONOSCOPY     • DILATION AND CURETTAGE OF UTERUS  2018   • ROTATOR CUFF REPAIR Right        Social History     Socioeconomic History   • Marital status:      Spouse name: Royal    • Number of children: 4   • Years of education: Not on file   • Highest education level: Not on file   Occupational History   • Occupation: retired/     Tobacco Use   • Smoking status: Former Smoker     Packs/day: 1.00     Years: 40.00     Pack years: 40.00     Types: Cigarettes     Start date:      Quit date:      Years since quittin.6   • Smokeless tobacco: Never Used   Vaping Use   • Vaping Use: Never used   Substance and Sexual Activity   • Alcohol use: Yes     Comment: occassional    • Drug use: No   • Sexual activity: Never   Other Topics Concern   • Not on file   Social History Narrative    Lives with  in a 2 story home     Social Determinants of Health     Financial Resource Strain:    • Difficulty of Paying Living Expenses:    Food Insecurity: No Food Insecurity   • Worried About Running Out of Food in the Last Year: Never true   • Ran Out of Food in the Last Year: Never true   Transportation Needs:    • Lack of  Transportation (Medical):    • Lack of Transportation (Non-Medical):    Physical Activity:    • Days of Exercise per Week:    • Minutes of Exercise per Session:    Stress:    • Feeling of Stress :    Social Connections:    • Frequency of Communication with Friends and Family:    • Frequency of Social Gatherings with Friends and Family:    • Attends [employer] Services:    • Active Member of Clubs or Organizations:    • Attends Club or Organization Meetings:    • Marital Status:    Intimate Partner Violence:    • Fear of Current or Ex-Partner:    • Emotionally Abused:    • Physically Abused:    • Sexually Abused:        Allergies   Allergen Reactions   • Adhesive Tape-Silicones        No current facility-administered medications for this encounter.    Current Outpatient Medications:   •  benazepril (LOTENSIN) 10 mg tablet, Take 10 mg by mouth daily., Disp: , Rfl:   •  bumetanide (BUMEX) 1 mg tablet, Take 1 mg by mouth as needed.  , Disp: , Rfl:   •  Lactobacillus acidophilus (PROBIOTIC ORAL), Take by mouth., Disp: , Rfl:   •  levothyroxine (SYNTHROID) 88 mcg tablet, Take 88 mcg by mouth daily.  , Disp: , Rfl: 3  •  pantoprazole (PROTONIX) 40 mg EC tablet, Take 40 mg by mouth daily.  , Disp: , Rfl:     There were no vitals filed for this visit.    Cardiac Imaging    TRANSTHORACIC ECHO (TTE) COMPLETE 07/21/2021    Interpretation Summary  · The left ventricle is normal in size. There is normal wall thickness. There is focal upper septal hypertrophy. An ultrasound enhancing agent was used. There is normal left ventricular systolic function. Left ventricular ejection fraction is 65% by visual estimate. There is normal wall motion. Diastolic function was not assessed due to mitral inflow merging.  · The left atrium is moderately dilated. The left atrial volume indexed to body surface area measures 48 mL/m2.  · The mitral valve is normal. There is trace mitral regurgitation.  · The aortic valve has three cusps. There is mild  thickening and focal calcification of the cusps. There is no aortic stenosis. There is no aortic regurgitation.  · The aortic root is normal in size. The ascending aorta is normal in size. The aortic arch was not assessed.  · The right ventricle is normal in size. There is normal right ventricular systolic function. Right ventricular S' by tissue Doppler measures 20 cm/s.  · The right atrium is normal in size.  · The tricuspid valve is normal. There is trace tricuspid regurgitation. The estimated right ventricular systolic pressure = 34 mmHg.  · The pulmonic valve is normal. There is physiologic pulmonic regurgitation.  · The inferior vena cava measures less than 2.1 cm and collapses greater than 50% with inspiration. The estimated right atrial pressure is 0 -5 mmHg.  · There is no pericardial effusion.    There are no prior studies available for comparison.      CBC Results       08/30/21 07/06/21 06/07/21                    1215 1443 1439         WBC 7.00 6.87 11.52         RBC 3.93 3.52 3.66         HGB 11.6 10.6 11.3         HCT 36.4 33.1 33.1         MCV 92.6 94.0 90.4         MCH 29.5 30.1 30.9         MCHC 31.9 32.0 34.1          316 429         Comment for PLT at 1439 on 06/07/21: ALL RESULTS HAVE BEEN RECHECKED. SPECIMEN QUALITY CHECKED        BMP Results       08/30/21 07/06/21 06/07/21                    1215 1443 1439          142 138         K 5.0 4.3 3.5         Cl 106 108 99         CO2 26 22 25         Glucose 94 93 91         BUN 22 14 12         Creatinine 1.3 1.2 1.2         Calcium 9.4 9.0 8.9         Anion Gap 8 12 14         EGFR 39.8 43.7 43.7         Comment for K at 1439 on 06/07/21: Results obtained on plasma. Plasma Potassium values may be up to 0.4 mEQ/L less than serum values. The differences may be greater for patients with high platelet or white cell counts.        PT/PTT Results    No lab values to display.       The patient does not have an active anticoagulation  episode.    AB        Anesthesia ROS/MED HX      Cardiovascular   CAD   hypertension   Echocardiogram reviewed, Covid19 Test Reviewed and ECG reviewed   Normal ECG    Endo/Other   Hypothyroidism  Body Habitus: Overweight       Past Surgical History:   Procedure Laterality Date   • CHOLECYSTECTOMY     • COLONOSCOPY     • DILATION AND CURETTAGE OF UTERUS  02/2018   • ROTATOR CUFF REPAIR Right        Physical Exam    Airway   Mallampati: II   TM distance: >3 FB   Neck ROM: full  Cardiovascular - normal   Rhythm: regular   Rate: normalPulmonary - normal   clear to auscultation  Dental - normal        Anesthesia Plan    Plan: general    Technique: general endotracheal     Lines and Monitors: additional IV     Airway: direct visual laryngoscopy   ASA 3  Anesthetic plan and risks discussed with: patient  Induction:    intravenous   Postop Plan:   Patient Disposition: inpatient floor planned admission   Pain Management: IV analgesics

## 2021-08-31 NOTE — PRE-PROCEDURE INSTRUCTIONS
1. We will call you between 3 pm and 7 pm on August 31, 2021 to determine that arrival time for your procedure. If you do not hear by 6PM. Please call [phone] for arrival time.     2. Please report to Main Entrance near Parking lot A, walk into main lobby and report to the admission desk on the first floor on the day of your procedure.    3. Please follow the following fasting guidelines:     No solid food EIGHT HOURS prior to surgery.  Unlimited clear liquids, meaning water or PLAIN black coffee WITHOUT any milk, cream, sugar, or sweetener are permitted up to TWO HOURS prior to arrival at the hospital.    4. Early on the morning of the procedure please take your usual dose of the listed medications with a sip of water: LEVOTHYROXINE PROTONIX. NO ASPIRIN, IBUPROFEN, VITAMINS OR HERBAL SUPPLEMENTS      5. Other Instructions: You may brush your teeth the morning of the procedure. Rinse and spit, do not swallow.  Bring a list of your medications with dosages.  Use surgical wash as directed.     6. If you develop a cold, cough, fever, rash, or other symptom prior to the day of the procedure, please report it to your physician immediately.    7. If you need to cancel the procedure for any reason, please contact your physician.    8. Make arrangements to have someone drive you home from the procedure. If you have not arranged for transportation home, your surgery may be cancelled.     9. You may not take public transportation unless accompanied by a responsible person.    10. You may not drive a car or operate complex or potentially dangerous machinery for 24 hours following anesthesia and/or sedation.    11.  If it is medically necessary for you to have a longer stay, you will be informed as soon as the decision is made.    12. Only bring essential items to the hospital.  Do not wear or bring anything of value to the hospital including jewelry of any kind, money, or wallet. Do not wear make-up or contact lenses.  DO  NOT BRING MEDICATIONS FROM HOME. DO bring your hearing aids, glasses, and a case    13. No lotion, creams, powders, or oils on skin the morning of procedure     14. Dress in comfortable clothes.    15.  If instructed, please bring a copy of your Advanced Directive (Living Will/Durable Power of ) on the day of your procedure.     16. Patients need to quarantine from the time of PAT COVID test to day of surgery, regardless of COVID vaccine status.      17. Ensuring your safety at all times is a very important part of our [facility] Culture of Safety. After having surgery and sedation, you are at risk for falling and balance issues. Although you may feel awake, the effects of the medication can last up to 24 hours after anesthesia. If you need to use the bathroom during your recovery period, nursing staff will escort you there and stay with you to ensure your safety.    Pre operative instructions given as per protocol.  Form explained by: Patricia Wechsler, RN    I have read and understand the above information. I have had sufficient opportunity to ask questions I might have and they have been answered to my satisfaction. I agree to comply with the Patient Responsibilities listed above and have received a copy of this form.

## 2021-09-01 ENCOUNTER — HOSPITAL ENCOUNTER (OUTPATIENT)
Facility: HOSPITAL | Age: 76
Setting detail: HOSPITAL OUTPATIENT SURGERY
Discharge: HOME | End: 2021-09-01
Attending: THORACIC SURGERY (CARDIOTHORACIC VASCULAR SURGERY) | Admitting: THORACIC SURGERY (CARDIOTHORACIC VASCULAR SURGERY)
Payer: MEDICARE

## 2021-09-01 ENCOUNTER — APPOINTMENT (OUTPATIENT)
Dept: RADIOLOGY | Facility: HOSPITAL | Age: 76
Setting detail: HOSPITAL OUTPATIENT SURGERY
End: 2021-09-01
Attending: PHYSICIAN ASSISTANT
Payer: MEDICARE

## 2021-09-01 ENCOUNTER — APPOINTMENT (OUTPATIENT)
Dept: RADIOLOGY | Facility: HOSPITAL | Age: 76
Setting detail: HOSPITAL OUTPATIENT SURGERY
End: 2021-09-01
Attending: THORACIC SURGERY (CARDIOTHORACIC VASCULAR SURGERY)
Payer: MEDICARE

## 2021-09-01 ENCOUNTER — ANESTHESIA (OUTPATIENT)
Dept: OPERATING ROOM | Facility: HOSPITAL | Age: 76
Setting detail: HOSPITAL OUTPATIENT SURGERY
End: 2021-09-01
Payer: MEDICARE

## 2021-09-01 VITALS
WEIGHT: 154 LBS | HEIGHT: 60 IN | RESPIRATION RATE: 18 BRPM | TEMPERATURE: 98.2 F | SYSTOLIC BLOOD PRESSURE: 104 MMHG | OXYGEN SATURATION: 96 % | HEART RATE: 90 BPM | BODY MASS INDEX: 30.23 KG/M2 | DIASTOLIC BLOOD PRESSURE: 79 MMHG

## 2021-09-01 DIAGNOSIS — R94.2 ABNORMAL PET SCAN, LUNG: ICD-10-CM

## 2021-09-01 DIAGNOSIS — R91.8 MULTIPLE LUNG NODULES ON CT: ICD-10-CM

## 2021-09-01 LAB
GRAM STN SPEC: NORMAL

## 2021-09-01 PROCEDURE — 27200000 HC STERILE SUPPLY

## 2021-09-01 PROCEDURE — 63700000 HC SELF-ADMINISTRABLE DRUG: Performed by: THORACIC SURGERY (CARDIOTHORACIC VASCULAR SURGERY)

## 2021-09-01 PROCEDURE — 71000011 HC PACU PHASE 1 EA ADDL MIN: Performed by: THORACIC SURGERY (CARDIOTHORACIC VASCULAR SURGERY)

## 2021-09-01 PROCEDURE — 87206 SMEAR FLUORESCENT/ACID STAI: CPT | Performed by: THORACIC SURGERY (CARDIOTHORACIC VASCULAR SURGERY)

## 2021-09-01 PROCEDURE — 63600000 HC DRUGS/DETAIL CODE: Performed by: NURSE ANESTHETIST, CERTIFIED REGISTERED

## 2021-09-01 PROCEDURE — 71000002 HC PACU PHASE 2 INITIAL 30MIN: Performed by: THORACIC SURGERY (CARDIOTHORACIC VASCULAR SURGERY)

## 2021-09-01 PROCEDURE — 71000001 HC PACU PHASE 1 INITIAL 30MIN: Performed by: THORACIC SURGERY (CARDIOTHORACIC VASCULAR SURGERY)

## 2021-09-01 PROCEDURE — 31627 NAVIGATIONAL BRONCHOSCOPY: CPT | Performed by: THORACIC SURGERY (CARDIOTHORACIC VASCULAR SURGERY)

## 2021-09-01 PROCEDURE — 36000012 HC OR LEVEL 2 EA ADDL MIN: Performed by: THORACIC SURGERY (CARDIOTHORACIC VASCULAR SURGERY)

## 2021-09-01 PROCEDURE — 31645 BRNCHSC W/THER ASPIR 1ST: CPT

## 2021-09-01 PROCEDURE — 31652 BRONCH EBUS SAMPLNG 1/2 NODE: CPT

## 2021-09-01 PROCEDURE — 87102 FUNGUS ISOLATION CULTURE: CPT | Mod: 59 | Performed by: THORACIC SURGERY (CARDIOTHORACIC VASCULAR SURGERY)

## 2021-09-01 PROCEDURE — 63600000 HC DRUGS/DETAIL CODE: Mod: JW | Performed by: NURSE ANESTHETIST, CERTIFIED REGISTERED

## 2021-09-01 PROCEDURE — 87205 SMEAR GRAM STAIN: CPT | Performed by: THORACIC SURGERY (CARDIOTHORACIC VASCULAR SURGERY)

## 2021-09-01 PROCEDURE — 07D78ZX EXTRACTION OF THORAX LYMPHATIC, VIA NATURAL OR ARTIFICIAL OPENING ENDOSCOPIC, DIAGNOSTIC: ICD-10-PCS | Performed by: THORACIC SURGERY (CARDIOTHORACIC VASCULAR SURGERY)

## 2021-09-01 PROCEDURE — 88305 TISSUE EXAM BY PATHOLOGIST: CPT | Performed by: THORACIC SURGERY (CARDIOTHORACIC VASCULAR SURGERY)

## 2021-09-01 PROCEDURE — 88331 PATH CONSLTJ SURG 1 BLK 1SPC: CPT | Performed by: PATHOLOGY

## 2021-09-01 PROCEDURE — 88173 CYTOPATH EVAL FNA REPORT: CPT | Performed by: THORACIC SURGERY (CARDIOTHORACIC VASCULAR SURGERY)

## 2021-09-01 PROCEDURE — 36000002 HC OR LEVEL 2 INITIAL 30MIN: Performed by: THORACIC SURGERY (CARDIOTHORACIC VASCULAR SURGERY)

## 2021-09-01 PROCEDURE — 87205 SMEAR GRAM STAIN: CPT | Mod: 59 | Performed by: THORACIC SURGERY (CARDIOTHORACIC VASCULAR SURGERY)

## 2021-09-01 PROCEDURE — 25800000 HC PHARMACY IV SOLUTIONS: Performed by: NURSE ANESTHETIST, CERTIFIED REGISTERED

## 2021-09-01 PROCEDURE — 37000001 HC ANESTHESIA GENERAL: Performed by: THORACIC SURGERY (CARDIOTHORACIC VASCULAR SURGERY)

## 2021-09-01 PROCEDURE — 25800000 HC PHARMACY IV SOLUTIONS: Performed by: PHYSICIAN ASSISTANT

## 2021-09-01 PROCEDURE — 71045 X-RAY EXAM CHEST 1 VIEW: CPT

## 2021-09-01 PROCEDURE — 87206 SMEAR FLUORESCENT/ACID STAI: CPT | Mod: 59 | Performed by: THORACIC SURGERY (CARDIOTHORACIC VASCULAR SURGERY)

## 2021-09-01 PROCEDURE — 87070 CULTURE OTHR SPECIMN AEROBIC: CPT | Performed by: THORACIC SURGERY (CARDIOTHORACIC VASCULAR SURGERY)

## 2021-09-01 PROCEDURE — 87015 SPECIMEN INFECT AGNT CONCNTJ: CPT | Performed by: THORACIC SURGERY (CARDIOTHORACIC VASCULAR SURGERY)

## 2021-09-01 PROCEDURE — 25000000 HC PHARMACY GENERAL: Performed by: NURSE ANESTHETIST, CERTIFIED REGISTERED

## 2021-09-01 PROCEDURE — 31654 BRONCH EBUS IVNTJ PERPH LES: CPT | Performed by: THORACIC SURGERY (CARDIOTHORACIC VASCULAR SURGERY)

## 2021-09-01 PROCEDURE — 71000012 HC PACU PHASE 2 EA ADDL MIN: Performed by: THORACIC SURGERY (CARDIOTHORACIC VASCULAR SURGERY)

## 2021-09-01 PROCEDURE — 87070 CULTURE OTHR SPECIMN AEROBIC: CPT | Mod: 59 | Performed by: THORACIC SURGERY (CARDIOTHORACIC VASCULAR SURGERY)

## 2021-09-01 PROCEDURE — 31628 BRONCHOSCOPY/LUNG BX EACH: CPT | Performed by: THORACIC SURGERY (CARDIOTHORACIC VASCULAR SURGERY)

## 2021-09-01 RX ORDER — SODIUM CHLORIDE, SODIUM GLUCONATE, SODIUM ACETATE, POTASSIUM CHLORIDE AND MAGNESIUM CHLORIDE 30; 37; 368; 526; 502 MG/100ML; MG/100ML; MG/100ML; MG/100ML; MG/100ML
INJECTION, SOLUTION INTRAVENOUS CONTINUOUS PRN
Status: DISCONTINUED | OUTPATIENT
Start: 2021-09-01 | End: 2021-09-01 | Stop reason: SURG

## 2021-09-01 RX ORDER — ONDANSETRON HYDROCHLORIDE 2 MG/ML
4 INJECTION, SOLUTION INTRAVENOUS
Status: DISCONTINUED | OUTPATIENT
Start: 2021-09-01 | End: 2021-09-01 | Stop reason: HOSPADM

## 2021-09-01 RX ORDER — ACETAMINOPHEN 325 MG/1
975 TABLET ORAL AS NEEDED
Status: DISCONTINUED | OUTPATIENT
Start: 2021-09-01 | End: 2021-09-01 | Stop reason: HOSPADM

## 2021-09-01 RX ORDER — MIDAZOLAM HYDROCHLORIDE 2 MG/2ML
INJECTION, SOLUTION INTRAMUSCULAR; INTRAVENOUS AS NEEDED
Status: DISCONTINUED | OUTPATIENT
Start: 2021-09-01 | End: 2021-09-01 | Stop reason: SURG

## 2021-09-01 RX ORDER — HYDROMORPHONE HYDROCHLORIDE 1 MG/ML
0.5 INJECTION, SOLUTION INTRAMUSCULAR; INTRAVENOUS; SUBCUTANEOUS
Status: DISCONTINUED | OUTPATIENT
Start: 2021-09-01 | End: 2021-09-01 | Stop reason: HOSPADM

## 2021-09-01 RX ORDER — DEXTROSE 50 % IN WATER (D50W) INTRAVENOUS SYRINGE
25 AS NEEDED
Status: DISCONTINUED | OUTPATIENT
Start: 2021-09-01 | End: 2021-09-01 | Stop reason: HOSPADM

## 2021-09-01 RX ORDER — PROPOFOL 10 MG/ML
INJECTION, EMULSION INTRAVENOUS CONTINUOUS PRN
Status: DISCONTINUED | OUTPATIENT
Start: 2021-09-01 | End: 2021-09-01 | Stop reason: SURG

## 2021-09-01 RX ORDER — SODIUM CHLORIDE 9 MG/ML
INJECTION, SOLUTION INTRAVENOUS CONTINUOUS
Status: DISCONTINUED | OUTPATIENT
Start: 2021-09-01 | End: 2021-09-01 | Stop reason: HOSPADM

## 2021-09-01 RX ORDER — PHENYLEPHRINE HCL IN 0.9% NACL 50MG/250ML
10-200 PLASTIC BAG, INJECTION (ML) INTRAVENOUS
Status: ACTIVE | OUTPATIENT
Start: 2021-09-01 | End: 2021-09-01

## 2021-09-01 RX ORDER — LIDOCAINE HYDROCHLORIDE 10 MG/ML
INJECTION, SOLUTION INFILTRATION; PERINEURAL AS NEEDED
Status: DISCONTINUED | OUTPATIENT
Start: 2021-09-01 | End: 2021-09-01 | Stop reason: SURG

## 2021-09-01 RX ORDER — DEXAMETHASONE SODIUM PHOSPHATE 4 MG/ML
INJECTION, SOLUTION INTRA-ARTICULAR; INTRALESIONAL; INTRAMUSCULAR; INTRAVENOUS; SOFT TISSUE AS NEEDED
Status: DISCONTINUED | OUTPATIENT
Start: 2021-09-01 | End: 2021-09-01 | Stop reason: SURG

## 2021-09-01 RX ORDER — FENTANYL CITRATE 50 UG/ML
INJECTION, SOLUTION INTRAMUSCULAR; INTRAVENOUS AS NEEDED
Status: DISCONTINUED | OUTPATIENT
Start: 2021-09-01 | End: 2021-09-01 | Stop reason: SURG

## 2021-09-01 RX ORDER — IBUPROFEN 200 MG
16-32 TABLET ORAL AS NEEDED
Status: DISCONTINUED | OUTPATIENT
Start: 2021-09-01 | End: 2021-09-01 | Stop reason: HOSPADM

## 2021-09-01 RX ORDER — PROPOFOL 10 MG/ML
INJECTION, EMULSION INTRAVENOUS AS NEEDED
Status: DISCONTINUED | OUTPATIENT
Start: 2021-09-01 | End: 2021-09-01 | Stop reason: SURG

## 2021-09-01 RX ORDER — DEXTROSE 40 %
15-30 GEL (GRAM) ORAL AS NEEDED
Status: DISCONTINUED | OUTPATIENT
Start: 2021-09-01 | End: 2021-09-01 | Stop reason: HOSPADM

## 2021-09-01 RX ORDER — ONDANSETRON HYDROCHLORIDE 2 MG/ML
INJECTION, SOLUTION INTRAVENOUS AS NEEDED
Status: DISCONTINUED | OUTPATIENT
Start: 2021-09-01 | End: 2021-09-01 | Stop reason: SURG

## 2021-09-01 RX ORDER — FENTANYL CITRATE 50 UG/ML
50 INJECTION, SOLUTION INTRAMUSCULAR; INTRAVENOUS
Status: DISCONTINUED | OUTPATIENT
Start: 2021-09-01 | End: 2021-09-01 | Stop reason: HOSPADM

## 2021-09-01 RX ORDER — ROCURONIUM BROMIDE 10 MG/ML
INJECTION, SOLUTION INTRAVENOUS AS NEEDED
Status: DISCONTINUED | OUTPATIENT
Start: 2021-09-01 | End: 2021-09-01 | Stop reason: SURG

## 2021-09-01 RX ADMIN — FENTANYL CITRATE 50 MCG: 50 INJECTION, SOLUTION INTRAMUSCULAR; INTRAVENOUS at 10:17

## 2021-09-01 RX ADMIN — PROPOFOL INJECTABLE EMULSION 110 MG: 10 INJECTION, EMULSION INTRAVENOUS at 10:06

## 2021-09-01 RX ADMIN — ROCURONIUM BROMIDE 50 MG: 10 INJECTION, SOLUTION INTRAVENOUS at 10:12

## 2021-09-01 RX ADMIN — MIDAZOLAM HYDROCHLORIDE 1 MG: 1 INJECTION, SOLUTION INTRAMUSCULAR; INTRAVENOUS at 09:57

## 2021-09-01 RX ADMIN — SODIUM CHLORIDE: 9 INJECTION, SOLUTION INTRAVENOUS at 09:54

## 2021-09-01 RX ADMIN — ONDANSETRON HYDROCHLORIDE 4 MG: 2 SOLUTION INTRAMUSCULAR; INTRAVENOUS at 11:39

## 2021-09-01 RX ADMIN — Medication 25 MCG/MIN: at 10:30

## 2021-09-01 RX ADMIN — ROCURONIUM BROMIDE 50 MG: 10 INJECTION, SOLUTION INTRAVENOUS at 10:06

## 2021-09-01 RX ADMIN — ROCURONIUM BROMIDE 20 MG: 10 INJECTION, SOLUTION INTRAVENOUS at 11:31

## 2021-09-01 RX ADMIN — DEXAMETHASONE SODIUM PHOSPHATE 4 MG: 4 INJECTION, SOLUTION INTRAMUSCULAR; INTRAVENOUS at 10:23

## 2021-09-01 RX ADMIN — ACETAMINOPHEN 975 MG: 325 TABLET, FILM COATED ORAL at 14:57

## 2021-09-01 RX ADMIN — SUGAMMADEX 300 MG: 100 INJECTION, SOLUTION INTRAVENOUS at 11:54

## 2021-09-01 RX ADMIN — SODIUM CHLORIDE, SODIUM GLUCONATE, SODIUM ACETATE, POTASSIUM CHLORIDE AND MAGNESIUM CHLORIDE: 526; 502; 368; 37; 30 INJECTION, SOLUTION INTRAVENOUS at 09:54

## 2021-09-01 RX ADMIN — PROPOFOL 125 MCG/KG/MIN: 10 INJECTION, EMULSION INTRAVENOUS at 10:12

## 2021-09-01 RX ADMIN — LIDOCAINE HYDROCHLORIDE 5 ML: 10 INJECTION, SOLUTION INFILTRATION; PERINEURAL at 10:06

## 2021-09-01 RX ADMIN — FENTANYL CITRATE 50 MCG: 50 INJECTION, SOLUTION INTRAMUSCULAR; INTRAVENOUS at 10:06

## 2021-09-01 RX ADMIN — MIDAZOLAM HYDROCHLORIDE 1 MG: 1 INJECTION, SOLUTION INTRAMUSCULAR; INTRAVENOUS at 09:53

## 2021-09-01 NOTE — ANESTHESIA PROCEDURE NOTES
Airway  Urgency: elective    Start Time: 9/1/2021 10:11 AM  Airway not difficult    General Information and Staff    Patient location during procedure: OR  Anesthesiologist: Vishnu Blanco MD  Resident/CRNA: Jenise Shine CRNA  Performed: anesthesiologist     Indications and Patient Condition  Indications for airway management: anesthesia  Sedation level: deep  Preoxygenated: yes  Patient position: sniffing  MILS maintained throughout  Mask difficulty assessment: 1 - vent by mask    Final Airway Details  Final airway type: endotracheal airway      Successful airway: ETT  Cuffed: yes   Successful intubation technique: video laryngoscopy  Facilitating devices/methods: intubating stylet  Endotracheal tube insertion site: oral  Blade: Hayley  Blade size: #4  ETT size (mm): 8.5  Cormack-Lehane Classification: grade IIa - partial view of glottis  Placement verified by: chest auscultation and capnometry   Measured from: teeth  ETT to teeth (cm): 22  Number of attempts at approach: 1  Atraumatic airway insertion

## 2021-09-01 NOTE — BRIEF OP NOTE
ION NAVIGATIONAL BRONCHOSCOPY AND ENDOBRONCHIAL ULTRASOUND Procedure Note    Procedure:    ION NAVIGATIONAL BRONCHOSCOPY AND ENDOBRONCHIAL ULTRASOUND  CPT(R) Code:  86878 - PA BRONCHOSCOPY W/CPTR-ASST IMAGE-GUIDED NAVIGATION      Pre-op Diagnosis     * Multiple lung nodules on CT [R91.8]     * Abnormal PET scan, lung [R94.2]       Post-op Diagnosis     * Multiple lung nodules on CT [R91.8]     * Abnormal PET scan, lung [R94.2]        **Adenocarcinoma of the JAYESH    Surgeon(s) and Role:     * Owen Gamez MD PhD - Primary     * Grey Ford MD - Assisting     * Diana Duke PA C - Assisting    Anesthesia: General    Staff:   Circulator: Josee Aguirre RN  Scrub Person: Amna Craven    Procedure Details   Successful Ion navigational bronchoscopy and endobronchial ultrasound for lung nodule and lymph node biopsies, respectively    Estimated Blood Loss:  minimal.    Specimens:                Order Name Source Comment Collection Info Order Time   ANAEROBIC CULTURE / SMEAR (INCLUDES AEROBIC CULTURE) Lung, Left Upper Lobe Pre-op diagnosis:  Multiple lung nodules on CT [R91.8]  Abnormal PET scan, lung [R94.2] Collected By: Owen Gamez MD PhD 9/1/2021 11:02 AM     Release to patient   Immediate        FUNGAL CULTURE / SMEAR Lung, Left Upper Lobe Pre-op diagnosis:  Multiple lung nodules on CT [R91.8]  Abnormal PET scan, lung [R94.2] Collected By: Owen Gamez MD PhD 9/1/2021 11:02 AM     Release to patient   Immediate        TISSUE CULTURE / SMEAR Lung, Left Upper Lobe Pre-op diagnosis:  Multiple lung nodules on CT [R91.8]  Abnormal PET scan, lung [R94.2] Collected By: Owen Gamez MD PhD 9/1/2021 11:02 AM     Release to patient   Immediate        AFB CULTURE / SMEAR Lung, Left Upper Lobe Pre-op diagnosis:  Multiple lung nodules on CT [R91.8]  Abnormal PET scan, lung [R94.2] Collected By: Owen Gamez MD PhD 9/1/2021 11:02 AM     Release to patient   Immediate        FUNGAL CULTURE / SMEAR  Bronchioalveolar Lavage, Left Upper Lobe Pre-op diagnosis:  Multiple lung nodules on CT [R91.8]  Abnormal PET scan, lung [R94.2] Collected By: Oewn Gamez MD PhD 9/1/2021 11:14 AM     Release to patient   Immediate        SPUTUM CULTURE / SMEAR Bronchioalveolar Lavage, Left Upper Lobe Pre-op diagnosis:  Multiple lung nodules on CT [R91.8]  Abnormal PET scan, lung [R94.2] Collected By: Owen Gamez MD PhD 9/1/2021 11:14 AM     Release to patient   Immediate        AFB CULTURE / SMEAR Bronchioalveolar Lavage, Left Upper Lobe Pre-op diagnosis:  Multiple lung nodules on CT [R91.8]  Abnormal PET scan, lung [R94.2] Collected By: Owen Gamez MD PhD 9/1/2021 11:14 AM     Release to patient   Immediate        CYTOLOGY, NON-GYN (MLHL) Lung, Left Upper Lobe Pre-op diagnosis:  Multiple lung nodules on CT [R91.8]  Abnormal PET scan, lung [R94.2] Collected By: Owen Gamez MD PhD 9/1/2021 10:55 AM     Release to patient   Immediate        PATHOLOGY TISSUE EXAM Lung, Left Upper Lobe Pre-op diagnosis:  Multiple lung nodules on CT [R91.8]  Abnormal PET scan, lung [R94.2] Collected By: Owen Gamez MD PhD 9/1/2021 11:03 AM     Release to patient   Immediate              Drains: * No LDAs found *    Implants: * No implants in log *           Complications:  None; patient tolerated the procedure well.           Disposition: PACU - hemodynamically stable.           Condition: stable    Owen Gamez MD PhD  Phone Number: [phone]

## 2021-09-01 NOTE — OP NOTE
Op Note    Hospital: [facility]  Medical Record Number:  [id number]  Patient Name:  Raegan Young  YOB: 1945   Date of Operation:  9/1/2021  Primary Surgeon:  Owen Gamez MD PhD  First Assistant: Diana Duke PA-C  Co Surgeon: Grey Ford MD      Preoperative Diagnosis:    Pre-op Diagnosis     * Multiple lung nodules on CT [R91.8]     * Abnormal PET scan, lung [R94.2]    Postoperative Diagnosis:   Post-op Diagnosis  Adenocarcinoma Left upper lobe    Operation:  Procedure:    ION NAVIGATIONAL BRONCHOSCOPY AND ENDOBRONCHIAL ULTRASOUND  CPT(R) Code:  01746 - CA BRONCHOSCOPY W/CPTR-ASST IMAGE-GUIDED NAVIGATION      Anesthesia: General    Anesthesiologist: Anesthesiologist: Charles Garcia MD  CRNA: Malaika Smith CRNA; Jenise Shine CRNA     Staff:   Circulator: Josee Aguirre RN  Scrub Person: Amna Craven    Indication: Raegan Young is a 75 y.o. female with a remote smoking history (quit 23 years ago) who has a colouterine fistula and a history of vaginal cancer.  There are 2 indeterminate nodules in the left upper lobe 1 of which is hypermetabolic.  She has a hypermetabolic but small right paratracheal lymph node.    Operative Findings:  PET positive left upper lobe nodule is adenocarcinoma  Right paratracheal node measures 8 mm and cytology was obtained  Carinal nodes are small as on the left hilar nodes.    Procedure Details   After smooth induction of general anesthesia bronchoscopy was performed.  The ion bronchoscope was docked and registration was completed.  We navigated to the dominant left upper lobe nodule.  Radial EBUS confirmed this lesion and the signal was eccentric.  3D imaging was performed and we were then able to maneuver the catheter and obtain a concentric radial EBUS signal.  Needle aspiration was obtained and had limited cellularity.  Multiple biopsies obtained these showed adenocarcinoma.  The area was cleared and EBUS was  performed.  Those details are contained in Dr. Méndez note.  She was extubated and taken to PACU in stable condition.    Estimated Blood Loss: None    Specimens:                Order Name Source Comment Collection Info Order Time   ANAEROBIC CULTURE / SMEAR (INCLUDES AEROBIC CULTURE) Lung, Left Upper Lobe Pre-op diagnosis:  Multiple lung nodules on CT [R91.8]  Abnormal PET scan, lung [R94.2] Collected By: Owen Gamez MD PhD 9/1/2021 11:02 AM     Release to patient   Immediate        FUNGAL CULTURE / SMEAR Lung, Left Upper Lobe Pre-op diagnosis:  Multiple lung nodules on CT [R91.8]  Abnormal PET scan, lung [R94.2] Collected By: Owen Gamez MD PhD 9/1/2021 11:02 AM     Release to patient   Immediate        AFB CULTURE / SMEAR Lung, Left Upper Lobe Pre-op diagnosis:  Multiple lung nodules on CT [R91.8]  Abnormal PET scan, lung [R94.2] Collected By: Owen Gamez MD PhD 9/1/2021 11:02 AM     Release to patient   Immediate        FUNGAL CULTURE / SMEAR Bronchioalveolar Lavage, Left Upper Lobe Pre-op diagnosis:  Multiple lung nodules on CT [R91.8]  Abnormal PET scan, lung [R94.2] Collected By: Owen Gamez MD PhD 9/1/2021 11:14 AM     Release to patient   Immediate        SPUTUM CULTURE / SMEAR Bronchioalveolar Lavage, Left Upper Lobe Pre-op diagnosis:  Multiple lung nodules on CT [R91.8]  Abnormal PET scan, lung [R94.2] Collected By: Owen Gamez MD PhD 9/1/2021 11:14 AM     Release to patient   Immediate        AFB CULTURE / SMEAR Bronchioalveolar Lavage, Left Upper Lobe Pre-op diagnosis:  Multiple lung nodules on CT [R91.8]  Abnormal PET scan, lung [R94.2] Collected By: Owen Gamez MD PhD 9/1/2021 11:14 AM     Release to patient   Immediate        CYTOLOGY, NON-GYN (MLHL) Lung, Left Upper Lobe Pre-op diagnosis:  Multiple lung nodules on CT [R91.8]  Abnormal PET scan, lung [R94.2] Collected By: Owen Gamez MD PhD 9/1/2021 10:55 AM     Release to patient   Immediate        PATHOLOGY TISSUE EXAM Lung, Left  Upper Lobe Pre-op diagnosis:  Multiple lung nodules on CT [R91.8]  Abnormal PET scan, lung [R94.2] Collected By: Owen Gamez MD PhD 9/1/2021 11:03 AM     Release to patient   Immediate                   Complications: None: the patient tolerated the procedure well           Disposition: PACU - hemodynamically stable.           Condition: stable    Owen Gamez MD PhD  Phone Number: [phone]    Owen Gamez MD PhD

## 2021-09-01 NOTE — OR SURGEON
Pre-Procedure patient identification:  I am the primary operating surgeon/proceduralist and I have identified the patient on 09/01/21 at 8:52 AM Owen Gamez MD PhD  Phone Number: [phone]

## 2021-09-01 NOTE — PROCEDURES
Bronchoscopy Report          Date: 9/1/2021    Pre-Procedure Diagnosis: Mediastinal lymphadenopathy    Post-Procedure Diagnosis: same    Procedure Start Time: 11:50    Procedure End Time:  12:05    Attending Physician: Dr. Grey Ford    This procedure was done at the end of the navigational bronchoscopy.     Summary of Procedure:  The procedure took place in [facility]'s OR room 2. Vital signs were monitored throughout the procedure. A timeout was performed. The EBUS bronchoscope was passed through ETT to the elysia.     The EBUS bronchoscope was positioned into the area of the 10R lymph node. Utilizing endobronchial ultrasound, a 4.6mm node was visualized. This was not biopsied.    The EBUS bronchoscope was then positioned into the area of the 4R lymph node. Utilizing endobronchial ultrasound, a 8mm node was visualized. This lymph node was smooth in echotecture, and oval shaped. Transbronchial needle aspirations were performed to this area with 4 passes.    The EBUS bronchoscope was positioned into the area of the level 7 lymph node. Utilizing endobronchial ultrasound, a <5mm node was visualized. This was not biopsied.    Finally, the EBUS bronchoscope was positioned into the area of the 10L lymph node. Utilizing endobronchial ultrasound, a <5mm node was visualized. This was not biopsied.    There was no active bleeding at procedures end. Both basilar segments were lightly suctioned for any secretions that dripped down during the procedure. The bronchoscope was then removed. The patient tolerated the procedure well.    Estimated Blood Loss: <5cc    Impression:    1. Mediastinal Adenopathy:     Lymph Node Stations assessed:  - Level 10R, <5mm in largest dimension, not biopsied  - Level 4R, 8mm in largest dimension, biopsied  - Level 7, <5mm in largest dimension, not biopsied   - Level 10L, <5mm in largest dimension, not biopsied     Susannah Anne,   Pulmonary / Critical Care Fellow  PGY-5

## 2021-09-01 NOTE — DISCHARGE INSTRUCTIONS
DISCHARGE INSTRUCTIONS  THORACIC SURGERY    Procedure: Navigational ION bronchoscopy with lung biopsies    - It is very common to cough and cough up a small amount of blood for a day or two after your procedure.  This should not cause shortness of breath or be in large amounts.  If this occurs, please call [phone]      DRIVING:  • Please avoid driving today.  You have received anesthesia which can alter your acuity making driving or operating heavy machinery very dangerous      ACTIVITY:  • Carefully return to your normal household routines today  • You should be able to resume all normal activity approximately 24 hours after having anesthesia      MEDICATION:  • Please resume all your regular medications      DIET:  • You may resume your regular diet as you tolerate  • Some patients notice an upset stomach or decreased appetite the day of their procedure/anesthesia which should resolve.        WHEN TO CALL US: [phone]   ? Fever: Temperature of 101 degrees or above.  (Temperatures of 100.4 are normal after surgery)  ? New body aches or discomfort  ? Nausea and/or Vomiting   ? Hoarseness that does not improve over the next couple days    WHEN TO CALL 911  ? Chest pain, tightness or heaviness  ? Difficulty breathing  ? Dizziness or loss of consciousness  ? Abnormal weakness (especially if on one side of the body) or changes in speech  ? Abnormal swelling and/or pain in your arm or leg      **Someone is always available to answer your questions.  Please call if you are concerned.  Our office number is [phone]      FOLLOW Up:   We will plan to call you with your results and further recommendations.    ASAD Estes C

## 2021-09-01 NOTE — ANESTHESIOLOGIST PRE-PROCEDURE ATTESTATION
Pre-Procedure Patient Identification:  I am the Primary Anesthesiologist and have identified the patient on 09/01/21 at 9:20 AM.   I have confirmed the procedure(s) will be performed by the following surgeon/proceduralist Owen Gamez MD PhD.

## 2021-09-02 ENCOUNTER — PREP FOR CASE (OUTPATIENT)
Dept: COLORECTAL SURGERY | Facility: CLINIC | Age: 76
End: 2021-09-02

## 2021-09-02 LAB
CASE RPRT: NORMAL
CLIN PATH EVAL NOTE: NORMAL
CLINICAL INFO: NORMAL
FUNGUS STAIN: NORMAL
FUNGUS STAIN: NORMAL
PATH REPORT.FINAL DX SPEC: NORMAL
PATH REPORT.GROSS SPEC: NORMAL
RHODAMINE-AURAMINE STN SPEC: NORMAL
RHODAMINE-AURAMINE STN SPEC: NORMAL

## 2021-09-02 RX ORDER — ALVIMOPAN 12 MG/1
12 CAPSULE ORAL ONCE
Status: CANCELLED | OUTPATIENT
Start: 2021-09-02 | End: 2021-09-02

## 2021-09-02 RX ORDER — ACETAMINOPHEN 325 MG/1
975 TABLET ORAL ONCE
Status: CANCELLED | OUTPATIENT
Start: 2021-09-02 | End: 2021-09-02

## 2021-09-02 RX ORDER — CEFAZOLIN SODIUM 2 G/50ML
2 SOLUTION INTRAVENOUS
Status: CANCELLED | OUTPATIENT
Start: 2021-09-10

## 2021-09-02 RX ORDER — GABAPENTIN 100 MG/1
300 CAPSULE ORAL ONCE
Status: CANCELLED | OUTPATIENT
Start: 2021-09-02 | End: 2021-09-02

## 2021-09-02 RX ORDER — METRONIDAZOLE 500 MG/100ML
500 INJECTION, SOLUTION INTRAVENOUS
Status: CANCELLED | OUTPATIENT
Start: 2021-09-10

## 2021-09-02 RX ORDER — CELECOXIB 100 MG/1
200 CAPSULE ORAL ONCE
Status: CANCELLED | OUTPATIENT
Start: 2021-09-02 | End: 2021-09-02

## 2021-09-03 LAB
CASE RPRT: NORMAL
CLINICAL INFO: NORMAL
MICROORGANISM SPEC CULT: NORMAL
PATH REPORT.FINAL DX SPEC: NORMAL
PATH REPORT.FINAL DX SPEC: NORMAL
PATH REPORT.GROSS SPEC: NORMAL
PATH REPORT.INTRAOP OBS SPEC DOC: NORMAL

## 2021-09-03 ASSESSMENT — PAIN SCALES - GENERAL: PAIN_LEVEL: 0

## 2021-09-03 NOTE — ANESTHESIA POSTPROCEDURE EVALUATION
Patient: Raegan Young    Procedure Summary     Date: 09/01/21 Room / [facility] OR [facility] / [facility]    Anesthesia Start: 0954 Anesthesia Stop: 1213    Procedure: ION NAVIGATIONAL BRONCHOSCOPY AND ENDOBRONCHIAL ULTRASOUND (N/A ) Diagnosis:       Multiple lung nodules on CT      Abnormal PET scan, lung      (Multiple lung nodules on CT [R91.8])      (Abnormal PET scan, lung [R94.2])    Surgeons: Owen Gamez MD PhD Responsible Provider: Charles Garcia MD    Anesthesia Type: general ASA Status: 3          Anesthesia Type: general  PACU Vitals  9/1/2021 1205 - 9/1/2021 1305      9/1/2021  1210 9/1/2021  1215 9/1/2021  1225 9/1/2021  1230    BP:  (!) 118/52  --  102/61  102/61    Temp:  36.2 °C (97.1 °F)  --  --  --    Pulse:  81  84  80  80    Resp:  (!) 10  (!) 24  18  (!) 34    SpO2:  --  --  --  --              9/1/2021  1239 9/1/2021  1245 9/1/2021  1300       BP:  --  (!) 117/57  (!) 118/57     Temp:  --  --  --     Pulse:  --  80  82     Resp:  --  13  12     SpO2:  100 %  100 %  99 %             Anesthesia Post Evaluation    Pain score: 0  Pain management: adequate  Mode of pain management: IV medication  Patient location during evaluation: PACU  Patient participation: complete - patient participated  Level of consciousness: awake and alert  Cardiovascular status: acceptable  Airway Patency: adequate  Respiratory status: acceptable  Hydration status: acceptable  Anesthetic complications: no

## 2021-09-06 LAB
GRAM STN SPEC: NORMAL
GRAM STN SPEC: NORMAL
MICROORGANISM SPEC CULT: NORMAL

## 2021-09-07 ENCOUNTER — TELEPHONE (OUTPATIENT)
Dept: COLORECTAL SURGERY | Facility: CLINIC | Age: 76
End: 2021-09-07

## 2021-09-07 NOTE — TELEPHONE ENCOUNTER
Patient left a message with the call service stating that they found cancer in her lungs and would like to know if this will effect her upcoming surgery.

## 2021-09-07 NOTE — PROGRESS NOTES
Spoke with patient. Surgery is cancelled. Will speak to Dr. Mandel about patient's next move pertaining to her surgery with Dr. Mandel.

## 2021-09-16 ENCOUNTER — OFFICE VISIT (OUTPATIENT)
Dept: THORACIC SURGERY | Facility: CLINIC | Age: 76
End: 2021-09-16
Payer: MEDICARE

## 2021-09-16 VITALS
BODY MASS INDEX: 30.63 KG/M2 | DIASTOLIC BLOOD PRESSURE: 70 MMHG | SYSTOLIC BLOOD PRESSURE: 137 MMHG | OXYGEN SATURATION: 96 % | HEIGHT: 60 IN | WEIGHT: 156 LBS | RESPIRATION RATE: 20 BRPM | HEART RATE: 90 BPM

## 2021-09-16 DIAGNOSIS — C34.12 MALIGNANT NEOPLASM OF UPPER LOBE OF LEFT LUNG (CMS/HCC): Primary | ICD-10-CM

## 2021-09-16 PROBLEM — C34.92 ADENOCARCINOMA OF LEFT LUNG (CMS/HCC): Status: ACTIVE | Noted: 2021-09-16

## 2021-09-16 PROCEDURE — 99214 OFFICE O/P EST MOD 30 MIN: CPT | Performed by: THORACIC SURGERY (CARDIOTHORACIC VASCULAR SURGERY)

## 2021-09-16 PROCEDURE — G8752 SYS BP LESS 140: HCPCS | Performed by: THORACIC SURGERY (CARDIOTHORACIC VASCULAR SURGERY)

## 2021-09-16 PROCEDURE — G8754 DIAS BP LESS 90: HCPCS | Performed by: THORACIC SURGERY (CARDIOTHORACIC VASCULAR SURGERY)

## 2021-09-16 RX ORDER — ALPRAZOLAM 0.5 MG/1
0.5 TABLET ORAL 2 TIMES DAILY PRN
Qty: 30 TABLET | Refills: 1 | Status: SHIPPED | OUTPATIENT
Start: 2021-09-16 | End: 2021-10-13

## 2021-09-16 NOTE — PROGRESS NOTES
Thoracic Surgery    Patient ID: Raegan Young                              : 1945  MRN: [id number]  Clinic: [facility]                                            Visit Date: 2021  Encounter Provider: wOen Gamez  Referring Provider: Geovani Mandel MD         Patient: Raegan Young  Chief Complaint: NSCLC JAYESH     Subjective     Raegan Young is a pleasant 75 y.o. old female presenting today for discussion of next steps in her recently diagnosed adenocarcinoma of the left upper lobe (Ion navigational bronchoscopy on 2021.     Her ROS is notable for absence of dyspnea, cough, hemoptysis, fever, and chills.  Fistulas related to her colovaginal fistula are unchanged.  The remainder of her review of systems is negative.         Past Medical History:  has a past medical history of Colovaginal fistula, COVID-19 vaccine series completed (2021), Disease of thyroid gland, Diverticulitis of colon, GERD (gastroesophageal reflux disease), History of snoring, Hypertension, Hypothyroidism, and Vaginal cancer (CMS/[facility]). She also has no past medical history of Diabetes mellitus (CMS/[facility]) or Myocardial infarction (CMS/[facility]).  Past Surgical History:  has a past surgical history that includes Cholecystectomy; Dilation and curettage of uterus (2018); Rotator cuff repair (Right); and Colonoscopy.  Social History:  reports that she quit smoking about 23 years ago. Her smoking use included cigarettes. She started smoking about 64 years ago. She has a 40.00 pack-year smoking history. She has never used smokeless tobacco. She reports current alcohol use. She reports that she does not use drugs.  Medications:   Current Outpatient Medications   Medication Sig Dispense Refill   • benazepril (LOTENSIN) 10 mg tablet Take 10 mg by mouth daily.     • bumetanide (BUMEX) 1 mg tablet Take 1 mg by mouth as needed.       • Lactobacillus acidophilus (PROBIOTIC ORAL) Take by mouth.     •  levothyroxine (SYNTHROID) 88 mcg tablet Take 88 mcg by mouth daily.    3   • pantoprazole (PROTONIX) 40 mg EC tablet Take 40 mg by mouth daily.       • ALPRAZolam (XANAX) 0.5 mg tablet Take 1 tablet (0.5 mg total) by mouth 2 (two) times a day as needed for anxiety or sleep. 30 tablet 1     No current facility-administered medications for this visit.       Allergies:   Allergies   Allergen Reactions   • Adhesive Tape-Silicones           Objective   Vitals:   Visit Vitals  /70 (BP Location: Right upper arm, Patient Position: Sitting)   Pulse 90   Resp 20   Ht 1.524 m (5')   Wt 70.8 kg (156 lb)   SpO2 96%   BMI 30.47 kg/m²      General: She is in no distress.  She appears younger than her stated age   Pulmonary: Lung fields are clear bilaterally   CV: Rhythm is regular  Nodes: No cervical or supraclavicular adenopathy   Extremities: No clubbing cyanosis or edema      Imaging Review: I have independently evaluated her imaging studies and the reports.  Her pathology shows adenocarcinoma left upper lobe nodule    Assessment   This pleasant 75-year-old female with newly diagnosed adenocarcinoma left upper lobe is a good candidate for robotic assisted lobectomy.  We discussed this with her and her daughter and she wishes to proceed.

## 2021-09-16 NOTE — LETTER
Dear Tequila, Geovani CISNEROS MD    I appreciate the opportunity to evaluate  your patient Raegan Young in the office today.      Raegan Young is a pleasant 75 y.o. old female presenting today for discussion of next steps in her recently diagnosed adenocarcinoma of the left upper lobe (Ion navigational bronchoscopy on 9/1/2021.     Her ROS is notable for absence of dyspnea, cough, hemoptysis, fever, and chills.  Fistulas related to her colovaginal fistula are unchanged.  The remainder of her review of systems is negative.        This pleasant 75-year-old female with newly diagnosed adenocarcinoma left upper lobe is a good candidate for robotic assisted lobectomy.  We discussed this with her and her daughter and she wishes to proceed.      Thank you for the opportunity to participate in her care.    Owen Gamez MD PhD

## 2021-09-21 ENCOUNTER — TELEPHONE (OUTPATIENT)
Dept: PULMONOLOGY | Facility: CLINIC | Age: 76
End: 2021-09-21

## 2021-09-21 NOTE — TELEPHONE ENCOUNTER
Patient has some questions about her upcoming surgery. She can be reached at [phone] when you have a free moment.

## 2021-09-29 LAB
FUNGUS SPEC CULT: NORMAL
FUNGUS SPEC CULT: NORMAL

## 2021-10-13 ENCOUNTER — APPOINTMENT (OUTPATIENT)
Dept: PREADMISSION TESTING | Facility: HOSPITAL | Age: 76
End: 2021-10-13
Payer: MEDICARE

## 2021-10-13 VITALS — HEIGHT: 60 IN | WEIGHT: 154 LBS | BODY MASS INDEX: 30.23 KG/M2

## 2021-10-13 ASSESSMENT — PAIN SCALES - GENERAL: PAINLEVEL: 0-NO PAIN

## 2021-10-13 NOTE — PRE-PROCEDURE INSTRUCTIONS
1. We will call you between 3 pm and 7 pm on October 19, 2021 to determine that arrival time for your procedure. If you do not hear by 6PM. Please call [phone] for arrival time.    2. Please report to Main Entrance near Parking lot A, walk into main lobby and report to the admission desk on the first floor on the day of your procedure.       3. Please follow the following fasting guidelines: DR MAYBERRY INSTRUCTIONS         Nothing to eat (no solid food) after midnight.    Unlimited clear liquids, meaning water or PLAIN black coffee WITHOUT any milk or cream, are permitted up to TWO HOURS prior to arrival at the hospital.     4. On the morning of the procedure please take your usual dose of the listed medications with a sip of water:  Synthroid, protonics  No aspirin, nsaids, vitamins or herbal supplements   Tylenol is ok to use       5. Other Instructions: You may brush your teeth the morning of the procedure. Rinse and spit, do not swallow.  Bring a list of your medications with dosages with you.    Please follow the hospital provided surgical wash instructions given to you today. If surgical procedure indicates the need for CHG anti-bacterial wash, please use provided soap and follow instructions.      6. If you develop a cold, cough, fever, rash, or other symptom prior to the data of the procedure, please report it to your physician immediately.   7. If you need to cancel the procedure for any reason, please contact your physician or call the unit listed above.   8. Make arrangements to have someone drive you home from the procedure. If you have not arranged for transportation home, your surgery may be cancelled.    9. You may not take public transportation unless you are accompanied by a responsible person.   10. You may not drive a car or operate complex or potentially dangerous machinery for 24 hours following anesthesia and/or sedation.   11. If it is medically necessary for you to have a longer stay, you  will be informed as soon as the decision is made.   12. Do not wear or bring anything of value to the hospital including jewelry of any kind, money, or wallet. Do not wear make-up or contact lenses. DO bring your glasses and a case. DO NOT BRING MEDICATIONS FROM HOME.   13. No lotion, creams, powders, or oils on skin the morning of procedure    14. Dress in comfortable clothes.   15.  If instructed, please bring a copy of your Advanced Directive (Living Will/Durable Power of ) on the day of your procedure.      Pre operative instructions given as per protocol.  Form explained by: Ira Obrien RN     I have read and understand the above information. I have had sufficient opportunity to ask questions I might have and they have been answered to my satisfaction. I agree to comply with the Patient Responsibilities listed above and have received a copy of this form.

## 2021-10-14 LAB
MYCOBACTERIUM SPEC CULT: NORMAL
MYCOBACTERIUM SPEC CULT: NORMAL

## 2021-10-15 ENCOUNTER — APPOINTMENT (OUTPATIENT)
Dept: LAB | Facility: HOSPITAL | Age: 76
End: 2021-10-15
Attending: PHYSICIAN ASSISTANT
Payer: MEDICARE

## 2021-10-15 DIAGNOSIS — C34.92 ADENOCARCINOMA OF LEFT LUNG (CMS/HCC): ICD-10-CM

## 2021-10-15 DIAGNOSIS — Z01.818 PREOP TESTING: ICD-10-CM

## 2021-10-15 DIAGNOSIS — C34.12 MALIGNANT NEOPLASM OF UPPER LOBE OF LEFT LUNG (CMS/HCC): ICD-10-CM

## 2021-10-15 LAB
ABO + RH BLD: NORMAL
BACTERIA URNS QL MICRO: ABNORMAL /HPF
BILIRUB UR QL STRIP.AUTO: NEGATIVE MG/DL
BLD GP AB SCN SERPL QL: NEGATIVE
BLOOD BANK CMNT PATIENT-IMP: NORMAL
CLARITY UR REFRACT.AUTO: CLEAR
COLOR UR AUTO: YELLOW
D AG BLD QL: NEGATIVE
GLUCOSE UR STRIP.AUTO-MCNC: NEGATIVE MG/DL
HGB UR QL STRIP.AUTO: ABNORMAL
HYALINE CASTS #/AREA URNS LPF: ABNORMAL /LPF
KETONES UR STRIP.AUTO-MCNC: NEGATIVE MG/DL
LABORATORY COMMENT REPORT: NORMAL
LEUKOCYTE ESTERASE UR QL STRIP.AUTO: 2
NITRITE UR QL STRIP.AUTO: NEGATIVE
PH UR STRIP.AUTO: 6 [PH]
PROT UR QL STRIP.AUTO: NEGATIVE
RBC #/AREA URNS HPF: ABNORMAL /HPF
SP GR UR REFRACT.AUTO: 1.01
SPECIMEN EXP DATE BLD: NORMAL
SQUAMOUS URNS QL MICRO: 1 /HPF
UROBILINOGEN UR STRIP-ACNC: 0.2 EU/DL
WBC #/AREA URNS HPF: ABNORMAL /HPF

## 2021-10-15 PROCEDURE — 36415 COLL VENOUS BLD VENIPUNCTURE: CPT

## 2021-10-15 PROCEDURE — 86901 BLOOD TYPING SEROLOGIC RH(D): CPT

## 2021-10-15 PROCEDURE — 81003 URINALYSIS AUTO W/O SCOPE: CPT

## 2021-10-18 ENCOUNTER — APPOINTMENT (OUTPATIENT)
Dept: LAB | Facility: HOSPITAL | Age: 76
DRG: 164 | End: 2021-10-18
Attending: PHYSICIAN ASSISTANT
Payer: MEDICARE

## 2021-10-18 DIAGNOSIS — C34.92 ADENOCARCINOMA OF LEFT LUNG (CMS/HCC): ICD-10-CM

## 2021-10-18 LAB
ANION GAP SERPL CALC-SCNC: 13 MEQ/L (ref 3–15)
BUN SERPL-MCNC: 23 MG/DL (ref 8–20)
CALCIUM SERPL-MCNC: 9.2 MG/DL (ref 8.9–10.3)
CHLORIDE SERPL-SCNC: 105 MEQ/L (ref 98–109)
CO2 SERPL-SCNC: 23 MEQ/L (ref 22–32)
CREAT SERPL-MCNC: 1.2 MG/DL (ref 0.6–1.1)
ERYTHROCYTE [DISTWIDTH] IN BLOOD BY AUTOMATED COUNT: 14.8 % (ref 11.7–14.4)
GFR SERPL CREATININE-BSD FRML MDRD: 43.7 ML/MIN/1.73M*2
GLUCOSE SERPL-MCNC: 74 MG/DL (ref 70–99)
HCT VFR BLDCO AUTO: 37.2 % (ref 35–45)
HGB BLD-MCNC: 12 G/DL (ref 11.8–15.7)
MCH RBC QN AUTO: 28.9 PG (ref 28–33.2)
MCHC RBC AUTO-ENTMCNC: 32.3 G/DL (ref 32.2–35.5)
MCV RBC AUTO: 89.6 FL (ref 83–98)
PDW BLD AUTO: 9.4 FL (ref 9.4–12.3)
PLATELET # BLD AUTO: 281 K/UL (ref 150–369)
POTASSIUM SERPL-SCNC: 4.9 MEQ/L (ref 3.6–5.1)
RBC # BLD AUTO: 4.15 M/UL (ref 3.93–5.22)
SARS-COV-2 RNA RESP QL NAA+PROBE: NEGATIVE
SODIUM SERPL-SCNC: 141 MEQ/L (ref 136–144)
WBC # BLD AUTO: 8.12 K/UL (ref 3.8–10.5)

## 2021-10-18 PROCEDURE — 36415 COLL VENOUS BLD VENIPUNCTURE: CPT

## 2021-10-18 PROCEDURE — 85027 COMPLETE CBC AUTOMATED: CPT

## 2021-10-18 PROCEDURE — 80048 BASIC METABOLIC PNL TOTAL CA: CPT

## 2021-10-18 PROCEDURE — U0003 INFECTIOUS AGENT DETECTION BY NUCLEIC ACID (DNA OR RNA); SEVERE ACUTE RESPIRATORY SYNDROME CORONAVIRUS 2 (SARS-COV-2) (CORONAVIRUS DISEASE [COVID-19]), AMPLIFIED PROBE TECHNIQUE, MAKING USE OF HIGH THROUGHPUT TECHNOLOGIES AS DESCRIBED BY CMS-2020-01-R: HCPCS

## 2021-10-19 ENCOUNTER — ANESTHESIA EVENT (OUTPATIENT)
Dept: OPERATING ROOM | Facility: HOSPITAL | Age: 76
Setting detail: SURGERY ADMIT
DRG: 164 | End: 2021-10-19
Payer: MEDICARE

## 2021-10-19 RX ORDER — DEXMEDETOMIDINE HYDROCHLORIDE 4 UG/ML
0.2 INJECTION, SOLUTION INTRAVENOUS
Status: DISCONTINUED | OUTPATIENT
Start: 2021-10-20 | End: 2021-10-20

## 2021-10-19 NOTE — ANESTHESIA PREPROCEDURE EVALUATION
Relevant Problems   CARDIOVASCULAR   (+) Hypertension      GASTROINTESTINAL   (+) GERD (gastroesophageal reflux disease)      HEMATOLOGY   (+) Anemia      MUSCULOSKELETAL   (+) Primary osteoarthritis of left knee      NEUROLOGY   (+) History of cancer of vagina      URINARY SYSTEM   (+) Low magnesium level      Other   (+) Adenocarcinoma of left lung (CMS/HCC)   (+) Hypothyroidism       Anesthesia ROS/MED HX      Pulmonary    history of tobacco use and ex-smoker  Cardiovascular   CAD (Increased coronary calcium score)   hypertension   Echocardiogram reviewed, Covid19 Test Reviewed and ECG reviewed   Normal ECG    Endo/Other  History of cancer and lung cancer  Body Habitus: Obese  ROS/MED HX Comments:    Cardiology: T· The left ventricle is normal in size. There is normal wall thickness. There is focal upper septal hypertrophy. An ultrasound enhancing agent was used. There is normal left ventricular systolic function. Left ventricular ejection fraction is 65% by visual estimate. There is normal wall motion. Diastolic function was not assessed due to mitral inflow merging.  · The left atrium is moderately dilated. The left atrial volume indexed to body surface area measures 48 mL/m2.  · The mitral valve is normal. There is trace mitral regurgitation.  · The aortic valve has three cusps. There is mild thickening and focal calcification of the cusps. There is no aortic stenosis. There is no aortic regurgitation.  · The aortic root is normal in size. The ascending aorta is normal in size. The aortic arch was not assessed.  · The right ventricle is normal in size. There is normal right ventricular systolic function. Right ventricular S' by tissue Doppler measures 20 cm/s.  · The right atrium is normal in size.  · The tricuspid valve is normal. There is trace tricuspid regurgitation. The estimated right ventricular systolic pressure = 34 mmHg.  · The pulmonic valve is normal. There is physiologic pulmonic  regurgitation.  · The inferior vena cava measures less than 2.1 cm and collapses greater than 50% with inspiration. The estimated right atrial pressure is 0 -5 mmHg.    TE 7/20/21:     ECG: Normal sinus rhythm   Normal ECG   When compared with ECG of 07-JUN-2021 14:27,   Premature supraventricular complexes are no longer Present   Nonspecific T wave abnormality no longer evident in Anterior leads   Confirmed by MYA ENRIQUEZ MD ([id number]) on 7/6/2021 7:07:36 PM    Specimen Collected: 07/06/21 14:26             Past Surgical History:   Procedure Laterality Date   • CHOLECYSTECTOMY     • COLONOSCOPY     • DILATION AND CURETTAGE OF UTERUS  02/2018   • ROTATOR CUFF REPAIR Right        Physical Exam    Airway   Mallampati: III   TM distance: <3 FB   Neck ROM: limited  Cardiovascular    Rhythm: regular   Rate: normalPulmonary    clear to auscultation    Anesthesia  Exam Comments:   Dental: Denies loose teeth        Anesthesia Plan    Plan: general    Technique: general endotracheal     Lines and Monitors: PIV, arterial line and additional IV     Airway: direct visual laryngoscopy, video laryngoscope, oral intubation and double lumen ETT   ASA 3  Blood Products:   Use of Blood Products Discussed: No     Consented to blood products  Anesthetic plan and risks discussed with: patient  Postop Plan:   Patient Disposition: inpatient floor planned admission   Pain Management: IV analgesics     Patient Active Problem List   Diagnosis   • History of cancer of vagina   • Elevated serum creatinine   • Low magnesium level   • Anemia   • Lung nodule < 6cm on CT   • Colovaginal fistula   • Diverticulitis of large intestine with perforation and abscess with bleeding   • Colouterine fistula   • Diverticulitis of colon   • Hypertension   • Hypothyroidism   • GERD (gastroesophageal reflux disease)   • Coronary artery calcification seen on CT scan   • Primary osteoarthritis of left knee   • Abnormal PET scan, lung   • Adenocarcinoma of left  lung (CMS/[facility])       No current facility-administered medications for this encounter.       Prior to Admission medications    Medication Sig Start Date End Date Taking? Authorizing Provider   benazepril (LOTENSIN) 10 mg tablet Take 10 mg by mouth daily.    Padma Mccarty MD   bumetanide (BUMEX) 1 mg tablet Take 1 mg by mouth as needed.   9/29/20   Padma Mccarty MD   levothyroxine (SYNTHROID) 88 mcg tablet Take 88 mcg by mouth daily.   10/22/18   Corey Simmons,    pantoprazole (PROTONIX) 40 mg EC tablet Take 40 mg by mouth daily.   6/7/16   Padma Mccarty MD       CBC Results       10/18/21 08/30/21 07/06/21     1443 1215 1443    WBC 8.12 7.00 6.87    RBC 4.15 3.93 3.52    HGB 12.0 11.6 10.6    HCT 37.2 36.4 33.1    MCV 89.6 92.6 94.0    MCH 28.9 29.5 30.1    MCHC 32.3 31.9 32.0     309 316          BMP Results       10/18/21 08/30/21 07/06/21     1443 1215 1443     140 142    K 4.9 5.0 4.3    Cl 105 106 108    CO2 23 26 22    Glucose 74 94 93    BUN 23 22 14    Creatinine 1.2 1.3 1.2    Calcium 9.2 9.4 9.0    Anion Gap 13 8 12    EGFR 43.7 39.8 43.7

## 2021-10-20 ENCOUNTER — HOSPITAL ENCOUNTER (INPATIENT)
Facility: HOSPITAL | Age: 76
LOS: 2 days | Discharge: HOME HEALTH | DRG: 164 | End: 2021-10-22
Attending: THORACIC SURGERY (CARDIOTHORACIC VASCULAR SURGERY) | Admitting: THORACIC SURGERY (CARDIOTHORACIC VASCULAR SURGERY)
Payer: MEDICARE

## 2021-10-20 ENCOUNTER — APPOINTMENT (INPATIENT)
Dept: RADIOLOGY | Facility: HOSPITAL | Age: 76
DRG: 164 | End: 2021-10-20
Attending: STUDENT IN AN ORGANIZED HEALTH CARE EDUCATION/TRAINING PROGRAM
Payer: MEDICARE

## 2021-10-20 ENCOUNTER — ANESTHESIA (OUTPATIENT)
Dept: OPERATING ROOM | Facility: HOSPITAL | Age: 76
Setting detail: SURGERY ADMIT
DRG: 164 | End: 2021-10-20
Payer: MEDICARE

## 2021-10-20 DIAGNOSIS — R79.0 LOW MAGNESIUM LEVEL: ICD-10-CM

## 2021-10-20 DIAGNOSIS — I25.10 CORONARY ARTERY CALCIFICATION SEEN ON CT SCAN: Primary | ICD-10-CM

## 2021-10-20 DIAGNOSIS — C34.92 ADENOCARCINOMA OF LEFT LUNG (CMS/HCC): ICD-10-CM

## 2021-10-20 PROBLEM — D14.32: Status: ACTIVE | Noted: 2021-10-20

## 2021-10-20 LAB
GLUCOSE BLD-MCNC: 205 MG/DL (ref 70–99)
POCT TEST: ABNORMAL

## 2021-10-20 PROCEDURE — 0BTG0ZZ RESECTION OF LEFT UPPER LUNG LOBE, OPEN APPROACH: ICD-10-PCS | Performed by: THORACIC SURGERY (CARDIOTHORACIC VASCULAR SURGERY)

## 2021-10-20 PROCEDURE — 07B74ZZ EXCISION OF THORAX LYMPHATIC, PERCUTANEOUS ENDOSCOPIC APPROACH: ICD-10-PCS | Performed by: THORACIC SURGERY (CARDIOTHORACIC VASCULAR SURGERY)

## 2021-10-20 PROCEDURE — 63700000 HC SELF-ADMINISTRABLE DRUG: Performed by: STUDENT IN AN ORGANIZED HEALTH CARE EDUCATION/TRAINING PROGRAM

## 2021-10-20 PROCEDURE — 63700000 HC SELF-ADMINISTRABLE DRUG: Performed by: PHYSICIAN ASSISTANT

## 2021-10-20 PROCEDURE — 63600000 HC DRUGS/DETAIL CODE: Performed by: NURSE ANESTHETIST, CERTIFIED REGISTERED

## 2021-10-20 PROCEDURE — 71045 X-RAY EXAM CHEST 1 VIEW: CPT

## 2021-10-20 PROCEDURE — 32674 THORACOSCOPY LYMPH NODE EXC: CPT | Performed by: THORACIC SURGERY (CARDIOTHORACIC VASCULAR SURGERY)

## 2021-10-20 PROCEDURE — 63600000 HC DRUGS/DETAIL CODE: Performed by: PHYSICIAN ASSISTANT

## 2021-10-20 PROCEDURE — 25000000 HC PHARMACY GENERAL: Performed by: NURSE ANESTHETIST, CERTIFIED REGISTERED

## 2021-10-20 PROCEDURE — 37000001 HC ANESTHESIA GENERAL: Performed by: THORACIC SURGERY (CARDIOTHORACIC VASCULAR SURGERY)

## 2021-10-20 PROCEDURE — 63600000 HC DRUGS/DETAIL CODE: Performed by: SPECIALIST

## 2021-10-20 PROCEDURE — 8E0W4CZ ROBOTIC ASSISTED PROCEDURE OF TRUNK REGION, PERCUTANEOUS ENDOSCOPIC APPROACH: ICD-10-PCS | Performed by: THORACIC SURGERY (CARDIOTHORACIC VASCULAR SURGERY)

## 2021-10-20 PROCEDURE — 36000016 HC OR LEVEL 6 EA ADDL MIN: Performed by: THORACIC SURGERY (CARDIOTHORACIC VASCULAR SURGERY)

## 2021-10-20 PROCEDURE — 25800000 HC PHARMACY IV SOLUTIONS: Performed by: SPECIALIST

## 2021-10-20 PROCEDURE — 36000006 HC OR LEVEL 6 INITIAL 30MIN: Performed by: THORACIC SURGERY (CARDIOTHORACIC VASCULAR SURGERY)

## 2021-10-20 PROCEDURE — 63600000 HC DRUGS/DETAIL CODE: Performed by: STUDENT IN AN ORGANIZED HEALTH CARE EDUCATION/TRAINING PROGRAM

## 2021-10-20 PROCEDURE — 88307 TISSUE EXAM BY PATHOLOGIST: CPT | Performed by: THORACIC SURGERY (CARDIOTHORACIC VASCULAR SURGERY)

## 2021-10-20 PROCEDURE — 32674 THORACOSCOPY LYMPH NODE EXC: CPT | Mod: AS,82 | Performed by: PHYSICIAN ASSISTANT

## 2021-10-20 PROCEDURE — 32663 THORACOSCOPY W/LOBECTOMY: CPT | Mod: LT | Performed by: THORACIC SURGERY (CARDIOTHORACIC VASCULAR SURGERY)

## 2021-10-20 PROCEDURE — 71000001 HC PACU PHASE 1 INITIAL 30MIN: Performed by: THORACIC SURGERY (CARDIOTHORACIC VASCULAR SURGERY)

## 2021-10-20 PROCEDURE — 21400000 HC ROOM AND CARE CCU/INTERMEDIATE

## 2021-10-20 PROCEDURE — 25800000 HC PHARMACY IV SOLUTIONS: Performed by: PHYSICIAN ASSISTANT

## 2021-10-20 PROCEDURE — 25800000 HC PHARMACY IV SOLUTIONS: Performed by: NURSE ANESTHETIST, CERTIFIED REGISTERED

## 2021-10-20 PROCEDURE — 25000000 HC PHARMACY GENERAL: Performed by: SPECIALIST

## 2021-10-20 PROCEDURE — C1729 CATH, DRAINAGE: HCPCS | Performed by: THORACIC SURGERY (CARDIOTHORACIC VASCULAR SURGERY)

## 2021-10-20 PROCEDURE — 0BJ08ZZ INSPECTION OF TRACHEOBRONCHIAL TREE, VIA NATURAL OR ARTIFICIAL OPENING ENDOSCOPIC: ICD-10-PCS | Performed by: THORACIC SURGERY (CARDIOTHORACIC VASCULAR SURGERY)

## 2021-10-20 PROCEDURE — 27200000 HC STERILE SUPPLY: Performed by: THORACIC SURGERY (CARDIOTHORACIC VASCULAR SURGERY)

## 2021-10-20 PROCEDURE — 99024 POSTOP FOLLOW-UP VISIT: CPT | Performed by: THORACIC SURGERY (CARDIOTHORACIC VASCULAR SURGERY)

## 2021-10-20 PROCEDURE — 71000011 HC PACU PHASE 1 EA ADDL MIN: Performed by: THORACIC SURGERY (CARDIOTHORACIC VASCULAR SURGERY)

## 2021-10-20 PROCEDURE — 25800000 HC PHARMACY IV SOLUTIONS: Performed by: ANESTHESIOLOGY

## 2021-10-20 PROCEDURE — 25000000 HC PHARMACY GENERAL: Performed by: THORACIC SURGERY (CARDIOTHORACIC VASCULAR SURGERY)

## 2021-10-20 PROCEDURE — 32663 THORACOSCOPY W/LOBECTOMY: CPT | Mod: AS,82,LT | Performed by: PHYSICIAN ASSISTANT

## 2021-10-20 RX ORDER — KETAMINE HYDROCHLORIDE 50 MG/ML
INJECTION, SOLUTION INTRAMUSCULAR; INTRAVENOUS CONTINUOUS PRN
Status: DISCONTINUED | OUTPATIENT
Start: 2021-10-20 | End: 2021-10-20 | Stop reason: SURG

## 2021-10-20 RX ORDER — MIDAZOLAM HYDROCHLORIDE 2 MG/2ML
INJECTION, SOLUTION INTRAMUSCULAR; INTRAVENOUS AS NEEDED
Status: DISCONTINUED | OUTPATIENT
Start: 2021-10-20 | End: 2021-10-20 | Stop reason: SURG

## 2021-10-20 RX ORDER — TRAMADOL HYDROCHLORIDE 50 MG/1
50 TABLET ORAL
Status: DISCONTINUED | OUTPATIENT
Start: 2021-10-20 | End: 2021-10-22 | Stop reason: HOSPADM

## 2021-10-20 RX ORDER — FLUMAZENIL 0.1 MG/ML
INJECTION INTRAVENOUS AS NEEDED
Status: DISCONTINUED | OUTPATIENT
Start: 2021-10-20 | End: 2021-10-20 | Stop reason: SURG

## 2021-10-20 RX ORDER — ROCURONIUM BROMIDE 10 MG/ML
INJECTION, SOLUTION INTRAVENOUS AS NEEDED
Status: DISCONTINUED | OUTPATIENT
Start: 2021-10-20 | End: 2021-10-20 | Stop reason: SURG

## 2021-10-20 RX ORDER — MEPERIDINE HYDROCHLORIDE 25 MG/ML
12.5 INJECTION INTRAMUSCULAR; INTRAVENOUS; SUBCUTANEOUS EVERY 10 MIN PRN
Status: DISCONTINUED | OUTPATIENT
Start: 2021-10-20 | End: 2021-10-20

## 2021-10-20 RX ORDER — PHENYLEPHRINE HYDROCHLORIDE 10 MG/ML
INJECTION INTRAVENOUS AS NEEDED
Status: DISCONTINUED | OUTPATIENT
Start: 2021-10-20 | End: 2021-10-20 | Stop reason: SURG

## 2021-10-20 RX ORDER — HYDROMORPHONE HYDROCHLORIDE 1 MG/ML
0.5 INJECTION, SOLUTION INTRAMUSCULAR; INTRAVENOUS; SUBCUTANEOUS
Status: DISCONTINUED | OUTPATIENT
Start: 2021-10-20 | End: 2021-10-20

## 2021-10-20 RX ORDER — DEXTROSE 40 %
15-30 GEL (GRAM) ORAL AS NEEDED
Status: DISCONTINUED | OUTPATIENT
Start: 2021-10-20 | End: 2021-10-22 | Stop reason: HOSPADM

## 2021-10-20 RX ORDER — FENTANYL CITRATE 50 UG/ML
25 INJECTION, SOLUTION INTRAMUSCULAR; INTRAVENOUS
Status: DISCONTINUED | OUTPATIENT
Start: 2021-10-20 | End: 2021-10-20

## 2021-10-20 RX ORDER — LIDOCAINE HYDROCHLORIDE 10 MG/ML
INJECTION, SOLUTION INFILTRATION; PERINEURAL AS NEEDED
Status: DISCONTINUED | OUTPATIENT
Start: 2021-10-20 | End: 2021-10-20 | Stop reason: SURG

## 2021-10-20 RX ORDER — DEXTROSE 50 % IN WATER (D50W) INTRAVENOUS SYRINGE
25 AS NEEDED
Status: DISCONTINUED | OUTPATIENT
Start: 2021-10-20 | End: 2021-10-22 | Stop reason: HOSPADM

## 2021-10-20 RX ORDER — ACETAMINOPHEN 325 MG/1
975 TABLET ORAL ONCE
Status: COMPLETED | OUTPATIENT
Start: 2021-10-20 | End: 2021-10-20

## 2021-10-20 RX ORDER — MIDAZOLAM HYDROCHLORIDE 2 MG/2ML
1 INJECTION, SOLUTION INTRAMUSCULAR; INTRAVENOUS AS NEEDED
Status: DISCONTINUED | OUTPATIENT
Start: 2021-10-20 | End: 2021-10-20

## 2021-10-20 RX ORDER — SODIUM CHLORIDE, SODIUM GLUCONATE, SODIUM ACETATE, POTASSIUM CHLORIDE AND MAGNESIUM CHLORIDE 30; 37; 368; 526; 502 MG/100ML; MG/100ML; MG/100ML; MG/100ML; MG/100ML
INJECTION, SOLUTION INTRAVENOUS CONTINUOUS PRN
Status: DISCONTINUED | OUTPATIENT
Start: 2021-10-20 | End: 2021-10-20 | Stop reason: SURG

## 2021-10-20 RX ORDER — IBUPROFEN 200 MG
16-32 TABLET ORAL AS NEEDED
Status: DISCONTINUED | OUTPATIENT
Start: 2021-10-20 | End: 2021-10-22 | Stop reason: HOSPADM

## 2021-10-20 RX ORDER — GABAPENTIN 100 MG/1
200 CAPSULE ORAL ONCE
Status: COMPLETED | OUTPATIENT
Start: 2021-10-20 | End: 2021-10-20

## 2021-10-20 RX ORDER — KETAMINE HYDROCHLORIDE 10 MG/ML
INJECTION, SOLUTION INTRAMUSCULAR; INTRAVENOUS AS NEEDED
Status: DISCONTINUED | OUTPATIENT
Start: 2021-10-20 | End: 2021-10-20 | Stop reason: SURG

## 2021-10-20 RX ORDER — HYDRALAZINE HYDROCHLORIDE 20 MG/ML
5 INJECTION INTRAMUSCULAR; INTRAVENOUS
Status: DISCONTINUED | OUTPATIENT
Start: 2021-10-20 | End: 2021-10-20

## 2021-10-20 RX ORDER — PANTOPRAZOLE SODIUM 40 MG/1
40 TABLET, DELAYED RELEASE ORAL DAILY
Status: DISCONTINUED | OUTPATIENT
Start: 2021-10-21 | End: 2021-10-22 | Stop reason: HOSPADM

## 2021-10-20 RX ORDER — SODIUM CHLORIDE 9 MG/ML
INJECTION, SOLUTION INTRAVENOUS CONTINUOUS
Status: DISCONTINUED | OUTPATIENT
Start: 2021-10-20 | End: 2021-10-20 | Stop reason: HOSPADM

## 2021-10-20 RX ORDER — CEFAZOLIN SODIUM 2 G/50ML
2 SOLUTION INTRAVENOUS
Status: COMPLETED | OUTPATIENT
Start: 2021-10-20 | End: 2021-10-20

## 2021-10-20 RX ORDER — GABAPENTIN 100 MG/1
200 CAPSULE ORAL 3 TIMES DAILY
Status: DISCONTINUED | OUTPATIENT
Start: 2021-10-20 | End: 2021-10-22 | Stop reason: HOSPADM

## 2021-10-20 RX ORDER — PROPOFOL 10 MG/ML
INJECTION, EMULSION INTRAVENOUS AS NEEDED
Status: DISCONTINUED | OUTPATIENT
Start: 2021-10-20 | End: 2021-10-20 | Stop reason: SURG

## 2021-10-20 RX ORDER — BUPIVACAINE HYDROCHLORIDE 2.5 MG/ML
INJECTION, SOLUTION EPIDURAL; INFILTRATION; INTRACAUDAL
Status: DISCONTINUED | OUTPATIENT
Start: 2021-10-20 | End: 2021-10-20 | Stop reason: HOSPADM

## 2021-10-20 RX ORDER — LIDOCAINE HYDROCHLORIDE ANHYDROUS AND DEXTROSE MONOHYDRATE .8; 5 G/100ML; G/100ML
1 INJECTION, SOLUTION INTRAVENOUS
Status: DISCONTINUED | OUTPATIENT
Start: 2021-10-20 | End: 2021-10-20

## 2021-10-20 RX ORDER — SODIUM CHLORIDE, SODIUM LACTATE, POTASSIUM CHLORIDE, CALCIUM CHLORIDE 600; 310; 30; 20 MG/100ML; MG/100ML; MG/100ML; MG/100ML
INJECTION, SOLUTION INTRAVENOUS CONTINUOUS
Status: DISCONTINUED | OUTPATIENT
Start: 2021-10-20 | End: 2021-10-21

## 2021-10-20 RX ORDER — METOPROLOL TARTRATE 1 MG/ML
2.5 INJECTION, SOLUTION INTRAVENOUS AS NEEDED
Status: DISCONTINUED | OUTPATIENT
Start: 2021-10-20 | End: 2021-10-20

## 2021-10-20 RX ORDER — FENTANYL CITRATE 50 UG/ML
INJECTION, SOLUTION INTRAMUSCULAR; INTRAVENOUS AS NEEDED
Status: DISCONTINUED | OUTPATIENT
Start: 2021-10-20 | End: 2021-10-20 | Stop reason: SURG

## 2021-10-20 RX ORDER — ACETAMINOPHEN 325 MG/1
975 TABLET ORAL
Status: DISCONTINUED | OUTPATIENT
Start: 2021-10-20 | End: 2021-10-22 | Stop reason: HOSPADM

## 2021-10-20 RX ORDER — PROPOFOL 10 MG/ML
INJECTION, EMULSION INTRAVENOUS CONTINUOUS PRN
Status: DISCONTINUED | OUTPATIENT
Start: 2021-10-20 | End: 2021-10-20 | Stop reason: SURG

## 2021-10-20 RX ORDER — PHENYLEPHRINE HCL IN 0.9% NACL 50MG/250ML
10-200 PLASTIC BAG, INJECTION (ML) INTRAVENOUS
Status: DISCONTINUED | OUTPATIENT
Start: 2021-10-20 | End: 2021-10-20

## 2021-10-20 RX ORDER — EPHEDRINE SULFATE/0.9% NACL/PF 50 MG/5 ML
10 SYRINGE (ML) INTRAVENOUS AS NEEDED
Status: DISCONTINUED | OUTPATIENT
Start: 2021-10-20 | End: 2021-10-20 | Stop reason: HOSPADM

## 2021-10-20 RX ADMIN — PHENYLEPHRINE HYDROCHLORIDE 25 MCG/MIN: 10 INJECTION INTRAVENOUS at 13:34

## 2021-10-20 RX ADMIN — SODIUM CHLORIDE: 9 INJECTION, SOLUTION INTRAVENOUS at 12:42

## 2021-10-20 RX ADMIN — MIDAZOLAM HYDROCHLORIDE 2 MG: 1 INJECTION, SOLUTION INTRAMUSCULAR; INTRAVENOUS at 12:43

## 2021-10-20 RX ADMIN — ACETAMINOPHEN 975 MG: 325 TABLET, FILM COATED ORAL at 10:54

## 2021-10-20 RX ADMIN — SUGAMMADEX 200 MG: 100 INJECTION, SOLUTION INTRAVENOUS at 15:48

## 2021-10-20 RX ADMIN — PROPOFOL 25 MCG/KG/MIN: 10 INJECTION, EMULSION INTRAVENOUS at 12:56

## 2021-10-20 RX ADMIN — SODIUM CHLORIDE, POTASSIUM CHLORIDE, SODIUM LACTATE AND CALCIUM CHLORIDE: 600; 310; 30; 20 INJECTION, SOLUTION INTRAVENOUS at 17:06

## 2021-10-20 RX ADMIN — SODIUM CHLORIDE 500 ML: 9 INJECTION, SOLUTION INTRAVENOUS at 18:30

## 2021-10-20 RX ADMIN — ACETAMINOPHEN 975 MG: 325 TABLET, FILM COATED ORAL at 18:59

## 2021-10-20 RX ADMIN — SODIUM CHLORIDE 0.8 MCG/KG/HR: 9 INJECTION, SOLUTION INTRAVENOUS at 12:53

## 2021-10-20 RX ADMIN — CEFAZOLIN 2 G: 330 INJECTION, POWDER, FOR SOLUTION INTRAMUSCULAR; INTRAVENOUS at 13:16

## 2021-10-20 RX ADMIN — GABAPENTIN 200 MG: 100 CAPSULE ORAL at 22:22

## 2021-10-20 RX ADMIN — SODIUM CHLORIDE, SODIUM GLUCONATE, SODIUM ACETATE, POTASSIUM CHLORIDE AND MAGNESIUM CHLORIDE: 526; 502; 368; 37; 30 INJECTION, SOLUTION INTRAVENOUS at 12:42

## 2021-10-20 RX ADMIN — GABAPENTIN 200 MG: 100 CAPSULE ORAL at 10:54

## 2021-10-20 RX ADMIN — TRAMADOL HYDROCHLORIDE 50 MG: 50 TABLET, FILM COATED ORAL at 20:53

## 2021-10-20 RX ADMIN — FLUMAZENIL 0.5 MG: 0.1 INJECTION INTRAVENOUS at 16:09

## 2021-10-20 RX ADMIN — PROPOFOL INJECTABLE EMULSION 150 MG: 10 INJECTION, EMULSION INTRAVENOUS at 12:53

## 2021-10-20 RX ADMIN — LIDOCAINE HYDROCHLORIDE 10 ML: 10 INJECTION, SOLUTION INFILTRATION; PERINEURAL at 12:53

## 2021-10-20 RX ADMIN — KETAMINE HYDROCHLORIDE 3 MCG/KG/MIN: 50 INJECTION INTRAMUSCULAR; INTRAVENOUS at 12:53

## 2021-10-20 RX ADMIN — FENTANYL CITRATE 25 MCG: 50 INJECTION, SOLUTION INTRAMUSCULAR; INTRAVENOUS at 17:42

## 2021-10-20 RX ADMIN — FENTANYL CITRATE 25 MCG: 50 INJECTION, SOLUTION INTRAMUSCULAR; INTRAVENOUS at 17:26

## 2021-10-20 RX ADMIN — ROCURONIUM BROMIDE 100 MG: 10 INJECTION INTRAVENOUS at 12:53

## 2021-10-20 RX ADMIN — PHENYLEPHRINE HYDROCHLORIDE 25 MCG/MIN: 10 INJECTION INTRAVENOUS at 17:18

## 2021-10-20 RX ADMIN — ACETAMINOPHEN 975 MG: 325 TABLET, FILM COATED ORAL at 20:52

## 2021-10-20 RX ADMIN — Medication 50 MG: at 12:53

## 2021-10-20 RX ADMIN — PHENYLEPHRINE HYDROCHLORIDE 100 MCG: 10 INJECTION INTRAVENOUS at 15:52

## 2021-10-20 RX ADMIN — LIDOCAINE HYDROCHLORIDE 1 MG/MIN: 8 INJECTION, SOLUTION INTRAVENOUS at 12:53

## 2021-10-20 RX ADMIN — SODIUM CHLORIDE, POTASSIUM CHLORIDE, SODIUM LACTATE AND CALCIUM CHLORIDE: 600; 310; 30; 20 INJECTION, SOLUTION INTRAVENOUS at 22:28

## 2021-10-20 RX ADMIN — FENTANYL CITRATE 100 MCG: 50 INJECTION, SOLUTION INTRAMUSCULAR; INTRAVENOUS at 12:50

## 2021-10-20 RX ADMIN — PHENYLEPHRINE HYDROCHLORIDE 100 MCG: 10 INJECTION INTRAVENOUS at 15:59

## 2021-10-20 ASSESSMENT — LIFESTYLE VARIABLES: TOBACCO_USE: 1

## 2021-10-20 NOTE — OR SURGEON
Pre-Procedure patient identification:  I am the primary operating surgeon/proceduralist and I have identified the patient and confirmed laterality is left on 10/20/21 at 12:03 PM Owen Gamez MD PhD  Phone Number: [phone]

## 2021-10-20 NOTE — OP NOTE
Bronchoscopy, Robotic Assisted Left Upper Lobe Lobectomy, Thoracic Lymphadenectomy     Hospital: [facility]  Medical Record Number:  [id number]  Patient Name:  Raegan Young  YOB: 1945   Date of Operation:  10/20/2021  Primary Surgeon:  Owen Gamez MD PhD  First Assistant: Diana Duke PA-C, Tiko-Okoye, Chidinma      Preoperative Diagnosis:    Pre-op Diagnosis     * Adenocarcinoma of left lung (CMS/HCC) [C34.92]    Postoperative Diagnosis:   Post-op Diagnosis     * Adenocarcinoma of left lung (CMS/HCC) [C34.92]    Operation:  Procedure:    FLEXIBLE BRONCHOSCOPY,  ROBOT ASSISTED LEFT UPPER LOBE LOBECTOMY AND THORACIC LYMPHADENECTOMY  CPT(R) Code:  69756 - MD THORACOSCOPY SURG LOBECTOMY      Anesthesia: General    Anesthesiologist: Anesthesiologist: Rajesh Gibbs MD  CRNA: Camila Mann CRNA; Karlene Hwang CRNA     Staff:   Circulator: Latasha Graves RN; Disha Azevedo RN; Flower Gonzalez RN  Scrub Person: Varsha Manriquez RN; Angelo Nj    Indication: Raegan Young is a 75 y.o. female with a biopsy-proven (Ion) adenocarcinoma of the left upper lobe.     Operative Findings:  Left upper lobe lobectomy  Lymph nodes resected from 5, 6, 7, 9, 10 and 11    Procedure Details   After the smooth induction of general endotracheal anesthesia fiberoptic bronchoscopy was performed.  There was no bleeding or purulence.  There were no endobronchial lesions.  Double-lumen intubation was performed.  The patient was positioned, padded, prepped, and draped.    The camera port was placed in the 8th space posterior axillary line.  Under direct vision the anterior and posterior ports were positioned in the 7th and 8th spaces.  An assistant port was placed in the 9th space at the costal margin.  The pleural space was evaluated and there were no pleural lesions.  The pulmonary ligament was taken down and the station 9 lymph nodes were removed.  These were sent  for permanent section.  We incised the pleura in the posterior hilum and began the dissection in the fissure.  We  took all of the station 7 nodes and sent these for permanent section.  We  opened the pleura on the anterior hilum carefully preserving the phrenic nerve .  We began the dissection in the fissure where we could visualize the pulmonary artery.  We completed the major fissure posteriorly and anteriorly with  bipolar dissection.  We carefully dissected the lingular and posterior pulmonary artery branches to the left upper lobe.We encircled these vessels  These were ligated with the robotic vascular load stapler.  We  isolated,  encircled and ligated the superior pulmonary vein with the robotic vascular stapler.  These were all hemostatic.  We cleared the bronchus of its adenopathy and closed the upper lobe bronchus with the robotic green load stapler carefully preserving the takeoff to the LLL. We dissected the apical pulmonary artery branches and ligated these with the robotic vascular stapler. Station 10 lymph nodes were removed.  Station 11 lymph nodes were sent for permanent section..  We then took all of the station 5 lymph nodes.  Tisseel was applied to the bronchial stump and to the completed edges of the fissure.  The specimen was placed into a large anchor bag and it was brought out through the assistant port.  We drained the pleural space with angled and straight 28 Prydeinig chest tubes.  The lower lobe expanded well under direct vision.  Hemostasis was good.  Sponge and needle counts were reported as correct.  We closed the ports with PDS and Vicryl.  Staples were used.  The chest tubes were sewn in place with 0 Ti-Cron.  DARBY Mcintosh was the first assistant.  On robotic assisted cases the first assistant facilitates the dissection and exposure, stapling and specimen removal.  The patient was extubated uneventfully and taken to the PACU in stable condition.    Estimated Blood Loss: 25  mL    Specimens:                Order Name Source Comment Collection Info Order Time   PATHOLOGY TISSUE EXAM Lymph Node (specify site) Pre-op diagnosis:  Adenocarcinoma of left lung (CMS/HCC) [C34.92] Collected By: Owen Gamez MD PhD 10/20/2021  2:17 PM     Release to patient   Immediate            Drains:   Chest Tube 1 Left 28 Fr (Active)   Chest Tube WDL WDL 10/20/21 1638   Device/Suction -20 cm H2O 10/20/21 1638   Air Leak/Fluctuation air leak present; fluctuation present 10/20/21 1638   Drainage Description Dark red 10/20/21 1638   Dressing Status clean & dry 10/20/21 1638   Site Assessment clean; dry; unable to assess site 10/20/21 1638   Subcutaneous Emphysema none present 10/20/21 1638   Securement tubing anchored to body distal to insertion site with tape 10/20/21 1638   Safety all connections secured; all tubing connections taped 10/20/21 1638   Connected to Chest Drainage System #1 Chest Drainage System 10/20/21 1638       Chest Tube 2 Left 28 Fr (Active)   Chest Tube WDL WDL 10/20/21 1639   Device/Suction -20 cm H2O 10/20/21 1639   Air Leak/Fluctuation air leak present; fluctuation present 10/20/21 1639   Dressing Status clean & dry; dressing intact 10/20/21 1639   Site Assessment clean; dry; unable to assess site 10/20/21 1639   Subcutaneous Emphysema none present 10/20/21 1639   Securement tubing anchored to body distal to insertion site with tape 10/20/21 1639   Safety all connections secured; all tubing connections taped 10/20/21 1639   Connected to Chest Drainage System #1 Chest Drainage System 10/20/21 1639       Urethral Catheter Latex 14 Fr (Active)   Reason for Continuing Urinary Catheterization Post surgical procedure, within 24 hours 10/20/21 1638   Urine Characteristics yellow 10/20/21 1638   Securement Method Securing Device 10/20/21 1638   Foreskin not applicable 10/20/21 1638   Tolerance no signs/symptoms of discomfort 10/20/21 1638              Complications:  None: the patient  tolerated the procedure well           Disposition: PACU - hemodynamically stable.           Condition: stable    Owen Gamez MD PhD  Phone Number: [phone]    Owen Gamez MD PhD

## 2021-10-20 NOTE — ANESTHESIA PROCEDURE NOTES
Arterial Line:    Start time: 10/20/2021 11:10 AM    An arterial line was placed in the pre-op for the following indication(s): continuous blood pressure monitoring and blood sampling needed.    A 20 G (size), 1 and 3/4 inch (length), Cook (type) catheter was placed,   Seldinger technique used, into theradial artery, secured by Tegaderm.      Procedure performed using ultrasound guidance and surface landmarks.    Image visualization comments: Needle and wire visualized withing =n vessel    Events:  patient tolerated procedure well with no complications.    The supervising anesthesiologist was   Attending: Rajesh Gibbs MD

## 2021-10-20 NOTE — NURSING NOTE
Dr. Chito Freeman from anesthesia at bedside to assess pt. Arterial line and BP via cuff not correlating. SBP on A-line in 90's and cuff in low 80's. SBP in 140's via A-line and cuff 120's when running ck gtt at 25mcg (lowest dose). BP drops when drip is off. Pt received roughly 800cc ivf in pacu on arrival. Johnson in place draining yellow urine. Per Dr. Freeman 500cc bolus of LR and order for ephedrine will be placed.

## 2021-10-20 NOTE — ANESTHESIOLOGIST PRE-PROCEDURE ATTESTATION
Pre-Procedure Patient Identification:  I am the Primary Anesthesiologist and have identified the patient on 10/20/21 at 12:38 PM.   I have confirmed the procedure(s) will be performed by the following surgeon/proceduralist Owen Gamez MD PhD.

## 2021-10-20 NOTE — ANESTHESIA PROCEDURE NOTES
Airway  Start Time: 10/20/2021 12:56 PM  Airway not difficult    General Information and Staff    Patient location during procedure: OR  Resident/CRNA: Karlene Hwang CRNA    Indications and Patient Condition  Indications for airway management: anesthesia  Sedation level: general  Preoxygenated: yes  Patient position: sniffing  MILS not maintained throughout  Mask difficulty assessment: 1 - vent by mask    Final Airway Details  Final airway type: endotracheal airway      Successful airway: ETT - double lumen left  Cuffed: yes   Successful intubation technique: flexible bronchoscopy  Facilitating devices/methods: intubating stylet  Endotracheal tube insertion site: oral  Blade: Hayley  Blade size: #D Blade CMac  ETT DL size (fr): 35  Placement verified by: chest auscultation, capnometry and single lung ventilation   Measured from: teeth  ETT to teeth (cm): 27  Ventilation between attempts: none  Number of other approaches attempted: 0  Atraumatic airway insertion      Additional Comments  Cavalon applied to face prior to taping ett, cavalon applied to area where BIS will go over forehead. #8.0 oett placed easily for bronch.   Switched afterwards to #35 left ANUJA under direct fiberoptic visualization. One lung ventilation initiated.

## 2021-10-21 ENCOUNTER — APPOINTMENT (INPATIENT)
Dept: RADIOLOGY | Facility: HOSPITAL | Age: 76
DRG: 164 | End: 2021-10-21
Attending: STUDENT IN AN ORGANIZED HEALTH CARE EDUCATION/TRAINING PROGRAM
Payer: MEDICARE

## 2021-10-21 LAB
ANION GAP SERPL CALC-SCNC: 15 MEQ/L (ref 3–15)
ANION GAP SERPL CALC-SCNC: 19 MEQ/L (ref 3–15)
ATRIAL RATE: 97
BUN SERPL-MCNC: 19 MG/DL (ref 8–20)
BUN SERPL-MCNC: 20 MG/DL (ref 8–20)
CALCIUM SERPL-MCNC: 9.1 MG/DL (ref 8.9–10.3)
CALCIUM SERPL-MCNC: 9.5 MG/DL (ref 8.9–10.3)
CHLORIDE SERPL-SCNC: 103 MEQ/L (ref 98–109)
CHLORIDE SERPL-SCNC: 105 MEQ/L (ref 98–109)
CO2 SERPL-SCNC: 17 MEQ/L (ref 22–32)
CO2 SERPL-SCNC: 25 MEQ/L (ref 22–32)
CREAT SERPL-MCNC: 1.1 MG/DL (ref 0.6–1.1)
CREAT SERPL-MCNC: 1.2 MG/DL (ref 0.6–1.1)
ERYTHROCYTE [DISTWIDTH] IN BLOOD BY AUTOMATED COUNT: 14.6 % (ref 11.7–14.4)
GFR SERPL CREATININE-BSD FRML MDRD: 43.7 ML/MIN/1.73M*2
GFR SERPL CREATININE-BSD FRML MDRD: 48.3 ML/MIN/1.73M*2
GLUCOSE BLD-MCNC: 122 MG/DL (ref 70–99)
GLUCOSE BLD-MCNC: 122 MG/DL (ref 70–99)
GLUCOSE BLD-MCNC: 95 MG/DL (ref 70–99)
GLUCOSE SERPL-MCNC: 126 MG/DL (ref 70–99)
GLUCOSE SERPL-MCNC: 88 MG/DL (ref 70–99)
HCT VFR BLDCO AUTO: 35.6 % (ref 35–45)
HGB BLD-MCNC: 11.6 G/DL (ref 11.8–15.7)
MCH RBC QN AUTO: 29 PG (ref 28–33.2)
MCHC RBC AUTO-ENTMCNC: 32.6 G/DL (ref 32.2–35.5)
MCV RBC AUTO: 89 FL (ref 83–98)
P AXIS: 63
PDW BLD AUTO: 9.4 FL (ref 9.4–12.3)
PLATELET # BLD AUTO: 226 K/UL (ref 150–369)
POCT TEST: ABNORMAL
POCT TEST: ABNORMAL
POCT TEST: NORMAL
POTASSIUM SERPL-SCNC: 4.9 MEQ/L (ref 3.6–5.1)
POTASSIUM SERPL-SCNC: 5.4 MEQ/L (ref 3.6–5.1)
PR INTERVAL: 160
QRS DURATION: 76
QT INTERVAL: 360
QTC CALCULATION(BAZETT): 457
R AXIS: 52
RBC # BLD AUTO: 4 M/UL (ref 3.93–5.22)
SODIUM SERPL-SCNC: 141 MEQ/L (ref 136–144)
SODIUM SERPL-SCNC: 143 MEQ/L (ref 136–144)
T WAVE AXIS: 13
VENTRICULAR RATE: 97
WBC # BLD AUTO: 10.92 K/UL (ref 3.8–10.5)

## 2021-10-21 PROCEDURE — 63700000 HC SELF-ADMINISTRABLE DRUG: Performed by: STUDENT IN AN ORGANIZED HEALTH CARE EDUCATION/TRAINING PROGRAM

## 2021-10-21 PROCEDURE — 25800000 HC PHARMACY IV SOLUTIONS: Performed by: STUDENT IN AN ORGANIZED HEALTH CARE EDUCATION/TRAINING PROGRAM

## 2021-10-21 PROCEDURE — 71045 X-RAY EXAM CHEST 1 VIEW: CPT

## 2021-10-21 PROCEDURE — 36415 COLL VENOUS BLD VENIPUNCTURE: CPT | Performed by: STUDENT IN AN ORGANIZED HEALTH CARE EDUCATION/TRAINING PROGRAM

## 2021-10-21 PROCEDURE — 80048 BASIC METABOLIC PNL TOTAL CA: CPT | Performed by: STUDENT IN AN ORGANIZED HEALTH CARE EDUCATION/TRAINING PROGRAM

## 2021-10-21 PROCEDURE — 93005 ELECTROCARDIOGRAM TRACING: CPT | Performed by: STUDENT IN AN ORGANIZED HEALTH CARE EDUCATION/TRAINING PROGRAM

## 2021-10-21 PROCEDURE — 93010 ELECTROCARDIOGRAM REPORT: CPT | Performed by: INTERNAL MEDICINE

## 2021-10-21 PROCEDURE — 99024 POSTOP FOLLOW-UP VISIT: CPT | Performed by: THORACIC SURGERY (CARDIOTHORACIC VASCULAR SURGERY)

## 2021-10-21 PROCEDURE — 97161 PT EVAL LOW COMPLEX 20 MIN: CPT | Mod: GP

## 2021-10-21 PROCEDURE — 21400000 HC ROOM AND CARE CCU/INTERMEDIATE

## 2021-10-21 PROCEDURE — 85027 COMPLETE CBC AUTOMATED: CPT | Performed by: STUDENT IN AN ORGANIZED HEALTH CARE EDUCATION/TRAINING PROGRAM

## 2021-10-21 PROCEDURE — 63600000 HC DRUGS/DETAIL CODE

## 2021-10-21 RX ORDER — ENOXAPARIN SODIUM 100 MG/ML
40 INJECTION SUBCUTANEOUS
Status: DISCONTINUED | OUTPATIENT
Start: 2021-10-21 | End: 2021-10-22 | Stop reason: HOSPADM

## 2021-10-21 RX ORDER — KETOROLAC TROMETHAMINE 15 MG/ML
15 INJECTION, SOLUTION INTRAMUSCULAR; INTRAVENOUS EVERY 6 HOURS PRN
Status: DISCONTINUED | OUTPATIENT
Start: 2021-10-21 | End: 2021-10-22 | Stop reason: HOSPADM

## 2021-10-21 RX ORDER — DEXTROSE 50 % IN WATER (D50W) INTRAVENOUS SYRINGE
25 ONCE
Status: DISCONTINUED | OUTPATIENT
Start: 2021-10-21 | End: 2021-10-21

## 2021-10-21 RX ADMIN — GABAPENTIN 200 MG: 100 CAPSULE ORAL at 09:24

## 2021-10-21 RX ADMIN — ACETAMINOPHEN 975 MG: 325 TABLET, FILM COATED ORAL at 20:48

## 2021-10-21 RX ADMIN — PANTOPRAZOLE SODIUM 40 MG: 40 TABLET, DELAYED RELEASE ORAL at 09:24

## 2021-10-21 RX ADMIN — GABAPENTIN 200 MG: 100 CAPSULE ORAL at 15:17

## 2021-10-21 RX ADMIN — SODIUM CHLORIDE 500 ML: 9 INJECTION, SOLUTION INTRAVENOUS at 12:06

## 2021-10-21 RX ADMIN — ACETAMINOPHEN 975 MG: 325 TABLET, FILM COATED ORAL at 15:15

## 2021-10-21 RX ADMIN — GABAPENTIN 200 MG: 100 CAPSULE ORAL at 20:48

## 2021-10-21 RX ADMIN — ENOXAPARIN SODIUM 40 MG: 100 INJECTION SUBCUTANEOUS at 15:17

## 2021-10-21 RX ADMIN — ACETAMINOPHEN 975 MG: 325 TABLET, FILM COATED ORAL at 07:55

## 2021-10-21 ASSESSMENT — COGNITIVE AND FUNCTIONAL STATUS - GENERAL
MOVING TO AND FROM BED TO CHAIR: 4 - NONE
STANDING UP FROM CHAIR USING ARMS: 4 - NONE
AFFECT: WFL
CLIMB 3 TO 5 STEPS WITH RAILING: 4 - NONE
WALKING IN HOSPITAL ROOM: 4 - NONE

## 2021-10-21 NOTE — ANESTHESIA POSTPROCEDURE EVALUATION
Patient: Raegan Young    Procedure Summary     Date: 10/20/21 Room [facility]    Anesthesia Start: 1242 Anesthesia Stop: 1621    Procedure: FLEXIBLE BRONCHOSCOPY,  ROBOT ASSISTED LEFT UPPER LOBE LOBECTOMY AND THORACIC LYMPHADENECTOMY (Left ) Diagnosis:       Adenocarcinoma of left lung (CMS/HCC)      (Adenocarcinoma of left lung (CMS/HCC) [C34.92])    Surgeons: Owen Gamez MD PhD Responsible Provider: Rajesh Gibbs MD    Anesthesia Type: general ASA Status: 3          Anesthesia Type: general  PACU Vitals  10/20/2021 1613 - 10/20/2021 1713      10/20/2021  1615 10/20/2021  1620 10/20/2021  1630 10/20/2021  1645    BP: 88/57 116/58 87/49 78/42    Arterial Line BP: 96/48 136/70 98/52 98/54    Temp: 36.1 °C (97 °F) -- -- --    Pulse: 72 72 72 74    Resp: 15 15 14 15    SpO2: 97 % 99 % 99 % 100 %              10/20/2021  1648 10/20/2021  1700          BP: 128/80 83/53      Arterial Line BP: 94/56 102/54      Temp: -- 36.2 °C (97.1 °F)      Pulse: 74 72      Resp: 19 13      SpO2: 98 % 100 %              Anesthesia Post Evaluation    Pain management: satisfactory to patient  Patient location during evaluation: floor  Level of consciousness: awake and alert  Cardiovascular status: acceptable and hemodynamically stable  Airway Patency: adequate and patent  Respiratory status: nonlabored ventilation and acceptable  Hydration status: stable  Anesthetic complications: no  Comments: Based on chart review

## 2021-10-21 NOTE — PLAN OF CARE
Problem: Adult Inpatient Plan of Care  Goal: Plan of Care Review  Flowsheets  Taken 10/21/2021 1412 by Camila Crawley RN  Progress: improving  Plan of Care Reviewed With: patient  Taken 10/21/2021 1032 by David Umanzor PT  Outcome Summary: pt is independent and at baseline with all mobility, skilled PT no longer required. Rec home when stable for d/c  Goal: Readiness for Transition of Care  Intervention: Mutually Develop Transition Plan  Flowsheets (Taken 10/21/2021 1412)  Anticipated Discharge Disposition:   home with assistance   home with home health  Equipment Needed After Discharge: none  Discharge Coordination/Progress:   Pt lives with her  in [place], [place]. She is independent at baseline   no DME or AD needs. She will be staying at her dtr Ila's home at d/c. It is a 2SH with 1st fl CA and FF to 2nd floor with bed/bath with stall. Pt is interested in skilled nursing visits at d/c and agreeable to [facility]. Daughter, Ila Lopez can be contacted for home care. Cell: [phone]. Address: [address] [place] [address]  Assistive Device/Animal Currently Used at Home: none  Anticipated Changes Related to Illness: inability to care for self  Outpatient/Agency/Support Group Needs: homecare agency  Transportation Concerns: car, none  Readmission Within the Last 30 Days: no previous admission in last 30 days  Patient/Family Anticipated Services at Transition: home health care  Patient/Family Anticipates Transition to:   home with family   home with help/services  Transportation Anticipated:   car, drives self   family or friend will provide  Patient's Choice of Community Agency(s): [facility]  Offered/Gave Vendor List: yes  Type of Home Care Services: nursing     Pt pod#1 s/p JAYESH lobectomy. Pt has CT's x2. Per surgical update, possibility of CT d/c today. Referral sent to [facility] via [facility].  Isha Crawley RN, CCM

## 2021-10-21 NOTE — DISCHARGE SUMMARY
Inpatient Discharge Summary    BRIEF OVERVIEW  Admitting Provider: Owen Gamez MD PhD  Discharge Provider: Owen Gamez MD PhD  Primary Care Physician at Discharge: Javon Valadez [phone]    Admission Date: 10/20/2021     Discharge Date: 10/22/21    Primary Discharge Diagnosis  Adenocarcinoma of left lung (CMS/HCC)    Discharge Disposition     Code Status at Discharge: Full Code    Discharge Medications     Medication List      ASK your doctor about these medications    benazepriL 10 mg tablet  Commonly known as: LOTENSIN  Take 10 mg by mouth daily.  Dose: 10 mg     bumetanide 1 mg tablet  Commonly known as: BUMEX  Take 1 mg by mouth as needed.  Dose: 1 mg     levothyroxine 88 mcg tablet  Commonly known as: SYNTHROID  Take 88 mcg by mouth daily.  Dose: 88 mcg     PROTONIX 40 mg EC tablet  Take 40 mg by mouth daily.  Dose: 40 mg  Generic drug: pantoprazole            Active Issues Requiring Follow-up    Outpatient Follow-Up            In 1 month Trev Jimenez MD [facility] Gynecologic Oncology at [facility]              Test Results Pending at Discharge  Pending Labs     Order Current Status    Pathology Tissue Exam In process          DETAILS OF HOSPITAL STAY    Presenting Problem/History of Present Illness  Adenocarcinoma of left lung (CMS/HCC) [C34.92]  Adenoma of left lung [D14.32]    Operative Procedures Performed  Procedure(s):  FLEXIBLE BRONCHOSCOPY,  ROBOT ASSISTED LEFT UPPER LOBE LOBECTOMY AND THORACIC LYMPHADENECTOMY    Physical Exam on Day of Discharge  General appearance: alert,appears stated age,cooperative  Neck: supple, symmetrical, trachea midline  Lungs: Normal respiratory effort  Heart: regular rate and rhythm, S1, S2 normal, no murmur, click, rub or gallop and regular rate and rhythm  Abdomen: soft, non-tender; bowel sounds normal; no masses, no organomegaly  Extremities: extremities warm and well-perfused  Pulses: 2+ and symmetric  Skin:warm and dry CT to  water seal, no air leak. Scant serosang output.  Neurologic: Grossly normal    Hospital Course  [facility] for planned robot-assisted robotic thoracoscopic lobectomy, flexible bronchoscopy, and thoracic lymph node dissection for biopsy-proven adenocarcinoma of the left upper lobe of the lung. She tolerated the procedure well, was extubated, and then transferred to the PACU. Overnight, on POD#0, her chest tube was put to water seal with no air leak and patient reported pain well controlled. POD#1 patient tolerated her regular diet, had no nausea or vomiting, and her burnette catheter was removed. On POD#2 (10/22/21) patient was deemed fit for discharge. Her chest tube was pulled and physical exam was within normal limits. She was given instructions to follow-up with Dr. Gamez as an outpatient.     Discharge Disposition  Disposition: Home  Destination: Home

## 2021-10-21 NOTE — PROGRESS NOTES
Patient: Raegan Young  Location:  [facility] 2012  MRN:  [id number]  Today's date:  10/21/2021    Pt left in chair, alarm activated, with call bell and personal items within reach. RN made aware.    Raegan is a 75 y.o. female admitted on 10/20/2021 with Adenocarcinoma of left lung (CMS/HCC) [C34.92]  Adenoma of left lung [D14.32]. Principal problem is Adenocarcinoma of left lung (CMS/HCC).    Past Medical History  Raegan has a past medical history of Anemia, Arthritis, Colovaginal fistula, COVID-19 vaccine series completed (02/26/2021), Disease of thyroid gland, Diverticulitis of colon, GERD (gastroesophageal reflux disease), Hiatal hernia, History of snoring, Hypertension, Hypothyroidism, Lung cancer (CMS/HCC), Lung nodule, and Vaginal cancer (CMS/HCC) (2018).    History of Present Illness   L upper lobectomy      PT Vitals    Date/Time Pulse HR Source SpO2 Pt Activity O2 Therapy BP Pt Position [facility]   10/21/21 0825 103 Monitor 97 % At rest None (Room air) 139/69 Sitting BMN      PT Pain    Date/Time Pain Type Rating: Rest Rating: Activity [facility]   10/21/21 0825 Pain Assessment 0 0 BMN   10/21/21 0840 Pain Reassessment 0 0 [facility]H          Prior Living Environment      Most Recent Value   Current Living Arrangements home   Living Environment Comment pt lives with  in 2SH, 4STE, first floor set up. will be staying with daughter in 2SH, 2STE, bed and bath upstairs, stall shower with shower chair        Prior Level of Function      Most Recent Value   Dominant Hand right   Ambulation independent   Transferring independent   Toileting independent   Bathing independent   Dressing independent   Eating independent   Communication understands/communicates without difficulty   Assistive Device Currently Used at Home none           PT Evaluation and Treatment - 10/21/21 0823        PT Time Calculation    Start Time 0823     Stop Time 0840     Time Calculation (min) 17 min        Session Details     Document Type initial evaluation     Mode of Treatment physical therapy        General Information    Patient Profile Reviewed yes     Existing Precautions/Restrictions fall        Cognition/Psychosocial    Affect/Mental Status (Cognition) WFL     Orientation Status (Cognition) oriented x 4        Sensory Assessment (Somatosensory)    Sensory Assessment (Somatosensory) LE sensation intact        Range of Motion (ROM)    Range of Motion ROM is WFL        Strength (Manual Muscle Testing)    Strength (Manual Muscle Testing) strength is WFL        Bed Mobility    Comment (Bed Mobility) pt recieved OOB        Sit to Stand Transfer    [place], Sit to Stand Transfer independent     Assistive Device none     Comment STS x2 from EOB and toilet        Stand to Sit Transfer    [place], Stand to Sit Transfer independent     Assistive Device none     Comment good eccentric control        Gait Training    [place], Gait independent     Assistive Device none     Distance in Feet 265 feet     Pattern (Gait) step-through     Comment (Gait/Stairs) at baseline        Stairs Training    [place], Stairs independent     Assistive Device none     Handrail Location (Stairs) both sides     Number of Stairs 12     Ascending Stairs Technique step-over-step     Descending Stairs Technique step-over-step     Comment at baseline        Balance    Static Sitting Balance WFL     Dynamic Sitting Balance WFL     Static Standing Balance WFL     Dynamic Standing Balance WFL        AM-PAC (TM) - Mobility (Current Function)    Turning from your back to your side while in a flat bed without using bedrails? 4 - None     Moving from lying on your back to sitting on the side of a flat bed without using bedrails? 4 - None     Moving to and from a bed to a chair? 4 - None     Standing up from a chair using your arms? 4 - None     To walk in a hospital room? 4 - None     Climbing 3-5 steps with a railing? 4 - None     AM-PAC (TM) Mobility  Score 24        Assessment/Plan (PT)    Daily Outcome Statement pt is independent and at baseline with all mobility, skilled PT no longer required. Rec home when stable for d/c     Rehab Potential good, to achieve stated therapy goals     Therapy Frequency evaluation only               PT Assessment/Plan      Most Recent Value   PT Recommended Discharge Disposition home at 10/21/2021 0823   Anticipated Equipment Needs at Discharge (PT) none at 10/21/2021 0823   Patient/Family Therapy Goals Statement return to PLOF at 10/21/2021 0823                    Education Documentation  Home Safety, taught by David Umanzor, PT at 10/21/2021 10:32 AM.  Learner: Patient  Readiness: Acceptance  Method: Explanation  Response: Verbalizes Understanding  Comment: safety with mobility

## 2021-10-21 NOTE — PATIENT CARE CONFERENCE
Care Progression Rounds Note  Date: 10/21/2021  Time: 9:24 AM     Patient Name: Raegan Young     Medical Record Number: [id number]   YOB: 1945  Sex: Female      Room/Bed: 2012    Admitting Diagnosis: Adenocarcinoma of left lung (CMS/HCC) [C34.92]  Adenoma of left lung [D14.32]   Admit Date/Time: 10/20/2021 10:13 AM    Primary Diagnosis: Adenocarcinoma of left lung (CMS/HCC)  Principal Problem: Adenocarcinoma of left lung (CMS/HCC)    GMLOS: pending  Anticipated Discharge Date: 10/24/2021    AM-PAC:  Mobility Score:      Discharge Planning:  Current Living Arrangements: home    Barriers to Discharge:  Barriers to Discharge: Medical issues not resolved    Participants:  advanced practice provider, , nursing, physical therapy, physician, social work/services

## 2021-10-21 NOTE — PLAN OF CARE
Problem: Adult Inpatient Plan of Care  Goal: Plan of Care Review  Outcome: Met  Flowsheets (Taken 10/21/2021 1032)  Progress: improving  Plan of Care Reviewed With: patient  Outcome Summary: pt is independent and at baseline with all mobility, skilled PT no longer required. Rec home when stable for d/c     Problem: Mobility Impairment  Goal: Optimal Mobility  Outcome: Met

## 2021-10-21 NOTE — HOSPITAL COURSE
Raegan is a 75 y.o. female admitted on 10/20/2021 with Adenocarcinoma of left lung (CMS/HCC) [C34.92]  Adenoma of left lung [D14.32]. Principal problem is Adenocarcinoma of left lung (CMS/HCC).    Past Medical History  Raegan has a past medical history of Anemia, Arthritis, Colovaginal fistula, COVID-19 vaccine series completed (02/26/2021), Disease of thyroid gland, Diverticulitis of colon, GERD (gastroesophageal reflux disease), Hiatal hernia, History of snoring, Hypertension, Hypothyroidism, Lung cancer (CMS/HCC), Lung nodule, and Vaginal cancer (CMS/HCC) (2018).    History of Present Illness   L upper lobectomy

## 2021-10-21 NOTE — PROGRESS NOTES
[facility]    Interval Events:    No acute events overnight. Tolerating regular diet no nausea, vomiting. CT with 10cc serosang output. No air leak. Water seal.     Objective     Vital signs in last 24 hours:  Temp:  [36.1 °C (97 °F)-36.6 °C (97.9 °F)] 36.5 °C (97.7 °F)  Heart Rate:  [] 92  Resp:  [10-20] 16  BP: ()/(42-80) 139/69      Intake/Output this shift:  I/O this shift:  In: 1308.3 [P.O.:480; I.V.:828.3]  Out: -     Physical Exam    General appearance: alert,appears stated age,cooperative  Neck: supple, symmetrical, trachea midline  Lungs: Normal respiratory effort  Heart: regular rate and rhythm, S1, S2 normal, no murmur, click, rub or gallop and regular rate and rhythm  Abdomen: soft, non-tender; bowel sounds normal; no masses, no organomegaly  Extremities: extremities warm and well-perfused  Pulses: 2+ and symmetric  Skin:warm and dry CT to water seal, no air leak. Scant serosang output.  Neurologic: Grossly normal      VTE Assessment: I have reassessed and the patient's VTE risk and treatment plan is appropriate.    Labs  Lab Results   Component Value Date    WBC 10.92 (H) 10/21/2021    HGB 11.6 (L) 10/21/2021    HCT 35.6 10/21/2021    MCV 89.0 10/21/2021     10/21/2021     Lab Results   Component Value Date    GLUCOSE 126 (H) 10/21/2021    CALCIUM 9.1 10/21/2021     10/21/2021    K 5.4 (H) 10/21/2021    CO2 17 (L) 10/21/2021     10/21/2021    BUN 20 10/21/2021    CREATININE 1.1 10/21/2021         Imaging  I have reviewed the Imaging from the last 24 hrs.      Assessment/Plan   75F s/p robotic left upper lobectomy for AdenoCA on 10/20    - Consider DC CT  - DC burnette catheter  - heplock IV fluids    Elvis Andrade DO

## 2021-10-21 NOTE — PROGRESS NOTES
[facility] Received referral via [facility]. Chart reviewed and noted local service address. Will provide SN as requested. Referral completed for CEE 10/22/21 Bowen Brown RN b [phone]

## 2021-10-21 NOTE — POST-OP (NON-BILLABLE)
General Surgery Daily Progress Note - POST-OP    SUBJECTIVE:    Patient went to the OR for a Robotic left upper lobectomy for AdenoCA today on 10/20/21. They tolerated the procedure well without complication.      Post-operatively, they were seen resting comfortably in bed. States pain is well controlled. Abdomen soft, non-tender, non-distended. Incisions clean, dry, intact. No surrounding erythema or evidence of hematoma.    Chest tube in place. Dressings c/d/i. No airleak.     Denies chest pain, fever, chills, nausea, vomiting.      OBJECTIVE:    Vital signs in last 24 hours:  Temp:  [36.1 °C (97 °F)-36.6 °C (97.9 °F)] 36.2 °C (97.1 °F)  Heart Rate:  [] 66  Resp:  [10-20] 12  BP: ()/(42-80) 101/57      Intake/Output Summary (Last 24 hours) at 10/20/2021 2222  Last data filed at 10/20/2021 1900  Gross per 24 hour   Intake 2800 ml   Output 285 ml   Net 2515 ml     Intake/Output this shift:  I/O this shift:  In: 500 [I.V.:500]  Out: 160 [Urine:150; Chest Tube:10]    Physical Exam    General appearance: alert, appears stated age and cooperative  Head: normocephalic, without obvious abnormality, atraumatic  Eyes: negative, no scleral icterus.   Nose: no discharge  Neck: no JVD, symmetrical, trachea midline  Back: range of motion normal  Lungs: No increased work of breathing.   Chest wall: no tenderness.   Chest tube in place. Dressings c/d/i. No airleak.   Heart: Regular rate and rhythm.   Abdomen: soft, non-tender, non-distended.  Extremities: extremities normal, warm and well-perfused.  Pulses: 2+ and symmetric  Skin: Skin color, texture, turgor normal.  Neurologic: Grossly normal.        VTE Assessment: I have reassessed and the patient's VTE risk and treatment plan is appropriate.    Labs  No new labs.    Imaging  CXR CLINICAL HISTORY:  post-op     COMMENT:   Single portable AP view of the chest is compared to prior studies,  most recently 9/1/2021.  There are left-sided chest tubes. There is partial  visualization of left upper  quadrant postsurgical changes with skin staples. The lungs appear clear.  There is no large pleural effusion.  There is no pneumothorax.  The cardiomediastinal silhouette appears stable, noting aortic tortuosity and  calcification and normal heart size.    No acute osseous abnormality is seen.     --  IMPRESSION:  1.  Postoperative appearance as in comment    ASSESSMENT & PLAN    75F s/p robotic left upper lobectomy for AdenoCA on 10/20    - Post-op CXR with no pneumo   - Chest tubes to water seal.   - Multi-modal pain control  - IV-fluids  - f/u morning labs  - ISS      Venkatesh Elmore MD  General Surgery PGY-1  Please page *[facility] [phone] with any questions or concerns.

## 2021-10-22 ENCOUNTER — APPOINTMENT (INPATIENT)
Dept: RADIOLOGY | Facility: HOSPITAL | Age: 76
DRG: 164 | End: 2021-10-22
Attending: STUDENT IN AN ORGANIZED HEALTH CARE EDUCATION/TRAINING PROGRAM
Payer: MEDICARE

## 2021-10-22 VITALS
SYSTOLIC BLOOD PRESSURE: 136 MMHG | TEMPERATURE: 98 F | BODY MASS INDEX: 32.46 KG/M2 | DIASTOLIC BLOOD PRESSURE: 84 MMHG | RESPIRATION RATE: 16 BRPM | HEART RATE: 102 BPM | OXYGEN SATURATION: 94 % | WEIGHT: 166.2 LBS

## 2021-10-22 LAB
ANION GAP SERPL CALC-SCNC: 11 MEQ/L (ref 3–15)
BUN SERPL-MCNC: 19 MG/DL (ref 8–20)
CALCIUM SERPL-MCNC: 8.9 MG/DL (ref 8.9–10.3)
CHLORIDE SERPL-SCNC: 105 MEQ/L (ref 98–109)
CO2 SERPL-SCNC: 24 MEQ/L (ref 22–32)
CREAT SERPL-MCNC: 1.4 MG/DL (ref 0.6–1.1)
ERYTHROCYTE [DISTWIDTH] IN BLOOD BY AUTOMATED COUNT: 15.3 % (ref 11.7–14.4)
GFR SERPL CREATININE-BSD FRML MDRD: 36.6 ML/MIN/1.73M*2
GLUCOSE BLD-MCNC: 89 MG/DL (ref 70–99)
GLUCOSE SERPL-MCNC: 108 MG/DL (ref 70–99)
HCT VFR BLDCO AUTO: 35.7 % (ref 35–45)
HGB BLD-MCNC: 11.2 G/DL (ref 11.8–15.7)
MCH RBC QN AUTO: 29 PG (ref 28–33.2)
MCHC RBC AUTO-ENTMCNC: 31.4 G/DL (ref 32.2–35.5)
MCV RBC AUTO: 92.5 FL (ref 83–98)
PDW BLD AUTO: 9.3 FL (ref 9.4–12.3)
PLATELET # BLD AUTO: 228 K/UL (ref 150–369)
POCT TEST: NORMAL
POTASSIUM SERPL-SCNC: 4.8 MEQ/L (ref 3.6–5.1)
RBC # BLD AUTO: 3.86 M/UL (ref 3.93–5.22)
SODIUM SERPL-SCNC: 140 MEQ/L (ref 136–144)
WBC # BLD AUTO: 12.03 K/UL (ref 3.8–10.5)

## 2021-10-22 PROCEDURE — 80048 BASIC METABOLIC PNL TOTAL CA: CPT | Performed by: STUDENT IN AN ORGANIZED HEALTH CARE EDUCATION/TRAINING PROGRAM

## 2021-10-22 PROCEDURE — 85027 COMPLETE CBC AUTOMATED: CPT | Performed by: STUDENT IN AN ORGANIZED HEALTH CARE EDUCATION/TRAINING PROGRAM

## 2021-10-22 PROCEDURE — 99024 POSTOP FOLLOW-UP VISIT: CPT | Performed by: THORACIC SURGERY (CARDIOTHORACIC VASCULAR SURGERY)

## 2021-10-22 PROCEDURE — 63700000 HC SELF-ADMINISTRABLE DRUG: Performed by: STUDENT IN AN ORGANIZED HEALTH CARE EDUCATION/TRAINING PROGRAM

## 2021-10-22 PROCEDURE — 71045 X-RAY EXAM CHEST 1 VIEW: CPT

## 2021-10-22 PROCEDURE — 63600000 HC DRUGS/DETAIL CODE

## 2021-10-22 PROCEDURE — 36415 COLL VENOUS BLD VENIPUNCTURE: CPT | Performed by: STUDENT IN AN ORGANIZED HEALTH CARE EDUCATION/TRAINING PROGRAM

## 2021-10-22 RX ORDER — TRAMADOL HYDROCHLORIDE 50 MG/1
50 TABLET ORAL EVERY 6 HOURS PRN
Qty: 20 TABLET | Refills: 0 | Status: SHIPPED | OUTPATIENT
Start: 2021-10-22 | End: 2021-10-27

## 2021-10-22 RX ADMIN — TRAMADOL HYDROCHLORIDE 50 MG: 50 TABLET, FILM COATED ORAL at 03:27

## 2021-10-22 RX ADMIN — GABAPENTIN 200 MG: 100 CAPSULE ORAL at 09:06

## 2021-10-22 RX ADMIN — ACETAMINOPHEN 975 MG: 325 TABLET, FILM COATED ORAL at 03:27

## 2021-10-22 RX ADMIN — PANTOPRAZOLE SODIUM 40 MG: 40 TABLET, DELAYED RELEASE ORAL at 09:06

## 2021-10-22 RX ADMIN — ACETAMINOPHEN 975 MG: 325 TABLET, FILM COATED ORAL at 09:06

## 2021-10-22 RX ADMIN — ENOXAPARIN SODIUM 40 MG: 100 INJECTION SUBCUTANEOUS at 05:45

## 2021-10-22 NOTE — PLAN OF CARE
D/C orders written; updated [facility] RN liaison, Bowen Brown.    Isha Crawley RN, CCM

## 2021-10-22 NOTE — NURSING NOTE
Patient taken off of telemetry monitor and peripheral IV removed. Patient verbalized understanding of discharge instructions and follow up appointments. Discharged home at 1500.

## 2021-10-22 NOTE — DISCHARGE INSTRUCTIONS
Thoracic Surgery Discharge Instructions     [facility]     Activity:   · No strenuous exercise or heavy lifting (over 10 pounds) until follow up with doctor   · No driving until follow up with doctor   · Walking is the best exercise. Walk at least 2-3 times a day and increase your distance a small amount each day   · Going up and down stairs is okay   · Keep your shoulder on the side of surgery limber with range of motion exercises at least 4 times a day   · Continue to use the incentive spirometer at least 4 times a day     Nutrition:   · Regular diet     Wound Care Instructions:   · Chest tube dressing may be removed on 10/24/21.   · If the chest tube site is still draining, apply a dressing to the site and change it daily or as needed until dry.   · Shower daily (remove dressing before shower and re-apply afterwards if needed)   · Clean incision with soap and water. No lotions, creams, perfumes, etc. directly on incision   · No tub baths until incision completely healed in about 4-6 weeks     Avoid Constipation   · Drink plenty of water and fruit juices (non diabetics)   · Eat prunes, bran or fiber supplements (Metamucil)   · Use over the counter stool softeners (Colace), laxatives (senokot, milk of magnesia), or suppositories (Dulcolax)   · Stop medications if experience diarrhea     Medications:   · Resume all home medications unless otherwise directed by doctor or nurse practitioner     Reasons to Call the Surgeon:   · Severe and persistent shortness of breath not relieved with rest   · Fever above 101.5 oF   · Redness, swelling or drainage from incision     Follow Up Appointment:   · Call to schedule an appointment with your surgeon after discharge   · Dr. Owen Gamez -[phone]   · Please get an X-Ray 1-2 hours prior to your follow up appointment.

## 2021-10-22 NOTE — PROGRESS NOTES
General Surgery Daily Progress Note - [facility]    Interval Events:    No acute events overnight. Tolerating regular diet no nausea, vomiting. CT with 86cc serosang output. Water seal. Tid[place], with small airleak (1 chamber at most). Patient has been ambulating well.     Objective     Vital signs in last 24 hours:  Temp:  [36.3 °C (97.3 °F)-37.1 °C (98.7 °F)] 36.7 °C (98 °F)  Heart Rate:  [] 89  Resp:  [16-18] 16  BP: (129-149)/(60-84) 136/84      Intake/Output this shift:  No intake/output data recorded.    Physical Exam    General appearance: alert,appears stated age,cooperative  Neck: supple, symmetrical, trachea midline  Lungs: Normal respiratory effort  Heart: regular rate and rhythm, S1, S2 normal, no murmur, click, rub or gallop and regular rate and rhythm  Abdomen: soft, non-tender; bowel sounds normal; no masses, no organomegaly  Extremities: extremities warm and well-perfused  Pulses: 2+ and symmetric  Skin:warm and dry CT to water seal, no air leak. Scant serosang output.  Neurologic: Grossly normal      VTE Assessment: I have reassessed and the patient's VTE risk and treatment plan is appropriate.    Labs  Lab Results   Component Value Date    WBC 10.92 (H) 10/21/2021    HGB 11.6 (L) 10/21/2021    HCT 35.6 10/21/2021    MCV 89.0 10/21/2021     10/21/2021     Lab Results   Component Value Date    GLUCOSE 88 10/21/2021    CALCIUM 9.5 10/21/2021     10/21/2021    K 4.9 10/21/2021    CO2 25 10/21/2021     10/21/2021    BUN 19 10/21/2021    CREATININE 1.2 (H) 10/21/2021         Imaging  I have reviewed the Imaging from the last 24 hrs.      Assessment/Plan   75F s/p robotic left upper lobectomy for AdenoCA on 10/20    - Consider DC CT  - f/u AM CXR    - multimodal pain control  - FEN/GI: Reg diet  - VTE ppx: lovenox, ambulation  - ISS    Linette Romano MD

## 2021-10-26 ENCOUNTER — TELEPHONE (OUTPATIENT)
Dept: GYNECOLOGIC ONCOLOGY | Facility: CLINIC | Age: 76
End: 2021-10-26

## 2021-10-26 LAB
CASE RPRT: NORMAL
CLINICAL INFO: NORMAL
PATH REPORT.FINAL DX SPEC: NORMAL
PATH REPORT.FINAL DX SPEC: NORMAL
PATH REPORT.GROSS SPEC: NORMAL
PATHOLOGY SYNOPTIC REPORT: NORMAL

## 2021-11-05 ENCOUNTER — OFFICE VISIT (OUTPATIENT)
Dept: THORACIC SURGERY | Facility: CLINIC | Age: 76
End: 2021-11-05
Payer: MEDICARE

## 2021-11-05 ENCOUNTER — HOSPITAL ENCOUNTER (OUTPATIENT)
Dept: RADIOLOGY | Facility: HOSPITAL | Age: 76
Discharge: HOME | End: 2021-11-05
Attending: PHYSICIAN ASSISTANT
Payer: MEDICARE

## 2021-11-05 VITALS
OXYGEN SATURATION: 97 % | DIASTOLIC BLOOD PRESSURE: 81 MMHG | RESPIRATION RATE: 20 BRPM | HEIGHT: 60 IN | BODY MASS INDEX: 31.61 KG/M2 | HEART RATE: 101 BPM | SYSTOLIC BLOOD PRESSURE: 147 MMHG | WEIGHT: 161 LBS

## 2021-11-05 DIAGNOSIS — C34.12 MALIGNANT NEOPLASM OF UPPER LOBE OF LEFT LUNG (CMS/HCC): ICD-10-CM

## 2021-11-05 DIAGNOSIS — C34.12 MALIGNANT NEOPLASM OF UPPER LOBE OF LEFT LUNG (CMS/HCC): Primary | ICD-10-CM

## 2021-11-05 PROCEDURE — 99024 POSTOP FOLLOW-UP VISIT: CPT | Performed by: THORACIC SURGERY (CARDIOTHORACIC VASCULAR SURGERY)

## 2021-11-05 PROCEDURE — 71046 X-RAY EXAM CHEST 2 VIEWS: CPT

## 2021-11-05 NOTE — PROGRESS NOTES
Thoracic Surgery    Patient ID: Raegan Young                              : 1945  MRN: [id number]  Clinic: [facility]                                            Visit Date: 2021  Encounter Provider: Owen Gamez  Referring Provider: Geovani Mandel MD         Patient: Raegan Young  Chief Complaint: Post-op Visit (Malignant neoplasm of upper lobe of left lung)      Subjective     Raegan Young is a 76 y.o. old female presenting today for postoperative visit following robotic assisted left upper lobe lobectomy 10/20/21 for synchronous T1 N0 adenocarcinomas (1 cm and 0.8 cm).     Her ROS is notable for regaining her appetite and energy level.  She has not had cough, fever or chills.         Past Medical History:  has a past medical history of Anemia, Arthritis, Colovaginal fistula, COVID-19 vaccine series completed (2021), Disease of thyroid gland, Diverticulitis of colon, GERD (gastroesophageal reflux disease), Hiatal hernia, History of snoring, Hypertension, Hypothyroidism, Lung cancer (CMS/HCC), Lung nodule, and Vaginal cancer (CMS/HCC) (2018). She also has no past medical history of Diabetes mellitus (CMS/HCC) or Myocardial infarction (CMS/HCC).  Past Surgical History:  has a past surgical history that includes Cholecystectomy; Dilation and curettage of uterus (2018); Rotator cuff repair (Right); Colonoscopy; and Lung surgery.  Social History:  reports that she quit smoking about 23 years ago. Her smoking use included cigarettes. She started smoking about 64 years ago. She has a 40.00 pack-year smoking history. She has never used smokeless tobacco. She reports current alcohol use. She reports that she does not use drugs.  Medications:   Current Outpatient Medications   Medication Sig Dispense Refill   • benazepril (LOTENSIN) 10 mg tablet Take 10 mg by mouth daily.     • bumetanide (BUMEX) 1 mg tablet Take 1 mg by mouth as needed.       • levothyroxine  (SYNTHROID) 88 mcg tablet Take 88 mcg by mouth daily.    3   • pantoprazole (PROTONIX) 40 mg EC tablet Take 40 mg by mouth daily.         No current facility-administered medications for this visit.       Allergies:   Allergies   Allergen Reactions   • Adhesive Tape-Silicones      Causes bruising          Objective   Vitals:   Visit Vitals  BP (!) 147/81 (BP Location: Right upper arm, Patient Position: Sitting)   Pulse (!) 101   Resp 20   Ht 1.524 m (5')   Wt 73 kg (161 lb)   SpO2 97%   BMI 31.44 kg/m²      General: She appears well and is in no distress   Pulmonary: Lung fields are clear   CV: Heart rhythm is regular   Incisions: Well-healed   Extremities: No clubbing cyanosis or edema      Imaging Review: Chest x-ray shows full expansion and clear lung fields      Assessment   This delightful 76-year-old female had 2 foci of adenocarcinoma left upper lobe.  All nodes are negative.  She is doing very well in her recovery.  We will plan to see her in 6 months for surveillance CT scan.

## 2021-11-05 NOTE — LETTER
Re: Raegan Young  1945        Dear Geovani Mandel MD    I appreciate the opportunity to evaluate  your patient Raegan Young in the office today.      Raegan Young is a 76 y.o. old female presenting today for postoperative visit following robotic assisted left upper lobe lobectomy 10/20/21 for synchronous T1 N0 adenocarcinomas (1 cm and 0.8 cm).     Her ROS is notable for regaining her appetite and energy level.  She has not had cough, fever or chills.        This delightful 76-year-old female had 2 foci of adenocarcinoma left upper lobe.  All nodes are negative.  She is doing very well in her recovery.  We will plan to see her in 6 months for surveillance CT scan.      Thank you for the opportunity to participate in her care.    Owen Gamez MD PhD

## 2021-11-08 ENCOUNTER — TELEPHONE (OUTPATIENT)
Dept: THORACIC SURGERY | Facility: CLINIC | Age: 76
End: 2021-11-08

## 2021-11-16 NOTE — TELEPHONE ENCOUNTER
Patient has been scheduled for her 1 mth TeleMed visit with Diana and her 6 mth follow up with a CT scan with Dr. Gamez.

## 2021-11-29 ENCOUNTER — OFFICE VISIT (OUTPATIENT)
Dept: GYNECOLOGIC ONCOLOGY | Facility: CLINIC | Age: 76
End: 2021-11-29
Attending: OBSTETRICS & GYNECOLOGY
Payer: MEDICARE

## 2021-11-29 VITALS
DIASTOLIC BLOOD PRESSURE: 83 MMHG | SYSTOLIC BLOOD PRESSURE: 146 MMHG | WEIGHT: 160 LBS | OXYGEN SATURATION: 92 % | BODY MASS INDEX: 31.25 KG/M2 | HEART RATE: 99 BPM

## 2021-11-29 DIAGNOSIS — N82.4 COLOVAGINAL FISTULA: Primary | ICD-10-CM

## 2021-11-29 DIAGNOSIS — Z85.44 HISTORY OF CANCER OF VAGINA: ICD-10-CM

## 2021-11-29 PROCEDURE — 99214 OFFICE O/P EST MOD 30 MIN: CPT | Performed by: OBSTETRICS & GYNECOLOGY

## 2021-11-29 PROCEDURE — G8753 SYS BP > OR = 140: HCPCS | Performed by: OBSTETRICS & GYNECOLOGY

## 2021-11-29 PROCEDURE — G8754 DIAS BP LESS 90: HCPCS | Performed by: OBSTETRICS & GYNECOLOGY

## 2021-11-29 NOTE — PROGRESS NOTES
HPI: Ms. Raegan Young is a 76 y.o. lady with a history of   Cancer Staging  History of cancer of vagina  Staging form: Vagina, AJCC 8th Edition  - Clinical stage from 9/12/2018: FIGO Stage III (cT3, cN1, cM0) - Signed by Trev Jimenez MD on 9/21/2018    Oncology History   History of cancer of vagina   9/12/2018 Cancer Staged    Staging form: Vagina, AJCC 8th Edition  - Clinical stage from 9/12/2018: FIGO Stage III (cT3, cN1, cM0)     9/21/2018 Initial Diagnosis    Vaginal cancer (CMS/HCC) (HCC)     9/21/2018 - 10/29/2018 Chemotherapy    CISplatin with Concurrent Radiation,        9/27/2018 - 11/28/2018 Radiation Therapy    IMRT concurrent Cisplatin complicated by N/V, dehydration, thrombocytopenia febrile neutropenia     1/15/2019 Surgery    Left groin node excision benign     9/20/2020 Imaging Significant Findings    CT: She had CT scan on 9/2/20: Significant sigmoid colonic wall thickening from chronic diverticulosis. Significant size hiatal hernia present.  Left upper lobe 5 mm lung nodule is stable. There is no grossly enlarged lymph node noted in the left groin on the CT.         Pt just had Robotic assisted left lower lobe lobectomy done on 10/20/2021 for adenoma of left lung. States that she is healing well from it and doesn't have to see her pulmonologist for 6 months.. Pt has a hx of colovaginal fistula, states that she hasn't had stool come out of her vagina for a couple weeks now.  She denies vaginal bleeding/discharge,Vaginal pruritis /pain, lesions, lymph nodes,  denies pelvic/abdominal pain/bloating, denies change in bowel/bladder habits, denies other suspicious symptoms. She denies any concerning signs/symptoms.         Medical History:   Past Medical History:   Diagnosis Date   • Anemia    • Arthritis    • Colovaginal fistula    • COVID-19 vaccine series completed 02/26/2021   • Disease of thyroid gland    • Diverticulitis of colon    • GERD (gastroesophageal reflux disease)    • Hiatal hernia     • History of snoring    • Hypertension    • Hypothyroidism    • Lung cancer (CMS/HCC)    • Lung nodule    • Vaginal cancer (CMS/HCC) 2018    s/p XRT x 7 weeks and chemo weekly x 5 weeks         Surgical History:   Past Surgical History:   Procedure Laterality Date   • CHOLECYSTECTOMY     • COLONOSCOPY     • DILATION AND CURETTAGE OF UTERUS  02/2018   • LUNG SURGERY     • ROTATOR CUFF REPAIR Right      Social Hx:   reports that she quit smoking about 23 years ago. Her smoking use included cigarettes. She started smoking about 64 years ago. She has a 40.00 pack-year smoking history. She has never used smokeless tobacco. She reports current alcohol use. She reports that she does not use drugs.    Family HX:  Cancer-related family history includes Lung cancer in her biological father. There is no history of Breast cancer, Cancer, Colon cancer, or Ovarian cancer.    Allergies: Adhesive tape-silicones    Medications:   Current Outpatient Medications   Medication Sig Dispense Refill   • benazepril (LOTENSIN) 10 mg tablet Take 10 mg by mouth daily.     • bumetanide (BUMEX) 1 mg tablet Take 1 mg by mouth as needed.       • levothyroxine (SYNTHROID) 88 mcg tablet Take 88 mcg by mouth daily.    3   • pantoprazole (PROTONIX) 40 mg EC tablet Take 40 mg by mouth daily.         No current facility-administered medications for this visit.       Review of Systems  All other systems reviewed and negative except as noted in the HPI.    Objective     Vital Signs for the last 24 hours:  Visit Vitals  BP (!) 146/83   Pulse 99   Wt 72.6 kg (160 lb)   SpO2 92%   BMI 31.25 kg/m²       Physical Exam From Office:  General: well-developed, well-nourished, no apparent distress  Neck: supple, no masses  Lymphatic: no supraclavicular, cervical, or inguinal adenopathy  Respiratory: lungs clear to auscultation bilaterally, normal respiratory effort  Cardiovascular: regular rate and rhythm, no murmurs, rubs, gallops.   Extremity: no clubbing,  cyanosis, edema  GI: abdomen soft, non-distended, non-tender, no hepatosplenomegaly, no masses  Neurologic: alert & oriented x3, no gross deficits  Psychiatric: mood and affect normal  Musculoskeletal: no deformity or gross strength deficit  Gynecologic: Normal vulva, urethra, perianal region with skin changes consistent with radiation therapy.  Normal vagina narrowing to a constricted upper vagina/cervix.  No stool in the vagina.  Firm cervix and uterus, small, consistent with radiation therapy.  Rectal: No masses    Assessment/Plan     Ms. Raegan Young is a 76 y.o. lady with  Problem List Items Addressed This Visit        Digestive    Colovaginal fistula - Primary    Current Assessment & Plan     Given that she hasn't had stool through the vaginal, fisula may be healing on its own.  Discussed with Dr. Mandel via telephone who agrees that we can hold on surgery for now.  CT scan of the abdomen and pelvis with rectal contrast after January 1.  Patient will need a BMP prior to see if IV contrast is safe.         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 Will call with plan once we speak with .  She was advised to call should she experience any concerning symptoms such as vaginal bleeding, vaginal/vulvar irritation, lumps,masses, pruritus, pelvic or abdominal pain, bloating, or any other concerns         Relevant Orders    CT ABDOMEN PELVIS WITH IV CONTRAST    Basic metabolic panel           I spent 33 minutes on this date of service performing the following activities: obtaining history, performing examination, entering orders, documenting, independently reviewing study/studies  and communicating results.

## 2021-11-30 NOTE — ASSESSMENT & PLAN NOTE
Given that she hasn't had stool through the vaginal, fisula may be healing on its own.  Discussed with Dr. Mandel via telephone who agrees that we can hold on surgery for now.  CT scan of the abdomen and pelvis with rectal contrast after January 1.  Patient will need a BMP prior to see if IV contrast is safe.

## 2021-11-30 NOTE — ASSESSMENT & PLAN NOTE
SHARON on physical exam today. Will call with plan once we speak with .  She was advised to call should she experience any concerning symptoms such as vaginal bleeding, vaginal/vulvar irritation, lumps,masses, pruritus, pelvic or abdominal pain, bloating, or any other concerns

## 2021-12-01 PROBLEM — N82.4 COLOUTERINE FISTULA: Status: RESOLVED | Noted: 2021-06-09 | Resolved: 2021-12-01

## 2021-12-14 ENCOUNTER — TELEMEDICINE (OUTPATIENT)
Dept: THORACIC SURGERY | Facility: CLINIC | Age: 76
End: 2021-12-14
Payer: MEDICARE

## 2021-12-14 DIAGNOSIS — C34.92 ADENOCARCINOMA OF LEFT LUNG (CMS/HCC): Primary | ICD-10-CM

## 2021-12-14 PROCEDURE — 99024 POSTOP FOLLOW-UP VISIT: CPT | Mod: 95 | Performed by: PHYSICIAN ASSISTANT

## 2021-12-14 NOTE — PROGRESS NOTES
Thoracic Surgery Post-op Visit    Patient: Raegan Young                              : 1945  MRN: [id number]                                            Visit Date: 2021  Clinic: telemedicine  Encounter Provider: Diana Duke  Referring Provider: Geovani Mandel MD    Chief Complaint   Patient presents with   • Follow-up     Stage I adenocarcinoma of lung s/p JAYESH lobectomy 10/20/2021   • Lung Cancer     cancer care plan       Subjective     Raegan Young is a very pleasant 76 y.o. female with whom we are following up via telemedicine for her check in and cancer care plan review.      She states she continues to do very well overall but has not been walking as much recently bc of the cold weather.  She is trying to use her IS and Pickle more. Saturday she had an episode of wheezing which was relieved with rest and menthol cough drop.  This happens occasionally but not frequently and it resolves quickly with rest.  She has some discomfort at night if she tries to sleep on her left side.  She has followed up with Dr. Jimenez and there is thought that she has had some healing of her colovaginal fistula.  She still notes gas expulsion although no more fecal leakage. She is due to have contrast CT pelvis just after  and is hoping for only need repair versus reconstruction.  No fevers or chills. She is doing all of her normal ADLs.  Would like to get to [place] to see her son and grandchildren during their hockey season       Objective     Physical Exam:  She sounds very well and in no distress on the phone. No hoarseness and no pausing due to dyspnea.  In good spirits and is appropriate.    Imaging: no new imaging to review    Assessment    we have reviewed her above sx and I have offered albuterol inhaler trial since she has occasional wheezing. She feels the episodes are few and minor; relieved with rest. She will call if they continue and she would like to discuss further.  Increased activity will be good in maintaining and/or gaining stamina.  CA care plan reviewed and she is ok with this being in MyChart. This will be sent to PCP and she is scheduled for f/u in January.  We will plan to see her next year for her first 6-mo surveillance visit with CT chest.

## 2022-02-14 ENCOUNTER — APPOINTMENT (OUTPATIENT)
Dept: LAB | Facility: HOSPITAL | Age: 77
End: 2022-02-14
Attending: OBSTETRICS & GYNECOLOGY
Payer: MEDICARE

## 2022-02-14 DIAGNOSIS — Z85.44 HISTORY OF CANCER OF VAGINA: ICD-10-CM

## 2022-02-14 DIAGNOSIS — N82.4 COLOVAGINAL FISTULA: ICD-10-CM

## 2022-02-14 LAB
ANION GAP SERPL CALC-SCNC: 10 MEQ/L (ref 3–15)
BUN SERPL-MCNC: 25 MG/DL (ref 8–20)
CALCIUM SERPL-MCNC: 9.5 MG/DL (ref 8.9–10.3)
CHLORIDE SERPL-SCNC: 104 MEQ/L (ref 98–109)
CO2 SERPL-SCNC: 23 MEQ/L (ref 22–32)
CREAT SERPL-MCNC: 1.2 MG/DL (ref 0.6–1.1)
GFR SERPL CREATININE-BSD FRML MDRD: 43.6 ML/MIN/1.73M*2
GLUCOSE SERPL-MCNC: 89 MG/DL (ref 70–99)
POTASSIUM SERPL-SCNC: 4.5 MEQ/L (ref 3.6–5.1)
SODIUM SERPL-SCNC: 137 MEQ/L (ref 136–144)

## 2022-02-14 PROCEDURE — 80048 BASIC METABOLIC PNL TOTAL CA: CPT

## 2022-02-14 PROCEDURE — 36415 COLL VENOUS BLD VENIPUNCTURE: CPT

## 2022-02-17 ENCOUNTER — HOSPITAL ENCOUNTER (OUTPATIENT)
Dept: RADIOLOGY | Facility: HOSPITAL | Age: 77
Discharge: HOME | End: 2022-02-17
Attending: OBSTETRICS & GYNECOLOGY
Payer: MEDICARE

## 2022-02-17 DIAGNOSIS — Z85.44 HISTORY OF CANCER OF VAGINA: ICD-10-CM

## 2022-02-17 DIAGNOSIS — N82.4 COLOVAGINAL FISTULA: ICD-10-CM

## 2022-02-17 PROCEDURE — 25500000 HC DRUGS/INCIDENT RAD: Performed by: OBSTETRICS & GYNECOLOGY

## 2022-02-17 PROCEDURE — G1004 CDSM NDSC: HCPCS

## 2022-02-17 RX ADMIN — BARIUM SULFATE 900 ML: 21 SUSPENSION ORAL at 11:24

## 2022-02-18 ENCOUNTER — TELEPHONE (OUTPATIENT)
Dept: GYNECOLOGIC ONCOLOGY | Facility: CLINIC | Age: 77
End: 2022-02-18
Payer: MEDICARE

## 2022-02-21 ENCOUNTER — TELEPHONE (OUTPATIENT)
Dept: GYNECOLOGIC ONCOLOGY | Facility: CLINIC | Age: 77
End: 2022-02-21
Payer: MEDICARE

## 2022-03-02 ENCOUNTER — DOCUMENTATION (OUTPATIENT)
Dept: COLORECTAL SURGERY | Facility: CLINIC | Age: 77
End: 2022-03-02
Payer: MEDICARE

## 2022-03-02 NOTE — PROGRESS NOTES
1         2             3              4             5     LAST APPOINTMENT: 05/17/2023     TIME ARRIVED:     TIME ROOMED:     VISIT TYPE:   NP       POV     EPV     DISC:             YES         NO                                   PREPPED:  YES  NO     LABS:  [facility]  [facility]  [facility]  OTHER      PHARMACY ENTERED:  YES   NO     DIAGNOSIS: Colovaginal fistula      SURGERY  : EUA with biopsies, colonoscopy      DATE OF SURGERY:07/09/2021 Rectal EUA and flexible sigmoidoscopy        PATHOLOGY STAGE:   RADIATION :    Yes   No                                           DOSAGE:      LOCATION:   Anterior   Right Lateral   Left Lateral   Posterior   Circumferential      LEVEL:                        SIZE:     CT SCAN:  04/26/2023  FFC:    FFS:    CEA:    PET:    MRI:      OTHER:  BE  02/20/2023     FIBER:  NO  METAMUCIL  KONSYL  CITYRUCEL   FIBERCON  BENEFIBER OTHER  LOMOTIL:    NO    1  VS   2    QD    BID    TID    QID  ZANTAC:       Y / N  AMPHOGEL:    Y / N                                                                          START / STOP                                                 RAD ONC:                              N / A        MED ONC:                             N / A

## 2022-03-03 ENCOUNTER — PREP FOR CASE (OUTPATIENT)
Dept: COLORECTAL SURGERY | Facility: CLINIC | Age: 77
End: 2022-03-03

## 2022-03-03 ENCOUNTER — OFFICE VISIT (OUTPATIENT)
Dept: COLORECTAL SURGERY | Facility: CLINIC | Age: 77
End: 2022-03-03
Payer: MEDICARE

## 2022-03-03 VITALS — BODY MASS INDEX: 31.41 KG/M2 | HEIGHT: 60 IN | WEIGHT: 160 LBS

## 2022-03-03 DIAGNOSIS — N82.4 COLOVAGINAL FISTULA: Primary | ICD-10-CM

## 2022-03-03 PROCEDURE — G8756 NO BP MEASURE DOC: HCPCS | Performed by: COLON & RECTAL SURGERY

## 2022-03-03 PROCEDURE — 99215 OFFICE O/P EST HI 40 MIN: CPT | Performed by: COLON & RECTAL SURGERY

## 2022-03-03 RX ORDER — OMEGA-3-ACID ETHYL ESTERS 1 G/1
CAPSULE, LIQUID FILLED ORAL
COMMUNITY
End: 2022-04-18 | Stop reason: ALTCHOICE

## 2022-03-03 NOTE — LETTER
March 3, 2022     Javon Valadez DO  [address]  [place] [facility] [address]    Patient: Raegan Young  YOB: 1945  Date of Visit: 3/3/2022      Dear Dr. Valadez:    Thank you for referring Raegan Young to me for evaluation. Below are my notes for this consultation.    If you have questions, please do not hesitate to call me. I look forward to following your patient along with you.         Sincerely,        Geovani Mandel MD        CC: MD Akbar Mueller MD David O Holtz, MD Xiaomang Stickles, MD Patrick Ross, MD PhD  Tequila, Geovani CISNEROS MD  3/3/2022 10:37 PM  Signed  HISTORY & PHYSICAL EXAM    HPI: Raegan is here to be seen today in follow-up.  She recently underwent robotic resection of an adenocarcinoma of her lung by Dr. Gamez.  She has a history of a colovaginal fistula related to was thought to be diverticulitis and possibly radiation to her cervix from cervical cancer.  We do not have any evidence of recurrent cervical cancer at this time.  She continues to have intermittent drainage of feculent material from her vagina and has been doing an excellent job with hygiene using a spray bottle to keep matters are clean.  She has not had any fevers or chills, nausea or vomiting.  She notes some family events coming up in the end of April and would like to see if we could delay any potential intervention until after that.  Colonoscopy was attempted in the past and was not successful due to the tortuosity of the colon in the rectum.  This was performed in the operating room as an exam under anesthesia with Dr. Jimenez and we even attempted to use a pediatric bronchoscope and were not successful in passing and navigating the colon.  That said on evaluation on the vaginal side, there is no evidence of a recurrent cervical cancer.    Past Medical History:   Diagnosis Date   • Anemia    • Arthritis    • Colovaginal fistula    • COVID-19 vaccine series  completed 2021   • Disease of thyroid gland    • Diverticulitis of colon    • GERD (gastroesophageal reflux disease)    • Hiatal hernia    • History of snoring    • Hypertension    • Hypothyroidism    • Lung cancer (CMS/HCC)    • Lung nodule    • Vaginal cancer (CMS/HCC) 2018    s/p XRT x 7 weeks and chemo weekly x 5 weeks       Past Surgical History:   Procedure Laterality Date   • CHOLECYSTECTOMY     • COLONOSCOPY     • DILATION AND CURETTAGE OF UTERUS  2018   • LUNG SURGERY     • ROTATOR CUFF REPAIR Right        Current Outpatient Medications:   •  benazepril (LOTENSIN) 10 mg tablet, Take 10 mg by mouth daily., Disp: , Rfl:   •  bumetanide (BUMEX) 1 mg tablet, Take 1 mg by mouth as needed.  , Disp: , Rfl:   •  cholecalciferol, vitamin D3, 1,000 unit (25 mcg) tablet, Take 2,000 Units by mouth daily., Disp: , Rfl:   •  levothyroxine (SYNTHROID) 88 mcg tablet, Take 88 mcg by mouth daily.  , Disp: , Rfl: 3  •  omega-3 acid ethyl esters (LOVAZA) 1 gram capsule, , Disp: , Rfl:   •  pantoprazole (PROTONIX) 40 mg EC tablet, Take 40 mg by mouth daily.  , Disp: , Rfl:    Allergies   Allergen Reactions   • Adhesive Tape-Silicones      Causes bruising     Social History     Socioeconomic History   • Marital status:      Spouse name: Royal    • Number of children: 4   • Years of education: Not on file   • Highest education level: Not on file   Occupational History   • Occupation: retired/     Tobacco Use   • Smoking status: Former Smoker     Packs/day: 1.00     Years: 40.00     Pack years: 40.00     Types: Cigarettes     Start date:      Quit date:      Years since quittin.1   • Smokeless tobacco: Never Used   Vaping Use   • Vaping Use: Never used   Substance and Sexual Activity   • Alcohol use: Yes     Comment: occassional    • Drug use: No   • Sexual activity: Never   Other Topics Concern   • Not on file   Social History Narrative    Lives with  in a 2 story home     Social  Determinants of Health     Financial Resource Strain: Not on file   Food Insecurity: No Food Insecurity   • Worried About Running Out of Food in the Last Year: Never true   • Ran Out of Food in the Last Year: Never true   Transportation Needs: Not on file   Physical Activity: Not on file   Stress: Not on file   Social Connections: Not on file   Intimate Partner Violence: Not on file   Housing Stability: Not on file      Family History   Problem Relation Age of Onset   • Heart attack Biological Mother    • Hypertension Biological Mother    • Lung cancer Biological Father    • Breast cancer Neg Hx    • Cancer Neg Hx    • Colon cancer Neg Hx    • Ovarian cancer Neg Hx        REVIEW OF SYSTEMS: Unchanged    PHYSICAL EXAM:  GEN: On examination the patient is resting comfortably.  NEURO/PSYCH: Alert and oriented ×3  HEENT: Eyes: Sclera anicteric  CHEST: Equal rise and fall the chest.  No tenderness bilaterally.  SKIN: Warm and moist  NODES: No groin or cervical lymphadenopathy  EXT: No palpable edema of the bilateral lower extremities.  No clubbing.  GI: Abdomen soft, nontender, nondistended.  No palpable liver or spleen edge.  No ventral hernias, mass, or tenderness.  RECTAL: Deferred      ASSESSMENT/PLAN:     Colovaginal fistula  Patient has a colovaginal/colouterine fistula present likely related to a combination of diverticular disease and history of radiation therapy.  She has been doing well and has not had significant vaginitis as a result of her excellent hygiene however continues to have feculent drainage from the vagina intermittently.  My assessment is that she should have elective surgery to takedown the colovaginal fistula which we would approach robotically.  The patient additionally, should have hysterectomy at the same time and I have discussed the case with .  It seems most reasonable to proceed on May 6 with a robotic low anterior resection, robotic ALYSSA/BSO, takedown of colovaginal fistula and  anastomosis.  I discussed with the patient that given her history of radiation therapy to the cervix, there is a chance that she may require temporary diverting ileostomy depending on how the rectum appears.  I also discussed with the patient the risks of procedure with her which include bleeding, infection, anastomotic leak, need for open surgery, injury to the structures, need for additional bowel resection, need for temporary permanent stoma, MI, stroke, death.  She would like to proceed.  We are going to also make arrangements to have urology placed bilateral lighted stents and perform cystoscopy at the time of the operation.

## 2022-03-04 NOTE — ASSESSMENT & PLAN NOTE
Patient has a colovaginal/colouterine fistula present likely related to a combination of diverticular disease and history of radiation therapy.  She has been doing well and has not had significant vaginitis as a result of her excellent hygiene however continues to have feculent drainage from the vagina intermittently.  My assessment is that she should have elective surgery to takedown the colovaginal fistula which we would approach robotically.  The patient additionally, should have hysterectomy at the same time and I have discussed the case with .  It seems most reasonable to proceed on May 6 with a robotic low anterior resection, robotic ALYSSA/BSO, takedown of colovaginal fistula and anastomosis.  I discussed with the patient that given her history of radiation therapy to the cervix, there is a chance that she may require temporary diverting ileostomy depending on how the rectum appears.  I also discussed with the patient the risks of procedure with her which include bleeding, infection, anastomotic leak, need for open surgery, injury to the structures, need for additional bowel resection, need for temporary permanent stoma, MI, stroke, death.  She would like to proceed.  We are going to also make arrangements to have urology placed bilateral lighted stents and perform cystoscopy at the time of the operation.

## 2022-03-04 NOTE — PROGRESS NOTES
HISTORY & PHYSICAL EXAM    HPI: Raegan is here to be seen today in follow-up.  She recently underwent robotic resection of an adenocarcinoma of her lung by Dr. Gamez.  She has a history of a colovaginal fistula related to was thought to be diverticulitis and possibly radiation to her cervix from cervical cancer.  We do not have any evidence of recurrent cervical cancer at this time.  She continues to have intermittent drainage of feculent material from her vagina and has been doing an excellent job with hygiene using a spray bottle to keep matters are clean.  She has not had any fevers or chills, nausea or vomiting.  She notes some family events coming up in the end of April and would like to see if we could delay any potential intervention until after that.  Colonoscopy was attempted in the past and was not successful due to the tortuosity of the colon in the rectum.  This was performed in the operating room as an exam under anesthesia with Dr. Jimenez and we even attempted to use a pediatric bronchoscope and were not successful in passing and navigating the colon.  That said on evaluation on the vaginal side, there is no evidence of a recurrent cervical cancer.    Past Medical History:   Diagnosis Date   • Anemia    • Arthritis    • Colovaginal fistula    • COVID-19 vaccine series completed 02/26/2021   • Disease of thyroid gland    • Diverticulitis of colon    • GERD (gastroesophageal reflux disease)    • Hiatal hernia    • History of snoring    • Hypertension    • Hypothyroidism    • Lung cancer (CMS/HCC)    • Lung nodule    • Vaginal cancer (CMS/HCC) 2018    s/p XRT x 7 weeks and chemo weekly x 5 weeks       Past Surgical History:   Procedure Laterality Date   • CHOLECYSTECTOMY     • COLONOSCOPY     • DILATION AND CURETTAGE OF UTERUS  02/2018   • LUNG SURGERY     • ROTATOR CUFF REPAIR Right        Current Outpatient Medications:   •  benazepril (LOTENSIN) 10 mg tablet, Take 10 mg by mouth daily., Disp: , Rfl:   •   bumetanide (BUMEX) 1 mg tablet, Take 1 mg by mouth as needed.  , Disp: , Rfl:   •  cholecalciferol, vitamin D3, 1,000 unit (25 mcg) tablet, Take 2,000 Units by mouth daily., Disp: , Rfl:   •  levothyroxine (SYNTHROID) 88 mcg tablet, Take 88 mcg by mouth daily.  , Disp: , Rfl: 3  •  omega-3 acid ethyl esters (LOVAZA) 1 gram capsule, , Disp: , Rfl:   •  pantoprazole (PROTONIX) 40 mg EC tablet, Take 40 mg by mouth daily.  , Disp: , Rfl:    Allergies   Allergen Reactions   • Adhesive Tape-Silicones      Causes bruising     Social History     Socioeconomic History   • Marital status:      Spouse name: Royal    • Number of children: 4   • Years of education: Not on file   • Highest education level: Not on file   Occupational History   • Occupation: retired/     Tobacco Use   • Smoking status: Former Smoker     Packs/day: 1.00     Years: 40.00     Pack years: 40.00     Types: Cigarettes     Start date:      Quit date:      Years since quittin.1   • Smokeless tobacco: Never Used   Vaping Use   • Vaping Use: Never used   Substance and Sexual Activity   • Alcohol use: Yes     Comment: occassional    • Drug use: No   • Sexual activity: Never   Other Topics Concern   • Not on file   Social History Narrative    Lives with  in a 2 story home     Social Determinants of Health     Financial Resource Strain: Not on file   Food Insecurity: No Food Insecurity   • Worried About Running Out of Food in the Last Year: Never true   • Ran Out of Food in the Last Year: Never true   Transportation Needs: Not on file   Physical Activity: Not on file   Stress: Not on file   Social Connections: Not on file   Intimate Partner Violence: Not on file   Housing Stability: Not on file      Family History   Problem Relation Age of Onset   • Heart attack Biological Mother    • Hypertension Biological Mother    • Lung cancer Biological Father    • Breast cancer Neg Hx    • Cancer Neg Hx    • Colon cancer Neg Hx    •  Ovarian cancer Neg Hx        REVIEW OF SYSTEMS: Unchanged    PHYSICAL EXAM:  GEN: On examination the patient is resting comfortably.  NEURO/PSYCH: Alert and oriented ×3  HEENT: Eyes: Sclera anicteric  CHEST: Equal rise and fall the chest.  No tenderness bilaterally.  SKIN: Warm and moist  NODES: No groin or cervical lymphadenopathy  EXT: No palpable edema of the bilateral lower extremities.  No clubbing.  GI: Abdomen soft, nontender, nondistended.  No palpable liver or spleen edge.  No ventral hernias, mass, or tenderness.  RECTAL: Deferred      ASSESSMENT/PLAN:     Colovaginal fistula  Patient has a colovaginal/colouterine fistula present likely related to a combination of diverticular disease and history of radiation therapy.  She has been doing well and has not had significant vaginitis as a result of her excellent hygiene however continues to have feculent drainage from the vagina intermittently.  My assessment is that she should have elective surgery to takedown the colovaginal fistula which we would approach robotically.  The patient additionally, should have hysterectomy at the same time and I have discussed the case with .  It seems most reasonable to proceed on May 6 with a robotic low anterior resection, robotic ALYSSA/BSO, takedown of colovaginal fistula and anastomosis.  I discussed with the patient that given her history of radiation therapy to the cervix, there is a chance that she may require temporary diverting ileostomy depending on how the rectum appears.  I also discussed with the patient the risks of procedure with her which include bleeding, infection, anastomotic leak, need for open surgery, injury to the structures, need for additional bowel resection, need for temporary permanent stoma, MI, stroke, death.  She would like to proceed.  We are going to also make arrangements to have urology placed bilateral lighted stents and perform cystoscopy at the time of the operation.

## 2022-03-18 ENCOUNTER — TELEPHONE (OUTPATIENT)
Dept: SCHEDULING | Facility: CLINIC | Age: 77
End: 2022-03-18

## 2022-03-18 RX ORDER — DOXYCYCLINE 100 MG/1
CAPSULE ORAL
Qty: 2 CAPSULE | Refills: 0 | Status: SHIPPED | OUTPATIENT
Start: 2022-03-18 | End: 2022-05-09 | Stop reason: ALTCHOICE

## 2022-03-21 ENCOUNTER — TELEPHONE (OUTPATIENT)
Dept: THORACIC SURGERY | Facility: CLINIC | Age: 77
End: 2022-03-21

## 2022-03-21 NOTE — TELEPHONE ENCOUNTER
The sensations described in the subcostal/waist region sound to be typical postoperative neuralgia.  She does not see discoloration or feel any lumps or masses.  She describes it as a superficial irritation and a deeper pain with palpation/massage.  She has tried heat and ice without change in sensation.  There is improvement with Tylenol but she does not like to take medication which was what prompted her to call.  She is scheduled for hysterectomy and partial bowel resection for fistula in May of this year.  We will plan to move her currently scheduled surveillance visit up to April 28 with same-day CT scan.  She will continue to monitor the symptoms and call back if this continues to be bothersome or progresses.  She is very appreciative of our conversation today

## 2022-03-21 NOTE — TELEPHONE ENCOUNTER
Raegan called with pain on her side where her surgery was. The area is tender to the touch and when pushed feels a pinching . Tylenol seems to be working for the pain but would like to speak to you about it.

## 2022-04-18 ENCOUNTER — OFFICE VISIT (OUTPATIENT)
Dept: CARDIOLOGY | Facility: CLINIC | Age: 77
End: 2022-04-18
Payer: MEDICARE

## 2022-04-18 VITALS
OXYGEN SATURATION: 98 % | HEART RATE: 96 BPM | SYSTOLIC BLOOD PRESSURE: 150 MMHG | BODY MASS INDEX: 31.96 KG/M2 | WEIGHT: 162.8 LBS | HEIGHT: 60 IN | DIASTOLIC BLOOD PRESSURE: 80 MMHG

## 2022-04-18 DIAGNOSIS — I65.23 ATHEROSCLEROSIS OF BOTH CAROTID ARTERIES: ICD-10-CM

## 2022-04-18 DIAGNOSIS — I10 PRIMARY HYPERTENSION: ICD-10-CM

## 2022-04-18 DIAGNOSIS — I25.10 CORONARY ARTERY CALCIFICATION SEEN ON CT SCAN: ICD-10-CM

## 2022-04-18 DIAGNOSIS — Z01.810 PRE-OPERATIVE CARDIOVASCULAR EXAMINATION: Primary | ICD-10-CM

## 2022-04-18 DIAGNOSIS — E78.00 PURE HYPERCHOLESTEROLEMIA: ICD-10-CM

## 2022-04-18 PROCEDURE — G8753 SYS BP > OR = 140: HCPCS | Performed by: INTERNAL MEDICINE

## 2022-04-18 PROCEDURE — 93000 ELECTROCARDIOGRAM COMPLETE: CPT | Performed by: INTERNAL MEDICINE

## 2022-04-18 PROCEDURE — G8754 DIAS BP LESS 90: HCPCS | Performed by: INTERNAL MEDICINE

## 2022-04-18 PROCEDURE — 99214 OFFICE O/P EST MOD 30 MIN: CPT | Performed by: INTERNAL MEDICINE

## 2022-04-18 RX ORDER — GLUCOSAMINE/D3/BOSWELLIA SERRA 1500MG-400
1 TABLET ORAL DAILY
COMMUNITY
Start: 2022-01-01 | End: 2025-03-13 | Stop reason: ALTCHOICE

## 2022-04-18 RX ORDER — ATORVASTATIN CALCIUM 40 MG/1
40 TABLET, FILM COATED ORAL DAILY
Qty: 30 TABLET | Refills: 0 | Status: SHIPPED | OUTPATIENT
Start: 2022-04-18 | End: 2022-04-19

## 2022-04-18 NOTE — PROGRESS NOTES
Sonny Sanchez MD [facility]  Cardiology    [facility]    [facility]  [facility] [address]  [address] [place] [address]    TEL  [phone]  [facility]     2022    Re:  Raegan Young  : 1945    Primary care: Dr. Javon Valadez    Referring Physician: Dr Geovani Mandel    Gynecology: Dr Trev Oh    Dear Althea Redd and Rory     It was a pleasure to meet Raegan Young in the office for the first time today.  Tania is a 76-year-old former smoker who is scheduled to undergo surgery with Jessica Mandel and Kadeem on 2022.  She is here for preoperative cardiovascular risk stratification given her comorbidities of coronary artery calcification, essential hypertension, hyperlipidemia and former tobacco use.    Raegan tells me that she continues to remain active despite her chronic conditions including a colovaginal fistula which has recently not been as much of a problem for her.  She is still troubled by lower abdominal pain in conjunction.  She still remains active able to climb flights of stairs, carry groceries and perform gardening as we had into the spring without any challenges.  I had recommended initiation of statin therapy as well as a low-dose aspirin prior to her last surgery given her atherosclerosis risk factors, although she took it in the lead up to the surgery (lung lobectomy) she discontinue it immediately after in the interest of medication minimization.  She reports no chest pain, dyspnea with activity or rest, no lower extremity edema no palpitations no history of lightheadedness or syncope.  She has no history of heart failure, she takes as needed bumetanide for knee swelling but has not had to do so recently.    PAST MEDICAL HISTORY:  History of vaginal cancer s/p chemotherapy and radiation. 2018.  Lobectomy for adenocarcinoma of left lung  Essential HTN   Unilateral lower extremity edema  of his left leg  Left knee osteoarthritis   Hypothyroidism   Coronary artery calcification on CT (9/02/2020)    FAMILY HISTORY:   There is a family history of premature coronary artery disease. Mother had a MI and sudden death at age 51. Otherwise there is no sudden cardiac death or cardiomyopathy      SOCIAL HISTORY:   She is a former smoker. She smoked a pack and half a day for about 30 years. She doesn't drink alcohol to excess. She is a retired  in [employer].   She lives with her  and has 4 healthy children.  She is going to live with her daughter after the surgery to recuperate    MEDICATIONS:    Outpatient Encounter Medications as of 4/18/2022:   •  benazepril (LOTENSIN) 10 mg tablet, Take 10 mg by mouth daily.  •  bumetanide (BUMEX) 1 mg tablet, Take 1 mg by mouth as needed.    •  cholecalciferol, vitamin D3, 1,000 unit (25 mcg) tablet, Take 2,000 Units by mouth daily.  •  doxycycline hyclate (VIBRAMYCIN) 100 mg capsule, Take one capsule at 8AM and one capsule at 8PM the day before surgery.  •  glucosamine-D3-Boswellia serr 1,500-400-100 mg-unit-mg tablet,   •  levothyroxine (SYNTHROID) 88 mcg tablet, Take 88 mcg by mouth daily.    •  pantoprazole (PROTONIX) 40 mg EC tablet, Take 40 mg by mouth daily.    •  omega-3 acid ethyl esters (LOVAZA) 1 gram capsule,     REVIEW OF SYSTEMS: Aside from what is mentioned above, a relevant complete review of systems was performed and was negative.    Visit Vitals  BP (!) 150/80 (BP Location: Left upper arm, Patient Position: Sitting)   Pulse 96   Ht 1.524 m (5')   Wt 73.8 kg (162 lb 12.8 oz)   SpO2 98%   BMI 31.79 kg/m²   Body surface area is 1.77 meters squared.     PHYSICAL EXAMINATION:  General: Pleasant and in no acute distress.  HEENT: No corneal arcus or xanthelasmas.  Sclerae are anicteric.    Neck: Supple.  JVP is 6 cm/H2O.  Right carotid bruit.    Heart: Normal S1 and S2.  Soft systolic murmur, mid peaking at the RUSB.  Chest:  Symmetrical.  Lungs: Clear bilaterally without rales, wheezes nor rhonchi.  Abdomen: Soft, nontender.   Extremities: Changes consistent with osteoarthritis in fingers.  Lower extremity edema left leg >right leg.   Distal pulses are easily palpable.  Skin: Warm and dry and well perfused.  Neurologic: Alert and appropriate, moving all four extremities. Gait is normal  Psychiatric: Normal mood, affect & judgment.    DIAGNOSTIC DATA:     ECG 4/18/2022: Normal sinus rhythm, normal ECG      ECG on 7/6/2021 was a normal ECG     ECG on 6/7/2021 also showed premature atrial complexes.    Labs:   Lab Results   Component Value Date     02/14/2022    K 4.5 02/14/2022    MG 1.3 (L) 11/19/2018    BUN 25 (H) 02/14/2022    CREATININE 1.2 (H) 02/14/2022    EGFR 43.6 (L) 02/14/2022    WBC 12.03 (H) 10/22/2021    HGB 11.2 (L) 10/22/2021     10/22/2021    ALT 12 06/07/2021    AST 21 06/07/2021    GLUCOSE 89 02/14/2022    TSH 1.03 09/21/2018       Lipid Profile 12/17/2020:    Total cholesterol 200   Triglycerides 210      HDL 51    Imaging:     CT chest abdomen pelvis 9/2/2020: There is moderate coronary calcification in the left coronary tree.  There is a moderate sized hiatal hernia.    Transthoracic echo (TTE) complete with contrast 07/21/2021    Interpretation Summary  · The left ventricle is normal in size. There is normal wall thickness. There is focal upper septal hypertrophy. An ultrasound enhancing agent was used. There is normal left ventricular systolic function. Left ventricular ejection fraction is 65% by visual estimate. There is normal wall motion. Diastolic function was not assessed due to mitral inflow merging.  · The left atrium is moderately dilated. The left atrial volume indexed to body surface area measures 48 mL/m2.  · The mitral valve is normal. There is trace mitral regurgitation.  · The aortic valve has three cusps. There is mild thickening and focal calcification of the cusps. There is no  aortic stenosis. There is no aortic regurgitation.  · The aortic root is normal in size. The ascending aorta is normal in size. The aortic arch was not assessed.  · The right ventricle is normal in size. There is normal right ventricular systolic function. Right ventricular S' by tissue Doppler measures 20 cm/s.  · The right atrium is normal in size.  · The tricuspid valve is normal. There is trace tricuspid regurgitation. The estimated right ventricular systolic pressure = 34 mmHg.  · The pulmonic valve is normal. There is physiologic pulmonic regurgitation.  · The inferior vena cava measures less than 2.1 cm and collapses greater than 50% with inspiration. The estimated right atrial pressure is 0 -5 mmHg.  · There is no pericardial effusion.    There are no prior studies available for comparison.    Ultrasound carotid bilateral 07/21/2021    Interpretation Summary  1. Doppler waveforms show mild spectral broadening and normal peak systolic velocities compatible with low end of 1-49% stenosis of both internal carotid arteries  2. Non-occlusive calcific atherosclerotic plaque of both carotid bulbs.  3. Antegrade vertebral artery flow bilaterally    ASSESSMENT/PLAN:    1. Coronary calcification on CT  2. Right carotid bruit  3. Pure hypercholesterolemia    Lifetime ASCVD risk is 27.3% based on the pooled cohort equation.  She is not interested in long-term lipid-lowering therapy.  I did recommend that she initiate moderate intensity statin at least in the lead up to surgery and during the immediate postoperative period  in the hopes of minimizing her atherosclerotic vascular disease risk.  She is agreeable.  I have prescribed atorvastatin 40 mg daily.  She has a moderate right carotid artery stenosis on ultrasound atherosclerosis is noted in both arteries.    4. Essential hypertension    She remains on benazepril 10 mg daily, I have made no changes today.  I have recommended that she hold this the morning of surgery      5.  Preoperative cardiovascular risk stratification    Raegan demonstrates > 4 METS exercise capacity based on her report of her day to day activities. She has no active cardiovascular condition (namely, acute or recent coronary syndrome, decompensated heart failure or valve disease, malignant ventricular arrhythmias) and her ECG does not demonstrate evidence of prior infarction. She has no history of a CVA, significant chronic kidney disease (creatinine <2 mg/dL) or diabetes requiring insulin.     Her Subramanian Perioperative Risk for Myocardial Infarction or Cardiac Arrest (WATSON) is 0.7% for risk of myocardial infarction or cardiac arrest, intraoperatively or up to 30 days post-operatively.    From a cardiovascular standpoint she is at acceptable risk to undergo her planned procedure.  I do think that there are some areas that can be optimized in terms of lipid-lowering and initiation of aspirin therapy.  I have made plans as detailed above to start these so that her overall risk is as optimal as possible from a cardiovascular standpoint.  I reiterated that ideally she would be on lifelong lipid-lowering therapy given her burden of atherosclerosis.    Thank you for allowing me to share in the care of your patient.  I asked Raegan  to return on an as-needed basis ..  If you have any further questions, please do not hesitate to contact me.    Sincerely,        Sonny Sanchez MD, [facility]     The total time for this visit was 30 minutes and was comprised of chart preparation/review, office visit (gathering HPI, examination, testing review, plan formulation/discussion and patient education), processing orders and completing documentation.

## 2022-04-18 NOTE — PATIENT INSTRUCTIONS
Sonny Sanchez MD  Cardiology    [facility]    [facility]  [facility] [address]  [address] [facility] [place] [address]    TEL  [phone]  [email or web address]         Mrs Young,  it was a pleasure to see you in the clinic today. To recap, we discussed the following major points:     I have no concerns about your upcoming procedure, I would recommend starting atorvastatin (cholesterol medicine) until at least the lead up to your surgery.     2.   I will convey my findings to Dr Mandel.     If you have any questions in the meantime please feel free to call on [phone] and I will get back to you as soon as I am able.     Best wishes         Sonny Sanchez MD

## 2022-04-19 RX ORDER — ATORVASTATIN CALCIUM 40 MG/1
TABLET, FILM COATED ORAL
Qty: 90 TABLET | Refills: 1 | Status: SHIPPED | OUTPATIENT
Start: 2022-04-19 | End: 2022-11-17

## 2022-04-19 NOTE — TELEPHONE ENCOUNTER
A refill request was received from [facility] for Atorvastatin 40 mg daily.     Last OV w/ Dr. Sanchez was on 4/18/22.   No upcoming appts scheduled.     HJ: You sent a 30 day supply of Atorvastatin yesterday w/o refills. Are refills appropriate? TY

## 2022-05-02 ENCOUNTER — OFFICE VISIT (OUTPATIENT)
Dept: PREADMISSION TESTING | Facility: HOSPITAL | Age: 77
End: 2022-05-02
Attending: COLON & RECTAL SURGERY
Payer: MEDICARE

## 2022-05-02 ENCOUNTER — APPOINTMENT (OUTPATIENT)
Dept: LAB | Facility: HOSPITAL | Age: 77
End: 2022-05-02
Attending: COLON & RECTAL SURGERY
Payer: MEDICARE

## 2022-05-02 VITALS
SYSTOLIC BLOOD PRESSURE: 143 MMHG | OXYGEN SATURATION: 98 % | HEART RATE: 85 BPM | DIASTOLIC BLOOD PRESSURE: 71 MMHG | HEIGHT: 60 IN | BODY MASS INDEX: 32.24 KG/M2 | WEIGHT: 164.2 LBS | RESPIRATION RATE: 18 BRPM | TEMPERATURE: 97.8 F

## 2022-05-02 DIAGNOSIS — I10 PRIMARY HYPERTENSION: ICD-10-CM

## 2022-05-02 DIAGNOSIS — E78.5 DYSLIPIDEMIA: ICD-10-CM

## 2022-05-02 DIAGNOSIS — N82.4 COLOVAGINAL FISTULA: ICD-10-CM

## 2022-05-02 DIAGNOSIS — E03.9 HYPOTHYROIDISM, UNSPECIFIED TYPE: ICD-10-CM

## 2022-05-02 DIAGNOSIS — Z01.818 PREOP EXAMINATION: Primary | ICD-10-CM

## 2022-05-02 PROBLEM — Z85.118 HISTORY OF LUNG CANCER: Status: ACTIVE | Noted: 2022-05-02

## 2022-05-02 PROBLEM — N18.32 STAGE 3B CHRONIC KIDNEY DISEASE (CMS/HCC): Status: ACTIVE | Noted: 2022-05-02

## 2022-05-02 LAB
ABO + RH BLD: NORMAL
ALBUMIN SERPL-MCNC: 3.9 G/DL (ref 3.4–5)
ALP SERPL-CCNC: 74 IU/L (ref 35–126)
ALT SERPL-CCNC: 13 IU/L (ref 11–54)
ANION GAP SERPL CALC-SCNC: 12 MEQ/L (ref 3–15)
AST SERPL-CCNC: 14 IU/L (ref 15–41)
BASOPHILS # BLD: 0.03 K/UL (ref 0.01–0.1)
BASOPHILS NFR BLD: 0.4 %
BILIRUB SERPL-MCNC: 0.6 MG/DL (ref 0.3–1.2)
BLD GP AB SCN SERPL QL: NEGATIVE
BLOOD BANK CMNT PATIENT-IMP: NORMAL
BUN SERPL-MCNC: 26 MG/DL (ref 8–20)
CALCIUM SERPL-MCNC: 9.4 MG/DL (ref 8.9–10.3)
CHLORIDE SERPL-SCNC: 105 MEQ/L (ref 98–109)
CO2 SERPL-SCNC: 21 MEQ/L (ref 22–32)
CREAT SERPL-MCNC: 1.2 MG/DL (ref 0.6–1.1)
D AG BLD QL: NEGATIVE
DIFFERENTIAL METHOD BLD: ABNORMAL
EOSINOPHIL # BLD: 0.11 K/UL (ref 0.04–0.36)
EOSINOPHIL NFR BLD: 1.6 %
ERYTHROCYTE [DISTWIDTH] IN BLOOD BY AUTOMATED COUNT: 14.2 % (ref 11.7–14.4)
GFR SERPL CREATININE-BSD FRML MDRD: 43.6 ML/MIN/1.73M*2
GLUCOSE SERPL-MCNC: 87 MG/DL (ref 70–99)
HCT VFR BLDCO AUTO: 38.3 % (ref 35–45)
HGB BLD-MCNC: 13 G/DL (ref 11.8–15.7)
IMM GRANULOCYTES # BLD AUTO: 0.01 K/UL (ref 0–0.08)
IMM GRANULOCYTES NFR BLD AUTO: 0.1 %
LABORATORY COMMENT REPORT: NORMAL
LYMPHOCYTES # BLD: 1.74 K/UL (ref 1.2–3.5)
LYMPHOCYTES NFR BLD: 24.8 %
MCH RBC QN AUTO: 30 PG (ref 28–33.2)
MCHC RBC AUTO-ENTMCNC: 33.9 G/DL (ref 32.2–35.5)
MCV RBC AUTO: 88.2 FL (ref 83–98)
MONOCYTES # BLD: 0.67 K/UL (ref 0.28–0.8)
MONOCYTES NFR BLD: 9.6 %
NEUTROPHILS # BLD: 4.45 K/UL (ref 1.7–7)
NEUTS SEG NFR BLD: 63.5 %
NRBC BLD-RTO: 0 %
PDW BLD AUTO: 9.3 FL (ref 9.4–12.3)
PLATELET # BLD AUTO: 268 K/UL (ref 150–369)
POTASSIUM SERPL-SCNC: 4.5 MEQ/L (ref 3.6–5.1)
PROT SERPL-MCNC: 6.9 G/DL (ref 6–8.2)
RBC # BLD AUTO: 4.34 M/UL (ref 3.93–5.22)
SARS-COV-2 RNA RESP QL NAA+PROBE: NEGATIVE
SODIUM SERPL-SCNC: 138 MEQ/L (ref 136–144)
SPECIMEN EXP DATE BLD: NORMAL
WBC # BLD AUTO: 7.01 K/UL (ref 3.8–10.5)

## 2022-05-02 PROCEDURE — C9803 HOPD COVID-19 SPEC COLLECT: HCPCS

## 2022-05-02 PROCEDURE — U0003 INFECTIOUS AGENT DETECTION BY NUCLEIC ACID (DNA OR RNA); SEVERE ACUTE RESPIRATORY SYNDROME CORONAVIRUS 2 (SARS-COV-2) (CORONAVIRUS DISEASE [COVID-19]), AMPLIFIED PROBE TECHNIQUE, MAKING USE OF HIGH THROUGHPUT TECHNOLOGIES AS DESCRIBED BY CMS-2020-01-R: HCPCS

## 2022-05-02 PROCEDURE — G8753 SYS BP > OR = 140: HCPCS | Performed by: INTERNAL MEDICINE

## 2022-05-02 PROCEDURE — 86901 BLOOD TYPING SEROLOGIC RH(D): CPT

## 2022-05-02 PROCEDURE — 85025 COMPLETE CBC W/AUTO DIFF WBC: CPT

## 2022-05-02 PROCEDURE — 86900 BLOOD TYPING SEROLOGIC ABO: CPT

## 2022-05-02 PROCEDURE — 99204 OFFICE O/P NEW MOD 45 MIN: CPT | Performed by: INTERNAL MEDICINE

## 2022-05-02 PROCEDURE — 36415 COLL VENOUS BLD VENIPUNCTURE: CPT

## 2022-05-02 PROCEDURE — 80053 COMPREHEN METABOLIC PANEL: CPT

## 2022-05-02 PROCEDURE — G8754 DIAS BP LESS 90: HCPCS | Performed by: INTERNAL MEDICINE

## 2022-05-02 ASSESSMENT — PAIN SCALES - GENERAL: PAINLEVEL: 0-NO PAIN

## 2022-05-02 NOTE — ASSESSMENT & PLAN NOTE
Due to the pre-existing condition of CKD, this patient may be at risk of acute renal failure. I would avoid nephrotoxins as able, avoid intra-operative relative hypotension as possible, avoid a net negative fluid balance, and follow the BMP closely. Ongoing mgmt per PCP.

## 2022-05-02 NOTE — PRE-PROCEDURE INSTRUCTIONS
1.      You will be called between 3pm -7pm one business day before your procedure  May 5, 2022   to tell you where and when to report.  If you do not receive a phone call by 7pm, please call the Admissions office at [phone] to determine the arrival time for your procedure.      2.        Please report to Admissions or Short Procedure Unit , park in lot [address], on the day of your procedure.  Detailed directions will be given to you when you are called with arrival time.      3.      No solid food for EIGHT HOURS prior to surgery.  Unlimited CLEAR liquids, meaning water or PLAIN black coffee (WITHOUT any milk, cream, sugar, or sweetener) are permitted up to TWO HOURS prior to arrival at the hospital.  Follow Dr. Mandel's instructions on eating and drinking     4.      Early on the morning of the procedure please take your usual dose of the listed medications with a sip of water:  levothyroxine  No Aspirin, Advil, Aleve, Motrin, Supplements, Vitamins from 5/2/22 till 5/5/22    May take Tylenol if needed.     5.      Other Instructions: You may brush your teeth the morning of the procedure. Rinse and spit, do not swallow.  Bring a list of your medications with dosages with you.  Use surgical wash as directed.  CHG Scrub the night before and the morning of the procedure.   6.      If you develop a cold, cough, fever, rash, or other symptom prior to the data of the procedure, please report it to your physician immediately.     7.      If you need to cancel the procedure for any reason, please contact your physician.     8.      Make arrangements to have someone drive you home from the procedure. If you have not arranged for transportation home, your surgery may be cancelled.      9.      You may not take public transportation unless you are accompanied by a responsible person.     10.      You may not drive a car or operate complex or potentially dangerous machinery for 24 hours following anesthesia and/or sedation.       11.      12.      If it is medically necessary for you to have a longer stay, you will be informed as soon as the decision is made.              Only bring essential items to the hospital.  Do not wear or bring anything of value to the hospital including jewelry of any kind, money, or wallet. Do not wear make-up or contact lenses. Do not BRING MEDICATIONS FROM HOME unless instructed to do so.  DO bring your hearing aids, glasses, and a case.        13.      No lotion, creams, powders, or oils on skin the morning of procedure        14.      Dress in comfortable clothes.     15.      If instructed, please bring a copy of your Advanced Directive (Living Will/Durable Power of ) on the day of your procedure.      16.      17.            18.       19.       Patients need to quarantine from the time of PAT COVID test to day of surgery, regardless of COVID vaccine status.         Ensuring your safety at all times is a very important part of out [facility] Culture of Safety . After having surgery and sedation, you are at risk for falling and balance issues.  Although you may feel awake, the effects of the medication can last up to 24 hours after anesthesia.  If you need to use the bathroom during your recovery period, nursing staff will escort you there and stay with you to ensure your safety.          Refrain from drinking alcohol and smoking cigarettes for 24 hours prior to surgery.         Shower with antibacterial soap (DIAL) the night before and morning of your procedure.  If your procedure indicates the need for CHG antiseptic was (Bactoshield or Hibiclens), please use this instead and follow instructions as discussed at the time of your Pre-Admission Testing visit or phone interview.     Above instructions reviewed with patient and patient acknowledges understanding.

## 2022-05-02 NOTE — CONSULTS
Hospital Medicine Service -  Pre-Operative Consultation       Patient Name: Raegan Young  Referring Surgeon: Geovani Mandel; Jw Quan; Trev Jimenez    Reason for Referral: Pre-Operative Evaluation  Surgical Procedure: Robotic Low Anterior Resection possible stoma w/cysto/stents(psb)  Cystoscopy with Bilateral Ureteral Stent Placement  HYSTERECTOMY ABDOMINAL TOTAL--TLH/LAVH LAPAROSCOPIC ROBOTIC (DA KASHMIR)  Operative Date: 5/6/22  Other Providers:      PCP: Javon Valadez DO     Cardiology: Sonny Hamilton  Thoracic Surgery: Dr. Gamez     HISTORY OF PRESENT ILLNESS      Raegan Young is a 76 y.o. female presenting today to the [facility] at [facility] for pre-operative evaluation prior to planned surgery.    This patient presented (per patient history to me today) to an outside hospital last Memorial Day weekend with severe abdominal pain, diagnosed with diverticulitis and treated with antibiotics, with concurrent colovaginal fistula discovered. Of note, she has a history of radiation therapy for vaginal cancer a few years prior to this. This patient has a history of a colovaginal fistula related to what was thought to be diverticulitis and possibly radiation to her cervix. It is noted she has feculent drainage from vagina intermittently and she practices hygiene to the area without significant vaginitis history. She endorses good appetite, stable weight, no nausea/vomiting, no abdominal pain, no other associated symptoms today other than noting intermittent spotting postmemopausal.     In regards to medical history:  · She has hypertension and dyslipidemia medically managed.  · She has coronary artery calcification on CT 9/2/2020.  · She is a former smoker.  · She has had lobectomy for adenocarcinoma of the left lung.  · She has a history of vaginal cancer s/p chemotherapy and radiation in 2018.  · She has  hypothyroidism.  · She has moderate right carotid artery stenosis by ultrasound (July 2021).   · She has GERD and a hiatal hernia.  · Reviewing labs, she appears to have CKD stage III.    The patient denies any current or recent chest pain or pressure, dyspnea, cough, sputum, fevers, chills, abdominal pain, nausea, vomiting, diarrhea or other symptoms.     Functionally, the patient is able to ascend a flight or so of stairs with no dyspnea or chest pain.     The patient denies, on specific questioning, the following:  No history of MI, arrhythmia,or CHF.  No history of RAMON. STOP-Bang Total Score: 3  No history of DVT/PE.  No history of COPD.  No history of CVA.  No history of DM.     PAST MEDICAL AND SURGICAL HISTORY      Past Medical History:   Diagnosis Date   • Anemia    • Arthritis    • Colovaginal fistula    • COVID-19 vaccine series completed 02/26/2021   • Disease of thyroid gland    • Diverticulitis of colon    • GERD (gastroesophageal reflux disease)    • Hiatal hernia    • History of snoring    • Hypertension    • Hypothyroidism    • Lung cancer (CMS/HCC)    • Lung nodule    • Vaginal cancer (CMS/HCC) 2018    s/p XRT x 7 weeks and chemo weekly x 5 weeks         Past Surgical History:   Procedure Laterality Date   • CHOLECYSTECTOMY     • COLONOSCOPY     • DILATION AND CURETTAGE OF UTERUS  02/2018   • LUNG SURGERY     • ROTATOR CUFF REPAIR Right    • TONSILLECTOMY     • WISDOM TOOTH EXTRACTION      hx of       MEDICATIONS        Current Outpatient Medications:   •  atorvastatin (LIPITOR) 40 mg tablet, TAKE 1 TABLET BY MOUTH EVERY DAY (Patient taking differently: Take 40 mg by mouth daily.), Disp: 90 tablet, Rfl: 1  •  benazepril (LOTENSIN) 10 mg tablet, Take 10 mg by mouth daily., Disp: , Rfl:   •  bumetanide (BUMEX) 1 mg tablet, Take 1 mg by mouth as needed.  , Disp: , Rfl:   •  cholecalciferol, vitamin D3, 1,000 unit (25 mcg) tablet, Take 2,000 Units by mouth daily., Disp: , Rfl:   •  doxycycline hyclate  (VIBRAMYCIN) 100 mg capsule, Take one capsule at 8AM and one capsule at 8PM the day before surgery. (Patient taking differently: Take 100 mg by mouth daily. Take one capsule at 8AM and one capsule at 8PM the day before surgery.), Disp: 2 capsule, Rfl: 0  •  glucosamine-D3-Boswellia serr 1,500-400-100 mg-unit-mg tablet, Take 1 tablet by mouth daily., Disp: , Rfl:   •  levothyroxine (SYNTHROID) 88 mcg tablet, Take 88 mcg by mouth daily.  , Disp: , Rfl: 3    ALLERGIES      Adhesive tape-silicones    FAMILY HISTORY      family history includes Heart attack in her biological mother; Hypertension in her biological mother; Lung cancer in her biological father.    Denies any prior known family history of DVTs/PEs/clotting disorder    SOCIAL HISTORY      Social History     Tobacco Use   • Smoking status: Former Smoker     Packs/day: 1.00     Years: 40.00     Pack years: 40.00     Types: Cigarettes     Start date:      Quit date:      Years since quittin.3   • Smokeless tobacco: Never Used   Vaping Use   • Vaping Use: Never used   Substance Use Topics   • Alcohol use: Yes     Comment: occassional    • Drug use: No       REVIEW OF SYSTEMS      All other systems reviewed and negative except as noted in HPI    PHYSICAL EXAMINATION      Visit Vitals  BP (!) 143/71 (BP Location: Right upper arm, Patient Position: Standing)   Pulse 85   Temp 36.6 °C (97.8 °F) (Temporal)   Resp 18   Ht 1.524 m (5')   Wt 74.5 kg (164 lb 3.2 oz)   SpO2 98%   BMI 32.07 kg/m²     Body mass index is 32.07 kg/m².  GEN: well-developed and well-nourished; not in acute distress  HEENT: normocephalic; atraumatic  NECK: no JVD; no bruits  CARDIO: regular rate and rhythm; no murmurs or rubs  RESP: clear to auscultation bilaterally; no rales, rhonchi, or wheezes  ABD: soft, non-distended, non-tender, normal bowel sounds  EXT: no cyanosis, clubbing, or edema  SKIN: clean, dry, warm, and intact  MUSCULOSKELETAL: no injury or deformity  NEURO: alert  and oriented x 3; nonfocal  BEHAVIOR/EMOTIONAL: appropriate; cooperative    LABS / EKG        Labs  Lab Results   Component Value Date     05/02/2022    K 4.5 05/02/2022     05/02/2022    BUN 26 (H) 05/02/2022    CREATININE 1.2 (H) 05/02/2022    WBC 7.01 05/02/2022    HGB 13.0 05/02/2022    HCT 38.3 05/02/2022     05/02/2022    ALT 13 05/02/2022    AST 14 (L) 05/02/2022       ECG/Telemetry  NSR no acute ischemic changes    TTE 7/21/21  Interpretation Summary  · The left ventricle is normal in size. There is normal wall thickness. There is focal upper septal hypertrophy. An ultrasound enhancing agent was used. There is normal left ventricular systolic function. Left ventricular ejection fraction is 65% by visual estimate. There is normal wall motion. Diastolic function was not assessed due to mitral inflow merging.  · The left atrium is moderately dilated. The left atrial volume indexed to body surface area measures 48 mL/m2.  · The mitral valve is normal. There is trace mitral regurgitation.  · The aortic valve has three cusps. There is mild thickening and focal calcification of the cusps. There is no aortic stenosis. There is no aortic regurgitation.  · The aortic root is normal in size. The ascending aorta is normal in size. The aortic arch was not assessed.  · The right ventricle is normal in size. There is normal right ventricular systolic function. Right ventricular S' by tissue Doppler measures 20 cm/s.  · The right atrium is normal in size.  · The tricuspid valve is normal. There is trace tricuspid regurgitation. The estimated right ventricular systolic pressure = 34 mmHg.  · The pulmonic valve is normal. There is physiologic pulmonic regurgitation.  · The inferior vena cava measures less than 2.1 cm and collapses greater than 50% with inspiration. The estimated right atrial pressure is 0 -5 mmHg.    ASSESSMENT AND PLAN         Preop examination  Medical management and romy-operative risk  commentary noted as per this document's contents.    Colovaginal fistula  Surgery as scheduled.    Hypertension  Resume benazepril as long as no renal dysfunction, hypotension, nor volume depletion.     Hypothyroidism  Continue levothyroxine.    Coronary artery calcification seen on CT scan  Continue statin.    History of cancer of vagina  Noted. As per HPI and mgmt per Dr. Jimenez.    Dyslipidemia  Continue statin.    History of lung cancer  s/p lobectomy. Mgmt per Dr. Gamez.    Stage 3b chronic kidney disease (CMS/HCC)  Due to the pre-existing condition of CKD, this patient may be at risk of acute renal failure. I would avoid nephrotoxins as able, avoid intra-operative relative hypotension as possible, avoid a net negative fluid balance, and follow the BMP closely. Ongoing mgmt per PCP.       In regards to perioperative cardiac risk:  Per cardiology.     Further comments:  Resume supplements when OK with surgical team.  I would encourage incentive spirometry to assist with minimizing romy-operative pulmonary risk.  DVT prophylaxis and timing of such per the discretion of the surgeon.     Please do not hesitate to contact [facility] during the upcoming hospitalization with any questions or concerns.     Jamal Andrade MD  5/3/2022

## 2022-05-05 ENCOUNTER — ANESTHESIA EVENT (OUTPATIENT)
Dept: OPERATING ROOM | Facility: HOSPITAL | Age: 77
Setting detail: SURGERY ADMIT
DRG: 331 | End: 2022-05-05
Payer: MEDICARE

## 2022-05-05 ASSESSMENT — LIFESTYLE VARIABLES: TOBACCO_USE: 1

## 2022-05-05 NOTE — ANESTHESIA PREPROCEDURE EVALUATION
Relevant Problems   CARDIOVASCULAR   (+) Dyslipidemia   (+) Hypertension      GASTROINTESTINAL   (+) GERD (gastroesophageal reflux disease)      HEMATOLOGY   (+) Anemia      MUSCULOSKELETAL   (+) Primary osteoarthritis of left knee      NEUROLOGY   (+) History of cancer of vagina   (+) History of lung cancer      URINARY SYSTEM   (+) Low magnesium level      Other   (+) Adenocarcinoma of left lung (CMS/HCC)   (+) Hypothyroidism       Anesthesia ROS/MED HX    Anesthesia History    Previous anesthetics  No history of anesthetic complications  Pulmonary    history of tobacco use and ex-smoker  Neuro/Psych - neg  Cardiovascular   CAD (Increased coronary calcium score)   hypertension   Echocardiogram reviewed, Covid19 Test Reviewed and ECG reviewed   Normal ECG  Hematological - neg  GI/Hepatic- neg  Musculoskeletal- neg  Renal Disease- neg  Endo/Other  History of cancer and lung cancer  Body Habitus: Obese       Past Surgical History:   Procedure Laterality Date   • CHOLECYSTECTOMY     • COLONOSCOPY     • DILATION AND CURETTAGE OF UTERUS  02/2018   • LUNG SURGERY     • ROTATOR CUFF REPAIR Right    • TONSILLECTOMY     • WISDOM TOOTH EXTRACTION      hx of       Physical Exam    Airway   Mallampati: III   TM distance: <3 FB   Neck ROM: limited  Cardiovascular    Rhythm: regular   Rate: normalPulmonary    clear to auscultation    Anesthesia  Exam Comments:   Dental: Denies loose teeth      TTE 7/21/21: Normal LV size and function. EF65%.  Mod dilated LA. Trace MR. No AS/AR. RV normal size and function. Trace TR. eRVSP = 34.     Carotid U/S 7/21/21: <49% stenosis of both ICA, Non-occlusive plaque of bulbs.        Anesthesia Plan    Plan: general    Technique: general endotracheal     Lines and Monitors: PIV, arterial line and additional IV     Airway: video laryngoscope   ASA 3  Blood Products:     Use of Blood Products Discussed: Yes     Consented to blood products  Anesthetic plan and risks discussed with: patient  Postop  Plan:   Patient Disposition: inpatient floor planned admission   Pain Management: IV analgesics  Comments:    Plan: ERAS Meds

## 2022-05-06 ENCOUNTER — ANESTHESIA (OUTPATIENT)
Dept: OPERATING ROOM | Facility: HOSPITAL | Age: 77
Setting detail: SURGERY ADMIT
DRG: 331 | End: 2022-05-06
Payer: MEDICARE

## 2022-05-11 ENCOUNTER — PREP FOR CASE (OUTPATIENT)
Dept: COLORECTAL SURGERY | Facility: CLINIC | Age: 77
End: 2022-05-11
Payer: MEDICARE

## 2022-05-11 ENCOUNTER — HOSPITAL ENCOUNTER (OUTPATIENT)
Dept: RADIOLOGY | Facility: HOSPITAL | Age: 77
Discharge: HOME | End: 2022-05-11
Attending: PHYSICIAN ASSISTANT
Payer: MEDICARE

## 2022-05-11 DIAGNOSIS — N82.4 COLOVAGINAL FISTULA: Primary | ICD-10-CM

## 2022-05-11 DIAGNOSIS — R91.8 MULTIPLE LUNG NODULES ON CT: ICD-10-CM

## 2022-05-11 DIAGNOSIS — R94.2 ABNORMAL PET SCAN OF LUNG: ICD-10-CM

## 2022-05-11 DIAGNOSIS — R59.0 MEDIASTINAL ADENOPATHY: ICD-10-CM

## 2022-05-11 PROCEDURE — 71260 CT THORAX DX C+: CPT | Mod: ME

## 2022-05-11 PROCEDURE — 63600105 HC IODINE BASED CONTRAST: Mod: JW | Performed by: PHYSICIAN ASSISTANT

## 2022-05-11 RX ADMIN — IOHEXOL 75 ML: 350 INJECTION, SOLUTION INTRAVENOUS at 08:42

## 2022-05-12 ENCOUNTER — APPOINTMENT (OUTPATIENT)
Dept: LAB | Facility: HOSPITAL | Age: 77
DRG: 331 | End: 2022-05-12
Attending: COLON & RECTAL SURGERY
Payer: MEDICARE

## 2022-05-12 ENCOUNTER — OFFICE VISIT (OUTPATIENT)
Dept: THORACIC SURGERY | Facility: CLINIC | Age: 77
End: 2022-05-12
Payer: MEDICARE

## 2022-05-12 VITALS
DIASTOLIC BLOOD PRESSURE: 80 MMHG | WEIGHT: 161 LBS | HEIGHT: 60 IN | SYSTOLIC BLOOD PRESSURE: 123 MMHG | RESPIRATION RATE: 20 BRPM | TEMPERATURE: 97.7 F | OXYGEN SATURATION: 98 % | HEART RATE: 70 BPM | BODY MASS INDEX: 31.61 KG/M2

## 2022-05-12 DIAGNOSIS — C34.92 ADENOCARCINOMA OF LEFT LUNG (CMS/HCC): Primary | ICD-10-CM

## 2022-05-12 DIAGNOSIS — N82.4 COLOVAGINAL FISTULA: ICD-10-CM

## 2022-05-12 LAB — SARS-COV-2 RNA RESP QL NAA+PROBE: NEGATIVE

## 2022-05-12 PROCEDURE — C9803 HOPD COVID-19 SPEC COLLECT: HCPCS

## 2022-05-12 PROCEDURE — G8752 SYS BP LESS 140: HCPCS | Performed by: THORACIC SURGERY (CARDIOTHORACIC VASCULAR SURGERY)

## 2022-05-12 PROCEDURE — U0003 INFECTIOUS AGENT DETECTION BY NUCLEIC ACID (DNA OR RNA); SEVERE ACUTE RESPIRATORY SYNDROME CORONAVIRUS 2 (SARS-COV-2) (CORONAVIRUS DISEASE [COVID-19]), AMPLIFIED PROBE TECHNIQUE, MAKING USE OF HIGH THROUGHPUT TECHNOLOGIES AS DESCRIBED BY CMS-2020-01-R: HCPCS

## 2022-05-12 PROCEDURE — 99213 OFFICE O/P EST LOW 20 MIN: CPT | Performed by: THORACIC SURGERY (CARDIOTHORACIC VASCULAR SURGERY)

## 2022-05-12 PROCEDURE — G8754 DIAS BP LESS 90: HCPCS | Performed by: THORACIC SURGERY (CARDIOTHORACIC VASCULAR SURGERY)

## 2022-05-12 RX ORDER — DOXYCYCLINE HYCLATE 100 MG/1
CAPSULE ORAL
Qty: 2 CAPSULE | Refills: 0 | Status: SHIPPED | OUTPATIENT
Start: 2022-05-12 | End: 2022-05-22 | Stop reason: HOSPADM

## 2022-05-12 NOTE — PROGRESS NOTES
Thoracic Surgery    Patient ID: Raegan Young                              : 1945  MRN: [id number]  Clinic: [facility]                                            Visit Date: 2022  Encounter Provider: Owen Gamez  Referring Provider: Geovani Mandel MD         Patient: Raegan Young  Chief Complaint: NSCLC surveillance, Chest CT done yesterday      Subjective     Raegan Young is a judy 76 y.o. old female presenting today for 6-month survivorship visit. She had synchronous stage I adenocarcinomas of the lung treated with robot-assisted left upper lobectomy on 10/21/2021 and recovered quite well.     Her ROS is notable for left costal margin tenderness with palpation.  She tried CBD lotion that a friend recommended.  This did take away the pain.  However she had watering of the eyes shortly afterward and thought that it was related to the lotion she discontinued it.  The paresthesia/tenderness is more of an annoyance than nito pain.  Does not limit her activity.  She was scheduled for repair of her colovaginal fistula last week however this had to be rescheduled, and she is scheduled for tomorrow.  She is on minimal clear diet and has had Jell-O.  She starts her prep this evening at 5 PM.  She has had no shortness of breath, chest pain, fever, chills, nausea or vomiting.         Past Medical History:  has a past medical history of Anemia, Arthritis, Colovaginal fistula, COVID-19 vaccine series completed (2021), Disease of thyroid gland, Diverticulitis of colon, GERD (gastroesophageal reflux disease), Hiatal hernia, History of snoring, Hypertension, Hypothyroidism, Lung cancer (CMS/HCC), Lung nodule, and Vaginal cancer (CMS/HCC) ().    She has no past medical history of Diabetes mellitus (CMS/HCC) or Myocardial infarction (CMS/HCC).  Past Surgical History:  has a past surgical history that includes Cholecystectomy; Dilation and curettage of uterus (2018);  Rotator cuff repair (Right); Colonoscopy; Lung surgery; Tonsillectomy; and [place] tooth extraction.  Social History:   Social History     Tobacco Use   • Smoking status: Former Smoker     Packs/day: 1.00     Years: 40.00     Pack years: 40.00     Types: Cigarettes     Start date:      Quit date:      Years since quittin.3   • Smokeless tobacco: Never Used   Vaping Use   • Vaping Use: Never used   Substance Use Topics   • Alcohol use: Yes     Comment: occassional    • Drug use: No     Medications:   Current Outpatient Medications   Medication Sig Dispense Refill   • atorvastatin (LIPITOR) 40 mg tablet TAKE 1 TABLET BY MOUTH EVERY DAY (Patient taking differently: Take 40 mg by mouth daily.) 90 tablet 1   • benazepril (LOTENSIN) 10 mg tablet Take 10 mg by mouth daily.     • bumetanide (BUMEX) 1 mg tablet Take 1 mg by mouth as needed.       • cholecalciferol, vitamin D3, 1,000 unit (25 mcg) tablet Take 2,000 Units by mouth daily.     • glucosamine-D3-Boswellia serr 1,500-400-100 mg-unit-mg tablet Take 1 tablet by mouth daily.     • levothyroxine (SYNTHROID) 88 mcg tablet Take 88 mcg by mouth daily.    3   • VIBRAMYCIN 100 mg capsule Take 1 tablet PO at 10 am and 1 tablet PO 10 pm 2 capsule 0     No current facility-administered medications for this visit.       Allergies:   Allergies   Allergen Reactions   • Adhesive Tape-Silicones      Causes bruising          Objective   Vitals:   Visit Vitals  /80 (BP Location: Left upper arm, Patient Position: Sitting)   Pulse 70   Temp 36.5 °C (97.7 °F)   Resp 20   Ht 1.524 m (5')   Wt 73 kg (161 lb)   SpO2 98%   BMI 31.44 kg/m²      General: She is in no distress and appears younger than her stated age   Pulmonary: Lear to auscultation bilaterally   CV: Rate and rhythm without murmur appreciated   Incisions: Well-healed left-sided robotic thoracoscopic incisions.  Tenderness at the left costal margin without lesion, swelling, discoloration or concern    Extremities: Cyanosis or clubbing.  Mild edema that is stable    Imaging Review: We have personally reviewed her CT scan from yesterday.  There are postoperative changes with very mild left-sided volume loss, and no evidence of recurrence or metastasis.  There is no adenopathy.  She has a large hiatal hernia that is redemonstrated.      Assessment   We are very pleased with Ms. Young's progress and prognosis.  We will plan to see her in 6 months with surveillance CT scan of the chest again.  She will call us in the meantime with any changes, question or concern.

## 2022-05-13 ENCOUNTER — HOSPITAL ENCOUNTER (INPATIENT)
Facility: HOSPITAL | Age: 77
LOS: 9 days | Discharge: HOME HEALTH | DRG: 331 | End: 2022-05-22
Attending: COLON & RECTAL SURGERY | Admitting: COLON & RECTAL SURGERY
Payer: MEDICARE

## 2022-05-13 DIAGNOSIS — N82.4 COLOVAGINAL FISTULA: ICD-10-CM

## 2022-05-13 LAB
ABO + RH BLD: NORMAL
BASE EXCESS BLDA CALC-SCNC: -0.7 MEQ/L
BASE EXCESS BLDA CALC-SCNC: -1.7 MEQ/L
BASE EXCESS BLDA CALC-SCNC: -9.3 MEQ/L
BLD GP AB SCN SERPL QL: NEGATIVE
CA-I BLD-SCNC: 1.1 MMOL/L (ref 1.15–1.27)
CA-I BLD-SCNC: 1.2 MMOL/L (ref 1.15–1.27)
CA-I BLD-SCNC: 1.28 MMOL/L (ref 1.15–1.27)
CHLORIDE BLDA-SCNC: 113 MEQ/L (ref 98–109)
CO2 BLDA-SCNC: 20.3 MEQ/L (ref 22–32)
CO2 BLDA-SCNC: 23.1 MEQ/L (ref 22–32)
CO2 BLDA-SCNC: 28.6 MEQ/L (ref 22–32)
COHGB MFR BLD: 1.3 %
COHGB MFR BLD: 1.3 %
COHGB MFR BLD: 1.4 %
D AG BLD QL: NEGATIVE
GLUCOSE BLDA-MCNC: 127 MG/DL (ref 70–99)
GLUCOSE BLDA-MCNC: 133 MG/DL (ref 70–99)
GLUCOSE BLDA-MCNC: 140 MG/DL (ref 70–99)
HCO3 BLDA-SCNC: 17.7 MEQ/L (ref 21–28)
HCO3 BLDA-SCNC: 23.6 MEQ/L (ref 21–28)
HCO3 BLDA-SCNC: 24.4 MEQ/L (ref 21–28)
HGB BLDA OXIMETRY-MCNC: 10.6 G/DL (ref 12–16)
HGB BLDA OXIMETRY-MCNC: 10.8 G/DL (ref 12–16)
HGB BLDA OXIMETRY-MCNC: 11.4 G/DL (ref 12–16)
LABORATORY COMMENT REPORT: NORMAL
LACTATE BLDA-SCNC: 0.6 MMOL/L (ref 0.4–1.6)
LACTATE BLDA-SCNC: 0.7 MMOL/L (ref 0.4–1.6)
LACTATE BLDA-SCNC: 0.8 MMOL/L (ref 0.4–1.6)
METHGB BLD-SCNC: 0.5 % (ref 0.4–1.5)
METHGB BLD-SCNC: 0.6 % (ref 0.4–1.5)
METHGB BLD-SCNC: 0.7 % (ref 0.4–1.5)
PCO2 BLDA: 34 MM HG (ref 35–48)
PCO2 BLDA: 48 MM HG (ref 35–48)
PCO2 BLDA: 56 MM HG (ref 35–48)
PH BLDA: 7.2 [PH] (ref 7.35–7.45)
PH BLDA: 7.29 [PH] (ref 7.35–7.45)
PH BLDA: 7.42 [PH] (ref 7.35–7.45)
PO2 BLDA: 215 MM HG (ref 83–100)
PO2 BLDA: 222 MM HG (ref 83–100)
PO2 BLDA: 235 MM HG (ref 83–100)
POTASSIUM BLDA-SCNC: 4.2 MEQ/L (ref 3.4–4.5)
POTASSIUM BLDA-SCNC: 4.3 MEQ/L (ref 3.4–4.5)
POTASSIUM BLDA-SCNC: 4.4 MEQ/L (ref 3.4–4.5)
SAO2 % BLDA: 98 % (ref 93–98)
SODIUM BLDA-SCNC: 140 MEQ/L (ref 136–145)
SODIUM BLDA-SCNC: 145 MEQ/L (ref 136–145)
SODIUM BLDA-SCNC: 146 MEQ/L (ref 136–145)
SPECIMEN EXP DATE BLD: NORMAL

## 2022-05-13 PROCEDURE — 0D1B0Z4 BYPASS ILEUM TO CUTANEOUS, OPEN APPROACH: ICD-10-PCS | Performed by: COLON & RECTAL SURGERY

## 2022-05-13 PROCEDURE — 25000000 HC PHARMACY GENERAL: Performed by: NURSE ANESTHETIST, CERTIFIED REGISTERED

## 2022-05-13 PROCEDURE — 84132 ASSAY OF SERUM POTASSIUM: CPT | Performed by: COLON & RECTAL SURGERY

## 2022-05-13 PROCEDURE — 0DTN0ZZ RESECTION OF SIGMOID COLON, OPEN APPROACH: ICD-10-PCS | Performed by: COLON & RECTAL SURGERY

## 2022-05-13 PROCEDURE — 25000000 HC PHARMACY GENERAL

## 2022-05-13 PROCEDURE — 0UT24ZZ RESECTION OF BILATERAL OVARIES, PERCUTANEOUS ENDOSCOPIC APPROACH: ICD-10-PCS | Performed by: OBSTETRICS & GYNECOLOGY

## 2022-05-13 PROCEDURE — 8E0W4CZ ROBOTIC ASSISTED PROCEDURE OF TRUNK REGION, PERCUTANEOUS ENDOSCOPIC APPROACH: ICD-10-PCS | Performed by: OBSTETRICS & GYNECOLOGY

## 2022-05-13 PROCEDURE — 0UT94ZZ RESECTION OF UTERUS, PERCUTANEOUS ENDOSCOPIC APPROACH: ICD-10-PCS | Performed by: OBSTETRICS & GYNECOLOGY

## 2022-05-13 PROCEDURE — 21400000 HC ROOM AND CARE CCU/INTERMEDIATE

## 2022-05-13 PROCEDURE — 27800000 HC SUPPLY/IMPLANTS: Performed by: COLON & RECTAL SURGERY

## 2022-05-13 PROCEDURE — 36000016 HC OR LEVEL 6 EA ADDL MIN: Performed by: COLON & RECTAL SURGERY

## 2022-05-13 PROCEDURE — 63600000 HC DRUGS/DETAIL CODE

## 2022-05-13 PROCEDURE — 71000001 HC PACU PHASE 1 INITIAL 30MIN: Performed by: COLON & RECTAL SURGERY

## 2022-05-13 PROCEDURE — 0UT74ZZ RESECTION OF BILATERAL FALLOPIAN TUBES, PERCUTANEOUS ENDOSCOPIC APPROACH: ICD-10-PCS | Performed by: OBSTETRICS & GYNECOLOGY

## 2022-05-13 PROCEDURE — 58571 TLH W/T/O 250 G OR LESS: CPT | Performed by: OBSTETRICS & GYNECOLOGY

## 2022-05-13 PROCEDURE — C1769 GUIDE WIRE: HCPCS | Performed by: COLON & RECTAL SURGERY

## 2022-05-13 PROCEDURE — 83050 HGB METHEMOGLOBIN QUAN: CPT | Performed by: COLON & RECTAL SURGERY

## 2022-05-13 PROCEDURE — 36415 COLL VENOUS BLD VENIPUNCTURE: CPT | Performed by: COLON & RECTAL SURGERY

## 2022-05-13 PROCEDURE — 63600000 HC DRUGS/DETAIL CODE: Performed by: NURSE ANESTHETIST, CERTIFIED REGISTERED

## 2022-05-13 PROCEDURE — 0UBG4ZZ EXCISION OF VAGINA, PERCUTANEOUS ENDOSCOPIC APPROACH: ICD-10-PCS | Performed by: OBSTETRICS & GYNECOLOGY

## 2022-05-13 PROCEDURE — 0T788DZ DILATION OF BILATERAL URETERS WITH INTRALUMINAL DEVICE, VIA NATURAL OR ARTIFICIAL OPENING ENDOSCOPIC: ICD-10-PCS | Performed by: STUDENT IN AN ORGANIZED HEALTH CARE EDUCATION/TRAINING PROGRAM

## 2022-05-13 PROCEDURE — 37000001 HC ANESTHESIA GENERAL: Performed by: COLON & RECTAL SURGERY

## 2022-05-13 PROCEDURE — 86900 BLOOD TYPING SEROLOGIC ABO: CPT

## 2022-05-13 PROCEDURE — 25800000 HC PHARMACY IV SOLUTIONS: Performed by: NURSE ANESTHETIST, CERTIFIED REGISTERED

## 2022-05-13 PROCEDURE — 0DBP0ZZ EXCISION OF RECTUM, OPEN APPROACH: ICD-10-PCS | Performed by: COLON & RECTAL SURGERY

## 2022-05-13 PROCEDURE — 63600000 HC DRUGS/DETAIL CODE: Performed by: STUDENT IN AN ORGANIZED HEALTH CARE EDUCATION/TRAINING PROGRAM

## 2022-05-13 PROCEDURE — 63600000 HC DRUGS/DETAIL CODE: Performed by: ANESTHESIOLOGY

## 2022-05-13 PROCEDURE — 63700000 HC SELF-ADMINISTRABLE DRUG

## 2022-05-13 PROCEDURE — 25000000 HC PHARMACY GENERAL: Performed by: STUDENT IN AN ORGANIZED HEALTH CARE EDUCATION/TRAINING PROGRAM

## 2022-05-13 PROCEDURE — 44213 LAP MOBIL SPLENIC FL ADD-ON: CPT | Performed by: COLON & RECTAL SURGERY

## 2022-05-13 PROCEDURE — 0DBM0ZZ EXCISION OF DESCENDING COLON, OPEN APPROACH: ICD-10-PCS | Performed by: COLON & RECTAL SURGERY

## 2022-05-13 PROCEDURE — 36000006 HC OR LEVEL 6 INITIAL 30MIN: Performed by: COLON & RECTAL SURGERY

## 2022-05-13 PROCEDURE — 63600000 HC DRUGS/DETAIL CODE: Performed by: SURGERY

## 2022-05-13 PROCEDURE — C1758 CATHETER, URETERAL: HCPCS | Performed by: COLON & RECTAL SURGERY

## 2022-05-13 PROCEDURE — 86920 COMPATIBILITY TEST SPIN: CPT

## 2022-05-13 PROCEDURE — 27200000 HC STERILE SUPPLY: Performed by: COLON & RECTAL SURGERY

## 2022-05-13 PROCEDURE — 03HY32Z INSERTION OF MONITORING DEVICE INTO UPPER ARTERY, PERCUTANEOUS APPROACH: ICD-10-PCS | Performed by: STUDENT IN AN ORGANIZED HEALTH CARE EDUCATION/TRAINING PROGRAM

## 2022-05-13 PROCEDURE — 63700000 HC SELF-ADMINISTRABLE DRUG: Performed by: SURGERY

## 2022-05-13 PROCEDURE — 44208 L COLECTOMY/COLOPROCTOSTOMY: CPT | Performed by: COLON & RECTAL SURGERY

## 2022-05-13 PROCEDURE — 63700000 HC SELF-ADMINISTRABLE DRUG: Performed by: STUDENT IN AN ORGANIZED HEALTH CARE EDUCATION/TRAINING PROGRAM

## 2022-05-13 PROCEDURE — 88307 TISSUE EXAM BY PATHOLOGIST: CPT | Performed by: COLON & RECTAL SURGERY

## 2022-05-13 PROCEDURE — 71000011 HC PACU PHASE 1 EA ADDL MIN: Performed by: COLON & RECTAL SURGERY

## 2022-05-13 DEVICE — IMPLANTABLE DEVICE: Type: IMPLANTABLE DEVICE | Status: FUNCTIONAL

## 2022-05-13 RX ORDER — IBUPROFEN 200 MG
16-32 TABLET ORAL AS NEEDED
Status: DISCONTINUED | OUTPATIENT
Start: 2022-05-13 | End: 2022-05-13 | Stop reason: HOSPADM

## 2022-05-13 RX ORDER — DEXTROSE, SODIUM CHLORIDE, SODIUM LACTATE, POTASSIUM CHLORIDE, AND CALCIUM CHLORIDE 5; .6; .31; .03; .02 G/100ML; G/100ML; G/100ML; G/100ML; G/100ML
INJECTION, SOLUTION INTRAVENOUS CONTINUOUS
Status: DISCONTINUED | OUTPATIENT
Start: 2022-05-13 | End: 2022-05-14

## 2022-05-13 RX ORDER — SODIUM CHLORIDE, SODIUM GLUCONATE, SODIUM ACETATE, POTASSIUM CHLORIDE AND MAGNESIUM CHLORIDE 30; 37; 368; 526; 502 MG/100ML; MG/100ML; MG/100ML; MG/100ML; MG/100ML
125 INJECTION, SOLUTION INTRAVENOUS CONTINUOUS
Status: DISCONTINUED | OUTPATIENT
Start: 2022-05-13 | End: 2022-05-14

## 2022-05-13 RX ORDER — FENTANYL CITRATE 50 UG/ML
25 INJECTION, SOLUTION INTRAMUSCULAR; INTRAVENOUS
Status: COMPLETED | OUTPATIENT
Start: 2022-05-13 | End: 2022-05-13

## 2022-05-13 RX ORDER — ALVIMOPAN 12 MG/1
12 CAPSULE ORAL ONCE
Status: COMPLETED | OUTPATIENT
Start: 2022-05-13 | End: 2022-05-13

## 2022-05-13 RX ORDER — LIDOCAINE HYDROCHLORIDE ANHYDROUS AND DEXTROSE MONOHYDRATE .8; 5 G/100ML; G/100ML
INJECTION, SOLUTION INTRAVENOUS CONTINUOUS PRN
Status: DISCONTINUED | OUTPATIENT
Start: 2022-05-13 | End: 2022-05-13 | Stop reason: SURG

## 2022-05-13 RX ORDER — HYDROMORPHONE HYDROCHLORIDE 1 MG/ML
INJECTION, SOLUTION INTRAMUSCULAR; INTRAVENOUS; SUBCUTANEOUS AS NEEDED
Status: DISCONTINUED | OUTPATIENT
Start: 2022-05-13 | End: 2022-05-13 | Stop reason: SURG

## 2022-05-13 RX ORDER — METRONIDAZOLE 500 MG/100ML
500 INJECTION, SOLUTION INTRAVENOUS
Status: DISCONTINUED | OUTPATIENT
Start: 2022-05-13 | End: 2022-05-14

## 2022-05-13 RX ORDER — DEXTROSE 50 % IN WATER (D50W) INTRAVENOUS SYRINGE
25 AS NEEDED
Status: DISCONTINUED | OUTPATIENT
Start: 2022-05-13 | End: 2022-05-22 | Stop reason: HOSPADM

## 2022-05-13 RX ORDER — FENTANYL CITRATE 50 UG/ML
25 INJECTION, SOLUTION INTRAMUSCULAR; INTRAVENOUS
Status: DISCONTINUED | OUTPATIENT
Start: 2022-05-13 | End: 2022-05-13 | Stop reason: SDUPTHER

## 2022-05-13 RX ORDER — LIDOCAINE HCL/PF 100 MG/5ML
SYRINGE (ML) INTRAVENOUS AS NEEDED
Status: DISCONTINUED | OUTPATIENT
Start: 2022-05-13 | End: 2022-05-13 | Stop reason: SURG

## 2022-05-13 RX ORDER — MIDAZOLAM HYDROCHLORIDE 2 MG/2ML
INJECTION, SOLUTION INTRAMUSCULAR; INTRAVENOUS AS NEEDED
Status: DISCONTINUED | OUTPATIENT
Start: 2022-05-13 | End: 2022-05-13 | Stop reason: SURG

## 2022-05-13 RX ORDER — METOPROLOL TARTRATE 1 MG/ML
INJECTION, SOLUTION INTRAVENOUS AS NEEDED
Status: DISCONTINUED | OUTPATIENT
Start: 2022-05-13 | End: 2022-05-13 | Stop reason: SURG

## 2022-05-13 RX ORDER — ONDANSETRON HYDROCHLORIDE 2 MG/ML
4 INJECTION, SOLUTION INTRAVENOUS
Status: DISCONTINUED | OUTPATIENT
Start: 2022-05-13 | End: 2022-05-13 | Stop reason: HOSPADM

## 2022-05-13 RX ORDER — PHENYLEPHRINE HCL IN 0.9% NACL 50MG/250ML
PLASTIC BAG, INJECTION (ML) INTRAVENOUS CONTINUOUS PRN
Status: DISCONTINUED | OUTPATIENT
Start: 2022-05-13 | End: 2022-05-13 | Stop reason: SURG

## 2022-05-13 RX ORDER — DEXTROSE 40 %
15-30 GEL (GRAM) ORAL AS NEEDED
Status: DISCONTINUED | OUTPATIENT
Start: 2022-05-13 | End: 2022-05-22 | Stop reason: HOSPADM

## 2022-05-13 RX ORDER — GABAPENTIN 300 MG/1
300 CAPSULE ORAL 2 TIMES DAILY
Status: DISCONTINUED | OUTPATIENT
Start: 2022-05-13 | End: 2022-05-13 | Stop reason: SDUPTHER

## 2022-05-13 RX ORDER — DEXTROSE 50 % IN WATER (D50W) INTRAVENOUS SYRINGE
25 AS NEEDED
Status: DISCONTINUED | OUTPATIENT
Start: 2022-05-13 | End: 2022-05-13 | Stop reason: SDUPTHER

## 2022-05-13 RX ORDER — ACETAMINOPHEN 325 MG/1
975 TABLET ORAL ONCE
Status: COMPLETED | OUTPATIENT
Start: 2022-05-13 | End: 2022-05-13

## 2022-05-13 RX ORDER — FENTANYL CITRATE 50 UG/ML
INJECTION, SOLUTION INTRAMUSCULAR; INTRAVENOUS AS NEEDED
Status: DISCONTINUED | OUTPATIENT
Start: 2022-05-13 | End: 2022-05-13 | Stop reason: SURG

## 2022-05-13 RX ORDER — ROCURONIUM BROMIDE 10 MG/ML
INJECTION, SOLUTION INTRAVENOUS AS NEEDED
Status: DISCONTINUED | OUTPATIENT
Start: 2022-05-13 | End: 2022-05-13 | Stop reason: SURG

## 2022-05-13 RX ORDER — METRONIDAZOLE 500 MG/100ML
500 INJECTION, SOLUTION INTRAVENOUS
Status: COMPLETED | OUTPATIENT
Start: 2022-05-13 | End: 2022-05-13

## 2022-05-13 RX ORDER — IBUPROFEN 200 MG
16-32 TABLET ORAL AS NEEDED
Status: DISCONTINUED | OUTPATIENT
Start: 2022-05-13 | End: 2022-05-13 | Stop reason: SDUPTHER

## 2022-05-13 RX ORDER — SODIUM CHLORIDE, SODIUM GLUCONATE, SODIUM ACETATE, POTASSIUM CHLORIDE AND MAGNESIUM CHLORIDE 30; 37; 368; 526; 502 MG/100ML; MG/100ML; MG/100ML; MG/100ML; MG/100ML
INJECTION, SOLUTION INTRAVENOUS CONTINUOUS PRN
Status: DISCONTINUED | OUTPATIENT
Start: 2022-05-13 | End: 2022-05-13 | Stop reason: SURG

## 2022-05-13 RX ORDER — SODIUM CHLORIDE 9 MG/ML
INJECTION, SOLUTION INTRAVENOUS CONTINUOUS PRN
Status: DISCONTINUED | OUTPATIENT
Start: 2022-05-13 | End: 2022-05-13 | Stop reason: SURG

## 2022-05-13 RX ORDER — DEXAMETHASONE SODIUM PHOSPHATE 4 MG/ML
INJECTION, SOLUTION INTRA-ARTICULAR; INTRALESIONAL; INTRAMUSCULAR; INTRAVENOUS; SOFT TISSUE AS NEEDED
Status: DISCONTINUED | OUTPATIENT
Start: 2022-05-13 | End: 2022-05-13 | Stop reason: SURG

## 2022-05-13 RX ORDER — PROPOFOL 10 MG/ML
INJECTION, EMULSION INTRAVENOUS AS NEEDED
Status: DISCONTINUED | OUTPATIENT
Start: 2022-05-13 | End: 2022-05-13 | Stop reason: SURG

## 2022-05-13 RX ORDER — ACETAMINOPHEN 325 MG/1
975 TABLET ORAL EVERY 6 HOURS
Status: DISCONTINUED | OUTPATIENT
Start: 2022-05-13 | End: 2022-05-22 | Stop reason: HOSPADM

## 2022-05-13 RX ORDER — ALVIMOPAN 12 MG/1
12 CAPSULE ORAL 2 TIMES DAILY
Status: DISCONTINUED | OUTPATIENT
Start: 2022-05-14 | End: 2022-05-15

## 2022-05-13 RX ORDER — KETAMINE HYDROCHLORIDE 50 MG/ML
INJECTION, SOLUTION INTRAMUSCULAR; INTRAVENOUS AS NEEDED
Status: DISCONTINUED | OUTPATIENT
Start: 2022-05-13 | End: 2022-05-13 | Stop reason: SURG

## 2022-05-13 RX ORDER — HYDROMORPHONE HYDROCHLORIDE 1 MG/ML
0.5 INJECTION, SOLUTION INTRAMUSCULAR; INTRAVENOUS; SUBCUTANEOUS
Status: DISCONTINUED | OUTPATIENT
Start: 2022-05-13 | End: 2022-05-13 | Stop reason: HOSPADM

## 2022-05-13 RX ORDER — ALVIMOPAN 12 MG/1
12 CAPSULE ORAL 2 TIMES DAILY
Status: DISCONTINUED | OUTPATIENT
Start: 2022-05-13 | End: 2022-05-13 | Stop reason: SDUPTHER

## 2022-05-13 RX ORDER — GABAPENTIN 300 MG/1
600 CAPSULE ORAL ONCE
Status: COMPLETED | OUTPATIENT
Start: 2022-05-13 | End: 2022-05-13

## 2022-05-13 RX ORDER — GABAPENTIN 300 MG/1
300 CAPSULE ORAL 2 TIMES DAILY
Status: DISCONTINUED | OUTPATIENT
Start: 2022-05-13 | End: 2022-05-17

## 2022-05-13 RX ORDER — CEFAZOLIN SODIUM 2 G/100ML
2 INJECTION, SOLUTION INTRAVENOUS
Status: COMPLETED | OUTPATIENT
Start: 2022-05-13 | End: 2022-05-13

## 2022-05-13 RX ORDER — DEXTROSE 40 %
15-30 GEL (GRAM) ORAL AS NEEDED
Status: DISCONTINUED | OUTPATIENT
Start: 2022-05-13 | End: 2022-05-13 | Stop reason: HOSPADM

## 2022-05-13 RX ORDER — HYDROMORPHONE HYDROCHLORIDE 1 MG/ML
0.25 INJECTION, SOLUTION INTRAMUSCULAR; INTRAVENOUS; SUBCUTANEOUS
Status: DISCONTINUED | OUTPATIENT
Start: 2022-05-13 | End: 2022-05-15

## 2022-05-13 RX ORDER — HYDROMORPHONE HYDROCHLORIDE 1 MG/ML
0.5 INJECTION, SOLUTION INTRAMUSCULAR; INTRAVENOUS; SUBCUTANEOUS
Status: DISCONTINUED | OUTPATIENT
Start: 2022-05-13 | End: 2022-05-13 | Stop reason: SDUPTHER

## 2022-05-13 RX ORDER — CIPROFLOXACIN 2 MG/ML
400 INJECTION, SOLUTION INTRAVENOUS
Status: DISCONTINUED | OUTPATIENT
Start: 2022-05-13 | End: 2022-05-14

## 2022-05-13 RX ORDER — CELECOXIB 200 MG/1
200 CAPSULE ORAL ONCE
Status: COMPLETED | OUTPATIENT
Start: 2022-05-13 | End: 2022-05-13

## 2022-05-13 RX ORDER — ONDANSETRON HYDROCHLORIDE 2 MG/ML
4 INJECTION, SOLUTION INTRAVENOUS
Status: DISCONTINUED | OUTPATIENT
Start: 2022-05-13 | End: 2022-05-13 | Stop reason: SDUPTHER

## 2022-05-13 RX ORDER — KETAMINE HYDROCHLORIDE 10 MG/ML
INJECTION, SOLUTION INTRAMUSCULAR; INTRAVENOUS AS NEEDED
Status: DISCONTINUED | OUTPATIENT
Start: 2022-05-13 | End: 2022-05-13

## 2022-05-13 RX ORDER — INDOCYANINE GREEN AND WATER 25 MG
KIT INJECTION AS NEEDED
Status: DISCONTINUED | OUTPATIENT
Start: 2022-05-13 | End: 2022-05-13 | Stop reason: SURG

## 2022-05-13 RX ORDER — SODIUM BICARBONATE 1 MEQ/ML
SYRINGE (ML) INTRAVENOUS AS NEEDED
Status: DISCONTINUED | OUTPATIENT
Start: 2022-05-13 | End: 2022-05-13 | Stop reason: SURG

## 2022-05-13 RX ORDER — IBUPROFEN 200 MG
16-32 TABLET ORAL AS NEEDED
Status: DISCONTINUED | OUTPATIENT
Start: 2022-05-13 | End: 2022-05-22 | Stop reason: HOSPADM

## 2022-05-13 RX ORDER — CALCIUM CHLORIDE INJECTION 100 MG/ML
INJECTION, SOLUTION INTRAVENOUS AS NEEDED
Status: DISCONTINUED | OUTPATIENT
Start: 2022-05-13 | End: 2022-05-13 | Stop reason: SURG

## 2022-05-13 RX ORDER — DEXTROSE 40 %
15-30 GEL (GRAM) ORAL AS NEEDED
Status: DISCONTINUED | OUTPATIENT
Start: 2022-05-13 | End: 2022-05-13 | Stop reason: SDUPTHER

## 2022-05-13 RX ORDER — KETOROLAC TROMETHAMINE 15 MG/ML
15 INJECTION, SOLUTION INTRAMUSCULAR; INTRAVENOUS EVERY 6 HOURS PRN
Status: DISCONTINUED | OUTPATIENT
Start: 2022-05-13 | End: 2022-05-16

## 2022-05-13 RX ORDER — ONDANSETRON HYDROCHLORIDE 2 MG/ML
INJECTION, SOLUTION INTRAVENOUS AS NEEDED
Status: DISCONTINUED | OUTPATIENT
Start: 2022-05-13 | End: 2022-05-13 | Stop reason: SURG

## 2022-05-13 RX ORDER — DEXTROSE 50 % IN WATER (D50W) INTRAVENOUS SYRINGE
25 AS NEEDED
Status: DISCONTINUED | OUTPATIENT
Start: 2022-05-13 | End: 2022-05-13 | Stop reason: HOSPADM

## 2022-05-13 RX ORDER — CIPROFLOXACIN 2 MG/ML
400 INJECTION, SOLUTION INTRAVENOUS
Status: DISCONTINUED | OUTPATIENT
Start: 2022-05-13 | End: 2022-05-13

## 2022-05-13 RX ADMIN — CALCIUM CHLORIDE 1 G: 100 INJECTION, SOLUTION INTRAVENOUS at 15:47

## 2022-05-13 RX ADMIN — ACETAMINOPHEN 975 MG: 325 TABLET, FILM COATED ORAL at 22:33

## 2022-05-13 RX ADMIN — GABAPENTIN 300 MG: 300 CAPSULE ORAL at 22:33

## 2022-05-13 RX ADMIN — DEXAMETHASONE SODIUM PHOSPHATE 4 MG: 4 INJECTION, SOLUTION INTRAMUSCULAR; INTRAVENOUS at 11:40

## 2022-05-13 RX ADMIN — KETOROLAC TROMETHAMINE 15 MG: 15 INJECTION, SOLUTION INTRAMUSCULAR; INTRAVENOUS at 22:35

## 2022-05-13 RX ADMIN — METOPROLOL TARTRATE 1 MG: 1 INJECTION, SOLUTION INTRAVENOUS at 15:10

## 2022-05-13 RX ADMIN — METOPROLOL TARTRATE 1 MG: 1 INJECTION, SOLUTION INTRAVENOUS at 18:43

## 2022-05-13 RX ADMIN — ONDANSETRON HYDROCHLORIDE 4 MG: 2 SOLUTION INTRAMUSCULAR; INTRAVENOUS at 17:57

## 2022-05-13 RX ADMIN — FENTANYL CITRATE 25 MCG: 50 INJECTION, SOLUTION INTRAMUSCULAR; INTRAVENOUS at 19:58

## 2022-05-13 RX ADMIN — CEFAZOLIN SODIUM 2 G: 2 INJECTION, SOLUTION INTRAVENOUS at 15:55

## 2022-05-13 RX ADMIN — LIDOCAINE HYDROCHLORIDE 1 MG/MIN: 8 INJECTION, SOLUTION INTRAVENOUS at 11:46

## 2022-05-13 RX ADMIN — ROCURONIUM BROMIDE 10 MG: 10 INJECTION, SOLUTION INTRAVENOUS at 17:13

## 2022-05-13 RX ADMIN — KETAMINE HYDROCHLORIDE 30 MG: 50 INJECTION INTRAMUSCULAR; INTRAVENOUS at 11:40

## 2022-05-13 RX ADMIN — HYDROMORPHONE HYDROCHLORIDE 0.5 MG: 1 INJECTION, SOLUTION INTRAMUSCULAR; INTRAVENOUS; SUBCUTANEOUS at 17:55

## 2022-05-13 RX ADMIN — FENTANYL CITRATE 100 MCG: 50 INJECTION, SOLUTION INTRAMUSCULAR; INTRAVENOUS at 11:40

## 2022-05-13 RX ADMIN — SUGAMMADEX 200 MG: 100 INJECTION, SOLUTION INTRAVENOUS at 18:27

## 2022-05-13 RX ADMIN — CEFAZOLIN SODIUM 2 G: 2 INJECTION, SOLUTION INTRAVENOUS at 11:55

## 2022-05-13 RX ADMIN — LIDOCAINE HYDROCHLORIDE 80 MG: 20 INJECTION, SOLUTION INTRAVENOUS at 11:40

## 2022-05-13 RX ADMIN — FENTANYL CITRATE 25 MCG: 50 INJECTION, SOLUTION INTRAMUSCULAR; INTRAVENOUS at 19:42

## 2022-05-13 RX ADMIN — ROCURONIUM BROMIDE 100 MG: 10 INJECTION, SOLUTION INTRAVENOUS at 11:40

## 2022-05-13 RX ADMIN — PROPOFOL INJECTABLE EMULSION 120 MG: 10 INJECTION, EMULSION INTRAVENOUS at 11:40

## 2022-05-13 RX ADMIN — KETAMINE HYDROCHLORIDE 1 MCG/KG/MIN: 50 INJECTION INTRAMUSCULAR; INTRAVENOUS at 11:46

## 2022-05-13 RX ADMIN — CIPROFLOXACIN 400 MG: 2 INJECTION, SOLUTION INTRAVENOUS at 22:27

## 2022-05-13 RX ADMIN — CELECOXIB 200 MG: 200 CAPSULE ORAL at 06:45

## 2022-05-13 RX ADMIN — ACETAMINOPHEN 975 MG: 325 TABLET ORAL at 06:44

## 2022-05-13 RX ADMIN — SODIUM CHLORIDE, SODIUM LACTATE, POTASSIUM CHLORIDE, CALCIUM CHLORIDE AND DEXTROSE MONOHYDRATE: 5; 600; 310; 30; 20 INJECTION, SOLUTION INTRAVENOUS at 21:04

## 2022-05-13 RX ADMIN — FENTANYL CITRATE 100 MCG: 50 INJECTION, SOLUTION INTRAMUSCULAR; INTRAVENOUS at 13:10

## 2022-05-13 RX ADMIN — METRONIDAZOLE 500 MG: 500 INJECTION, SOLUTION INTRAVENOUS at 11:50

## 2022-05-13 RX ADMIN — ROCURONIUM BROMIDE 10 MG: 10 INJECTION, SOLUTION INTRAVENOUS at 16:33

## 2022-05-13 RX ADMIN — GABAPENTIN 600 MG: 300 CAPSULE ORAL at 06:46

## 2022-05-13 RX ADMIN — INDOCYANINE GREEN 3 ML: KIT INTRAVENOUS at 17:15

## 2022-05-13 RX ADMIN — ALVIMOPAN 12 MG: 12 CAPSULE ORAL at 06:45

## 2022-05-13 RX ADMIN — ROCURONIUM BROMIDE 20 MG: 10 INJECTION, SOLUTION INTRAVENOUS at 14:13

## 2022-05-13 RX ADMIN — SODIUM BICARBONATE 100 MEQ: 84 INJECTION, SOLUTION INTRAVENOUS at 15:45

## 2022-05-13 RX ADMIN — Medication 25 MCG/MIN: at 12:13

## 2022-05-13 RX ADMIN — SODIUM CHLORIDE: 9 INJECTION, SOLUTION INTRAVENOUS at 11:31

## 2022-05-13 RX ADMIN — FENTANYL CITRATE 25 MCG: 50 INJECTION, SOLUTION INTRAMUSCULAR; INTRAVENOUS at 20:30

## 2022-05-13 RX ADMIN — METOPROLOL TARTRATE 1 MG: 1 INJECTION, SOLUTION INTRAVENOUS at 16:20

## 2022-05-13 RX ADMIN — SODIUM CHLORIDE, SODIUM GLUCONATE, SODIUM ACETATE, POTASSIUM CHLORIDE AND MAGNESIUM CHLORIDE: 526; 502; 368; 37; 30 INJECTION, SOLUTION INTRAVENOUS at 11:31

## 2022-05-13 RX ADMIN — FENTANYL CITRATE 25 MCG: 50 INJECTION, SOLUTION INTRAMUSCULAR; INTRAVENOUS at 21:00

## 2022-05-13 RX ADMIN — MIDAZOLAM HYDROCHLORIDE 2 MG: 1 INJECTION, SOLUTION INTRAMUSCULAR; INTRAVENOUS at 11:31

## 2022-05-13 RX ADMIN — METOPROLOL TARTRATE 1 MG: 1 INJECTION, SOLUTION INTRAVENOUS at 14:55

## 2022-05-13 RX ADMIN — METOPROLOL TARTRATE 1 MG: 1 INJECTION, SOLUTION INTRAVENOUS at 15:49

## 2022-05-13 RX ADMIN — ROCURONIUM BROMIDE 10 MG: 10 INJECTION, SOLUTION INTRAVENOUS at 15:46

## 2022-05-13 RX ADMIN — METRONIDAZOLE 500 MG: 500 INJECTION, SOLUTION INTRAVENOUS at 22:27

## 2022-05-13 ASSESSMENT — PATIENT HEALTH QUESTIONNAIRE - PHQ9: SUM OF ALL RESPONSES TO PHQ9 QUESTIONS 1 & 2: 0

## 2022-05-13 NOTE — OP NOTE
Robotic Low Anterior Resection w/C+S, Possible Stoma, Robotic Low Anterior Resection w/C+S, Possible Stoma Procedure Note     Procedure:    Robotic Low Anterior Resection w/C+S, Possible Stoma  CPT(R) Code:  59571 - NM LAP,SURG,COLECTOMY,W/ANAST    Procedure:    Robotic Low Anterior Resection w/C+S, Possible Stoma  CPT(R) Code:  30018 - NM LAP, SURG COLOSTOMY    Procedure:    Cystoscopy with Bilateral Ureteral Stent Placement  CPT(R) Code:  62957 - NM CYSTOURETHROSCOPY,URETER CATHETER    Procedure:    HYSTERECTOMY ABDOMINAL TOTAL--TLH/LAVH LAPAROSCOPIC ROBOTIC (DA KASHMIR)  CPT(R) Code:  10712 - NM LAPAROSCOPY W TOT HYSTERECTUTERUS <=250 GRAM  W TUBE/OVARY    Indications: The patient a 76-year-old female with complex past medical history including vaginal cancer complicated by colovaginal fistula who presents today for colovaginal fistula replacer, robotic hysterectomy, and LAR.  Urology consulted for intraoperative designation of ureters.        Pre-op Diagnosis     * Colovaginal fistula [N82.4]    Post-op Diagnosis     * Colovaginal fistula [N82.4]    Surgeon: Jw Quan DO    Assistants: Kg Eng, PGY 4    Anesthesia: General endotracheal anesthesia    ASA Class: 3      Procedure Details   The patient was seen and identified in the preoperative holding area, informed consent was obtained. Risks and benefits of the procedure were explained to the patient and she agreed to go forward at this time. Patient was then transported to the operating room and placed on the operating room table in supine position. Surgical timeout was then performed with all parties in agreement. General anesthesia was administered by anesthesia via endotracheal tube and perioperative antibiotics were administered with 2 g of Ancef. Patient was then prepped and draped in normal sterile fashion in the modified jackknife position making sure to keep all bony prominences padded off pressure.     At this time a rigid cystoscope was  introduced into the bladder with relative ease.  Pan cystoscopy revealed no concerning mass or lesion.  Our attention was then turned to the right ureteral orifice where a 5 Slovenian open-ended catheter was placed into the UO over a sensor wire and advanced a 25 cm, this was then repeated on the left advancing the ureteral catheter up to 25 cm.  Scope was then removed and a 16 Slovenian Johnson catheter was placed into the bladder with 10 cc sterile water in the balloon.  Each of the ureteral catheters were then tied to the Johnson catheter with an 0 silk tie.  Lighted ureteral stents were then placed through the 5 Slovenian open-ended catheters until they were hubbed and they were taped in place and demarcated as to which side they were located in.  The Johnson and the ureteral catheters were then placed into a drainage bag on the left inner thigh.  At this point our portion of the case was completed and we turned the case over to Gyn Onc and colorectal surgery.    Of note Dr. Quan was available and present for all critical portions of the case.       Findings:  1.  Normal-appearing bladder mucosa with bilateral ureteral orifices in normal orthotopic position    Estimated Blood Loss:  No blood loss documented.           Drains: Bilateral 5 Slovenian ureteral catheters, 16 Slovenian Johnson catheter           Total IV Fluids: See anesthesia note            Specimens: * No specimens in log *           Implants: * No implants in log *           Complications: None           Disposition: PACU - hemodynamically stable.           Condition: stable    Jw Quan, DO    I, Jw Quan, was present and scrubbed for the entirety of the case.

## 2022-05-13 NOTE — ANESTHESIOLOGIST PRE-PROCEDURE ATTESTATION
Pre-Procedure Patient Identification:  I am the Primary Anesthesiologist and have identified the patient on 05/13/22 at 7:56 AM.   I have confirmed the procedure(s) will be performed by the following surgeon/proceduralist Geovani Mandel MD.

## 2022-05-13 NOTE — H&P
Admitting Diagnosis: Colovaginal fistula [N82.4]  HPI     Patient is a 76 y.o. female who presents with history of vaginal cancer and a colovaginal fistula.  Biopsies were negative for cancer..     Medical History:   Past Medical History:   Diagnosis Date   • Anemia    • Arthritis    • Colovaginal fistula    • COVID-19 vaccine series completed 02/26/2021   • Disease of thyroid gland    • Diverticulitis of colon    • GERD (gastroesophageal reflux disease)    • Hiatal hernia    • History of snoring    • Hypertension    • Hypothyroidism    • Lung cancer (CMS/HCC)    • Lung nodule    • Vaginal cancer (CMS/HCC) 2018    s/p XRT x 7 weeks and chemo weekly x 5 weeks         Surgical History:   Past Surgical History:   Procedure Laterality Date   • CHOLECYSTECTOMY     • COLONOSCOPY     • DILATION AND CURETTAGE OF UTERUS  02/2018   • LUNG SURGERY     • ROTATOR CUFF REPAIR Right    • TONSILLECTOMY     • WISDOM TOOTH EXTRACTION      hx of     Oncology History   History of cancer of vagina   9/12/2018 Cancer Staged    Staging form: Vagina, AJCC 8th Edition  - Clinical stage from 9/12/2018: FIGO Stage III (cT3, cN1, cM0)     9/21/2018 Initial Diagnosis    Vaginal cancer (CMS/HCC) (HCC)     9/21/2018 - 10/29/2018 Chemotherapy    CISplatin with Concurrent Radiation,        9/27/2018 - 11/28/2018 Radiation Therapy    IMRT concurrent Cisplatin complicated by N/V, dehydration, thrombocytopenia febrile neutropenia     1/15/2019 Surgery    Left groin node excision benign     9/20/2020 Imaging Significant Findings    CT: She had CT scan on 9/2/20: Significant sigmoid colonic wall thickening from chronic diverticulosis. Significant size hiatal hernia present.  Left upper lobe 5 mm lung nodule is stable. There is no grossly enlarged lymph node noted in the left groin on the CT.          Social History:   Social History     Socioeconomic History   • Marital status:      Spouse name: Royal    • Number of children: 4   • Years of  education: None   • Highest education level: None   Occupational History   • Occupation: retired/     Tobacco Use   • Smoking status: Former Smoker     Packs/day: 1.00     Years: 40.00     Pack years: 40.00     Types: Cigarettes     Start date:      Quit date:      Years since quittin.3   • Smokeless tobacco: Never Used   Vaping Use   • Vaping Use: Never used   Substance and Sexual Activity   • Alcohol use: Yes     Comment: occassional    • Drug use: No   • Sexual activity: Never     Comment:    Social History Narrative    Lives with  in a 2 story home     Social Determinants of Health     Food Insecurity: No Food Insecurity   • Worried About Running Out of Food in the Last Year: Never true   • Ran Out of Food in the Last Year: Never true       Family History:   Family History   Problem Relation Age of Onset   • Heart attack Biological Mother    • Hypertension Biological Mother    • Lung cancer Biological Father    • Breast cancer Neg Hx    • Cancer Neg Hx    • Colon cancer Neg Hx    • Ovarian cancer Neg Hx        Allergies: Adhesive tape-silicones       Medication List      ASK your doctor about these medications    atorvastatin 40 mg tablet  Commonly known as: LIPITOR  TAKE 1 TABLET BY MOUTH EVERY DAY     benazepriL 10 mg tablet  Commonly known as: LOTENSIN  Take 10 mg by mouth daily.  Dose: 10 mg     bumetanide 1 mg tablet  Commonly known as: BUMEX  Take 1 mg by mouth as needed.  Dose: 1 mg     cholecalciferol (vitamin D3) 1,000 unit (25 mcg) tablet  Take 2,000 Units by mouth daily.  Dose: 2,000 Units     glucosamine-D3-Boswellia serr 1,500-400-100 mg-unit-mg tablet  Take 1 tablet by mouth daily.  Dose: 1 tablet     levothyroxine 88 mcg tablet  Commonly known as: SYNTHROID  Take 88 mcg by mouth daily.  Dose: 88 mcg     VIBRAMYCIN 100 mg capsule  Take 1 tablet PO at 10 am and 1 tablet PO 10 pm  Generic drug: doxycycline hyclate          Review of Systems  All other systems  reviewed and negative except as noted in the HPI.    Objective     Vital Signs for the last 24 hours:  Temp:  [36.2 °C (97.1 °F)] 36.2 °C (97.1 °F)  Heart Rate:  [97] 97  Resp:  [18] 18  BP: (152)/(68) 152/68    Physical Exam:  General: well-developed, well-nourished, no apparent distress  Neck: supple, no masses  Lymphatic: no supraclavicular, cervical, or inguinal adenopathy  Respiratory: lungs clear to auscultation bilaterally, normal respiratory effort  Cardiovascular: regular rate and rhythm, no murmurs, rubs, gallops.   Extremity: no clubbing, cyanosis, edema  GI: abdomen soft, non-distended, non-tender, no hepatosplenomegaly, no masses  Neurologic: alert & oriented x3, no gross deficits  Psychiatric: mood and affect normal  Musculoskeletal: no deformity or gross strength deficit  Gynecologic: normal external female genitalia.    Speculum: normal appearing cervix and vagina   Bimanual: normal sized uterus, no palpable adnexal masses   Rectovaginal: no masses/nodularity      Labs  I have reviewed the patient's labs.  Significant abnormals are Cr 1.2.      Assessment/Plan     Code Status: Full Code    Patient is signed consent for robotic hysterectomy, BSO as part of planned fistula resection.  We discussed the risks of injury to the bowel bladder and ureter that are elevated due to her history of prior radiation therapy.

## 2022-05-13 NOTE — OR SURGEON
Pre-Procedure patient identification:  I am the primary operating surgeon/proceduralist and I have identified the patient on 05/13/22 at 6:46 AM Geovani Mandel MD  Phone Number: [phone]

## 2022-05-13 NOTE — OR SURGEON
Pre-Procedure patient identification:  I am the primary operating surgeon/proceduralist and I have identified the patient on 05/13/22 at 7:05 AM Geovani Mandel MD  Phone Number: [phone]

## 2022-05-13 NOTE — H&P
HISTORY & PHYSICAL EXAM     HPI: Raegan is here to be seen today in follow-up.  She recently underwent robotic resection of an adenocarcinoma of her lung by Dr. Gamez.  She has a history of a colovaginal fistula related to was thought to be diverticulitis and possibly radiation to her cervix from cervical cancer.  We do not have any evidence of recurrent cervical cancer at this time.  She continues to have intermittent drainage of feculent material from her vagina and has been doing an excellent job with hygiene using a spray bottle to keep matters are clean.  She has not had any fevers or chills, nausea or vomiting.  She notes some family events coming up in the end of April and would like to see if we could delay any potential intervention until after that.  Colonoscopy was attempted in the past and was not successful due to the tortuosity of the colon in the rectum.  This was performed in the operating room as an exam under anesthesia with Dr. Jimenez and we even attempted to use a pediatric bronchoscope and were not successful in passing and navigating the colon.  That said on evaluation on the vaginal side, there is no evidence of a recurrent cervical cancer.     Medical History        Past Medical History:   Diagnosis Date   • Anemia     • Arthritis     • Colovaginal fistula     • COVID-19 vaccine series completed 02/26/2021   • Disease of thyroid gland     • Diverticulitis of colon     • GERD (gastroesophageal reflux disease)     • Hiatal hernia     • History of snoring     • Hypertension     • Hypothyroidism     • Lung cancer (CMS/HCC)     • Lung nodule     • Vaginal cancer (CMS/HCC) 2018     s/p XRT x 7 weeks and chemo weekly x 5 weeks           Surgical History         Past Surgical History:   Procedure Laterality Date   • CHOLECYSTECTOMY       • COLONOSCOPY       • DILATION AND CURETTAGE OF UTERUS   02/2018   • LUNG SURGERY       • ROTATOR CUFF REPAIR Right              Current Outpatient Medications:   •   benazepril (LOTENSIN) 10 mg tablet, Take 10 mg by mouth daily., Disp: , Rfl:   •  bumetanide (BUMEX) 1 mg tablet, Take 1 mg by mouth as needed.  , Disp: , Rfl:   •  cholecalciferol, vitamin D3, 1,000 unit (25 mcg) tablet, Take 2,000 Units by mouth daily., Disp: , Rfl:   •  levothyroxine (SYNTHROID) 88 mcg tablet, Take 88 mcg by mouth daily.  , Disp: , Rfl: 3  •  omega-3 acid ethyl esters (LOVAZA) 1 gram capsule, , Disp: , Rfl:   •  pantoprazole (PROTONIX) 40 mg EC tablet, Take 40 mg by mouth daily.  , Disp: , Rfl:          Allergies   Allergen Reactions   • Adhesive Tape-Silicones         Causes bruising      Social History               Socioeconomic History   • Marital status:        Spouse name: Royal    • Number of children: 4   • Years of education: Not on file   • Highest education level: Not on file   Occupational History   • Occupation: retired/     Tobacco Use   • Smoking status: Former Smoker       Packs/day: 1.00       Years: 40.00       Pack years: 40.00       Types: Cigarettes       Start date:        Quit date:        Years since quittin.1   • Smokeless tobacco: Never Used   Vaping Use   • Vaping Use: Never used   Substance and Sexual Activity   • Alcohol use: Yes       Comment: occassional    • Drug use: No   • Sexual activity: Never   Other Topics Concern   • Not on file   Social History Narrative     Lives with  in a 2 story home      Social Determinants of Health          Financial Resource Strain: Not on file   Food Insecurity: No Food Insecurity   • Worried About Running Out of Food in the Last Year: Never true   • Ran Out of Food in the Last Year: Never true   Transportation Needs: Not on file   Physical Activity: Not on file   Stress: Not on file   Social Connections: Not on file   Intimate Partner Violence: Not on file   Housing Stability: Not on file               Family History   Problem Relation Age of Onset   • Heart attack Biological Mother     •  Hypertension Biological Mother     • Lung cancer Biological Father     • Breast cancer Neg Hx     • Cancer Neg Hx     • Colon cancer Neg Hx     • Ovarian cancer Neg Hx           REVIEW OF SYSTEMS: Unchanged     PHYSICAL EXAM:  GEN: On examination the patient is resting comfortably.  NEURO/PSYCH: Alert and oriented ×3  HEENT: Eyes: Sclera anicteric  CHEST: Equal rise and fall the chest.  No tenderness bilaterally.  SKIN: Warm and moist  NODES: No groin or cervical lymphadenopathy  EXT: No palpable edema of the bilateral lower extremities.  No clubbing.  GI: Abdomen soft, nontender, nondistended.  No palpable liver or spleen edge.  No ventral hernias, mass, or tenderness.  RECTAL: Deferred        ASSESSMENT/PLAN:      Colovaginal fistula  Patient has a colovaginal/colouterine fistula present likely related to a combination of diverticular disease and history of radiation therapy.  She has been doing well and has not had significant vaginitis as a result of her excellent hygiene however continues to have feculent drainage from the vagina intermittently.  My assessment is that she should have elective surgery to takedown the colovaginal fistula which we would approach robotically.  The patient additionally, should have hysterectomy at the same time and I have discussed the case with .  It seems most reasonable to proceed on May 6 with a robotic low anterior resection, robotic ALYSSA/BSO, takedown of colovaginal fistula and anastomosis.  I discussed with the patient that given her history of radiation therapy to the cervix, there is a chance that she may require temporary diverting ileostomy depending on how the rectum appears.  I also discussed with the patient the risks of procedure with her which include bleeding, infection, anastomotic leak, need for open surgery, injury to the structures, need for additional bowel resection, need for temporary permanent stoma, MI, stroke, death.  She would like to proceed.  We  are going to also make arrangements to have urology placed bilateral lighted stents and perform cystoscopy at the time of the operation.

## 2022-05-13 NOTE — BRIEF OP NOTE
1. Robotic proctectomy and sigmoidectomy with takedown of colouterine and rectovaginal fistula and coloanal anastomosis with diverting loop ileostomy 2. Robotic splenic flexure mobilization.  3. ALYSSA/BSO (Trev Jimenez primary) Procedure Note    Procedure:    1. Robotic proctectomy and sigmoidectomy with takedown of colouterine and rectovaginal fistula and coloanal anastomosis with diverting loop ileostomy 2. Robotic splenic flexure mobilization.  3. ALYSSA/BSO (Trev Jimenez primary)  CPT(R) Code:  66177 - OR LAP,SURG,COLECTOMY,W/ANAST    Procedure:    Cystoscopy with Bilateral Ureteral Stent Placement  CPT(R) Code:  82993 - OR CYSTOURETHROSCOPY,URETER CATHETER    Procedure:    HYSTERECTOMY ABDOMINAL TOTAL--TLH/LAVH LAPAROSCOPIC ROBOTIC (DA KASHMIR)  CPT(R) Code:  82991 - OR LAPAROSCOPY W TOT HYSTERECTUTERUS <=250 GRAM  W TUBE/OVARY      Pre-op Diagnosis     * Colovaginal fistula [N82.4]       Post-op Diagnosis     * Colovaginal fistula [N82.4]    Surgeon(s) and Role:  Panel 1:     * Geovani Mandel MD - Primary     * Ghanshyam Ponce MD - Resident - Assisting  Panel 2:     * Jw Quan DO - Primary     * Kg Eng DO - Assisting  Panel 3:     * Trev Jimenez MD - Primary    Anesthesia: General    Staff:   Circulator: Renee Morales RN; Josee Santamaria RN; Sujata Chin, ASHLEY  Scrub Person: Thierry Soler RN; Sujata Chin, ASHLEY; Amna Craven    Procedure Details   Robotic Assisted proctosigmoidectomy, colo-anal anastomosis with 28 EEA stapler, diverting loop ileostomy. Total hysterectomy, BSO (see separate note from GYN). Bilateral ureteral stents placement - per Urology (Right stent was discontinued at the end of the case).    Finding: colo (sigmoid)-vaginal fistula, with small abscess cavity, diverticulosis on sigmoid and descending colon.    Estimated Blood Loss: 150 mL    Crystalloids: 4000 ml    EBL: 150 ml    UO: 450 ml +    No transfusions, no  albumin      Specimens:                Order Name Source Comment Collection Info Order Time   TYPE AND SCREEN Blood, Venous  Collected By: Bianca Franco RN 5/13/2022  6:12 AM     Are you aware of this patient having previously identified antibodies?   NO          Does this Patient have Sickle Cell Disease/Sickle Cell Anemia?   NO          Release to patient   Immediate        BLOOD GAS, ARTERIAL COMPREHENSIVE Blood, Arterial Pre-op diagnosis:  Robotic Low Anterior Resection w/C+S, Possible Stoma Collected By: Geovani Mandel MD 5/13/2022  2:57 PM     Release to patient   Immediate        BLOOD GAS, ARTERIAL COMPREHENSIVE Blood, Arterial Pre-op diagnosis:  Robotic Low Anterior Resection w/C+S, Possible Stoma Collected By: Geovani Mandel MD 5/13/2022  4:02 PM     Release to patient   Immediate        BLOOD GAS, ARTERIAL COMPREHENSIVE Blood, Arterial   5/13/2022  4:24 PM     Release to patient   Immediate        BLOOD GAS, ARTERIAL COMPREHENSIVE Blood, Arterial Pre-op diagnosis:  Robotic Low Anterior Resection w/C+S, Possible Stoma Collected By: Geovani Mandel MD 5/13/2022  4:48 PM     Release to patient   Immediate        PREPARE RBC  Pre-op for Procedure  5/13/2022 11:49 AM     Transfusion indications   Pre-OP major surgery with bleeding risk          Has consent been obtained?   Yes          Are you aware of this patient having previously identified antibodies?   NO          Does this Patient have Sickle Cell Disease/Sickle Cell Anemia?   NO        PATHOLOGY TISSUE EXAM Uterus/Tubes/Ovaries Pre-op diagnosis:  Robotic Low Anterior Resection w/C+S, Possible Stoma Collected By: Geovani Mandel MD 5/13/2022  3:23 PM     Release to patient   Immediate              Drains:   Ileostomy RLQ (Active)   Tolerance no signs/symptoms of discomfort 05/13/22 1918       Urethral Catheter Latex 16 Fr (Active)       Implants:   Implant Name Type Inv. Item Serial No.  Lot No. LRB No. Used  Action   STENT URETERAL ILLUM IRIS KIT - [id number] Urological stents STENT URETERAL ILLUM IRIS KIT  [employer]   1 Implanted              Complications:  None; patient tolerated the procedure well.           Disposition: PACU - hemodynamically stable.           Condition: stable    Geovani Mandel MD  Phone Number: [phone]

## 2022-05-13 NOTE — ANESTHESIA PROCEDURE NOTES
Airway  Urgency: elective    Start Time: 5/13/2022 11:43 AM  Airway not difficult    General Information and Staff    Patient location during procedure: OR  Anesthesiologist: Corey Corley MD  Resident/CRNA: Jenise Shine CRNA  Performed: resident/CRNA     Indications and Patient Condition  Indications for airway management: anesthesia  Sedation level: deep  Preoxygenated: yes  Patient position: sniffing  MILS maintained throughout  Mask difficulty assessment: 1 - vent by mask    Final Airway Details  Final airway type: endotracheal airway      Successful airway: ETT wEVAC  Cuffed: yes   Successful intubation technique: video laryngoscopy (Roblero 3)  Facilitating devices/methods: intubating stylet  Endotracheal tube insertion site: oral  Blade: Hayley  Blade size: #3  ETT size (mm): 7.0  Cormack-Lehane Classification: grade I - full view of glottis  Placement verified by: chest auscultation and capnometry   Measured from: teeth  ETT to teeth (cm): 22  Number of attempts at approach: 1  Number of other approaches attempted: 0  Atraumatic airway insertion

## 2022-05-13 NOTE — OP NOTE
REPORT TYPE: Operative Note    DATE OF OPERATION: 05/13/2022    PREOPERATIVE DIAGNOSIS:  Rectovaginal fistula.    POSTOPERATIVE DIAGNOSIS:  Rectovaginal fistula.    PROCEDURE:  Robotic total laparoscopic hysterectomy, bilateral salpingo-oophorectomy.    ATTENDING SURGEON:  Trev Jimenez M.D.    ASSISTANT:  Geovani Mandel M.D.    ANESTHESIA:  General endotracheal with local.    ESTIMATED BLOOD LOSS:  100 mL.    COMPLICATIONS:  None.    DESCRIPTION OF PROCEDURE:  The patient and surgeon were mutually identified and consent was signed.  The patient was taken to the operating room where anesthesia was administered by attending anesthesiologist.  Bilateral lighted stents were placed.    Please see separately dictated operative note.  Dr. Mandel then placed ports and docked the robot.  See his separately dictated operative note.  When I was called into the operating room, I divided the right round ligament.  I divided the   vesicouterine peritoneum and the left round ligament.  I opened up the bilateral retroperitoneum for visualization of the LAD stent on the right.  I isolated, ligated and divided the IP ligament.  I dissected the posterior broad ligament down to the   level of the uterine artery.  I dissected the bladder off the underlying cervix and ligated the uterine artery.  I should note that due to the patient's history of pelvic radiation and inflammation from the rectovaginal septum, the tissue was edematous   and friable.  This was repeated on the left hand side.  I was then able to take down the uterosacral ligaments and cardinal ligaments bilaterally.  I entered the vagina anteriorly, followed by the posterior.  Dr. Mandel was then able to complete the   dissection of the rectum off of the posterior vagina.  I closed the vagina with 2-0 PDO barbed suture in a running suture.  Dr. Mandel then completed the remainder of the case and extracted the uterus, cervix, tubes and ovaries and passed  off for   permanent pathology.  At the end of my part of the case, the patient was intubated.      Trev Jimenez MD      CC:MD ODIN REINOSO DO    DD: 05/13/2022 15:50  DT: 05/13/2022 15:51  Voice ID: [id number]/Report ID: [id number]  bs

## 2022-05-13 NOTE — PROGRESS NOTES
Raegan is a 76 year old female who is presenting today for a combination colorectal, gyn, Uro case. She has a colo-vaginal fistual. Urology to place stents and to be available as needed. Pt S&E in pre-op consent signed

## 2022-05-13 NOTE — ANESTHESIA PROCEDURE NOTES
Arterial Line:    Start time: 5/13/2022 10:30 AM    An arterial line was placed in the pre-op for the following indication(s): continuous blood pressure monitoring and blood sampling needed.    A 20 G (size), 1 and 3/4 inch (length), Arrow (type) catheter was placed,   Seldinger technique used, into the Right radial artery, secured by Tegaderm and tape.      Procedure performed using ultrasound guidance and surface landmarks.  Image not captured or stored.  Image visualization comments: Ultrasound used to visualize the placement of wire and/or needle in appropriate artery.    Events:  patient tolerated procedure well with no complications.    The supervising anesthesiologist was   Performed By: anesthesiologist  Attending: Gasper Johnson MD

## 2022-05-14 LAB
ANION GAP SERPL CALC-SCNC: 10 MEQ/L (ref 3–15)
BASOPHILS # BLD: 0.11 K/UL (ref 0.01–0.1)
BASOPHILS NFR BLD: 1 %
BUN SERPL-MCNC: 19 MG/DL (ref 8–20)
CALCIUM SERPL-MCNC: 7.9 MG/DL (ref 8.9–10.3)
CHLORIDE SERPL-SCNC: 109 MEQ/L (ref 98–109)
CO2 SERPL-SCNC: 22 MEQ/L (ref 22–32)
CREAT SERPL-MCNC: 1.4 MG/DL (ref 0.6–1.1)
CRP SERPL-MCNC: 84.11 MG/L
DIFFERENTIAL METHOD BLD: ABNORMAL
EOSINOPHIL # BLD: 0 K/UL (ref 0.04–0.36)
EOSINOPHIL NFR BLD: 0 %
ERYTHROCYTE [DISTWIDTH] IN BLOOD BY AUTOMATED COUNT: 14.3 % (ref 11.7–14.4)
GFR SERPL CREATININE-BSD FRML MDRD: 36.5 ML/MIN/1.73M*2
GLUCOSE SERPL-MCNC: 194 MG/DL (ref 70–99)
HCT VFR BLDCO AUTO: 31.2 % (ref 35–45)
HGB BLD-MCNC: 10.2 G/DL (ref 11.8–15.7)
LYMPHOCYTES # BLD: 0.88 K/UL (ref 1.2–3.5)
LYMPHOCYTES NFR BLD: 8 %
MAGNESIUM SERPL-MCNC: 1.7 MG/DL (ref 1.8–2.5)
MCH RBC QN AUTO: 29.6 PG (ref 28–33.2)
MCHC RBC AUTO-ENTMCNC: 32.7 G/DL (ref 32.2–35.5)
MCV RBC AUTO: 90.4 FL (ref 83–98)
METAMYELOCYTES # BLD MANUAL: 0.22 K/UL
METAMYELOCYTES NFR BLD MANUAL: 2 %
MONOCYTES # BLD: 0.44 K/UL (ref 0.28–0.8)
MONOCYTES NFR BLD: 4 %
NEUTS BAND # BLD: 0.88 K/UL (ref 0–0.53)
NEUTS BAND # BLD: 8.44 K/UL (ref 1.7–7)
NEUTS BAND NFR BLD: 8 %
NEUTS SEG NFR BLD: 77 %
OVALOCYTES BLD QL SMEAR: ABNORMAL
PDW BLD AUTO: 9.5 FL (ref 9.4–12.3)
PHOSPHATE SERPL-MCNC: 3.7 MG/DL (ref 2.4–4.7)
PLAT MORPH BLD: NORMAL
PLATELET # BLD AUTO: 201 K/UL (ref 150–369)
PLATELET # BLD EST: ABNORMAL 10*3/UL
POTASSIUM SERPL-SCNC: 4.1 MEQ/L (ref 3.6–5.1)
RBC # BLD AUTO: 3.45 M/UL (ref 3.93–5.22)
SODIUM SERPL-SCNC: 141 MEQ/L (ref 136–144)
TOXIC GRANULES BLD QL SMEAR: SLIGHT
WBC # BLD AUTO: 10.96 K/UL (ref 3.8–10.5)

## 2022-05-14 PROCEDURE — 80048 BASIC METABOLIC PNL TOTAL CA: CPT | Performed by: STUDENT IN AN ORGANIZED HEALTH CARE EDUCATION/TRAINING PROGRAM

## 2022-05-14 PROCEDURE — 36415 COLL VENOUS BLD VENIPUNCTURE: CPT | Performed by: STUDENT IN AN ORGANIZED HEALTH CARE EDUCATION/TRAINING PROGRAM

## 2022-05-14 PROCEDURE — 99024 POSTOP FOLLOW-UP VISIT: CPT | Performed by: COLON & RECTAL SURGERY

## 2022-05-14 PROCEDURE — 97162 PT EVAL MOD COMPLEX 30 MIN: CPT | Mod: GP

## 2022-05-14 PROCEDURE — 63600000 HC DRUGS/DETAIL CODE: Performed by: SURGERY

## 2022-05-14 PROCEDURE — 25000000 HC PHARMACY GENERAL: Performed by: SURGERY

## 2022-05-14 PROCEDURE — 63700000 HC SELF-ADMINISTRABLE DRUG: Performed by: STUDENT IN AN ORGANIZED HEALTH CARE EDUCATION/TRAINING PROGRAM

## 2022-05-14 PROCEDURE — 63700000 HC SELF-ADMINISTRABLE DRUG: Performed by: SURGERY

## 2022-05-14 PROCEDURE — 85025 COMPLETE CBC W/AUTO DIFF WBC: CPT | Performed by: STUDENT IN AN ORGANIZED HEALTH CARE EDUCATION/TRAINING PROGRAM

## 2022-05-14 PROCEDURE — 97166 OT EVAL MOD COMPLEX 45 MIN: CPT | Mod: GO

## 2022-05-14 PROCEDURE — 84100 ASSAY OF PHOSPHORUS: CPT | Performed by: STUDENT IN AN ORGANIZED HEALTH CARE EDUCATION/TRAINING PROGRAM

## 2022-05-14 PROCEDURE — 99024 POSTOP FOLLOW-UP VISIT: CPT | Performed by: OBSTETRICS & GYNECOLOGY

## 2022-05-14 PROCEDURE — 63600000 HC DRUGS/DETAIL CODE: Performed by: STUDENT IN AN ORGANIZED HEALTH CARE EDUCATION/TRAINING PROGRAM

## 2022-05-14 PROCEDURE — 21400000 HC ROOM AND CARE CCU/INTERMEDIATE

## 2022-05-14 PROCEDURE — 86140 C-REACTIVE PROTEIN: CPT | Performed by: STUDENT IN AN ORGANIZED HEALTH CARE EDUCATION/TRAINING PROGRAM

## 2022-05-14 PROCEDURE — 25000000 HC PHARMACY GENERAL: Performed by: STUDENT IN AN ORGANIZED HEALTH CARE EDUCATION/TRAINING PROGRAM

## 2022-05-14 PROCEDURE — 25800000 HC PHARMACY IV SOLUTIONS: Performed by: SURGERY

## 2022-05-14 PROCEDURE — 83735 ASSAY OF MAGNESIUM: CPT | Performed by: STUDENT IN AN ORGANIZED HEALTH CARE EDUCATION/TRAINING PROGRAM

## 2022-05-14 RX ORDER — DEXTROSE MONOHYDRATE, SODIUM CHLORIDE, AND POTASSIUM CHLORIDE 50; 1.49; 4.5 G/1000ML; G/1000ML; G/1000ML
INJECTION, SOLUTION INTRAVENOUS CONTINUOUS
Status: ACTIVE | OUTPATIENT
Start: 2022-05-14 | End: 2022-05-21

## 2022-05-14 RX ADMIN — POTASSIUM CHLORIDE, DEXTROSE MONOHYDRATE AND SODIUM CHLORIDE: 150; 5; 450 INJECTION, SOLUTION INTRAVENOUS at 12:59

## 2022-05-14 RX ADMIN — SODIUM CHLORIDE, SODIUM LACTATE, POTASSIUM CHLORIDE, CALCIUM CHLORIDE AND DEXTROSE MONOHYDRATE: 5; 600; 310; 30; 20 INJECTION, SOLUTION INTRAVENOUS at 05:31

## 2022-05-14 RX ADMIN — SODIUM CHLORIDE, POTASSIUM CHLORIDE, SODIUM LACTATE AND CALCIUM CHLORIDE 500 ML: 600; 310; 30; 20 INJECTION, SOLUTION INTRAVENOUS at 15:52

## 2022-05-14 RX ADMIN — GABAPENTIN 300 MG: 300 CAPSULE ORAL at 20:00

## 2022-05-14 RX ADMIN — METRONIDAZOLE 500 MG: 500 INJECTION, SOLUTION INTRAVENOUS at 05:31

## 2022-05-14 RX ADMIN — MAGNESIUM SULFATE HEPTAHYDRATE 4 G: 40 INJECTION, SOLUTION INTRAVENOUS at 12:59

## 2022-05-14 RX ADMIN — DOXYCYCLINE 200 MG: 100 INJECTION, POWDER, LYOPHILIZED, FOR SOLUTION INTRAVENOUS at 22:22

## 2022-05-14 RX ADMIN — ACETAMINOPHEN 975 MG: 325 TABLET, FILM COATED ORAL at 15:46

## 2022-05-14 RX ADMIN — ALVIMOPAN 12 MG: 12 CAPSULE ORAL at 20:00

## 2022-05-14 RX ADMIN — ALVIMOPAN 12 MG: 12 CAPSULE ORAL at 08:26

## 2022-05-14 RX ADMIN — KETOROLAC TROMETHAMINE 15 MG: 15 INJECTION, SOLUTION INTRAMUSCULAR; INTRAVENOUS at 08:31

## 2022-05-14 RX ADMIN — GABAPENTIN 300 MG: 300 CAPSULE ORAL at 08:26

## 2022-05-14 RX ADMIN — CIPROFLOXACIN 400 MG: 2 INJECTION, SOLUTION INTRAVENOUS at 08:26

## 2022-05-14 RX ADMIN — ACETAMINOPHEN 975 MG: 325 TABLET, FILM COATED ORAL at 22:21

## 2022-05-14 RX ADMIN — METRONIDAZOLE 500 MG: 500 INJECTION, SOLUTION INTRAVENOUS at 15:30

## 2022-05-14 RX ADMIN — ACETAMINOPHEN 975 MG: 325 TABLET, FILM COATED ORAL at 05:27

## 2022-05-14 ASSESSMENT — COGNITIVE AND FUNCTIONAL STATUS - GENERAL
MOVING TO AND FROM BED TO CHAIR: 3 - A LITTLE
AFFECT: WFL
DRESSING REGULAR LOWER BODY CLOTHING: 2 - A LOT
HELP NEEDED FOR PERSONAL GROOMING: 3 - A LITTLE
EATING MEALS: 4 - NONE
TOILETING: 2 - A LOT
AFFECT: WFL
DRESSING REGULAR UPPER BODY CLOTHING: 3 - A LITTLE
HELP NEEDED FOR BATHING: 2 - A LOT
WALKING IN HOSPITAL ROOM: 3 - A LITTLE
CLIMB 3 TO 5 STEPS WITH RAILING: 3 - A LITTLE
STANDING UP FROM CHAIR USING ARMS: 3 - A LITTLE

## 2022-05-14 NOTE — PLAN OF CARE
Problem: Adult Inpatient Plan of Care  Goal: Readiness for Transition of Care  Intervention: Mutually Develop Transition Plan  Flowsheets (Taken 5/14/2022 0218)  Anticipated Discharge Disposition:   family member's home with services   home with assistance  Equipment Needed After Discharge: none  Assistive Device/Animal Currently Used at Home: none  Anticipated Changes Related to Illness: none  Readmission Within the Last 30 Days: no previous admission in last 30 days  Patient/Family Anticipated Services at Transition: none  Patient/Family Anticipates Transition to:   home   home with family  Transportation Anticipated: family or friend will provide  Concerns to be Addressed:   care coordination/care conferences   discharge planning  Offered/Gave Vendor List: (Pt known to [facility].) yes    SW s/w pt to complete mutually develop transition plan. Pt stated she lives with her  in [place]. Pt stated she will d/c to her daughter's home ([address] [place] [address] in [place]) when stable for d/c. Pt stated she is independent with her ADL's. Pt stated she does not use DME and has never been to SNF. Pt known to [facility] following last admission. Pt stated her dtr works from home and will be able to provide assistance if needed when stable. Dtr to transport home when stable for d/c. PCP, pharmacy, demographic information, and emergency contact information verified. Pt prefers that her medications are sent to the [facility] in [place] when stable for d/c.

## 2022-05-14 NOTE — PROGRESS NOTES
Colorectal Surgery Post Op    Raegan Young is a 76 y.o. F s/p s/p robotic LAR, ALYSSA/BSO, takedown of colovaginal and colouterine fistulas, coloanal anastomosis, DLI for hx of diverticulitis and radiation therapy for cervical ca today.     S  Patient is resting comfortably when seen at bedside. Denies nausea or vomiting. Denies other consitutional symptoms such as fevers or chills  Pain well controlled   Surgical incisions clean, dry, intact   Johnson in place  Intra op  cc, 4 L IVF, 450 cc UOP      O  Vitals:    05/13/22 2305   BP: (!) 148/92   Pulse: 99   Resp: 18   Temp: 36.7 °C (98 °F)   SpO2: 93%       Physical Exam  Constitutional:       Appearance: Normal appearance.   HENT:      Head: Normocephalic and atraumatic.   Cardiovascular:      Rate and Rhythm: Normal rate and regular rhythm.   Pulmonary:      Effort: Pulmonary effort is normal.   Abdominal:      General: Abdomen is flat. There is no distension.      Palpations: Abdomen is soft.      Comments: Incisions clean dry intact. Diverting loop ileostomy pink, patent with bowel sweat and minimal serosanguinous fluid   Genitourinary:     Comments: Fc place  Skin:     General: Skin is warm and dry.   Neurological:      General: No focal deficit present.      Mental Status: She is alert and oriented to person, place, and time.        A/P  Raegan Young is a 76 y.o. F s/p robotic LAR, ALYSSA/BSO, takedown of colovaginal and colouterine fistulas, coloanal anastomosis, DLI for hx of diverticulitis and radiation therapy for cervical ca today.      - CLD,   - Toradol, tylenol, dilaudid, gabapentin  - Cipro/flagyl  - Fc, strict UOP  - ARBF    Reji Ugalde MD  General Surgery PGY-1

## 2022-05-14 NOTE — PLAN OF CARE
Problem: Adult Inpatient Plan of Care  Goal: Plan of Care Review  Flowsheets (Taken 5/14/2022 3094)  Progress: improving  Plan of Care Reviewed With: patient  Outcome Summary: OT vanna  completed     Problem: Self-Care Deficit  Goal: Improved Ability to Complete Activities of Daily Living  Outcome: Progressing

## 2022-05-14 NOTE — PLAN OF CARE
Problem: Adult Inpatient Plan of Care  Goal: Plan of Care Review  Outcome: Progressing  Flowsheets (Taken 5/14/2022 1038)  Progress: improving  Plan of Care Reviewed With: patient  Outcome Summary: PT eval     Problem: Mobility Impairment  Goal: Optimal Mobility  Outcome: Progressing

## 2022-05-14 NOTE — POST-OP (NON-BILLABLE)
Patient sitting in chair comfortable, no complaints    Incisions clean dry and intact    No blood on perineum    Discussed surgery with the patient, we will be available if questions arise.

## 2022-05-14 NOTE — PERIOPERATIVE NURSING NOTE
Patient had an uneventful recovery in PACU, Pain under control. Daughter visited. Patient is ready to go to room.

## 2022-05-14 NOTE — HOSPITAL COURSE
Raegan is a 76 y.o. female admitted on 5/13/2022 with Colovaginal fistula [N82.4]. Principal problem is Colovaginal fistula.    Past Medical History  Raegan has a past medical history of Anemia, Arthritis, Colovaginal fistula, COVID-19 vaccine series completed (02/26/2021), Disease of thyroid gland, Diverticulitis of colon, GERD (gastroesophageal reflux disease), Hiatal hernia, History of snoring, Hypertension, Hypothyroidism, Lung cancer (CMS/HCC), Lung nodule, and Vaginal cancer (CMS/HCC) (2018).    Pt reports independent PLOF, extra-strength Tylenol (2/day) for pain from previous surgery, frequent falls, none recently    History of Present Illness   s/p robotic LAR, ALYSSA/BSO, takedown of colovaginal and colouterine fistulas, coloanal anastomosis, DLI  5/17:  decreased po intake, increased abd distention, and increase in creatinine

## 2022-05-14 NOTE — ANESTHESIA POSTPROCEDURE EVALUATION
Patient: Raegan Young    Procedure Summary     Date: 05/13/22 Room / Location: [facility] OR 6 / [facility] OR    Anesthesia Start: 1131 Anesthesia Stop: 1906    Procedures:       1. Robotic proctectomy and sigmoidectomy with takedown of colouterine and rectovaginal fistula and coloanal anastomosis with diverting loop ileostomy 2. Robotic splenic flexure mobilization.  3. ALYSSA/BSO (Trev Jimenez primary) (N/A )      Cystoscopy with Bilateral Ureteral Stent Placement (Bilateral )      HYSTERECTOMY ABDOMINAL TOTAL--TLH/LAVH LAPAROSCOPIC ROBOTIC (DA KASHMIR) (N/A ) Diagnosis:       Colovaginal fistula      (Robotic Low Anterior Resection w/C+S, Possible Stoma)    Surgeons: Geovani Mandel MD; Jw Quan DO; Trev Jimenez MD Responsible Provider: Gasper Johnson MD    Anesthesia Type: general ASA Status: 3          Anesthesia Type: general  PACU Vitals    No data found in the last 10 encounters.           Anesthesia Post Evaluation    Pain management: satisfactory to patient  Patient location during evaluation: PACU  Patient participation: complete - patient participated  Level of consciousness: awake and alert  Cardiovascular status: acceptable  Airway Patency: adequate and patent  Respiratory status: nonlabored ventilation, spontaneous ventilation and nasal cannula  Anesthetic complications: no

## 2022-05-14 NOTE — PROGRESS NOTES
Patient:  Raegan Young  Location:  [facility] [address] [address]  MRN:  [id number]  Today's date:  5/14/2022     Pt left sitting in chair with call bell in reach. RN aware of ADLs and mobility performance in session.      Raegan is a 76 y.o. female admitted on 5/13/2022 with Colovaginal fistula [N82.4]. Principal problem is Colovaginal fistula.    Past Medical History  Raegan has a past medical history of Anemia, Arthritis, Colovaginal fistula, COVID-19 vaccine series completed (02/26/2021), Disease of thyroid gland, Diverticulitis of colon, GERD (gastroesophageal reflux disease), Hiatal hernia, History of snoring, Hypertension, Hypothyroidism, Lung cancer (CMS/[facility]), Lung nodule, and Vaginal cancer (CMS/[facility]) (2018).    History of Present Illness   s/p robotic LAR, ALYSSA/BSO, takedown of colovaginal and colouterine fistulas, coloanal anastomosis, DLI      OT Vitals    Date/Time Pulse SpO2 Pt Activity BP BP Location BP Method Pt Position [facility]   05/14/22 0930 84 98 % At rest 95/55 Left upper arm Automatic Lying KW   05/14/22 0946 86 98 % Walking 108/72 Left upper arm Automatic Sitting KW      OT Pain    Date/Time Pain Type Location Rating: Rest Rating: Activity [facility]   05/14/22 0930 Pain Assessment abdomen 0 2 KW          Prior Living Environment    Flowsheet Row Most Recent Value   Current Living Arrangements other (see comments)  [will go to daughter's]   Home Accessibility stairs to enter home (Group), stairs within home (Group)   Number of Stairs, Main Entrance 1   Location, Patient Bedroom second floor, must negotiate stairs to access   Location, Bathroom first (main) floor, second floor, must negotiate stairs to access   Number of Stairs, Within Home, Primary 12        Prior Level of Function    Flowsheet Row Most Recent Value   Ambulation independent   Transferring independent   Toileting independent   Bathing independent   Dressing independent   Eating independent   Communication  understands/communicates without difficulty   Assistive Device Currently Used at Home none        Occupational Profile    Flowsheet Row Most Recent Value   Occupational History/Life Experiences retired           OT Evaluation and Treatment - 05/14/22 0929        OT Time Calculation    Start Time 0930     Stop Time 0950     Time Calculation (min) 20 min        Session Details    Document Type initial evaluation     Mode of Treatment occupational therapy        General Information    Patient Profile Reviewed yes     General Observations of Patient supine in bed     Existing Precautions/Restrictions fall        Cognition/Psychosocial    Affect/Mental Status (Cognition) WFL     Orientation Status (Cognition) oriented x 3     Follows Commands (Cognition) WFL        Hearing Assessment    Hearing Status [facility]        Vision Assessment/Intervention    Visual Impairment/Limitations corrective lenses for reading;WFL        Sensory Assessment (Somatosensory)    Sensory Assessment (Somatosensory) UE sensation intact        Range of Motion (ROM)    Range of Motion bilateral upper extremities;ROM is WFL        Strength (Manual Muscle Testing)    Strength (Manual Muscle Testing) bilateral upper extremities;strength is WFL        Bed Mobility    [place], Supine to Sit minimum assist (75% or more patient effort)     Verbal Cues (Supine to Sit) technique     Comment (Bed Mobility) log roll tech 2* pain        Sit to Stand Transfer    [place], Sit to Stand Transfer minimum assist (75% or more patient effort)     Verbal Cues hand placement;technique     Assistive Device none     Comment min A for functional mob.        Stand to Sit Transfer    [place], Stand to Sit Transfer minimum assist (75% or more patient effort)     Assistive Device none     Comment good eccentric control        Safety Issues, Functional Mobility    Impairments Affecting Function (Mobility) endurance/activity tolerance;pain        Balance    Static  Sitting Balance WFL     Dynamic Sitting Balance WFL     Static Standing Balance mild impairment     Dynamic Standing Balance mild impairment        Upper Body Dressing    Tasks don;hospital gown     [place] minimum assist (75% or more patient effort)        Lower Body Dressing    Tasks don;socks     [place] maximum assist (25-49% patient effort)        Toileting    Comment + burnette        Self-Feeding    [place] liquids to mouth;independent        BADL Safety/Performance    Impairments, BADL Safety/Performance balance;endurance/activity tolerance        ADL Interventions    [employer] Conservation Techniques activity pacing encouraged;activity adapted to sitting        AM-PAC (TM) - ADL (Current Function)    Putting on and taking off regular lower body clothing? 2 - A Lot     Bathing? 2 - A Lot     Toileting? 2 - A Lot     Putting on/taking off regular upper body clothing? 3 - A Little     How much help for taking care of personal grooming? 3 - A Little     Eating meals? 4 - None     AM-PAC (TM) ADL Score 16        Assessment/Plan (OT)    Daily Outcome Statement OT eval completed.  Pt required  min a with bed mob and transfers, Pt required increased assistance with LB dressing 2* increased pain and dec act nate,  cont OT services return to [facility] with ADLs and transfers     Rehab Potential good, to achieve stated therapy goals     Therapy Frequency 3-5 times/wk     Planned Therapy Interventions activity tolerance training;adaptive equipment training;BADL retraining;functional balance retraining;occupation/activity based interventions;patient/caregiver education/training;ROM/therapeutic exercise;transfer/mobility retraining;strengthening exercise               OT Assessment/Plan    Flowsheet Row Most Recent Value   OT Recommended Discharge Disposition home with assistance at 05/14/2022 0929   Anticipated Equipment Needs At Discharge (OT) bathing equipment, dressing equipment at 05/14/2022 0929   Patient/Family  Therapy Goal Statement go home at 05/14/2022 0929                    Education Documentation  Treatment Plan, taught by Ariadne Yarbrough OT at 5/14/2022  1:38 PM.  Learner: Patient  Readiness: Acceptance  Method: Explanation, Demonstration  Response: Needs Reinforcement  Comment: safe transfers and ADLs          OT Goals    Flowsheet Row Most Recent Value   Bed Mobility Goal 1    Activity/Assistive Device bed mobility activities, all at 05/14/2022 0929   [place] modified independence at 05/14/2022 0929   Time Frame by discharge at 05/14/2022 0929   Progress/Outcome goal ongoing at 05/14/2022 0929   Transfer Goal 1    Activity/Assistive Device all transfers at 05/14/2022 0929   [place] modified independence at 05/14/2022 0929   Time Frame by discharge at 05/14/2022 0929   Progress/Outcome goal ongoing at 05/14/2022 0929   Dressing Goal 1    Activity/Adaptive Equipment dressing skills, all at 05/14/2022 0929   [place] modified independence at 05/14/2022 0929   Time Frame by discharge at 05/14/2022 0929   Progress/Outcome goal ongoing at 05/14/2022 0929   Toileting Goal 1    Activity/Assistive Device toileting skills, all at 05/14/2022 0929   [place] modified independence at 05/14/2022 0929   Time Frame by discharge at 05/14/2022 0929   Progress/Outcome goal ongoing at 05/14/2022 0929

## 2022-05-14 NOTE — PROGRESS NOTES
Patient: Raegan Young  Location:  [place] [address]  MRN:  [id number]  Today's date:  5/14/2022    Pt and RN agreeable to therapy. Pt remained OOB in chair with personal items in reach, PCT present. RN aware of pt performance  Raegan is a 76 y.o. female admitted on 5/13/2022 with Colovaginal fistula [N82.4]. Principal problem is Colovaginal fistula.    Past Medical History  Raegan has a past medical history of Anemia, Arthritis, Colovaginal fistula, COVID-19 vaccine series completed (02/26/2021), Disease of thyroid gland, Diverticulitis of colon, GERD (gastroesophageal reflux disease), Hiatal hernia, History of snoring, Hypertension, Hypothyroidism, Lung cancer (CMS/[facility]), Lung nodule, and Vaginal cancer (CMS/[facility]) (2018).    History of Present Illness   s/p robotic LAR, ALYSSA/BSO, takedown of colovaginal and colouterine fistulas, coloanal anastomosis, DLI      PT Vitals    Date/Time Pulse SpO2 Pt Activity BP BP Location BP Method Pt Position [facility]   05/14/22 0930 84 98 % At rest 95/55 Left upper arm Automatic Lying KW   05/14/22 0946 86 98 % Walking 108/72 Left upper arm Automatic Sitting KW      PT Pain    Date/Time Pain Type Location Rating: Rest Rating: Activity [facility]   05/14/22 0930 Pain Assessment abdomen 0 2 KW          Prior Living Environment    Flowsheet Row Most Recent Value   Current Living Arrangements other (see comments)  [will go to daughter's]   Home Accessibility stairs to enter home (Group), stairs within home (Group)   Number of Stairs, Main Entrance 1   Location, Patient Bedroom second floor, must negotiate stairs to access   Location, Bathroom first (main) floor, second floor, must negotiate stairs to access   Number of Stairs, Within Home, Primary 12        Prior Level of Function    Flowsheet Row Most Recent Value   Ambulation independent   Transferring independent   Toileting independent   Bathing independent   Dressing independent   Eating independent   Communication  understands/communicates without difficulty   Assistive Device Currently Used at Home none           PT Evaluation and Treatment - 05/14/22 0930        PT Time Calculation    Start Time 0930     Stop Time 0950     Time Calculation (min) 20 min        Session Details    Document Type initial evaluation     Mode of Treatment physical therapy        General Information    General Observations of Patient in bed, sleeping but arouseable. Pleasant and cooperative     Existing Precautions/Restrictions fall        Cognition/Psychosocial    Affect/Mental Status (Cognition) WFL     Orientation Status (Cognition) oriented x 3     Follows Commands (Cognition) WFL        Sensory    Hearing Status WFL        Vision Assessment/Intervention    Visual Impairment/Limitations corrective lenses for reading        Sensory Assessment (Somatosensory)    Left LE Sensory Assessment general sensation;intact     Right LE Sensory Assessment general sensation;intact        Range of Motion (ROM)    Left Lower Extremity (ROM) left LE ROM is WFL     Right Lower Extremity (ROM) right LE ROM is WFL        Strength (Manual Muscle Testing)    Left Lower Extremity Strength left LE strength is WFL     Right Lower Extremity Strength right LE strength is WFL        Bed Mobility    [place], Supine to Sit minimum assist (75% or more patient effort);verbal cues     Verbal Cues (Supine to Sit) technique     [place], Sit to Supine not tested        Sit to Stand Transfer    [place], Sit to Stand Transfer minimum assist (75% or more patient effort)     Assistive Device none        Stand to Sit Transfer    [place], Stand to Sit Transfer minimum assist (75% or more patient effort)     Assistive Device none        Gait Training    [place], Gait minimum assist (75% or more patient effort);2 person assist     Assistive Device other (see comments);bilateral   HHA    Distance in Feet 30 feet     Deviations/Abnormal Patterns (Gait) step length  decreased;festinating/shuffling        Stairs Training    [place], Stairs not tested        Balance    Static Sitting Balance WFL;unsupported;sitting, edge of bed     Dynamic Sitting Balance WFL;unsupported;sitting, edge of bed     Static Standing Balance mild impairment;supported     Dynamic Standing Balance mild impairment;supported        AM-PAC (TM) - Mobility (Current Function)    Turning from your back to your side while in a flat bed without using bedrails? 3 - A Little     Moving from lying on your back to sitting on the side of a flat bed without using bedrails? 3 - A Little     Moving to and from a bed to a chair? 3 - A Little     Standing up from a chair using your arms? 3 - A Little     To walk in a hospital room? 3 - A Little     Climbing 3-5 steps with a railing? 3 - A Little     AM-PAC (TM) Mobility Score 18        Assessment/Plan (PT)    Daily Outcome Statement pt presents with pain and decr endurance limiting functional mobility. expect patient to progress well. May benefit from RW at home depnding on progress     Rehab Potential good, to achieve stated therapy goals     Therapy Frequency 5 times/wk     Planned Therapy Interventions transfer training;stair training;strengthening;balance training;bed mobility training;gait training;patient/family education               PT Assessment/Plan    Flowsheet Row Most Recent Value   PT Recommended Discharge Disposition family member's home  [daughter] at 05/14/2022 0930   Anticipated Equipment Needs at Discharge (PT) walker, front-wheeled  [will continue to assess] at 05/14/2022 0930   Patient/Family Therapy Goals Statement return to daughter's at 05/14/2022 0930                    Education Documentation  Treatment Plan, taught by Shannon Maldonado, PT at 5/14/2022 10:37 AM.  Learner: Patient  Readiness: Acceptance  Method: Explanation  Response: Verbalizes Understanding  Comment: role of PT, plan of care          PT Goals    Flowsheet Row Most Recent  Value   Bed Mobility Goal 1    Activity/Assistive Device rolling to left, rolling to right, sit to supine, supine to sit at 05/14/2022 0930   [place] modified independence at 05/14/2022 0930   Time Frame by discharge at 05/14/2022 0930   Progress/Outcome goal ongoing at 05/14/2022 0930   Transfer Goal 1    Activity/Assistive Device sit-to-stand/stand-to-sit, walker, front-wheeled  [if needed] at 05/14/2022 0930   [place] modified independence at 05/14/2022 0930   Time Frame by discharge at 05/14/2022 0930   Progress/Outcome goal ongoing at 05/14/2022 0930   Gait Training Goal 1    Activity/Assistive Device gait (walking locomotion), walker, front-wheeled  [if needed] at 05/14/2022 0930   [place] modified independence at 05/14/2022 0930   Distance 150 at 05/14/2022 0930   Time Frame by discharge at 05/14/2022 0930   Progress/Outcome goal ongoing at 05/14/2022 0930   Stairs Goal 1    Activity/Assistive Device ascending stairs, descending stairs, using handrail, left, using handrail, right at 05/14/2022 0930   [place] supervision required at 05/14/2022 0930   Number of Stairs 12 at 05/14/2022 0930   Time Frame by discharge at 05/14/2022 0930   Progress/Outcome goal ongoing at 05/14/2022 0930

## 2022-05-14 NOTE — PROGRESS NOTES
Colorectal surgery Daily Progress Note    Subjective   No acute events overnight.  Vitals within normal limits.  On room air.  States she feels good.  Was sitting up working on incentive spirometer upon walking into the room.  Pain well controlled.  Is ordered clears but has not had a tray of clears.  Tolerating water and ice chips.  No nausea or vomiting.  Ostomy with function.    Objective     Vital signs in last 24 hours:  Temp:  [36.4 °C (97.6 °F)-36.8 °C (98.2 °F)] 36.7 °C (98 °F)  Heart Rate:  [80-99] 80  Resp:  [13-18] 18  BP: ()/(51-96) 95/51      Intake/Output Summary (Last 24 hours) at 5/14/2022 0659  Last data filed at 5/14/2022 0531  Gross per 24 hour   Intake 5511.66 ml   Output 1250 ml   Net 4261.66 ml     Intake/Output this shift:  I/O this shift:  In: 1511.7 [P.O.:120; I.V.:991.7; IV Piggyback:400]  Out: 650 [Urine:650]    Physical Exam    General appearance: alert, appears stated age, cooperative, resting comfortably, not in acute distress  HENT: NC/AT, neck supple, symmetrical, trachea midline  Lungs: Normal respiratory effort, even and unlabored, on RA  Heart: regular rate and rhythm  Abdomen: soft, nondistended, appropriately TTP. Incisions- c/d/i w/steri strips. Diverting loop ileostomy pink and patent with gas and thick green output.  : Fc and L stent in place  Extremities: extremities warm and well-perfused  Pulses: 2+ and symmetric  Skin: warm and dry  Neurologic: Grossly normal    VTE Assessment: I have reassessed and the patient's VTE risk and treatment plan is appropriate.    Labs  Lab Results   Component Value Date    WBC 10.96 (H) 05/14/2022    HGB 10.2 (L) 05/14/2022    HCT 31.2 (L) 05/14/2022    MCV 90.4 05/14/2022     05/14/2022     Lab Results   Component Value Date    GLUCOSE 194 (H) 05/14/2022    CALCIUM 7.9 (L) 05/14/2022     05/14/2022    K 4.1 05/14/2022    CO2 22 05/14/2022     05/14/2022    BUN 19 05/14/2022    CREATININE 1.4 (H) 05/14/2022          Imaging  I have reviewed the Imaging from the last 24 hrs.      Assessment/Plan     75yo F w/PMH of lung ca, hx of colovaginal fistula likely from diverticulitis, cervical ca s/p radiation, arthritis, GERD, hiatal hernia, RAMON, HTN, hypothyroidism s/p robotic LAR, ALYSSA/BSO, takedown of colovaginal and colouterine fistulas, coloanal anastomosis, DLI for hx of diverticulitis and radiation therapy for cervical ca 5/13. 1 Day Post-Op    - CLD  - IVF  - cipro, flagyl for at least 7d  - pain control prn  - Fc, L stent  - IS/OOB/ambulate   - PT/OT/WOCN    Please page #[phone] with any questions or concerns.    Daily Ivan MD

## 2022-05-15 LAB
ANION GAP SERPL CALC-SCNC: 8 MEQ/L (ref 3–15)
BASOPHILS # BLD: 0.02 K/UL (ref 0.01–0.1)
BASOPHILS NFR BLD: 0.2 %
BUN SERPL-MCNC: 15 MG/DL (ref 8–20)
CALCIUM SERPL-MCNC: 7.6 MG/DL (ref 8.9–10.3)
CHLORIDE SERPL-SCNC: 108 MEQ/L (ref 98–109)
CO2 SERPL-SCNC: 22 MEQ/L (ref 22–32)
CREAT SERPL-MCNC: 1.3 MG/DL (ref 0.6–1.1)
CRP SERPL-MCNC: 209.77 MG/L
DIFFERENTIAL METHOD BLD: ABNORMAL
EOSINOPHIL # BLD: 0.3 K/UL (ref 0.04–0.36)
EOSINOPHIL NFR BLD: 2.7 %
ERYTHROCYTE [DISTWIDTH] IN BLOOD BY AUTOMATED COUNT: 14.5 % (ref 11.7–14.4)
GFR SERPL CREATININE-BSD FRML MDRD: 39.7 ML/MIN/1.73M*2
GLUCOSE SERPL-MCNC: 96 MG/DL (ref 70–99)
HCT VFR BLDCO AUTO: 30.1 % (ref 35–45)
HGB BLD-MCNC: 9.8 G/DL (ref 11.8–15.7)
IMM GRANULOCYTES # BLD AUTO: 0.07 K/UL (ref 0–0.08)
IMM GRANULOCYTES NFR BLD AUTO: 0.6 %
LYMPHOCYTES # BLD: 0.88 K/UL (ref 1.2–3.5)
LYMPHOCYTES NFR BLD: 7.9 %
MAGNESIUM SERPL-MCNC: 2.3 MG/DL (ref 1.8–2.5)
MCH RBC QN AUTO: 29.5 PG (ref 28–33.2)
MCHC RBC AUTO-ENTMCNC: 32.6 G/DL (ref 32.2–35.5)
MCV RBC AUTO: 90.7 FL (ref 83–98)
MONOCYTES # BLD: 0.57 K/UL (ref 0.28–0.8)
MONOCYTES NFR BLD: 5.1 %
NEUTROPHILS # BLD: 9.25 K/UL (ref 1.7–7)
NEUTS SEG NFR BLD: 83.5 %
NRBC BLD-RTO: 0 %
PDW BLD AUTO: 9.5 FL (ref 9.4–12.3)
PHOSPHATE SERPL-MCNC: 2.4 MG/DL (ref 2.4–4.7)
PLATELET # BLD AUTO: 181 K/UL (ref 150–369)
POTASSIUM SERPL-SCNC: 3.9 MEQ/L (ref 3.6–5.1)
RBC # BLD AUTO: 3.32 M/UL (ref 3.93–5.22)
SODIUM SERPL-SCNC: 138 MEQ/L (ref 136–144)
WBC # BLD AUTO: 11.09 K/UL (ref 3.8–10.5)

## 2022-05-15 PROCEDURE — 84100 ASSAY OF PHOSPHORUS: CPT | Performed by: STUDENT IN AN ORGANIZED HEALTH CARE EDUCATION/TRAINING PROGRAM

## 2022-05-15 PROCEDURE — 63700000 HC SELF-ADMINISTRABLE DRUG: Performed by: STUDENT IN AN ORGANIZED HEALTH CARE EDUCATION/TRAINING PROGRAM

## 2022-05-15 PROCEDURE — 25800000 HC PHARMACY IV SOLUTIONS: Performed by: SURGERY

## 2022-05-15 PROCEDURE — 63700000 HC SELF-ADMINISTRABLE DRUG: Performed by: SURGERY

## 2022-05-15 PROCEDURE — 83735 ASSAY OF MAGNESIUM: CPT | Performed by: STUDENT IN AN ORGANIZED HEALTH CARE EDUCATION/TRAINING PROGRAM

## 2022-05-15 PROCEDURE — 63600000 HC DRUGS/DETAIL CODE: Performed by: STUDENT IN AN ORGANIZED HEALTH CARE EDUCATION/TRAINING PROGRAM

## 2022-05-15 PROCEDURE — 80048 BASIC METABOLIC PNL TOTAL CA: CPT | Performed by: STUDENT IN AN ORGANIZED HEALTH CARE EDUCATION/TRAINING PROGRAM

## 2022-05-15 PROCEDURE — 85025 COMPLETE CBC W/AUTO DIFF WBC: CPT | Performed by: STUDENT IN AN ORGANIZED HEALTH CARE EDUCATION/TRAINING PROGRAM

## 2022-05-15 PROCEDURE — 21400000 HC ROOM AND CARE CCU/INTERMEDIATE

## 2022-05-15 PROCEDURE — 86140 C-REACTIVE PROTEIN: CPT | Performed by: STUDENT IN AN ORGANIZED HEALTH CARE EDUCATION/TRAINING PROGRAM

## 2022-05-15 PROCEDURE — 25000000 HC PHARMACY GENERAL: Performed by: SURGERY

## 2022-05-15 PROCEDURE — 99024 POSTOP FOLLOW-UP VISIT: CPT | Performed by: COLON & RECTAL SURGERY

## 2022-05-15 PROCEDURE — 36415 COLL VENOUS BLD VENIPUNCTURE: CPT | Performed by: STUDENT IN AN ORGANIZED HEALTH CARE EDUCATION/TRAINING PROGRAM

## 2022-05-15 PROCEDURE — 63600000 HC DRUGS/DETAIL CODE: Performed by: SURGERY

## 2022-05-15 RX ORDER — HEPARIN SODIUM 5000 [USP'U]/ML
5000 INJECTION, SOLUTION INTRAVENOUS; SUBCUTANEOUS EVERY 8 HOURS
Status: DISCONTINUED | OUTPATIENT
Start: 2022-05-15 | End: 2022-05-22 | Stop reason: HOSPADM

## 2022-05-15 RX ORDER — HYDROMORPHONE HYDROCHLORIDE 1 MG/ML
0.5 INJECTION, SOLUTION INTRAMUSCULAR; INTRAVENOUS; SUBCUTANEOUS
Status: DISCONTINUED | OUTPATIENT
Start: 2022-05-15 | End: 2022-05-19

## 2022-05-15 RX ADMIN — DOXYCYCLINE 200 MG: 100 INJECTION, POWDER, LYOPHILIZED, FOR SOLUTION INTRAVENOUS at 22:22

## 2022-05-15 RX ADMIN — ACETAMINOPHEN 975 MG: 325 TABLET, FILM COATED ORAL at 22:22

## 2022-05-15 RX ADMIN — HEPARIN SODIUM 5000 UNITS: 5000 INJECTION, SOLUTION INTRAVENOUS; SUBCUTANEOUS at 16:14

## 2022-05-15 RX ADMIN — ACETAMINOPHEN 975 MG: 325 TABLET, FILM COATED ORAL at 05:06

## 2022-05-15 RX ADMIN — HEPARIN SODIUM 5000 UNITS: 5000 INJECTION, SOLUTION INTRAVENOUS; SUBCUTANEOUS at 22:16

## 2022-05-15 RX ADMIN — GABAPENTIN 300 MG: 300 CAPSULE ORAL at 08:26

## 2022-05-15 RX ADMIN — GABAPENTIN 300 MG: 300 CAPSULE ORAL at 20:26

## 2022-05-15 RX ADMIN — ACETAMINOPHEN 975 MG: 325 TABLET, FILM COATED ORAL at 16:14

## 2022-05-15 RX ADMIN — ACETAMINOPHEN 975 MG: 325 TABLET, FILM COATED ORAL at 10:01

## 2022-05-15 RX ADMIN — KETOROLAC TROMETHAMINE 15 MG: 15 INJECTION, SOLUTION INTRAMUSCULAR; INTRAVENOUS at 08:26

## 2022-05-15 NOTE — PROGRESS NOTES
Colorectal surgery Daily Progress Note    Subjective   NAEON. Afebrile. Vitals wnl. On RA. Tolerating clears. Denies n/v. States she has more pain today but overall well controlled. + gas and stool in ostomy. Has been OOB and ambulating.       Objective     Vital signs in last 24 hours:  Temp:  [36.3 °C (97.4 °F)-37 °C (98.6 °F)] 37 °C (98.6 °F)  Heart Rate:  [78-96] 95  Resp:  [16-18] 18  BP: ()/(51-72) 118/57      Intake/Output Summary (Last 24 hours) at 5/15/2022 0656  Last data filed at 5/15/2022 0515  Gross per 24 hour   Intake 2236 ml   Output 2575 ml   Net -339 ml     Intake/Output this shift:  I/O this shift:  In: 1054.7 [P.O.:120; I.V.:434.7; IV Piggyback:500]  Out: 1650 [Urine:1050; Stool:600]    Physical Exam    General appearance: alert, appears stated age, cooperative, resting comfortably, not in acute distress  HENT: NC/AT, neck supple, symmetrical, trachea midline  Lungs: Normal respiratory effort, even and unlabored, on RA  Heart: regular rate and rhythm  Abdomen: soft, nondistended, appropriately TTP. Incisions- c/d/i w/steri strips. Diverting loop ileostomy pink and patent with gas and thick green output.  : Fc  Extremities: extremities warm and well-perfused  Pulses: 2+ and symmetric  Skin: warm and dry  Neurologic: Grossly normal    VTE Assessment: I have reassessed and the patient's VTE risk and treatment plan is appropriate.    Labs  Lab Results   Component Value Date    WBC 10.96 (H) 05/14/2022    HGB 10.2 (L) 05/14/2022    HCT 31.2 (L) 05/14/2022    MCV 90.4 05/14/2022     05/14/2022     Lab Results   Component Value Date    GLUCOSE 194 (H) 05/14/2022    CALCIUM 7.9 (L) 05/14/2022     05/14/2022    K 4.1 05/14/2022    CO2 22 05/14/2022     05/14/2022    BUN 19 05/14/2022    CREATININE 1.4 (H) 05/14/2022         Imaging  I have reviewed the Imaging from the last 24 hrs.      Assessment/Plan     77yo F w/PMH of lung ca, hx of colovaginal fistula likely from  diverticulitis, cervical ca s/p radiation, arthritis, GERD, hiatal hernia, RAMON, HTN, hypothyroidism s/p robotic LAR, ALYSSA/BSO, takedown of colovaginal and colouterine fistulas, coloanal anastomosis, DLI for hx of diverticulitis and radiation therapy for cervical ca 5/13. 2 Days Post-Op    - CLD, will advance diet to LR  - IVF  - Pain control as needed  - Antibiotics changed to Doxy for 10 days  - Fc  - IS/OOB/ambulate   - PT/OT/WOCN  - DVT prophylaxis subq heparin    Please page #[phone] with any questions or concerns.    Daily Ivan MD

## 2022-05-16 LAB
ALBUMIN SERPL-MCNC: 2.3 G/DL (ref 3.4–5)
ANION GAP SERPL CALC-SCNC: 10 MEQ/L (ref 3–15)
BASOPHILS # BLD: 0.01 K/UL (ref 0.01–0.1)
BASOPHILS NFR BLD: 0.1 %
BUN SERPL-MCNC: 18 MG/DL (ref 8–20)
CALCIUM SERPL-MCNC: 7.6 MG/DL (ref 8.9–10.3)
CHLORIDE SERPL-SCNC: 107 MEQ/L (ref 98–109)
CO2 SERPL-SCNC: 21 MEQ/L (ref 22–32)
CREAT SERPL-MCNC: 1.5 MG/DL (ref 0.6–1.1)
CRP SERPL-MCNC: 299.11 MG/L
DIFFERENTIAL METHOD BLD: ABNORMAL
EOSINOPHIL # BLD: 0.29 K/UL (ref 0.04–0.36)
EOSINOPHIL NFR BLD: 2.6 %
ERYTHROCYTE [DISTWIDTH] IN BLOOD BY AUTOMATED COUNT: 14.2 % (ref 11.7–14.4)
GFR SERPL CREATININE-BSD FRML MDRD: 33.7 ML/MIN/1.73M*2
GLUCOSE SERPL-MCNC: 96 MG/DL (ref 70–99)
HCT VFR BLDCO AUTO: 30.9 % (ref 35–45)
HGB BLD-MCNC: 10.1 G/DL (ref 11.8–15.7)
IMM GRANULOCYTES # BLD AUTO: 0.08 K/UL (ref 0–0.08)
IMM GRANULOCYTES NFR BLD AUTO: 0.7 %
LYMPHOCYTES # BLD: 0.81 K/UL (ref 1.2–3.5)
LYMPHOCYTES NFR BLD: 7.2 %
MAGNESIUM SERPL-MCNC: 2.4 MG/DL (ref 1.8–2.5)
MCH RBC QN AUTO: 29.9 PG (ref 28–33.2)
MCHC RBC AUTO-ENTMCNC: 32.7 G/DL (ref 32.2–35.5)
MCV RBC AUTO: 91.4 FL (ref 83–98)
MONOCYTES # BLD: 0.54 K/UL (ref 0.28–0.8)
MONOCYTES NFR BLD: 4.8 %
NEUTROPHILS # BLD: 9.54 K/UL (ref 1.7–7)
NEUTS SEG NFR BLD: 84.6 %
NRBC BLD-RTO: 0 %
PDW BLD AUTO: 9.3 FL (ref 9.4–12.3)
PHOSPHATE SERPL-MCNC: 2.3 MG/DL (ref 2.4–4.7)
PLATELET # BLD AUTO: 191 K/UL (ref 150–369)
POTASSIUM SERPL-SCNC: 4.1 MEQ/L (ref 3.6–5.1)
PREALB SERPL-MCNC: 7.6 MG/DL (ref 18–38)
RBC # BLD AUTO: 3.38 M/UL (ref 3.93–5.22)
SODIUM SERPL-SCNC: 138 MEQ/L (ref 136–144)
WBC # BLD AUTO: 11.27 K/UL (ref 3.8–10.5)

## 2022-05-16 PROCEDURE — 25000000 HC PHARMACY GENERAL: Performed by: SURGERY

## 2022-05-16 PROCEDURE — 84134 ASSAY OF PREALBUMIN: CPT | Performed by: STUDENT IN AN ORGANIZED HEALTH CARE EDUCATION/TRAINING PROGRAM

## 2022-05-16 PROCEDURE — 25800000 HC PHARMACY IV SOLUTIONS: Performed by: SURGERY

## 2022-05-16 PROCEDURE — 99024 POSTOP FOLLOW-UP VISIT: CPT | Performed by: COLON & RECTAL SURGERY

## 2022-05-16 PROCEDURE — 82040 ASSAY OF SERUM ALBUMIN: CPT | Performed by: STUDENT IN AN ORGANIZED HEALTH CARE EDUCATION/TRAINING PROGRAM

## 2022-05-16 PROCEDURE — 83735 ASSAY OF MAGNESIUM: CPT | Performed by: STUDENT IN AN ORGANIZED HEALTH CARE EDUCATION/TRAINING PROGRAM

## 2022-05-16 PROCEDURE — 86140 C-REACTIVE PROTEIN: CPT | Performed by: STUDENT IN AN ORGANIZED HEALTH CARE EDUCATION/TRAINING PROGRAM

## 2022-05-16 PROCEDURE — 63600000 HC DRUGS/DETAIL CODE: Performed by: SURGERY

## 2022-05-16 PROCEDURE — 63700000 HC SELF-ADMINISTRABLE DRUG: Performed by: STUDENT IN AN ORGANIZED HEALTH CARE EDUCATION/TRAINING PROGRAM

## 2022-05-16 PROCEDURE — 21400000 HC ROOM AND CARE CCU/INTERMEDIATE

## 2022-05-16 PROCEDURE — 63700000 HC SELF-ADMINISTRABLE DRUG: Performed by: SURGERY

## 2022-05-16 PROCEDURE — 85025 COMPLETE CBC W/AUTO DIFF WBC: CPT | Performed by: STUDENT IN AN ORGANIZED HEALTH CARE EDUCATION/TRAINING PROGRAM

## 2022-05-16 PROCEDURE — 84100 ASSAY OF PHOSPHORUS: CPT | Performed by: STUDENT IN AN ORGANIZED HEALTH CARE EDUCATION/TRAINING PROGRAM

## 2022-05-16 PROCEDURE — 80048 BASIC METABOLIC PNL TOTAL CA: CPT | Performed by: STUDENT IN AN ORGANIZED HEALTH CARE EDUCATION/TRAINING PROGRAM

## 2022-05-16 PROCEDURE — 36415 COLL VENOUS BLD VENIPUNCTURE: CPT | Performed by: STUDENT IN AN ORGANIZED HEALTH CARE EDUCATION/TRAINING PROGRAM

## 2022-05-16 RX ORDER — PANTOPRAZOLE SODIUM 40 MG/10ML
40 INJECTION, POWDER, LYOPHILIZED, FOR SOLUTION INTRAVENOUS EVERY 24 HOURS
Status: DISCONTINUED | OUTPATIENT
Start: 2022-05-16 | End: 2022-05-17

## 2022-05-16 RX ADMIN — PANTOPRAZOLE SODIUM 40 MG: 40 INJECTION, POWDER, FOR SOLUTION INTRAVENOUS at 07:53

## 2022-05-16 RX ADMIN — DOXYCYCLINE 200 MG: 100 INJECTION, POWDER, LYOPHILIZED, FOR SOLUTION INTRAVENOUS at 22:16

## 2022-05-16 RX ADMIN — HEPARIN SODIUM 5000 UNITS: 5000 INJECTION, SOLUTION INTRAVENOUS; SUBCUTANEOUS at 05:26

## 2022-05-16 RX ADMIN — ACETAMINOPHEN 975 MG: 325 TABLET, FILM COATED ORAL at 20:02

## 2022-05-16 RX ADMIN — ACETAMINOPHEN 975 MG: 325 TABLET, FILM COATED ORAL at 05:25

## 2022-05-16 RX ADMIN — SODIUM CHLORIDE, POTASSIUM CHLORIDE, SODIUM LACTATE AND CALCIUM CHLORIDE 500 ML: 600; 310; 30; 20 INJECTION, SOLUTION INTRAVENOUS at 14:34

## 2022-05-16 RX ADMIN — HEPARIN SODIUM 5000 UNITS: 5000 INJECTION, SOLUTION INTRAVENOUS; SUBCUTANEOUS at 22:16

## 2022-05-16 RX ADMIN — HEPARIN SODIUM 5000 UNITS: 5000 INJECTION, SOLUTION INTRAVENOUS; SUBCUTANEOUS at 14:35

## 2022-05-16 RX ADMIN — GABAPENTIN 300 MG: 300 CAPSULE ORAL at 07:53

## 2022-05-16 RX ADMIN — GABAPENTIN 300 MG: 300 CAPSULE ORAL at 20:03

## 2022-05-16 NOTE — PATIENT CARE CONFERENCE
Care Progression Rounds Note  Date: 5/16/2022  Time: 10:58 AM     Patient Name: Raegan Young     Medical Record Number: [id number]   YOB: 1945  Sex: Female      Room/Bed: 1010    Admitting Diagnosis: Colovaginal fistula [N82.4]   Admit Date/Time: 5/13/2022  6:00 AM    Primary Diagnosis: Colovaginal fistula  Principal Problem: Colovaginal fistula    GMLOS: 3.3  Anticipated Discharge Date: 5/17/2022    AM-PAC:  Mobility Score: 18    Discharge Planning:  Current Living Arrangements: other (see comments) (will go to daughter's)  Concerns to be Addressed: care coordination/care conferences, discharge planning  Anticipated Discharge Disposition: home with home health, home with assistance    Barriers to Discharge:  Medical issues not resolved    Comments:       Participants:  nursing, occupational therapy, social work/services, physician,

## 2022-05-16 NOTE — PROGRESS NOTES
Colorectal surgery Daily Progress Note    Subjective   NAEON. Afebrile. Vitals wnl. On RA. Tolerating LR diet. Denies n/v. Feels good. Endorses some belching which is new. Pain well controlled. + gas and stool in ostomy. Has been OOB and ambulating.     Objective     Vital signs in last 24 hours:  Temp:  [36.4 °C (97.6 °F)-37.1 °C (98.8 °F)] 37.1 °C (98.8 °F)  Heart Rate:  [75-94] 87  Resp:  [16-18] 16  BP: ()/(51-76) 113/58      Intake/Output Summary (Last 24 hours) at 5/16/2022 0730  Last data filed at 5/16/2022 0622  Gross per 24 hour   Intake 1646.4 ml   Output 2400 ml   Net -753.6 ml     Intake/Output this shift:  No intake/output data recorded.    Physical Exam    General appearance: alert, appears stated age, cooperative, resting comfortably, not in acute distress  HENT: NC/AT, neck supple, symmetrical, trachea midline  Lungs: Normal respiratory effort, even and unlabored, on RA  Heart: regular rate and rhythm  Abdomen: soft, nondistended, appropriately TTP. Incisions- c/d/i w/steri strips. Diverting loop ileostomy pink and patent with gas and thick green output.  : Fc  Extremities: extremities warm and well-perfused  Pulses: 2+ and symmetric  Skin: warm and dry  Neurologic: Grossly normal    VTE Assessment: I have reassessed and the patient's VTE risk and treatment plan is appropriate.    Labs  Lab Results   Component Value Date    WBC 11.09 (H) 05/15/2022    HGB 9.8 (L) 05/15/2022    HCT 30.1 (L) 05/15/2022    MCV 90.7 05/15/2022     05/15/2022     Lab Results   Component Value Date    GLUCOSE 96 05/15/2022    CALCIUM 7.6 (L) 05/15/2022     05/15/2022    K 3.9 05/15/2022    CO2 22 05/15/2022     05/15/2022    BUN 15 05/15/2022    CREATININE 1.3 (H) 05/15/2022         Imaging  I have reviewed the Imaging from the last 24 hrs.      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 3 Days Post-Op    - LR diet   - IVF  - Pain control as needed  - Antibiotics changed to Doxy for 10 days  - Fc  - IS/OOB/ambulate   - PT/OT/WOCN  - DVT prophylaxis subq heparin    Please page #[phone] with any questions or concerns.    Daily Ivan MD

## 2022-05-16 NOTE — PLAN OF CARE
Problem: Adult Inpatient Plan of Care  Goal: Plan of Care Review  Outcome: Progressing  Flowsheets (Taken 5/16/2022 1779)  Progress: no change  Plan of Care Reviewed With: patient  Outcome Summary: Pt ambulated in the sood way, Pain well controlled with meds   Plan of Care Review  Plan of Care Reviewed With: patient  Progress: no change  Outcome Summary: Pt ambulated in the sood way, Pain well controlled with meds

## 2022-05-16 NOTE — PLAN OF CARE
Problem: Adult Inpatient Plan of Care  Goal: Plan of Care Review  Outcome: Progressing  Flowsheets (Taken 5/16/2022 1103)  Progress: improving  Plan of Care Reviewed With:   patient   other (see comments)  Outcome Summary:   Per update from CPR, CEE in the next 24-48 hrs   WCON to see patient this morning   I met with patient at the bedside to discuss pending dcp   patient confirmed she will be staying with her dtr: [phone] [phone]   patient was referred to [facility] liaison per choice   care coordination to follow for transition of care needs until dc is complete.

## 2022-05-16 NOTE — DISCHARGE INSTRUCTIONS
Follow-up: Please call Dr. Mandel's office at [phone] upon discharge to schedule your follow-up appointment for 2 weeks. Please call the office if you have any questions or concerns. Call if you experience the following: fevers (greater than 101 F), chills, nausea, vomiting, diarrhea, worsening abdominal pain or distention, headache, lightheadedness, dizziness, or changes in vision.     Please follow-up with your primary care physician 1-2 weeks of discharge from the hospital to update them regarding your hospital stay.     Medications:   Please resume all of your previous home medications unless otherwise indicated.  AVOID blood thinning medications such as NSAIDS (Aleve, Advil, Ibuprofen, Motrin, Aspirin, etc.)  Please take the antibiotics as prescribed.    Pain: Take your prescribed pain medication as needed for severe pain. Take Tylenol 650mg every 4 hours as needed for mild or moderate pain.    Diet: Low residue, low fiber. Try to avoid fresh fruits and vegetables, bran, whole grains, carbonated beverages and dairy products. You will stay on this diet until we see you in clinic.     Activity: Do not lift more than 10 lbs for 2 weeks.  You can be a passenger in a car but no driving until your first follow up visit.  Light activity such as walking or stairs is OK.  Please try to avoid vigorous activity including core workouts.    Wound:    Please record your ileostomy output daily. If your output is greater than 1.2L in two consecutive days, please call the office. If your output is greater than 1.5L in one day, please call the office.    Please shower daily - let gentle soap and water run over your incision but do not directly scrub the wounds. Do not submerge your incisions under water (I.e. no tub baths, hot tubs, pools, jacuzzi, swimming in the ocean, etc.) until seen in follow-up.  You will either have surgical glue or steri-strips covering your incisions - please do not remove these until your  first follow up appointment. No lotions or ointments to wounds.        Dr. Rajesh Cooper and Dr. Geovani Mandel  [facility]  [address]  [address] [place] [address] Kathy  ASAD Ramirez [phone]                Ileostomy Home Guide    An ileostomy is an opening for stool to leave your body when a medical condition prevents it from leaving through the usual opening (rectum). During a surgery, a piece of small intestine (ileum) is brought through an opening in the abdominal wall. The new opening is called a stoma or ostomy. A pouch fits over the stoma to catch stool and gas. Your stool may be liquid at first. Over time, it may become about the consistency of loose paste.    CARING FOR YOUR STOMA  Normally, the stoma looks a lot like the inside of your cheek: pink and moist. At first, it may be swollen, but this swelling will decrease within 4- 6 weeks.  Keep the skin around the stoma clean and dry. You can gently wash the skin around the stoma in the shower with a clean, soft washcloth. If you develop any skin irritation, your healthcare provider may give you a stomal skin powder to help heal the area. Do not use any products other than those specifically given to you by your caregiver.   Your stoma should not be uncomfortable. If you notice any stinging or burning, your pouch may be leaking and the skin around your stoma may be coming into contact with stool. This can cause skin irritation. If you notice stinging, you should replace your pouch with a new one and discard the old one.    OSTOMY POUCHES  The pouch that fits over the ostomy can be made up of either 1 or 2 pieces. A one-piece pouch has a skin barrier and the pouch adhered together in one unit. A two-piece pouch has a skin barrier with a separate pouch that snaps on and off of the skin barrier. Either way, you should empty the pouch when it is only one third full. Do not let more stool or gas build up. This could cause the pouch to  leak. Some ostomy pouches have a built-in gas release valve. Ostomy pouch deodorizers can be put into the pouch to minimize odor when emptying.  Although, ileostomies usually do not have a great deal of odor when emptying.  You should not expect to have odor except if emptying.       As your swelling in your abdomen and colostomy decrease and the contour/shape changes---expect you might need a different style of pouch.  While receiving Visiting Nurse, they might order different pouches.    EMPTYING YOUR OSTOMY POUCH  You will get lessons on how to empty your pouch from the nursing staff and/or a Wound-Ostomy-Continence nurse before you leave the hospital. Here are the basic steps:    Sit far back on the toilet.  Put pieces of toilet paper onto the toilet water. This will prevent splashing as you empty the stool into the toilet bowl.  Unclip or unvelcro the tail end of the pouch.  If desired, you may insert some water to loosen the stool.  Pinch the end of the pouch closed and lower it between your legs towards the toilet.  Empty stool contents into the toilet.  Clean the tail with toilet paper.  Apply clip or velcro to close it.  Wash your hands.    CHANGING YOUR OSTOMY POUCH  Change your ostomy pouch about every 3 to 4 days for the first 6 weeks.  It might be possible to wait 5 to 7 days to change it later on after the swelling lessens. Always change the pouch sooner if you begin to notice any discomfort or irritation of the skin around the stoma. When possible, plan to change your ostomy pouching system before eating and drinking because this will lessen the chance of stool coming out during the change. The nursing staff and/or Wound-Ostomy-Continence Nurse may teach you how to change your pouch before you leave the hospital. Here are the basic steps:    Lay out your supplies  Carefully remove the old pouch.  Wash the skin around the stoma. Allow the area to dry. Men may be advised to shave any hair around the  "stoma very carefully. This will make the adhesive stick better and not pull so hard when removing the pouch when changing.  Use the stoma measuring guide that comes with your pouch set to decide what size opening you will need to cut in the skin barrier piece. Choose the smallest possible size that will hold the stoma but will not touch it.  If the stoma is an oval, cut it an oval.  If the stoma is a [place], then cut it a [place].  It should not allow skin to be exposed as the stool will make it sore if it constantly sits on it.  Trace the [place] or the pattern on the back of the skin barrier piece. Cut out the opening.  Hold the cut out skin barrier over the stoma to make sure the opening is the correct size.  Modify it as needed.  Skin should not be exposed.  Apply an extra skin barrier ring or squeeze stoma paste on the back of the skin barrier around the opening to fill in any fine creases if instructed by the healthcare provider.  Clean and dry the skin around the stoma again if needed.  Carefully fit the skin barrier over your stoma.  If you are using a two-piece pouch, snap the pouch onto the skin barrier piece.  Close the tail of the pouch.  Put your hand over the top of the skin barrier piece to help warm it for about 5 minutes, so that it conforms to your body better.  Wash your hands.    DIET TIPS  Because you have a higher risk of a blockage(obstruction) after getting an ileostomy, you should decrease your fiber intake.     Fibrous foods include:  Celery.  Cabbage (saurkraut and coleslaw).  Pineapple.  Mushrooms.  Corn and popcorn.  Whole fruits and vegetables, especially with the skins on.(Canned fruits are fine except for pineapple)  Foods that contain seeds and whole nuts.  Smooth \"butters\" are Ok(creamy peanut butter).  Oranges, grapefruit, tangerines, and other citrus fruit.  Sausage casings     Other tips:  Drink about eight, 8 oz glasses of water each day.  You can prevent gas by eating slowly and " chewing your food thoroughly.  If you feel concerned that you have too much gas, you can cut back on gas-producing foods, such as:  Spicy foods--onions and garlic.  Cruciferous vegetables (cabbage, broccoli, cauliflower, Milligan sprouts).  Beans and legumes.  Some cheeses.  Eggs.  Fish.  Bubbly (carbonated) drinks.  Chewing gum  Drinking from a straw    GENERAL TIPS  You can shower with or without the pouch in place.  Always keep the pouch snapped on if you are bathing or swimming.  If your pouch gets wet, you can dry it with a blow-dryer set to cool or bath towel.  Avoid wearing tight clothing directly over your stoma so that it does not become irritated or bleed. Tight clothing can also prevent the stool from draining into the pouch, which can cause it to leak.  It is helpful to always have an extra skin barrier and pouch with you when traveling. Do not leave them anywhere in the excessive heat or  cold.  The adherence property is not as good when exposed to extremes in temperature.  Try to keep the seat belt either above or below your stoma, or use a tiny pillow to cushion it.  You can still participate in sports, but you should avoid activities in which there is a risk of getting hit in the abdomen.  You can still have sex. It is a good idea to empty your pouch prior to sex. Some people and their partners feel very comfortable seeing the pouch during sex. Others choose to wear lingerie or a T-shirt that covers the device.  If you still have your rectum & anus, then expect to feel like you need to go to the bathroom and expel something.  This is normal.  Sit on the commode, bear down like you are having a BM.  The first few times this happens, it will have stool flecks and some old blood in it.  Later on, it will just be mucus. This will occur anytime.    SEEK IMMEDIATE MEDICAL CARE IF:  You feel dizzy, light-headed, or faint.  You measure pouch drainage of more than 1,200 mL per day. This amount of drainage can  "lead to dehydration.  You notice a change in the size or color of the stoma, especially if it becomes very red, purple, black, or pale white.  You have bloody stools or excessive bleeding from the ostomy(a smear or pinpoint drops are OK).  You have unusual abdominal pain, nausea, vomiting, or bloating.  There is anything unusual protruding from the ostomy.  You have irritation or red skin around the ostomy.  No stool or gas is passing from the stoma.  You have excessive diarrhea (requiring pouch emptying more frequently than normal).    Listed below are corporate partner for additional information and ostomy product samples:    ORGANIZATION  BRIEF DESCRIPTION  WEBSITE  TELEPHONE NUMBER    American Cancer Society  A worldwide non-profit organization that helps people stay well and get well; helps find cures and fight back against cancer; and offers support groups.  www.cancer.org  0-360-504-7633    Crohn's and Colitis [employer]  A non-profit, volunteer-driven organization dedicated to finding the cures for Crohn's disease and ulcerative colitis; and offers support groups.  www.ccfa.org  2-375-413-3634    [employer]  A non-profit organization that provides free ostomy supplies: The recipient pays for shipping and handling.  www.ostogroup.org  1-890.316.5079    [employer] Ostomy Associations of [employer] (UOAA)  An association of affiliated, nonprofit, support groups who are committed to the improvement of the quality of life of people who have, or will have, an intestinal or urinary diversion.  www.ostomy.org  6-342-982-9323    Wound, Ostomy and Continence Nurses Society  A professional nursing society whose members provide and direct the care of people with ostomies. The Society's website can be searched using the \"Patient Links\" tab to find a nurse who is available for a referral or consultation for ostomy service in or near a patient's geographic location.  www.wocn.org  1-587.855.9454           "    WEBSITE  TELEPHONE NUMBER   [employer]  www.coloplast.Mgv.Dezide 7-901-857-7805   [employer] www.convatec.Dezide 1-588.480.8823   [employer] www.cymedostomy.com 1-974-372-5407   [employer]. wwwDone. 8-085-524-0245   [employer] www.[employer] 1-684.356.1738   [employer] wwwhulu 7-280-768-4539     This information is not intended to replace advice given to you by your health care provider. Make sure you discuss any questions you have with your health care provider.  Document Released: 12/20/2004 Document Revised: 01/08/2016 Document Reviewed: 08/30/2016  Elsevier Interactive Patient Education © 2017 Elsevier Inc.

## 2022-05-16 NOTE — CONSULTS
Wound Ostomy Continence Note    Subjective    HPI Patient is a 76 y.o. female who was admitted on 5/13/2022 with a diagnosis of Colovaginal fistula [N82.4].    Problem list Problem List:   Patient Active Problem List   Diagnosis   • History of cancer of vagina   • Elevated serum creatinine   • Low magnesium level   • Anemia   • Lung nodule < 6cm on CT   • Colovaginal fistula   • Diverticulitis of large intestine with perforation and abscess with bleeding   • Diverticulitis of colon   • Hypertension   • Hypothyroidism   • GERD (gastroesophageal reflux disease)   • Coronary artery calcification seen on CT scan   • Primary osteoarthritis of left knee   • Abnormal PET scan, lung   • Adenocarcinoma of left lung (CMS/HCC)   • Adenoma of left lung   • Preop examination   • Dyslipidemia   • History of lung cancer   • Stage 3b chronic kidney disease (CMS/HCC)      PMH/PSH Medical History:   Past Medical History:   Diagnosis Date   • Anemia    • Arthritis    • Colovaginal fistula    • COVID-19 vaccine series completed 02/26/2021   • Disease of thyroid gland    • Diverticulitis of colon    • GERD (gastroesophageal reflux disease)    • Hiatal hernia    • History of snoring    • Hypertension    • Hypothyroidism    • Lung cancer (CMS/HCC)    • Lung nodule    • Vaginal cancer (CMS/HCC) 2018    s/p XRT x 7 weeks and chemo weekly x 5 weeks       Surgical History:   Past Surgical History:   Procedure Laterality Date   • CHOLECYSTECTOMY     • COLONOSCOPY     • DILATION AND CURETTAGE OF UTERUS  02/2018   • LUNG SURGERY     • ROTATOR CUFF REPAIR Right    • TONSILLECTOMY     • WISDOM TOOTH EXTRACTION      hx of      Assessment and Recommendation S/p ileostomy 05/13; :  --stoma red, protruding, moist, has bridge on, functioning with liquid stool;   --at today, ostomy teaching performed, showed patient how to remove pouch, cleanse and apply new pouch; all related information mentioned to the patient; starter kit with supplies at bedside;  reinforce with patent that home care will order supplies for the patient   --d/c instruction implemented, WOCN can follow up as needed before d/c    Plan of care discussed with pt at bedside;             Date: 05/16/22  Signature: Jennifer Grady RN

## 2022-05-17 LAB
ANION GAP SERPL CALC-SCNC: 10 MEQ/L (ref 3–15)
BASOPHILS # BLD: 0.01 K/UL (ref 0.01–0.1)
BASOPHILS NFR BLD: 0.1 %
BUN SERPL-MCNC: 15 MG/DL (ref 8–20)
CALCIUM SERPL-MCNC: 7.9 MG/DL (ref 8.9–10.3)
CHLORIDE SERPL-SCNC: 110 MEQ/L (ref 98–109)
CO2 SERPL-SCNC: 21 MEQ/L (ref 22–32)
CREAT SERPL-MCNC: 1.4 MG/DL (ref 0.6–1.1)
CROSSMATCH: NORMAL
CROSSMATCH: NORMAL
CRP SERPL-MCNC: 270.45 MG/L
DIFFERENTIAL METHOD BLD: ABNORMAL
EOSINOPHIL # BLD: 0.3 K/UL (ref 0.04–0.36)
EOSINOPHIL NFR BLD: 3 %
ERYTHROCYTE [DISTWIDTH] IN BLOOD BY AUTOMATED COUNT: 14.4 % (ref 11.7–14.4)
GFR SERPL CREATININE-BSD FRML MDRD: 36.5 ML/MIN/1.73M*2
GLUCOSE SERPL-MCNC: 91 MG/DL (ref 70–99)
HCT VFR BLDCO AUTO: 32.3 % (ref 35–45)
HGB BLD-MCNC: 10.7 G/DL (ref 11.8–15.7)
IMM GRANULOCYTES # BLD AUTO: 0.06 K/UL (ref 0–0.08)
IMM GRANULOCYTES NFR BLD AUTO: 0.6 %
ISBT CODE: 600
ISBT CODE: 600
LYMPHOCYTES # BLD: 0.86 K/UL (ref 1.2–3.5)
LYMPHOCYTES NFR BLD: 8.6 %
MAGNESIUM SERPL-MCNC: 2.2 MG/DL (ref 1.8–2.5)
MCH RBC QN AUTO: 29.9 PG (ref 28–33.2)
MCHC RBC AUTO-ENTMCNC: 33.1 G/DL (ref 32.2–35.5)
MCV RBC AUTO: 90.2 FL (ref 83–98)
MONOCYTES # BLD: 0.71 K/UL (ref 0.28–0.8)
MONOCYTES NFR BLD: 7.1 %
NEUTROPHILS # BLD: 8.02 K/UL (ref 1.7–7)
NEUTS SEG NFR BLD: 80.6 %
NRBC BLD-RTO: 0 %
PDW BLD AUTO: 9.7 FL (ref 9.4–12.3)
PHOSPHATE SERPL-MCNC: 2.5 MG/DL (ref 2.4–4.7)
PLATELET # BLD AUTO: 222 K/UL (ref 150–369)
POTASSIUM SERPL-SCNC: 4.5 MEQ/L (ref 3.6–5.1)
PRODUCT CODE: NORMAL
PRODUCT CODE: NORMAL
PRODUCT STATUS: NORMAL
PRODUCT STATUS: NORMAL
RBC # BLD AUTO: 3.58 M/UL (ref 3.93–5.22)
SODIUM SERPL-SCNC: 141 MEQ/L (ref 136–144)
SPECIMEN EXP DATE BLD: NORMAL
SPECIMEN EXP DATE BLD: NORMAL
UNIT ABO: NORMAL
UNIT ABO: NORMAL
UNIT ID: NORMAL
UNIT ID: NORMAL
UNIT RH: NEGATIVE
UNIT RH: NEGATIVE
WBC # BLD AUTO: 9.96 K/UL (ref 3.8–10.5)

## 2022-05-17 PROCEDURE — 97535 SELF CARE MNGMENT TRAINING: CPT | Mod: GO

## 2022-05-17 PROCEDURE — 63700000 HC SELF-ADMINISTRABLE DRUG: Performed by: SURGERY

## 2022-05-17 PROCEDURE — 21400000 HC ROOM AND CARE CCU/INTERMEDIATE

## 2022-05-17 PROCEDURE — 99024 POSTOP FOLLOW-UP VISIT: CPT | Performed by: OBSTETRICS & GYNECOLOGY

## 2022-05-17 PROCEDURE — 25800000 HC PHARMACY IV SOLUTIONS: Performed by: SURGERY

## 2022-05-17 PROCEDURE — 85025 COMPLETE CBC W/AUTO DIFF WBC: CPT | Performed by: STUDENT IN AN ORGANIZED HEALTH CARE EDUCATION/TRAINING PROGRAM

## 2022-05-17 PROCEDURE — 84100 ASSAY OF PHOSPHORUS: CPT | Performed by: STUDENT IN AN ORGANIZED HEALTH CARE EDUCATION/TRAINING PROGRAM

## 2022-05-17 PROCEDURE — 25000000 HC PHARMACY GENERAL: Performed by: SURGERY

## 2022-05-17 PROCEDURE — 63600000 HC DRUGS/DETAIL CODE: Performed by: SURGERY

## 2022-05-17 PROCEDURE — 63700000 HC SELF-ADMINISTRABLE DRUG: Performed by: STUDENT IN AN ORGANIZED HEALTH CARE EDUCATION/TRAINING PROGRAM

## 2022-05-17 PROCEDURE — 83735 ASSAY OF MAGNESIUM: CPT | Performed by: STUDENT IN AN ORGANIZED HEALTH CARE EDUCATION/TRAINING PROGRAM

## 2022-05-17 PROCEDURE — 80048 BASIC METABOLIC PNL TOTAL CA: CPT | Performed by: STUDENT IN AN ORGANIZED HEALTH CARE EDUCATION/TRAINING PROGRAM

## 2022-05-17 PROCEDURE — 86140 C-REACTIVE PROTEIN: CPT | Performed by: STUDENT IN AN ORGANIZED HEALTH CARE EDUCATION/TRAINING PROGRAM

## 2022-05-17 PROCEDURE — 99024 POSTOP FOLLOW-UP VISIT: CPT | Performed by: COLON & RECTAL SURGERY

## 2022-05-17 PROCEDURE — 97530 THERAPEUTIC ACTIVITIES: CPT | Mod: GP,CQ

## 2022-05-17 PROCEDURE — 97116 GAIT TRAINING THERAPY: CPT | Mod: GP,CQ

## 2022-05-17 PROCEDURE — 36415 COLL VENOUS BLD VENIPUNCTURE: CPT | Performed by: STUDENT IN AN ORGANIZED HEALTH CARE EDUCATION/TRAINING PROGRAM

## 2022-05-17 RX ORDER — PANTOPRAZOLE SODIUM 40 MG/10ML
40 INJECTION, POWDER, LYOPHILIZED, FOR SOLUTION INTRAVENOUS EVERY 12 HOURS
Status: DISCONTINUED | OUTPATIENT
Start: 2022-05-17 | End: 2022-05-20

## 2022-05-17 RX ADMIN — GABAPENTIN 300 MG: 300 CAPSULE ORAL at 09:39

## 2022-05-17 RX ADMIN — HEPARIN SODIUM 5000 UNITS: 5000 INJECTION, SOLUTION INTRAVENOUS; SUBCUTANEOUS at 16:10

## 2022-05-17 RX ADMIN — DOXYCYCLINE 200 MG: 100 INJECTION, POWDER, LYOPHILIZED, FOR SOLUTION INTRAVENOUS at 21:19

## 2022-05-17 RX ADMIN — POTASSIUM CHLORIDE, DEXTROSE MONOHYDRATE AND SODIUM CHLORIDE: 150; 5; 450 INJECTION, SOLUTION INTRAVENOUS at 18:41

## 2022-05-17 RX ADMIN — HEPARIN SODIUM 5000 UNITS: 5000 INJECTION, SOLUTION INTRAVENOUS; SUBCUTANEOUS at 21:19

## 2022-05-17 RX ADMIN — ACETAMINOPHEN 975 MG: 325 TABLET, FILM COATED ORAL at 06:28

## 2022-05-17 RX ADMIN — POTASSIUM CHLORIDE, DEXTROSE MONOHYDRATE AND SODIUM CHLORIDE 125 ML/HR: 150; 5; 450 INJECTION, SOLUTION INTRAVENOUS at 05:47

## 2022-05-17 RX ADMIN — PANTOPRAZOLE SODIUM 40 MG: 40 INJECTION, POWDER, FOR SOLUTION INTRAVENOUS at 09:39

## 2022-05-17 RX ADMIN — PANTOPRAZOLE SODIUM 40 MG: 40 INJECTION, POWDER, FOR SOLUTION INTRAVENOUS at 21:19

## 2022-05-17 RX ADMIN — HEPARIN SODIUM 5000 UNITS: 5000 INJECTION, SOLUTION INTRAVENOUS; SUBCUTANEOUS at 05:48

## 2022-05-17 ASSESSMENT — COGNITIVE AND FUNCTIONAL STATUS - GENERAL
STANDING UP FROM CHAIR USING ARMS: 3 - A LITTLE
DRESSING REGULAR LOWER BODY CLOTHING: 3 - A LITTLE
AFFECT: WNL
CLIMB 3 TO 5 STEPS WITH RAILING: 3 - A LITTLE
HELP NEEDED FOR BATHING: 3 - A LITTLE
WALKING IN HOSPITAL ROOM: 3 - A LITTLE
TOILETING: 4 - NONE
HELP NEEDED FOR PERSONAL GROOMING: 4 - NONE
AFFECT: WFL
EATING MEALS: 4 - NONE
DRESSING REGULAR UPPER BODY CLOTHING: 4 - NONE
MOVING TO AND FROM BED TO CHAIR: 3 - A LITTLE

## 2022-05-17 NOTE — POST-OP (NON-BILLABLE)
No vaginal drainage/bleeding  Continued nausea despite ostomy functioning.  Inc CDI    Post op care per Dr. Mandel's team  Please call with questions

## 2022-05-17 NOTE — OP NOTE
Raegan Young  1945  [id number]  05/17/22        SURGEON: Geovani Mandel MD    ASSISTANT: Ghanshyam Ponce MD    CO-SURGEON: Trev Jimenez MD    PREOPERATIVE DIAGNOSIS: [place] uterine and colovaginal fistula related to history of diverticulitis and previous radiation therapy and therapy for cervical cancer    POSTOPERATIVE DIAGNOSIS: No evidence of metastatic disease seen in the abdomen or pelvis,: Uterine and colovaginal fistula seen with history of diverticulitis and previous radiation therapy from treatment of cervical cancer.    PROCEDURE PERFORMED:    1.  Robotic proctectomy and sigmoidectomy with takedown of colovaginal and colon uterine fistula and stapled coloanal anastomosis with diverting loop ileostomy  2.  Robotic splenic flexure mobilization  3.  Laparoscopic lysis of adhesions  4.  Robotic ALYSSA/BSO (performed Dr. Jimenez)  5.  Cystoscopy and bilateral lighted stents placed (performed by urology, Dr. Quan.)    SPECIMENS:    1.  Rectum sigmoid colon and portion of descending colon  2.  Uterus tubes and ovary   3.  Proximal anastomotic donut  4.  Distal anastomotic donut     ANESTHESIA: General.    COMPLICATIONS: None.    EBL: 100 cc    OPERATIVE INDICATIONS: Patient is a very nice 76-year-old woman who has a history of cervical cancer status post radiation therapy for definitive therapy of this.  She has developed diverticulitis and has resulted in fistula to the uterus as well as the vagina.  She is here to have definitive surgical management of this.  I discussed risk the procedure with her in detail including bleeding, infection, anastomotic leak, need for open surgery, need for temporary permanent stoma, MI, stroke, death.  The patient understood and elected proceed.    OPERATIVE DETAIL: The patient was brought to the operating table and placed in supine position.  She was identified and general anesthesia was induced.  She was placed in modified lithotomy position and her abdomen was  prepped and draped in usual fashion using Betadine.  Urology entered the operating room and prepped and draped the perineum and a timeout was performed.  Cystoscopy and bilateral lighted stents were placed.  Once this was done, the abdomen was reprepped and draped in usual fashion using Betadine and timeout was performed.  Supraumbilical incision was made and the robotic 8 mm trochars placed after obtained pneumoperitoneum was Veress needle.  The abdomen was explored laparoscopically and additional trochars were placed the patient's right lower quadrant, 12 mm trocar, and right upper quadrant, 8 mm assistant port.  Adhesions the anterior abdominal wall were present and were lysed systematically using LigaSure device in order to proceed with the operation and they were absolutely necessary to be lysed in order to proceed.  Once this was done, additional robotic trochars were placed the patient's left upper quadrant and left midabdomen.  At this point, the patient was placed in Trendelenburg and right side on position, small bowel is moved out of the way, and the robot was docked.  Using the robotic vessel sealer and grasper the  inferior mesenteric vein was tracked anterior and cephalad and the peritoneum and this was opened  with the robotic vessel sealer.  The mesentery is mobilized from medial to lateral fashion.  Next, the inferior mesenteric artery was retracted anterior and cephalad and the peritoneum out of this was opened with the robotic vessel sealer.  This was then mobilized from medial to lateral fashion and the ureter was identified and kept in the retroperitoneum.  The inferior mesenteric artery was dissected and divided with the robotic vessel sealer.  The inferior mesenteric vein was then dissected and divided with the vessel sealer.  Once the mesentery was fully mobilized from its lateral fashion, the white line of Toldt was mobilized and the descending colon was mobilized fully.  The presacral space  was entered and dissection was carried out to the level of the mid rectum and peritoneal sulcus were opened on either side of the rectum using robotic vessel sealer.  Next, Dr. Jimenez entered the operating room and performed ALYSSA/BSO and partial vaginectomy.  Once this was completed, our dissection carried out to the level of the low rectum where there was dense adhesion between the rectum and the vagina and ultimately a plane was found between the rectum and the vagina in the distal rectum.  The mesorectum was dissected and divided with the robotic vessel sealer and the rectum was divided with robotic vessel sealer green load in 2 firings.  Once this was done, she was placed in reverse Trendelenburg position and the gastrocolic ligament was incised from medial to lateral fashion using robotic vessel sealer.  The white line of Toldt was mobilized from lateral to medial fashion using robotic vessel sealer and the inferior border the pancreas mobilized medial to lateral fashion using robotic vessel sealer mobilizing the entire splenic flexure.  At this point, the pneumoperitoneum was released, a Pfannenstiel incision was made and dissection was carried out with Bovie cautery to level the fascia which was opened and a transverse fashion.  The rectus flaps were performed and the midline peritoneum was opened.  The Josemanuel wound protector was placed and the colon was brought of the abdomen as well as the uterus tubes and ovaries.  These were passed off the field specimen after dividing the colon in the area that was healthy.  The mesentery was controlled LigaSure device and the auto pursestring device was used.  The EEA 20 and was sewn in place and care was taken to ensure there is no diverticulum of the staple line.  There was none seen.  The colon was returned back to the abdomen, pneumoperitoneum was reestablished after changing gowns and gloves, she was placed in Trendelenburg and right side on position once again,  small bowel moved out of the pelvis and the EEA device was placed in the anus and guided the staple line.  The post was brought just to the middle of the staple line and the anvil was mated with the post.  After ensuring there is no twisting of the colon or mesentery, the anastomosis was created.  ICG dye was used to check real-time vascular flow monitoring of the bowel and there was excellent blood flow seen to the colorectal anastomosis.  The anastomosis was checked under water with an air leak test and there was no air leak seen.  The anastomotic donuts were seen to be complete.  There was no tension on the anastomosis.  The fascial defect was closed by Dr. Jimenez with STRATAFIX suture in running fashion.  At this point, care was taken ensure there is no loop of small intestine underneath the descending colonic mesentery and there was none seen.  Irrigation took place until clear throughout the abdomen, hemostasis was excellent, and pneumoperitoneum was released.  Next, the terminal ileum was identified and 20 cm proximal to the [place], this was identified for ileostomy.  The right lower quadrant 10 mm trocar was removed and this quarter size piece of skin was excised.  Dissection was carried out to the level of fascia was opened in a cruciate fashion.  This was dilated to 2 fingerbreadths and the bowel was brought out of the abdomen.  A rubber catheter was placed and this was marked distally with a dyed suture to keep orientation..  The laparoscopic and robotic trochars were removed and the Pfannenstiel incision was closed by closing the peritoneum vertically with a 2-0 Vicryl suture in running fashion.  Next the anterior to fascia was closed with #1 PDS x2 in running fashion.  The subcutaneous tissue was irrigated with saline and the skin was closed with 4 Monocryl subcuticular sutures.  Steri-Strips were applied.  Towels were placed around the abdomen and the ileostomy was matured with 3-0 Vicryl stitches  after making a transverse enterotomy.  The metrician happened with Jennifer sutures in 6 cardinal locations and interspersed sutures to make it watertight.  The rubber catheter was then secured using #5 Ethibond suture in a figure fashion.  Stoma appliance was applied.  At the conclusion all sharps, sponge, and instrument counts were correct x2.  The patient tolerated procedure well and was transferred to the PACU in extubated and stable condition.

## 2022-05-17 NOTE — PROGRESS NOTES
Patient:  Raegan Young  Location:  [facility] [address] [address]  MRN:  [id number]  Today's date:  5/17/2022  Rn aware and Pt cleared for OT tx session. At end of session, Pt was left in chair PCT present, call bell in reach and all other needs met. Verbal handoff w/ Rn at end of session to discuss Pt progress.    Raegan is a 76 y.o. female admitted on 5/13/2022 with Colovaginal fistula [N82.4]. Principal problem is Colovaginal fistula.    Past Medical History  Raegan has a past medical history of Anemia, Arthritis, Colovaginal fistula, COVID-19 vaccine series completed (02/26/2021), Disease of thyroid gland, Diverticulitis of colon, GERD (gastroesophageal reflux disease), Hiatal hernia, History of snoring, Hypertension, Hypothyroidism, Lung cancer (CMS/HCC), Lung nodule, and Vaginal cancer (CMS/HCC) (2018).    Pt reports independent PLOF, extra-strength Tylenol (2/day) for pain from previous surgery, frequent falls, none recently    History of Present Illness   s/p robotic LAR, ALYSSA/BSO, takedown of colovaginal and colouterine fistulas, coloanal anastomosis, DLI  5/17:  decreased po intake, increased abd distention, and increase in creatinine       OT Vitals    Date/Time Pulse HR Source SpO2 Pt Activity O2 Therapy BP MAP BP Location BP Method Pt Position [facility]   05/17/22 1427 108 sinus tachy Monitor 92 % Walking None (Room air) 160/72 10 mmHg Left upper arm Automatic Sitting DS   05/17/22 1428 90 Monitor 100 % At rest None (Room air) 160/72 -- Left upper arm Automatic Sitting LS   05/17/22 1441 98 Monitor 98 % Other (Comment) after activity None (Room air) 159/70 -- Left upper arm Automatic Sitting LS      OT Pain    Date/Time Pain Type Location Rating: Rest Rating: Activity Interventions [facility]   05/17/22 1428 Pain Assessment abdomen 2 - mild pain 2 - mild pain position adjusted LS          Prior Living Environment    Flowsheet Row Most Recent Value   Current Living Arrangements other (see comments)   [will go to daughter's]   Home Accessibility stairs to enter home (Group), stairs within home (Group)   Number of Stairs, Main Entrance 1   Location, Patient Bedroom second floor, must negotiate stairs to access   Location, Bathroom first (main) floor, second floor, must negotiate stairs to access   Number of Stairs, Within Home, Primary 12        Prior Level of Function    Flowsheet Row Most Recent Value   Ambulation independent   Transferring independent   Toileting independent   Bathing independent   Dressing independent   Eating independent   Communication understands/communicates without difficulty   Prior Level of Function Comment Pt reports independent PLOF, no AD, limited by L knee edema but shops & does housework   Assistive Device Currently Used at Home none        Occupational Profile    Flowsheet Row Most Recent Value   Occupational History/Life Experiences retired           OT Evaluation and Treatment - 05/17/22 1420        OT Time Calculation    Start Time 1425     Stop Time 1441     Time Calculation (min) 16 min        Session Details    Document Type daily treatment/progress note     Mode of Treatment occupational therapy        General Information    Patient Profile Reviewed yes     Patient/Family/Caregiver Comments/Observations Rn aware, Pt cleared     General Observations of Patient Pt rcv'd sitting in chair, agreeable to OT     Existing Precautions/Restrictions fall;other (see comments)   aat       Cognition/Psychosocial    Affect/Mental Status (Cognition) WNL     Orientation Status (Cognition) oriented x 4     Follows Commands (Cognition) WFL     Cognitive Function WFL     Comment, Cognition No cog deficits noted        Bed Mobility    Comment (Bed Mobility) Pt rcv'd OOB. Pt rcv'd edu with visual demo in home-sim HOB bed mob with HOB flat. Pt rcv'd edu on bed rail availability for home as needed        Transfers    Transfers toilet transfer;tub transfer;other (see comments)     Comment  sit<>stand        Sit to Stand Transfer    [place], Sit to Stand Transfer verbal cues;supervision     Verbal Cues hand placement;safety;technique     Assistive Device none     Comment from recliner, slow and steady to rise        Stand to Sit Transfer    [place], Stand to Sit Transfer supervision;verbal cues     Verbal Cues hand placement;safety;technique     Assistive Device none     Comment to low recliner, good eccentric control upon descent        Toilet Transfer    Transfer Technique stand-sit;sit-stand     [place], Toilet Transfer supervision;verbal cues     Verbal Cues hand placement;safety;technique     Assistive Device grab bars/safety frame     Comment Pt rcv'd edu/training in home-sim toilet transfer. Performed WFL. Pt rcv'd edu on using commode over toilet if needed        Tub Transfer    Transfer Technique step over, right entry     [place], Tub Transfer supervision;verbal cues   +visual demo    Verbal Cues hand placement;safety;technique     Assistive Device none     Comment Pt rcv'd edu/training in home -sim tub transfer while using proper and placement/safety techniques. Pt owns tub bench at her home, dtr does not have. Pt and dtr aware of tub bench option at dtrs. During training, Pt req incr time to bring LLE over sim tub threshold d/t decr LLE knee ROM.        Safety Issues, Functional Mobility    Impairments Affecting Function (Mobility) endurance/activity tolerance     Comment, Safety Issues/Impairments (Mobility) Pt performed func mob of HH distance w/ no AD w/ SUP.  no LOB        Balance    Balance Assessment sitting static balance;sitting dynamic balance;sit to stand dynamic balance;standing dynamic balance;standing static balance     Static Sitting Balance WFL;sitting in chair     Dynamic Sitting Balance WFL   on toilet    Sit to Stand Dynamic Balance WFL;unsupported     Static Standing Balance WFL;unsupported     Dynamic Standing Balance mild impairment;unsupported      Balance Interventions occupation based/functional task     Comment, Balance no overt LOB noted during func performance        Lower Body Dressing    Tasks doff;don;socks     [place] maximum assist (25-49% patient effort)     Position supported sitting     Adaptive Equipment none     Comment Pt unable to perform func reach WFL to don/soff socks. Pt rc'vd brief edu w/ visual image of LBD AE. Pt reports she owns all dressing equipment and does not need a review on their use.        Toileting    Comment +yomaira, Pt demos func reach WFL to perform safely        BADL Safety/Performance    Impairments, BADL Safety/Performance balance;endurance/activity tolerance     Skilled BADL Treatment/Intervention BADL process/adaptation training;compensatory training        ADL Interventions    Self-Care (BADL) Promotion activity adapted to sitting;activity pacing encouraged;best position for BADL determined;out of bed for BADLs encouraged        AM-PAC (TM) - ADL (Current Function)    Putting on and taking off regular lower body clothing? 3 - A Little     Bathing? 3 - A Little     Toileting? 4 - None     Putting on/taking off regular upper body clothing? 4 - None     How much help for taking care of personal grooming? 4 - None     Eating meals? 4 - None     AM-PAC (TM) ADL Score 22        Assessment/Plan (OT)    Daily Outcome Statement OT tx session complete. Raegan cont to progress toward OT goals. She req SUP for all OOB func sit<>stand transfers/func mob/home-sim toilet and tub transfer w/ no AD. Raegan rcv'd edu in home-sim bed mob w/ logroll technique and LBD w/ AE (she owns equipment, deferred training). Cont to rec d/c home w/ dtr and with (A) as needed. OT plan to provide edu/training in home-sim bed mob next session.     Rehab Potential good, to achieve stated therapy goals     Therapy Frequency 3-5 times/wk     Planned Therapy Interventions activity tolerance training;adaptive equipment training;BADL retraining;functional  balance retraining;occupation/activity based interventions;patient/caregiver education/training;transfer/mobility retraining               OT Assessment/Plan    Flowsheet Row Most Recent Value   OT Recommended Discharge Disposition home with assistance, home at 05/17/2022 1420   Anticipated Equipment Needs At Discharge (OT) bathing equipment  [Pt owns dressing equipment and tub bench] at 05/17/2022 1420   Patient/Family Therapy Goal Statement to go home at 05/17/2022 1420                         OT Goals    Flowsheet Row Most Recent Value   Bed Mobility Goal 1    Activity/Assistive Device bed mobility activities, all at 05/14/2022 0929   [place] modified independence at 05/14/2022 0929   Time Frame by discharge at 05/14/2022 0929   Progress/Outcome goal ongoing at 05/17/2022 1420   Transfer Goal 1    Activity/Assistive Device all transfers at 05/14/2022 0929   [place] modified independence at 05/14/2022 0929   Time Frame by discharge at 05/14/2022 0929   Progress/Outcome goal ongoing at 05/17/2022 1420   Dressing Goal 1    Activity/Adaptive Equipment dressing skills, all at 05/14/2022 0929   [place] modified independence at 05/14/2022 0929   Time Frame by discharge at 05/14/2022 0929   Progress/Outcome goal met at 05/17/2022 1420   Toileting Goal 1    Activity/Assistive Device toileting skills, all at 05/14/2022 0929   [place] modified independence at 05/14/2022 0929   Time Frame by discharge at 05/14/2022 0929   Progress/Outcome goal met at 05/17/2022 1420

## 2022-05-17 NOTE — PATIENT CARE CONFERENCE
Care Progression Rounds Note  Date: 5/17/2022  Time: 10:51 AM     Patient Name: Raegan Young     Medical Record Number: [id number]   YOB: 1945  Sex: Female      Room/Bed: 1010    Admitting Diagnosis: Colovaginal fistula [N82.4]   Admit Date/Time: 5/13/2022  6:00 AM    Primary Diagnosis: Colovaginal fistula  Principal Problem: Colovaginal fistula    GMLOS: 3.3  Anticipated Discharge Date: 5/20/2022    AM-PAC:  Mobility Score: 18    Discharge Planning:  Current Living Arrangements: other (see comments) (will go to daughter's)  Concerns to be Addressed: care coordination/care conferences, discharge planning  Anticipated Discharge Disposition: home with home health, home with assistance    Barriers to Discharge:  Medical issues not resolved    Comments:  diet backed down to clrs    Participants:  , physician, nursing, occupational therapy, social work/services

## 2022-05-17 NOTE — PROGRESS NOTES
Free text surgery note:    Paged earlier this morning that patient had a 500 cc bilious emesis.  Plan to place a NG tube and make n.p.o.  Discussed plan with patient. Patient is currently refusing a NG tube.  Risks and benefits were discussed.  Patient states that if she feels nauseous, vomits again, feels more distended, has more belching, or notices that ileostomy output decreases then she will be amenable to NG tube.  Attending notified.    Daily Ivan MD  Surgery PGY1

## 2022-05-17 NOTE — PROGRESS NOTES
Colorectal surgery Daily Progress Note    Subjective   NAEON. Afebrile. Vitals wnl. On RA. Yesterday started belching, decreased po intake, increased abd distention, and increase in creatinine so pt's toradol dc'ed, 500cc LR bolus given, diet backed down to clears, and increased in IVF.     This am pt states she has reflux and PPI helps. Tolerating clears (water and jello) w/o n/v. Continues to endorse belching and abd distention. Ileostomy output decrease- 525cc output in last 24hr compared to 1.4L the previous 24hr. Has been OOB and ambulating. Pain well controlled.        Objective     Vital signs in last 24 hours:  Temp:  [36.8 °C (98.2 °F)-37.2 °C (99 °F)] 37.1 °C (98.8 °F)  Heart Rate:  [83-94] 87  Resp:  [16-20] 18  BP: (117-145)/(55-79) 145/64      Intake/Output Summary (Last 24 hours) at 5/17/2022 0721  Last data filed at 5/17/2022 0454  Gross per 24 hour   Intake 2636.67 ml   Output 2625 ml   Net 11.67 ml     Intake/Output this shift:  No intake/output data recorded.    Physical Exam    General appearance: alert, appears stated age, cooperative, resting comfortably, not in acute distress  HENT: NC/AT, neck supple, symmetrical, trachea midline  Lungs: Normal respiratory effort, even and unlabored, on RA  Heart: regular rate and rhythm  Abdomen: soft, mildly distended, appropriately TTP. Incisions- c/d/i w/steri strips. Diverting loop ileostomy pink and patent with gas and thick green output.  : Fc  Extremities: extremities warm and well-perfused  Pulses: 2+ and symmetric  Skin: warm and dry  Neurologic: Grossly normal    VTE Assessment: I have reassessed and the patient's VTE risk and treatment plan is appropriate.    Labs  Lab Results   Component Value Date    WBC 11.27 (H) 05/16/2022    HGB 10.1 (L) 05/16/2022    HCT 30.9 (L) 05/16/2022    MCV 91.4 05/16/2022     05/16/2022     Lab Results   Component Value Date    GLUCOSE 96 05/16/2022    CALCIUM 7.6 (L) 05/16/2022     05/16/2022    K 4.1  05/16/2022    CO2 21 (L) 05/16/2022     05/16/2022    BUN 18 05/16/2022    CREATININE 1.5 (H) 05/16/2022         Imaging  I have reviewed the Imaging from the last 24 hrs.      Assessment/Plan     75yo F w/PMH of lung ca, hx of colovaginal fistula likely from diverticulitis, cervical ca s/p radiation, arthritis, GERD, hiatal hernia, RAMON, HTN, hypothyroidism s/p robotic LAR, ALYSSA/BSO, takedown of colovaginal and colouterine fistulas, coloanal anastomosis, DLI for hx of diverticulitis and radiation therapy for cervical ca 5/13. 4 Days Post-Op    - diet backed down to clears   - IVF @125  - Pain control as needed  - Antibiotics changed to Doxy for 10 days  - Fc  - IS/OOB/ambulate   - PT/OT/WOCN  - DVT prophylaxis subq heparin    Please page #[phone] with any questions or concerns.    Daily Ivan MD

## 2022-05-18 LAB
ANION GAP SERPL CALC-SCNC: 12 MEQ/L (ref 3–15)
BASOPHILS # BLD: 0.01 K/UL (ref 0.01–0.1)
BASOPHILS NFR BLD: 0.1 %
BUN SERPL-MCNC: 10 MG/DL (ref 8–20)
CALCIUM SERPL-MCNC: 8.1 MG/DL (ref 8.9–10.3)
CASE RPRT: NORMAL
CHLORIDE SERPL-SCNC: 110 MEQ/L (ref 98–109)
CLINICAL INFO: NORMAL
CO2 SERPL-SCNC: 18 MEQ/L (ref 22–32)
CREAT SERPL-MCNC: 1.2 MG/DL (ref 0.6–1.1)
CRP SERPL-MCNC: 182.36 MG/L
DIFFERENTIAL METHOD BLD: ABNORMAL
EOSINOPHIL # BLD: 0.36 K/UL (ref 0.04–0.36)
EOSINOPHIL NFR BLD: 5 %
ERYTHROCYTE [DISTWIDTH] IN BLOOD BY AUTOMATED COUNT: 14.2 % (ref 11.7–14.4)
GFR SERPL CREATININE-BSD FRML MDRD: 43.6 ML/MIN/1.73M*2
GLUCOSE SERPL-MCNC: 100 MG/DL (ref 70–99)
HCT VFR BLDCO AUTO: 31.2 % (ref 35–45)
HGB BLD-MCNC: 10.1 G/DL (ref 11.8–15.7)
IMM GRANULOCYTES # BLD AUTO: 0.04 K/UL (ref 0–0.08)
IMM GRANULOCYTES NFR BLD AUTO: 0.6 %
LYMPHOCYTES # BLD: 0.99 K/UL (ref 1.2–3.5)
LYMPHOCYTES NFR BLD: 13.9 %
MAGNESIUM SERPL-MCNC: 1.9 MG/DL (ref 1.8–2.5)
MCH RBC QN AUTO: 29.5 PG (ref 28–33.2)
MCHC RBC AUTO-ENTMCNC: 32.4 G/DL (ref 32.2–35.5)
MCV RBC AUTO: 91.2 FL (ref 83–98)
MONOCYTES # BLD: 0.77 K/UL (ref 0.28–0.8)
MONOCYTES NFR BLD: 10.8 %
NEUTROPHILS # BLD: 4.97 K/UL (ref 1.7–7)
NEUTS SEG NFR BLD: 69.6 %
NRBC BLD-RTO: 0 %
PATH REPORT.FINAL DX SPEC: NORMAL
PATH REPORT.GROSS SPEC: NORMAL
PDW BLD AUTO: 9.2 FL (ref 9.4–12.3)
PHOSPHATE SERPL-MCNC: 2.4 MG/DL (ref 2.4–4.7)
PLATELET # BLD AUTO: 241 K/UL (ref 150–369)
POTASSIUM SERPL-SCNC: 4.5 MEQ/L (ref 3.6–5.1)
RBC # BLD AUTO: 3.42 M/UL (ref 3.93–5.22)
SODIUM SERPL-SCNC: 140 MEQ/L (ref 136–144)
WBC # BLD AUTO: 7.14 K/UL (ref 3.8–10.5)

## 2022-05-18 PROCEDURE — 84100 ASSAY OF PHOSPHORUS: CPT | Performed by: STUDENT IN AN ORGANIZED HEALTH CARE EDUCATION/TRAINING PROGRAM

## 2022-05-18 PROCEDURE — 63600000 HC DRUGS/DETAIL CODE

## 2022-05-18 PROCEDURE — 36415 COLL VENOUS BLD VENIPUNCTURE: CPT | Performed by: STUDENT IN AN ORGANIZED HEALTH CARE EDUCATION/TRAINING PROGRAM

## 2022-05-18 PROCEDURE — 25800000 HC PHARMACY IV SOLUTIONS

## 2022-05-18 PROCEDURE — 25800000 HC PHARMACY IV SOLUTIONS: Performed by: SURGERY

## 2022-05-18 PROCEDURE — 63600000 HC DRUGS/DETAIL CODE: Performed by: SURGERY

## 2022-05-18 PROCEDURE — 25000000 HC PHARMACY GENERAL: Performed by: SURGERY

## 2022-05-18 PROCEDURE — 99024 POSTOP FOLLOW-UP VISIT: CPT | Performed by: COLON & RECTAL SURGERY

## 2022-05-18 PROCEDURE — 80048 BASIC METABOLIC PNL TOTAL CA: CPT | Performed by: STUDENT IN AN ORGANIZED HEALTH CARE EDUCATION/TRAINING PROGRAM

## 2022-05-18 PROCEDURE — 86140 C-REACTIVE PROTEIN: CPT | Performed by: STUDENT IN AN ORGANIZED HEALTH CARE EDUCATION/TRAINING PROGRAM

## 2022-05-18 PROCEDURE — 21400000 HC ROOM AND CARE CCU/INTERMEDIATE

## 2022-05-18 PROCEDURE — 83735 ASSAY OF MAGNESIUM: CPT | Performed by: STUDENT IN AN ORGANIZED HEALTH CARE EDUCATION/TRAINING PROGRAM

## 2022-05-18 PROCEDURE — 85025 COMPLETE CBC W/AUTO DIFF WBC: CPT | Performed by: STUDENT IN AN ORGANIZED HEALTH CARE EDUCATION/TRAINING PROGRAM

## 2022-05-18 RX ORDER — FUROSEMIDE 10 MG/ML
20 INJECTION INTRAMUSCULAR; INTRAVENOUS ONCE
Status: COMPLETED | OUTPATIENT
Start: 2022-05-18 | End: 2022-05-18

## 2022-05-18 RX ADMIN — POTASSIUM CHLORIDE, DEXTROSE MONOHYDRATE AND SODIUM CHLORIDE: 150; 5; 450 INJECTION, SOLUTION INTRAVENOUS at 09:28

## 2022-05-18 RX ADMIN — HEPARIN SODIUM 5000 UNITS: 5000 INJECTION, SOLUTION INTRAVENOUS; SUBCUTANEOUS at 14:58

## 2022-05-18 RX ADMIN — PANTOPRAZOLE SODIUM 40 MG: 40 INJECTION, POWDER, FOR SOLUTION INTRAVENOUS at 09:13

## 2022-05-18 RX ADMIN — MAGNESIUM SULFATE HEPTAHYDRATE 2 G: 2 INJECTION, SOLUTION INTRAVENOUS at 11:08

## 2022-05-18 RX ADMIN — PANTOPRAZOLE SODIUM 40 MG: 40 INJECTION, POWDER, FOR SOLUTION INTRAVENOUS at 20:13

## 2022-05-18 RX ADMIN — HEPARIN SODIUM 5000 UNITS: 5000 INJECTION, SOLUTION INTRAVENOUS; SUBCUTANEOUS at 05:28

## 2022-05-18 RX ADMIN — FUROSEMIDE 20 MG: 10 INJECTION, SOLUTION INTRAMUSCULAR; INTRAVENOUS at 11:08

## 2022-05-18 RX ADMIN — DOXYCYCLINE 200 MG: 100 INJECTION, POWDER, LYOPHILIZED, FOR SOLUTION INTRAVENOUS at 21:24

## 2022-05-18 RX ADMIN — HEPARIN SODIUM 5000 UNITS: 5000 INJECTION, SOLUTION INTRAVENOUS; SUBCUTANEOUS at 21:35

## 2022-05-18 NOTE — PROGRESS NOTES
General Surgery Daily Progress Note    S/p robotic LAR, ALYSSA/BSO, takedown of colovaginal and colouterine fistulas, coloanal anastomosis, diverting loop ileostomy on 5/13.     Subjective      No acute events overnight. Pt with one episode of approx 500 cc emesis yesterday. Pt refused NGT placement at that time. Pt was backed down to NPO with sips. Continued mild nausea, but no further vomiting episodes. Ileostomy with 1 L output over previous 24 hours. Pt has been OOB and ambulating.     Objective     Vital signs in last 24 hours:  Temp:  [36.4 °C (97.5 °F)-37.6 °C (99.7 °F)] 37.6 °C (99.7 °F)  Heart Rate:  [] 88  Resp:  [16-20] 16  BP: (124-163)/(60-74) 124/60      Intake/Output Summary (Last 24 hours) at 5/18/2022 0703  Last data filed at 5/18/2022 0656  Gross per 24 hour   Intake 3314.21 ml   Output 3675 ml   Net -360.79 ml       Intake/Output Last 24hours:    Intake/Output Summary (Last 24 hours) at 5/18/2022 0703  Last data filed at 5/18/2022 0656  Gross per 24 hour   Intake 3314.21 ml   Output 3675 ml   Net -360.79 ml       Physical Exam    General appearance: alert, appears stated age, cooperative  Neck: supple, symmetrical, trachea midline  Lungs: Normal respiratory effort  Heart: regular rate and rhythm  Abdomen: soft, appropriately tender, mildly distended. Incisions are C/D/I with steristrips in place. Ileostomy pink and patent with gas think output.  Extremities: extremities warm and well-perfused  Skin: warm and dry  Neurologic: Grossly normal      VTE Assessment: I have reassessed and the patient's VTE risk and treatment plan is appropriate. [facility]    Labs  CBC Results       05/17/22 05/16/22 05/15/22     0658 0701 0655    WBC 9.96 11.27 11.09    RBC 3.58 3.38 3.32    HGB 10.7 10.1 9.8    HCT 32.3 30.9 30.1    MCV 90.2 91.4 90.7    MCH 29.9 29.9 29.5    MCHC 33.1 32.7 32.6     191 181        BMP Results       05/17/22 05/16/22 05/15/22     0658 0701 0655     138 138    K 4.5 4.1 3.9     Cl 110 107 108    CO2 21 21 22    Glucose 91 96 96    BUN 15 18 15    Creatinine 1.4 1.5 1.3    Calcium 7.9 7.6 7.6    Anion Gap 10 10 8    EGFR 36.5 33.7 39.7        Results from last 7 days   Lab Units 05/16/22  0701   ALBUMIN g/dL 2.3*     Results from last 7 days   Lab Units 05/17/22  0658 05/16/22  0701 05/15/22  0655   CRP mg/L 270.45* 299.11* 209.77*       PT PTT Results    No lab values to display.         Micro  Microbiology Results     Procedure Component Value Units Date/Time    COVID-19 PAT/Pre-procedural [[id number]]  (Normal) Collected: 05/12/22 1219    Specimen: Nasopharyngeal Swab from Nasopharynx Updated: 05/12/22 2208    Narrative:      The following orders were created for panel order COVID-19 PAT/Pre-procedural.  Procedure                               Abnormality         Status                     ---------                               -----------         ------                     SARS-CoV-2 (COVID-19), PCR[[id number]]   Normal              Final result                 Please view results for these tests on the individual orders.    SARS-CoV-2 (COVID-19), PCR [[id number]]  (Normal) Collected: 05/12/22 1219    Specimen: Nasopharyngeal Swab from Nasopharynx Updated: 05/12/22 2208     SARS-CoV-2 (COVID-19) Negative    COVID-19 PAT/Pre-procedural [[id number]]  (Normal) Collected: 05/02/22 1321    Specimen: Nasopharyngeal Swab from Nasopharynx Updated: 05/02/22 2122    Narrative:      The following orders were created for panel order COVID-19 PAT/Pre-procedural.  Procedure                               Abnormality         Status                     ---------                               -----------         ------                     SARS-CoV-2 (COVID-19), PCR[[id number]]   Normal              Final result                 Please view results for these tests on the individual orders.    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 5 Days Post-Op.    -NPO,   -PRN pain medication  -Continue Doxycycline for 10 day course total  -Fc to remain in place. Minimally pink tinged, will monitor  -IS/OOB/ambulate as tolerated  -[facility]    Please page [phone] with any questions   ASAD Ruiz

## 2022-05-18 NOTE — PATIENT CARE CONFERENCE
Care Progression Rounds Note  Date: 5/18/2022  Time: 11:14 AM     Patient Name: Raegan Young     Medical Record Number: [id number]   YOB: 1945  Sex: Female      Room/Bed: 1010    Admitting Diagnosis: Colovaginal fistula [N82.4]   Admit Date/Time: 5/13/2022  6:00 AM    Primary Diagnosis: Colovaginal fistula  Principal Problem: Colovaginal fistula    GMLOS: 3.3  Anticipated Discharge Date: 5/20/2022    AM-PAC:  Mobility Score: 18    Discharge Planning:  Current Living Arrangements: other (see comments) (will go to daughter's)  Concerns to be Addressed: care coordination/care conferences, discharge planning  Anticipated Discharge Disposition: family member's home with services, home with home health    Barriers to Discharge:  Medical issues not resolved    Comments:       Participants:  , physician, nursing, occupational therapy, social work/services

## 2022-05-18 NOTE — CONSULTS
Brief Nutrition Note    Recommendations   Advance diet to goal of low fiber as medically indicated     Clinical Course: Patient is a 76 y.o. female who was admitted on 5/13/2022 with a diagnosis of Colovaginal fistula [N82.4].     Past Medical History:   Diagnosis Date   • Anemia    • Arthritis    • Colovaginal fistula    • COVID-19 vaccine series completed 02/26/2021   • Disease of thyroid gland    • Diverticulitis of colon    • GERD (gastroesophageal reflux disease)    • Hiatal hernia    • History of snoring    • Hypertension    • Hypothyroidism    • Lung cancer (CMS/HCC)    • Lung nodule    • Vaginal cancer (CMS/HCC) 2018    s/p XRT x 7 weeks and chemo weekly x 5 weeks       Past Surgical History:   Procedure Laterality Date   • CHOLECYSTECTOMY     • COLONOSCOPY     • DILATION AND CURETTAGE OF UTERUS  02/2018   • LUNG SURGERY     • ROTATOR CUFF REPAIR Right    • TONSILLECTOMY     • WISDOM TOOTH EXTRACTION      hx of       Reason for Assessment  Reason For Assessment: identified at risk by screening criteria (NPO  x 5)     [facility] Nutrition Screen Tool  Has patient lost weight without trying?: 0-->No  Has patient been eating poorly due to decreased appetite?: 0-->No  [facility] Nutrition Screen Score: 0     Nutrition/Diet History  Intake (%): 25%    Physical Findings  Gastrointestinal: ostomy (ileostomy 1025ml)  Last Bowel Movement:  (ileostomy)  Skin: surgical incision, edema (2+ BLE edema)     RETS18 Physical Appearance  Gastrointestinal: ostomy (ileostomy 1025ml)  Last Bowel Movement:  (ileostomy)  Skin: surgical incision, edema (2+ BLE edema)     Nutrition Order  Nutrition Order: does not meet nutritional requirements  Nutrition Order Comments: clears     Anthropometrics  Height: 152.4 cm (5')     Current Weight  Weight Method: Standing scale  Weight: 79.4 kg (175 lb 0.7 oz)     Ideal Body Weight (IBW)  Ideal Body Weight (IBW) (kg): 45.86  % Ideal Body Weight: 172.2     Body Mass Index (BMI)  BMI (Calculated): 34.2      Labs/Procedures/Meds  Lab Results Reviewed: reviewed, pertinent   BMP Results       05/18/22 05/17/22 05/16/22     0703 0658 0701     141 138    K 4.5 4.5 4.1    Cl 110 110 107    CO2 18 21 21    Glucose 100 91 96    BUN 10 15 18    Creatinine 1.2 1.4 1.5    Calcium 8.1 7.9 7.6     Medications  Pertinent Medications Reviewed: reviewed, pertinent   Scheduled Meds:  • acetaminophen  975 mg oral q6h CADENCE   • doxycycline (VIBRAMYCIN) 200 mg in 1000 mL over 12 hours  200 mg intravenous Nightly   • heparin (porcine)  5,000 Units subcutaneous q8h CADENCE   • magnesium sulfate  2 g intravenous Once   • pantoprazole  40 mg intravenous q12h CADENCE     Continuous Infusions:  • potassium chloride-D5-0.45%NaCl   80 mL/hr at 05/18/22 0928     Clinical comments:  Pt admit for 5/13- robotic LAR, ALYSSA/BSO, takedown of colovaginal and colouterine fistulas, coloanal anastomosis, diverting loop ileostomy.    Screened for npo/ clears x 5 days. Starting clears today, sipping juice at time of visit. Reports good appetite pta, has regained some weight she lost during lung cancer treatment. Recommend advance diet to goal of low fiber as medically indicated     Monitor: PO intake, plan of care  Goals: meet 75% of needs via oral intake       Recommendations: See above       Date: 05/18/22  Signature: Cathy Samuels RD

## 2022-05-18 NOTE — PLAN OF CARE
Problem: Adult Inpatient Plan of Care  Goal: Plan of Care Review  Outcome: Progressing  Flowsheets (Taken 5/18/2022 0619)  Progress: improving  Plan of Care Reviewed With: patient  Outcome Summary: VSS overnight. Pt ambulating halls w/minimal assist. burnette in place w/adequate urine output. Urine is blood tinged this am, made MD aware. Adequate ostomy output. No complaints of pain or nausea overnight, Remains NPO.  Goal: Patient-Specific Goal (Individualized)  Outcome: Progressing  Goal: Optimal Comfort and Wellbeing  Outcome: Progressing

## 2022-05-19 LAB
ALBUMIN SERPL-MCNC: 2.2 G/DL (ref 3.4–5)
ANION GAP SERPL CALC-SCNC: 12 MEQ/L (ref 3–15)
BASOPHILS # BLD: 0 K/UL (ref 0.01–0.1)
BASOPHILS NFR BLD: 0 %
BUN SERPL-MCNC: 8 MG/DL (ref 8–20)
CALCIUM SERPL-MCNC: 8.3 MG/DL (ref 8.9–10.3)
CHLORIDE SERPL-SCNC: 105 MEQ/L (ref 98–109)
CO2 SERPL-SCNC: 21 MEQ/L (ref 22–32)
CREAT SERPL-MCNC: 1.2 MG/DL (ref 0.6–1.1)
CRP SERPL-MCNC: 114.81 MG/L
DIFFERENTIAL METHOD BLD: ABNORMAL
EOSINOPHIL # BLD: 0.38 K/UL (ref 0.04–0.36)
EOSINOPHIL NFR BLD: 5 %
ERYTHROCYTE [DISTWIDTH] IN BLOOD BY AUTOMATED COUNT: 13.9 % (ref 11.7–14.4)
GFR SERPL CREATININE-BSD FRML MDRD: 43.6 ML/MIN/1.73M*2
GLUCOSE SERPL-MCNC: 86 MG/DL (ref 70–99)
HCT VFR BLDCO AUTO: 31.9 % (ref 35–45)
HGB BLD-MCNC: 10.6 G/DL (ref 11.8–15.7)
LYMPHOCYTES # BLD: 1.29 K/UL (ref 1.2–3.5)
LYMPHOCYTES NFR BLD: 17 %
MAGNESIUM SERPL-MCNC: 1.8 MG/DL (ref 1.8–2.5)
MCH RBC QN AUTO: 29.4 PG (ref 28–33.2)
MCHC RBC AUTO-ENTMCNC: 33.2 G/DL (ref 32.2–35.5)
MCV RBC AUTO: 88.6 FL (ref 83–98)
MONOCYTES # BLD: 0.61 K/UL (ref 0.28–0.8)
MONOCYTES NFR BLD: 8 %
NEUTS BAND # BLD: 0.15 K/UL (ref 0–0.53)
NEUTS BAND # BLD: 5.16 K/UL (ref 1.7–7)
NEUTS BAND NFR BLD: 2 %
NEUTS SEG NFR BLD: 68 %
PDW BLD AUTO: 9.4 FL (ref 9.4–12.3)
PHOSPHATE SERPL-MCNC: 3 MG/DL (ref 2.4–4.7)
PLAT MORPH BLD: NORMAL
PLATELET # BLD AUTO: 278 K/UL (ref 150–369)
PLATELET # BLD EST: ABNORMAL 10*3/UL
POTASSIUM SERPL-SCNC: 4.5 MEQ/L (ref 3.6–5.1)
PREALB SERPL-MCNC: 9.1 MG/DL (ref 18–38)
RBC # BLD AUTO: 3.6 M/UL (ref 3.93–5.22)
RBC MORPH BLD: NORMAL
SODIUM SERPL-SCNC: 138 MEQ/L (ref 136–144)
WBC # BLD AUTO: 7.59 K/UL (ref 3.8–10.5)

## 2022-05-19 PROCEDURE — 63600000 HC DRUGS/DETAIL CODE: Performed by: SURGERY

## 2022-05-19 PROCEDURE — 83735 ASSAY OF MAGNESIUM: CPT | Performed by: STUDENT IN AN ORGANIZED HEALTH CARE EDUCATION/TRAINING PROGRAM

## 2022-05-19 PROCEDURE — 63700000 HC SELF-ADMINISTRABLE DRUG: Performed by: STUDENT IN AN ORGANIZED HEALTH CARE EDUCATION/TRAINING PROGRAM

## 2022-05-19 PROCEDURE — 84134 ASSAY OF PREALBUMIN: CPT | Performed by: STUDENT IN AN ORGANIZED HEALTH CARE EDUCATION/TRAINING PROGRAM

## 2022-05-19 PROCEDURE — 99024 POSTOP FOLLOW-UP VISIT: CPT | Performed by: COLON & RECTAL SURGERY

## 2022-05-19 PROCEDURE — 25000000 HC PHARMACY GENERAL: Performed by: SURGERY

## 2022-05-19 PROCEDURE — 85025 COMPLETE CBC W/AUTO DIFF WBC: CPT | Performed by: STUDENT IN AN ORGANIZED HEALTH CARE EDUCATION/TRAINING PROGRAM

## 2022-05-19 PROCEDURE — 84100 ASSAY OF PHOSPHORUS: CPT | Performed by: STUDENT IN AN ORGANIZED HEALTH CARE EDUCATION/TRAINING PROGRAM

## 2022-05-19 PROCEDURE — 21400000 HC ROOM AND CARE CCU/INTERMEDIATE

## 2022-05-19 PROCEDURE — 25800000 HC PHARMACY IV SOLUTIONS: Performed by: SURGERY

## 2022-05-19 PROCEDURE — 97116 GAIT TRAINING THERAPY: CPT | Mod: GP,CQ

## 2022-05-19 PROCEDURE — 80048 BASIC METABOLIC PNL TOTAL CA: CPT | Performed by: STUDENT IN AN ORGANIZED HEALTH CARE EDUCATION/TRAINING PROGRAM

## 2022-05-19 PROCEDURE — 63600000 HC DRUGS/DETAIL CODE

## 2022-05-19 PROCEDURE — 86140 C-REACTIVE PROTEIN: CPT | Performed by: STUDENT IN AN ORGANIZED HEALTH CARE EDUCATION/TRAINING PROGRAM

## 2022-05-19 PROCEDURE — 82040 ASSAY OF SERUM ALBUMIN: CPT | Performed by: STUDENT IN AN ORGANIZED HEALTH CARE EDUCATION/TRAINING PROGRAM

## 2022-05-19 PROCEDURE — 36415 COLL VENOUS BLD VENIPUNCTURE: CPT | Performed by: STUDENT IN AN ORGANIZED HEALTH CARE EDUCATION/TRAINING PROGRAM

## 2022-05-19 RX ORDER — IBUPROFEN/PSEUDOEPHEDRINE HCL 200MG-30MG
3 TABLET ORAL NIGHTLY
Status: DISCONTINUED | OUTPATIENT
Start: 2022-05-19 | End: 2022-05-22 | Stop reason: HOSPADM

## 2022-05-19 RX ORDER — FUROSEMIDE 10 MG/ML
20 INJECTION INTRAMUSCULAR; INTRAVENOUS ONCE
Status: COMPLETED | OUTPATIENT
Start: 2022-05-19 | End: 2022-05-19

## 2022-05-19 RX ORDER — OXYCODONE HYDROCHLORIDE 5 MG/1
5 TABLET ORAL
Status: DISCONTINUED | OUTPATIENT
Start: 2022-05-19 | End: 2022-05-22 | Stop reason: HOSPADM

## 2022-05-19 RX ADMIN — ACETAMINOPHEN 975 MG: 325 TABLET, FILM COATED ORAL at 22:01

## 2022-05-19 RX ADMIN — PANTOPRAZOLE SODIUM 40 MG: 40 INJECTION, POWDER, FOR SOLUTION INTRAVENOUS at 20:27

## 2022-05-19 RX ADMIN — PANTOPRAZOLE SODIUM 40 MG: 40 INJECTION, POWDER, FOR SOLUTION INTRAVENOUS at 08:28

## 2022-05-19 RX ADMIN — DOXYCYCLINE 200 MG: 100 INJECTION, POWDER, LYOPHILIZED, FOR SOLUTION INTRAVENOUS at 22:01

## 2022-05-19 RX ADMIN — HEPARIN SODIUM 5000 UNITS: 5000 INJECTION, SOLUTION INTRAVENOUS; SUBCUTANEOUS at 13:27

## 2022-05-19 RX ADMIN — Medication 3 MG: at 22:01

## 2022-05-19 RX ADMIN — FUROSEMIDE 20 MG: 10 INJECTION, SOLUTION INTRAMUSCULAR; INTRAVENOUS at 09:26

## 2022-05-19 RX ADMIN — ACETAMINOPHEN 975 MG: 325 TABLET, FILM COATED ORAL at 11:13

## 2022-05-19 RX ADMIN — HEPARIN SODIUM 5000 UNITS: 5000 INJECTION, SOLUTION INTRAVENOUS; SUBCUTANEOUS at 22:01

## 2022-05-19 RX ADMIN — HEPARIN SODIUM 5000 UNITS: 5000 INJECTION, SOLUTION INTRAVENOUS; SUBCUTANEOUS at 06:03

## 2022-05-19 RX ADMIN — SALINE NASAL SPRAY 1 SPRAY: 1.5 SOLUTION NASAL at 06:03

## 2022-05-19 RX ADMIN — ACETAMINOPHEN 975 MG: 325 TABLET, FILM COATED ORAL at 16:21

## 2022-05-19 ASSESSMENT — COGNITIVE AND FUNCTIONAL STATUS - GENERAL
CLIMB 3 TO 5 STEPS WITH RAILING: 3 - A LITTLE
MOVING TO AND FROM BED TO CHAIR: 4 - NONE
AFFECT: WFL
WALKING IN HOSPITAL ROOM: 3 - A LITTLE
STANDING UP FROM CHAIR USING ARMS: 4 - NONE

## 2022-05-19 NOTE — PLAN OF CARE
Problem: Adult Inpatient Plan of Care  Goal: Plan of Care Review  Flowsheets (Taken 5/19/2022 1417)  Progress: improving  Plan of Care Reviewed With:   patient   other (see comments)  Outcome Summary:   Per update from CPR anticipated dc to home in the next 24 hrs with home health services/[facility]   patient is aware of and agreeable with plan   family will be providing transportation home   IMM letter was provided to patient at the bedside   care coordination to follow for transition of care needs until dc is complete.

## 2022-05-19 NOTE — PROGRESS NOTES
Colorectal Surgery Daily Progress Note    S/p robotic LAR, ALYSSA/BSO, takedown of colovaginal and colouterine fistulas, coloanal anastomosis, diverting loop ileostomy on 5/13. 6 Days Post-Op    Subjective    NAEON. Afebrile. Vitals wnl. Pt given lasix 20 iv and responded appropriately. Pt states she had a rough night due to lots of gas pain and poor sleep. Does not know if gas pain improved with increase of flatus in stoma bag. Currently does not have gas pain. Ileostomy w/ gas and dark green/brown liquid stool. Also endorses a stuffy nose which pt takes a saline spray at home for. Has been OOB and ambulating. Has been tolerating sips of clears.       Objective     Vital signs in last 24 hours:  Temp:  [30.6 °C (87 °F)-37.1 °C (98.8 °F)] 36.8 °C (98.2 °F)  Heart Rate:  [72-82] 79  Resp:  [16-18] 18  BP: (118-141)/(59-65) 139/62      Intake/Output Summary (Last 24 hours) at 5/19/2022 0732  Last data filed at 5/19/2022 0700  Gross per 24 hour   Intake 2244.65 ml   Output 6475 ml   Net -4230.35 ml       Intake/Output Last 24hours:    Intake/Output Summary (Last 24 hours) at 5/19/2022 0732  Last data filed at 5/19/2022 0700  Gross per 24 hour   Intake 2244.65 ml   Output 6475 ml   Net -4230.35 ml       Physical Exam    General appearance: alert, appears stated age, cooperative, resting comfortably in bed, not in acute distress  Neck: supple, symmetrical, trachea midline  Lungs: Normal respiratory effort, on RA  Heart: regular rate and rhythm  Abdomen: soft, appropriately tender, minimally distended. Incisions are C/D/I with steristrips in place. Ileostomy pink and patent with gas and dark green/brown liquid output.  : Fc in place  Extremities: extremities warm and well-perfused  Skin: warm and dry  Neurologic: Grossly normal      VTE Assessment: I have reassessed and the patient's VTE risk and treatment plan is appropriate. [facility]    Labs  CBC Results       05/18/22 05/17/22 05/16/22     0703 0658 0701    WBC 7.14 9.96  11.27    RBC 3.42 3.58 3.38    HGB 10.1 10.7 10.1    HCT 31.2 32.3 30.9    MCV 91.2 90.2 91.4    MCH 29.5 29.9 29.9    MCHC 32.4 33.1 32.7     222 191        BMP Results       05/18/22 05/17/22 05/16/22     0703 0658 0701     141 138    K 4.5 4.5 4.1    Cl 110 110 107    CO2 18 21 21    Glucose 100 91 96    BUN 10 15 18    Creatinine 1.2 1.4 1.5    Calcium 8.1 7.9 7.6    Anion Gap 12 10 10    EGFR 43.6 36.5 33.7          Results from last 7 days   Lab Units 05/16/22  0701   ALBUMIN g/dL 2.3*     Results from last 7 days   Lab Units 05/18/22  0703 05/17/22  0658 05/16/22  0701   CRP mg/L 182.36* 270.45* 299.11*       PT PTT Results    No lab values to display.         Micro  Microbiology Results     Procedure Component Value Units Date/Time    COVID-19 PAT/Pre-procedural [[id number]]  (Normal) Collected: 05/12/22 1219    Specimen: Nasopharyngeal Swab from Nasopharynx Updated: 05/12/22 2208    Narrative:      The following orders were created for panel order COVID-19 PAT/Pre-procedural.  Procedure                               Abnormality         Status                     ---------                               -----------         ------                     SARS-CoV-2 (COVID-19), PCR[[id number]]   Normal              Final result                 Please view results for these tests on the individual orders.    SARS-CoV-2 (COVID-19), PCR [[id number]]  (Normal) Collected: 05/12/22 1219    Specimen: Nasopharyngeal Swab from Nasopharynx Updated: 05/12/22 2208     SARS-CoV-2 (COVID-19) Negative    COVID-19 PAT/Pre-procedural [[id number]]  (Normal) Collected: 05/02/22 1321    Specimen: Nasopharyngeal Swab from Nasopharynx Updated: 05/02/22 2122    Narrative:      The following orders were created for panel order COVID-19 PAT/Pre-procedural.  Procedure                               Abnormality         Status                     ---------                               -----------         ------                      SARS-CoV-2 (COVID-19), PCR[[id number]]   Normal              Final result                 Please view results for these tests on the individual orders.    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 6 Days Post-Op    - clears, IVF 80  - pain meds prn  -Continue Doxycycline for 10 day course total  -Fc to remain in place  -IS/OOB/ambulate as tolerated  -[facility]    Please page [phone] with any questions   Daily Ivan MD

## 2022-05-19 NOTE — PATIENT CARE CONFERENCE
Care Progression Rounds Note  Date: 5/19/2022  Time: 12:28 PM     Patient Name: Raegan Young     Medical Record Number: [id number]   YOB: 1945  Sex: Female      Room/Bed: 1010    Admitting Diagnosis: Colovaginal fistula [N82.4]   Admit Date/Time: 5/13/2022  6:00 AM    Primary Diagnosis: Colovaginal fistula  Principal Problem: Colovaginal fistula    GMLOS: 3.3  Anticipated Discharge Date: 5/20/2022    AM-PAC:  Mobility Score: 18    Discharge Planning:  Current Living Arrangements: other (see comments) (will go to daughter's)  Concerns to be Addressed: care coordination/care conferences, discharge planning  Anticipated Discharge Disposition: home with home health, home with assistance    Barriers to Discharge:  Medical issues not resolved    Comments:       Participants:  nursing, occupational therapy, social work/services, physician,

## 2022-05-19 NOTE — PROGRESS NOTES
Patient: Raegan Young  Location:  [facility] [address] [address]  MRN:  [id number]  Today's date:  5/19/2022     RN concurs with PTA assessment that patient is medically stable and appropriate for therapy.    Patient received sitting in recliner, amenable to therapy.  Pt reported persistent pain L flank, medicated with Tylenol and K-pad in place.    Patient performed sit<-->stand independently.  Patient instructed to remain at sitting surface for 10-15 seconds, to assess for dizziness before ambulation.  Patient reported good understanding.    Patient ambulated 237 feet with distant supervision, no device, step-through pattern.  WBOS and increased lateral sway.  Pt reaching for environmental support occasionally. Recommend temporary use of SPC during recovery, but pt refused any AD. Pt and dtr educated in the improved stability and safety offered by use of SPC.    Pt performed full flight previous visit with supervision.    Patient left sitting in chair, all stated needs met.  RN aware.    Patient has met short-term goals and agrees with plan to discharge from physical therapy.  Discussed above with supervising physical therapist.    Raegan is a 76 y.o. female admitted on 5/13/2022 with Colovaginal fistula [N82.4]. Principal problem is Colovaginal fistula.    Past Medical History  Raegan has a past medical history of Anemia, Arthritis, Colovaginal fistula, COVID-19 vaccine series completed (02/26/2021), Disease of thyroid gland, Diverticulitis of colon, GERD (gastroesophageal reflux disease), Hiatal hernia, History of snoring, Hypertension, Hypothyroidism, Lung cancer (CMS/HCC), Lung nodule, and Vaginal cancer (CMS/HCC) (2018).    Pt reports independent PLOF, extra-strength Tylenol (2/day) for pain from previous surgery, frequent falls, none recently    History of Present Illness   s/p robotic LAR, ALYSSA/BSO, takedown of colovaginal and colouterine fistulas, coloanal anastomosis, DLI  5/17:  decreased po  intake, increased abd distention, and increase in creatinine       PT Vitals    Date/Time Pulse HR Source SpO2 Pt Activity O2 Therapy BP MAP BP Location BP Method Pt Position [facility]   05/19/22 1219 80 NSR Monitor 97 % At rest None (Room air) 136/65 93 mmHg Left upper arm Automatic Sitting DS   05/19/22 1230 83 NSR Monitor 97 % Walking None (Room air) 138/64 94 mmHg Left upper arm Automatic Sitting DS      PT Pain    Date/Time Pain Type Rating: Rest Rating: Activity Interventions [facility]   05/19/22 1219 Pain Reassessment 5 5 -- DS   05/19/22 1230 -- -- -- position adjusted DS          Prior Living Environment    Flowsheet Row Most Recent Value   Current Living Arrangements other (see comments)  [will go to daughter's]   Home Accessibility stairs to enter home (Group), stairs within home (Group)   Number of Stairs, Main Entrance 1   Location, Patient Bedroom second floor, must negotiate stairs to access   Location, Bathroom first (main) floor, second floor, must negotiate stairs to access   Number of Stairs, Within Home, Primary 12        Prior Level of Function    Flowsheet Row Most Recent Value   Ambulation independent   Transferring independent   Toileting independent   Bathing independent   Dressing independent   Eating independent   Communication understands/communicates without difficulty   Prior Level of Function Comment Pt reports independent PLOF, no AD, limited by L knee edema but shops & does housework   Assistive Device Currently Used at Home none           PT Evaluation and Treatment - 05/19/22 1219        PT Time Calculation    Start Time 1219     Stop Time 1232     Time Calculation (min) 13 min        Session Details    Document Type daily treatment/progress note     Mode of Treatment individual therapy;physical therapy        General Information    Existing Precautions/Restrictions fall        Cognition/Psychosocial    Affect/Mental Status (Cognition) WFL     Orientation Status (Cognition) oriented x 4         Sit to Stand Transfer    Barnes, Sit to Stand Transfer modified independence     Comment good transfer technique        Stand to Sit Transfer    Barnes, Stand to Sit Transfer modified independence     Comment good eccentric control        Gait Training    Barnes, Gait distant supervision     Assistive Device none     Distance in Feet 237 feet     Comment (Gait/Stairs) see note        Stairs Training    Barnes, Stairs not tested     Comment full flight with supervision previous visit        Balance    Static Sitting Balance WFL     Dynamic Sitting Balance WFL     Sit to Stand Dynamic Balance WFL     Static Standing Balance WFL     Dynamic Standing Balance mild impairment        AM-PAC (TM) - Mobility (Current Function)    Turning from your back to your side while in a flat bed without using bedrails? 4 - None     Moving from lying on your back to sitting on the side of a flat bed without using bedrails? 4 - None     Moving to and from a bed to a chair? 4 - None     Standing up from a chair using your arms? 4 - None     To walk in a hospital room? 3 - A Little     Climbing 3-5 steps with a railing? 3 - A Little     AM-PAC (TM) Mobility Score 22        Assessment/Plan (PT)    Daily Outcome Statement Pt transferring independently.  Pt ambulating household distance with distant supervision, no device.  Pt ascended/descended full flight with supervision previous session.  Anticipate discharge to dtr's home.     Rehab Potential good, to achieve stated therapy goals     Therapy Frequency 5 times/wk     Planned Therapy Interventions transfer training;stair training;strengthening;balance training;bed mobility training;gait training;patient/family education               PT Assessment/Plan    Flowsheet Row Most Recent Value   PT Recommended Discharge Disposition family member's home at 05/19/2022 1219   Anticipated Equipment Needs at Discharge (PT) none at 05/19/2022 1219   Patient/Family Therapy Goals  Statement planning d/c to dtr's home at 05/19/2022 1219                    Education Documentation  Treatment Plan, taught by Mehrdad Abdi PTA at 5/19/2022  2:12 PM.  Learner: Patient  Readiness: Acceptance  Method: Explanation  Response: Verbalizes Understanding  Comment: Pt instructed in the stability and safety advantages of SPC but pt politely refusing to use AD.          PT Goals    Flowsheet Row Most Recent Value   Bed Mobility Goal 1    Activity/Assistive Device rolling to left, rolling to right, sit to supine, supine to sit at 05/14/2022 0930   [place] modified independence at 05/14/2022 0930   Time Frame by discharge at 05/14/2022 0930   Progress/Outcome goal ongoing at 05/14/2022 0930   Transfer Goal 1    Activity/Assistive Device sit-to-stand/stand-to-sit, walker, front-wheeled  [if needed] at 05/14/2022 0930   [place] modified independence at 05/14/2022 0930   Time Frame by discharge at 05/14/2022 0930   Progress/Outcome goal ongoing at 05/14/2022 0930   Gait Training Goal 1    Activity/Assistive Device gait (walking locomotion), walker, front-wheeled  [if needed] at 05/14/2022 0930   [place] modified independence at 05/14/2022 0930   Distance 150 at 05/14/2022 0930   Time Frame by discharge at 05/14/2022 0930   Progress/Outcome goal ongoing at 05/14/2022 0930   Stairs Goal 1    Activity/Assistive Device ascending stairs, descending stairs, using handrail, left, using handrail, right at 05/14/2022 0930   [place] supervision required at 05/14/2022 0930   Number of Stairs 12 at 05/14/2022 0930   Time Frame by discharge at 05/14/2022 0930   Progress/Outcome goal ongoing at 05/14/2022 0930

## 2022-05-20 LAB
ANION GAP SERPL CALC-SCNC: 17 MEQ/L (ref 3–15)
BASOPHILS # BLD: 0.03 K/UL (ref 0.01–0.1)
BASOPHILS NFR BLD: 0.3 %
BUN SERPL-MCNC: 13 MG/DL (ref 8–20)
CALCIUM SERPL-MCNC: 8.6 MG/DL (ref 8.9–10.3)
CHLORIDE SERPL-SCNC: 102 MEQ/L (ref 98–109)
CO2 SERPL-SCNC: 19 MEQ/L (ref 22–32)
CREAT SERPL-MCNC: 1.2 MG/DL (ref 0.6–1.1)
CRP SERPL-MCNC: 102.98 MG/L
DIFFERENTIAL METHOD BLD: ABNORMAL
EOSINOPHIL # BLD: 0.49 K/UL (ref 0.04–0.36)
EOSINOPHIL NFR BLD: 5.4 %
ERYTHROCYTE [DISTWIDTH] IN BLOOD BY AUTOMATED COUNT: 13.8 % (ref 11.7–14.4)
GFR SERPL CREATININE-BSD FRML MDRD: 43.6 ML/MIN/1.73M*2
GLUCOSE SERPL-MCNC: 85 MG/DL (ref 70–99)
HCT VFR BLDCO AUTO: 32.4 % (ref 35–45)
HGB BLD-MCNC: 10.7 G/DL (ref 11.8–15.7)
IMM GRANULOCYTES # BLD AUTO: 0.13 K/UL (ref 0–0.08)
IMM GRANULOCYTES NFR BLD AUTO: 1.4 %
LYMPHOCYTES # BLD: 1.73 K/UL (ref 1.2–3.5)
LYMPHOCYTES NFR BLD: 19.1 %
MAGNESIUM SERPL-MCNC: 1.7 MG/DL (ref 1.8–2.5)
MCH RBC QN AUTO: 29.2 PG (ref 28–33.2)
MCHC RBC AUTO-ENTMCNC: 33 G/DL (ref 32.2–35.5)
MCV RBC AUTO: 88.3 FL (ref 83–98)
MONOCYTES # BLD: 0.82 K/UL (ref 0.28–0.8)
MONOCYTES NFR BLD: 9.1 %
NEUTROPHILS # BLD: 5.84 K/UL (ref 1.7–7)
NEUTS SEG NFR BLD: 64.7 %
NRBC BLD-RTO: 0 %
PDW BLD AUTO: 9.5 FL (ref 9.4–12.3)
PHOSPHATE SERPL-MCNC: 4.2 MG/DL (ref 2.4–4.7)
PLATELET # BLD AUTO: 337 K/UL (ref 150–369)
POTASSIUM SERPL-SCNC: 4.3 MEQ/L (ref 3.6–5.1)
RBC # BLD AUTO: 3.67 M/UL (ref 3.93–5.22)
SODIUM SERPL-SCNC: 138 MEQ/L (ref 136–144)
WBC # BLD AUTO: 9.04 K/UL (ref 3.8–10.5)

## 2022-05-20 PROCEDURE — 63600000 HC DRUGS/DETAIL CODE: Performed by: SURGERY

## 2022-05-20 PROCEDURE — 83735 ASSAY OF MAGNESIUM: CPT | Performed by: STUDENT IN AN ORGANIZED HEALTH CARE EDUCATION/TRAINING PROGRAM

## 2022-05-20 PROCEDURE — 99024 POSTOP FOLLOW-UP VISIT: CPT | Performed by: COLON & RECTAL SURGERY

## 2022-05-20 PROCEDURE — 85025 COMPLETE CBC W/AUTO DIFF WBC: CPT | Performed by: STUDENT IN AN ORGANIZED HEALTH CARE EDUCATION/TRAINING PROGRAM

## 2022-05-20 PROCEDURE — 63700000 HC SELF-ADMINISTRABLE DRUG: Performed by: SURGERY

## 2022-05-20 PROCEDURE — 36415 COLL VENOUS BLD VENIPUNCTURE: CPT | Performed by: STUDENT IN AN ORGANIZED HEALTH CARE EDUCATION/TRAINING PROGRAM

## 2022-05-20 PROCEDURE — 84100 ASSAY OF PHOSPHORUS: CPT | Performed by: STUDENT IN AN ORGANIZED HEALTH CARE EDUCATION/TRAINING PROGRAM

## 2022-05-20 PROCEDURE — 21400000 HC ROOM AND CARE CCU/INTERMEDIATE

## 2022-05-20 PROCEDURE — 86140 C-REACTIVE PROTEIN: CPT | Performed by: STUDENT IN AN ORGANIZED HEALTH CARE EDUCATION/TRAINING PROGRAM

## 2022-05-20 PROCEDURE — 97535 SELF CARE MNGMENT TRAINING: CPT | Mod: GO

## 2022-05-20 PROCEDURE — 80048 BASIC METABOLIC PNL TOTAL CA: CPT | Performed by: STUDENT IN AN ORGANIZED HEALTH CARE EDUCATION/TRAINING PROGRAM

## 2022-05-20 PROCEDURE — 25800000 HC PHARMACY IV SOLUTIONS: Performed by: SURGERY

## 2022-05-20 PROCEDURE — 25000000 HC PHARMACY GENERAL: Performed by: SURGERY

## 2022-05-20 RX ORDER — OXYCODONE HYDROCHLORIDE 5 MG/1
5 TABLET ORAL EVERY 6 HOURS PRN
Qty: 12 TABLET | Refills: 0 | Status: SHIPPED | OUTPATIENT
Start: 2022-05-21 | End: 2022-05-24

## 2022-05-20 RX ORDER — ACETAMINOPHEN 325 MG/1
975 TABLET ORAL EVERY 6 HOURS PRN
Start: 2022-05-20 | End: 2022-05-30

## 2022-05-20 RX ORDER — PANTOPRAZOLE SODIUM 40 MG/1
40 TABLET, DELAYED RELEASE ORAL EVERY 12 HOURS
Status: DISCONTINUED | OUTPATIENT
Start: 2022-05-20 | End: 2022-05-22 | Stop reason: HOSPADM

## 2022-05-20 RX ORDER — DOXYCYCLINE 100 MG/1
100 CAPSULE ORAL 2 TIMES DAILY
Qty: 6 CAPSULE | Refills: 0 | Status: SHIPPED | OUTPATIENT
Start: 2022-05-21 | End: 2022-05-24

## 2022-05-20 RX ADMIN — HEPARIN SODIUM 5000 UNITS: 5000 INJECTION, SOLUTION INTRAVENOUS; SUBCUTANEOUS at 22:13

## 2022-05-20 RX ADMIN — HEPARIN SODIUM 5000 UNITS: 5000 INJECTION, SOLUTION INTRAVENOUS; SUBCUTANEOUS at 05:26

## 2022-05-20 RX ADMIN — MAGNESIUM SULFATE HEPTAHYDRATE 4 G: 40 INJECTION, SOLUTION INTRAVENOUS at 09:28

## 2022-05-20 RX ADMIN — DOXYCYCLINE 200 MG: 100 INJECTION, POWDER, LYOPHILIZED, FOR SOLUTION INTRAVENOUS at 22:13

## 2022-05-20 RX ADMIN — PANTOPRAZOLE SODIUM 40 MG: 40 TABLET, DELAYED RELEASE ORAL at 20:29

## 2022-05-20 RX ADMIN — PANTOPRAZOLE SODIUM 40 MG: 40 INJECTION, POWDER, FOR SOLUTION INTRAVENOUS at 08:20

## 2022-05-20 RX ADMIN — HEPARIN SODIUM 5000 UNITS: 5000 INJECTION, SOLUTION INTRAVENOUS; SUBCUTANEOUS at 14:56

## 2022-05-20 ASSESSMENT — COGNITIVE AND FUNCTIONAL STATUS - GENERAL
DRESSING REGULAR LOWER BODY CLOTHING: 3 - A LITTLE
EATING MEALS: 4 - NONE
HELP NEEDED FOR BATHING: 3 - A LITTLE
TOILETING: 4 - NONE
AFFECT: WFL
HELP NEEDED FOR PERSONAL GROOMING: 4 - NONE
DRESSING REGULAR UPPER BODY CLOTHING: 4 - NONE

## 2022-05-20 NOTE — PLAN OF CARE
Problem: Adult Inpatient Plan of Care  Goal: Plan of Care Review  Outcome: Progressing  Flowsheets (Taken 5/20/2022 8003)  Progress: improving  Plan of Care Reviewed With: patient  Outcome Summary: VSS, tolerating po diet. Adequate urine and ostomy output. No complaints of pain or nausea. Ambulating in the halls independently.  Goal: Patient-Specific Goal (Individualized)  Outcome: Progressing  Goal: Optimal Comfort and Wellbeing  Outcome: Progressing

## 2022-05-20 NOTE — PROGRESS NOTES
Colorectal Surgery Daily Progress Note    S/p robotic LAR, ALYSSA/BSO, takedown of colovaginal and colouterine fistulas, coloanal anastomosis, diverting loop ileostomy on 5/13. 7 Days Post-Op    Subjective    No acute events overnight.  Afebrile.  Vitals within normal limits.  On room air.  was given an additional dose of Lasix.  Diet was advanced to LR diet which she is tolerating well without any nausea or vomiting.  Johnson was removed yesterday and she is voiding well without any issues.  Continues to have some gas pain and ileostomy with gas and bowel movements.  Does endorse that she had an infiltrated IV but her arm is improving.    Objective     Vital signs in last 24 hours:  Temp:  [36.2 °C (97.2 °F)-36.7 °C (98.1 °F)] 36.7 °C (98.1 °F)  Heart Rate:  [64-83] 67  Resp:  [14-16] 16  BP: (120-145)/(50-66) 136/63      Intake/Output Summary (Last 24 hours) at 5/20/2022 0836  Last data filed at 5/20/2022 0831  Gross per 24 hour   Intake 1548.67 ml   Output 3325 ml   Net -1776.33 ml       Intake/Output Last 24hours:    Intake/Output Summary (Last 24 hours) at 5/20/2022 0836  Last data filed at 5/20/2022 0831  Gross per 24 hour   Intake 1548.67 ml   Output 3325 ml   Net -1776.33 ml       Physical Exam    General appearance: alert, appears stated age, cooperative, resting comfortably in bed, not in acute distress  Neck: supple, symmetrical, trachea midline  Lungs: Normal respiratory effort, on RA  Heart: regular rate and rhythm  Abdomen: soft, appropriately tender, minimally distended. Incisions are C/D/I with steristrips in place. Ileostomy pink and patent with gas and dark green/brown liquid output.  Extremities: extremities warm and well-perfused  Skin: warm and dry  Neurologic: Grossly normal      VTE Assessment: I have reassessed and the patient's VTE risk and treatment plan is appropriate. [facility]    Labs  CBC Results       05/20/22 05/19/22 05/18/22     0713 0655 0703    WBC 9.04 7.59 7.14    RBC 3.67 3.60 3.42    HGB  10.7 10.6 10.1    HCT 32.4 31.9 31.2    MCV 88.3 88.6 91.2    MCH 29.2 29.4 29.5    MCHC 33.0 33.2 32.4     278 241        BMP Results       05/20/22 05/19/22 05/18/22     0713 0655 0703     138 140    K 4.3 4.5 4.5    Cl 102 105 110    CO2 19 21 18    Glucose 85 86 100    BUN 13 8 10    Creatinine 1.2 1.2 1.2    Calcium 8.6 8.3 8.1    Anion Gap 17 12 12    EGFR 43.6 43.6 43.6            Results from last 7 days   Lab Units 05/19/22  0655 05/16/22  0701   ALBUMIN g/dL 2.2* 2.3*     Results from last 7 days   Lab Units 05/20/22  0713 05/19/22  0655 05/18/22  0703   CRP mg/L 102.98* 114.81* 182.36*       PT PTT Results    No lab values to display.         Micro  Microbiology Results     Procedure Component Value Units Date/Time    COVID-19 PAT/Pre-procedural [[id number]]  (Normal) Collected: 05/12/22 1219    Specimen: Nasopharyngeal Swab from Nasopharynx Updated: 05/12/22 2208    Narrative:      The following orders were created for panel order COVID-19 PAT/Pre-procedural.  Procedure                               Abnormality         Status                     ---------                               -----------         ------                     SARS-CoV-2 (COVID-19), PCR[[id number]]   Normal              Final result                 Please view results for these tests on the individual orders.    SARS-CoV-2 (COVID-19), PCR [[id number]]  (Normal) Collected: 05/12/22 1219    Specimen: Nasopharyngeal Swab from Nasopharynx Updated: 05/12/22 2208     SARS-CoV-2 (COVID-19) Negative    COVID-19 PAT/Pre-procedural [[id number]]  (Normal) Collected: 05/02/22 1321    Specimen: Nasopharyngeal Swab from Nasopharynx Updated: 05/02/22 2122    Narrative:      The following orders were created for panel order COVID-19 PAT/Pre-procedural.  Procedure                               Abnormality         Status                     ---------                               -----------         ------                     SARS-CoV-2  (COVID-19), PCR[[id number]]   Normal              Final result                 Please view results for these tests on the individual orders.    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 7 Days Post-Op    - LR diet, IVF 40  - pain meds prn  - Continue Doxycycline for 10 day course total  - IS/OOB/ambulate as tolerated  - dvt ppx: SQH  - Likely DC today versus tomorrow    Please page [phone] with any questions   Daily Ivan MD

## 2022-05-20 NOTE — PROGRESS NOTES
Patient:  Raegan Young  Location:  [place] [address]  MRN:  [id number]  Today's date:  5/20/2022    Rn aware and Pt cleared for OT tx session. At end of session, Pt was left in chair with call bell in reach and all other needs met. Verbal handoff w/ Rn at end of session to discuss Pt progress.    Raegan is a 76 y.o. female admitted on 5/13/2022 with Colovaginal fistula [N82.4]. Principal problem is Colovaginal fistula.    Past Medical History  Raegan has a past medical history of Anemia, Arthritis, Colovaginal fistula, COVID-19 vaccine series completed (02/26/2021), Disease of thyroid gland, Diverticulitis of colon, GERD (gastroesophageal reflux disease), Hiatal hernia, History of snoring, Hypertension, Hypothyroidism, Lung cancer (CMS/HCC), Lung nodule, and Vaginal cancer (CMS/HCC) (2018).    Pt reports independent PLOF, extra-strength Tylenol (2/day) for pain from previous surgery, frequent falls, none recently    History of Present Illness   s/p robotic LAR, ALYSSA/BSO, takedown of colovaginal and colouterine fistulas, coloanal anastomosis, DLI  5/17:  decreased po intake, increased abd distention, and increase in creatinine       OT Vitals    Date/Time Pulse HR Source SpO2 Pt Activity O2 Therapy BP BP Location BP Method Pt Position [facility]   05/20/22 1359 77 Monitor 97 % At rest None (Room air) 131/60 Left upper arm Automatic Sitting    05/20/22 1407 88 Monitor -- -- -- -- -- -- -- LS      OT Pain    Date/Time Pain Type Side/Orientation Location Rating: Rest Rating: Activity Interventions [facility]   05/20/22 1359 Pain Assessment generalized abdomen 2 - mild pain 2 - mild pain position adjusted LS          Prior Living Environment    Flowsheet Row Most Recent Value   Current Living Arrangements other (see comments)  [will go to daughter's]   Home Accessibility stairs to enter home (Group), stairs within home (Group)   Number of Stairs, Main Entrance 1   Location, Patient Bedroom second floor,  must negotiate stairs to access   Location, Bathroom first (main) floor, second floor, must negotiate stairs to access   Number of Stairs, Within Home, Primary 12        Prior Level of Function    Flowsheet Row Most Recent Value   Ambulation independent   Transferring independent   Toileting independent   Bathing independent   Dressing independent   Eating independent   Communication understands/communicates without difficulty   Prior Level of Function Comment Pt reports independent PLOF, no AD, limited by L knee edema but shops & does housework   Assistive Device Currently Used at Home none        Occupational Profile    Flowsheet Row Most Recent Value   Occupational History/Life Experiences retired           OT Evaluation and Treatment - 05/20/22 1359        OT Time Calculation    Start Time 1359     Stop Time 1407     Time Calculation (min) 8 min        Session Details    Document Type daily treatment/progress note     Mode of Treatment occupational therapy        General Information    Patient Profile Reviewed yes     Patient/Family/Caregiver Comments/Observations Rn aware, Pt cleared     General Observations of Patient Pt rcv'd sitting in chair, agreeable to OT        Cognition/Psychosocial    Affect/Mental Status (Cognition) WFL     Orientation Status (Cognition) oriented x 4     Follows Commands (Cognition) WFL     Cognitive Function WFL     Comment, Cognition no cog deficits noted        Bed Mobility    [place], Supine to Sit verbal cues;independent     Verbal Cues (Supine to Sit) hand placement;technique;safety     [place], Sit to Supine verbal cues;independent     Verbal Cues (Sit to Supine) hand placement;safety;technique     Assistive Device none     Comment (Bed Mobility) Pt rcv'd edu/training in home-sim bed mob while utilizing log roll technique. Pt receptive to edu and performed WFL        Transfers    Transfers other (see comments)     Comment sit<>stand        Sit to Stand Transfer     [place], Sit to Stand Transfer independent     Assistive Device none     Comment from recliner, slow and steady to rise        Stand to Sit Transfer    [place], Stand to Sit Transfer independent     Assistive Device none     Comment to recliner, good eccentric control upon descent        Toilet Transfer    Comment offered, deferred        Tub Transfer    Comment Pt rcv'd additional education on using tub transfer bench. Pt owns        Safety Issues, Functional Mobility    Impairments Affecting Function (Mobility) endurance/activity tolerance     Comment, Safety Issues/Impairments (Mobility) Pt performed func mob of HH distance w/ no AD. No LOB noted        Balance    Balance Assessment sitting static balance;sitting dynamic balance;sit to stand dynamic balance;standing static balance;standing dynamic balance     Static Sitting Balance WFL     Dynamic Sitting Balance WFL     Sit to Stand Dynamic Balance WFL     Static Standing Balance WFL     Dynamic Standing Balance WFL     Balance Interventions occupation based/functional task     Comment, Balance no LOB noted        Lower Body Dressing    Tasks don;doff;socks     [place] maximum assist (25-49% patient effort)     Position supported sitting     Comment Pt unable to perform func reach WFL to don/soff sock w/ crossed leg method. Pt able to teach back LBD AE (she owns) and reports being able to receive (A) as needed        Toileting    Comment offered, deferred        BADL Safety/Performance    Impairments, BADL Safety/Performance endurance/activity tolerance     Skilled BADL Treatment/Intervention BADL process/adaptation training        ADL Interventions    Self-Care (BADL) Promotion best position for BADL determined;out of bed for BADLs encouraged;activity adapted to sitting        AM-PAC (TM) - ADL (Current Function)    Putting on and taking off regular lower body clothing? 3 - A Little     Bathing? 3 - A Little     Toileting? 4 - None     Putting  on/taking off regular upper body clothing? 4 - None     How much help for taking care of personal grooming? 4 - None     Eating meals? 4 - None     AM-PAC (TM) ADL Score 22        Assessment/Plan (OT)    Daily Outcome Statement OT tx session complete. Raegan sueronaomi'd edu in home-sim bed mob and LBD strategies. She is SUP-INDP w/ no AD for all OOB func transfers/activities. All OT edu/training complete. Cont to rec d/c home w/ (A) as needed. D/c OT     Rehab Potential --   d/c OT    Therapy Frequency --   d/c OT    Planned Therapy Interventions --   All OT edu/training complete              OT Assessment/Plan    Flowsheet Row Most Recent Value   OT Recommended Discharge Disposition home, home with assistance at 05/20/2022 1359   Anticipated Equipment Needs At Discharge (OT) bathing equipment  [Pt owns dressing equipment and tub bench] at 05/20/2022 1359   Patient/Family Therapy Goal Statement to go home at 05/20/2022 1359                         OT Goals    Flowsheet Row Most Recent Value   Bed Mobility Goal 1    Activity/Assistive Device bed mobility activities, all at 05/14/2022 0929   [place] modified independence at 05/14/2022 0929   Time Frame by discharge at 05/14/2022 0929   Progress/Outcome goal met at 05/20/2022 1359   Transfer Goal 1    Activity/Assistive Device all transfers at 05/14/2022 0929   [place] modified independence at 05/14/2022 0929   Time Frame by discharge at 05/14/2022 0929   Progress/Outcome goal met at 05/20/2022 1359   Dressing Goal 1    Activity/Adaptive Equipment dressing skills, all at 05/14/2022 0929   [place] modified independence at 05/14/2022 0929   Time Frame by discharge at 05/14/2022 0929   Progress/Outcome goal met at 05/17/2022 1420   Toileting Goal 1    Activity/Assistive Device toileting skills, all at 05/14/2022 0929   [place] modified independence at 05/14/2022 0929   Time Frame by discharge at 05/14/2022 0929   Progress/Outcome goal met at 05/17/2022 1420

## 2022-05-20 NOTE — PATIENT CARE CONFERENCE
Care Progression Rounds Note  Date: 5/20/2022  Time: 1:25 PM     Patient Name: Raegan Young     Medical Record Number: [id number]   YOB: 1945  Sex: Female      Room/Bed: 1010    Admitting Diagnosis: Colovaginal fistula [N82.4]   Admit Date/Time: 5/13/2022  6:00 AM    Primary Diagnosis: Colovaginal fistula  Principal Problem: Colovaginal fistula    GMLOS: 3.3  Anticipated Discharge Date: 5/20/2022    AM-PAC:  Mobility Score: 22    Discharge Planning:  Current Living Arrangements: other (see comments) (will go to daughter's)  Concerns to be Addressed: care coordination/care conferences, discharge planning  Anticipated Discharge Disposition: home with home health    Barriers to Discharge:  Medical issues not resolved    Comments:       Participants:  , physical therapy, physician, social work/services, nursing

## 2022-05-20 NOTE — CONSULTS
Wound Ostomy Continence Note    Subjective    HPI Patient is a 76 y.o. female who was admitted on 5/13/2022 with a diagnosis of Colovaginal fistula [N82.4].    Problem list Problem List:   Patient Active Problem List   Diagnosis   • History of cancer of vagina   • Elevated serum creatinine   • Low magnesium level   • Anemia   • Lung nodule < 6cm on CT   • Colovaginal fistula   • Diverticulitis of large intestine with perforation and abscess with bleeding   • Diverticulitis of colon   • Hypertension   • Hypothyroidism   • GERD (gastroesophageal reflux disease)   • Coronary artery calcification seen on CT scan   • Primary osteoarthritis of left knee   • Abnormal PET scan, lung   • Adenocarcinoma of left lung (CMS/HCC)   • Adenoma of left lung   • Preop examination   • Dyslipidemia   • History of lung cancer   • Stage 3b chronic kidney disease (CMS/HCC)      PMH/PSH Medical History:   Past Medical History:   Diagnosis Date   • Anemia    • Arthritis    • Colovaginal fistula    • COVID-19 vaccine series completed 02/26/2021   • Disease of thyroid gland    • Diverticulitis of colon    • GERD (gastroesophageal reflux disease)    • Hiatal hernia    • History of snoring    • Hypertension    • Hypothyroidism    • Lung cancer (CMS/HCC)    • Lung nodule    • Vaginal cancer (CMS/HCC) 2018    s/p XRT x 7 weeks and chemo weekly x 5 weeks       Surgical History:   Past Surgical History:   Procedure Laterality Date   • CHOLECYSTECTOMY     • COLONOSCOPY     • DILATION AND CURETTAGE OF UTERUS  02/2018   • LUNG SURGERY     • ROTATOR CUFF REPAIR Right    • TONSILLECTOMY     • WISDOM TOOTH EXTRACTION      hx of      Assessment and Recommendation C/s for more ostomy education;  --more ostomy education performed today, went over the care process, all her questions were answered;   --starter kit with supplies at bedside, reinforced her that home care will assist her to order the supplies at home;   --WOCN can follow up before d/c as needed      Plan of care discussed with pt and nursing at bedside and unit             Date: 05/20/22  Signature: Jennifer Grady RN

## 2022-05-20 NOTE — PROGRESS NOTES
[facility] received referral for home care from [facility] , Tiffany. Chart reviewed. Referral completed with SN and PT visits. Beka De Dios RN [id number]

## 2022-05-21 LAB
ANION GAP SERPL CALC-SCNC: 13 MEQ/L (ref 3–15)
BASOPHILS # BLD: 0.02 K/UL (ref 0.01–0.1)
BASOPHILS NFR BLD: 0.2 %
BUN SERPL-MCNC: 14 MG/DL (ref 8–20)
CALCIUM SERPL-MCNC: 8.5 MG/DL (ref 8.9–10.3)
CHLORIDE SERPL-SCNC: 103 MEQ/L (ref 98–109)
CO2 SERPL-SCNC: 21 MEQ/L (ref 22–32)
CREAT SERPL-MCNC: 1.2 MG/DL (ref 0.6–1.1)
CRP SERPL-MCNC: 72.79 MG/L
DIFFERENTIAL METHOD BLD: ABNORMAL
EOSINOPHIL # BLD: 0.57 K/UL (ref 0.04–0.36)
EOSINOPHIL NFR BLD: 5.4 %
ERYTHROCYTE [DISTWIDTH] IN BLOOD BY AUTOMATED COUNT: 14.2 % (ref 11.7–14.4)
GFR SERPL CREATININE-BSD FRML MDRD: 43.6 ML/MIN/1.73M*2
GLUCOSE SERPL-MCNC: 85 MG/DL (ref 70–99)
HCT VFR BLDCO AUTO: 31.9 % (ref 35–45)
HGB BLD-MCNC: 10.8 G/DL (ref 11.8–15.7)
IMM GRANULOCYTES # BLD AUTO: 0.21 K/UL (ref 0–0.08)
IMM GRANULOCYTES NFR BLD AUTO: 2 %
LYMPHOCYTES # BLD: 1.61 K/UL (ref 1.2–3.5)
LYMPHOCYTES NFR BLD: 15.2 %
MAGNESIUM SERPL-MCNC: 2.2 MG/DL (ref 1.8–2.5)
MCH RBC QN AUTO: 29.4 PG (ref 28–33.2)
MCHC RBC AUTO-ENTMCNC: 33.9 G/DL (ref 32.2–35.5)
MCV RBC AUTO: 86.9 FL (ref 83–98)
MONOCYTES # BLD: 0.85 K/UL (ref 0.28–0.8)
MONOCYTES NFR BLD: 8 %
NEUTROPHILS # BLD: 7.31 K/UL (ref 1.7–7)
NEUTS SEG NFR BLD: 69.2 %
NRBC BLD-RTO: 0 %
PDW BLD AUTO: 9.3 FL (ref 9.4–12.3)
PHOSPHATE SERPL-MCNC: 4 MG/DL (ref 2.4–4.7)
PLATELET # BLD AUTO: 399 K/UL (ref 150–369)
POTASSIUM SERPL-SCNC: 4.6 MEQ/L (ref 3.6–5.1)
RBC # BLD AUTO: 3.67 M/UL (ref 3.93–5.22)
SODIUM SERPL-SCNC: 137 MEQ/L (ref 136–144)
WBC # BLD AUTO: 10.57 K/UL (ref 3.8–10.5)

## 2022-05-21 PROCEDURE — 86140 C-REACTIVE PROTEIN: CPT | Performed by: STUDENT IN AN ORGANIZED HEALTH CARE EDUCATION/TRAINING PROGRAM

## 2022-05-21 PROCEDURE — 84100 ASSAY OF PHOSPHORUS: CPT | Performed by: STUDENT IN AN ORGANIZED HEALTH CARE EDUCATION/TRAINING PROGRAM

## 2022-05-21 PROCEDURE — 63700000 HC SELF-ADMINISTRABLE DRUG: Performed by: SURGERY

## 2022-05-21 PROCEDURE — 63600000 HC DRUGS/DETAIL CODE: Performed by: SURGERY

## 2022-05-21 PROCEDURE — 21400000 HC ROOM AND CARE CCU/INTERMEDIATE

## 2022-05-21 PROCEDURE — 25800000 HC PHARMACY IV SOLUTIONS

## 2022-05-21 PROCEDURE — 85025 COMPLETE CBC W/AUTO DIFF WBC: CPT | Performed by: STUDENT IN AN ORGANIZED HEALTH CARE EDUCATION/TRAINING PROGRAM

## 2022-05-21 PROCEDURE — 63700000 HC SELF-ADMINISTRABLE DRUG: Performed by: STUDENT IN AN ORGANIZED HEALTH CARE EDUCATION/TRAINING PROGRAM

## 2022-05-21 PROCEDURE — 83735 ASSAY OF MAGNESIUM: CPT | Performed by: STUDENT IN AN ORGANIZED HEALTH CARE EDUCATION/TRAINING PROGRAM

## 2022-05-21 PROCEDURE — 25000000 HC PHARMACY GENERAL: Performed by: SURGERY

## 2022-05-21 PROCEDURE — 25800000 HC PHARMACY IV SOLUTIONS: Performed by: SURGERY

## 2022-05-21 PROCEDURE — 80048 BASIC METABOLIC PNL TOTAL CA: CPT | Performed by: STUDENT IN AN ORGANIZED HEALTH CARE EDUCATION/TRAINING PROGRAM

## 2022-05-21 PROCEDURE — 36415 COLL VENOUS BLD VENIPUNCTURE: CPT | Performed by: STUDENT IN AN ORGANIZED HEALTH CARE EDUCATION/TRAINING PROGRAM

## 2022-05-21 RX ORDER — DEXTROSE MONOHYDRATE, SODIUM CHLORIDE, AND POTASSIUM CHLORIDE 50; 1.49; 4.5 G/1000ML; G/1000ML; G/1000ML
INJECTION, SOLUTION INTRAVENOUS CONTINUOUS
Status: DISCONTINUED | OUTPATIENT
Start: 2022-05-21 | End: 2022-05-22 | Stop reason: HOSPADM

## 2022-05-21 RX ORDER — FUROSEMIDE 10 MG/ML
20 INJECTION INTRAMUSCULAR; INTRAVENOUS ONCE
Status: COMPLETED | OUTPATIENT
Start: 2022-05-21 | End: 2022-05-21

## 2022-05-21 RX ADMIN — PANTOPRAZOLE SODIUM 40 MG: 40 TABLET, DELAYED RELEASE ORAL at 22:08

## 2022-05-21 RX ADMIN — POTASSIUM CHLORIDE, DEXTROSE MONOHYDRATE AND SODIUM CHLORIDE: 150; 5; 450 INJECTION, SOLUTION INTRAVENOUS at 11:55

## 2022-05-21 RX ADMIN — PANTOPRAZOLE SODIUM 40 MG: 40 TABLET, DELAYED RELEASE ORAL at 08:15

## 2022-05-21 RX ADMIN — HEPARIN SODIUM 5000 UNITS: 5000 INJECTION, SOLUTION INTRAVENOUS; SUBCUTANEOUS at 06:49

## 2022-05-21 RX ADMIN — FUROSEMIDE 20 MG: 10 INJECTION, SOLUTION INTRAMUSCULAR; INTRAVENOUS at 13:26

## 2022-05-21 RX ADMIN — Medication 3 MG: at 22:08

## 2022-05-21 RX ADMIN — DOXYCYCLINE 200 MG: 100 INJECTION, POWDER, LYOPHILIZED, FOR SOLUTION INTRAVENOUS at 22:09

## 2022-05-21 RX ADMIN — HEPARIN SODIUM 5000 UNITS: 5000 INJECTION, SOLUTION INTRAVENOUS; SUBCUTANEOUS at 22:08

## 2022-05-21 RX ADMIN — HEPARIN SODIUM 5000 UNITS: 5000 INJECTION, SOLUTION INTRAVENOUS; SUBCUTANEOUS at 13:26

## 2022-05-21 RX ADMIN — ACETAMINOPHEN 975 MG: 325 TABLET, FILM COATED ORAL at 17:57

## 2022-05-21 NOTE — PLAN OF CARE
Problem: Adult Inpatient Plan of Care  Goal: Plan of Care Review  Outcome: Progressing  Flowsheets (Taken 5/21/2022 9565)  Progress: improving  Plan of Care Reviewed With: patient  Outcome Summary: VSS, tolerating po diet w/no nausea. No reports of abdominal pain. Educating pt on ostomy care. Plan for dc tomorrow.  Goal: Patient-Specific Goal (Individualized)  Outcome: Progressing  Goal: Optimal Comfort and Wellbeing  Outcome: Progressing

## 2022-05-21 NOTE — PROGRESS NOTES
Colorectal Surgery Daily Progress Note    S/p robotic LAR, ALYSSA/BSO, takedown of colovaginal and colouterine fistulas, coloanal anastomosis, diverting loop ileostomy on 5/13. 8 Days Post-Op    Subjective    No acute events overnight.  Afebrile.  Vitals within normal limits.  On room air.  Patient said she ate half of her dinner and some of her breakfast.  Did not have lunch as she did not have an appetite.  Continues to pass flatus and stool in her stoma.  Feels less  edematous.  Pain well controlled.  Out of bed and ambulating.    Objective     Vital signs in last 24 hours:  Temp:  [36.7 °C (98 °F)-37.1 °C (98.7 °F)] 36.8 °C (98.3 °F)  Heart Rate:  [63-88] 72  Resp:  [16-18] 18  BP: (124-163)/(60-79) 163/73      Intake/Output Summary (Last 24 hours) at 5/21/2022 0700  Last data filed at 5/21/2022 0649  Gross per 24 hour   Intake 1569.7 ml   Output 2250 ml   Net -680.3 ml       Intake/Output Last 24hours:    Intake/Output Summary (Last 24 hours) at 5/21/2022 0700  Last data filed at 5/21/2022 0649  Gross per 24 hour   Intake 1569.7 ml   Output 2250 ml   Net -680.3 ml       Physical Exam    General appearance: alert, appears stated age, cooperative, resting comfortably in bed, not in acute distress  Neck: supple, symmetrical, trachea midline  Lungs: Normal respiratory effort, on RA  Heart: regular rate and rhythm  Abdomen: soft, appropriately tender, minimally distended. Incisions are C/D/I with steristrips in place. Ileostomy pink and patent with gas and dark green/brown liquid output.  Extremities: extremities warm and well-perfused  Skin: warm and dry  Neurologic: Grossly normal      VTE Assessment: I have reassessed and the patient's VTE risk and treatment plan is appropriate. [facility]    Labs  CBC Results       05/20/22 05/19/22 05/18/22     0713 0655 0703    WBC 9.04 7.59 7.14    RBC 3.67 3.60 3.42    HGB 10.7 10.6 10.1    HCT 32.4 31.9 31.2    MCV 88.3 88.6 91.2    MCH 29.2 29.4 29.5    MCHC 33.0 33.2 32.4    PLT  337 278 241        BMP Results       05/20/22 05/19/22 05/18/22     0713 0655 0703     138 140    K 4.3 4.5 4.5    Cl 102 105 110    CO2 19 21 18    Glucose 85 86 100    BUN 13 8 10    Creatinine 1.2 1.2 1.2    Calcium 8.6 8.3 8.1    Anion Gap 17 12 12    EGFR 43.6 43.6 43.6            Results from last 7 days   Lab Units 05/19/22  0655 05/16/22  0701   ALBUMIN g/dL 2.2* 2.3*     Results from last 7 days   Lab Units 05/20/22  0713 05/19/22  0655 05/18/22  0703   CRP mg/L 102.98* 114.81* 182.36*       PT PTT Results    No lab values to display.         Micro  Microbiology Results     Procedure Component Value Units Date/Time    COVID-19 PAT/Pre-procedural [[id number]]  (Normal) Collected: 05/12/22 1219    Specimen: Nasopharyngeal Swab from Nasopharynx Updated: 05/12/22 2208    Narrative:      The following orders were created for panel order COVID-19 PAT/Pre-procedural.  Procedure                               Abnormality         Status                     ---------                               -----------         ------                     SARS-CoV-2 (COVID-19), PCR[[id number]]   Normal              Final result                 Please view results for these tests on the individual orders.    SARS-CoV-2 (COVID-19), PCR [[id number]]  (Normal) Collected: 05/12/22 1219    Specimen: Nasopharyngeal Swab from Nasopharynx Updated: 05/12/22 2208     SARS-CoV-2 (COVID-19) Negative    COVID-19 PAT/Pre-procedural [[id number]]  (Normal) Collected: 05/02/22 1321    Specimen: Nasopharyngeal Swab from Nasopharynx Updated: 05/02/22 2122    Narrative:      The following orders were created for panel order COVID-19 PAT/Pre-procedural.  Procedure                               Abnormality         Status                     ---------                               -----------         ------                     SARS-CoV-2 (COVID-19), PCR[[id number]]   Normal              Final result                 Please view results for these tests on  the individual orders.    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 8 Days Post-Op    - LR diet, IVF 40  - pain meds prn  - Continue Doxycycline for 10 day course total  - IS/OOB/ambulate as tolerated  - dvt ppx: SQH  - Likely DC today    Please page [phone] with any questions   Daily Ivan MD

## 2022-05-21 NOTE — PROGRESS NOTES
"I visited this patient for support care.  Patient was alert and sitting in chair.  Patient engaged in conversation and states that she is \"coming along okay\".  She is waiting to be discharged today.  SC wished the patient well.    Johanne Anthony   Intern  [phone]        "

## 2022-05-22 VITALS
BODY MASS INDEX: 31.6 KG/M2 | OXYGEN SATURATION: 98 % | SYSTOLIC BLOOD PRESSURE: 133 MMHG | HEIGHT: 60 IN | DIASTOLIC BLOOD PRESSURE: 62 MMHG | HEART RATE: 65 BPM | TEMPERATURE: 97.6 F | RESPIRATION RATE: 18 BRPM | WEIGHT: 160.94 LBS

## 2022-05-22 LAB
ANION GAP SERPL CALC-SCNC: 16 MEQ/L (ref 3–15)
BASOPHILS # BLD: 0.05 K/UL (ref 0.01–0.1)
BASOPHILS NFR BLD: 0.5 %
BUN SERPL-MCNC: 19 MG/DL (ref 8–20)
CALCIUM SERPL-MCNC: 8.7 MG/DL (ref 8.9–10.3)
CHLORIDE SERPL-SCNC: 104 MEQ/L (ref 98–109)
CO2 SERPL-SCNC: 18 MEQ/L (ref 22–32)
CREAT SERPL-MCNC: 1.3 MG/DL (ref 0.6–1.1)
CRP SERPL-MCNC: 59.23 MG/L
DIFFERENTIAL METHOD BLD: ABNORMAL
EOSINOPHIL # BLD: 0.61 K/UL (ref 0.04–0.36)
EOSINOPHIL NFR BLD: 5.9 %
ERYTHROCYTE [DISTWIDTH] IN BLOOD BY AUTOMATED COUNT: 14.2 % (ref 11.7–14.4)
GFR SERPL CREATININE-BSD FRML MDRD: 39.7 ML/MIN/1.73M*2
GLUCOSE SERPL-MCNC: 106 MG/DL (ref 70–99)
HCT VFR BLDCO AUTO: 32.5 % (ref 35–45)
HGB BLD-MCNC: 11.1 G/DL (ref 11.8–15.7)
IMM GRANULOCYTES # BLD AUTO: 0.17 K/UL (ref 0–0.08)
IMM GRANULOCYTES NFR BLD AUTO: 1.6 %
LYMPHOCYTES # BLD: 1.59 K/UL (ref 1.2–3.5)
LYMPHOCYTES NFR BLD: 15.3 %
MAGNESIUM SERPL-MCNC: 1.9 MG/DL (ref 1.8–2.5)
MCH RBC QN AUTO: 29.8 PG (ref 28–33.2)
MCHC RBC AUTO-ENTMCNC: 34.2 G/DL (ref 32.2–35.5)
MCV RBC AUTO: 87.1 FL (ref 83–98)
MONOCYTES # BLD: 0.86 K/UL (ref 0.28–0.8)
MONOCYTES NFR BLD: 8.3 %
NEUTROPHILS # BLD: 7.14 K/UL (ref 1.7–7)
NEUTS SEG NFR BLD: 68.4 %
NRBC BLD-RTO: 0 %
PDW BLD AUTO: 9.3 FL (ref 9.4–12.3)
PHOSPHATE SERPL-MCNC: 4.4 MG/DL (ref 2.4–4.7)
PLATELET # BLD AUTO: 437 K/UL (ref 150–369)
POTASSIUM SERPL-SCNC: 4.5 MEQ/L (ref 3.6–5.1)
RBC # BLD AUTO: 3.73 M/UL (ref 3.93–5.22)
SODIUM SERPL-SCNC: 138 MEQ/L (ref 136–144)
WBC # BLD AUTO: 10.42 K/UL (ref 3.8–10.5)

## 2022-05-22 PROCEDURE — 63600000 HC DRUGS/DETAIL CODE: Performed by: SURGERY

## 2022-05-22 PROCEDURE — 36415 COLL VENOUS BLD VENIPUNCTURE: CPT | Performed by: STUDENT IN AN ORGANIZED HEALTH CARE EDUCATION/TRAINING PROGRAM

## 2022-05-22 PROCEDURE — 63700000 HC SELF-ADMINISTRABLE DRUG: Performed by: SURGERY

## 2022-05-22 PROCEDURE — 85025 COMPLETE CBC W/AUTO DIFF WBC: CPT | Performed by: STUDENT IN AN ORGANIZED HEALTH CARE EDUCATION/TRAINING PROGRAM

## 2022-05-22 PROCEDURE — 86140 C-REACTIVE PROTEIN: CPT | Performed by: STUDENT IN AN ORGANIZED HEALTH CARE EDUCATION/TRAINING PROGRAM

## 2022-05-22 PROCEDURE — 84100 ASSAY OF PHOSPHORUS: CPT | Performed by: STUDENT IN AN ORGANIZED HEALTH CARE EDUCATION/TRAINING PROGRAM

## 2022-05-22 PROCEDURE — 80048 BASIC METABOLIC PNL TOTAL CA: CPT | Performed by: STUDENT IN AN ORGANIZED HEALTH CARE EDUCATION/TRAINING PROGRAM

## 2022-05-22 PROCEDURE — 83735 ASSAY OF MAGNESIUM: CPT | Performed by: STUDENT IN AN ORGANIZED HEALTH CARE EDUCATION/TRAINING PROGRAM

## 2022-05-22 RX ADMIN — PANTOPRAZOLE SODIUM 40 MG: 40 TABLET, DELAYED RELEASE ORAL at 08:16

## 2022-05-22 RX ADMIN — HEPARIN SODIUM 5000 UNITS: 5000 INJECTION, SOLUTION INTRAVENOUS; SUBCUTANEOUS at 05:26

## 2022-05-22 NOTE — PROGRESS NOTES
Colorectal Surgery Daily Progress Note    S/p robotic LAR, ALYSSA/BSO, takedown of colovaginal and colouterine fistulas, coloanal anastomosis, diverting loop ileostomy on 5/13. 9 Days Post-Op    Subjective    NAEON. Afebrile. Vitals wnl. On RA. Pt ate more yesterday w/o n/v. Pt given the option to go home yesterday however she felt more comfortable w/dc today. Now ready to go home per pt. No abd pain, bloating, or edema. Stoma w/fxn. Has been OOB and ambulating.     Objective     Vital signs in last 24 hours:  Temp:  [36.4 °C (97.6 °F)-36.9 °C (98.4 °F)] 36.5 °C (97.7 °F)  Heart Rate:  [62-82] 78  Resp:  [16-18] 18  BP: (134-159)/(63-77) 143/77      Intake/Output Summary (Last 24 hours) at 5/22/2022 0727  Last data filed at 5/22/2022 0528  Gross per 24 hour   Intake 1800 ml   Output 2925 ml   Net -1125 ml       Intake/Output Last 24hours:    Intake/Output Summary (Last 24 hours) at 5/22/2022 0727  Last data filed at 5/22/2022 0528  Gross per 24 hour   Intake 1800 ml   Output 2925 ml   Net -1125 ml       Physical Exam    General appearance: alert, appears stated age, cooperative, resting comfortably in bed, not in acute distress  Neck: supple, symmetrical, trachea midline  Lungs: Normal respiratory effort, on RA  Heart: regular rate and rhythm  Abdomen: soft, appropriately tender, nondistended. Incisions are C/D/I with steristrips in place. Ileostomy pink and patent with gas and dark green/brown liquid output.  Extremities: extremities warm and well-perfused  Skin: warm and dry  Neurologic: Grossly normal      VTE Assessment: I have reassessed and the patient's VTE risk and treatment plan is appropriate. [facility]    Labs  CBC Results       05/21/22 05/20/22 05/19/22     0858 0713 0655    WBC 10.57 9.04 7.59    RBC 3.67 3.67 3.60    HGB 10.8 10.7 10.6    HCT 31.9 32.4 31.9    MCV 86.9 88.3 88.6    MCH 29.4 29.2 29.4    MCHC 33.9 33.0 33.2     337 278        BMP Results       05/21/22 05/20/22 05/19/22     0858 0757  0655     138 138    K 4.6 4.3 4.5    Cl 103 102 105    CO2 21 19 21    Glucose 85 85 86    BUN 14 13 8    Creatinine 1.2 1.2 1.2    Calcium 8.5 8.6 8.3    Anion Gap 13 17 12    EGFR 43.6 43.6 43.6            Results from last 7 days   Lab Units 05/19/22  0655 05/16/22  0701   ALBUMIN g/dL 2.2* 2.3*     Results from last 7 days   Lab Units 05/21/22  0858 05/20/22  0713 05/19/22  0655   CRP mg/L 72.79* 102.98* 114.81*       PT PTT Results    No lab values to display.         Micro  Microbiology Results     Procedure Component Value Units Date/Time    COVID-19 PAT/Pre-procedural [[id number]]  (Normal) Collected: 05/12/22 1219    Specimen: Nasopharyngeal Swab from Nasopharynx Updated: 05/12/22 2208    Narrative:      The following orders were created for panel order COVID-19 PAT/Pre-procedural.  Procedure                               Abnormality         Status                     ---------                               -----------         ------                     SARS-CoV-2 (COVID-19), PCR[[id number]]   Normal              Final result                 Please view results for these tests on the individual orders.    SARS-CoV-2 (COVID-19), PCR [[id number]]  (Normal) Collected: 05/12/22 1219    Specimen: Nasopharyngeal Swab from Nasopharynx Updated: 05/12/22 2208     SARS-CoV-2 (COVID-19) Negative    COVID-19 PAT/Pre-procedural [[id number]]  (Normal) Collected: 05/02/22 1321    Specimen: Nasopharyngeal Swab from Nasopharynx Updated: 05/02/22 2122    Narrative:      The following orders were created for panel order COVID-19 PAT/Pre-procedural.  Procedure                               Abnormality         Status                     ---------                               -----------         ------                     SARS-CoV-2 (COVID-19), PCR[[id number]]   Normal              Final result                 Please view results for these tests on the individual orders.    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 9 Days Post-Op    - LR diet, IVF 40  - pain meds prn  - Continue Doxycycline for 10 day course total  - IS/OOB/ambulate as tolerated  - dvt ppx: SQH  - DC today w/home care and abx for a total of 10 d    Please page [phone] with any questions   Daily Ivan MD

## 2022-05-24 ENCOUNTER — TELEPHONE (OUTPATIENT)
Dept: COLORECTAL SURGERY | Facility: CLINIC | Age: 77
End: 2022-05-24
Payer: MEDICARE

## 2022-05-24 NOTE — TELEPHONE ENCOUNTER
Received voicemail message from Ila Lopez, stating discharge instructions state to call if patient is experiencing chills and nausea, which she is, and requesting call back to discuss.

## 2022-05-24 NOTE — TELEPHONE ENCOUNTER
Spoke to patient on the phone who states she is feeling better than she was this morning because she was having nausea but she believes it is because she didn't eat anything and was able to eat toast and fruit. Patient currently denies N/V, fever/chills, pain or bleeding and states she is having a good output from her ostomy and is urinating without difficulty. Patient encouraged to eat small meals, make sure to keep hydrated, and measure output of ostomy to keep track of output. Patient instructed to call back to office if she develops N/V, fever/chills, pain or increased or decreased output from ostomy, or decrease in urination. Patient verbalized understanding, was appreciative of the call back and states home care RN is coming today as well.

## 2022-05-25 NOTE — DISCHARGE SUMMARY
Inpatient Discharge Summary    BRIEF OVERVIEW  Admitting Provider: Dr. Mandel  H&P Notes 4/25/2022 to 5/25/2022         Date of Service   Author Author Type Status Note Type File Time    05/13/22 0719  Trev Jimenez MD Physician Signed H&P 05/13/22 0721    05/13/22 0647  Geovani Mandel MD Physician Signed H&P 05/13/22 0647          Attending Provider: Dr. Mandel  Primary Care Physician at Discharge: Javon Valadez [phone]    Admission Date: 5/13/2022     Discharge Date: 5/22/22    Primary Discharge Diagnosis  Colovaginal fistula    Secondary Discharge Diagnosis  Active Hospital Problems    Diagnosis Date Noted   • Colovaginal fistula 06/04/2021      Resolved Hospital Problems   No resolved problems to display.       DETAILS OF HOSPITAL STAY    Operative Procedures Performed  Procedure(s):  1. Robotic proctectomy and sigmoidectomy with takedown of colouterine and rectovaginal fistula and coloanal anastomosis with diverting loop ileostomy 2. Robotic splenic flexure mobilization.  3. ALYSSA/BSO (Trev Jimenez primary)  Cystoscopy with Bilateral Ureteral Stent Placement  HYSTERECTOMY ABDOMINAL TOTAL--TLH/LAVH LAPAROSCOPIC ROBOTIC (DA KASHMIR)    Consults:   Consult Notes 4/25/2022 to 5/25/2022         Date of Service   Author Author Type Status Note Type File Time    05/20/22 1159  Jennifer Grady RN Wound Ostomy Continence Signed Consults 05/20/22 1204    05/18/22 1200  Cathy Samuels RD Registered Dietitian Signed Consults 05/18/22 1203    05/16/22 1130  Jennifer Grady RN Wound Ostomy Continence Signed Consults 05/16/22 1234    05/02/22 1230  Jamal Andrade MD Physician Signed Consults 05/03/22 0912          Consult Orders During Admission:  IP CONSULT TO WOUND OSTOMY CONTINENCE  IP CONSULT TO WOUND OSTOMY CONTINENCE     Procedures:   Robotic assisted proctosigmoidectomy, coloanal anastomosis and loop ileostomy, takedown of colovaginal Colouterine fistulas, total hysterectomy, bilateral  salpingo-oophorectomy, cystoscopy and bilateral ureteral stent 5/13  Pertinent Test Results:  CBC Results       05/22/22 05/21/22 05/20/22     0936 0858 0713    WBC 10.42 10.57 9.04    RBC 3.73 3.67 3.67    HGB 11.1 10.8 10.7    HCT 32.5 31.9 32.4    MCV 87.1 86.9 88.3    MCH 29.8 29.4 29.2    MCHC 34.2 33.9 33.0     399 337        BMP Results       05/22/22 05/21/22 05/20/22     0936 0858 0713     137 138    K 4.5 4.6 4.3    Cl 104 103 102    CO2 18 21 19    Glucose 106 85 85    BUN 19 14 13    Creatinine 1.3 1.2 1.2    Calcium 8.7 8.5 8.6    Anion Gap 16 13 17    EGFR 39.7 43.6 43.6        Pathology Results     Procedure Component Value Units Date/Time    Pathology Tissue Exam [[id number]] Collected: 05/13/22 1522    Specimen: Tissue from Uterus/Tubes/Ovaries; Tissue from Large Intestine; Tissue from Large Intestine, Rectum; Tissue from Large Intestine, Left/Descending Colon Updated: 05/18/22 1001     Case Report --     Surgical Pathology                                Case: [id number]                                  Authorizing Provider:  Geovani Mandel MD   Collected:           05/13/2022 1522              Ordering Location:     [facility]    Received:            05/13/2022 1813                                     Operating Room                                                               Pathologist:           Olayinka Alonzo MD                                                        Specimens:   A) - Uterus/Tubes/Ovaries, uterus, cervix, bilateral tubes and ovaries                              B) - Large Intestine, Portion of rectum, sigmoid colon and portion of descending                    colon                                                                                               C) - Large Intestine, Rectum, Distal Donut                                                          D) - Large Intestine, Left/Descending Colon, Proximal Donut                              "    Clinical Information --     Pre-op diagnosis:  Robotic Low Anterior Resection w/C+S, Possible Stoma     Final Diagnosis --     A.  Uterus, cervix, bilateral tubes and ovaries:    Chronic cystic cervicitis.  Atrophic endometrium with cystic change, tubal metaplasia and focal glandular crowding.  Colonic epithelium with surrounding granulation tissue involving endometrium, myometrium consistent with fistula.  Adenomyosis.  Left fallopian tube with chronic salpingitis and endometriosis  Right Fallopian tube with chronic salpingitis.  Right ovary with no pathologic change  Left ovary not identified.      B.  Portion of rectum, sigmoid colon and portion of descending colon:    Diverticulosis coli.  Serosal adhesions.  Subserosal granulation tissue with mucin pools and focal calcification consistent with fistula.  Resection margins with no pathologic change.    C.  Distal Donut :    Segment of colon with no pathologic change.    D.  Proximal Donut:    Segment of colon with no pathologic change.        **ATTENTION PATIENTS**  **The findings in this report have been made available for review potentially before your provider has had a chance to review and discuss the results with you.  Please allow time for your provider to review your results.  If you have any questions or concerns about these results, please contact the healthcare provider who ordered the test.**         Gross Description --        A.  The specimen is received in formalin, in a container labeled with the patient's name and \" uterus, cervix, bilateral tubes and ovaries\".  It consists of a 55 g hysterectomy including a uterus with attached cervix and bilateral adnexa.  The uterus measures 3.8 cm cornu to cornu, 3.7 cm superior to inferior, and 3.6 cm anterior to posterior.  The serosa is tan-pink and shaggy.  The attached cervix measures 2.6 cm in length and 3.4 cm in diameter.  There is an abundant amount of cauterized dense fibrous tissue surrounding " "the cervix circumferentially.  The ectocervix is gray-white and smooth, with a 0.1 cm stenotic os.  The adnexa are shaggy and disrupted with multiple adhesions.  The soft tissue surrounding the cervix is inked black.  The specimen is bivalved revealing a stenotic cervical canal measuring 0.2 cm in diameter, lined by a pink-white endocervical mucosa.  The endometrium is tan-pink and 0.1 cm in thickness.  The myometrium is homogeneous tan and 1.2 cm in thickness.  Sectioning of the cervix and adhesed tissue reveals dense focally fibrotic cut surfaces.  No masses are identified grossly.  The attached left possible fallopian tube measures 1.9 x 1.5 x 1.2 cm.  Sectioning reveals a dilated cut surface.  Sectioning of the remaining left adnexal tissue reveals mottled cut surfaces with no obvious ovary identified.  The attached right possible fallopian tube measures 4 cm in length and 1 cm in greatest diameter the possible fimbria are identified.  Sectioning reveals a patent lumen, with an adhesed 1.3 x 0.6 cm possible ovary.  Representative sections are submitted as follows:  A1-A3 anterior cervix  A4-A5 anterior endomyometrium  A6-A8 posterior cervix  A9-A10 posterior endomyometrium  A11-A13 entire left adnexa  A14 right fallopian tube and ovary       B.  The specimen is received in formalin, in a container labeled with the patient's name and \" portion of rectum, sigmoid colon and portion of descending colon\".  It consists of a segment of large bowel measuring 31 cm in length and up to 3 cm in diameter.  The proximal margin is open, and the distal margin is stapled.  The serosal surface is tan-pink with diffuse, dense red-purple adhesions as well as a 1.8 cm full-thickness defect noted 3 cm from the stapled margin.  On opening, the mucosa is tan-pink and plicated with no polyps or masses identified.  Further sectioning reveals numerous diverticula and dense fibrotic wall thickening.  Representative sections are submitted " "as follows:  B1 proximal margin  B2 distal margin  B3-B6 diverticula to include serosal adhesions       C.  The specimen is received in formalin, in a container labeled with the patient's name and \" distal donuts\".  It consists of a 1.7 x 1.5 x 0.5 cm annular ring of bowel with numerous embedded staples.  The mucosal surface is tan-pink and glistening.  No lesions are identified grossly.  A representative section is submitted in cassette C1.       D.  The specimen is received in formalin, in a container labeled with the patient's name and \" proximal doughnut\".  It consists of a 2.3 x 2 x 1.7 cm annular ring of bowel.  The mucosal surface is tan-pink and glistening with no lesions identified grossly.  A representative section is submitted in cassette D1.    ASAD Mijares (ZOILA)cm                  Imaging  CT CHEST WITH IV CONTRAST    Result Date: 5/11/2022  IMPRESSION: 1. Interval resolution of the previous partially solid and partially subserosal nodule in the posterior apical segment of the left upper lobe and previous nonpathologically enlarged right paratracheal/precarinal and supraclavicular lymph nodes. 2. Unchanged medium to large hiatal hernia containing a large portion of the stomach. 3. Unchanged mild apical predominant emphysematous change. COMMENT: Postoperative Changes: None. Heart and pericardium: Normal in size without effusion. Aorta and great vessels: Normal in caliber with moderate coarse mural calcifications. Leanna and mediastinum: See impression. Tracheobronchial tree: Patent. Lungs: Right: No nodules or air space disease. Left: No nodules or air space disease. Both: See impression. In addition, there is unchanged right greater than left pleural-parenchymal thickening. Pleura: No fluid. Lower neck: Normal. Chest wall and axilla : Normal. Bones: Unchanged lower thoracic dextroconvex kyphoscoliosis and a grade 1 retrolisthesis at L1-2 with advanced chronic disc degeneration at this level " Visualized upper abdomen: Unchanged adjacent exophytic cysts in the upper pole the right kidney that measure up to 3.3 cm.        Presenting Problem/History of Present Illness  Colovaginal fistula [N82.4]     Raegan is here to be seen today in follow-up.  She recently underwent robotic resection of an adenocarcinoma of her lung by Dr. Gamez.  She has a history of a colovaginal fistula related to was thought to be diverticulitis and possibly radiation to her cervix from cervical cancer.  We do not have any evidence of recurrent cervical cancer at this time.  She continues to have intermittent drainage of feculent material from her vagina and has been doing an excellent job with hygiene using a spray bottle to keep matters are clean.  She has not had any fevers or chills, nausea or vomiting.  She notes some family events coming up in the end of April and would like to see if we could delay any potential intervention until after that.  Colonoscopy was attempted in the past and was not successful due to the tortuosity of the colon in the rectum.  This was performed in the operating room as an exam under anesthesia with Dr. Jimenez and we even attempted to use a pediatric bronchoscope and were not successful in passing and navigating the colon.  That said on evaluation on the vaginal side, there is no evidence of a recurrent cervical cancer.    Exam on Day of Discharge  Patient seen and examined on day of discharge.  General appearance: alert, appears stated age, cooperative, resting comfortably in bed, not in acute distress  Neck: supple, symmetrical, trachea midline  Lungs: Normal respiratory effort, on RA  Heart: regular rate and rhythm  Abdomen: soft, appropriately tender, nondistended. Incisions are C/D/I with steristrips in place. Ileostomy pink and patent with gas and dark green/brown liquid output.  Extremities: extremities warm and well-perfused  Skin: warm and dry  Neurologic: Grossly normal       Hospital  Course  Patient was admitted to the hospital for her surgery on 5/13 s/p Robotic assisted proctosigmoidectomy, coloanal anastomosis and loop ileostomy, takedown of colovaginal Colouterine fistulas, total hysterectomy, bilateral salpingo-oophorectomy, cystoscopy and bilateral ureteral stent.  Immediately postop Johnson remain in place and she was started on Cipro and Flagyl for antibiotics.  On postop day 2, 5/15 patient was tolerating clears and her ostomy had gas and stool output so she was advanced to a LR diet.  On postop day 3 she started developing some belching.  On postop day 4, 5/17, patient started having an increase in abdominal distention and nausea.  Her ileostomy output decreased.  She also had an episode of emesis.  Patient refused NG tube.  At this point her diet was backed down to NPO.  During her hospitalization patient felt edematous and fluid overloaded.  She typically takes a diuretic as needed at home.  So she was given a couple doses of Lasix.  On 5/19, postop day 6, her diet was eventually advanced back to a LR diet.  Johnson was removed and she was voiding well without issues.  Patient was medically stable for discharge on 5/21 as she was tolerating LR diet, pain was well controlled with p.o. medication, stoma with function, and voiding and ambulating without any issues.  However patient did not want to be discharged wanted to stay inpatient 1 more day.  As result she was discharged on 5/22.    Discharge Orders     Medication List      START taking these medications    acetaminophen 325 mg tablet  Commonly known as: TYLENOL  Take 3 tablets (975 mg total) by mouth every 6 (six) hours as needed for mild pain for up to 10 days.  Dose: 975 mg        CONTINUE taking these medications    atorvastatin 40 mg tablet  Commonly known as: LIPITOR  TAKE 1 TABLET BY MOUTH EVERY DAY     benazepriL 10 mg tablet  Commonly known as: LOTENSIN  Take 10 mg by mouth daily.  Dose: 10 mg     bumetanide 1 mg  tablet  Commonly known as: BUMEX  Take 1 mg by mouth as needed.  Dose: 1 mg     cholecalciferol (vitamin D3) 1,000 unit (25 mcg) tablet  Take 2,000 Units by mouth daily.  Dose: 2,000 Units     glucosamine-D3-Boswellia serr 1,500-400-100 mg-unit-mg tablet  Take 1 tablet by mouth daily.  Dose: 1 tablet     levothyroxine 88 mcg tablet  Commonly known as: SYNTHROID  Take 88 mcg by mouth daily.  Dose: 88 mcg        STOP taking these medications    VIBRAMYCIN 100 mg capsule  Generic drug: doxycycline hyclate        ASK your doctor about these medications    doxycycline hyclate 100 mg capsule  Commonly known as: VIBRAMYCIN  Take 1 capsule (100 mg total) by mouth 2 (two) times a day for 3 days.  Dose: 100 mg  Ask about: Should I take this medication?     oxyCODONE 5 mg immediate release tablet  Commonly known as: ROXICODONE  Take 1 tablet (5 mg total) by mouth every 6 (six) hours as needed for severe pain for up to 3 days.  Dose: 5 mg  Ask about: Should I take this medication?          Instructions for after discharge     Call provider for:  difficulty breathing, headache or visual disturbances      Call provider for:  extreme fatigue      Call provider for:  hives      Call provider for:  persistent dizziness or light-headedness      Call provider for:  persistent nausea or vomiting      Call provider for:  redness, tenderness, or signs of infection (pain, swelling, redness, odor or green/yellow discharge around incision site)      Call provider for:  severe uncontrolled pain      Call provider for:  temperature >100.4      Discharge diet      Low fiber, low residue. Please avoid fresh fruit and vegetable, seeds, nuts, grains.    Diet Type / Texture: Regular    Driving Restrictions (specify)      Do not drive for 2 weeks until you are seen in follow-up and cleared by your surgeon. You may be a passenger in the car.    Follow-up with primary physician (PCP)      Please call your primary care provider to schedule a follow-up  appointment for within 1 month of discharge to discuss your recent surgery and hospitalization.    Lifting Restrictions (Specify)      Maximum Weigth to Lift: 10 Pounds          Outpatient Follow-Ups            In 2 weeks Geovani Mandel MD [facility]    In 2 weeks Trev Jimenez MD [facility]    In 5 months Owen Gamez MD PhD [facility]        Referrals:  No orders of the defined types were placed in this encounter.      Active Issues Requiring Follow-up  Issue: n/a  What is Needed:   1) fu w/Dr. Mandel in 2 weeks  2) follow-up with your PCP in 1 to 2 weeks  Patient is aware she is to take the antibiotics as prescribed      Test Results Pending at Discharge  Unresulted Labs (From admission, onward)            None          Discharge Disposition  Disposition: Home Health Care - [facility]  Destination: Home      Code Status at Discharge: Prior  Physician Order for Life-Sustaining Treatment Document Status      No documents found

## 2022-06-03 ENCOUNTER — TELEPHONE (OUTPATIENT)
Dept: COLORECTAL SURGERY | Facility: CLINIC | Age: 77
End: 2022-06-03
Payer: MEDICARE

## 2022-06-08 ENCOUNTER — APPOINTMENT (OUTPATIENT)
Dept: LAB | Facility: HOSPITAL | Age: 77
End: 2022-06-08
Attending: COLON & RECTAL SURGERY
Payer: MEDICARE

## 2022-06-08 ENCOUNTER — TELEPHONE (OUTPATIENT)
Dept: COLORECTAL SURGERY | Facility: CLINIC | Age: 77
End: 2022-06-08

## 2022-06-08 ENCOUNTER — OFFICE VISIT (OUTPATIENT)
Dept: GYNECOLOGIC ONCOLOGY | Facility: CLINIC | Age: 77
End: 2022-06-08
Attending: OBSTETRICS & GYNECOLOGY
Payer: MEDICARE

## 2022-06-08 ENCOUNTER — OFFICE VISIT (OUTPATIENT)
Dept: COLORECTAL SURGERY | Facility: CLINIC | Age: 77
End: 2022-06-08
Payer: MEDICARE

## 2022-06-08 VITALS
BODY MASS INDEX: 29.25 KG/M2 | OXYGEN SATURATION: 80 % | DIASTOLIC BLOOD PRESSURE: 73 MMHG | WEIGHT: 149 LBS | SYSTOLIC BLOOD PRESSURE: 119 MMHG | HEIGHT: 60 IN | HEART RATE: 86 BPM

## 2022-06-08 VITALS — BODY MASS INDEX: 28.86 KG/M2 | WEIGHT: 147 LBS | HEIGHT: 60 IN

## 2022-06-08 DIAGNOSIS — Z85.44 HISTORY OF CANCER OF VAGINA: Primary | ICD-10-CM

## 2022-06-08 DIAGNOSIS — Z09 POSTOP CHECK: ICD-10-CM

## 2022-06-08 DIAGNOSIS — K57.21 DIVERTICULITIS OF LARGE INTESTINE WITH PERFORATION AND ABSCESS WITH BLEEDING: Primary | ICD-10-CM

## 2022-06-08 DIAGNOSIS — N82.4 COLOVAGINAL FISTULA: Primary | ICD-10-CM

## 2022-06-08 DIAGNOSIS — K57.21 DIVERTICULITIS OF LARGE INTESTINE WITH PERFORATION AND ABSCESS WITH BLEEDING: ICD-10-CM

## 2022-06-08 LAB
ALBUMIN SERPL-MCNC: 3.3 G/DL (ref 3.4–5)
ALP SERPL-CCNC: 95 IU/L (ref 35–126)
ALT SERPL-CCNC: 23 IU/L (ref 11–54)
ANION GAP SERPL CALC-SCNC: 12 MEQ/L (ref 3–15)
AST SERPL-CCNC: 25 IU/L (ref 15–41)
BASOPHILS # BLD: 0.04 K/UL (ref 0.01–0.1)
BASOPHILS NFR BLD: 0.4 %
BILIRUB SERPL-MCNC: 0.3 MG/DL (ref 0.3–1.2)
BUN SERPL-MCNC: 23 MG/DL (ref 8–20)
CALCIUM SERPL-MCNC: 9.3 MG/DL (ref 8.9–10.3)
CHLORIDE SERPL-SCNC: 107 MEQ/L (ref 98–109)
CO2 SERPL-SCNC: 19 MEQ/L (ref 22–32)
CREAT SERPL-MCNC: 1.6 MG/DL (ref 0.6–1.1)
DIFFERENTIAL METHOD BLD: ABNORMAL
EOSINOPHIL # BLD: 0.49 K/UL (ref 0.04–0.36)
EOSINOPHIL NFR BLD: 5.3 %
ERYTHROCYTE [DISTWIDTH] IN BLOOD BY AUTOMATED COUNT: 15.1 % (ref 11.7–14.4)
GFR SERPL CREATININE-BSD FRML MDRD: 31.3 ML/MIN/1.73M*2
GLUCOSE SERPL-MCNC: 90 MG/DL (ref 70–99)
HCT VFR BLDCO AUTO: 36.9 % (ref 35–45)
HGB BLD-MCNC: 11.7 G/DL (ref 11.8–15.7)
IMM GRANULOCYTES # BLD AUTO: 0.03 K/UL (ref 0–0.08)
IMM GRANULOCYTES NFR BLD AUTO: 0.3 %
LYMPHOCYTES # BLD: 2.16 K/UL (ref 1.2–3.5)
LYMPHOCYTES NFR BLD: 23.4 %
MCH RBC QN AUTO: 29.4 PG (ref 28–33.2)
MCHC RBC AUTO-ENTMCNC: 31.7 G/DL (ref 32.2–35.5)
MCV RBC AUTO: 92.7 FL (ref 83–98)
MONOCYTES # BLD: 1.03 K/UL (ref 0.28–0.8)
MONOCYTES NFR BLD: 11.1 %
NEUTROPHILS # BLD: 5.5 K/UL (ref 1.7–7)
NEUTS SEG NFR BLD: 59.5 %
NRBC BLD-RTO: 0 %
PDW BLD AUTO: 9.3 FL (ref 9.4–12.3)
PLATELET # BLD AUTO: 295 K/UL (ref 150–369)
POTASSIUM SERPL-SCNC: 5 MEQ/L (ref 3.6–5.1)
PROT SERPL-MCNC: 7 G/DL (ref 6–8.2)
RBC # BLD AUTO: 3.98 M/UL (ref 3.93–5.22)
SODIUM SERPL-SCNC: 138 MEQ/L (ref 136–144)
WBC # BLD AUTO: 9.25 K/UL (ref 3.8–10.5)

## 2022-06-08 PROCEDURE — 85025 COMPLETE CBC W/AUTO DIFF WBC: CPT

## 2022-06-08 PROCEDURE — 80053 COMPREHEN METABOLIC PANEL: CPT

## 2022-06-08 PROCEDURE — 99024 POSTOP FOLLOW-UP VISIT: CPT | Performed by: OBSTETRICS & GYNECOLOGY

## 2022-06-08 PROCEDURE — 99024 POSTOP FOLLOW-UP VISIT: CPT | Performed by: COLON & RECTAL SURGERY

## 2022-06-08 PROCEDURE — 36415 COLL VENOUS BLD VENIPUNCTURE: CPT

## 2022-06-08 RX ORDER — PANTOPRAZOLE SODIUM 40 MG/1
40 TABLET, DELAYED RELEASE ORAL
COMMUNITY
Start: 2022-05-31

## 2022-06-08 NOTE — PROGRESS NOTES
CC: post-op visit    HPI: Ms. 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     She returns today for post-op visit. She denies vaginal bleeding, denies pelvic/abdominal pain/bloating, denies change in bowel/bladder habits, denies other suspicious symptoms.  She has resumed all normal activities.    Medical History:   Past Medical History:   Diagnosis Date   • Anemia    • Arthritis    • Colovaginal fistula    • COVID-19 vaccine series completed 02/26/2021   • Disease of thyroid gland    • Diverticulitis of colon    • GERD (gastroesophageal reflux disease)    • Hiatal hernia    • History of snoring    • Hypertension    • Hypothyroidism    • Lung cancer (CMS/HCC)    • Lung nodule    • Vaginal cancer (CMS/HCC) 2018    s/p XRT x 7 weeks and chemo weekly x 5 weeks         Surgical History:   Past Surgical History:   Procedure Laterality Date   • CHOLECYSTECTOMY     • COLONOSCOPY     • DILATION AND CURETTAGE OF UTERUS  02/2018   • LUNG SURGERY     • ROTATOR CUFF REPAIR Right    • TONSILLECTOMY     • WISDOM TOOTH EXTRACTION      hx of       Allergies: Adhesive tape-silicones    Medications:   Current Outpatient Medications   Medication Sig Dispense Refill   • atorvastatin (LIPITOR) 40 mg tablet TAKE 1 TABLET BY MOUTH EVERY DAY (Patient taking differently: Take 40 mg by mouth daily.) 90 tablet 1   • benazepril (LOTENSIN) 10 mg tablet Take 10 mg by mouth daily.     • bumetanide (BUMEX) 1 mg tablet Take 1 mg by mouth as needed.       • cholecalciferol, vitamin D3, 1,000 unit (25 mcg) tablet Take 2,000 Units by mouth daily.     • glucosamine-D3-Boswellia serr 1,500-400-100 mg-unit-mg  tablet Take 1 tablet by mouth daily.     • levothyroxine (SYNTHROID) 88 mcg tablet Take 88 mcg by mouth daily.    3   • pantoprazole (PROTONIX) 40 mg EC tablet Take 40 mg by mouth once daily.       No current facility-administered medications for this visit.       Vital Signs:  Visit Vitals  /73 (BP Location: Right upper arm, Patient Position: Sitting)   Pulse 86   Ht 1.524 m (5')   Wt 67.6 kg (149 lb)   SpO2 (!) 80%   BMI 29.10 kg/m²       Physical Exam:  General: well-developed, well-nourished, no apparent distress  GI: abdominal incision well healed  Gynecologic: normal external female genitalia.    Speculum: vaginal cuff visibly intact   Bimanual: vaginal cuff palpably intact    Pathology Results: No evidence of malignancy      Assessment/Plan     Ms. Raegan Young is a 76 y.o. lady who is healing well from surgery. She can resume her usual activities.     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

## 2022-06-08 NOTE — PROGRESS NOTES
HISTORY & PHYSICAL EXAM    HPI: Raegan is  here to be seen today in follow-up.  She has been doing fair, has not been measuring the output from her bag notes the dumps at times.  She has been feeling lousy, nauseated and vomiting even at times.  Her energy level is poor, appetite is poor, and she is not really turning the corner yet.    Past Medical History:   Diagnosis Date   • Anemia    • Arthritis    • Colovaginal fistula    • COVID-19 vaccine series completed 02/26/2021   • Disease of thyroid gland    • Diverticulitis of colon    • GERD (gastroesophageal reflux disease)    • Hiatal hernia    • History of snoring    • Hypertension    • Hypothyroidism    • Lung cancer (CMS/HCC)    • Lung nodule    • Vaginal cancer (CMS/HCC) 2018    s/p XRT x 7 weeks and chemo weekly x 5 weeks       Past Surgical History:   Procedure Laterality Date   • CHOLECYSTECTOMY     • COLONOSCOPY     • DILATION AND CURETTAGE OF UTERUS  02/2018   • LUNG SURGERY     • ROTATOR CUFF REPAIR Right    • TONSILLECTOMY     • WISDOM TOOTH EXTRACTION      hx of       Current Outpatient Medications:   •  atorvastatin (LIPITOR) 40 mg tablet, TAKE 1 TABLET BY MOUTH EVERY DAY (Patient taking differently: Take 40 mg by mouth daily.), Disp: 90 tablet, Rfl: 1  •  benazepril (LOTENSIN) 10 mg tablet, Take 10 mg by mouth daily., Disp: , Rfl:   •  bumetanide (BUMEX) 1 mg tablet, Take 1 mg by mouth as needed.  , Disp: , Rfl:   •  cholecalciferol, vitamin D3, 1,000 unit (25 mcg) tablet, Take 2,000 Units by mouth daily., Disp: , Rfl:   •  glucosamine-D3-Boswellia serr 1,500-400-100 mg-unit-mg tablet, Take 1 tablet by mouth daily., Disp: , Rfl:   •  levothyroxine (SYNTHROID) 88 mcg tablet, Take 88 mcg by mouth daily.  , Disp: , Rfl: 3  •  pantoprazole (PROTONIX) 40 mg EC tablet, Take 40 mg by mouth once daily., Disp: , Rfl:    Allergies   Allergen Reactions   • Adhesive Tape-Silicones      Causes bruising     Social History     Socioeconomic History   • Marital  status:      Spouse name: Royal    • Number of children: 4   • Years of education: Not on file   • Highest education level: Not on file   Occupational History   • Occupation: retired/     Tobacco Use   • Smoking status: Former Smoker     Packs/day: 1.00     Years: 40.00     Pack years: 40.00     Types: Cigarettes     Start date:      Quit date:      Years since quittin.4   • Smokeless tobacco: Never Used   Vaping Use   • Vaping Use: Never used   Substance and Sexual Activity   • Alcohol use: Yes     Comment: occassional    • Drug use: No   • Sexual activity: Never     Comment:    Other Topics Concern   • Not on file   Social History Narrative    Lives with  in a 2 story home     Social Determinants of Health     Financial Resource Strain: Not on file   Food Insecurity: Not on file   Transportation Needs: Not on file   Physical Activity: Not on file   Stress: Not on file   Social Connections: Not on file   Intimate Partner Violence: Not on file   Housing Stability: Not on file      Family History   Problem Relation Age of Onset   • Heart attack Biological Mother    • Hypertension Biological Mother    • Lung cancer Biological Father    • Breast cancer Neg Hx    • Cancer Neg Hx    • Colon cancer Neg Hx    • Ovarian cancer Neg Hx        REVIEW OF SYSTEMS: Unchanged    PHYSICAL EXAM:  GEN: On examination the patient is resting comfortably.  NEURO/PSYCH: Alert and oriented ×3  HEENT: Eyes: Sclera anicteric  CHEST: Equal rise and fall the chest.  No tenderness bilaterally.  SKIN: Warm and moist  NODES: No groin or cervical lymphadenopathy  EXT: No palpable edema of the bilateral lower extremities.  No clubbing.  GI: Abdomen soft, nontender, nondistended.  No palpable liver or spleen edge.  No ventral hernias, mass, or tenderness.  RECTAL: Perianal skin and demonstrates mildly irritated skin and symmetrical fashion around the anus.  Digital exam reveals normal sphincter tone, the  anastomosis is palpable just above the anorectal ring and appears to be healed nicely at least digitally.      ASSESSMENT/PLAN:     Colovaginal fistula  Overall the patient is recovering as I would expect.  She does have significant inflammation of the perianal skin and asked her to apply Desitin to this.  She appears to be on the dehydrated side and I am going to order an CBC and BMP to evaluate her electrolytes, kidney function, and blood counts.  If she is severely dehydrated on lab work or has a significant KARL, she may need to be admitted to the hospital for IV hydration otherwise been revealed to consider oral hydration plan with close follow-up on labs.  I think hydration is going to help her feel a lot better.  I am going to also order a Gastrografin enema through the distal end of the stoma that should be obtained prior to her coming back to see me in the office within the next 4 to 6weeks in order to evaluate the anastomosis.  This should just be done through the distal limb of the stoma and that should not be performed transanally as she has a colosuper anal anastomosis and the balloon would potentially disrupt the anastomosis.

## 2022-06-08 NOTE — LETTER
June 8, 2022     Javon Valadez DO  [address]  [facility] [address]    Patient: Raegan Young  YOB: 1945  Date of Visit: 6/8/2022      Dear Dr. Valadez:    Thank you for referring Raegan Young to me for evaluation. Below are my notes for this consultation.    If you have questions, please do not hesitate to call me. I look forward to following your patient along with you.         Sincerely,        Geovani Mandel MD        CC: MD Akbar Mueller MD David O Holtz, MD Xiaomang Stickles, MD Patrick Ross, MD PhD  Tequila, Geovani CISNEROS MD  6/8/2022  8:01 PM  Signed  HISTORY & PHYSICAL EXAM    HPI: Raegan is  here to be seen today in follow-up.  She has been doing fair, has not been measuring the output from her bag notes the dumps at times.  She has been feeling lousy, nauseated and vomiting even at times.  Her energy level is poor, appetite is poor, and she is not really turning the corner yet.    Past Medical History:   Diagnosis Date   • Anemia    • Arthritis    • Colovaginal fistula    • COVID-19 vaccine series completed 02/26/2021   • Disease of thyroid gland    • Diverticulitis of colon    • GERD (gastroesophageal reflux disease)    • Hiatal hernia    • History of snoring    • Hypertension    • Hypothyroidism    • Lung cancer (CMS/HCC)    • Lung nodule    • Vaginal cancer (CMS/HCC) 2018    s/p XRT x 7 weeks and chemo weekly x 5 weeks       Past Surgical History:   Procedure Laterality Date   • CHOLECYSTECTOMY     • COLONOSCOPY     • DILATION AND CURETTAGE OF UTERUS  02/2018   • LUNG SURGERY     • ROTATOR CUFF REPAIR Right    • TONSILLECTOMY     • WISDOM TOOTH EXTRACTION      hx of       Current Outpatient Medications:   •  atorvastatin (LIPITOR) 40 mg tablet, TAKE 1 TABLET BY MOUTH EVERY DAY (Patient taking differently: Take 40 mg by mouth daily.), Disp: 90 tablet, Rfl: 1  •  benazepril (LOTENSIN) 10 mg tablet, Take 10 mg by mouth daily.,  Disp: , Rfl:   •  bumetanide (BUMEX) 1 mg tablet, Take 1 mg by mouth as needed.  , Disp: , Rfl:   •  cholecalciferol, vitamin D3, 1,000 unit (25 mcg) tablet, Take 2,000 Units by mouth daily., Disp: , Rfl:   •  glucosamine-D3-Boswellia serr 1,500-400-100 mg-unit-mg tablet, Take 1 tablet by mouth daily., Disp: , Rfl:   •  levothyroxine (SYNTHROID) 88 mcg tablet, Take 88 mcg by mouth daily.  , Disp: , Rfl: 3  •  pantoprazole (PROTONIX) 40 mg EC tablet, Take 40 mg by mouth once daily., Disp: , Rfl:    Allergies   Allergen Reactions   • Adhesive Tape-Silicones      Causes bruising     Social History     Socioeconomic History   • Marital status:      Spouse name: Royal    • Number of children: 4   • Years of education: Not on file   • Highest education level: Not on file   Occupational History   • Occupation: retired/     Tobacco Use   • Smoking status: Former Smoker     Packs/day: 1.00     Years: 40.00     Pack years: 40.00     Types: Cigarettes     Start date:      Quit date:      Years since quittin.4   • Smokeless tobacco: Never Used   Vaping Use   • Vaping Use: Never used   Substance and Sexual Activity   • Alcohol use: Yes     Comment: occassional    • Drug use: No   • Sexual activity: Never     Comment:    Other Topics Concern   • Not on file   Social History Narrative    Lives with  in a 2 story home     Social Determinants of Health     Financial Resource Strain: Not on file   Food Insecurity: Not on file   Transportation Needs: Not on file   Physical Activity: Not on file   Stress: Not on file   Social Connections: Not on file   Intimate Partner Violence: Not on file   Housing Stability: Not on file      Family History   Problem Relation Age of Onset   • Heart attack Biological Mother    • Hypertension Biological Mother    • Lung cancer Biological Father    • Breast cancer Neg Hx    • Cancer Neg Hx    • Colon cancer Neg Hx    • Ovarian cancer Neg Hx        REVIEW OF  SYSTEMS: Unchanged    PHYSICAL EXAM:  GEN: On examination the patient is resting comfortably.  NEURO/PSYCH: Alert and oriented ×3  HEENT: Eyes: Sclera anicteric  CHEST: Equal rise and fall the chest.  No tenderness bilaterally.  SKIN: Warm and moist  NODES: No groin or cervical lymphadenopathy  EXT: No palpable edema of the bilateral lower extremities.  No clubbing.  GI: Abdomen soft, nontender, nondistended.  No palpable liver or spleen edge.  No ventral hernias, mass, or tenderness.  RECTAL: Perianal skin and demonstrates mildly irritated skin and symmetrical fashion around the anus.  Digital exam reveals normal sphincter tone, the anastomosis is palpable just above the anorectal ring and appears to be healed nicely at least digitally.      ASSESSMENT/PLAN:     Colovaginal fistula  Overall the patient is recovering as I would expect.  She does have significant inflammation of the perianal skin and asked her to apply Desitin to this.  She appears to be on the dehydrated side and I am going to order an CBC and BMP to evaluate her electrolytes, kidney function, and blood counts.  If she is severely dehydrated on lab work or has a significant KARL, she may need to be admitted to the hospital for IV hydration otherwise been revealed to consider oral hydration plan with close follow-up on labs.  I think hydration is going to help her feel a lot better.  I am going to also order a Gastrografin enema through the distal end of the stoma that should be obtained prior to her coming back to see me in the office within the next 4 to 6weeks in order to evaluate the anastomosis.  This should just be done through the distal limb of the stoma and that should not be performed transanally as she has a colosuper anal anastomosis and the balloon would potentially disrupt the anastomosis.

## 2022-06-08 NOTE — TELEPHONE ENCOUNTER
I spoke to the patient regarding her lab results.  It appears that she has mild dehydration and some KARL related to this.  Creatinine is 1.6 today.  She is going to aggressively hydrate with Gatorade/Pedialyte, and water.  We will see how she feels tomorrow and if things are not better we will bring her to the hospital for IV fluid hydration.

## 2022-06-08 NOTE — LETTER
June 20, 2022    Patient: Raegan Young   YOB: 1945   Date of Visit: 6/8/2022       Dear Dr. Valadez:    The patient is seen back at the [facility] today in follow up with regard to her   1. History of cancer of vagina    2. Postop check    . Below is my assessment and plan of care.          Ms. Raegan Young is a 76 y.o. lady who is healing well from surgery. She can resume her usual activities.     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

## 2022-06-09 NOTE — ASSESSMENT & PLAN NOTE
Overall the patient is recovering as I would expect.  She does have significant inflammation of the perianal skin and asked her to apply Desitin to this.  She appears to be on the dehydrated side and I am going to order an CBC and BMP to evaluate her electrolytes, kidney function, and blood counts.  If she is severely dehydrated on lab work or has a significant KARL, she may need to be admitted to the hospital for IV hydration otherwise been revealed to consider oral hydration plan with close follow-up on labs.  I think hydration is going to help her feel a lot better.  I am going to also order a Gastrografin enema through the distal end of the stoma that should be obtained prior to her coming back to see me in the office within the next 4 to 6weeks in order to evaluate the anastomosis.  This should just be done through the distal limb of the stoma and that should not be performed transanally as she has a colosuper anal anastomosis and the balloon would potentially disrupt the anastomosis.

## 2022-06-20 ENCOUNTER — TELEPHONE (OUTPATIENT)
Dept: GYNECOLOGIC ONCOLOGY | Facility: CLINIC | Age: 77
End: 2022-06-20
Payer: MEDICARE

## 2022-06-20 RX ORDER — FLUCONAZOLE 150 MG/1
150 TABLET ORAL ONCE
Qty: 1 TABLET | Refills: 0 | Status: SHIPPED | OUTPATIENT
Start: 2022-06-20 | End: 2022-06-20

## 2022-06-20 NOTE — TELEPHONE ENCOUNTER
Raegan called to report some vaginal discharge with pruritus since 6/8, Denies any dysuria, burning, fevers, abdominal pain, bloating. Agreed to send over prescription for diflucan. advised patient to call if symptoms worsen or do not resolve after 7 full days. If symptoms do not resolve she was advised to schedule an appointment

## 2022-06-23 ENCOUNTER — TELEPHONE (OUTPATIENT)
Dept: GYNECOLOGIC ONCOLOGY | Facility: CLINIC | Age: 77
End: 2022-06-23
Payer: MEDICARE

## 2022-06-23 NOTE — TELEPHONE ENCOUNTER
Pt says Edda prescribed medication for her problem on Monday and was told to come in for a swab if it wasn't working. It's not working and she would like to come in.  Please let me know if she can go on schedule.

## 2022-06-24 ENCOUNTER — OFFICE VISIT (OUTPATIENT)
Dept: GYNECOLOGIC ONCOLOGY | Facility: CLINIC | Age: 77
End: 2022-06-24
Attending: PHYSICIAN ASSISTANT
Payer: MEDICARE

## 2022-06-24 VITALS
OXYGEN SATURATION: 99 % | HEART RATE: 110 BPM | DIASTOLIC BLOOD PRESSURE: 78 MMHG | WEIGHT: 148 LBS | BODY MASS INDEX: 29.06 KG/M2 | HEIGHT: 60 IN | SYSTOLIC BLOOD PRESSURE: 143 MMHG

## 2022-06-24 DIAGNOSIS — N90.89 VULVAR IRRITATION: ICD-10-CM

## 2022-06-24 DIAGNOSIS — N76.0 ACUTE VAGINITIS: ICD-10-CM

## 2022-06-24 DIAGNOSIS — R30.0 DYSURIA: Primary | ICD-10-CM

## 2022-06-24 DIAGNOSIS — Z85.44 HISTORY OF CANCER OF VAGINA: ICD-10-CM

## 2022-06-24 PROCEDURE — 87210 SMEAR WET MOUNT SALINE/INK: CPT | Mod: QW | Performed by: OBSTETRICS & GYNECOLOGY

## 2022-06-24 PROCEDURE — 99024 POSTOP FOLLOW-UP VISIT: CPT | Performed by: PHYSICIAN ASSISTANT

## 2022-06-24 RX ORDER — CLOBETASOL PROPIONATE 0.5 MG/G
OINTMENT TOPICAL 3 TIMES WEEKLY
Qty: 60 G | Refills: 11 | Status: SHIPPED | OUTPATIENT
Start: 2022-06-24 | End: 2022-12-14

## 2022-06-24 RX ORDER — FLUCONAZOLE 150 MG/1
150 TABLET ORAL ONCE
Qty: 2 TABLET | Refills: 0 | Status: SHIPPED | OUTPATIENT
Start: 2022-06-24 | End: 2022-06-24

## 2022-06-24 NOTE — ASSESSMENT & PLAN NOTE
Mild-moderate vulvar erythema noted on exam today. Rx sent for clobetasol with detailed instructions printed for patient. She was advised to call and follow-up sooner if symptoms persist.

## 2022-06-24 NOTE — PATIENT INSTRUCTIONS
Clobetasol use for Lichen Sclerosis:    Apply steroid cream to clean, dry area.  Only use enough to cover with a thin layer.    Apply twice daily for 4 weeks, then  Apply once daily for 4 weeks, then  Apply 2-3 times a week for 2 weeks  Apply once weekly as needed to control itching/burning.    If the itching or burning returns, restart twice daily for 2 weeks.    Please call if you experience any pain, bleeding, or notice lumps while on the steroid.

## 2022-06-24 NOTE — ASSESSMENT & PLAN NOTE
UA with Culture collected. Will call with results. Advised patient to call or go to urgent care/ED if symptoms worsen over the weekend with fevers, abd/pelvic pain, vomiting, flank pain, nausea/vomiting.

## 2022-06-24 NOTE — PROGRESS NOTES
CC: post-op visit    HPI: Ms. 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     She returns today for urgent visit. Patient called the office on 6/20/22 with c/o vaginal discharge + vaginal pruritus. She thought she had a Yeast infection. She took a dose of diflucan and waited 3 days with no improvement in symptoms. She returns today with some dysuria, continuation of vaginal discharge + vaginal pruritus. She denies vaginal bleeding, denies pelvic/abdominal pain/bloating, denies change in bowel/bladder habits, denies other suspicious symptoms.She has resumed all normal activities.    Medical History:   Past Medical History:   Diagnosis Date   • Anemia    • Arthritis    • Colovaginal fistula    • COVID-19 vaccine series completed 02/26/2021   • Disease of thyroid gland    • Diverticulitis of colon    • GERD (gastroesophageal reflux disease)    • Hiatal hernia    • History of snoring    • Hypertension    • Hypothyroidism    • Lung cancer (CMS/HCC)    • Lung nodule    • Vaginal cancer (CMS/HCC) 2018    s/p XRT x 7 weeks and chemo weekly x 5 weeks         Surgical History:   Past Surgical History:   Procedure Laterality Date   • CHOLECYSTECTOMY     • COLONOSCOPY     • DILATION AND CURETTAGE OF UTERUS  02/2018   • LUNG SURGERY     • ROTATOR CUFF REPAIR Right    • TONSILLECTOMY     • WISDOM TOOTH EXTRACTION      hx of       Allergies: Adhesive tape-silicones    Medications:   Current Outpatient Medications   Medication Sig Dispense Refill   • clobetasoL (TEMOVATE) 0.05 % ointment Apply topically 3 (three) times a week (Mon, Wed, Fri) for 14 days. Apply to vulva 3 times per week 60 g  11   • fluconazole (DIFLUCAN) 150 mg tablet Take 1 tablet (150 mg total) by mouth once for 1 dose. Repeat in 7 days if symptoms persist. 2 tablet 0   • atorvastatin (LIPITOR) 40 mg tablet TAKE 1 TABLET BY MOUTH EVERY DAY (Patient taking differently: Take 40 mg by mouth daily.) 90 tablet 1   • benazepril (LOTENSIN) 10 mg tablet Take 10 mg by mouth daily.     • bumetanide (BUMEX) 1 mg tablet Take 1 mg by mouth as needed.       • cholecalciferol, vitamin D3, 1,000 unit (25 mcg) tablet Take 2,000 Units by mouth daily.     • glucosamine-D3-Boswellia serr 1,500-400-100 mg-unit-mg tablet Take 1 tablet by mouth daily.     • levothyroxine (SYNTHROID) 88 mcg tablet Take 88 mcg by mouth daily.    3   • pantoprazole (PROTONIX) 40 mg EC tablet Take 40 mg by mouth once daily.       No current facility-administered medications for this visit.       Vital Signs:  Visit Vitals  BP (!) 143/78 (BP Location: Right upper arm, Patient Position: Sitting)   Pulse (!) 110   Ht 1.524 m (5')   Wt 67.1 kg (148 lb)   SpO2 99%   BMI 28.90 kg/m²       Physical Exam:  General: well-developed, well-nourished, no apparent distress  GI: abdominal incision well healed  Gynecologic: erythema noted bilateral vulva, no suspicious lesions, otherwise normal external female genitalia.    Speculum: vaginal cuff visibly intact, shortened vagina, no signs of discharge, collected vaginal swab (neg for wbc, clue cells, fungal organisms)       Assessment/Plan     Ms. Raegan Young is a 76 y.o. lady who is healing well from surgery. She can resume her usual activities.     Problem List Items Addressed This Visit        Unprioritized    Dysuria - Primary    Current Assessment & Plan     UA with Culture collected. Will call with results. Advised patient to call or go to urgent care/ED if symptoms worsen over the weekend with fevers, abd/pelvic pain, vomiting, flank pain, nausea/vomiting.           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           Vaginitis    Current Assessment & Plan     Acute vaginitis; rx sent for diflucan. Advised patient to call if symptoms do not resolve/worsen.           Vulvar irritation    Current Assessment & Plan     Mild-moderate vulvar erythema noted on exam today. Rx sent for clobetasol with detailed instructions printed for patient. She was advised to call and follow-up sooner if symptoms persist.                   Edda Cummings PA-C  ;I spent time on this date of service performing the following activities: obtaining history, performing examination, entering orders, documenting, preparing for visit, obtaining / reviewing records, providing counseling and education, independently reviewing study/studies, communicating results and coordinating care.

## 2022-06-25 LAB
APPEARANCE UR: ABNORMAL
BACTERIA #/AREA URNS HPF: ABNORMAL /[HPF]
BILIRUB UR QL STRIP: NEGATIVE
CASTS URNS QL MICRO: ABNORMAL /LPF
COLOR UR: YELLOW
EPI CELLS #/AREA URNS HPF: ABNORMAL /HPF (ref 0–10)
GLUCOSE UR QL STRIP: NEGATIVE
HGB UR QL STRIP: ABNORMAL
KETONES UR QL STRIP: NEGATIVE
LAB CORP URINALYSIS REFLEX: ABNORMAL
LEUKOCYTE ESTERASE UR QL STRIP: ABNORMAL
MICRO URNS: ABNORMAL
NITRITE UR QL STRIP: NEGATIVE
PH UR STRIP: 5.5 [PH] (ref 5–7.5)
PROT UR QL STRIP: ABNORMAL
RBC #/AREA URNS HPF: ABNORMAL /HPF (ref 0–2)
SP GR UR STRIP: 1.03 (ref 1–1.03)
UROBILINOGEN UR STRIP-MCNC: 0.2 MG/DL (ref 0.2–1)
WBC #/AREA URNS HPF: >30 /HPF (ref 0–5)

## 2022-06-27 ENCOUNTER — TELEPHONE (OUTPATIENT)
Dept: GYNECOLOGIC ONCOLOGY | Facility: CLINIC | Age: 77
End: 2022-06-27
Payer: MEDICARE

## 2022-06-27 RX ORDER — SULFAMETHOXAZOLE AND TRIMETHOPRIM 800; 160 MG/1; MG/1
1 TABLET ORAL 2 TIMES DAILY
Qty: 14 TABLET | Refills: 0 | Status: SHIPPED | OUTPATIENT
Start: 2022-06-27 | End: 2022-07-04

## 2022-06-27 NOTE — TELEPHONE ENCOUNTER
----- Message from Raegan Young sent at 6/27/2022  1:38 PM EDT -----  Regarding: Question regarding Microscopic Examination  I'm seeing these results of test but I'm in extreme burning pain ,keeps me up at night . The pressure when urinating is so bad and burning after . Taking 2 Tylenol every 6 hours. With I haven't done since my operation 6 weeks ago. Please help!

## 2022-06-27 NOTE — TELEPHONE ENCOUNTER
Called patient back with results. Discussed culture is pending. Patient reports dysuria. Agreed to send broad spectrum antibiotic; due to renal function sent rx for bactrim DS. Will call and change abx to appropriate one once culture returns.    Edda Cummings PA-C

## 2022-06-28 ENCOUNTER — TELEPHONE (OUTPATIENT)
Dept: GYNECOLOGIC ONCOLOGY | Facility: CLINIC | Age: 77
End: 2022-06-28
Payer: MEDICARE

## 2022-06-28 NOTE — TELEPHONE ENCOUNTER
Called Raegan to check in she reports feeling 65 percent better since starting antibiotic. Urine culture still pending. Will follow-up once culture returns.

## 2022-06-28 NOTE — TELEPHONE ENCOUNTER
----- Message from ASAD Cordero sent at 6/28/2022 10:09 AM EDT -----  Cristóbal Narayanan/Madeleine do one of you mind contacting the lab and seeing what the status of urine culture is ?

## 2022-06-29 LAB
BACTERIA UR CULT: ABNORMAL
OTHER ANTIBIOTIC SUSC ISLT: ABNORMAL

## 2022-06-29 NOTE — TELEPHONE ENCOUNTER
Called with culture results; discussed with tika that she should continue taking current abx and call if symptoms do not resolve.

## 2022-07-03 ENCOUNTER — TELEPHONE (OUTPATIENT)
Dept: GYNECOLOGIC ONCOLOGY | Facility: CLINIC | Age: 77
End: 2022-07-03
Payer: MEDICARE

## 2022-07-03 NOTE — TELEPHONE ENCOUNTER
Patient called answering service reporting diarrhea/high ileostomy output after taking Bactrum for UTI. Has taken 5 days so far. Also had nausea since last night. Took Imodium once last night, stool thickening up now. Advised to stop antibiotics as 5 days is sufficient for uncomplicated UTI. Ok to continue Imodium as needed to keep ileostomy output under 1L a day, max 8 tablets per day.

## 2022-07-06 ENCOUNTER — HOSPITAL ENCOUNTER (OUTPATIENT)
Dept: RADIOLOGY | Facility: HOSPITAL | Age: 77
Discharge: HOME | DRG: 640 | End: 2022-07-06
Attending: COLON & RECTAL SURGERY
Payer: MEDICARE

## 2022-07-06 ENCOUNTER — OFFICE VISIT (OUTPATIENT)
Dept: COLORECTAL SURGERY | Facility: CLINIC | Age: 77
End: 2022-07-06
Payer: MEDICARE

## 2022-07-06 ENCOUNTER — HOSPITAL ENCOUNTER (INPATIENT)
Facility: HOSPITAL | Age: 77
LOS: 2 days | Discharge: HOME | DRG: 640 | End: 2022-07-08
Attending: COLON & RECTAL SURGERY | Admitting: COLON & RECTAL SURGERY
Payer: MEDICARE

## 2022-07-06 ENCOUNTER — PREP FOR CASE (OUTPATIENT)
Dept: COLORECTAL SURGERY | Facility: CLINIC | Age: 77
End: 2022-07-06

## 2022-07-06 VITALS — BODY MASS INDEX: 24.75 KG/M2 | HEIGHT: 64 IN | WEIGHT: 145 LBS

## 2022-07-06 DIAGNOSIS — K57.21 DIVERTICULITIS OF LARGE INTESTINE WITH PERFORATION AND ABSCESS WITH BLEEDING: ICD-10-CM

## 2022-07-06 DIAGNOSIS — N82.4 COLOVAGINAL FISTULA: Primary | ICD-10-CM

## 2022-07-06 DIAGNOSIS — Z01.818 PREOP EXAMINATION: ICD-10-CM

## 2022-07-06 DIAGNOSIS — K57.32 DIVERTICULITIS OF COLON: Primary | ICD-10-CM

## 2022-07-06 PROBLEM — R10.9 ABDOMINAL PAIN: Status: ACTIVE | Noted: 2022-07-06

## 2022-07-06 LAB
ALBUMIN SERPL-MCNC: 4.3 G/DL (ref 3.4–5)
ALP SERPL-CCNC: 80 IU/L (ref 35–126)
ALT SERPL-CCNC: 17 IU/L (ref 11–54)
ANION GAP SERPL CALC-SCNC: 9 MEQ/L (ref 3–15)
AST SERPL-CCNC: 20 IU/L (ref 15–41)
BACTERIA URNS QL MICRO: ABNORMAL /HPF
BILIRUB SERPL-MCNC: 0.6 MG/DL (ref 0.3–1.2)
BILIRUB UR QL STRIP.AUTO: NEGATIVE MG/DL
BUN SERPL-MCNC: 35 MG/DL (ref 8–20)
CALCIUM SERPL-MCNC: 9.8 MG/DL (ref 8.9–10.3)
CHLORIDE SERPL-SCNC: 105 MEQ/L (ref 98–109)
CLARITY UR REFRACT.AUTO: CLEAR
CO2 SERPL-SCNC: 18 MEQ/L (ref 22–32)
COLOR UR AUTO: COLORLESS
CREAT SERPL-MCNC: 2.2 MG/DL (ref 0.6–1.1)
ERYTHROCYTE [DISTWIDTH] IN BLOOD BY AUTOMATED COUNT: 14.9 % (ref 11.7–14.4)
GFR SERPL CREATININE-BSD FRML MDRD: 21.6 ML/MIN/1.73M*2
GLUCOSE SERPL-MCNC: 97 MG/DL (ref 70–99)
GLUCOSE UR STRIP.AUTO-MCNC: NEGATIVE MG/DL
HCT VFR BLDCO AUTO: 36.5 % (ref 35–45)
HGB BLD-MCNC: 12.1 G/DL (ref 11.8–15.7)
HGB UR QL STRIP.AUTO: NEGATIVE
HYALINE CASTS #/AREA URNS LPF: ABNORMAL /LPF
KETONES UR STRIP.AUTO-MCNC: NEGATIVE MG/DL
LEUKOCYTE ESTERASE UR QL STRIP.AUTO: 3
MAGNESIUM SERPL-MCNC: 1.5 MG/DL (ref 1.8–2.5)
MCH RBC QN AUTO: 29.7 PG (ref 28–33.2)
MCHC RBC AUTO-ENTMCNC: 33.2 G/DL (ref 32.2–35.5)
MCV RBC AUTO: 89.7 FL (ref 83–98)
NITRITE UR QL STRIP.AUTO: NEGATIVE
PDW BLD AUTO: 8.5 FL (ref 9.4–12.3)
PH UR STRIP.AUTO: 5.5 [PH]
PHOSPHATE SERPL-MCNC: 4 MG/DL (ref 2.4–4.7)
PLATELET # BLD AUTO: 323 K/UL (ref 150–369)
POTASSIUM SERPL-SCNC: 5.3 MEQ/L (ref 3.6–5.1)
PROT SERPL-MCNC: 7.9 G/DL (ref 6–8.2)
PROT UR QL STRIP.AUTO: NEGATIVE
RBC # BLD AUTO: 4.07 M/UL (ref 3.93–5.22)
RBC #/AREA URNS HPF: ABNORMAL /HPF
SODIUM SERPL-SCNC: 132 MEQ/L (ref 136–144)
SP GR UR REFRACT.AUTO: 1.01
SQUAMOUS URNS QL MICRO: ABNORMAL /HPF
UROBILINOGEN UR STRIP-ACNC: 0.2 EU/DL
WBC # BLD AUTO: 8.77 K/UL (ref 3.8–10.5)
WBC #/AREA URNS HPF: ABNORMAL /HPF

## 2022-07-06 PROCEDURE — 80053 COMPREHEN METABOLIC PANEL: CPT

## 2022-07-06 PROCEDURE — 74270 X-RAY XM COLON 1CNTRST STD: CPT

## 2022-07-06 PROCEDURE — 83735 ASSAY OF MAGNESIUM: CPT

## 2022-07-06 PROCEDURE — 99222 1ST HOSP IP/OBS MODERATE 55: CPT | Mod: 24 | Performed by: COLON & RECTAL SURGERY

## 2022-07-06 PROCEDURE — 85027 COMPLETE CBC AUTOMATED: CPT

## 2022-07-06 PROCEDURE — 93005 ELECTROCARDIOGRAM TRACING: CPT | Performed by: STUDENT IN AN ORGANIZED HEALTH CARE EDUCATION/TRAINING PROGRAM

## 2022-07-06 PROCEDURE — 87086 URINE CULTURE/COLONY COUNT: CPT | Performed by: STUDENT IN AN ORGANIZED HEALTH CARE EDUCATION/TRAINING PROGRAM

## 2022-07-06 PROCEDURE — 99999 PR OFFICE/OUTPT VISIT,PROCEDURE ONLY: CPT | Performed by: COLON & RECTAL SURGERY

## 2022-07-06 PROCEDURE — 21400000 HC ROOM AND CARE CCU/INTERMEDIATE

## 2022-07-06 PROCEDURE — 63600000 HC DRUGS/DETAIL CODE

## 2022-07-06 PROCEDURE — 200200 PR NO CHARGE: Performed by: COLON & RECTAL SURGERY

## 2022-07-06 PROCEDURE — 81001 URINALYSIS AUTO W/SCOPE: CPT

## 2022-07-06 PROCEDURE — 84100 ASSAY OF PHOSPHORUS: CPT

## 2022-07-06 PROCEDURE — 25800000 HC PHARMACY IV SOLUTIONS: Performed by: STUDENT IN AN ORGANIZED HEALTH CARE EDUCATION/TRAINING PROGRAM

## 2022-07-06 PROCEDURE — 36415 COLL VENOUS BLD VENIPUNCTURE: CPT

## 2022-07-06 RX ORDER — DEXTROSE 40 %
15-30 GEL (GRAM) ORAL AS NEEDED
Status: DISCONTINUED | OUTPATIENT
Start: 2022-07-06 | End: 2022-07-08 | Stop reason: HOSPADM

## 2022-07-06 RX ORDER — DEXTROSE 50 % IN WATER (D50W) INTRAVENOUS SYRINGE
25 AS NEEDED
Status: DISCONTINUED | OUTPATIENT
Start: 2022-07-06 | End: 2022-07-08 | Stop reason: HOSPADM

## 2022-07-06 RX ORDER — SODIUM CHLORIDE, SODIUM LACTATE, POTASSIUM CHLORIDE, CALCIUM CHLORIDE 600; 310; 30; 20 MG/100ML; MG/100ML; MG/100ML; MG/100ML
INJECTION, SOLUTION INTRAVENOUS CONTINUOUS
Status: DISCONTINUED | OUTPATIENT
Start: 2022-07-06 | End: 2022-07-06

## 2022-07-06 RX ORDER — ATORVASTATIN CALCIUM 40 MG/1
40 TABLET, FILM COATED ORAL
Status: DISCONTINUED | OUTPATIENT
Start: 2022-07-07 | End: 2022-07-08 | Stop reason: HOSPADM

## 2022-07-06 RX ORDER — PANTOPRAZOLE SODIUM 40 MG/1
40 TABLET, DELAYED RELEASE ORAL
Status: DISCONTINUED | OUTPATIENT
Start: 2022-07-07 | End: 2022-07-08 | Stop reason: HOSPADM

## 2022-07-06 RX ORDER — LEVOTHYROXINE SODIUM 88 UG/1
88 TABLET ORAL
Status: DISCONTINUED | OUTPATIENT
Start: 2022-07-07 | End: 2022-07-08 | Stop reason: HOSPADM

## 2022-07-06 RX ORDER — SULFAMETHOXAZOLE AND TRIMETHOPRIM 800; 160 MG/1; MG/1
1 TABLET ORAL EVERY 12 HOURS
Status: DISCONTINUED | OUTPATIENT
Start: 2022-07-07 | End: 2022-07-06

## 2022-07-06 RX ORDER — PANTOPRAZOLE SODIUM 40 MG/10ML
40 INJECTION, POWDER, LYOPHILIZED, FOR SOLUTION INTRAVENOUS EVERY 24 HOURS
Status: DISCONTINUED | OUTPATIENT
Start: 2022-07-06 | End: 2022-07-06

## 2022-07-06 RX ORDER — DIATRIZOATE MEGLUMINE AND DIATRIZOATE SODIUM 660; 100 MG/ML; MG/ML
360 SOLUTION ORAL; RECTAL
Status: DISCONTINUED | OUTPATIENT
Start: 2022-07-06 | End: 2022-07-07 | Stop reason: HOSPADM

## 2022-07-06 RX ORDER — IBUPROFEN 200 MG
16-32 TABLET ORAL AS NEEDED
Status: DISCONTINUED | OUTPATIENT
Start: 2022-07-06 | End: 2022-07-08 | Stop reason: HOSPADM

## 2022-07-06 RX ORDER — CALCIUM GLUCONATE 20 MG/ML
1 INJECTION, SOLUTION INTRAVENOUS ONCE
Status: COMPLETED | OUTPATIENT
Start: 2022-07-07 | End: 2022-07-07

## 2022-07-06 RX ORDER — SULFAMETHOXAZOLE AND TRIMETHOPRIM 400; 80 MG/1; MG/1
1 TABLET ORAL EVERY 12 HOURS
Status: DISCONTINUED | OUTPATIENT
Start: 2022-07-07 | End: 2022-07-07

## 2022-07-06 RX ORDER — SODIUM CHLORIDE 9 MG/ML
INJECTION, SOLUTION INTRAVENOUS CONTINUOUS
Status: DISCONTINUED | OUTPATIENT
Start: 2022-07-06 | End: 2022-07-08 | Stop reason: HOSPADM

## 2022-07-06 RX ADMIN — SODIUM CHLORIDE, POTASSIUM CHLORIDE, SODIUM LACTATE AND CALCIUM CHLORIDE: 600; 310; 30; 20 INJECTION, SOLUTION INTRAVENOUS at 19:00

## 2022-07-06 RX ADMIN — SODIUM CHLORIDE: 9 INJECTION, SOLUTION INTRAVENOUS at 22:39

## 2022-07-06 ASSESSMENT — PATIENT HEALTH QUESTIONNAIRE - PHQ9: SUM OF ALL RESPONSES TO PHQ9 QUESTIONS 1 & 2: 0

## 2022-07-06 NOTE — H&P
"General Surgery History and Physical    Diagnosis: Abdominal pain [R10.9].    HPI     Patient is a 76 y.o. female who presents with lethargy, fatigue, and feeling unwell for a week.    She was seen in the office and came to [facility] as directed for dehydration. She reports she was diagnosed with a UTI roughly a week ago and was started on Batrim.  Since starting the Bactrim, she has felt increasingly nauseous, sometimes dry heaving, and also having large amounts of diarrhea through her ostomy.  He reports that she has been feeling very \"lousy \"since the beginning of June, and this past week she has been even worse.  She has no appetite, is not drinking a lot of fluids.  Her daughter reports she has noticed a significant decrease in her energy level.  The course of Bactrim earlier this week.     On admission, she is resting comfortably but continues to endorse feeling lousy, especially since beginning Bactrim for a UTI a week ago. Endorses profuse watery stool in ostomy bag and that [place] and rehydrate. Occasionally takes imodium with reported improvement. Output has been stable the last few days. She has not been keeping record of her daily ileostomy output at home.     She is being admitted for IV rehydration and electrolyte correction. She is scheduled for ostomy reversal in August.     Medical History:   Past Medical History:   Diagnosis Date   • Anemia    • Arthritis    • Colovaginal fistula    • COVID-19 vaccine series completed 02/26/2021   • Disease of thyroid gland    • Diverticulitis of colon    • GERD (gastroesophageal reflux disease)    • Hiatal hernia    • History of snoring    • Hypertension    • Hypothyroidism    • Lung cancer (CMS/HCC)    • Lung nodule    • Vaginal cancer (CMS/HCC) 2018    s/p XRT x 7 weeks and chemo weekly x 5 weeks         Surgical History:   Past Surgical History:   Procedure Laterality Date   • CHOLECYSTECTOMY     • " COLONOSCOPY     • DILATION AND CURETTAGE OF UTERUS  2018   • LUNG SURGERY     • ROTATOR CUFF REPAIR Right    • TONSILLECTOMY     • WISDOM TOOTH EXTRACTION      hx of       Social History:   Social History     Socioeconomic History   • Marital status:      Spouse name: Royal    • Number of children: 4   Occupational History   • Occupation: retired/     Tobacco Use   • Smoking status: Former Smoker     Packs/day: 1.00     Years: 40.00     Pack years: 40.00     Types: Cigarettes     Start date:      Quit date:      Years since quittin.5   • Smokeless tobacco: Never Used   Vaping Use   • Vaping Use: Never used   Substance and Sexual Activity   • Alcohol use: Yes     Comment: occassional    • Drug use: No   • Sexual activity: Never     Comment:    Social History Narrative    Lives with  in a 2 story home       Family History:   Family History   Problem Relation Age of Onset   • Heart attack Biological Mother    • Hypertension Biological Mother    • Endometrial cancer Biological Father    • Lung cancer Biological Father    • Breast cancer Neg Hx    • Cancer Neg Hx    • Colon cancer Neg Hx    • Ovarian cancer Neg Hx        Allergies: Adhesive tape-silicones    Home Medications:    Current Medications:  •  atorvastatin  •  benazepriL  •  bumetanide  •  cholecalciferol (vitamin D3)  •  clobetasoL  •  glucosamine-D3-Boswellia serr  •  levothyroxine  •  pantoprazole  •  diatrizoate katie-diatrizoat sod, 360 mL, rectal, Once in imaging    Review of Systems  All other systems reviewed and negative except as noted in the HPI.    Objective     Vital Signs for the last 24 hours:     Physicial Exam  GEN: On examination the patient is resting comfortably.  NEURO/PSYCH: Alert and oriented ×3  HEENT: Eyes: Sclera anicteric  CHEST: Equal rise and fall the chest.  No tenderness bilaterally.  SKIN: Warm and moist  NODES: No groin or cervical lymphadenopathy  EXT: No palpable edema of the  bilateral lower extremities.  No clubbing.  GI: Abdomen soft, nontender, nondistended.  No palpable liver or spleen edge.  No ventral hernias, mass, or tenderness.    Labs  Labs are pending.    Imaging  Not applicable    Assessment/Plan    75 y/o female admitted for dehydration secondary to high output from diverting loop ileostomy. Case called and discussed with Dr. Mandel.    -strict I/Os  -multimodal pain control  -clear liquid diet  -follow up morning labs  -monitor vitals q4hr  -IV rehydration  -UA with hold for Ucx      Mendel Doan, DO  General Surgery PGY-1  Please page [phone] with any additional questions or concerns

## 2022-07-06 NOTE — LETTER
"August 9, 2022     Javon Valadez DO  [address]  [place] [place] [address]    Patient: Raegan Young  YOB: 1945  Date of Visit: 7/6/2022      Dear Dr. Valadez:    Thank you for referring Raegan Young to me for evaluation. Below are my notes for this consultation.    If you have questions, please do not hesitate to call me. I look forward to following your patient along with you.         Sincerely,        Geovani Mandel MD        CC: MD Akbar Mueller MD David O Holtz, MD Xiaomang Stickles, MD Patrick Ross, MD PhD  Tequila, Geovani CISNEROS MD  8/9/2022  6:00 PM  Signed  HISTORY & PHYSICAL EXAM    HPI: Raegan is here for continued follow-up. She reports she was diagnosed with a UTI roughly a week ago and was started on Batrim.  Since starting the Bactrim, she has felt increasingly nauseous, sometimes dry heaving, and also having large amounts of diarrhea through her ostomy.  He reports that she has been feeling very \"lousy \"since the beginning of June, and this past week she has been even worse.  She has no appetite, is not drinking a lot of fluids.  Her daughter reports she has noticed a significant decrease in her energy level.  The course of Bactrim earlier this week.    Past Medical History:   Diagnosis Date   • Anemia    • Arthritis    • Colovaginal fistula    • COVID-19 vaccine series completed 02/26/2021   • Disease of thyroid gland    • Diverticulitis of colon    • GERD (gastroesophageal reflux disease)    • Hiatal hernia    • History of snoring    • Hypertension    • Hypothyroidism    • Lung cancer (CMS/HCC)    • Lung nodule    • Vaginal cancer (CMS/HCC) 2018    s/p XRT x 7 weeks and chemo weekly x 5 weeks       Past Surgical History:   Procedure Laterality Date   • BOWEL RESECTION      partial with ileostomy   • CHOLECYSTECTOMY     • COLONOSCOPY     • DILATION AND CURETTAGE OF UTERUS  02/2018   • LUNG SURGERY     • ROTATOR CUFF REPAIR " Right    • TONSILLECTOMY     • WISDOM TOOTH EXTRACTION      hx of       Current Outpatient Medications:   •  atorvastatin (LIPITOR) 40 mg tablet, TAKE 1 TABLET BY MOUTH EVERY DAY (Patient taking differently: Take 40 mg by mouth daily.), Disp: 90 tablet, Rfl: 1  •  benazepril (LOTENSIN) 10 mg tablet, Take 10 mg by mouth daily., Disp: , Rfl:   •  bumetanide (BUMEX) 1 mg tablet, Take 1 mg by mouth as needed.  , Disp: , Rfl:   •  cholecalciferol, vitamin D3, 1,000 unit (25 mcg) tablet, Take 2,000 Units by mouth daily., Disp: , Rfl:   •  clobetasoL (TEMOVATE) 0.05 % ointment, Apply topically 3 (three) times a week (Mon, Wed, Fri) for 14 days. Apply to vulva 3 times per week, Disp: 60 g, Rfl: 11  •  glucosamine-D3-Boswellia serr 1,500-400-100 mg-unit-mg tablet, Take 1 tablet by mouth daily., Disp: , Rfl:   •  levothyroxine (SYNTHROID) 88 mcg tablet, Take 88 mcg by mouth daily.  , Disp: , Rfl: 3  •  pantoprazole (PROTONIX) 40 mg EC tablet, Take 40 mg by mouth once daily., Disp: , Rfl:   •  acetaminophen (TYLENOL) 325 mg tablet, Take 2 tablets (650 mg total) by mouth every 4 (four) hours as needed for pain for up to 30 doses., Disp: 30 tablet, Rfl: 0  •  ciprofloxacin (CIPRO) 500 mg tablet, Take 1 tablet (500 mg total) by mouth 2 (two) times a day for 10 days., Disp: 20 tablet, Rfl: 0  •  metroNIDAZOLE (FLAGYL) 500 mg tablet, Take 1 tablet (500 mg total) by mouth 3 (three) times a day for 10 days., Disp: 30 tablet, Rfl: 0   Allergies   Allergen Reactions   • Bactrim [Sulfamethoxazole-Trimethoprim]    • Adhesive Tape-Silicones      Causes bruising     Social History     Socioeconomic History   • Marital status:      Spouse name: Royal    • Number of children: 4   • Years of education: Not on file   • Highest education level: Not on file   Occupational History   • Occupation: retired/     Tobacco Use   • Smoking status: Former Smoker     Packs/day: 1.00     Years: 40.00     Pack years: 40.00     Types:  Cigarettes     Start date:      Quit date:      Years since quittin.6   • Smokeless tobacco: Never Used   Vaping Use   • Vaping Use: Never used   Substance and Sexual Activity   • Alcohol use: Yes     Comment: occassional    • Drug use: No   • Sexual activity: Never     Comment:    Other Topics Concern   • Not on file   Social History Narrative    Lives with  in a 2 story home     Social Determinants of Health     Financial Resource Strain: Not on file   Food Insecurity: Not on file   Transportation Needs: Not on file   Physical Activity: Not on file   Stress: Not on file   Social Connections: Not on file   Intimate Partner Violence: Not on file   Housing Stability: Not on file      Family History   Problem Relation Age of Onset   • Heart attack Biological Mother    • Hypertension Biological Mother    • Endometrial cancer Biological Father    • Lung cancer Biological Father    • Breast cancer Neg Hx    • Cancer Neg Hx    • Colon cancer Neg Hx    • Ovarian cancer Neg Hx        REVIEW OF SYSTEMS: Unchanged    PHYSICAL EXAM:  GEN: On examination the patient is resting comfortably.  NEURO/PSYCH: Alert and oriented ×3  HEENT: Eyes: Sclera anicteric  CHEST: Equal rise and fall the chest.  No tenderness bilaterally.  SKIN: Warm and moist  NODES: No groin or cervical lymphadenopathy  EXT: No palpable edema of the bilateral lower extremities.  No clubbing.  GI: Abdomen soft, nontender, nondistended.  No palpable liver or spleen edge.  No ventral hernias, mass, or tenderness.  RECTAL: Perianal skin is normal.  Digital exam reveals normal sphincter tone no masses are palpable.  Flexible sigmoidoscopy: Carried out to level of the descending colon was limited by patient discomfort demonstrates a widely patent, well-healed colorectal/supra anal anastomosis with no evidence of abnormality seen.    XRAYS: I personally reviewed the current X rays and the pertinent findings are: Gastrografin enema images  reviewed and interpretation noted in HPI      ASSESSMENT/PLAN:     Colovaginal fistula  Overall Tania is doing excellent.  I reviewed the barium enema which demonstrates no evidence of anastomotic leak.  I also evaluate matters endoscopically today and the anastomosis appears well-healed.  We are going to have the patient on the OR schedule for ileostomy closure.  I discussed the risk the procedure with the patient in detail including bleeding, infection, anastomotic leak, need for additional procedures.  The patient understood and would like to proceed.

## 2022-07-06 NOTE — PROGRESS NOTES
"HISTORY & PHYSICAL EXAM    HPI: Raegan is here for continued follow-up. She reports she was diagnosed with a UTI roughly a week ago and was started on Batrim.  Since starting the Bactrim, she has felt increasingly nauseous, sometimes dry heaving, and also having large amounts of diarrhea through her ostomy.  He reports that she has been feeling very \"lousy \"since the beginning of June, and this past week she has been even worse.  She has no appetite, is not drinking a lot of fluids.  Her daughter reports she has noticed a significant decrease in her energy level.  The course of Bactrim earlier this week.    Past Medical History:   Diagnosis Date   • Anemia    • Arthritis    • Colovaginal fistula    • COVID-19 vaccine series completed 02/26/2021   • Disease of thyroid gland    • Diverticulitis of colon    • GERD (gastroesophageal reflux disease)    • Hiatal hernia    • History of snoring    • Hypertension    • Hypothyroidism    • Lung cancer (CMS/HCC)    • Lung nodule    • Vaginal cancer (CMS/HCC) 2018    s/p XRT x 7 weeks and chemo weekly x 5 weeks       Past Surgical History:   Procedure Laterality Date   • BOWEL RESECTION      partial with ileostomy   • CHOLECYSTECTOMY     • COLONOSCOPY     • DILATION AND CURETTAGE OF UTERUS  02/2018   • LUNG SURGERY     • ROTATOR CUFF REPAIR Right    • TONSILLECTOMY     • WISDOM TOOTH EXTRACTION      hx of       Current Outpatient Medications:   •  atorvastatin (LIPITOR) 40 mg tablet, TAKE 1 TABLET BY MOUTH EVERY DAY (Patient taking differently: Take 40 mg by mouth daily.), Disp: 90 tablet, Rfl: 1  •  benazepril (LOTENSIN) 10 mg tablet, Take 10 mg by mouth daily., Disp: , Rfl:   •  bumetanide (BUMEX) 1 mg tablet, Take 1 mg by mouth as needed.  , Disp: , Rfl:   •  cholecalciferol, vitamin D3, 1,000 unit (25 mcg) tablet, Take 2,000 Units by mouth daily., Disp: , Rfl:   •  clobetasoL (TEMOVATE) 0.05 % ointment, Apply topically 3 (three) times a week (Mon, Wed, Fri) for 14 days. Apply " to vulva 3 times per week, Disp: 60 g, Rfl: 11  •  glucosamine-D3-Boswellia serr 1,500-400-100 mg-unit-mg tablet, Take 1 tablet by mouth daily., Disp: , Rfl:   •  levothyroxine (SYNTHROID) 88 mcg tablet, Take 88 mcg by mouth daily.  , Disp: , Rfl: 3  •  pantoprazole (PROTONIX) 40 mg EC tablet, Take 40 mg by mouth once daily., Disp: , Rfl:   •  acetaminophen (TYLENOL) 325 mg tablet, Take 2 tablets (650 mg total) by mouth every 4 (four) hours as needed for pain for up to 30 doses., Disp: 30 tablet, Rfl: 0  •  ciprofloxacin (CIPRO) 500 mg tablet, Take 1 tablet (500 mg total) by mouth 2 (two) times a day for 10 days., Disp: 20 tablet, Rfl: 0  •  metroNIDAZOLE (FLAGYL) 500 mg tablet, Take 1 tablet (500 mg total) by mouth 3 (three) times a day for 10 days., Disp: 30 tablet, Rfl: 0   Allergies   Allergen Reactions   • Bactrim [Sulfamethoxazole-Trimethoprim]    • Adhesive Tape-Silicones      Causes bruising     Social History     Socioeconomic History   • Marital status:      Spouse name: Royal    • Number of children: 4   • Years of education: Not on file   • Highest education level: Not on file   Occupational History   • Occupation: retired/     Tobacco Use   • Smoking status: Former Smoker     Packs/day: 1.00     Years: 40.00     Pack years: 40.00     Types: Cigarettes     Start date:      Quit date:      Years since quittin.6   • Smokeless tobacco: Never Used   Vaping Use   • Vaping Use: Never used   Substance and Sexual Activity   • Alcohol use: Yes     Comment: occassional    • Drug use: No   • Sexual activity: Never     Comment:    Other Topics Concern   • Not on file   Social History Narrative    Lives with  in a 2 story home     Social Determinants of Health     Financial Resource Strain: Not on file   Food Insecurity: Not on file   Transportation Needs: Not on file   Physical Activity: Not on file   Stress: Not on file   Social Connections: Not on file   Intimate  Partner Violence: Not on file   Housing Stability: Not on file      Family History   Problem Relation Age of Onset   • Heart attack Biological Mother    • Hypertension Biological Mother    • Endometrial cancer Biological Father    • Lung cancer Biological Father    • Breast cancer Neg Hx    • Cancer Neg Hx    • Colon cancer Neg Hx    • Ovarian cancer Neg Hx        REVIEW OF SYSTEMS: Unchanged    PHYSICAL EXAM:  GEN: On examination the patient is resting comfortably.  NEURO/PSYCH: Alert and oriented ×3  HEENT: Eyes: Sclera anicteric  CHEST: Equal rise and fall the chest.  No tenderness bilaterally.  SKIN: Warm and moist  NODES: No groin or cervical lymphadenopathy  EXT: No palpable edema of the bilateral lower extremities.  No clubbing.  GI: Abdomen soft, nontender, nondistended.  No palpable liver or spleen edge.  No ventral hernias, mass, or tenderness.  RECTAL: Perianal skin is normal.  Digital exam reveals normal sphincter tone no masses are palpable.  Flexible sigmoidoscopy: Carried out to level of the descending colon was limited by patient discomfort demonstrates a widely patent, well-healed colorectal/supra anal anastomosis with no evidence of abnormality seen.    XRAYS: I personally reviewed the current X rays and the pertinent findings are: Gastrografin enema images reviewed and interpretation noted in HPI      ASSESSMENT/PLAN:     Colovaginal fistula  Overall Tania is doing excellent.  I reviewed the barium enema which demonstrates no evidence of anastomotic leak.  I also evaluate matters endoscopically today and the anastomosis appears well-healed.  We are going to have the patient on the OR schedule for ileostomy closure.  I discussed the risk the procedure with the patient in detail including bleeding, infection, anastomotic leak, need for additional procedures.  The patient understood and would like to proceed.

## 2022-07-07 LAB
ALBUMIN SERPL-MCNC: 3.4 G/DL (ref 3.4–5)
ANION GAP SERPL CALC-SCNC: 6 MEQ/L (ref 3–15)
ATRIAL RATE: 88
BUN SERPL-MCNC: 29 MG/DL (ref 8–20)
CALCIUM SERPL-MCNC: 9.5 MG/DL (ref 8.9–10.3)
CHLORIDE SERPL-SCNC: 111 MEQ/L (ref 98–109)
CO2 SERPL-SCNC: 18 MEQ/L (ref 22–32)
CREAT SERPL-MCNC: 1.6 MG/DL (ref 0.6–1.1)
ERYTHROCYTE [DISTWIDTH] IN BLOOD BY AUTOMATED COUNT: 15 % (ref 11.7–14.4)
GFR SERPL CREATININE-BSD FRML MDRD: 31.3 ML/MIN/1.73M*2
GLUCOSE SERPL-MCNC: 87 MG/DL (ref 70–99)
HCT VFR BLDCO AUTO: 31.5 % (ref 35–45)
HGB BLD-MCNC: 10.5 G/DL (ref 11.8–15.7)
MAGNESIUM SERPL-MCNC: 1.4 MG/DL (ref 1.8–2.5)
MCH RBC QN AUTO: 30.3 PG (ref 28–33.2)
MCHC RBC AUTO-ENTMCNC: 33.3 G/DL (ref 32.2–35.5)
MCV RBC AUTO: 91 FL (ref 83–98)
P AXIS: 74
PDW BLD AUTO: 8.6 FL (ref 9.4–12.3)
PHOSPHATE SERPL-MCNC: 3.9 MG/DL (ref 2.4–4.7)
PLATELET # BLD AUTO: 263 K/UL (ref 150–369)
POTASSIUM SERPL-SCNC: 5.1 MEQ/L (ref 3.6–5.1)
PR INTERVAL: 156
PREALB SERPL-MCNC: 30.2 MG/DL (ref 18–38)
QRS DURATION: 118
QT INTERVAL: 370
QTC CALCULATION(BAZETT): 447
R AXIS: 63
RBC # BLD AUTO: 3.46 M/UL (ref 3.93–5.22)
SODIUM SERPL-SCNC: 135 MEQ/L (ref 136–144)
T WAVE AXIS: 21
VENTRICULAR RATE: 88
WBC # BLD AUTO: 7.77 K/UL (ref 3.8–10.5)

## 2022-07-07 PROCEDURE — 63600000 HC DRUGS/DETAIL CODE: Performed by: STUDENT IN AN ORGANIZED HEALTH CARE EDUCATION/TRAINING PROGRAM

## 2022-07-07 PROCEDURE — 25800000 HC PHARMACY IV SOLUTIONS: Performed by: STUDENT IN AN ORGANIZED HEALTH CARE EDUCATION/TRAINING PROGRAM

## 2022-07-07 PROCEDURE — 63700000 HC SELF-ADMINISTRABLE DRUG

## 2022-07-07 PROCEDURE — 82040 ASSAY OF SERUM ALBUMIN: CPT | Performed by: STUDENT IN AN ORGANIZED HEALTH CARE EDUCATION/TRAINING PROGRAM

## 2022-07-07 PROCEDURE — 80048 BASIC METABOLIC PNL TOTAL CA: CPT

## 2022-07-07 PROCEDURE — 93010 ELECTROCARDIOGRAM REPORT: CPT | Performed by: INTERNAL MEDICINE

## 2022-07-07 PROCEDURE — 21400000 HC ROOM AND CARE CCU/INTERMEDIATE

## 2022-07-07 PROCEDURE — 85027 COMPLETE CBC AUTOMATED: CPT

## 2022-07-07 PROCEDURE — 36415 COLL VENOUS BLD VENIPUNCTURE: CPT

## 2022-07-07 PROCEDURE — 84134 ASSAY OF PREALBUMIN: CPT | Performed by: STUDENT IN AN ORGANIZED HEALTH CARE EDUCATION/TRAINING PROGRAM

## 2022-07-07 PROCEDURE — 63600000 HC DRUGS/DETAIL CODE

## 2022-07-07 PROCEDURE — 25800000 HC PHARMACY IV SOLUTIONS

## 2022-07-07 PROCEDURE — 84100 ASSAY OF PHOSPHORUS: CPT

## 2022-07-07 PROCEDURE — 63700000 HC SELF-ADMINISTRABLE DRUG: Performed by: STUDENT IN AN ORGANIZED HEALTH CARE EDUCATION/TRAINING PROGRAM

## 2022-07-07 PROCEDURE — 83735 ASSAY OF MAGNESIUM: CPT

## 2022-07-07 PROCEDURE — 99232 SBSQ HOSP IP/OBS MODERATE 35: CPT | Mod: 24 | Performed by: COLON & RECTAL SURGERY

## 2022-07-07 RX ORDER — LANOLIN ALCOHOL/MO/W.PET/CERES
200 CREAM (GRAM) TOPICAL ONCE
Status: COMPLETED | OUTPATIENT
Start: 2022-07-07 | End: 2022-07-07

## 2022-07-07 RX ORDER — NITROFURANTOIN 25; 75 MG/1; MG/1
100 CAPSULE ORAL EVERY 12 HOURS
Status: DISCONTINUED | OUTPATIENT
Start: 2022-07-07 | End: 2022-07-07

## 2022-07-07 RX ORDER — HEPARIN SODIUM 5000 [USP'U]/ML
5000 INJECTION, SOLUTION INTRAVENOUS; SUBCUTANEOUS EVERY 8 HOURS
Status: DISCONTINUED | OUTPATIENT
Start: 2022-07-07 | End: 2022-07-08 | Stop reason: HOSPADM

## 2022-07-07 RX ORDER — ACETAMINOPHEN 325 MG/1
325 TABLET ORAL ONCE
Status: COMPLETED | OUTPATIENT
Start: 2022-07-07 | End: 2022-07-07

## 2022-07-07 RX ORDER — LANOLIN ALCOHOL/MO/W.PET/CERES
400 CREAM (GRAM) TOPICAL ONCE
Status: COMPLETED | OUTPATIENT
Start: 2022-07-07 | End: 2022-07-07

## 2022-07-07 RX ADMIN — ACETAMINOPHEN 325 MG: 325 TABLET, FILM COATED ORAL at 16:34

## 2022-07-07 RX ADMIN — MAGNESIUM OXIDE TAB 400 MG (241.3 MG ELEMENTAL MG) 200 MG: 400 (241.3 MG) TAB at 04:48

## 2022-07-07 RX ADMIN — MAGNESIUM SULFATE HEPTAHYDRATE 2 G: 2 INJECTION, SOLUTION INTRAVENOUS at 06:31

## 2022-07-07 RX ADMIN — HEPARIN SODIUM 5000 UNITS: 5000 INJECTION, SOLUTION INTRAVENOUS; SUBCUTANEOUS at 16:02

## 2022-07-07 RX ADMIN — HEPARIN SODIUM 5000 UNITS: 5000 INJECTION, SOLUTION INTRAVENOUS; SUBCUTANEOUS at 22:14

## 2022-07-07 RX ADMIN — MAGNESIUM OXIDE TAB 400 MG (241.3 MG ELEMENTAL MG) 400 MG: 400 (241.3 MG) TAB at 04:48

## 2022-07-07 RX ADMIN — CALCIUM GLUCONATE 1 G: 20 INJECTION, SOLUTION INTRAVENOUS at 01:29

## 2022-07-07 RX ADMIN — SODIUM CHLORIDE: 9 INJECTION, SOLUTION INTRAVENOUS at 20:18

## 2022-07-07 RX ADMIN — LEVOTHYROXINE SODIUM 88 MCG: 88 TABLET ORAL at 05:52

## 2022-07-07 RX ADMIN — HEPARIN SODIUM 5000 UNITS: 5000 INJECTION, SOLUTION INTRAVENOUS; SUBCUTANEOUS at 09:09

## 2022-07-07 RX ADMIN — PANTOPRAZOLE SODIUM 40 MG: 40 TABLET, DELAYED RELEASE ORAL at 04:49

## 2022-07-07 RX ADMIN — SODIUM CHLORIDE 1 G: 900 INJECTION INTRAVENOUS at 10:54

## 2022-07-07 RX ADMIN — ATORVASTATIN CALCIUM 40 MG: 40 TABLET, FILM COATED ORAL at 04:49

## 2022-07-07 RX ADMIN — SODIUM CHLORIDE: 9 INJECTION, SOLUTION INTRAVENOUS at 10:54

## 2022-07-07 NOTE — PLAN OF CARE
Problem: Adult Inpatient Plan of Care  Goal: Plan of Care Review  Outcome: Progressing  Flowsheets (Taken 7/7/2022 1631)  Progress: improving    Attempted to reach pt to perform an assessment was unsuccessful. Will follow.

## 2022-07-07 NOTE — PLAN OF CARE
Problem: Adult Inpatient Plan of Care  Goal: Patient-Specific Goal (Individualized)  Outcome: Progressing     Problem: Adult Inpatient Plan of Care  Goal: Absence of Hospital-Acquired Illness or Injury  Outcome: Progressing   Plan of Care Review  Plan of Care Reviewed With: patient  Progress: improving  Outcome Summary: VSS. IVF infusing. Adequate urine output. pt denies n/v with eating and denies abdominal pain. ostomy output adequate. OOB to chair. Call bell and personal items within reach.

## 2022-07-07 NOTE — CONSULTS
"Nutrition Assessment    Recommendations  1. Liberalize diet to regular to promote PO intakes  - 2gm K+ PRN    2. Boost breeze BID    Pt meets 2+ criteria for severe malnutrition in the context of chronic illness      Clinical Course: Patient is a 76 y.o. female who was admitted on 7/6/2022 with a diagnosis of Abdominal pain [R10.9].     Past Medical History:   Diagnosis Date   • Anemia    • Arthritis    • Colovaginal fistula    • COVID-19 vaccine series completed 02/26/2021   • Disease of thyroid gland    • Diverticulitis of colon    • GERD (gastroesophageal reflux disease)    • Hiatal hernia    • History of snoring    • Hypertension    • Hypothyroidism    • Lung cancer (CMS/HCC)    • Lung nodule    • Vaginal cancer (CMS/HCC) 2018    s/p XRT x 7 weeks and chemo weekly x 5 weeks       Past Surgical History:   Procedure Laterality Date   • CHOLECYSTECTOMY     • COLONOSCOPY     • DILATION AND CURETTAGE OF UTERUS  02/2018   • LUNG SURGERY     • ROTATOR CUFF REPAIR Right    • TONSILLECTOMY     • WISDOM TOOTH EXTRACTION      hx of       Reason for Assessment  Reason For Assessment: identified at risk by screening criteria     [facility] Nutrition Screen Tool  Has patient lost weight without trying?: 0-->Yes  If yes,how much weight has been lost?: 1-->2-13 pounds  Has patient been eating poorly due to decreased appetite?: 1-->Yes  MST Nutrition Screen Score: 2     Physical Findings  Gastrointestinal: ostomy (ileostomy 300ml)  Skin: surgical incision     [id number] Physical Appearance  Gastrointestinal: ostomy (ileostomy 300ml)  Skin: surgical incision     Nutrition Order  Nutrition Order: meets nutritional requirements  Nutrition Order Comments: low fiber, low lactose, no carb beverages     Anthropometrics  Height: 162.6 cm (5' 4\")     Current Weight  Weight Method: Bed scale  Weight: 63.5 kg (140 lb)     Ideal Body Weight (IBW)  Ideal Body Weight (IBW) (kg): 55  % Ideal Body Weight: 115.45     Body Mass Index (BMI)  BMI " (Calculated): 24  Nutritional Status/Malnutrition: Chronic illness - Severe Malnutrition     Labs/Procedures/Meds  Lab Results Reviewed: reviewed     [email or web address] Lab Results  Lab Results Reviewed: reviewed   BMP Results       07/07/22 07/06/22 06/08/22     0338 1838 1420     132 138    K 5.1 5.3 5.0    Cl 111 105 107    CO2 18 18 19    Glucose 87 97 90    BUN 29 35 23    Creatinine 1.6 2.2 1.6    Calcium 9.5 9.8 9.3    Anion Gap 6 9 12    EGFR 31.3 21.6 31.3          Medications  Pertinent Medications Reviewed: reviewed   Scheduled Meds:  • atorvastatin  40 mg oral Daily (6a)   • heparin (porcine)  5,000 Units subcutaneous q8h CADENCE   • levothyroxine  88 mcg oral Daily (6:30a)   • nitrofurantoin (macrocrystal-monohydrate)  100 mg oral q12h CADENCE   • pantoprazole  40 mg oral Daily (6a)     Continuous Infusions:  • sodium chloride 0.9 %   125 mL/hr at 07/06/22 1109     Estimated/Assessed Needs  Additional Documentation: Protein Requirements (Group)     Calorie Requirements  Estimated kCal Needs: Actual Body Weight  Estimated Calorie Need Method: kcal/kg  Calorie/kg Recommended: 25-30  Calorie Recommendations: 1590 - 1900 kcal     Protein Requirements  Recommended Dosing Weight (Estimated Protein Needs): Actual Body Weight  Est Protein Requirement Amount (gms/kg):  (1.2 - 1.5 g/kg)  Protein Recommendations: 75 - 95 g     Fluid requirements 1600 - 1900 ml (25 - 30 ml/kg)    Dosing weight ABW 63.5kg    Nutritional Status/Malnutrition: Chronic illness - Severe Malnutrition    NFPE: visually unremarkable    Clinical comments: Pt seen for MST score. S/p robotic LAR, ALYSSA/BSO, takedown of colovaginal and colouterine fistulas, coloanal anastomosis, diverting loop ileostomy 5/13. Admitted with dehydration. Pt reports minimal PO intakes (food or liquids) x 2 weeks, endorses taste changes and early satiety. Overall diminished appetite/PO intake since surgery. Tried drinking gatorade and LiquidIV but taste changes limited appeal. Per  EMR, wt down 21# (13%) x 3 months, severe. Dislikes ensure/boost, willing to try boost breeze (orange).    Pt meets 2+ criteria for severe malnutrition in the context of chronic illness as evidenced 13% wt loss x 3 months and PO intakes meeting < 75% needs x 1+ month    Goals: meet > 75% needs via PO intakes  Monitor: PO intakes, skin, labs, plan of care    Recommendations: See above     PES  Statement: PES Statement  Nutrition Diagnosis: Inadequate Energy Intake  Related To:: Decreased ability to consume sufficient energy  As Evidenced By:: 13% wt loss x 3 months  Nutritional Needs Met?: No                                   Date: 07/07/22  Signature: JOAQUINA Chaudhari

## 2022-07-07 NOTE — PLAN OF CARE
Pt meets 2+ criteria for severe malnutrition in the context of chronic illness   Problem: Adult Inpatient Plan of Care  Goal: Plan of Care Review  Outcome: Progressing

## 2022-07-07 NOTE — DISCHARGE SUMMARY
Inpatient Discharge Summary    BRIEF OVERVIEW  Admitting Provider:  H&P Notes 6/7/2022 to 7/7/2022         Date of Service   Author Author Type Status Note Type File Time    07/06/22 1552 Edit Mendel Doan DO Resident Cosign Needed H&P 07/06/22 1755          Attending Provider: Geovani Mandel MD Attending phys phone: [phone]  Primary Care Physician at Discharge: Javon Valadez [phone]    Admission Date: 7/6/2022     Discharge Date: 7/7/2022    Primary Discharge Diagnosis  Abdominal pain    Secondary Discharge Diagnosis  Active Hospital Problems    Diagnosis Date Noted   • Abdominal pain 07/06/2022      Resolved Hospital Problems   No resolved problems to display.       DETAILS OF HOSPITAL STAY    Operative Procedures Performed      Consults:   Consult Notes 6/7/2022 to 7/7/2022         Date of Service   Author Author Type Status Note Type File Time    07/07/22 0920  Cary Gill LDN Registered Dietitian Signed Consults 07/07/22 0927          Consult Orders During Admission:  None     Procedures: none  Pertinent Test Results: none    Imaging  FLUOROSCOPY BARIUM ENEMA (SINGLE CONTRAST)    Result Date: 7/6/2022  IMPRESSION: Normal terminal ileum, cecum, ascending, transverse, and descending colon, with mild diverticular narrowing and diverticuli in the sigmoid colon. Fluoroscopy: 5.6 minutes. Cumulative air Kerma: 55.3 mGy. Images: 3 still images; 15 videos. COMMENT: A  radiograph of the abdomen demonstrates normal bowel gas pattern without abnormal soft tissue density or calcification. A Gastrografin single contrast enema was performed following the administration of contrast through the distal limb of the ileostomy via a catheter.  Contrast is seen to transit through the distal ileum, cecum, ascending colon, hepatic flexure, transverse colon, splenic flexure, and descending colon without contour abnormality, narrowing, filling defect, or obstruction. The mucosal  "pattern is normal. Mild narrowing with diverticuli are seen in the sigmoid colon extending to the proximal rectum, without filling defect, or extravasation.      Presenting Problem/History of Present Illness  Abdominal pain [R10.9]     She was seen in the office and [place] [facility] [place]. She reports she was diagnosed with a UTI roughly a week ago and was started on Batrim.  Since starting the Bactrim, she has felt increasingly nauseous, sometimes dry heaving, and also having large amounts of diarrhea through her ostomy.  He reports that she has been feeling very \"lousy \"since the beginning of June, and this past week she has been even worse.  She has no appetite, is not drinking a lot of fluids.  Her daughter reports she has noticed a significant decrease in her energy level.  The course of Bactrim earlier this week.    Exam on Day of Discharge  Patient seen and examined on day of discharge.  General: awake and alert, well appearing, no acute distress  HEENT: normocephalic, atraumatic, EOM intact, conjunctiva clear, sclera nonicteric  CV: normal rate, normotensive  Pulm: normal work of breathing, no acute distress  Abd: soft, nontender, nondistended  Extr: no obvious abnormality, moving extremities spontaneously   Neuro: Grossly normal     Hospital Course  Patient [place] [facility] [place] secondary to high ileostomy output on 7/6. She was admitted for IV rehydration and electrolyte correction. Overnight she was given calcium gluconate for an elevated potassium, magnesium sulfate for depleted magnesium, and IV rehydration with normal saline. The next morning, she reported improvement in how she was feeling. Of note, while in the hospital, she was found to have a urinary tract infection and was treated with ceftriaxone for 2 days.     On 7/8, she was deemed stable for discharge with instructions to follow up with both her PCP and the surgeon in two weeks. She was sent with a prescription for " oral fluoroquinolones for 3 days to adequately treat the UTI based on sensitivities.       Discharge Orders     Medication List      ASK your doctor about these medications    atorvastatin 40 mg tablet  Commonly known as: LIPITOR  TAKE 1 TABLET BY MOUTH EVERY DAY     benazepriL 10 mg tablet  Commonly known as: LOTENSIN  Take 10 mg by mouth daily.  Dose: 10 mg     bumetanide 1 mg tablet  Commonly known as: BUMEX  Take 1 mg by mouth as needed.  Dose: 1 mg     cholecalciferol (vitamin D3) 1,000 unit (25 mcg) tablet  Take 2,000 Units by mouth daily.  Dose: 2,000 Units     clobetasoL 0.05 % ointment  Commonly known as: TEMOVATE  Apply topically 3 (three) times a week (Mon, Wed, Fri) for 14 days. Apply to vulva 3 times per week     glucosamine-D3-Boswellia serr 1,500-400-100 mg-unit-mg tablet  Take 1 tablet by mouth daily.  Dose: 1 tablet     levothyroxine 88 mcg tablet  Commonly known as: SYNTHROID  Take 88 mcg by mouth daily.  Dose: 88 mcg     pantoprazole 40 mg EC tablet  Commonly known as: PROTONIX  Take 40 mg by mouth once daily.  Dose: 40 mg          Instructions for after discharge     Call provider for:  persistent nausea or vomiting      Call provider for:  redness, tenderness, or signs of infection (pain, swelling, redness, odor or green/yellow discharge around incision site)      Call provider for:  severe uncontrolled pain      Call provider for:  temperature >100.4            Outpatient Follow-Ups            In 3 weeks [facility] PAT PHONE CALL NURSE [facility] Pre-Admission Testing    In 3 weeks [facility] OP LAB PRE-PROC [facility]    In 4 months Owen Gamez MD PhD [facility] Thoracic Surgery at [facility]    In 5 months Trev Jimenez MD [facility] Gynecologic Oncology at [facility]        Referrals:  No orders of the defined types were placed in this encounter.        Test Results Pending at Discharge  Unresulted Labs (From  admission, onward)             Start     Ordered    07/11/22 0600  Albumin  Every Mon/Thu      Question:  Release to patient  Answer:  Immediate   Start Status   07/11/22 0600 Scheduled   07/14/22 0600 Scheduled   07/18/22 0600 Scheduled   07/21/22 0600 Scheduled       07/07/22 0626    07/11/22 0600  Prealbumin  Every Mon/Thu      Question:  Release to patient  Answer:  Immediate   Start Status   07/11/22 0600 Scheduled   07/14/22 0600 Scheduled   07/18/22 0600 Scheduled   07/21/22 0600 Scheduled       07/07/22 0626    07/07/22 0600  Basic metabolic panel  Daily      Question:  Release to patient  Answer:  Immediate   Start Status   07/08/22 0600 Scheduled   07/09/22 0600 Scheduled   07/10/22 0600 Scheduled       07/06/22 1744    07/06/22 2144  Urine culture Urine, Clean Catch  Once        Question:  Release to patient  Answer:  Immediate    07/06/22 2143    07/06/22 1745  Phosphorus  Daily      Question:  Release to patient  Answer:  Immediate   Start Status   07/08/22 0600 Scheduled   07/09/22 0600 Scheduled   07/10/22 0600 Scheduled   07/11/22 0600 Scheduled   07/12/22 0600 Scheduled       07/06/22 1744    07/06/22 1745  CBC  Daily      Question:  Release to patient  Answer:  Immediate   Start Status   07/08/22 0600 Scheduled   07/09/22 0600 Scheduled   07/10/22 0600 Scheduled   07/11/22 0600 Scheduled   07/12/22 0600 Scheduled       07/06/22 1744    07/06/22 1745  Magnesium  Daily      Question:  Release to patient  Answer:  Immediate   Start Status   07/08/22 0600 Scheduled   07/09/22 0600 Scheduled   07/10/22 0600 Scheduled   07/11/22 0600 Scheduled   07/12/22 0600 Scheduled       07/06/22 1744                Code Status at Discharge: Full Code  Physician Order for Life-Sustaining Treatment Document Status      No documents found

## 2022-07-08 VITALS
DIASTOLIC BLOOD PRESSURE: 65 MMHG | HEIGHT: 64 IN | RESPIRATION RATE: 16 BRPM | SYSTOLIC BLOOD PRESSURE: 141 MMHG | HEART RATE: 88 BPM | BODY MASS INDEX: 25.86 KG/M2 | OXYGEN SATURATION: 100 % | WEIGHT: 151.46 LBS | TEMPERATURE: 97.4 F

## 2022-07-08 LAB
ANION GAP SERPL CALC-SCNC: 6 MEQ/L (ref 3–15)
BACTERIA UR CULT: NORMAL
BACTERIA UR CULT: NORMAL
BUN SERPL-MCNC: 14 MG/DL (ref 8–20)
CALCIUM SERPL-MCNC: 8.5 MG/DL (ref 8.9–10.3)
CHLORIDE SERPL-SCNC: 113 MEQ/L (ref 98–109)
CO2 SERPL-SCNC: 19 MEQ/L (ref 22–32)
CREAT SERPL-MCNC: 1.1 MG/DL (ref 0.6–1.1)
ERYTHROCYTE [DISTWIDTH] IN BLOOD BY AUTOMATED COUNT: 15.3 % (ref 11.7–14.4)
GFR SERPL CREATININE-BSD FRML MDRD: 48.2 ML/MIN/1.73M*2
GLUCOSE SERPL-MCNC: 81 MG/DL (ref 70–99)
HCT VFR BLDCO AUTO: 30.3 % (ref 35–45)
HGB BLD-MCNC: 10 G/DL (ref 11.8–15.7)
MAGNESIUM SERPL-MCNC: 1.6 MG/DL (ref 1.8–2.5)
MCH RBC QN AUTO: 30.5 PG (ref 28–33.2)
MCHC RBC AUTO-ENTMCNC: 33 G/DL (ref 32.2–35.5)
MCV RBC AUTO: 92.4 FL (ref 83–98)
PDW BLD AUTO: 8.7 FL (ref 9.4–12.3)
PHOSPHATE SERPL-MCNC: 3 MG/DL (ref 2.4–4.7)
PLATELET # BLD AUTO: 236 K/UL (ref 150–369)
POTASSIUM SERPL-SCNC: 4.6 MEQ/L (ref 3.6–5.1)
RBC # BLD AUTO: 3.28 M/UL (ref 3.93–5.22)
SODIUM SERPL-SCNC: 138 MEQ/L (ref 136–144)
WBC # BLD AUTO: 5.99 K/UL (ref 3.8–10.5)

## 2022-07-08 PROCEDURE — 85027 COMPLETE CBC AUTOMATED: CPT

## 2022-07-08 PROCEDURE — 83735 ASSAY OF MAGNESIUM: CPT

## 2022-07-08 PROCEDURE — 84100 ASSAY OF PHOSPHORUS: CPT

## 2022-07-08 PROCEDURE — 25800000 HC PHARMACY IV SOLUTIONS

## 2022-07-08 PROCEDURE — 36415 COLL VENOUS BLD VENIPUNCTURE: CPT

## 2022-07-08 PROCEDURE — 63600000 HC DRUGS/DETAIL CODE: Performed by: STUDENT IN AN ORGANIZED HEALTH CARE EDUCATION/TRAINING PROGRAM

## 2022-07-08 PROCEDURE — 80048 BASIC METABOLIC PNL TOTAL CA: CPT

## 2022-07-08 PROCEDURE — 25800000 HC PHARMACY IV SOLUTIONS: Performed by: STUDENT IN AN ORGANIZED HEALTH CARE EDUCATION/TRAINING PROGRAM

## 2022-07-08 PROCEDURE — 200200 PR NO CHARGE: Performed by: COLON & RECTAL SURGERY

## 2022-07-08 PROCEDURE — 63600000 HC DRUGS/DETAIL CODE

## 2022-07-08 PROCEDURE — 99221 1ST HOSP IP/OBS SF/LOW 40: CPT | Mod: 24 | Performed by: COLON & RECTAL SURGERY

## 2022-07-08 PROCEDURE — 63700000 HC SELF-ADMINISTRABLE DRUG: Performed by: STUDENT IN AN ORGANIZED HEALTH CARE EDUCATION/TRAINING PROGRAM

## 2022-07-08 RX ORDER — CIPROFLOXACIN 500 MG/1
500 TABLET ORAL 2 TIMES DAILY
Qty: 6 TABLET | Refills: 0 | Status: SHIPPED | OUTPATIENT
Start: 2022-07-08 | End: 2022-07-08 | Stop reason: SDUPTHER

## 2022-07-08 RX ORDER — CIPROFLOXACIN 500 MG/1
500 TABLET ORAL 2 TIMES DAILY
Qty: 6 TABLET | Refills: 0 | Status: SHIPPED | OUTPATIENT
Start: 2022-07-08 | End: 2022-07-11

## 2022-07-08 RX ADMIN — SODIUM CHLORIDE 1 G: 900 INJECTION INTRAVENOUS at 10:36

## 2022-07-08 RX ADMIN — HEPARIN SODIUM 5000 UNITS: 5000 INJECTION, SOLUTION INTRAVENOUS; SUBCUTANEOUS at 06:13

## 2022-07-08 RX ADMIN — SODIUM CHLORIDE: 9 INJECTION, SOLUTION INTRAVENOUS at 04:12

## 2022-07-08 RX ADMIN — PANTOPRAZOLE SODIUM 40 MG: 40 TABLET, DELAYED RELEASE ORAL at 06:13

## 2022-07-08 RX ADMIN — MAGNESIUM SULFATE HEPTAHYDRATE 4 G: 40 INJECTION, SOLUTION INTRAVENOUS at 11:25

## 2022-07-08 RX ADMIN — LEVOTHYROXINE SODIUM 88 MCG: 88 TABLET ORAL at 06:13

## 2022-07-08 RX ADMIN — ATORVASTATIN CALCIUM 40 MG: 40 TABLET, FILM COATED ORAL at 06:13

## 2022-07-08 RX ADMIN — HEPARIN SODIUM 5000 UNITS: 5000 INJECTION, SOLUTION INTRAVENOUS; SUBCUTANEOUS at 14:41

## 2022-07-08 NOTE — PATIENT CARE CONFERENCE
Care Progression Rounds Note  Date: 7/7/2022  Time: 12:24 PM     Patient Name: Raegan Young     Medical Record Number: [id number]   YOB: 1945  Sex: Female      Room/Bed: 1023    Admitting Diagnosis: Abdominal pain [R10.9]   Admit Date/Time: 7/6/2022  4:57 PM    Primary Diagnosis: Abdominal pain  Principal Problem: Abdominal pain    GMLOS: pending  Anticipated Discharge Date: 7/9/2022    AM-PAC:  Mobility Score:      Discharge Planning:  Anticipated Discharge Disposition: home with home health, home without assistance or services    Barriers to Discharge:  Medical issues not resolved    Comments:       Participants:  , nursing, physical therapy, physician, social work/services  
Care Progression Rounds Note  Date: 7/8/2022  Time: 4:36 PM     Patient Name: Raegan Young     Medical Record Number: [id number]   YOB: 1945  Sex: Female      Room/Bed: 1023    Admitting Diagnosis: Abdominal pain [R10.9]   Admit Date/Time: 7/6/2022  4:57 PM    Primary Diagnosis: Abdominal pain  Principal Problem: Abdominal pain    GMLOS: 2.6  Anticipated Discharge Date: 7/8/2022    AM-PAC:  Mobility Score:      Discharge Planning:  Anticipated Discharge Disposition: home without assistance or services    Barriers to Discharge:  None    Comments:       Participants:  , nursing, physical therapy, physician, social work/services  
Anesthesia Volume In Cc: 0.5
Treatment Number (Will Not Render If 0): 0
Medical Necessity Information: It is in your best interest to select a reason for this procedure from the list below. All of these items fulfill various CMS LCD requirements except the new and changing color options.
Render Post-Care Instructions In Note?: no
Post-Care Instructions: I reviewed with the patient in detail post-care instructions. Patient is to wear sunprotection, and avoid picking at any of the treated lesions. Pt may apply Vaseline to crusted or scabbing areas.
Medical Necessity Clause: This procedure was medically necessary because the lesions that were treated were:
Consent: The patient's consent was obtained including but not limited to risks of crusting, scabbing, blistering, scarring, darker or lighter pigmentary change, recurrence, incomplete removal and infection.
Detail Level: Detailed

## 2022-07-08 NOTE — H&P (VIEW-ONLY)
ColoRectal Surgery Daily Progress Note    Subjective   NAEO. Was seen resting comfortably in bed. Endorses pain is well controlled. Denies nausea and vomiting. Tolerating her low residue diet. Emptying own ileostomy output. Reports passing some stool and barium from below.    Objective     Vital signs in last 24 hours:  Temp:  [36.4 °C (97.6 °F)-36.7 °C (98 °F)] 36.4 °C (97.6 °F)  Heart Rate:  [83-97] 86  Resp:  [16-18] 18  BP: (103-124)/(53-59) 124/58      Intake/Output Summary (Last 24 hours) at 7/8/2022 0827  Last data filed at 7/8/2022 0700  Gross per 24 hour   Intake 2866.67 ml   Output 2250 ml   Net 616.67 ml     Intake/Output this shift:  I/O this shift:  In: -   Out: 400 [Urine:250; Stool:150]    Physical Exam  General: awake and alert, well appearing, no acute distress  HEENT: normocephalic, atraumatic, EOM intact, conjunctiva clear, sclera nonicteric  CV: normal rate, normotensive  Pulm: normal work of breathing, no acute distress  Abd: soft, nontender, nondistended  Extr: no obvious abnormality, moving extremities spontaneously  Neuro: Grossly normal        Labs:  BMP Results       07/07/22 07/06/22 06/08/22     0338 1838 1420     132 138    K 5.1 5.3 5.0    Cl 111 105 107    CO2 18 18 19    Glucose 87 97 90    BUN 29 35 23    Creatinine 1.6 2.2 1.6    Calcium 9.5 9.8 9.3    Anion Gap 6 9 12    EGFR 31.3 21.6 31.3         CBC Results       07/07/22 07/06/22 06/08/22     0338 1838 1420    WBC 7.77 8.77 9.25    RBC 3.46 4.07 3.98    HGB 10.5 12.1 11.7    HCT 31.5 36.5 36.9    MCV 91.0 89.7 92.7    MCH 30.3 29.7 29.4    MCHC 33.3 33.2 31.7     323 295          Imaging  Not applicable      Assessment/Plan   Ms. Young, 77yo, admitted for rehydration and electrolyte repletion due to high ileostomy output, planned for flex sigmoidoscopy/colonoscopy tomorrow 7/9.     - Multi-modal pain control  - IV-fluids  - f/u morning labs  -start bowel prep  -consent patient for OR    Delmy Mills,  MD  General Surgery Resident  Please page [facility] [phone] with any questions or concerns.

## 2022-07-08 NOTE — PROGRESS NOTES
The patient's condition can be more specifically described as: severe/third degree malnutrition in the setting of chronic illness.

## 2022-07-08 NOTE — PLAN OF CARE
Pt with stable VS. No complaints overnight, walks to the bathroom, emptying her ileostomy on her own; independent.     Problem: Adult Inpatient Plan of Care  Goal: Optimal Comfort and Wellbeing  Outcome: Progressing     Problem: Adult Inpatient Plan of Care  Goal: Absence of Hospital-Acquired Illness or Injury  Outcome: Progressing

## 2022-07-08 NOTE — PROGRESS NOTES
ColoRectal Surgery Daily Progress Note    Subjective   NAEO. Was seen resting comfortably in bed. Endorses pain is well controlled. Denies nausea and vomiting. Tolerating her low residue diet. Emptying own ileostomy output. Reports passing some stool and barium from below.    Objective     Vital signs in last 24 hours:  Temp:  [36.4 °C (97.6 °F)-36.7 °C (98 °F)] 36.4 °C (97.6 °F)  Heart Rate:  [83-97] 86  Resp:  [16-18] 18  BP: (103-124)/(53-59) 124/58      Intake/Output Summary (Last 24 hours) at 7/8/2022 0827  Last data filed at 7/8/2022 0700  Gross per 24 hour   Intake 2866.67 ml   Output 2250 ml   Net 616.67 ml     Intake/Output this shift:  I/O this shift:  In: -   Out: 400 [Urine:250; Stool:150]    Physical Exam  General: awake and alert, well appearing, no acute distress  HEENT: normocephalic, atraumatic, EOM intact, conjunctiva clear, sclera nonicteric  CV: normal rate, normotensive  Pulm: normal work of breathing, no acute distress  Abd: soft, nontender, nondistended  Extr: no obvious abnormality, moving extremities spontaneously  Neuro: Grossly normal        Labs:  BMP Results       07/07/22 07/06/22 06/08/22     0338 1838 1420     132 138    K 5.1 5.3 5.0    Cl 111 105 107    CO2 18 18 19    Glucose 87 97 90    BUN 29 35 23    Creatinine 1.6 2.2 1.6    Calcium 9.5 9.8 9.3    Anion Gap 6 9 12    EGFR 31.3 21.6 31.3         CBC Results       07/07/22 07/06/22 06/08/22     0338 1838 1420    WBC 7.77 8.77 9.25    RBC 3.46 4.07 3.98    HGB 10.5 12.1 11.7    HCT 31.5 36.5 36.9    MCV 91.0 89.7 92.7    MCH 30.3 29.7 29.4    MCHC 33.3 33.2 31.7     323 295          Imaging  Not applicable      Assessment/Plan   Ms. Young, 75yo, admitted for rehydration and electrolyte repletion due to high ileostomy output, planned for flex sigmoidoscopy/colonoscopy tomorrow 7/9.     - Multi-modal pain control  - IV-fluids  - f/u morning labs  -start bowel prep  -consent patient for OR    Delmy Mills,  MD  General Surgery Resident  Please page Cooper [phone] with any questions or concerns.

## 2022-07-08 NOTE — PLAN OF CARE
Problem: Adult Inpatient Plan of Care  Goal: Readiness for Transition of Care  Intervention: Mutually Develop Transition Plan  Flowsheets (Taken 7/8/2022 1614)  Assistive Device/Animal Currently Used at Home: none  Transportation Concerns: car, none  Readmission Within the Last 30 Days: no previous admission in last 30 days  Patient/Family Anticipated Services at Transition: none  Patient/Family Anticipates Transition to: home with family  Transportation Anticipated: family or friend will provide    Initial assessment completed with patient. Patient lives in a 2SH with her . There are 4STE & a full set of stairs inside. Pt does not go to the 2nd flr.  Denies use or need of any DME. Pt has had home care in the past with [facility] . PCP,  Insurance, and Pharmacy verified. Patient will be able to obtain transportation at discharge. Pt's daughter will pick her up for transport home at [place]. Patient is Moderna vaccinated for Covid, receiving 2 shots & a boost. Patient also does not have issues affording food. Pt has LW/HCPOA.  Will continue to follow for transition of care needs until discharge is completed.

## 2022-07-08 NOTE — DISCHARGE INSTRUCTIONS
[facility]  ColoRectal Surgery Discharge Instructions.     Follow-up: Please call Dr. Mandel's office at [phone] upon discharge to schedule your follow-up appointment for 2 weeks.   Please call the office if you have any questions/concerns.       Call if you experience the following:   - fevers (>101)/chills  - nausea, vomiting, diarrhea  - pain not controlled by prescribed medications  - bleeding or other drainage from wound.   - Persistent lightheadedness, dizziness or changes in vision.     Please follow-up with your primary care physician within 1 month of discharge from the hospital to update them regarding your hospital stay.       Medications:   Please resume all of your previous home medications unless otherwise indicated.  AVOID blood thinning medications such as NSAIDS (Aleve, Advil, Ibuprofen, Motrin, Aspirin, ect)      PAIN: Take Percocet 1 -2 tablets every 4- 6 hours as needed for severe pain. Alternatively, take TYLENOL 650mg every 4 hours as needed for mild pain. Please do not take both medications together.       Diet: Low residue, low fiber. Try to avoid fresh fruits and vegetables, bran, whole grains, carbonated beverages and dairy products.      PLEASE MEASURE YOUR OSTOMY OUTPUT   Please call the office if you have ostomy output greater than 1200mL in 24 hours   If you have not heard back from the office in two days, please call to schedule your follow-up colonoscopy outpatient    UTI   Please take ciprofloxacin 500mg twice per day (once in morning and once in evening) for the next three days to finish treatment for your UTI.      Dr. Geovani Mandel  [facility]  [address]  [facility] [address] [place] [address]  [phone]

## 2022-07-08 NOTE — H&P (VIEW-ONLY)
ColoRectal Surgery Daily Progress Note    Subjective   NAEO. Was seen resting comfortably in bed. Endorses pain is well controlled. Denies nausea and vomiting. Tolerating her low residue diet. Emptying own ileostomy output. Reports passing some stool and barium from below.    Objective     Vital signs in last 24 hours:  Temp:  [36.4 °C (97.6 °F)-36.7 °C (98 °F)] 36.4 °C (97.6 °F)  Heart Rate:  [83-97] 86  Resp:  [16-18] 18  BP: (103-124)/(53-59) 124/58      Intake/Output Summary (Last 24 hours) at 7/8/2022 0827  Last data filed at 7/8/2022 0700  Gross per 24 hour   Intake 2866.67 ml   Output 2250 ml   Net 616.67 ml     Intake/Output this shift:  I/O this shift:  In: -   Out: 400 [Urine:250; Stool:150]    Physical Exam  General: awake and alert, well appearing, no acute distress  HEENT: normocephalic, atraumatic, EOM intact, conjunctiva clear, sclera nonicteric  CV: normal rate, normotensive  Pulm: normal work of breathing, no acute distress  Abd: soft, nontender, nondistended  Extr: no obvious abnormality, moving extremities spontaneously  Neuro: Grossly normal        Labs:  BMP Results       07/07/22 07/06/22 06/08/22     0338 1838 1420     132 138    K 5.1 5.3 5.0    Cl 111 105 107    CO2 18 18 19    Glucose 87 97 90    BUN 29 35 23    Creatinine 1.6 2.2 1.6    Calcium 9.5 9.8 9.3    Anion Gap 6 9 12    EGFR 31.3 21.6 31.3         CBC Results       07/07/22 07/06/22 06/08/22     0338 1838 1420    WBC 7.77 8.77 9.25    RBC 3.46 4.07 3.98    HGB 10.5 12.1 11.7    HCT 31.5 36.5 36.9    MCV 91.0 89.7 92.7    MCH 30.3 29.7 29.4    MCHC 33.3 33.2 31.7     323 295          Imaging  Not applicable      Assessment/Plan   Ms. Young, 75yo, admitted for rehydration and electrolyte repletion due to high ileostomy output, planned for flex sigmoidoscopy/colonoscopy tomorrow 7/9.     - Multi-modal pain control  - IV-fluids  - f/u morning labs  -start bowel prep  -consent patient for OR    Delmy Mills,  MD  General Surgery Resident  Please page Cooper [phone] with any questions or concerns.

## 2022-07-08 NOTE — NURSING NOTE
Discharge instructions completed at this time with patient. Medications, follow up appointments, and how to measure and record ostomy output were discussed in detail. Patient denies any further questions or concerns at this time.   IV discontinued and heart monitor removed.  Patient escorted out via wheelchair by PCT to family vehicle.

## 2022-07-12 ENCOUNTER — TELEPHONE (OUTPATIENT)
Dept: COLORECTAL SURGERY | Facility: CLINIC | Age: 77
End: 2022-07-12
Payer: MEDICARE

## 2022-07-12 NOTE — TELEPHONE ENCOUNTER
Pt called in stating that she is experiencing a UTI and would like a refill on Ciprofloxcin     Pt would like medication to go to   [facility]/pharmacy #[id number] - [place], [place] - [address] AT [facility] Phone:  [phone]   Fax:  [phone]

## 2022-07-13 ENCOUNTER — PREP FOR CASE (OUTPATIENT)
Dept: COLORECTAL SURGERY | Facility: CLINIC | Age: 77
End: 2022-07-13
Payer: MEDICARE

## 2022-07-13 DIAGNOSIS — Z12.12 SCREENING FOR COLORECTAL CANCER: Primary | ICD-10-CM

## 2022-07-13 DIAGNOSIS — K57.21 DIVERTICULITIS OF LARGE INTESTINE WITH PERFORATION AND ABSCESS WITH BLEEDING: Primary | ICD-10-CM

## 2022-07-13 DIAGNOSIS — Z12.11 SCREENING FOR COLORECTAL CANCER: Primary | ICD-10-CM

## 2022-07-13 NOTE — TELEPHONE ENCOUNTER
Spoke with pt who states she is still experiencing burning with and after urination. She states she is staying very hydrated, not measuring how many ounces a day. Per Dr Mandel will send pt for CBC, CMP, and urinalysis. Emailed script to pit. Pt will also be scheduled for flex sig in endo suite on 7/26. Transferred to N[place]ya,  to schedule. She verbalized understanding of plan, was appreciative of call

## 2022-07-18 NOTE — TELEPHONE ENCOUNTER
Pt called in and stated she is still having UTI symptoms, she stated she had lab work done at [facility] last week and wanted to know what the results were, I advise we have not received any results yet, she would like to discuss the symptoms further, [phone].

## 2022-07-18 NOTE — TELEPHONE ENCOUNTER
Spoke w/ pt after receiving results from lab as requested, results did not show positive for UTI. Pt states she is still experiencing some burning after urination, is it improving/ Advised to remain as hydrated as possible, please call back if symptoms do not continue to improve, worsen or develop anything further. She verbalized understanding, was appreciative of call.

## 2022-07-25 ENCOUNTER — APPOINTMENT (OUTPATIENT)
Dept: LAB | Facility: HOSPITAL | Age: 77
End: 2022-07-25
Attending: COLON & RECTAL SURGERY
Payer: MEDICARE

## 2022-07-25 ENCOUNTER — TELEPHONE (OUTPATIENT)
Dept: COLORECTAL SURGERY | Facility: CLINIC | Age: 77
End: 2022-07-25
Payer: MEDICARE

## 2022-07-25 ENCOUNTER — ANESTHESIA EVENT (OUTPATIENT)
Dept: ENDOSCOPY | Facility: HOSPITAL | Age: 77
End: 2022-07-25
Payer: MEDICARE

## 2022-07-25 DIAGNOSIS — Z12.11 SCREENING FOR COLORECTAL CANCER: ICD-10-CM

## 2022-07-25 DIAGNOSIS — Z12.12 SCREENING FOR COLORECTAL CANCER: ICD-10-CM

## 2022-07-25 DIAGNOSIS — K57.21 DIVERTICULITIS OF LARGE INTESTINE WITH PERFORATION AND ABSCESS WITH BLEEDING: ICD-10-CM

## 2022-07-25 LAB
AMORPH CRY #/AREA URNS HPF: ABNORMAL /HPF
BACTERIA URNS QL MICRO: ABNORMAL /HPF
BILIRUB UR QL STRIP.AUTO: NEGATIVE MG/DL
CLARITY UR REFRACT.AUTO: CLEAR
COLOR UR AUTO: YELLOW
GLUCOSE UR STRIP.AUTO-MCNC: NEGATIVE MG/DL
HGB UR QL STRIP.AUTO: NEGATIVE
HYALINE CASTS #/AREA URNS LPF: ABNORMAL /LPF
KETONES UR STRIP.AUTO-MCNC: NEGATIVE MG/DL
LEUKOCYTE ESTERASE UR QL STRIP.AUTO: NEGATIVE
MUCOUS THREADS URNS QL MICRO: ABNORMAL /LPF
NITRITE UR QL STRIP.AUTO: NEGATIVE
PH UR STRIP.AUTO: 5.5 [PH]
PROT UR QL STRIP.AUTO: NEGATIVE
RBC #/AREA URNS HPF: ABNORMAL /HPF
SARS-COV-2 RNA RESP QL NAA+PROBE: NEGATIVE
SP GR UR REFRACT.AUTO: 1.01
SQUAMOUS URNS QL MICRO: ABNORMAL /HPF
UROBILINOGEN UR STRIP-ACNC: 0.2 EU/DL
WBC #/AREA URNS HPF: ABNORMAL /HPF

## 2022-07-25 PROCEDURE — 81003 URINALYSIS AUTO W/O SCOPE: CPT

## 2022-07-25 PROCEDURE — C9803 HOPD COVID-19 SPEC COLLECT: HCPCS

## 2022-07-25 PROCEDURE — U0003 INFECTIOUS AGENT DETECTION BY NUCLEIC ACID (DNA OR RNA); SEVERE ACUTE RESPIRATORY SYNDROME CORONAVIRUS 2 (SARS-COV-2) (CORONAVIRUS DISEASE [COVID-19]), AMPLIFIED PROBE TECHNIQUE, MAKING USE OF HIGH THROUGHPUT TECHNOLOGIES AS DESCRIBED BY CMS-2020-01-R: HCPCS

## 2022-07-25 ASSESSMENT — LIFESTYLE VARIABLES: TOBACCO_USE: 1

## 2022-07-25 ASSESSMENT — ENCOUNTER SYMPTOMS: DYSRHYTHMIAS: 1

## 2022-07-25 NOTE — TELEPHONE ENCOUNTER
Pt called in and left a voice mail at the , she requested to speak with a nurse and did not specify the reason for the call, please call [phone].

## 2022-07-25 NOTE — ANESTHESIA PREPROCEDURE EVALUATION
Relevant Problems   CARDIOVASCULAR   (+) Dyslipidemia   (+) Hypertension      GASTROINTESTINAL   (+) GERD (gastroesophageal reflux disease)      HEMATOLOGY   (+) Anemia      MUSCULOSKELETAL   (+) Primary osteoarthritis of left knee      NEUROLOGY   (+) History of cancer of vagina   (+) History of lung cancer      URINARY SYSTEM   (+) Low magnesium level      Other   (+) Adenocarcinoma of left lung (CMS/HCC)   (+) Hypothyroidism     76 y.o. 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      Recently admitted for IV rehydration and electrolyte correction. She is scheduled for ostomy reversal in August.     Anesthesia ROS/MED HX      Pulmonary    history of tobacco use and ex-smoker  Cardiovascular   CAD   dyslipidemia   hypertension  Dysrhythmias (RBBB)   Covid19 Test Reviewed and ECG reviewed  Hematological    anemia  GI/Hepatic   inflammatory bowel disease   Hiatal hernia   GERD  Musculoskeletal   Arthritis  Renal Disease   chronic renal insufficiency  Endo/Other   Hypothyroidism  History of cancer (Vaginal CA), radiation treatment and lung cancer  ROS/MED HX Comments:    ECG: EKG (7/6/2022):  Sinus rhythm with Premature supraventricular complexes   Low voltage QRS   Right bundle branch block   Abnormal ECG   When compared with ECG of 21-OCT-2021 11:14,   Premature supraventricular complexes are now Present   Right bundle branch block is now Present        Past Surgical History:   Procedure Laterality Date   • CHOLECYSTECTOMY     • COLONOSCOPY     • DILATION AND CURETTAGE OF UTERUS  02/2018   • LUNG SURGERY     • ROTATOR CUFF REPAIR Right    • TONSILLECTOMY     • WISDOM TOOTH EXTRACTION       hx of       Physical Exam    CBC Results       07/08/22 07/07/22 07/06/22     0728 0338 1838    WBC 5.99 7.77 8.77    RBC 3.28 3.46 4.07    HGB 10.0 10.5 12.1    HCT 30.3 31.5 36.5    MCV 92.4 91.0 89.7    MCH 30.5 30.3 29.7    MCHC 33.0 33.3 33.2     263 323        CMP Results       07/08/22 07/07/22 07/06/22     0728 0338 1838     135 132    K 4.6 5.1 5.3    Cl 113 111 105    CO2 19 18 18    Glucose 81 87 97    BUN 14 29 35    Creatinine 1.1 1.6 2.2    Calcium 8.5 9.5 9.8    Anion Gap 6 6 9    AST -- -- 20    ALT -- -- 17    Albumin -- 3.4 4.3    EGFR 48.2 31.3 21.6         Comment for BUN at 0728 on 07/08/22: Result rechecked            Anesthesia Plan    Plan: MAC    Technique: MAC and total IV anesthesia     Lines and Monitors: PIV     Airway: natural airway / supplemental oxygen   ASA 3  Induction:    intravenous   Postop Plan:   Patient Disposition: phase II then home   Pain Management: IV analgesics

## 2022-07-26 ENCOUNTER — HOSPITAL ENCOUNTER (OUTPATIENT)
Facility: HOSPITAL | Age: 77
Discharge: HOME | End: 2022-07-26
Attending: COLON & RECTAL SURGERY | Admitting: COLON & RECTAL SURGERY
Payer: MEDICARE

## 2022-07-26 ENCOUNTER — ANESTHESIA (OUTPATIENT)
Dept: ENDOSCOPY | Facility: HOSPITAL | Age: 77
End: 2022-07-26
Payer: MEDICARE

## 2022-07-26 VITALS
TEMPERATURE: 97.1 F | HEART RATE: 79 BPM | BODY MASS INDEX: 24.75 KG/M2 | OXYGEN SATURATION: 100 % | WEIGHT: 145 LBS | SYSTOLIC BLOOD PRESSURE: 111 MMHG | DIASTOLIC BLOOD PRESSURE: 56 MMHG | HEIGHT: 64 IN | RESPIRATION RATE: 12 BRPM

## 2022-07-26 PROCEDURE — 25000000 HC PHARMACY GENERAL: Performed by: NURSE ANESTHETIST, CERTIFIED REGISTERED

## 2022-07-26 PROCEDURE — 63600000 HC DRUGS/DETAIL CODE: Mod: JW | Performed by: NURSE ANESTHETIST, CERTIFIED REGISTERED

## 2022-07-26 PROCEDURE — 45330 DIAGNOSTIC SIGMOIDOSCOPY: CPT | Mod: 78 | Performed by: COLON & RECTAL SURGERY

## 2022-07-26 PROCEDURE — 71000001 HC PACU PHASE 1 INITIAL 30MIN: Performed by: COLON & RECTAL SURGERY

## 2022-07-26 PROCEDURE — 75000135 HC SIGMOIDOSCOPY W WO BRUSH: Performed by: COLON & RECTAL SURGERY

## 2022-07-26 PROCEDURE — 37000002 HC ANESTHESIA MAC: Performed by: COLON & RECTAL SURGERY

## 2022-07-26 PROCEDURE — 25800000 HC PHARMACY IV SOLUTIONS: Performed by: NURSE ANESTHETIST, CERTIFIED REGISTERED

## 2022-07-26 PROCEDURE — 71000011 HC PACU PHASE 1 EA ADDL MIN: Performed by: COLON & RECTAL SURGERY

## 2022-07-26 PROCEDURE — 0DJD8ZZ INSPECTION OF LOWER INTESTINAL TRACT, VIA NATURAL OR ARTIFICIAL OPENING ENDOSCOPIC: ICD-10-PCS | Performed by: COLON & RECTAL SURGERY

## 2022-07-26 RX ORDER — SODIUM CHLORIDE 9 MG/ML
INJECTION, SOLUTION INTRAVENOUS CONTINUOUS PRN
Status: DISCONTINUED | OUTPATIENT
Start: 2022-07-26 | End: 2022-07-26 | Stop reason: SURG

## 2022-07-26 RX ORDER — PROPOFOL 200MG/20ML
SYRINGE (ML) INTRAVENOUS AS NEEDED
Status: DISCONTINUED | OUTPATIENT
Start: 2022-07-26 | End: 2022-07-26 | Stop reason: SURG

## 2022-07-26 RX ORDER — DEXTROSE 40 %
15-30 GEL (GRAM) ORAL AS NEEDED
Status: DISCONTINUED | OUTPATIENT
Start: 2022-07-26 | End: 2022-07-26 | Stop reason: HOSPADM

## 2022-07-26 RX ORDER — ONDANSETRON HYDROCHLORIDE 2 MG/ML
4 INJECTION, SOLUTION INTRAVENOUS
Status: DISCONTINUED | OUTPATIENT
Start: 2022-07-26 | End: 2022-07-26 | Stop reason: HOSPADM

## 2022-07-26 RX ORDER — SODIUM CHLORIDE, SODIUM GLUCONATE, SODIUM ACETATE, POTASSIUM CHLORIDE AND MAGNESIUM CHLORIDE 30; 37; 368; 526; 502 MG/100ML; MG/100ML; MG/100ML; MG/100ML; MG/100ML
125 INJECTION, SOLUTION INTRAVENOUS CONTINUOUS
Status: DISCONTINUED | OUTPATIENT
Start: 2022-07-26 | End: 2022-07-26 | Stop reason: HOSPADM

## 2022-07-26 RX ORDER — PROPOFOL 10 MG/ML
INJECTION, EMULSION INTRAVENOUS CONTINUOUS PRN
Status: DISCONTINUED | OUTPATIENT
Start: 2022-07-26 | End: 2022-07-26 | Stop reason: SURG

## 2022-07-26 RX ORDER — DEXTROSE 50 % IN WATER (D50W) INTRAVENOUS SYRINGE
25 AS NEEDED
Status: DISCONTINUED | OUTPATIENT
Start: 2022-07-26 | End: 2022-07-26 | Stop reason: HOSPADM

## 2022-07-26 RX ORDER — IBUPROFEN 200 MG
16-32 TABLET ORAL AS NEEDED
Status: DISCONTINUED | OUTPATIENT
Start: 2022-07-26 | End: 2022-07-26 | Stop reason: HOSPADM

## 2022-07-26 RX ORDER — LIDOCAINE HYDROCHLORIDE 10 MG/ML
INJECTION, SOLUTION EPIDURAL; INFILTRATION; INTRACAUDAL; PERINEURAL AS NEEDED
Status: DISCONTINUED | OUTPATIENT
Start: 2022-07-26 | End: 2022-07-26 | Stop reason: SURG

## 2022-07-26 RX ADMIN — LIDOCAINE HYDROCHLORIDE 5 ML: 10 INJECTION, SOLUTION EPIDURAL; INFILTRATION; INTRACAUDAL; PERINEURAL at 11:53

## 2022-07-26 RX ADMIN — PROPOFOL 30 MG: 10 INJECTION, EMULSION INTRAVENOUS at 11:53

## 2022-07-26 RX ADMIN — SODIUM CHLORIDE: 9 INJECTION, SOLUTION INTRAVENOUS at 11:46

## 2022-07-26 RX ADMIN — PROPOFOL INJECTABLE EMULSION 100 MCG/KG/MIN: 10 INJECTION, EMULSION INTRAVENOUS at 11:53

## 2022-07-26 RX ADMIN — PROPOFOL 20 MG: 10 INJECTION, EMULSION INTRAVENOUS at 11:54

## 2022-07-26 NOTE — OR SURGEON
Pre-Procedure patient identification:  I am the primary operating surgeon/proceduralist and I have identified the patient on 07/26/22 at 10:49 AM Geovani Mandel MD  Phone Number: [phone]

## 2022-07-26 NOTE — ANESTHESIA POSTPROCEDURE EVALUATION
Patient: Raegan Young    Procedure Summary     Date: 07/26/22 Room / [facility] (B[facility]    Anesthesia Start: 1146 Anesthesia Stop: 1206    Procedure: WA SIGMOIDOSCOPY FLX DX W/COLLJ SPEC BR/WA IF PFRMD [09706 (CPT®)] (N/A Anus) Diagnosis:       Screening for colon cancer      (FLEXIBLE SIGMOIDOSCOPY)    Providers: Geovani Mandel MD Responsible Provider: Gasper Johnson MD    Anesthesia Type: MAC ASA Status: 3          Anesthesia Type: MAC  PACU Vitals  7/26/2022 1201 - 7/26/2022 1301      7/26/2022  1204 7/26/2022  1210 7/26/2022  1220 7/26/2022  1230    BP: 83/43 101/53 109/53 111/56    Temp: 36.2 °C (97.1 °F) -- -- --    Pulse: 81 79 78 79    Resp: 20 18 17 12    SpO2: 100 % 100 % 100 % 100 %            Anesthesia Post Evaluation    Pain management: adequate  Mode of pain management: IV medication  Patient location during evaluation: PACU  Patient participation: complete - patient participated  Level of consciousness: awake and alert  Cardiovascular status: acceptable  Airway Patency: adequate  Respiratory status: acceptable  Hydration status: acceptable  Anesthetic complications: no

## 2022-07-26 NOTE — ANESTHESIOLOGIST PRE-PROCEDURE ATTESTATION
Pre-Procedure Patient Identification:  I am the Primary Anesthesiologist and have identified the patient on 07/26/22 at 11:24 AM.   I have confirmed the procedure(s) will be performed by the following surgeon/proceduralist Geovani Mandel MD.

## 2022-07-26 NOTE — OP NOTE
_______________________________________________________________________________  Patient Name: Raegan Young         Procedure Date: 7/26/2022 11:42 AM  MRN: [id number]                     Account Number: [id number]  YOB: 1945              Age: 76  Gender: Female                        Note Status: Finalized  Attending MD: UZIEL ANDRADE MD  _______________________________________________________________________________  Procedure:             Flexible Sigmoidoscopy  Indications:           Follow-up of diverticulitis  Providers:             UZIEL ANDRADE MD (Doctor)  Referring MD:          ODIN RUBIN DO  Requesting Provider:  Complications:         No immediate complications.  _______________________________________________________________________________  Procedure:             After obtaining informed consent, the endoscope was  passed under direct vision. Throughout the procedure,  the patient's blood pressure, pulse, and oxygen  saturations were monitored continuously. The  colonoscope was introduced through the anus and  advanced to the descending colon. The flexible  sigmoidoscopy was accomplished without difficulty. The  patient tolerated the procedure well. The quality of  the bowel preparation was adequate.  Findings:  Normal coloanal anastomosis  Impression:            - No specimens collected.  Recommendation:        - Discharge patient to home.  - High fiber diet indefinitely.  - Return to my office as previously scheduled.  Procedure Code(s):     --- Professional ---  94754, Sigmoidoscopy, flexible; diagnostic, including  collection of specimen(s) by brushing or washing, when  performed (separate procedure)  Diagnosis Code(s):     --- Professional ---  K57.32, Diverticulitis of large intestine without  perforation or abscess without bleeding  CPT copyright 2020 [employer]. All rights reserved.  The codes documented in this report are preliminary  and upon  review may  be revised to meet current compliance requirements.  Attending Participation:  I personally performed the entire procedure.  Geovani Mandel MD  _______________________  GEOVNAI MANDEL MD  7/26/2022 12:04:14 PM  This report has been signed electronically.  Number of Addenda: 0  Note Initiated On: 7/26/2022 11:42 AM

## 2022-07-27 ENCOUNTER — PREP FOR CASE (OUTPATIENT)
Dept: COLORECTAL SURGERY | Facility: CLINIC | Age: 77
End: 2022-07-27
Payer: MEDICARE

## 2022-07-27 DIAGNOSIS — N82.4 COLOVAGINAL FISTULA: Primary | ICD-10-CM

## 2022-07-27 RX ORDER — ALVIMOPAN 12 MG/1
12 CAPSULE ORAL ONCE
Status: CANCELLED | OUTPATIENT
Start: 2022-07-27 | End: 2022-07-27

## 2022-07-27 RX ORDER — METRONIDAZOLE 500 MG/100ML
500 INJECTION, SOLUTION INTRAVENOUS
Status: CANCELLED | OUTPATIENT
Start: 2022-07-27

## 2022-07-27 RX ORDER — CELECOXIB 100 MG/1
200 CAPSULE ORAL ONCE
Status: CANCELLED | OUTPATIENT
Start: 2022-07-27 | End: 2022-07-27

## 2022-07-27 RX ORDER — GABAPENTIN 100 MG/1
600 CAPSULE ORAL ONCE
Status: CANCELLED | OUTPATIENT
Start: 2022-07-27 | End: 2022-07-27

## 2022-07-27 RX ORDER — ACETAMINOPHEN 325 MG/1
975 TABLET ORAL ONCE
Status: CANCELLED | OUTPATIENT
Start: 2022-07-27 | End: 2022-07-27

## 2022-08-01 ENCOUNTER — APPOINTMENT (OUTPATIENT)
Dept: PREADMISSION TESTING | Facility: HOSPITAL | Age: 77
End: 2022-08-01
Payer: MEDICARE

## 2022-08-01 VITALS — BODY MASS INDEX: 28.47 KG/M2 | HEIGHT: 60 IN | WEIGHT: 145 LBS

## 2022-08-01 ASSESSMENT — PAIN SCALES - GENERAL: PAINLEVEL: 3

## 2022-08-01 NOTE — PRE-PROCEDURE INSTRUCTIONS
1.      You will be called between 3pm -7pm one business day before your procedure  August 4, 2022   to tell you where and when to report.  If you do not receive a phone call by 7pm, please call the Admissions office at [phone] to determine the arrival time for your procedure.      2.        Please report to Admissions or Short Procedure Unit , park in lot A, on the day of your procedure.  Detailed directions will be given to you when you are called with arrival time.      3.      No solid food for EIGHT HOURS prior to surgery.  Unlimited CLEAR liquids, meaning water or PLAIN black coffee (WITHOUT any milk, cream, sugar, or sweetener) are permitted up to TWO HOURS prior to arrival at the hospital.      4.      Early on the morning of the procedure please take your usual dose of the listed medications with a sip of water:    Atorvastatin, Protonix, and Levothyroxine   5.      Other Instructions: You may brush your teeth the morning of the procedure. Rinse and spit, do not swallow.  Bring a list of your medications with dosages with you.  Use antibacterial soap as directed.   6.      If you develop a cold, cough, fever, rash, or other symptom prior to the data of the procedure, please report it to your physician immediately.     7.      If you need to cancel the procedure for any reason, please contact your physician.     8.      Make arrangements to have someone drive you home from the procedure. If you have not arranged for transportation home, your surgery may be cancelled.      9.      You may not take public transportation unless you are accompanied by a responsible person.     10.      You may not drive a car or operate complex or potentially dangerous machinery for 24 hours following anesthesia and/or sedation.      11.      12.      If it is medically necessary for you to have a longer stay, you will be informed as soon as the decision is made.              Only bring essential items to the hospital.  Do  not wear or bring anything of value to the hospital including jewelry of any kind, money, or wallet. Do not wear make-up or contact lenses. Do not BRING MEDICATIONS FROM HOME unless instructed to do so.  DO bring your hearing aids, glasses, and a case.        13.      No lotion, creams, powders, or oils on skin the morning of procedure        14.      Dress in comfortable clothes.     15.      If instructed, please bring a copy of your Advanced Directive (Living Will/Durable Power of ) on the day of your procedure.      16.      17.            18.       19.       Patients need to quarantine from the time of PAT COVID test to day of surgery, regardless of COVID vaccine status.         Ensuring your safety at all times is a very important part of out [facility] Culture of Safety . After having surgery and sedation, you are at risk for falling and balance issues.  Although you may feel awake, the effects of the medication can last up to 24 hours after anesthesia.  If you need to use the bathroom during your recovery period, nursing staff will escort you there and stay with you to ensure your safety.          Refrain from drinking alcohol and smoking cigarettes for 24 hours prior to surgery.         Shower with antibacterial soap (DIAL) the night before and morning of your procedure.  If your procedure indicates the need for CHG antiseptic was (Bactoshield or Hibiclens), please use this instead and follow instructions as discussed at the time of your Pre-Admission Testing visit or phone interview.     Above instructions reviewed with patient and patient acknowledges understanding.

## 2022-08-03 ENCOUNTER — APPOINTMENT (OUTPATIENT)
Dept: LAB | Facility: HOSPITAL | Age: 77
End: 2022-08-03
Attending: COLON & RECTAL SURGERY
Payer: MEDICARE

## 2022-08-03 DIAGNOSIS — N82.4 COLOVAGINAL FISTULA: ICD-10-CM

## 2022-08-03 LAB
ABO + RH BLD: NORMAL
ALBUMIN SERPL-MCNC: 4 G/DL (ref 3.4–5)
ALP SERPL-CCNC: 78 IU/L (ref 35–126)
ALT SERPL-CCNC: 17 IU/L (ref 11–54)
ANION GAP SERPL CALC-SCNC: 10 MEQ/L (ref 3–15)
AST SERPL-CCNC: 21 IU/L (ref 15–41)
BASOPHILS # BLD: 0.04 K/UL (ref 0.01–0.1)
BASOPHILS NFR BLD: 0.4 %
BILIRUB SERPL-MCNC: 0.8 MG/DL (ref 0.3–1.2)
BLD GP AB SCN SERPL QL: NEGATIVE
BLOOD BANK CMNT PATIENT-IMP: NORMAL
BUN SERPL-MCNC: 21 MG/DL (ref 8–20)
CALCIUM SERPL-MCNC: 9.5 MG/DL (ref 8.9–10.3)
CHLORIDE SERPL-SCNC: 106 MEQ/L (ref 98–109)
CO2 SERPL-SCNC: 20 MEQ/L (ref 22–32)
CREAT SERPL-MCNC: 1.3 MG/DL (ref 0.6–1.1)
D AG BLD QL: NEGATIVE
DIFFERENTIAL METHOD BLD: ABNORMAL
EOSINOPHIL # BLD: 0.11 K/UL (ref 0.04–0.36)
EOSINOPHIL NFR BLD: 1.2 %
ERYTHROCYTE [DISTWIDTH] IN BLOOD BY AUTOMATED COUNT: 14.5 % (ref 11.7–14.4)
GFR SERPL CREATININE-BSD FRML MDRD: 39.7 ML/MIN/1.73M*2
GLUCOSE SERPL-MCNC: 104 MG/DL (ref 70–99)
HCT VFR BLDCO AUTO: 36.8 % (ref 35–45)
HGB BLD-MCNC: 12.1 G/DL (ref 11.8–15.7)
IMM GRANULOCYTES # BLD AUTO: 0.04 K/UL (ref 0–0.08)
IMM GRANULOCYTES NFR BLD AUTO: 0.4 %
LABORATORY COMMENT REPORT: NORMAL
LYMPHOCYTES # BLD: 1.78 K/UL (ref 1.2–3.5)
LYMPHOCYTES NFR BLD: 19.8 %
MCH RBC QN AUTO: 30.8 PG (ref 28–33.2)
MCHC RBC AUTO-ENTMCNC: 32.9 G/DL (ref 32.2–35.5)
MCV RBC AUTO: 93.6 FL (ref 83–98)
MONOCYTES # BLD: 0.71 K/UL (ref 0.28–0.8)
MONOCYTES NFR BLD: 7.9 %
NEUTROPHILS # BLD: 6.3 K/UL (ref 1.7–7)
NEUTS SEG NFR BLD: 70.3 %
NRBC BLD-RTO: 0 %
PDW BLD AUTO: 9.1 FL (ref 9.4–12.3)
PLATELET # BLD AUTO: 323 K/UL (ref 150–369)
POTASSIUM SERPL-SCNC: 5 MEQ/L (ref 3.6–5.1)
PROT SERPL-MCNC: 7 G/DL (ref 6–8.2)
RBC # BLD AUTO: 3.93 M/UL (ref 3.93–5.22)
SARS-COV-2 RNA RESP QL NAA+PROBE: NEGATIVE
SODIUM SERPL-SCNC: 136 MEQ/L (ref 136–144)
SPECIMEN EXP DATE BLD: NORMAL
WBC # BLD AUTO: 8.98 K/UL (ref 3.8–10.5)

## 2022-08-03 PROCEDURE — 80053 COMPREHEN METABOLIC PANEL: CPT

## 2022-08-03 PROCEDURE — C9803 HOPD COVID-19 SPEC COLLECT: HCPCS

## 2022-08-03 PROCEDURE — 36415 COLL VENOUS BLD VENIPUNCTURE: CPT

## 2022-08-03 PROCEDURE — 86850 RBC ANTIBODY SCREEN: CPT

## 2022-08-03 PROCEDURE — 85025 COMPLETE CBC W/AUTO DIFF WBC: CPT

## 2022-08-03 PROCEDURE — U0003 INFECTIOUS AGENT DETECTION BY NUCLEIC ACID (DNA OR RNA); SEVERE ACUTE RESPIRATORY SYNDROME CORONAVIRUS 2 (SARS-COV-2) (CORONAVIRUS DISEASE [COVID-19]), AMPLIFIED PROBE TECHNIQUE, MAKING USE OF HIGH THROUGHPUT TECHNOLOGIES AS DESCRIBED BY CMS-2020-01-R: HCPCS

## 2022-08-05 ENCOUNTER — ANESTHESIA EVENT (OUTPATIENT)
Dept: OPERATING ROOM | Facility: HOSPITAL | Age: 77
Setting detail: SURGERY ADMIT
End: 2022-08-05
Payer: MEDICARE

## 2022-08-05 ENCOUNTER — HOSPITAL ENCOUNTER (OUTPATIENT)
Facility: HOSPITAL | Age: 77
Setting detail: HOSPITAL OUTPATIENT SURGERY
Discharge: HOME | End: 2022-08-05
Attending: COLON & RECTAL SURGERY | Admitting: COLON & RECTAL SURGERY
Payer: MEDICARE

## 2022-08-05 VITALS
TEMPERATURE: 98 F | BODY MASS INDEX: 29.1 KG/M2 | HEART RATE: 69 BPM | RESPIRATION RATE: 20 BRPM | DIASTOLIC BLOOD PRESSURE: 58 MMHG | OXYGEN SATURATION: 100 % | WEIGHT: 149 LBS | SYSTOLIC BLOOD PRESSURE: 105 MMHG

## 2022-08-05 DIAGNOSIS — N82.4 COLOVAGINAL FISTULA: Primary | ICD-10-CM

## 2022-08-05 PROCEDURE — 25000000 HC PHARMACY GENERAL: Performed by: COLON & RECTAL SURGERY

## 2022-08-05 PROCEDURE — 71000002 HC PACU PHASE 2 INITIAL 30MIN: Performed by: COLON & RECTAL SURGERY

## 2022-08-05 PROCEDURE — 0UJH8ZZ INSPECTION OF VAGINA AND CUL-DE-SAC, VIA NATURAL OR ARTIFICIAL OPENING ENDOSCOPIC: ICD-10-PCS | Performed by: COLON & RECTAL SURGERY

## 2022-08-05 PROCEDURE — 63600000 HC DRUGS/DETAIL CODE: Performed by: NURSE ANESTHETIST, CERTIFIED REGISTERED

## 2022-08-05 PROCEDURE — 71000001 HC PACU PHASE 1 INITIAL 30MIN: Performed by: COLON & RECTAL SURGERY

## 2022-08-05 PROCEDURE — 63600000 HC DRUGS/DETAIL CODE

## 2022-08-05 PROCEDURE — 36000003 HC OR LEVEL 3 INITIAL 30MIN: Performed by: COLON & RECTAL SURGERY

## 2022-08-05 PROCEDURE — 0DJD7ZZ INSPECTION OF LOWER INTESTINAL TRACT, VIA NATURAL OR ARTIFICIAL OPENING: ICD-10-PCS | Performed by: COLON & RECTAL SURGERY

## 2022-08-05 PROCEDURE — 71000012 HC PACU PHASE 2 EA ADDL MIN: Performed by: COLON & RECTAL SURGERY

## 2022-08-05 PROCEDURE — 25800000 HC PHARMACY IV SOLUTIONS: Performed by: NURSE ANESTHETIST, CERTIFIED REGISTERED

## 2022-08-05 PROCEDURE — 37000001 HC ANESTHESIA GENERAL: Performed by: COLON & RECTAL SURGERY

## 2022-08-05 PROCEDURE — 200200 PR NO CHARGE: Performed by: OBSTETRICS & GYNECOLOGY

## 2022-08-05 PROCEDURE — 45990 SURG DX EXAM ANORECTAL: CPT | Mod: 78 | Performed by: COLON & RECTAL SURGERY

## 2022-08-05 PROCEDURE — 25000000 HC PHARMACY GENERAL: Performed by: NURSE ANESTHETIST, CERTIFIED REGISTERED

## 2022-08-05 PROCEDURE — 36000013 HC OR LEVEL 3 EA ADDL MIN: Performed by: COLON & RECTAL SURGERY

## 2022-08-05 PROCEDURE — 71000011 HC PACU PHASE 1 EA ADDL MIN: Performed by: COLON & RECTAL SURGERY

## 2022-08-05 PROCEDURE — 63700000 HC SELF-ADMINISTRABLE DRUG

## 2022-08-05 PROCEDURE — 27200000 HC STERILE SUPPLY: Performed by: COLON & RECTAL SURGERY

## 2022-08-05 RX ORDER — DEXTROSE 50 % IN WATER (D50W) INTRAVENOUS SYRINGE
25 AS NEEDED
Status: DISCONTINUED | OUTPATIENT
Start: 2022-08-05 | End: 2022-08-05 | Stop reason: SDUPTHER

## 2022-08-05 RX ORDER — IBUPROFEN 200 MG
16-32 TABLET ORAL AS NEEDED
Status: DISCONTINUED | OUTPATIENT
Start: 2022-08-05 | End: 2022-08-05 | Stop reason: SDUPTHER

## 2022-08-05 RX ORDER — METHYLENE BLUE 10 MG/ML
INJECTION INTRAVENOUS
Status: DISCONTINUED | OUTPATIENT
Start: 2022-08-05 | End: 2022-08-05 | Stop reason: HOSPADM

## 2022-08-05 RX ORDER — FENTANYL CITRATE 50 UG/ML
50 INJECTION, SOLUTION INTRAMUSCULAR; INTRAVENOUS
Status: DISCONTINUED | OUTPATIENT
Start: 2022-08-05 | End: 2022-08-05 | Stop reason: HOSPADM

## 2022-08-05 RX ORDER — GABAPENTIN 300 MG/1
600 CAPSULE ORAL ONCE
Status: COMPLETED | OUTPATIENT
Start: 2022-08-05 | End: 2022-08-05

## 2022-08-05 RX ORDER — DEXTROSE 40 %
15-30 GEL (GRAM) ORAL AS NEEDED
Status: DISCONTINUED | OUTPATIENT
Start: 2022-08-05 | End: 2022-08-05 | Stop reason: SDUPTHER

## 2022-08-05 RX ORDER — METRONIDAZOLE 500 MG/100ML
500 INJECTION, SOLUTION INTRAVENOUS
Status: DISCONTINUED | OUTPATIENT
Start: 2022-08-05 | End: 2022-08-05 | Stop reason: HOSPADM

## 2022-08-05 RX ORDER — CIPROFLOXACIN 500 MG/1
500 TABLET ORAL 2 TIMES DAILY
Qty: 20 TABLET | Refills: 0 | Status: SHIPPED | OUTPATIENT
Start: 2022-08-05 | End: 2022-08-15

## 2022-08-05 RX ORDER — DEXAMETHASONE SODIUM PHOSPHATE 4 MG/ML
INJECTION, SOLUTION INTRA-ARTICULAR; INTRALESIONAL; INTRAMUSCULAR; INTRAVENOUS; SOFT TISSUE AS NEEDED
Status: DISCONTINUED | OUTPATIENT
Start: 2022-08-05 | End: 2022-08-05 | Stop reason: SURG

## 2022-08-05 RX ORDER — CELECOXIB 200 MG/1
200 CAPSULE ORAL ONCE
Status: COMPLETED | OUTPATIENT
Start: 2022-08-05 | End: 2022-08-05

## 2022-08-05 RX ORDER — ACETAMINOPHEN 325 MG/1
975 TABLET ORAL ONCE
Status: COMPLETED | OUTPATIENT
Start: 2022-08-05 | End: 2022-08-05

## 2022-08-05 RX ORDER — LIDOCAINE HYDROCHLORIDE 10 MG/ML
INJECTION, SOLUTION INFILTRATION; PERINEURAL AS NEEDED
Status: DISCONTINUED | OUTPATIENT
Start: 2022-08-05 | End: 2022-08-05 | Stop reason: SURG

## 2022-08-05 RX ORDER — ONDANSETRON HYDROCHLORIDE 2 MG/ML
4 INJECTION, SOLUTION INTRAVENOUS
Status: DISCONTINUED | OUTPATIENT
Start: 2022-08-05 | End: 2022-08-05 | Stop reason: HOSPADM

## 2022-08-05 RX ORDER — ACETAMINOPHEN 325 MG/1
650 TABLET ORAL EVERY 4 HOURS PRN
Qty: 30 TABLET | Refills: 0 | Status: SHIPPED | OUTPATIENT
Start: 2022-08-05

## 2022-08-05 RX ORDER — PHENYLEPHRINE HYDROCHLORIDE 10 MG/ML
INJECTION INTRAVENOUS AS NEEDED
Status: DISCONTINUED | OUTPATIENT
Start: 2022-08-05 | End: 2022-08-05 | Stop reason: SURG

## 2022-08-05 RX ORDER — DEXTROSE 40 %
15-30 GEL (GRAM) ORAL AS NEEDED
Status: DISCONTINUED | OUTPATIENT
Start: 2022-08-05 | End: 2022-08-05 | Stop reason: HOSPADM

## 2022-08-05 RX ORDER — METRONIDAZOLE 500 MG/100ML
INJECTION, SOLUTION INTRAVENOUS AS NEEDED
Status: DISCONTINUED | OUTPATIENT
Start: 2022-08-05 | End: 2022-08-05 | Stop reason: SURG

## 2022-08-05 RX ORDER — IBUPROFEN 200 MG
16-32 TABLET ORAL AS NEEDED
Status: DISCONTINUED | OUTPATIENT
Start: 2022-08-05 | End: 2022-08-05 | Stop reason: HOSPADM

## 2022-08-05 RX ORDER — DEXTROSE 50 % IN WATER (D50W) INTRAVENOUS SYRINGE
25 AS NEEDED
Status: DISCONTINUED | OUTPATIENT
Start: 2022-08-05 | End: 2022-08-05 | Stop reason: HOSPADM

## 2022-08-05 RX ORDER — FENTANYL CITRATE 50 UG/ML
INJECTION, SOLUTION INTRAMUSCULAR; INTRAVENOUS AS NEEDED
Status: DISCONTINUED | OUTPATIENT
Start: 2022-08-05 | End: 2022-08-05 | Stop reason: SURG

## 2022-08-05 RX ORDER — HYDROMORPHONE HYDROCHLORIDE 1 MG/ML
0.5 INJECTION, SOLUTION INTRAMUSCULAR; INTRAVENOUS; SUBCUTANEOUS
Status: DISCONTINUED | OUTPATIENT
Start: 2022-08-05 | End: 2022-08-05 | Stop reason: HOSPADM

## 2022-08-05 RX ORDER — ALVIMOPAN 12 MG/1
12 CAPSULE ORAL ONCE
Status: COMPLETED | OUTPATIENT
Start: 2022-08-05 | End: 2022-08-05

## 2022-08-05 RX ORDER — SODIUM CHLORIDE 9 MG/ML
INJECTION, SOLUTION INTRAVENOUS CONTINUOUS PRN
Status: DISCONTINUED | OUTPATIENT
Start: 2022-08-05 | End: 2022-08-05 | Stop reason: SURG

## 2022-08-05 RX ORDER — PROPOFOL 10 MG/ML
INJECTION, EMULSION INTRAVENOUS AS NEEDED
Status: DISCONTINUED | OUTPATIENT
Start: 2022-08-05 | End: 2022-08-05 | Stop reason: SURG

## 2022-08-05 RX ORDER — MIDAZOLAM HYDROCHLORIDE 2 MG/2ML
INJECTION, SOLUTION INTRAMUSCULAR; INTRAVENOUS AS NEEDED
Status: DISCONTINUED | OUTPATIENT
Start: 2022-08-05 | End: 2022-08-05 | Stop reason: SURG

## 2022-08-05 RX ORDER — ACETAMINOPHEN 325 MG/1
650 TABLET ORAL EVERY 4 HOURS PRN
Status: DISCONTINUED | OUTPATIENT
Start: 2022-08-05 | End: 2022-08-05 | Stop reason: HOSPADM

## 2022-08-05 RX ORDER — ROCURONIUM BROMIDE 10 MG/ML
INJECTION, SOLUTION INTRAVENOUS AS NEEDED
Status: DISCONTINUED | OUTPATIENT
Start: 2022-08-05 | End: 2022-08-05 | Stop reason: SURG

## 2022-08-05 RX ORDER — ONDANSETRON HYDROCHLORIDE 2 MG/ML
INJECTION, SOLUTION INTRAVENOUS AS NEEDED
Status: DISCONTINUED | OUTPATIENT
Start: 2022-08-05 | End: 2022-08-05 | Stop reason: SURG

## 2022-08-05 RX ORDER — METRONIDAZOLE 500 MG/1
500 TABLET ORAL 3 TIMES DAILY
Qty: 30 TABLET | Refills: 0 | Status: SHIPPED | OUTPATIENT
Start: 2022-08-05 | End: 2022-08-15

## 2022-08-05 RX ADMIN — ROCURONIUM BROMIDE 100 MG: 10 INJECTION, SOLUTION INTRAVENOUS at 10:23

## 2022-08-05 RX ADMIN — ALVIMOPAN 12 MG: 12 CAPSULE ORAL at 08:41

## 2022-08-05 RX ADMIN — SUGAMMADEX 100 MG: 100 INJECTION, SOLUTION INTRAVENOUS at 11:44

## 2022-08-05 RX ADMIN — ACETAMINOPHEN 975 MG: 325 TABLET, FILM COATED ORAL at 08:40

## 2022-08-05 RX ADMIN — GABAPENTIN 600 MG: 300 CAPSULE ORAL at 08:41

## 2022-08-05 RX ADMIN — ONDANSETRON HYDROCHLORIDE 4 MG: 2 SOLUTION INTRAMUSCULAR; INTRAVENOUS at 10:47

## 2022-08-05 RX ADMIN — PHENYLEPHRINE HYDROCHLORIDE 100 MCG: 10 INJECTION INTRAVENOUS at 11:28

## 2022-08-05 RX ADMIN — SODIUM CHLORIDE: 9 INJECTION, SOLUTION INTRAVENOUS at 10:10

## 2022-08-05 RX ADMIN — MIDAZOLAM HYDROCHLORIDE 2 MG: 1 INJECTION, SOLUTION INTRAMUSCULAR; INTRAVENOUS at 10:12

## 2022-08-05 RX ADMIN — CELECOXIB 200 MG: 200 CAPSULE ORAL at 08:41

## 2022-08-05 RX ADMIN — LIDOCAINE HYDROCHLORIDE 10 ML: 10 INJECTION, SOLUTION INFILTRATION; PERINEURAL at 10:21

## 2022-08-05 RX ADMIN — FENTANYL CITRATE 50 MCG: 50 INJECTION, SOLUTION INTRAMUSCULAR; INTRAVENOUS at 10:21

## 2022-08-05 RX ADMIN — FENTANYL CITRATE 50 MCG: 50 INJECTION, SOLUTION INTRAMUSCULAR; INTRAVENOUS at 10:32

## 2022-08-05 RX ADMIN — DEXAMETHASONE SODIUM PHOSPHATE 4 MG: 4 INJECTION, SOLUTION INTRAMUSCULAR; INTRAVENOUS at 10:47

## 2022-08-05 RX ADMIN — PROPOFOL 100 MG: 10 INJECTION, EMULSION INTRAVENOUS at 10:21

## 2022-08-05 RX ADMIN — SUGAMMADEX 200 MG: 100 INJECTION, SOLUTION INTRAVENOUS at 11:39

## 2022-08-05 RX ADMIN — PHENYLEPHRINE HYDROCHLORIDE 100 MCG: 10 INJECTION INTRAVENOUS at 11:15

## 2022-08-05 RX ADMIN — METRONIDAZOLE 500 MG: 500 INJECTION, SOLUTION INTRAVENOUS at 10:46

## 2022-08-05 RX ADMIN — PHENYLEPHRINE HYDROCHLORIDE 100 MCG: 10 INJECTION INTRAVENOUS at 11:18

## 2022-08-05 RX ADMIN — CEFAZOLIN 2 G: 2 INJECTION, POWDER, FOR SOLUTION INTRAMUSCULAR; INTRAVENOUS at 10:27

## 2022-08-05 ASSESSMENT — PAIN SCALES - GENERAL: PAIN_LEVEL: 0

## 2022-08-05 NOTE — ANESTHESIOLOGIST PRE-PROCEDURE ATTESTATION
Pre-Procedure Patient Identification:  I am the Primary Anesthesiologist and have identified the patient on 08/05/22 at 8:30 AM.   I have confirmed the procedure(s) will be performed by the following surgeon/proceduralist Geovani Mandel MD.

## 2022-08-05 NOTE — ANESTHESIA POSTPROCEDURE EVALUATION
Patient: Raegan Young    Procedure Summary     Date: 08/05/22 [facility]    Anesthesia Start: 1011 Anesthesia Stop: 1151    Procedure: Rectal examination under anesthesia (EUA), flexible colonoscopy (N/A ) Diagnosis:       Colovaginal fistula      (Open Stoma Closure)    Surgeons: Geovani Mandel MD Responsible Provider: Charles Garcia MD    Anesthesia Type: general ASA Status: 2          Anesthesia Type: general  PACU Vitals  8/5/2022 1151 - 8/5/2022 1238      8/5/2022  1157 8/5/2022  1200 8/5/2022  1210 8/5/2022  1225    BP: 127/52 127/52 110/48 123/51    Temp: 36.9 °C (98.5 °F) -- -- --    Pulse: 90 86 82 90    Resp: 16 19 16 20    SpO2: 98 % -- 100 % 100 %            Anesthesia Post Evaluation    Pain score: 0  Pain management: adequate  Mode of pain management: IV medication  Patient location during evaluation: PACU  Patient participation: complete - patient participated  Level of consciousness: awake and alert  Cardiovascular status: acceptable  Airway Patency: adequate  Respiratory status: acceptable  Hydration status: acceptable  Anesthetic complications: no

## 2022-08-05 NOTE — INTERVAL H&P NOTE
H&P updated. The patient was examined and the patient has a well healed anastomosis. We will proceed with stoma closure.

## 2022-08-05 NOTE — DISCHARGE SUMMARY
Inpatient Discharge Summary    BRIEF OVERVIEW  Admitting Provider: Geovani Mandel MD  Discharge Provider: Geovani Mandel MD  Primary Care Physician at Discharge: WilbertJavon [email or web address] [phone]     Admission Date: 8/5/2022     Discharge Date: 8/5/2022    Primary Discharge Diagnosis  No Principal Problem: There is no principal problem currently on the Problem List. Please update the Problem List and refresh.    Discharge Disposition     Code Status at Discharge: Full Code    Discharge Medications     Medication List      ASK your doctor about these medications    atorvastatin 40 mg tablet  Commonly known as: LIPITOR  TAKE 1 TABLET BY MOUTH EVERY DAY     benazepriL 10 mg tablet  Commonly known as: LOTENSIN  Take 10 mg by mouth daily.  Dose: 10 mg     bumetanide 1 mg tablet  Commonly known as: BUMEX  Take 1 mg by mouth as needed.  Dose: 1 mg     cholecalciferol (vitamin D3) 1,000 unit (25 mcg) tablet  Take 2,000 Units by mouth daily.  Dose: 2,000 Units     clobetasoL 0.05 % ointment  Commonly known as: TEMOVATE  Apply topically 3 (three) times a week (Mon, Wed, Fri) for 14 days. Apply to vulva 3 times per week     glucosamine-D3-Boswellia serr 1,500-400-100 mg-unit-mg tablet  Take 1 tablet by mouth daily.  Dose: 1 tablet     levothyroxine 88 mcg tablet  Commonly known as: SYNTHROID  Take 88 mcg by mouth daily.  Dose: 88 mcg     pantoprazole 40 mg EC tablet  Commonly known as: PROTONIX  Take 40 mg by mouth once daily.  Dose: 40 mg            Outpatient Follow-Up            In 3 months Owen Gamez MD PhD [facility] Thoracic Surgery at [facility]    In 4 months Trev Jimenez MD [facility] Gynecologic Oncology at [facility]            DETAILS OF HOSPITAL STAY    Presenting Problem/History of Present Illness  Colovaginal fistula [N82.4]    Operative Procedures Performed  Procedure(s):  Open Stoma Closure      Physical Exam on Day of Discharge  ***    Hospital  [facility]  [place] [facility] planned stoma closure with Dr. Mandel. ***    ***  On ___ she was deemed stable for discharge home. She was instructed to follow-up as an outpatient.     Discharge Disposition  Disposition:  Home  Destination:  Home

## 2022-08-05 NOTE — ANESTHESIA PREPROCEDURE EVALUATION
Relevant Problems   CARDIOVASCULAR   (+) Dyslipidemia   (+) Hypertension      GASTROINTESTINAL   (+) GERD (gastroesophageal reflux disease)      HEMATOLOGY   (+) Anemia      MUSCULOSKELETAL   (+) Primary osteoarthritis of left knee      NEUROLOGY   (+) History of cancer of vagina   (+) History of lung cancer      URINARY SYSTEM   (+) Low magnesium level      Other   (+) Adenocarcinoma of left lung (CMS/HCC)   (+) Hypothyroidism     Past Medical History:   Diagnosis Date   • Anemia    • Arthritis    • Colovaginal fistula    • COVID-19 vaccine series completed 2021   • Disease of thyroid gland    • Diverticulitis of colon    • GERD (gastroesophageal reflux disease)    • Hiatal hernia    • History of snoring    • Hypertension    • Hypothyroidism    • Lung cancer (CMS/HCC)    • Lung nodule    • Vaginal cancer (CMS/HCC) 2018    s/p XRT x 7 weeks and chemo weekly x 5 weeks         Past Surgical History:   Procedure Laterality Date   • BOWEL RESECTION      partial with ileostomy   • CHOLECYSTECTOMY     • COLONOSCOPY     • DILATION AND CURETTAGE OF UTERUS  2018   • LUNG SURGERY     • ROTATOR CUFF REPAIR Right    • TONSILLECTOMY     • WISDOM TOOTH EXTRACTION      hx of       Social History     Socioeconomic History   • Marital status:      Spouse name: Royal    • Number of children: 4   • Years of education: Not on file   • Highest education level: Not on file   Occupational History   • Occupation: retired/     Tobacco Use   • Smoking status: Former Smoker     Packs/day: 1.00     Years: 40.00     Pack years: 40.00     Types: Cigarettes     Start date:      Quit date:      Years since quittin.6   • Smokeless tobacco: Never Used   Vaping Use   • Vaping Use: Never used   Substance and Sexual Activity   • Alcohol use: Yes     Comment: occassional    • Drug use: No   • Sexual activity: Never     Comment:    Other Topics Concern   • Not on file   Social History Narrative    Lives with   in a 2 story home     Social Determinants of Health     Financial Resource Strain: Not on file   Food Insecurity: Not on file   Transportation Needs: Not on file   Physical Activity: Not on file   Stress: Not on file   Social Connections: Not on file   Intimate Partner Violence: Not on file   Housing Stability: Not on file       Allergies   Allergen Reactions   • Bactrim [Sulfamethoxazole-Trimethoprim]    • Adhesive Tape-Silicones      Causes bruising         Current Facility-Administered Medications:   •  acetaminophen (TYLENOL) tablet 975 mg, 975 mg, oral, Once, Shila Gilliland PA C  •  alvimopan (ENTEREG) capsule 12 mg, 12 mg, oral, Once, Shila Gilliland PA C  •  ceFAZolin (ANCEF) 2 g/100 mL NSS, 2 g, intravenous, 60 min Pre-Op **AND** metroNIDAZOLE in NaCl (iso-os) (FLAGYL) IVPB 500 mg, 500 mg, intravenous, 60 min Pre-Op, Shila Gilliland PA C  •  celecoxib (celeBREX) capsule 200 mg, 200 mg, oral, Once, Shila Gilliland PA C  •  gabapentin (NEURONTIN) capsule 600 mg, 600 mg, oral, Once, Shila Gilliland PA C    There were no vitals filed for this visit.    Cardiac Imaging    TRANSTHORACIC ECHO (TTE) COMPLETE 07/21/2021    Interpretation Summary  · The left ventricle is normal in size. There is normal wall thickness. There is focal upper septal hypertrophy. An ultrasound enhancing agent was used. There is normal left ventricular systolic function. Left ventricular ejection fraction is 65% by visual estimate. There is normal wall motion. Diastolic function was not assessed due to mitral inflow merging.  · The left atrium is moderately dilated. The left atrial volume indexed to body surface area measures 48 mL/m2.  · The mitral valve is normal. There is trace mitral regurgitation.  · The aortic valve has three cusps. There is mild thickening and focal calcification of the cusps. There is no aortic stenosis. There is no aortic regurgitation.  · The aortic root is normal in size. The ascending aorta is normal in  size. The aortic arch was not assessed.  · The right ventricle is normal in size. There is normal right ventricular systolic function. Right ventricular S' by tissue Doppler measures 20 cm/s.  · The right atrium is normal in size.  · The tricuspid valve is normal. There is trace tricuspid regurgitation. The estimated right ventricular systolic pressure = 34 mmHg.  · The pulmonic valve is normal. There is physiologic pulmonic regurgitation.  · The inferior vena cava measures less than 2.1 cm and collapses greater than 50% with inspiration. The estimated right atrial pressure is 0 -5 mmHg.  · There is no pericardial effusion.    There are no prior studies available for comparison.      CBC Results       08/03/22 07/08/22 07/07/22     1037 0728 0338    WBC 8.98 5.99 7.77    RBC 3.93 3.28 3.46    HGB 12.1 10.0 10.5    HCT 36.8 30.3 31.5    MCV 93.6 92.4 91.0    MCH 30.8 30.5 30.3    MCHC 32.9 33.0 33.3     236 263        BMP Results       08/03/22 07/08/22 07/07/22     1037 0728 0338     138 135    K 5.0 4.6 5.1    Cl 106 113 111    CO2 20 19 18    Glucose 104 81 87    BUN 21 14 29    Creatinine 1.3 1.1 1.6    Calcium 9.5 8.5 9.5    Anion Gap 10 6 6    EGFR 39.7 48.2 31.3         Comment for BUN at 0728 on 07/08/22: Result rechecked          PT/PTT Results    No lab values to display.       The patient does not have an active anticoagulation episode.    AB    Anesthesia ROS/MED HX      Cardiovascular   PVD   CAD   hypertension   Echocardiogram reviewed and ECG reviewed  GI/Hepatic   Hiatal hernia   GERD  Endo/Other   Hypothyroidism  Body Habitus: Normal       Past Surgical History:   Procedure Laterality Date   • BOWEL RESECTION      partial with ileostomy   • CHOLECYSTECTOMY     • COLONOSCOPY     • DILATION AND CURETTAGE OF UTERUS  02/2018   • LUNG SURGERY     • ROTATOR CUFF REPAIR Right    • TONSILLECTOMY     • WISDOM TOOTH EXTRACTION      hx of       Physical Exam    Airway   Mallampati: II   TM  distance: >3 FB   Neck ROM: full  Cardiovascular - normal   Rhythm: regular   Rate: normalPulmonary - normal   clear to auscultation  Dental - normal        Anesthesia Plan    Plan: general    Technique: general endotracheal     Lines and Monitors: additional IV     Airway: direct visual laryngoscopy   ASA 2  Anesthetic plan and risks discussed with: patient  Induction:    intravenous   Postop Plan:   Patient Disposition: inpatient floor planned admission   Pain Management: IV analgesics

## 2022-08-05 NOTE — DISCHARGE INSTRUCTIONS
Follow-up: Please call Dr. Mandel's office at [phone] [place] [place] [facility] [place] [address]) upon discharge to schedule your follow-up appointment for 2 weeks. Please call Dr. Mandel's office if you have any questions/concerns. Call if you experience the following: fevers (>101)/chills, nausea, vomiting, diarrhea, pain not controlled by prescribed medications, bleeding or other drainage from wound, headache, lightheadedness, dizziness or changes in vision.     Please follow-up with your primary care physician within 1 month of discharge from the hospital to update them regarding your hospital stay.     Please follow up with Dr. Jimenez to discuss operative findings, CT scan, and next steps.    Medications: Please resume all of your previous home medications unless otherwise indicated.   You have been started on antibiotics, ciprofloxacin and flagyl. Please complete a 10 day course.    Imaging: Please have a CT abdomen pelvis with IV contrast and contrast via the distal limb of your ileostomy performed. This will evaluate for any fistula or abscess    Pain: Take Tylenol 650mg every 4 hours as needed for mild or moderate pain.    Diet:Regular    Activity: Do not lift more than 10 lbs for 2 weeks.  You can be a passenger in a car but no driving until your first follow up visit.  Light activity such as walking or stairs is OK.  Please try to avoid vigorous activity including core workouts.    Wound: You may notice some blue drainage from your rectum. This is normal, and due to the dye we used in the operation to look for a fistula. You may use a pad and mesh underwear to catch any drainage.    Dr. Rajesh Cooper and Dr. Geovani Mandel  [facility]  [address]  [address] [place] [address]  [phone]

## 2022-08-05 NOTE — ANESTHESIA PROCEDURE NOTES
Airway  Urgency: elective    Start Time: 8/5/2022 10:25 AM  Airway not difficult    General Information and Staff    Patient location during procedure: OR  Anesthesiologist: Charles Garcia MD  Resident/CRNA: Corey Aguilar CRNA  Performed: resident/CRNA     Indications and Patient Condition  Indications for airway management: anesthesia  Sedation level: general  Preoxygenated: yes  Patient position: sniffing  MILS maintained throughout  Mask difficulty assessment: 0 - not attempted    Final Airway Details  Final airway type: endotracheal airway      Successful airway: ETT  Cuffed: yes   Successful intubation technique: video laryngoscopy  Facilitating devices/methods: intubating stylet  Endotracheal tube insertion site: oral  Blade: Hayley  Blade size: #3  ETT size (mm): 7.0  Cormack-Lehane Classification: grade I - full view of glottis  Placement verified by: chest auscultation and capnometry   Cuff volume (mL): 9  Measured from: teeth  ETT to teeth (cm): 20  Number of attempts at approach: 1  Ventilation between attempts: none  Number of other approaches attempted: 0  Atraumatic airway insertion

## 2022-08-05 NOTE — BRIEF OP NOTE
Rectal examination under anesthesia (EUA), flexible colonoscopy Procedure Note    Procedure:    Rectal examination under anesthesia (EUA), flexible colonoscopy  CPT(R) Code:  33472 - HI CLOSE ENTEROSTOMY      Pre-op Diagnosis     * Colovaginal fistula [N82.4]       Post-op Diagnosis     * Colovaginal fistula [N82.4]    Surgeon(s) and Role:     * Geovani Mandel MD - Primary     * Trev Jimenez MD - Assisting     * Ban Parisi MD - Resident - Assisting    Anesthesia: General    Staff:   Circulator: Nayana Freeman RN; Nishi Bridges RN  Scrub Person: Marga Quevedo; Jw Mcnulty    Procedure Details   Rectal and vaginal exam under anesthesia, flexible sigmoidoscopy, no fistula noted    Estimated Blood Loss: No blood loss documented.    Specimens:                No specimens collected during this procedure.      Drains:   Ileostomy RLQ (Active)       [REMOVED] Urethral Catheter Latex 14 Fr (Removed)       Implants: * No implants in log *           Complications:  None; patient tolerated the procedure well.           Disposition: PACU - hemodynamically stable.           Condition: stable    Geovani Mandel MD  Phone Number: [phone]

## 2022-08-05 NOTE — OR SURGEON
Pre-Procedure patient identification:  I am the primary operating surgeon/proceduralist and I have identified the patient on 08/05/22 at 8:38 AM Geovani Mandel MD  Phone Number: [phone]

## 2022-08-07 NOTE — OP NOTE
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  Rectal examination under anesthesia with intraoperative colonoscopy and vaginoscopy    SPECIMENS:    1.  None    ANESTHESIA: General.    COMPLICATIONS: None.    EBL: None    OPERATIVE INDICATIONS: Patient is a very nice 76-year-old woman who previously had underwent diverting loop ileostomy after robotic low anterior resection and takedown of colovaginal and colon uterine fistula is with synchronous ALYSSA/BSO and partial vaginectomy by Dr. Jimenez.  She has been doing well and was here for reversal today.  I discussed risk the procedure with her in detail including bleeding, infection, anastomotic leak, need for additional procedures, MI, stroke, death.  She understood and would like to proceed.    OPERATIVE DETAIL: Raegan was brought to the operating table and placed in supine position.  She was identified and general anesthesia was induced.  Upon placing a Johnson catheter there was significant feculent/purulent drainage from the vagina as result she was placed in yellowfin lithotomy and the perianal skin was prepped and draped in usual fashion to Betadine.  Timeout was performed and a rectal exam was done.  So retractor was used to explore the anal canal and the anastomosis was seen to be widely patent and without abnormality.  Flexible colonoscopy was brought to the field and vaginoscopy was performed.  At the apex of the vagina there was defect in the vaginal cuff from which there was  significant purulence draining.  This was irrigated and upon reinspection there was no further purulence.  Dr. Jimenez was called for intraoperative consultation, came to the operating room and saw the findings endoscopically.  Next, the scope was inserted to the anus and the anastomosis was once again very carefully inspected.  There is no evidence of abnormality.  There was peroxide then instilled to the anus and under pressure evaluation was in the vaginal canal there was no evidence of bubbling.  Next, methylene blue that was mildly diluted down was inserted to the anus and a Ray-Shakeel was inserted to the vagina.  After 10 minutes time this was inspected and there was no methylene blue seen.  Bile intensive purposes there is no evidence of rectal or colovaginal fistula at this point.  That said, at this point we decided to abort the planned ileostomy reversal, we will plan to have her set up for cross-sectional imaging of the abdomen and pelvis with IV contrast and we will reevaluate matters in the office.  She tolerated this portion of the procedure well and was transferred to the PACU in extubated and stable condition.

## 2022-08-09 NOTE — ASSESSMENT & PLAN NOTE
Overall Tania is doing excellent.  I reviewed the barium enema which demonstrates no evidence of anastomotic leak.  I also evaluate matters endoscopically today and the anastomosis appears well-healed.  We are going to have the patient on the OR schedule for ileostomy closure.  I discussed the risk the procedure with the patient in detail including bleeding, infection, anastomotic leak, need for additional procedures.  The patient understood and would like to proceed.

## 2022-08-24 ENCOUNTER — HOSPITAL ENCOUNTER (OUTPATIENT)
Dept: RADIOLOGY | Facility: HOSPITAL | Age: 77
Discharge: HOME | End: 2022-08-24
Attending: STUDENT IN AN ORGANIZED HEALTH CARE EDUCATION/TRAINING PROGRAM
Payer: MEDICARE

## 2022-08-24 ENCOUNTER — OFFICE VISIT (OUTPATIENT)
Dept: COLORECTAL SURGERY | Facility: CLINIC | Age: 77
End: 2022-08-24
Payer: MEDICARE

## 2022-08-24 VITALS — BODY MASS INDEX: 28.66 KG/M2 | HEIGHT: 60 IN | WEIGHT: 146 LBS

## 2022-08-24 DIAGNOSIS — N82.4 COLOVAGINAL FISTULA: ICD-10-CM

## 2022-08-24 DIAGNOSIS — N82.4 COLOVAGINAL FISTULA: Primary | ICD-10-CM

## 2022-08-24 PROCEDURE — 74177 CT ABD & PELVIS W/CONTRAST: CPT | Mod: MG

## 2022-08-24 PROCEDURE — A9698 NON-RAD CONTRAST MATERIALNOC: HCPCS | Performed by: STUDENT IN AN ORGANIZED HEALTH CARE EDUCATION/TRAINING PROGRAM

## 2022-08-24 PROCEDURE — 99214 OFFICE O/P EST MOD 30 MIN: CPT | Mod: 25 | Performed by: COLON & RECTAL SURGERY

## 2022-08-24 PROCEDURE — 25500000 HC DRUGS/INCIDENT RAD: Performed by: STUDENT IN AN ORGANIZED HEALTH CARE EDUCATION/TRAINING PROGRAM

## 2022-08-24 PROCEDURE — G8756 NO BP MEASURE DOC: HCPCS | Performed by: COLON & RECTAL SURGERY

## 2022-08-24 PROCEDURE — 63600105 HC IODINE BASED CONTRAST: Performed by: STUDENT IN AN ORGANIZED HEALTH CARE EDUCATION/TRAINING PROGRAM

## 2022-08-24 PROCEDURE — 45330 DIAGNOSTIC SIGMOIDOSCOPY: CPT | Performed by: COLON & RECTAL SURGERY

## 2022-08-24 RX ORDER — AMOXICILLIN AND CLAVULANATE POTASSIUM 875; 125 MG/1; MG/1
1 TABLET, FILM COATED ORAL 2 TIMES DAILY WITH MEALS
COMMUNITY
Start: 2022-08-10 | End: 2022-09-15 | Stop reason: HOSPADM

## 2022-08-24 RX ORDER — DOXYCYCLINE 100 MG/1
CAPSULE ORAL
Qty: 10 CAPSULE | Refills: 0 | Status: SHIPPED | OUTPATIENT
Start: 2022-08-24 | End: 2022-09-15 | Stop reason: HOSPADM

## 2022-08-24 RX ADMIN — IOHEXOL 300 ML: 9 SOLUTION ORAL at 09:23

## 2022-08-24 RX ADMIN — IOHEXOL 100 ML: 350 INJECTION, SOLUTION INTRAVENOUS at 09:23

## 2022-08-24 NOTE — LETTER
September 6, 2022     Akbar Romero MD  [address] [place] [id number]    Patient: Raegan Young  YOB: 1945  Date of Visit: 8/24/2022      Dear Dr. Romero:    Thank you for referring Raegan Young to me for evaluation. Below are my notes for this consultation.    If you have questions, please do not hesitate to call me. I look forward to following your patient along with you.         Sincerely,        Geovani Mandel MD        CC: MD Tequila Chan, Geovani CISNEROS MD  9/6/2022  7:08 PM  Signed  HISTORY & PHYSICAL EXAM    HPI: Raegan is here to be seen today for follow-up.  She has been doing fairly well.  She had a portion of her procedure previously due to drainage to the vagina.  She had a CT of abdomen pelvis to review the images of personally demonstrates what appears to be fistulous communication to the vagina from the anastomosis however contrast was seen in the perineum and it was unclear as to whether the contrast was refluxing into the vaginal introitus.  The patient has not had significant further drainage on the vaginal side, does note still having some burning albeit somewhat better, and she is eager to have her stoma closed when appropriate.    Past Medical History:   Diagnosis Date    Anemia     Arthritis     Colovaginal fistula     COVID-19 vaccine series completed 02/26/2021    Disease of thyroid gland     Diverticulitis of colon     GERD (gastroesophageal reflux disease)     Hiatal hernia     History of snoring     Hypertension     Hypothyroidism     Lung cancer (CMS/HCC)     Lung nodule     Vaginal cancer (CMS/HCC) 2018    s/p XRT x 7 weeks and chemo weekly x 5 weeks       Past Surgical History:   Procedure Laterality Date    BOWEL RESECTION      partial with ileostomy    CHOLECYSTECTOMY      COLONOSCOPY      DILATION AND CURETTAGE OF UTERUS  02/2018    LUNG SURGERY      ROTATOR CUFF REPAIR Right     TONSILLECTOMY       WISDOM TOOTH EXTRACTION      hx of       Current Outpatient Medications:     acetaminophen (TYLENOL) 325 mg tablet, Take 2 tablets (650 mg total) by mouth every 4 (four) hours as needed for pain for up to 30 doses., Disp: 30 tablet, Rfl: 0    amoxicillin-pot clavulanate (AUGMENTIN) 875-125 mg per tablet, Take 1 tablet by mouth 2 (two) times a day with meals., Disp: , Rfl:     atorvastatin (LIPITOR) 40 mg tablet, TAKE 1 TABLET BY MOUTH EVERY DAY (Patient taking differently: Take 40 mg by mouth daily.), Disp: 90 tablet, Rfl: 1    benazepril (LOTENSIN) 10 mg tablet, Take 10 mg by mouth daily., Disp: , Rfl:     bumetanide (BUMEX) 1 mg tablet, Take 1 mg by mouth as needed.  , Disp: , Rfl:     cholecalciferol, vitamin D3, 1,000 unit (25 mcg) tablet, Take 2,000 Units by mouth daily., Disp: , Rfl:     clobetasoL (TEMOVATE) 0.05 % ointment, Apply topically 3 (three) times a week (Mon, Wed, Fri) for 14 days. Apply to vulva 3 times per week, Disp: 60 g, Rfl: 11    glucosamine-D3-Boswellia serr 1,500-400-100 mg-unit-mg tablet, Take 1 tablet by mouth daily., Disp: , Rfl:     levothyroxine (SYNTHROID) 88 mcg tablet, Take 88 mcg by mouth daily.  , Disp: , Rfl: 3    pantoprazole (PROTONIX) 40 mg EC tablet, Take 40 mg by mouth once daily., Disp: , Rfl:     doxycycline hyclate (VIBRAMYCIN) 100 mg capsule, Take 1 tablet PO for 10 days., Disp: 10 capsule, Rfl: 0   Allergies   Allergen Reactions    Sulfamethoxazole-Trimethoprim Nausea Only    Adhesive Tape-Silicones      Causes bruising     Social History     Socioeconomic History    Marital status:      Spouse name: Royal     Number of children: 4    Years of education: Not on file    Highest education level: Not on file   Occupational History    Occupation: retired/     Tobacco Use    Smoking status: Former Smoker     Packs/day: 1.00     Years: 40.00     Pack years: 40.00     Types: Cigarettes     Start date: 1957     Quit date: 1998     Years  since quittin.6    Smokeless tobacco: Never Used   Vaping Use    Vaping Use: Never used   Substance and Sexual Activity    Alcohol use: Yes     Comment: occassional     Drug use: No    Sexual activity: Never     Comment:    Other Topics Concern    Not on file   Social History Narrative    Lives with  in a 2 story home     Social Determinants of Health     Financial Resource Strain: Not on file   Food Insecurity: Not on file   Transportation Needs: Not on file   Physical Activity: Not on file   Stress: Not on file   Social Connections: Not on file   Intimate Partner Violence: Not on file   Housing Stability: Not on file      Family History   Problem Relation Age of Onset    Heart attack Biological Mother     Hypertension Biological Mother     Endometrial cancer Biological Father     Lung cancer Biological Father     Breast cancer Neg Hx     Cancer Neg Hx     Colon cancer Neg Hx     Ovarian cancer Neg Hx        REVIEW OF SYSTEMS: Unchanged    PHYSICAL EXAM:  GEN: On examination the patient is resting comfortably.  NEURO/PSYCH: Alert and oriented ×3  HEENT: Eyes: Sclera anicteric  CHEST: Equal rise and fall the chest.  No tenderness bilaterally.  SKIN: Warm and moist  NODES: No groin or cervical lymphadenopathy  EXT: No palpable edema of the bilateral lower extremities.  No clubbing.  GI: Abdomen soft, nontender, nondistended.  No palpable liver or spleen edge.  No ventral hernias, mass, or tenderness.  RECTAL: Perianal skin is normal.  Digital exam reveals normal sphincter tone anastomosis palpable and without any palpable defect.  Diagnostic flexible sigmoidoscopy: Carried out to the level of the anastomosis where it was limited by patient discomfort demonstrates in the anterior aspect a small 1 or 2 mm defect with staples seen formed inside of it consistent with delayed anastomotic healing.      ASSESSMENT/PLAN:     Colovaginal fistula  Status post resection and low colorectal  anastomosis.  There appears to be some breakdown of the staple line anteriorly and at this point we will plan to have her on doxycycline for a 10-day course and ultimately placed on the OR schedule for examination under anesthesia at which time we will plan to remove any staples in the divot of the anastomosis, and plan to revise that with sutures.  My hopes are that this will be all will take to get things to heal and ultimately at that point we will build to reverse her stoma.  I discussed with the patient and her daughter that she is going to likely require stoma for a number of months moving forward and that if she continued to have a rising creatinine, we will plan for her to have a PICC line so that she can have IV fluids at home once or twice a week in the interim to keep her hydration status adequate and her kidneys functioning well.

## 2022-08-25 ENCOUNTER — PREP FOR CASE (OUTPATIENT)
Dept: COLORECTAL SURGERY | Facility: CLINIC | Age: 77
End: 2022-08-25
Payer: MEDICARE

## 2022-08-25 DIAGNOSIS — N82.4 COLOVAGINAL FISTULA: Primary | ICD-10-CM

## 2022-08-29 ENCOUNTER — TELEPHONE (OUTPATIENT)
Dept: GYNECOLOGIC ONCOLOGY | Facility: CLINIC | Age: 77
End: 2022-08-29
Payer: MEDICARE

## 2022-08-29 RX ORDER — FLUCONAZOLE 150 MG/1
150 TABLET ORAL ONCE
Qty: 2 TABLET | Refills: 0 | Status: SHIPPED | OUTPATIENT
Start: 2022-08-29 | End: 2022-08-29

## 2022-08-29 NOTE — TELEPHONE ENCOUNTER
Patient called requesting refill of med Fluconazole. States she has still been experiencing burning. Unsure if it is yeast etc. But states she need med asap prior to upcoming appt. Patient request med to be sent to   [facility], [place] and [facility]. She is currently not in the area.

## 2022-08-29 NOTE — TELEPHONE ENCOUNTER
Called Raegan back. She reports vaginal irritation + white discharge and itchiness. Recently started taking abx prescribed by Dr. Mandel; thinks she has another yeast infection. Denies any fevers, chills, nausea, vomiting, or any other associated symptoms. Rx sent for diflucan (patient currently in [place]). Advised her to call if symptoms worsen or do not improve.

## 2022-09-06 NOTE — ASSESSMENT & PLAN NOTE
Status post resection and low colorectal anastomosis.  There appears to be some breakdown of the staple line anteriorly and at this point we will plan to have her on doxycycline for a 10-day course and ultimately placed on the OR schedule for examination under anesthesia at which time we will plan to remove any staples in the divot of the anastomosis, and plan to revise that with sutures.  My hopes are that this will be all will take to get things to heal and ultimately at that point we will build to reverse her stoma.  I discussed with the patient and her daughter that she is going to likely require stoma for a number of months moving forward and that if she continued to have a rising creatinine, we will plan for her to have a PICC line so that she can have IV fluids at home once or twice a week in the interim to keep her hydration status adequate and her kidneys functioning well.

## 2022-09-06 NOTE — H&P (VIEW-ONLY)
HISTORY & PHYSICAL EXAM    HPI: Raegan is here to be seen today for follow-up.  She has been doing fairly well.  She had a portion of her procedure previously due to drainage to the vagina.  She had a CT of abdomen pelvis to review the images of personally demonstrates what appears to be fistulous communication to the vagina from the anastomosis however contrast was seen in the perineum and it was unclear as to whether the contrast was refluxing into the vaginal introitus.  The patient has not had significant further drainage on the vaginal side, does note still having some burning albeit somewhat better, and she is eager to have her stoma closed when appropriate.    Past Medical History:   Diagnosis Date    Anemia     Arthritis     Colovaginal fistula     COVID-19 vaccine series completed 02/26/2021    Disease of thyroid gland     Diverticulitis of colon     GERD (gastroesophageal reflux disease)     Hiatal hernia     History of snoring     Hypertension     Hypothyroidism     Lung cancer (CMS/HCC)     Lung nodule     Vaginal cancer (CMS/HCC) 2018    s/p XRT x 7 weeks and chemo weekly x 5 weeks       Past Surgical History:   Procedure Laterality Date    BOWEL RESECTION      partial with ileostomy    CHOLECYSTECTOMY      COLONOSCOPY      DILATION AND CURETTAGE OF UTERUS  02/2018    LUNG SURGERY      ROTATOR CUFF REPAIR Right     TONSILLECTOMY      WISDOM TOOTH EXTRACTION      hx of       Current Outpatient Medications:     acetaminophen (TYLENOL) 325 mg tablet, Take 2 tablets (650 mg total) by mouth every 4 (four) hours as needed for pain for up to 30 doses., Disp: 30 tablet, Rfl: 0    amoxicillin-pot clavulanate (AUGMENTIN) 875-125 mg per tablet, Take 1 tablet by mouth 2 (two) times a day with meals., Disp: , Rfl:     atorvastatin (LIPITOR) 40 mg tablet, TAKE 1 TABLET BY MOUTH EVERY DAY (Patient taking differently: Take 40 mg by mouth daily.), Disp: 90 tablet, Rfl: 1    benazepril (LOTENSIN)  10 mg tablet, Take 10 mg by mouth daily., Disp: , Rfl:     bumetanide (BUMEX) 1 mg tablet, Take 1 mg by mouth as needed.  , Disp: , Rfl:     cholecalciferol, vitamin D3, 1,000 unit (25 mcg) tablet, Take 2,000 Units by mouth daily., Disp: , Rfl:     clobetasoL (TEMOVATE) 0.05 % ointment, Apply topically 3 (three) times a week (Mon, Wed, Fri) for 14 days. Apply to vulva 3 times per week, Disp: 60 g, Rfl: 11    glucosamine-D3-Boswellia serr 1,500-400-100 mg-unit-mg tablet, Take 1 tablet by mouth daily., Disp: , Rfl:     levothyroxine (SYNTHROID) 88 mcg tablet, Take 88 mcg by mouth daily.  , Disp: , Rfl: 3    pantoprazole (PROTONIX) 40 mg EC tablet, Take 40 mg by mouth once daily., Disp: , Rfl:     doxycycline hyclate (VIBRAMYCIN) 100 mg capsule, Take 1 tablet PO for 10 days., Disp: 10 capsule, Rfl: 0   Allergies   Allergen Reactions    Sulfamethoxazole-Trimethoprim Nausea Only    Adhesive Tape-Silicones      Causes bruising     Social History     Socioeconomic History    Marital status:      Spouse name: Royal     Number of children: 4    Years of education: Not on file    Highest education level: Not on file   Occupational History    Occupation: retired/     Tobacco Use    Smoking status: Former Smoker     Packs/day: 1.00     Years: 40.00     Pack years: 40.00     Types: Cigarettes     Start date:      Quit date:      Years since quittin.6    Smokeless tobacco: Never Used   Vaping Use    Vaping Use: Never used   Substance and Sexual Activity    Alcohol use: Yes     Comment: occassional     Drug use: No    Sexual activity: Never     Comment:    Other Topics Concern    Not on file   Social History Narrative    Lives with  in a 2 story home     Social Determinants of Health     Financial Resource Strain: Not on file   Food Insecurity: Not on file   Transportation Needs: Not on file   Physical Activity: Not on file   Stress: Not on file   Social  Connections: Not on file   Intimate Partner Violence: Not on file   Housing Stability: Not on file      Family History   Problem Relation Age of Onset    Heart attack Biological Mother     Hypertension Biological Mother     Endometrial cancer Biological Father     Lung cancer Biological Father     Breast cancer Neg Hx     Cancer Neg Hx     Colon cancer Neg Hx     Ovarian cancer Neg Hx        REVIEW OF SYSTEMS: Unchanged    PHYSICAL EXAM:  GEN: On examination the patient is resting comfortably.  NEURO/PSYCH: Alert and oriented ×3  HEENT: Eyes: Sclera anicteric  CHEST: Equal rise and fall the chest.  No tenderness bilaterally.  SKIN: Warm and moist  NODES: No groin or cervical lymphadenopathy  EXT: No palpable edema of the bilateral lower extremities.  No clubbing.  GI: Abdomen soft, nontender, nondistended.  No palpable liver or spleen edge.  No ventral hernias, mass, or tenderness.  RECTAL: Perianal skin is normal.  Digital exam reveals normal sphincter tone anastomosis palpable and without any palpable defect.  Diagnostic flexible sigmoidoscopy: Carried out to the level of the anastomosis where it was limited by patient discomfort demonstrates in the anterior aspect a small 1 or 2 mm defect with staples seen formed inside of it consistent with delayed anastomotic healing.      ASSESSMENT/PLAN:     Colovaginal fistula  Status post resection and low colorectal anastomosis.  There appears to be some breakdown of the staple line anteriorly and at this point we will plan to have her on doxycycline for a 10-day course and ultimately placed on the OR schedule for examination under anesthesia at which time we will plan to remove any staples in the divot of the anastomosis, and plan to revise that with sutures.  My hopes are that this will be all will take to get things to heal and ultimately at that point we will build to reverse her stoma.  I discussed with the patient and her daughter that she is going to likely  require stoma for a number of months moving forward and that if she continued to have a rising creatinine, we will plan for her to have a PICC line so that she can have IV fluids at home once or twice a week in the interim to keep her hydration status adequate and her kidneys functioning well.

## 2022-09-13 ENCOUNTER — APPOINTMENT (OUTPATIENT)
Dept: LAB | Facility: HOSPITAL | Age: 77
End: 2022-09-13
Attending: COLON & RECTAL SURGERY
Payer: MEDICARE

## 2022-09-13 DIAGNOSIS — N82.4 COLOVAGINAL FISTULA: ICD-10-CM

## 2022-09-13 LAB
ALBUMIN SERPL-MCNC: 3.7 G/DL (ref 3.4–5)
ALP SERPL-CCNC: 79 IU/L (ref 35–126)
ALT SERPL-CCNC: 19 IU/L (ref 11–54)
ANION GAP SERPL CALC-SCNC: 10 MEQ/L (ref 3–15)
AST SERPL-CCNC: 20 IU/L (ref 15–41)
BASOPHILS # BLD: 0.01 K/UL (ref 0.01–0.1)
BASOPHILS NFR BLD: 0.1 %
BILIRUB SERPL-MCNC: 0.4 MG/DL (ref 0.3–1.2)
BUN SERPL-MCNC: 22 MG/DL (ref 8–20)
CALCIUM SERPL-MCNC: 9.2 MG/DL (ref 8.9–10.3)
CHLORIDE SERPL-SCNC: 109 MEQ/L (ref 98–109)
CO2 SERPL-SCNC: 20 MEQ/L (ref 22–32)
CREAT SERPL-MCNC: 1.1 MG/DL (ref 0.6–1.1)
DIFFERENTIAL METHOD BLD: ABNORMAL
EOSINOPHIL # BLD: 0 K/UL (ref 0.04–0.36)
EOSINOPHIL NFR BLD: 0 %
ERYTHROCYTE [DISTWIDTH] IN BLOOD BY AUTOMATED COUNT: 13.2 % (ref 11.7–14.4)
GFR SERPL CREATININE-BSD FRML MDRD: 48.2 ML/MIN/1.73M*2
GLUCOSE SERPL-MCNC: 166 MG/DL (ref 70–99)
HCT VFR BLDCO AUTO: 39.1 % (ref 35–45)
HGB BLD-MCNC: 12.6 G/DL (ref 11.8–15.7)
IMM GRANULOCYTES # BLD AUTO: 0.05 K/UL (ref 0–0.08)
IMM GRANULOCYTES NFR BLD AUTO: 0.4 %
LYMPHOCYTES # BLD: 1.19 K/UL (ref 1.2–3.5)
LYMPHOCYTES NFR BLD: 8.9 %
MCH RBC QN AUTO: 30.1 PG (ref 28–33.2)
MCHC RBC AUTO-ENTMCNC: 32.2 G/DL (ref 32.2–35.5)
MCV RBC AUTO: 93.3 FL (ref 83–98)
MONOCYTES # BLD: 0.37 K/UL (ref 0.28–0.8)
MONOCYTES NFR BLD: 2.8 %
NEUTROPHILS # BLD: 11.81 K/UL (ref 1.7–7)
NEUTS SEG NFR BLD: 87.8 %
NRBC BLD-RTO: 0 %
PDW BLD AUTO: 8.9 FL (ref 9.4–12.3)
PLATELET # BLD AUTO: 272 K/UL (ref 150–369)
POTASSIUM SERPL-SCNC: 4.6 MEQ/L (ref 3.6–5.1)
PROT SERPL-MCNC: 6.9 G/DL (ref 6–8.2)
RBC # BLD AUTO: 4.19 M/UL (ref 3.93–5.22)
SARS-COV-2 RNA RESP QL NAA+PROBE: NEGATIVE
SODIUM SERPL-SCNC: 139 MEQ/L (ref 136–144)
WBC # BLD AUTO: 13.43 K/UL (ref 3.8–10.5)

## 2022-09-13 PROCEDURE — U0003 INFECTIOUS AGENT DETECTION BY NUCLEIC ACID (DNA OR RNA); SEVERE ACUTE RESPIRATORY SYNDROME CORONAVIRUS 2 (SARS-COV-2) (CORONAVIRUS DISEASE [COVID-19]), AMPLIFIED PROBE TECHNIQUE, MAKING USE OF HIGH THROUGHPUT TECHNOLOGIES AS DESCRIBED BY CMS-2020-01-R: HCPCS

## 2022-09-13 PROCEDURE — 80053 COMPREHEN METABOLIC PANEL: CPT

## 2022-09-13 PROCEDURE — C9803 HOPD COVID-19 SPEC COLLECT: HCPCS

## 2022-09-13 PROCEDURE — 36415 COLL VENOUS BLD VENIPUNCTURE: CPT

## 2022-09-13 PROCEDURE — 85025 COMPLETE CBC W/AUTO DIFF WBC: CPT

## 2022-09-14 ENCOUNTER — OFFICE VISIT (OUTPATIENT)
Dept: GYNECOLOGIC ONCOLOGY | Facility: CLINIC | Age: 77
End: 2022-09-14
Attending: OBSTETRICS & GYNECOLOGY
Payer: MEDICARE

## 2022-09-14 ENCOUNTER — APPOINTMENT (OUTPATIENT)
Dept: PREADMISSION TESTING | Facility: HOSPITAL | Age: 77
End: 2022-09-14
Payer: MEDICARE

## 2022-09-14 VITALS — WEIGHT: 148 LBS | HEIGHT: 60 IN | BODY MASS INDEX: 29.06 KG/M2

## 2022-09-14 VITALS
SYSTOLIC BLOOD PRESSURE: 129 MMHG | WEIGHT: 147 LBS | OXYGEN SATURATION: 99 % | BODY MASS INDEX: 28.86 KG/M2 | HEART RATE: 123 BPM | TEMPERATURE: 98 F | HEIGHT: 60 IN | DIASTOLIC BLOOD PRESSURE: 84 MMHG | RESPIRATION RATE: 20 BRPM

## 2022-09-14 DIAGNOSIS — N95.2 POST-MENOPAUSE ATROPHIC VAGINITIS: ICD-10-CM

## 2022-09-14 DIAGNOSIS — N82.4 COLOVAGINAL FISTULA: ICD-10-CM

## 2022-09-14 DIAGNOSIS — N76.0 ACUTE VAGINITIS: ICD-10-CM

## 2022-09-14 DIAGNOSIS — Z85.44 HISTORY OF CANCER OF VAGINA: Primary | ICD-10-CM

## 2022-09-14 PROBLEM — Z01.818 PREOP EXAMINATION: Status: RESOLVED | Noted: 2022-05-02 | Resolved: 2022-09-14

## 2022-09-14 PROBLEM — N90.89 VULVAR IRRITATION: Status: RESOLVED | Noted: 2022-06-24 | Resolved: 2022-09-14

## 2022-09-14 PROBLEM — R00.0 TACHYCARDIA: Status: ACTIVE | Noted: 2022-09-14

## 2022-09-14 PROCEDURE — G8752 SYS BP LESS 140: HCPCS | Performed by: OBSTETRICS & GYNECOLOGY

## 2022-09-14 PROCEDURE — G8754 DIAS BP LESS 90: HCPCS | Performed by: OBSTETRICS & GYNECOLOGY

## 2022-09-14 PROCEDURE — 99214 OFFICE O/P EST MOD 30 MIN: CPT | Mod: 24 | Performed by: OBSTETRICS & GYNECOLOGY

## 2022-09-14 RX ORDER — METRONIDAZOLE 7.5 MG/G
GEL VAGINAL 2 TIMES DAILY
Qty: 70 G | Refills: 0 | Status: SHIPPED | OUTPATIENT
Start: 2022-09-14 | End: 2022-09-19

## 2022-09-14 RX ORDER — LOPERAMIDE HYDROCHLORIDE 2 MG/1
2 CAPSULE ORAL 2 TIMES DAILY PRN
COMMUNITY
End: 2023-01-13 | Stop reason: ALTCHOICE

## 2022-09-14 RX ORDER — METHYLPREDNISOLONE 4 MG/1
4 TABLET ORAL DAILY
COMMUNITY
End: 2022-12-14

## 2022-09-14 RX ORDER — ESTRADIOL 0.1 MG/G
CREAM VAGINAL
Qty: 42.5 G | Refills: 3 | Status: SHIPPED | OUTPATIENT
Start: 2022-09-14 | End: 2022-12-14

## 2022-09-14 ASSESSMENT — PAIN SCALES - GENERAL: PAINLEVEL: 0-NO PAIN

## 2022-09-14 NOTE — PROGRESS NOTES
Raegan Young presents to the office for followup of her history of vaginal cancer. She is post-op from Robotic total laparoscopic hysterectomy, bilateral salpingo-oophorectomy at the time of her robotic Robotic proctectomy and sigmoidectomy with takedown of colovaginal and colon uterine fistula and stapled coloanal anastomosis with diverting loop ileostomy on 5/17/2022 for colo uterine and colovaginal fistula due to hx diverticulitis and prior radiation. Saw Dr. Mandel on 8/24/22; patient is scheduled for EUA + with the plan to remove any staples in the divot of the anastomosis, and plan to revise that with sutures. Dr. Mandel advised patient to consult our office prior to surgery.    She also continues to report vaginal pruritis. Denies discharge. Tried Diflucan in the past without change. Denies vaginal bleeding.     Past Medical History:   Diagnosis Date    Anemia     Arthritis     Colovaginal fistula     COVID-19 vaccine series completed 02/26/2021    Disease of thyroid gland     Diverticulitis of colon     GERD (gastroesophageal reflux disease)     Hiatal hernia     History of snoring     Hypertension     Hypothyroidism     Lung cancer (CMS/HCC)     Lung nodule     Vaginal cancer (CMS/HCC) 2018    s/p XRT x 7 weeks and chemo weekly x 5 weeks       Past Surgical History:   Procedure Laterality Date    BOWEL RESECTION      partial with ileostomy    CHOLECYSTECTOMY      COLONOSCOPY      DILATION AND CURETTAGE OF UTERUS  02/2018    LUNG SURGERY      ROTATOR CUFF REPAIR Right     TONSILLECTOMY      WISDOM TOOTH EXTRACTION      hx of       Current Outpatient Medications:     acetaminophen (TYLENOL) 325 mg tablet, Take 2 tablets (650 mg total) by mouth every 4 (four) hours as needed for pain for up to 30 doses., Disp: 30 tablet, Rfl: 0    amoxicillin-pot clavulanate (AUGMENTIN) 875-125 mg per tablet, Take 1 tablet by mouth 2 (two) times a day with meals., Disp: , Rfl:      atorvastatin (LIPITOR) 40 mg tablet, TAKE 1 TABLET BY MOUTH EVERY DAY (Patient taking differently: Take 40 mg by mouth daily.), Disp: 90 tablet, Rfl: 1    benazepril (LOTENSIN) 10 mg tablet, Take 10 mg by mouth daily., Disp: , Rfl:     bumetanide (BUMEX) 1 mg tablet, Take 1 mg by mouth as needed.  , Disp: , Rfl:     cholecalciferol, vitamin D3, 1,000 unit (25 mcg) tablet, Take 2,000 Units by mouth daily., Disp: , Rfl:     doxycycline hyclate (VIBRAMYCIN) 100 mg capsule, Take 1 tablet PO for 10 days., Disp: 10 capsule, Rfl: 0    estradioL (ESTRACE) 0.01 % (0.1 mg/gram) vaginal cream, Insert 1g vaginally twice weekly at bedtime, Disp: 42.5 g, Rfl: 3    glucosamine-D3-Boswellia serr 1,500-400-100 mg-unit-mg tablet, Take 1 tablet by mouth daily., Disp: , Rfl:     levothyroxine (SYNTHROID) 88 mcg tablet, Take 88 mcg by mouth daily.  , Disp: , Rfl: 3    methylPREDNISolone (MEDROL) 4 mg tablet, Take 4 mg by mouth daily., Disp: , Rfl:     metroNIDAZOLE (Metrogel VaginaL) 0.75 % (37.5mg/5 gram) vaginal gel, Insert into the vagina 2 (two) times a day for 5 days., Disp: 70 g, Rfl: 0    pantoprazole (PROTONIX) 40 mg EC tablet, Take 40 mg by mouth once daily., Disp: , Rfl:     clobetasoL (TEMOVATE) 0.05 % ointment, Apply topically 3 (three) times a week (Mon, Wed, Fri) for 14 days. Apply to vulva 3 times per week, Disp: 60 g, Rfl: 11  Sulfamethoxazole-trimethoprim and Adhesive tape-silicones  Cancer-related family history includes Endometrial cancer in her biological father; Lung cancer in her biological father. There is no history of Breast cancer, Cancer, Colon cancer, or Ovarian cancer.  Social History     Tobacco Use    Smoking status: Former Smoker     Packs/day: 1.00     Years: 40.00     Pack years: 40.00     Types: Cigarettes     Start date:      Quit date:      Years since quittin.7    Smokeless tobacco: Never Used   Vaping Use    Vaping Use: Never used   Substance Use Topics    Alcohol use:  Yes     Comment: occassional     Drug use: No     ROS:[negative in all systems with the exception of the above]    Physical examination: [Well-developed female in no apparent distress]  Vitals:    09/14/22 0958   BP: 129/84   Pulse: (!) 123   Resp: 20   Temp: 36.7 °C (98 °F)   SpO2: 99%     Body mass index is 28.71 kg/m².    Vitals: BP: 129/84  Temp: 36.7 °C (98 °F)  Temp Source: Oral  Heart Rate: 123  Resp: 20  SpO2: 99 %  Height: 152.4 cm (5')  Weight: 66.7 kg (147 lb) (09/14 0958)  HEENT: Normocephalic, atraumatic, nonicteric sclera  Neck: Supple no masses, thyroid normal nontender  Lymphatic: No inguinal or supraclavicular adenopathy  Heart: Regular rate no murmurs  Lungs: Clear to auscultation with good respiratory effort  Extremities: No clubbing, cyanosis, edema  Neuro: Oriented ×3 with normal mood and affect  Abdomen: Soft, nontender, nondistended. No hepatosplenomegaly. No rebound or guarding. Ostomy C/D/I, pink stoma.    Gynecologic: atrophic changes, cervix surgically absent   Rectovaginal: no masses/nodularity    Results for orders placed or performed in visit on 09/13/22   SARS-CoV-2 (COVID-19), PCR    Specimen: Nasopharynx; Nasopharyngeal Swab   Result Value Ref Range    SARS-CoV-2 (COVID-19) Negative Negative     Assessment/Plan   Assesment:   Problem List Items Addressed This Visit        Genitourinary    Colovaginal fistula    Current Assessment & Plan     Scheduled with Dr. Mandel tomorrow; no concerns for vaginal recurrence at this time. Available during surgery if needed.           Vaginitis    Current Assessment & Plan     Intermittent c/o vaginitis no improvement with diflucan. Rx sent for metrogel as well as vaginal estrace. Advised patient to contact the office if symptoms persist.           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 Advised patient to follow-up in 3 months.                      Edda Cummings PA-C  ;I spent time on this date of service performing the following activities: obtaining history, entering orders, documenting, preparing for visit, obtaining / reviewing records, providing counseling and education, independently reviewing study/studies, communicating results and coordinating care.

## 2022-09-14 NOTE — PRE-PROCEDURE INSTRUCTIONS
1. We will call you between 3 pm and 7 pm on September 14, 2022 to determine that arrival time for your procedure. If you do not hear by 6PM. Please call [phone] for arrival time.    2. Please report to Main Entrance near Parking lot A, walk into main lobby and report to the admission desk on the first floor on the day of your procedure.      3. Please follow the following fasting guidelines:         Nothing to eat after midnight.  Unlimited clear liquids, meaning water or PLAIN black coffee WITHOUT any milk or cream, are permitted up to TWO HOURS prior to arrival at the hospital.     4. On the morning of the procedure please take your usual dose of the listed medications with a sip of water:  Stop all vitamins, supplements, NSAIDs, motrin, aspirin, aleve and advil today.  You may take Tylenol if needed.    The morning of the procedure you may take:  Synthroid  Protonix  Medrol       5. Other Instructions: You may brush your teeth the morning of the procedure. Rinse and spit, do not swallow.  Bring a list of your medications with dosages with you.    Please follow the hospital provided surgical wash instructions given to you today. If surgical procedure indicates the need for CHG anti-bacterial wash, please use provided soap and follow instructions.      6. If you develop a cold, cough, fever, rash, or other symptom prior to the data of the procedure, please report it to your physician immediately.   7. If you need to cancel the procedure for any reason, please contact your physician or call the unit listed above.   8. Make arrangements to have someone drive you home from the procedure. If you have not arranged for transportation home, your surgery may be cancelled.    9. You may not take public transportation unless you are accompanied by a responsible person.   10. You may not drive a car or operate complex or potentially dangerous machinery for 24 hours following anesthesia and/or sedation.   11. If it is  medically necessary for you to have a longer stay, you will be informed as soon as the decision is made.   12. Do not wear or bring anything of value to the hospital including jewelry of any kind, money, or wallet. Do not wear make-up or contact lenses. DO bring your glasses and a case. DO NOT BRING MEDICATIONS FROM HOME.   13. No lotion, creams, powders, or oils on skin the morning of procedure    14. Dress in comfortable clothes.   15.  If instructed, please bring a copy of your Advanced Directive (Living Will/Durable Power of ) on the day of your procedure.      Pre operative instructions given as per protocol.  Form explained by: Belkis Noyola RN     I have read and understand the above information. I have had sufficient opportunity to ask questions I might have and they have been answered to my satisfaction. I agree to comply with the Patient Responsibilities listed above and have received a copy of this form.

## 2022-09-14 NOTE — ASSESSMENT & PLAN NOTE
Patient called requesting refill on med below:      buPROPion XL (WELLBUTRIN XL) 150 MG 24 hr tablet  150 mg, DAILY 1 ordered  EditCancel Reorder       Summary: Take 1 tablet by mouth daily.  Eprescribe, Disp-30 tablet, R-1     Dose, Route, Frequency: 150 mg, Oral, DAILY    Start: 11/1/2021    Ord/Sold: 11/1/2021 (O)      Report    Taking:     Long-term:       Pharmacy: [facility]/pharmacy #[id number] - [address] Dose History         Patient Sig: Take 1 tablet by mouth daily.       Ordered on: 11/1/2021       Authorized by: MELANIA GAONA       Dispense: 30 tablet          LOV: 11/11/2021    **Pharmacy verified     Scheduled with Dr. Mandel tomorrow; no concerns for vaginal recurrence at this time. Available during surgery if needed.

## 2022-09-14 NOTE — ASSESSMENT & PLAN NOTE
Intermittent c/o vaginitis no improvement with diflucan. Rx sent for metrogel as well as vaginal estrace. Advised patient to contact the office if symptoms persist.

## 2022-09-15 ENCOUNTER — ANESTHESIA (OUTPATIENT)
Dept: OPERATING ROOM | Facility: HOSPITAL | Age: 77
Setting detail: HOSPITAL OUTPATIENT SURGERY
End: 2022-09-15
Payer: MEDICARE

## 2022-09-15 ENCOUNTER — HOSPITAL ENCOUNTER (OUTPATIENT)
Facility: HOSPITAL | Age: 77
Setting detail: HOSPITAL OUTPATIENT SURGERY
Discharge: HOME | End: 2022-09-15
Attending: COLON & RECTAL SURGERY | Admitting: COLON & RECTAL SURGERY
Payer: MEDICARE

## 2022-09-15 VITALS
WEIGHT: 148 LBS | HEART RATE: 85 BPM | DIASTOLIC BLOOD PRESSURE: 65 MMHG | BODY MASS INDEX: 29.06 KG/M2 | HEIGHT: 60 IN | TEMPERATURE: 97.5 F | SYSTOLIC BLOOD PRESSURE: 127 MMHG | OXYGEN SATURATION: 100 % | RESPIRATION RATE: 14 BRPM

## 2022-09-15 DIAGNOSIS — N82.4 FISTULA, COLOVAGINAL: ICD-10-CM

## 2022-09-15 PROCEDURE — 71000012 HC PACU PHASE 2 EA ADDL MIN: Performed by: COLON & RECTAL SURGERY

## 2022-09-15 PROCEDURE — 27200000 HC STERILE SUPPLY: Performed by: COLON & RECTAL SURGERY

## 2022-09-15 PROCEDURE — 88304 TISSUE EXAM BY PATHOLOGIST: CPT | Performed by: COLON & RECTAL SURGERY

## 2022-09-15 PROCEDURE — 36000013 HC OR LEVEL 3 EA ADDL MIN: Performed by: COLON & RECTAL SURGERY

## 2022-09-15 PROCEDURE — 25800000 HC PHARMACY IV SOLUTIONS

## 2022-09-15 PROCEDURE — C9290 INJ, BUPIVACAINE LIPOSOME: HCPCS

## 2022-09-15 PROCEDURE — 71000001 HC PACU PHASE 1 INITIAL 30MIN: Performed by: COLON & RECTAL SURGERY

## 2022-09-15 PROCEDURE — 63600000 HC DRUGS/DETAIL CODE

## 2022-09-15 PROCEDURE — 71000011 HC PACU PHASE 1 EA ADDL MIN: Performed by: COLON & RECTAL SURGERY

## 2022-09-15 PROCEDURE — 36000003 HC OR LEVEL 3 INITIAL 30MIN: Performed by: COLON & RECTAL SURGERY

## 2022-09-15 PROCEDURE — 25000000 HC PHARMACY GENERAL

## 2022-09-15 PROCEDURE — 63600000 HC DRUGS/DETAIL CODE: Performed by: STUDENT IN AN ORGANIZED HEALTH CARE EDUCATION/TRAINING PROGRAM

## 2022-09-15 PROCEDURE — 45990 SURG DX EXAM ANORECTAL: CPT | Performed by: COLON & RECTAL SURGERY

## 2022-09-15 PROCEDURE — 0DQP7ZZ REPAIR RECTUM, VIA NATURAL OR ARTIFICIAL OPENING: ICD-10-PCS | Performed by: COLON & RECTAL SURGERY

## 2022-09-15 PROCEDURE — 71000002 HC PACU PHASE 2 INITIAL 30MIN: Performed by: COLON & RECTAL SURGERY

## 2022-09-15 PROCEDURE — 37000001 HC ANESTHESIA GENERAL: Performed by: COLON & RECTAL SURGERY

## 2022-09-15 PROCEDURE — 63700000 HC SELF-ADMINISTRABLE DRUG

## 2022-09-15 RX ORDER — CIPROFLOXACIN 500 MG/1
500 TABLET ORAL 2 TIMES DAILY
Qty: 14 TABLET | Refills: 0 | Status: SHIPPED | OUTPATIENT
Start: 2022-09-15 | End: 2022-09-22

## 2022-09-15 RX ORDER — DEXTROSE 40 %
15-30 GEL (GRAM) ORAL AS NEEDED
Status: DISCONTINUED | OUTPATIENT
Start: 2022-09-15 | End: 2022-09-15 | Stop reason: HOSPADM

## 2022-09-15 RX ORDER — SODIUM CHLORIDE 9 MG/ML
INJECTION, SOLUTION INTRAVENOUS CONTINUOUS PRN
Status: DISCONTINUED | OUTPATIENT
Start: 2022-09-15 | End: 2022-09-15 | Stop reason: SURG

## 2022-09-15 RX ORDER — PROPOFOL 10 MG/ML
INJECTION, EMULSION INTRAVENOUS AS NEEDED
Status: DISCONTINUED | OUTPATIENT
Start: 2022-09-15 | End: 2022-09-15 | Stop reason: SURG

## 2022-09-15 RX ORDER — IBUPROFEN 200 MG
16-32 TABLET ORAL AS NEEDED
Status: DISCONTINUED | OUTPATIENT
Start: 2022-09-15 | End: 2022-09-15 | Stop reason: HOSPADM

## 2022-09-15 RX ORDER — ACETAMINOPHEN 325 MG/1
975 TABLET ORAL ONCE
Status: COMPLETED | OUTPATIENT
Start: 2022-09-15 | End: 2022-09-15

## 2022-09-15 RX ORDER — METRONIDAZOLE 500 MG/1
500 TABLET ORAL 3 TIMES DAILY
Qty: 21 TABLET | Refills: 0 | Status: SHIPPED | OUTPATIENT
Start: 2022-09-15 | End: 2022-09-22

## 2022-09-15 RX ORDER — ROCURONIUM BROMIDE 10 MG/ML
INJECTION, SOLUTION INTRAVENOUS AS NEEDED
Status: DISCONTINUED | OUTPATIENT
Start: 2022-09-15 | End: 2022-09-15 | Stop reason: SURG

## 2022-09-15 RX ORDER — BUPIVACAINE HYDROCHLORIDE AND EPINEPHRINE 5; 5 MG/ML; UG/ML
60 INJECTION, SOLUTION EPIDURAL; INTRACAUDAL; PERINEURAL ONCE
Status: DISCONTINUED | OUTPATIENT
Start: 2022-09-15 | End: 2022-09-15 | Stop reason: HOSPADM

## 2022-09-15 RX ORDER — DEXTROSE 50 % IN WATER (D50W) INTRAVENOUS SYRINGE
25 AS NEEDED
Status: DISCONTINUED | OUTPATIENT
Start: 2022-09-15 | End: 2022-09-15 | Stop reason: HOSPADM

## 2022-09-15 RX ORDER — FENTANYL CITRATE 50 UG/ML
INJECTION, SOLUTION INTRAMUSCULAR; INTRAVENOUS AS NEEDED
Status: DISCONTINUED | OUTPATIENT
Start: 2022-09-15 | End: 2022-09-15 | Stop reason: SURG

## 2022-09-15 RX ORDER — DEXAMETHASONE SODIUM PHOSPHATE 4 MG/ML
INJECTION, SOLUTION INTRA-ARTICULAR; INTRALESIONAL; INTRAMUSCULAR; INTRAVENOUS; SOFT TISSUE AS NEEDED
Status: DISCONTINUED | OUTPATIENT
Start: 2022-09-15 | End: 2022-09-15 | Stop reason: SURG

## 2022-09-15 RX ORDER — ONDANSETRON HYDROCHLORIDE 2 MG/ML
4 INJECTION, SOLUTION INTRAVENOUS
Status: DISCONTINUED | OUTPATIENT
Start: 2022-09-15 | End: 2022-09-15 | Stop reason: HOSPADM

## 2022-09-15 RX ORDER — ONDANSETRON HYDROCHLORIDE 2 MG/ML
INJECTION, SOLUTION INTRAVENOUS AS NEEDED
Status: DISCONTINUED | OUTPATIENT
Start: 2022-09-15 | End: 2022-09-15 | Stop reason: SURG

## 2022-09-15 RX ORDER — LIDOCAINE HYDROCHLORIDE 10 MG/ML
INJECTION, SOLUTION INFILTRATION; PERINEURAL AS NEEDED
Status: DISCONTINUED | OUTPATIENT
Start: 2022-09-15 | End: 2022-09-15 | Stop reason: SURG

## 2022-09-15 RX ORDER — METRONIDAZOLE 500 MG/100ML
INJECTION, SOLUTION INTRAVENOUS AS NEEDED
Status: DISCONTINUED | OUTPATIENT
Start: 2022-09-15 | End: 2022-09-15 | Stop reason: SURG

## 2022-09-15 RX ORDER — TRAMADOL HYDROCHLORIDE 50 MG/1
50 TABLET ORAL ONCE
Status: COMPLETED | OUTPATIENT
Start: 2022-09-15 | End: 2022-09-15

## 2022-09-15 RX ORDER — CEFAZOLIN SODIUM 2 G/100ML
INJECTION, SOLUTION INTRAVENOUS AS NEEDED
Status: DISCONTINUED | OUTPATIENT
Start: 2022-09-15 | End: 2022-09-15 | Stop reason: SURG

## 2022-09-15 RX ORDER — HYDROMORPHONE HYDROCHLORIDE 1 MG/ML
0.5 INJECTION, SOLUTION INTRAMUSCULAR; INTRAVENOUS; SUBCUTANEOUS
Status: DISCONTINUED | OUTPATIENT
Start: 2022-09-15 | End: 2022-09-15 | Stop reason: HOSPADM

## 2022-09-15 RX ORDER — FENTANYL CITRATE 50 UG/ML
50 INJECTION, SOLUTION INTRAMUSCULAR; INTRAVENOUS
Status: DISCONTINUED | OUTPATIENT
Start: 2022-09-15 | End: 2022-09-15 | Stop reason: HOSPADM

## 2022-09-15 RX ADMIN — CEFAZOLIN SODIUM 2 G: 2 INJECTION, SOLUTION INTRAVENOUS at 10:15

## 2022-09-15 RX ADMIN — PROPOFOL 100 MG: 10 INJECTION, EMULSION INTRAVENOUS at 09:59

## 2022-09-15 RX ADMIN — SODIUM CHLORIDE: 9 INJECTION, SOLUTION INTRAVENOUS at 09:58

## 2022-09-15 RX ADMIN — FENTANYL CITRATE 50 MCG: 50 INJECTION, SOLUTION INTRAMUSCULAR; INTRAVENOUS at 11:24

## 2022-09-15 RX ADMIN — ONDANSETRON HYDROCHLORIDE 4 MG: 2 SOLUTION INTRAMUSCULAR; INTRAVENOUS at 09:59

## 2022-09-15 RX ADMIN — FENTANYL CITRATE 50 MCG: 50 INJECTION, SOLUTION INTRAMUSCULAR; INTRAVENOUS at 10:11

## 2022-09-15 RX ADMIN — METRONIDAZOLE 500 MG: 500 INJECTION, SOLUTION INTRAVENOUS at 10:20

## 2022-09-15 RX ADMIN — FENTANYL CITRATE 50 MCG: 50 INJECTION, SOLUTION INTRAMUSCULAR; INTRAVENOUS at 11:06

## 2022-09-15 RX ADMIN — SUGAMMADEX 400 MG: 100 INJECTION, SOLUTION INTRAVENOUS at 10:35

## 2022-09-15 RX ADMIN — FENTANYL CITRATE 50 MCG: 50 INJECTION, SOLUTION INTRAMUSCULAR; INTRAVENOUS at 09:59

## 2022-09-15 RX ADMIN — LIDOCAINE HYDROCHLORIDE 45 ML: 10 INJECTION, SOLUTION INFILTRATION; PERINEURAL at 09:59

## 2022-09-15 RX ADMIN — TRAMADOL HYDROCHLORIDE 50 MG: 50 TABLET, COATED ORAL at 12:09

## 2022-09-15 RX ADMIN — ROCURONIUM BROMIDE 50 MG: 10 INJECTION, SOLUTION INTRAVENOUS at 09:59

## 2022-09-15 RX ADMIN — ACETAMINOPHEN 975 MG: 325 TABLET ORAL at 12:09

## 2022-09-15 RX ADMIN — DEXAMETHASONE SODIUM PHOSPHATE 4 MG: 4 INJECTION, SOLUTION INTRAMUSCULAR; INTRAVENOUS at 09:59

## 2022-09-15 ASSESSMENT — PAIN SCALES - GENERAL: PAIN_LEVEL: 0

## 2022-09-15 NOTE — ANESTHESIOLOGIST PRE-PROCEDURE ATTESTATION
Pre-Procedure Patient Identification:  I am the Primary Anesthesiologist and have identified the patient on 09/15/22 at 8:12 AM.   I have confirmed the procedure(s) will be performed by the following surgeon/proceduralist Geovani Mandel MD.

## 2022-09-15 NOTE — OR SURGEON
Pre-Procedure patient identification:  I am the primary operating surgeon/proceduralist and I have identified the patient on 09/15/22 at 9:30 AM Goevani Mandel MD  Phone Number: [phone]

## 2022-09-15 NOTE — NURSING NOTE
Pt in a fair amount of pain requiring IV pain medication. Paged [phone] requesting PO tylenol and 50mg Tramadol be ordered for stage 2 prior to d/c home. Pt concerned re car ride home and states she tries to only take tylenol. Pt does not want anything stronger than tylenol and tramadol- does not like oxycodone. Discussed with patient if she feels she needs RX pain meds for home (would discuss with team) and pt declined at this time.

## 2022-09-15 NOTE — ANESTHESIA POSTPROCEDURE EVALUATION
Patient: Raeagn Young    Procedure Summary     Date: 09/15/22 Room / Location: [facility] OR 11 / [facility] OR    Anesthesia Start: 0958 Anesthesia Stop: 1051    Procedure: Rectal EUA  repair of rectal anastamosis (N/A ) Diagnosis:       Fistula, colovaginal      (Rectal EUA)    Surgeons: Geovani Mandel MD Responsible Provider: Charles Garcia MD    Anesthesia Type: general ASA Status: 2          Anesthesia Type: general  PACU Vitals  9/15/2022 1043 - 9/15/2022 1143      9/15/2022  1045 9/15/2022  1100 9/15/2022  1115 9/15/2022  1130    BP: 148/61 136/71 130/61 127/65    Temp: 36.7 °C (98 °F) -- -- --    Pulse: 88 84 80 80    Resp: 16 5 7 6    SpO2: 100 % 100 % 100 % 100 %            Anesthesia Post Evaluation    Pain score: 0  Pain management: adequate  Mode of pain management: IV medication  Patient location during evaluation: PACU  Patient participation: complete - patient participated  Level of consciousness: awake and alert  Cardiovascular status: acceptable  Airway Patency: adequate  Respiratory status: acceptable  Hydration status: acceptable  Anesthetic complications: no

## 2022-09-15 NOTE — BRIEF OP NOTE
Rectal EUA  repair of rectal anastamosis Procedure Note    Procedure:    Rectal EUA  repair of rectal anastamosis  CPT(R) Code:  99649 - [facility] SURG DIAGNOSTIC EXAM, ANORECTAL      Pre-op Diagnosis     * Fistula, colovaginal [N82.4]       Post-op Diagnosis     * Fistula, colovaginal [N82.4]    Surgeon(s) and Role:     * Geovani Mandel MD - Primary     * Navya Billings DO - Resident - Assisting    Anesthesia: Monitor Anesthesia Care    Staff:   Circulator: Caron Cool, ASHLEY; Jason Garcia, ASHLEY; Candelaria Santiago RN  Scrub Person: Marga Quevedo    Procedure Details   Examination of rectal anastomosis with staple removal     Estimated Blood Loss: No blood loss documented.    Specimens:                Order Name Source Comment Collection Info Order Time   PATHOLOGY TISSUE EXAM Anus Pre-op diagnosis:  Rectal EUA Collected By: Geovani Mandel MD 9/15/2022 10:26 AM     Release to patient   Immediate              Drains:   Ileostomy RLQ (Active)       Implants: * No implants in log *           Complications:  None; patient tolerated the procedure well.           Disposition: PACU - hemodynamically stable.           Condition: stable    Geovani Mandel MD  Phone Number: [phone]

## 2022-09-15 NOTE — DISCHARGE INSTRUCTIONS
[facility]  ColoRectal Surgery Discharge Instructions    Today you had an examination of your rectum and anus under anesthesia with removal of staples.     Follow-up: Please call Dr. Mandel's office at [phone] upon discharge to schedule your follow-up appointment for 2 weeks after your procedure.   Please call the office if you have any questions/concerns.       Call if you experience the following:   - fevers (>101)/chills  - nausea, vomiting, diarrhea  - pain not controlled by prescribed medications  - bleeding or other drainage from wound.   - Persistent lightheadedness, dizziness or changes in vision.     Please follow-up with your primary care physician within 1 month of discharge from the hospital to update them regarding your procedure.       Medications:   Please resume all of your previous home medications unless otherwise indicated.    Please take antibiotics that were sent to your pharmacy (ciprofloxacin 500mg pills every 12 hrs and metronidazole 500mg pills every 8 hrs) for 7 days.     PAIN: Take TYLENOL 650mg every 4 hours as needed for pain.         Dr. Geovani Mandel  [facility]  [address]  [facility] [place] [address]  [phone]

## 2022-09-15 NOTE — ANESTHESIA PROCEDURE NOTES
Airway  Urgency: elective    Start Time: 9/15/2022 10:01 AM  Airway not difficult    General Information and Staff    Patient location during procedure: OR  Anesthesiologist: Charles Garcia MD  Resident/CRNA: Natalee Medina CRNA  Performed: other anesthesia staff     Indications and Patient Condition  Indications for airway management: anesthesia  Sedation level: general  Preoxygenated: yes  Patient position: sniffing  MILS not maintained throughout  Mask difficulty assessment: 1 - vent by mask    Final Airway Details  Final airway type: endotracheal airway      Successful airway: ETT  Cuffed: yes   Successful intubation technique: video laryngoscopy (diamond)  Facilitating devices/methods: intubating stylet  Endotracheal tube insertion site: oral  Blade: Hayley  Blade size: #3  ETT size (mm): 7.0  Cormack-Lehane Classification: grade I - full view of glottis  Placement verified by: chest auscultation and capnometry   Measured from: teeth  ETT to teeth (cm): 21  Number of attempts at approach: 1  Number of other approaches attempted: 0  Atraumatic airway insertion      Additional Comments  Performed by resident

## 2022-09-15 NOTE — ANESTHESIA PREPROCEDURE EVALUATION
Relevant Problems   CARDIOVASCULAR   (+) Dyslipidemia   (+) Hypertension      GASTROINTESTINAL   (+) GERD (gastroesophageal reflux disease)      HEMATOLOGY   (+) Anemia      MUSCULOSKELETAL   (+) Primary osteoarthritis of left knee      NEUROLOGY   (+) History of cancer of vagina   (+) History of lung cancer      URINARY SYSTEM   (+) Low magnesium level      Other   (+) Adenocarcinoma of left lung (CMS/HCC)   (+) Hypothyroidism     Past Medical History:   Diagnosis Date    Anemia     Arthritis     Colovaginal fistula     COVID-19 vaccine series completed 2021    Disease of thyroid gland     Diverticulitis of colon     GERD (gastroesophageal reflux disease)     H/O ileostomy     Hiatal hernia     History of snoring     Hypertension     Hypothyroidism     Lung cancer (CMS/HCC)     Lung nodule     Vaginal cancer (CMS/HCC)     s/p XRT x 7 weeks and chemo weekly x 5 weeks         Past Surgical History:   Procedure Laterality Date    ABDOMINAL SURGERY      BOWEL RESECTION      partial with ileostomy    CHOLECYSTECTOMY      COLONOSCOPY      DILATION AND CURETTAGE OF UTERUS  2018    ILEOSTOMY      LUNG SURGERY      ROTATOR CUFF REPAIR Right     TONSILLECTOMY      WISDOM TOOTH EXTRACTION      hx of       Social History     Socioeconomic History    Marital status:      Spouse name: Royal     Number of children: 4    Years of education: Not on file    Highest education level: Not on file   Occupational History    Occupation: retired/     Tobacco Use    Smoking status: Former Smoker     Packs/day: 1.00     Years: 40.00     Pack years: 40.00     Types: Cigarettes     Start date:      Quit date:      Years since quittin.7    Smokeless tobacco: Never Used   Vaping Use    Vaping Use: Never used   Substance and Sexual Activity    Alcohol use: Yes     Comment: occassional     Drug use: No    Sexual activity: Never     Comment:    Other Topics  Concern    Not on file   Social History Narrative    Lives with  in a 2 story home     Social Determinants of Health     Financial Resource Strain: Not on file   Food Insecurity: Not on file   Transportation Needs: Not on file   Physical Activity: Not on file   Stress: Not on file   Social Connections: Not on file   Intimate Partner Violence: Not on file   Housing Stability: Not on file       Allergies   Allergen Reactions    Sulfamethoxazole-Trimethoprim Nausea Only    Adhesive Tape-Silicones      Causes bruising       No current facility-administered medications for this visit.  No current outpatient medications on file.    Facility-Administered Medications Ordered in Other Visits:     bupivacaine liposomal (EXPAREL) 266 mg, bupivacaine PF (MARCAINE) 0.5 % 20 mL in sodium chloride 0.9 % 40 mL injection, , infiltration, Once, Shila Gilliland PA C    bupivacaine-EPINEPHrine (PF) (MARCAINE w/EPI) 0.5 %-1:200,000 injection 60 mL, 60 mL, injection, Once, Shila Gilliland PA C    There were no vitals filed for this visit.    Cardiac Imaging    TRANSTHORACIC ECHO (TTE) COMPLETE 07/21/2021    Interpretation Summary  · The left ventricle is normal in size. There is normal wall thickness. There is focal upper septal hypertrophy. An ultrasound enhancing agent was used. There is normal left ventricular systolic function. Left ventricular ejection fraction is 65% by visual estimate. There is normal wall motion. Diastolic function was not assessed due to mitral inflow merging.  · The left atrium is moderately dilated. The left atrial volume indexed to body surface area measures 48 mL/m2.  · The mitral valve is normal. There is trace mitral regurgitation.  · The aortic valve has three cusps. There is mild thickening and focal calcification of the cusps. There is no aortic stenosis. There is no aortic regurgitation.  · The aortic root is normal in size. The ascending aorta is normal in size. The aortic arch was not  assessed.  · The right ventricle is normal in size. There is normal right ventricular systolic function. Right ventricular S' by tissue Doppler measures 20 cm/s.  · The right atrium is normal in size.  · The tricuspid valve is normal. There is trace tricuspid regurgitation. The estimated right ventricular systolic pressure = 34 mmHg.  · The pulmonic valve is normal. There is physiologic pulmonic regurgitation.  · The inferior vena cava measures less than 2.1 cm and collapses greater than 50% with inspiration. The estimated right atrial pressure is 0 -5 mmHg.  · There is no pericardial effusion.    There are no prior studies available for comparison.      CBC Results       08/03/22 07/08/22 07/07/22     1037 0728 0338    WBC 8.98 5.99 7.77    RBC 3.93 3.28 3.46    HGB 12.1 10.0 10.5    HCT 36.8 30.3 31.5    MCV 93.6 92.4 91.0    MCH 30.8 30.5 30.3    MCHC 32.9 33.0 33.3     236 263        BMP Results       08/03/22 07/08/22 07/07/22     1037 0728 0338     138 135    K 5.0 4.6 5.1    Cl 106 113 111    CO2 20 19 18    Glucose 104 81 87    BUN 21 14 29    Creatinine 1.3 1.1 1.6    Calcium 9.5 8.5 9.5    Anion Gap 10 6 6    EGFR 39.7 48.2 31.3         Comment for BUN at 0728 on 07/08/22: Result rechecked          PT/PTT Results    No lab values to display.       The patient does not have an active anticoagulation episode.    AB    Anesthesia ROS/MED HX      Cardiovascular   PVD   CAD   hypertension   Echocardiogram reviewed and ECG reviewed  GI/Hepatic   Hiatal hernia   GERD  Endo/Other   Hypothyroidism  Body Habitus: Normal       Past Surgical History:   Procedure Laterality Date    ABDOMINAL SURGERY      BOWEL RESECTION      partial with ileostomy    CHOLECYSTECTOMY      COLONOSCOPY      DILATION AND CURETTAGE OF UTERUS  02/2018    ILEOSTOMY      LUNG SURGERY      ROTATOR CUFF REPAIR Right     TONSILLECTOMY      WISDOM TOOTH EXTRACTION      hx of       Physical Exam    Airway   Mallampati: II    TM distance: >3 FB   Neck ROM: full  Cardiovascular - normal   Rhythm: regular   Rate: normalPulmonary - normal   clear to auscultation  Dental - normal        Anesthesia Plan    Plan: general    Technique: general endotracheal     Lines and Monitors: additional IV     Airway: direct visual laryngoscopy   ASA 2  Anesthetic plan and risks discussed with: patient  Induction:    intravenous   Postop Plan:   Patient Disposition: inpatient floor planned admission   Pain Management: IV analgesics

## 2022-09-16 NOTE — OP NOTE
Raegan Young  1945  [id number]  9/15/2022        SURGEON: Geovani Mandel MD    ASSISTANT: Navya Billings DO    PREOPERATIVE DIAGNOSIS: History of low anterior resection with descending  colorectal anastomosis, and diverting loop ileostomy.  Question of anterior anastomotic dehiscence    POSTOPERATIVE DIAGNOSIS: Small anterior anastomotic dehiscence    PROCEDURE PERFORMED:    1.  Rectal examination under anesthesia with debridement of anterior anastomotic dehiscence and  primary repair    SPECIMENS:    1.  None    ANESTHESIA: General, local with Exparel    COMPLICATIONS: None.    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  She has developed delayed wound healing of the anastomosis and anteriorly appeared to have a small dehiscence.  I discussed with the patient that we would plan to go to the operating room to examine this under anesthesia and the risk the procedure in detail including bleeding, infection, need for additional procedures, failure for the anastomosis to heal.  The patient understood and would like to proceed.    OPERATIVE DETAIL: The patient was brought to the operative table and placed in prone jackknife position.  She was identified and general incision was induced.  Her anal area is prepped and draped in usual fashion using Betadine and timeout was performed.  A rectal exam was done and the anal canal was explored with a Waddell retractor.  An anterior midline they were to malformed staples that were in a small separation of the anastomosis.  The staples were removed and the cavity was then curettaged with a curette.  The entirety of the cavity was approximately 2 mm in size.  3-0 Vicryl stitch was then used to close this and  intraoperative fashion x2.  Once this was completed.  To be intact the remaining of the anastomosis appeared intact.  The anal canal was irrigated with saline till clear, perianal field block was performed using Exparel.  At the conclusion all sharps and sponge, and instrument counts are correct.  The patient tolerated the procedure well and was transferred to the PACU in extubated and stable condition.

## 2022-09-17 LAB
CASE RPRT: NORMAL
CLINICAL INFO: NORMAL
PATH REPORT.FINAL DX SPEC: NORMAL
PATH REPORT.GROSS SPEC: NORMAL

## 2022-09-19 ENCOUNTER — HOSPITAL ENCOUNTER (OUTPATIENT)
Facility: HOSPITAL | Age: 77
Setting detail: SURGERY ADMIT
End: 2022-09-19
Attending: COLON & RECTAL SURGERY | Admitting: COLON & RECTAL SURGERY
Payer: MEDICARE

## 2022-09-28 ENCOUNTER — OFFICE VISIT (OUTPATIENT)
Dept: COLORECTAL SURGERY | Facility: CLINIC | Age: 77
End: 2022-09-28
Payer: MEDICARE

## 2022-09-28 VITALS — WEIGHT: 147 LBS | HEIGHT: 60 IN | BODY MASS INDEX: 28.86 KG/M2

## 2022-09-28 DIAGNOSIS — N82.4 COLOVAGINAL FISTULA: Primary | ICD-10-CM

## 2022-09-28 DIAGNOSIS — K57.21 DIVERTICULITIS OF LARGE INTESTINE WITH PERFORATION AND ABSCESS WITH BLEEDING: ICD-10-CM

## 2022-09-28 PROCEDURE — 99212 OFFICE O/P EST SF 10 MIN: CPT | Performed by: COLON & RECTAL SURGERY

## 2022-09-28 NOTE — LETTER
September 30, 2022     Javon Valadez DO  [address]  [facility] [address]    Patient: Raegan Young  YOB: 1945  Date of Visit: 9/28/2022      Dear Dr. Valadez:    Thank you for referring Raegan Young to me for evaluation. Below are my notes for this consultation.    If you have questions, please do not hesitate to call me. I look forward to following your patient along with you.         Sincerely,        Geovani Mandel MD        CC: MD Trev Mac MD Schoonyoung, Henry P, MD  9/30/2022  8:21 PM  Signed  HISTORY & PHYSICAL EXAM    HPI: Raegan is here to be seen today in follow-up.  She was admitted at the [facility] for severe abdominal pain which resolved spontaneously.  She  has been doing well with her stoma and her previously creatinine was 1.0.  She has not had any significant vaginal drainage or rectal drainage.  She was seen by Dr. Jimenez and the patient reports a very nice report from Dr. Jimenez noting that the vagina has healed well.    Past Medical History:   Diagnosis Date    Anemia     Arthritis     Colovaginal fistula     COVID-19 vaccine series completed 02/26/2021    Disease of thyroid gland     Diverticulitis of colon     GERD (gastroesophageal reflux disease)     H/O ileostomy     Hiatal hernia     History of snoring     Hypertension     Hypothyroidism     Lung cancer (CMS/HCC)     Lung nodule     Vaginal cancer (CMS/HCC) 2018    s/p XRT x 7 weeks and chemo weekly x 5 weeks       Past Surgical History:   Procedure Laterality Date    ABDOMINAL SURGERY      BOWEL RESECTION      partial with ileostomy    CHOLECYSTECTOMY      COLONOSCOPY      DILATION AND CURETTAGE OF UTERUS  02/2018    ILEOSTOMY      LUNG SURGERY      ROTATOR CUFF REPAIR Right     TONSILLECTOMY      WISDOM TOOTH EXTRACTION      hx of       Current Outpatient Medications:     acetaminophen (TYLENOL) 325 mg tablet, Take 2  tablets (650 mg total) by mouth every 4 (four) hours as needed for pain for up to 30 doses., Disp: 30 tablet, Rfl: 0    atorvastatin (LIPITOR) 40 mg tablet, TAKE 1 TABLET BY MOUTH EVERY DAY (Patient taking differently: Take 40 mg by mouth daily.), Disp: 90 tablet, Rfl: 1    benazepril (LOTENSIN) 10 mg tablet, Take 10 mg by mouth daily., Disp: , Rfl:     bumetanide (BUMEX) 1 mg tablet, Take 1 mg by mouth as needed.  , Disp: , Rfl:     cholecalciferol, vitamin D3, 1,000 unit (25 mcg) tablet, Take 2,000 Units by mouth daily., Disp: , Rfl:     estradioL (ESTRACE) 0.01 % (0.1 mg/gram) vaginal cream, Insert 1g vaginally twice weekly at bedtime (Patient taking differently: Insert into the vagina. Insert 1g vaginally twice weekly at bedtime), Disp: 42.5 g, Rfl: 3    glucosamine-D3-Boswellia serr 1,500-400-100 mg-unit-mg tablet, Take 1 tablet by mouth daily., Disp: , Rfl:     levothyroxine (SYNTHROID) 88 mcg tablet, Take 88 mcg by mouth daily.  , Disp: , Rfl: 3    loperamide (IMODIUM) 2 mg capsule, Take 2 mg by mouth 2 (two) times a day as needed for diarrhea., Disp: , Rfl:     methylPREDNISolone (MEDROL) 4 mg tablet, Take 4 mg by mouth daily., Disp: , Rfl:     pantoprazole (PROTONIX) 40 mg EC tablet, Take 40 mg by mouth once daily., Disp: , Rfl:     clobetasoL (TEMOVATE) 0.05 % ointment, Apply topically 3 (three) times a week (Mon, Wed, Fri) for 14 days. Apply to vulva 3 times per week, Disp: 60 g, Rfl: 11   Allergies   Allergen Reactions    Sulfamethoxazole-Trimethoprim Nausea Only    Adhesive Tape-Silicones      Causes bruising     Social History     Socioeconomic History    Marital status:      Spouse name: Royal     Number of children: 4    Years of education: Not on file    Highest education level: Not on file   Occupational History    Occupation: retired/     Tobacco Use    Smoking status: Former Smoker     Packs/day: 1.00     Years: 40.00     Pack years: 40.00     Types:  Cigarettes     Start date:      Quit date:      Years since quittin.7    Smokeless tobacco: Never Used   Vaping Use    Vaping Use: Never used   Substance and Sexual Activity    Alcohol use: Yes     Comment: occassional     Drug use: No    Sexual activity: Never     Comment:    Other Topics Concern    Not on file   Social History Narrative    Lives with  in a 2 story home     Social Determinants of Health     Financial Resource Strain: Not on file   Food Insecurity: Not on file   Transportation Needs: Not on file   Physical Activity: Not on file   Stress: Not on file   Social Connections: Not on file   Intimate Partner Violence: Not on file   Housing Stability: Not on file      Family History   Problem Relation Age of Onset    Heart attack Biological Mother     Hypertension Biological Mother     Endometrial cancer Biological Father     Lung cancer Biological Father     Breast cancer Neg Hx     Cancer Neg Hx     Colon cancer Neg Hx     Ovarian cancer Neg Hx        REVIEW OF SYSTEMS: Unchanged    PHYSICAL EXAM:  GEN: On examination the patient is resting comfortably.  NEURO/PSYCH: Alert and oriented ×3  HEENT: Eyes: Sclera anicteric  CHEST: Equal rise and fall the chest.  No tenderness bilaterally.  SKIN: Warm and moist  NODES: No groin or cervical lymphadenopathy  EXT: No palpable edema of the bilateral lower extremities.  No clubbing.  GI: Abdomen soft, nontender, nondistended.  No palpable liver or spleen edge.  No ventral hernias, mass, or tenderness.  RECTAL: Perianal skin has some postradiation changes.  Digital exam reveals normal sphincter tone, anastomosis is palpable, is patent and the anterior aspect appears to be healing nicely with Vicryl suture still in place.      ASSESSMENT/PLAN:     Diverticulitis of large intestine with perforation and abscess with bleeding  Overall the patient is doing well.  The area in the anterior aspect there was repaired with sutures  appears to be healing nicely.  From my standpoint the patient should continue activity as tolerated and we will see her back in the office in 2 months.  At that point we will relook at the anastomosis, make sure visually looks intact and at that point we will consider having her back on the schedule.

## 2022-10-01 NOTE — PROGRESS NOTES
HISTORY & PHYSICAL EXAM    HPI: Raegan is here to be seen today in follow-up.  She was admitted at the hospital [facility] for severe abdominal pain which resolved spontaneously.  She  has been doing well with her stoma and her previously creatinine was 1.0.  She has not had any significant vaginal drainage or rectal drainage.  She was seen by Dr. Jimenez and the patient reports a very nice report from Dr. Jimenez noting that the vagina has healed well.    Past Medical History:   Diagnosis Date    Anemia     Arthritis     Colovaginal fistula     COVID-19 vaccine series completed 02/26/2021    Disease of thyroid gland     Diverticulitis of colon     GERD (gastroesophageal reflux disease)     H/O ileostomy     Hiatal hernia     History of snoring     Hypertension     Hypothyroidism     Lung cancer (CMS/HCC)     Lung nodule     Vaginal cancer (CMS/HCC) 2018    s/p XRT x 7 weeks and chemo weekly x 5 weeks       Past Surgical History:   Procedure Laterality Date    ABDOMINAL SURGERY      BOWEL RESECTION      partial with ileostomy    CHOLECYSTECTOMY      COLONOSCOPY      DILATION AND CURETTAGE OF UTERUS  02/2018    ILEOSTOMY      LUNG SURGERY      ROTATOR CUFF REPAIR Right     TONSILLECTOMY      WISDOM TOOTH EXTRACTION      hx of       Current Outpatient Medications:     acetaminophen (TYLENOL) 325 mg tablet, Take 2 tablets (650 mg total) by mouth every 4 (four) hours as needed for pain for up to 30 doses., Disp: 30 tablet, Rfl: 0    atorvastatin (LIPITOR) 40 mg tablet, TAKE 1 TABLET BY MOUTH EVERY DAY (Patient taking differently: Take 40 mg by mouth daily.), Disp: 90 tablet, Rfl: 1    benazepril (LOTENSIN) 10 mg tablet, Take 10 mg by mouth daily., Disp: , Rfl:     bumetanide (BUMEX) 1 mg tablet, Take 1 mg by mouth as needed.  , Disp: , Rfl:     cholecalciferol, vitamin D3, 1,000 unit (25 mcg) tablet, Take 2,000 Units by mouth daily., Disp: , Rfl:     estradioL (ESTRACE) 0.01 % (0.1  mg/gram) vaginal cream, Insert 1g vaginally twice weekly at bedtime (Patient taking differently: Insert into the vagina. Insert 1g vaginally twice weekly at bedtime), Disp: 42.5 g, Rfl: 3    glucosamine-D3-Boswellia serr 1,500-400-100 mg-unit-mg tablet, Take 1 tablet by mouth daily., Disp: , Rfl:     levothyroxine (SYNTHROID) 88 mcg tablet, Take 88 mcg by mouth daily.  , Disp: , Rfl: 3    loperamide (IMODIUM) 2 mg capsule, Take 2 mg by mouth 2 (two) times a day as needed for diarrhea., Disp: , Rfl:     methylPREDNISolone (MEDROL) 4 mg tablet, Take 4 mg by mouth daily., Disp: , Rfl:     pantoprazole (PROTONIX) 40 mg EC tablet, Take 40 mg by mouth once daily., Disp: , Rfl:     clobetasoL (TEMOVATE) 0.05 % ointment, Apply topically 3 (three) times a week (Mon, Wed, Fri) for 14 days. Apply to vulva 3 times per week, Disp: 60 g, Rfl: 11   Allergies   Allergen Reactions    Sulfamethoxazole-Trimethoprim Nausea Only    Adhesive Tape-Silicones      Causes bruising     Social History     Socioeconomic History    Marital status:      Spouse name: Royal     Number of children: 4    Years of education: Not on file    Highest education level: Not on file   Occupational History    Occupation: retired/     Tobacco Use    Smoking status: Former Smoker     Packs/day: 1.00     Years: 40.00     Pack years: 40.00     Types: Cigarettes     Start date:      Quit date:      Years since quittin.7    Smokeless tobacco: Never Used   Vaping Use    Vaping Use: Never used   Substance and Sexual Activity    Alcohol use: Yes     Comment: occassional     Drug use: No    Sexual activity: Never     Comment:    Other Topics Concern    Not on file   Social History Narrative    Lives with  in a 2 story home     Social Determinants of Health     Financial Resource Strain: Not on file   Food Insecurity: Not on file   Transportation Needs: Not on file   Physical Activity: Not on file    Stress: Not on file   Social Connections: Not on file   Intimate Partner Violence: Not on file   Housing Stability: Not on file      Family History   Problem Relation Age of Onset    Heart attack Biological Mother     Hypertension Biological Mother     Endometrial cancer Biological Father     Lung cancer Biological Father     Breast cancer Neg Hx     Cancer Neg Hx     Colon cancer Neg Hx     Ovarian cancer Neg Hx        REVIEW OF SYSTEMS: Unchanged    PHYSICAL EXAM:  GEN: On examination the patient is resting comfortably.  NEURO/PSYCH: Alert and oriented ×3  HEENT: Eyes: Sclera anicteric  CHEST: Equal rise and fall the chest.  No tenderness bilaterally.  SKIN: Warm and moist  NODES: No groin or cervical lymphadenopathy  EXT: No palpable edema of the bilateral lower extremities.  No clubbing.  GI: Abdomen soft, nontender, nondistended.  No palpable liver or spleen edge.  No ventral hernias, mass, or tenderness.  RECTAL: Perianal skin has some postradiation changes.  Digital exam reveals normal sphincter tone, anastomosis is palpable, is patent and the anterior aspect appears to be healing nicely with Vicryl suture still in place.      ASSESSMENT/PLAN:     Diverticulitis of large intestine with perforation and abscess with bleeding  Overall the patient is doing well.  The area in the anterior aspect there was repaired with sutures appears to be healing nicely.  From my standpoint the patient should continue activity as tolerated and we will see her back in the office in 2 months.  At that point we will relook at the anastomosis, make sure visually looks intact and at that point we will consider having her back on the schedule.

## 2022-10-01 NOTE — ASSESSMENT & PLAN NOTE
Overall the patient is doing well.  The area in the anterior aspect there was repaired with sutures appears to be healing nicely.  From my standpoint the patient should continue activity as tolerated and we will see her back in the office in 2 months.  At that point we will relook at the anastomosis, make sure visually looks intact and at that point we will consider having her back on the schedule.

## 2022-10-30 ENCOUNTER — APPOINTMENT (EMERGENCY)
Dept: RADIOLOGY | Facility: HOSPITAL | Age: 77
End: 2022-10-30
Attending: EMERGENCY MEDICINE
Payer: MEDICARE

## 2022-10-30 ENCOUNTER — HOSPITAL ENCOUNTER (EMERGENCY)
Facility: HOSPITAL | Age: 77
Discharge: HOME | End: 2022-10-30
Attending: EMERGENCY MEDICINE | Admitting: EMERGENCY MEDICINE
Payer: MEDICARE

## 2022-10-30 VITALS
RESPIRATION RATE: 16 BRPM | WEIGHT: 160 LBS | HEIGHT: 60 IN | SYSTOLIC BLOOD PRESSURE: 122 MMHG | HEART RATE: 84 BPM | OXYGEN SATURATION: 97 % | BODY MASS INDEX: 31.41 KG/M2 | DIASTOLIC BLOOD PRESSURE: 63 MMHG | TEMPERATURE: 98.8 F

## 2022-10-30 DIAGNOSIS — N39.0 LOWER URINARY TRACT INFECTION: Primary | ICD-10-CM

## 2022-10-30 LAB
ALBUMIN SERPL-MCNC: 3.9 G/DL (ref 3.4–5)
ALP SERPL-CCNC: 70 IU/L (ref 35–126)
ALT SERPL-CCNC: 14 IU/L (ref 11–54)
ANION GAP SERPL CALC-SCNC: 9 MEQ/L (ref 3–15)
AST SERPL-CCNC: 19 IU/L (ref 15–41)
BACTERIA URNS QL MICRO: 1 /HPF
BASOPHILS # BLD: 0.02 K/UL (ref 0.01–0.1)
BASOPHILS NFR BLD: 0.2 %
BILIRUB SERPL-MCNC: 0.4 MG/DL (ref 0.3–1.2)
BILIRUB UR QL STRIP.AUTO: NEGATIVE MG/DL
BUN SERPL-MCNC: 21 MG/DL (ref 8–20)
CALCIUM SERPL-MCNC: 9.2 MG/DL (ref 8.9–10.3)
CHLORIDE SERPL-SCNC: 107 MEQ/L (ref 98–109)
CLARITY UR REFRACT.AUTO: CLEAR
CO2 SERPL-SCNC: 22 MEQ/L (ref 22–32)
COLOR UR AUTO: YELLOW
CREAT SERPL-MCNC: 1.3 MG/DL (ref 0.6–1.1)
DIFFERENTIAL METHOD BLD: ABNORMAL
EOSINOPHIL # BLD: 0.02 K/UL (ref 0.04–0.36)
EOSINOPHIL NFR BLD: 0.2 %
ERYTHROCYTE [DISTWIDTH] IN BLOOD BY AUTOMATED COUNT: 13.1 % (ref 11.7–14.4)
GFR SERPL CREATININE-BSD FRML MDRD: 39.7 ML/MIN/1.73M*2
GLUCOSE SERPL-MCNC: 102 MG/DL (ref 70–99)
GLUCOSE UR STRIP.AUTO-MCNC: NEGATIVE MG/DL
HCT VFR BLDCO AUTO: 36.5 % (ref 35–45)
HGB BLD-MCNC: 11.9 G/DL (ref 11.8–15.7)
HGB UR QL STRIP.AUTO: NEGATIVE
HYALINE CASTS #/AREA URNS LPF: ABNORMAL /LPF
IMM GRANULOCYTES # BLD AUTO: 0.02 K/UL (ref 0–0.08)
IMM GRANULOCYTES NFR BLD AUTO: 0.2 %
KETONES UR STRIP.AUTO-MCNC: NEGATIVE MG/DL
LACTATE SERPL-SCNC: 0.8 MMOL/L (ref 0.4–2)
LEUKOCYTE ESTERASE UR QL STRIP.AUTO: 3
LYMPHOCYTES # BLD: 1.31 K/UL (ref 1.2–3.5)
LYMPHOCYTES NFR BLD: 14.1 %
MCH RBC QN AUTO: 29.5 PG (ref 28–33.2)
MCHC RBC AUTO-ENTMCNC: 32.6 G/DL (ref 32.2–35.5)
MCV RBC AUTO: 90.6 FL (ref 83–98)
MONOCYTES # BLD: 0.69 K/UL (ref 0.28–0.8)
MONOCYTES NFR BLD: 7.4 %
MUCOUS THREADS URNS QL MICRO: ABNORMAL /LPF
NEUTROPHILS # BLD: 7.22 K/UL (ref 1.7–7)
NEUTS SEG NFR BLD: 77.9 %
NITRITE UR QL STRIP.AUTO: POSITIVE
NRBC BLD-RTO: 0 %
PDW BLD AUTO: 9.5 FL (ref 9.4–12.3)
PH UR STRIP.AUTO: 5.5 [PH]
PLATELET # BLD AUTO: 283 K/UL (ref 150–369)
POTASSIUM SERPL-SCNC: 3.9 MEQ/L (ref 3.6–5.1)
PROT SERPL-MCNC: 7.4 G/DL (ref 6–8.2)
PROT UR QL STRIP.AUTO: ABNORMAL
RBC # BLD AUTO: 4.03 M/UL (ref 3.93–5.22)
RBC #/AREA URNS HPF: ABNORMAL /HPF
SODIUM SERPL-SCNC: 138 MEQ/L (ref 136–144)
SP GR UR REFRACT.AUTO: 1.02
SQUAMOUS URNS QL MICRO: ABNORMAL /HPF
UROBILINOGEN UR STRIP-ACNC: 0.2 EU/DL
WBC # BLD AUTO: 9.28 K/UL (ref 3.8–10.5)
WBC #/AREA URNS HPF: ABNORMAL /HPF

## 2022-10-30 PROCEDURE — 25500000 HC DRUGS/INCIDENT RAD: Performed by: EMERGENCY MEDICINE

## 2022-10-30 PROCEDURE — 63600000 HC DRUGS/DETAIL CODE: Performed by: EMERGENCY MEDICINE

## 2022-10-30 PROCEDURE — 74176 CT ABD & PELVIS W/O CONTRAST: CPT | Mod: MG

## 2022-10-30 PROCEDURE — 99284 EMERGENCY DEPT VISIT MOD MDM: CPT | Mod: 25

## 2022-10-30 PROCEDURE — 36415 COLL VENOUS BLD VENIPUNCTURE: CPT | Performed by: EMERGENCY MEDICINE

## 2022-10-30 PROCEDURE — 3E0337Z INTRODUCTION OF ELECTROLYTIC AND WATER BALANCE SUBSTANCE INTO PERIPHERAL VEIN, PERCUTANEOUS APPROACH: ICD-10-PCS | Performed by: EMERGENCY MEDICINE

## 2022-10-30 PROCEDURE — 85025 COMPLETE CBC W/AUTO DIFF WBC: CPT | Performed by: EMERGENCY MEDICINE

## 2022-10-30 PROCEDURE — 96365 THER/PROPH/DIAG IV INF INIT: CPT

## 2022-10-30 PROCEDURE — 87086 URINE CULTURE/COLONY COUNT: CPT | Performed by: EMERGENCY MEDICINE

## 2022-10-30 PROCEDURE — 83605 ASSAY OF LACTIC ACID: CPT | Performed by: EMERGENCY MEDICINE

## 2022-10-30 PROCEDURE — 80053 COMPREHEN METABOLIC PANEL: CPT | Performed by: EMERGENCY MEDICINE

## 2022-10-30 PROCEDURE — A9698 NON-RAD CONTRAST MATERIALNOC: HCPCS | Performed by: EMERGENCY MEDICINE

## 2022-10-30 PROCEDURE — 81001 URINALYSIS AUTO W/SCOPE: CPT | Performed by: EMERGENCY MEDICINE

## 2022-10-30 PROCEDURE — 3E03329 INTRODUCTION OF OTHER ANTI-INFECTIVE INTO PERIPHERAL VEIN, PERCUTANEOUS APPROACH: ICD-10-PCS | Performed by: EMERGENCY MEDICINE

## 2022-10-30 PROCEDURE — 25800000 HC PHARMACY IV SOLUTIONS: Performed by: EMERGENCY MEDICINE

## 2022-10-30 PROCEDURE — 96361 HYDRATE IV INFUSION ADD-ON: CPT

## 2022-10-30 RX ORDER — CEPHALEXIN 500 MG/1
500 CAPSULE ORAL 3 TIMES DAILY
Qty: 21 CAPSULE | Refills: 0 | Status: SHIPPED | OUTPATIENT
Start: 2022-10-31 | End: 2022-11-01 | Stop reason: ALTCHOICE

## 2022-10-30 RX ORDER — CEPHALEXIN 500 MG/1
500 CAPSULE ORAL 3 TIMES DAILY
Qty: 21 CAPSULE | Refills: 0 | Status: SHIPPED | OUTPATIENT
Start: 2022-10-30 | End: 2022-10-30 | Stop reason: SDUPTHER

## 2022-10-30 RX ADMIN — CEFTRIAXONE SODIUM 1 G: 1 INJECTION, POWDER, FOR SOLUTION INTRAMUSCULAR; INTRAVENOUS at 13:32

## 2022-10-30 RX ADMIN — IOHEXOL 1000 ML: 9 SOLUTION ORAL at 12:29

## 2022-10-30 RX ADMIN — SODIUM CHLORIDE 1000 ML: 9 INJECTION, SOLUTION INTRAVENOUS at 11:01

## 2022-10-30 ASSESSMENT — ENCOUNTER SYMPTOMS
PALPITATIONS: 0
COUGH: 0
SHORTNESS OF BREATH: 0
HEADACHES: 0
BACK PAIN: 0
COLOR CHANGE: 0
DYSURIA: 0
FEVER: 0
ABDOMINAL PAIN: 0
MYALGIAS: 0
SORE THROAT: 0
FATIGUE: 0
VOMITING: 0

## 2022-10-30 NOTE — ED PROVIDER NOTES
Emergency Medicine Note  HPI   HISTORY OF PRESENT ILLNESS       Illness  Location:  Several weeks of dysuria and several days of belching  Severity:  Moderate  Onset quality:  Gradual  Timing:  Intermittent  Progression:  Waxing and waning  Chronicity:  New  Context:  2 weeks ago Rx Cipro x  5 days for UTI  Relieved by:  Nothing  Worsened by:  Nothing  Associated symptoms: no abdominal pain, no chest pain, no cough, no fatigue, no fever, no headaches, no myalgias, no rash, no shortness of breath, no sore throat and no vomiting          Patient History   PAST HISTORY     Reviewed from Nursing Triage:       Past Medical History:   Diagnosis Date    Anemia     Arthritis     Colovaginal fistula     COVID-19 vaccine series completed 2021    Disease of thyroid gland     Diverticulitis of colon     GERD (gastroesophageal reflux disease)     H/O ileostomy     Hiatal hernia     History of snoring     Hypertension     Hypothyroidism     Lung cancer (CMS/HCC)     Lung nodule     Vaginal cancer (CMS/HCC)     s/p XRT x 7 weeks and chemo weekly x 5 weeks         Past Surgical History:   Procedure Laterality Date    ABDOMINAL SURGERY      BOWEL RESECTION      partial with ileostomy    CHOLECYSTECTOMY      COLONOSCOPY      DILATION AND CURETTAGE OF UTERUS  2018    ILEOSTOMY      LUNG SURGERY      ROTATOR CUFF REPAIR Right     TONSILLECTOMY      WISDOM TOOTH EXTRACTION      hx of       Family History   Problem Relation Age of Onset    Heart attack Biological Mother     Hypertension Biological Mother     Endometrial cancer Biological Father     Lung cancer Biological Father     Breast cancer Neg Hx     Cancer Neg Hx     Colon cancer Neg Hx     Ovarian cancer Neg Hx        Social History     Tobacco Use    Smoking status: Former     Packs/day: 1.00     Years: 40.00     Pack years: 40.00     Types: Cigarettes     Start date:      Quit date:      Years since quittin.8     Smokeless tobacco: Never   Vaping Use    Vaping Use: Never used   Substance Use Topics    Alcohol use: Yes     Comment: occassional     Drug use: No         Review of Systems   REVIEW OF SYSTEMS     Review of Systems   Constitutional: Negative for fatigue and fever.   HENT: Negative for sore throat.    Respiratory: Negative for cough and shortness of breath.    Cardiovascular: Negative for chest pain and palpitations.   Gastrointestinal: Negative for abdominal pain and vomiting.   Genitourinary: Negative for dysuria and pelvic pain.   Musculoskeletal: Negative for back pain and myalgias.   Skin: Negative for color change and rash.   Neurological: Negative for syncope and headaches.   All other systems reviewed and are negative.        VITALS     ED Vitals    Date/Time Temp Pulse Resp BP SpO2 [facility]   10/30/22 0904 37.1 °C (98.8 °F) 103 18 135/66 100 % JE                       Physical Exam   PHYSICAL EXAM     Physical Exam  Vitals and nursing note reviewed.   Constitutional:       General: She is not in acute distress.     Appearance: She is well-developed.   HENT:      Head: Normocephalic and atraumatic.   Eyes:      General: No scleral icterus.     Conjunctiva/sclera: Conjunctivae normal.   Neck:      Vascular: No JVD.   Cardiovascular:      Rate and Rhythm: Normal rate and regular rhythm.   Pulmonary:      Effort: Pulmonary effort is normal. No respiratory distress.      Breath sounds: Normal breath sounds.   Abdominal:      Palpations: Abdomen is soft.      Tenderness: There is no abdominal tenderness.      Comments: Ileostomy bag   Musculoskeletal:         General: No swelling or tenderness.      Cervical back: Neck supple.      Right lower leg: No edema.      Left lower leg: No edema.   Skin:     General: Skin is warm and dry.   Neurological:      General: No focal deficit present.      Mental Status: She is alert and oriented to person, place, and time.   Psychiatric:         Mood and Affect: Mood normal.          Behavior: Behavior normal.           PROCEDURES     Procedures     DATA     Results     Procedure Component Value Units Date/Time    CBC and differential [[id number]] Collected: 10/30/22 1106    Specimen: Blood, Venous Updated: 10/30/22 1112    Comprehensive metabolic panel [[id number]] Collected: 10/30/22 1106    Specimen: Blood, Venous Updated: 10/30/22 1112    Lactate, w/ reflex repeat if > 2.0 [[id number]] Collected: 10/30/22 1106    Specimen: Blood, Venous Updated: 10/30/22 1111          Imaging Results    None         No orders to display       Scoring tools                                  ED Course & MDM   MDM / ED COURSE / CLINICAL IMPRESSION / DISPO     MDM    ED Course as of 10/30/22 1524   Sun Oct 30, 2022   0952 77yo female h/o CKD, colouterine fistula, HTN c/o intermittent dysuria and abd discomfort x several weeks. Went to urgent care 2 weeks ago-->Rx Cipro x 5 days. Symptoms gradually improved, but then reocurred last week. Yesterday, developed belching. Has nausea, but no vomiting.  Pt has ileostomy, which was full this morning.  Will check labs, UA and reassess. [DILLON]   1155 Lactate 0.8 [DILLON]   1211 GFR 39  Will check CT abd with PO contrast only [DILLON]   1315 UA c/w UTI [DILLON]   1317 Sign out from  pending CT A/P  [LA]   1517 CT A/P w/o acute abnormality.  [LA]   1523 Patient reassessed at this time.  Well-appearing.  Vital signs within normal limits.  Abdomen soft and nontender on repeat exam this time.  Updated on all labs and imaging.  Given dose of Rocephin in ED for UTI, will discharge home with p.o. Keflex.    The plan at this time is to discharge the patient home.  Patient agrees with plan and verbalized understanding.  All questions answered.  Strict return precautions given.  Patient to follow up with primary care doctor within 3 days.   [LA]      ED Course User Index  [DILLON] Clint Brooke MD  [LA] Keanu Aguirre MD     Clinical Impression      None               Clint Brooke,  MD  10/30/22 8614

## 2022-11-01 LAB
BACTERIA UR CULT: ABNORMAL
BACTERIA UR CULT: ABNORMAL

## 2022-11-01 RX ORDER — CEFUROXIME AXETIL 250 MG/1
250 TABLET ORAL 2 TIMES DAILY
Qty: 14 TABLET | Refills: 0 | Status: SHIPPED | OUTPATIENT
Start: 2022-11-01 | End: 2022-11-08

## 2022-11-10 ENCOUNTER — OFFICE VISIT (OUTPATIENT)
Dept: THORACIC SURGERY | Facility: CLINIC | Age: 77
End: 2022-11-10
Payer: MEDICARE

## 2022-11-10 VITALS
DIASTOLIC BLOOD PRESSURE: 74 MMHG | OXYGEN SATURATION: 94 % | WEIGHT: 150 LBS | HEART RATE: 94 BPM | RESPIRATION RATE: 20 BRPM | SYSTOLIC BLOOD PRESSURE: 122 MMHG | HEIGHT: 60 IN | BODY MASS INDEX: 29.45 KG/M2

## 2022-11-10 DIAGNOSIS — C34.12 MALIGNANT NEOPLASM OF UPPER LOBE OF LEFT LUNG (CMS/HCC): Primary | ICD-10-CM

## 2022-11-10 PROCEDURE — G8752 SYS BP LESS 140: HCPCS | Performed by: THORACIC SURGERY (CARDIOTHORACIC VASCULAR SURGERY)

## 2022-11-10 PROCEDURE — 99213 OFFICE O/P EST LOW 20 MIN: CPT | Performed by: THORACIC SURGERY (CARDIOTHORACIC VASCULAR SURGERY)

## 2022-11-10 PROCEDURE — G8754 DIAS BP LESS 90: HCPCS | Performed by: THORACIC SURGERY (CARDIOTHORACIC VASCULAR SURGERY)

## 2022-11-10 RX ORDER — ALPRAZOLAM 0.5 MG/1
0.5 TABLET ORAL 2 TIMES DAILY PRN
Qty: 60 TABLET | Refills: 3 | Status: SHIPPED | OUTPATIENT
Start: 2022-11-10 | End: 2022-12-10

## 2022-11-10 NOTE — LETTER
Dear Tequila, Geovani CISNEROS MD    I appreciate the opportunity to evaluate  your patient Raegan Young in the office today.    Raegan Young is a 77 y.o. old female presenting today for for 6-month survivorship visit. 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  Unfortunately, she developed a colouterine fistula and has had multiple issues delaying final surgical repair including recent pelvic abscess.  She currently has ileostomy.  She has follow-up next week to discuss next steps.    Her ROS is notable for good energy and appetite with stable weight.  She is frustrated with her abdominopelvic issues that have limited her ability to see family back in [place].    We are very pleased with how she has recovered and her overall prognosis.  She is a very delightful lady and handling her constellation of health issues quite well overall.  From a lung standpoint, she is able to proceed with further procedures/surgeries.  We will remain available in the meantime should there be any further question, change or concern.       Thank you for the opportunity to participate in her care.    ASAD Estes

## 2022-11-10 NOTE — PROGRESS NOTES
Thoracic Surgery    Patient ID: Raegan Young                              : 1945  MRN: [id number]  Clinic: [facility]                                            Visit Date: 11/10/2022  Encounter Provider: Owen Gamez  Referring Provider: Geovani Mandel MD         Patient: Raegan Young  Chief Complaint: Follow-up -6-month non-small cell lung cancer surveillance/survivorship visit      Subjective     Raegan Young is a 77 y.o. old female presenting today for for 6-month survivorship visit. 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  Unfortunately, she developed a colouterine fistula and has had multiple issues delaying final surgical repair including recent pelvic abscess.  She currently has ileostomy.  She has follow-up next week to discuss next steps.    Her ROS is notable for good energy and appetite with stable weight.  She is frustrated with her abdominopelvic issues that have limited her ability to see family back in [place].         Past Medical History:  has a past medical history of Anemia, Arthritis, Colovaginal fistula, COVID-19 vaccine series completed (2021), Disease of thyroid gland, Diverticulitis of colon, GERD (gastroesophageal reflux disease), H/O ileostomy, Hiatal hernia, History of snoring, Hypertension, Hypothyroidism, Lung cancer (CMS/HCC), Lung nodule, and Vaginal cancer (CMS/HCC) (2018).    She has no past medical history of Diabetes mellitus (CMS/HCC) or Myocardial infarction (CMS/HCC).  Past Surgical History:  has a past surgical history that includes Cholecystectomy; Dilation and curettage of uterus (2018); Rotator cuff repair (Right); Colonoscopy; Lung surgery; Tonsillectomy; [place] tooth extraction; Bowel resection; Abdominal surgery; and Ileostomy.  Social History:   Social History      Tobacco Use    Smoking status: Former     Packs/day: 1.00     Years: 40.00     Pack years: 40.00     Types: Cigarettes     Start date:      Quit date:      Years since quittin.8    Smokeless tobacco: Never   Vaping Use    Vaping Use: Never used   Substance Use Topics    Alcohol use: Yes     Comment: occassional     Drug use: No     Medications:   Current Outpatient Medications   Medication Sig Dispense Refill    acetaminophen (TYLENOL) 325 mg tablet Take 2 tablets (650 mg total) by mouth every 4 (four) hours as needed for pain for up to 30 doses. 30 tablet 0    ALPRAZolam (XANAX) 0.5 mg tablet Take 1 tablet (0.5 mg total) by mouth 2 (two) times a day as needed for anxiety. 60 tablet 3    benazepril (LOTENSIN) 10 mg tablet Take 10 mg by mouth daily.      bumetanide (BUMEX) 1 mg tablet Take 1 mg by mouth as needed.        cholecalciferol, vitamin D3, 1,000 unit (25 mcg) tablet Take 2,000 Units by mouth daily.      estradioL (ESTRACE) 0.01 % (0.1 mg/gram) vaginal cream Insert 1g vaginally twice weekly at bedtime (Patient taking differently: Insert into the vagina. Insert 1g vaginally twice weekly at bedtime) 42.5 g 3    glucosamine-D3-Boswellia serr 1,500-400-100 mg-unit-mg tablet Take 1 tablet by mouth daily.      levothyroxine (SYNTHROID) 88 mcg tablet Take 88 mcg by mouth daily.    3    loperamide (IMODIUM) 2 mg capsule Take 2 mg by mouth 2 (two) times a day as needed for diarrhea.      methylPREDNISolone (MEDROL) 4 mg tablet Take 4 mg by mouth daily.      pantoprazole (PROTONIX) 40 mg EC tablet Take 40 mg by mouth once daily.      atorvastatin (LIPITOR) 40 mg tablet TAKE 1 TABLET BY MOUTH EVERY DAY (Patient not taking: Reported on 11/10/2022) 90 tablet 1    clobetasoL (TEMOVATE) 0.05 % ointment Apply topically 3 (three) times a week (Mon, Wed, Fri) for 14 days. Apply to vulva 3 times per week 60 g 11     No current facility-administered medications for this visit.       Allergies:    Allergies   Allergen Reactions    Sulfamethoxazole-Trimethoprim Nausea Only    Adhesive Tape-Silicones      Causes bruising          Objective   Vitals:   Visit Vitals  /74 (BP Location: Right upper arm, Patient Position: Sitting)   Pulse 94   Resp 20   Ht 1.524 m (5')   Wt 68 kg (150 lb)   SpO2 94%   BMI 29.29 kg/m²      General: She is in good spirits and very pleasant.  She is in no acute distress and appears younger than her stated age  Ambulates into the office with steady gait and does not use assistance   Pulmonary: Good air entry bilaterally and is clear to auscultation.  She is not on supplemental oxygen   CV: Regular rate and rhythm without murmur appreciated   Extremities: No clubbing or cyanosis.  Mild and symmetric lower extremity edema appears to be at her baseline      Imaging Review: Personally reviewed her images and compared them to her images from 6 months ago which was her initial postop chest CT.  There are stable postoperative changes without concern for recurrence.  There is no adenopathy.  There is no acute concern noted.      Assessment   We are very pleased with how she has recovered and her overall prognosis.  She is a very delightful lady and handling her constellation of health issues quite well overall.  From a lung standpoint, she is able to proceed with further procedures/surgeries.  We will remain available in the meantime should there be any further question, change or concern.

## 2022-11-14 ENCOUNTER — OFFICE VISIT (OUTPATIENT)
Dept: COLORECTAL SURGERY | Facility: CLINIC | Age: 77
End: 2022-11-14
Payer: MEDICARE

## 2022-11-14 VITALS — WEIGHT: 150 LBS | BODY MASS INDEX: 29.45 KG/M2 | HEIGHT: 60 IN

## 2022-11-14 DIAGNOSIS — K57.21 DIVERTICULITIS OF LARGE INTESTINE WITH PERFORATION AND ABSCESS WITH BLEEDING: ICD-10-CM

## 2022-11-14 DIAGNOSIS — N82.4 COLOVAGINAL FISTULA: Primary | ICD-10-CM

## 2022-11-14 PROCEDURE — 99024 POSTOP FOLLOW-UP VISIT: CPT | Performed by: COLON & RECTAL SURGERY

## 2022-11-14 RX ORDER — CEPHALEXIN 500 MG/1
CAPSULE ORAL
COMMUNITY
Start: 2022-11-04 | End: 2023-01-30 | Stop reason: ENTERED-IN-ERROR

## 2022-11-14 RX ORDER — FLUCONAZOLE 150 MG/1
TABLET ORAL
COMMUNITY
Start: 2022-11-02 | End: 2022-12-14

## 2022-11-14 RX ORDER — METRONIDAZOLE 7.5 MG/G
GEL VAGINAL
COMMUNITY
Start: 2022-10-27 | End: 2022-12-14

## 2022-11-14 NOTE — LETTER
November 20, 2022     Javon Valadez DO  [address]  [facility] [address]    Patient: Raegan Young  YOB: 1945  Date of Visit: 11/14/2022      Dear Dr. Valadez:    Thank you for referring Raegan Young to me for evaluation. Below are my notes for this consultation.    If you have questions, please do not hesitate to call me. I look forward to following your patient along with you.         Sincerely,        Geovani Mandel MD        CC: MD Trev Mac MD Schoonyoung, Henry P, MD  11/20/2022  2:26 PM  Signed  HISTORY & PHYSICAL EXAM    HPI: Raegan is 2 months post EUA and repair of rectal anastomosis.  She went to the emergency room at the end of October for another UTI.  She reports she is finished the antibiotics and was also being treated for a yeast infection.  She states she taking 3 Lomotil a day, which is helping decrease her output.  She is very eager to discuss reversal.    Past Medical History:   Diagnosis Date    Anemia     Arthritis     Colovaginal fistula     COVID-19 vaccine series completed 02/26/2021    Disease of thyroid gland     Diverticulitis of colon     GERD (gastroesophageal reflux disease)     H/O ileostomy     Hiatal hernia     History of snoring     Hypertension     Hypothyroidism     Lung cancer (CMS/HCC)     Lung nodule     Vaginal cancer (CMS/HCC) 2018    s/p XRT x 7 weeks and chemo weekly x 5 weeks       Past Surgical History:   Procedure Laterality Date    ABDOMINAL SURGERY      BOWEL RESECTION      partial with ileostomy    CHOLECYSTECTOMY      COLONOSCOPY      DILATION AND CURETTAGE OF UTERUS  02/2018    ILEOSTOMY      LUNG SURGERY      ROTATOR CUFF REPAIR Right     TONSILLECTOMY      WISDOM TOOTH EXTRACTION      hx of       Current Outpatient Medications:     acetaminophen (TYLENOL) 325 mg tablet, Take 2 tablets (650 mg total) by mouth every 4 (four) hours as needed for pain for up to 30  doses., Disp: 30 tablet, Rfl: 0    ALPRAZolam (XANAX) 0.5 mg tablet, Take 1 tablet (0.5 mg total) by mouth 2 (two) times a day as needed for anxiety., Disp: 60 tablet, Rfl: 3    benazepril (LOTENSIN) 10 mg tablet, Take 10 mg by mouth daily., Disp: , Rfl:     bumetanide (BUMEX) 1 mg tablet, Take 1 mg by mouth as needed.  , Disp: , Rfl:     cephalexin (KEFLEX) 500 mg capsule, , Disp: , Rfl:     cholecalciferol, vitamin D3, 1,000 unit (25 mcg) tablet, Take 2,000 Units by mouth daily., Disp: , Rfl:     estradioL (ESTRACE) 0.01 % (0.1 mg/gram) vaginal cream, Insert 1g vaginally twice weekly at bedtime (Patient taking differently: Insert into the vagina. Insert 1g vaginally twice weekly at bedtime), Disp: 42.5 g, Rfl: 3    fluconazole (DIFLUCAN) 150 mg tablet, 1 (ONE) TABLET BY MOUTH ONE TIME DOSE, MAY REPEAT AFTER 3 DAYS IF SYMPTOMS PERSIST., Disp: , Rfl:     glucosamine-D3-Boswellia serr 1,500-400-100 mg-unit-mg tablet, Take 1 tablet by mouth daily., Disp: , Rfl:     levothyroxine (SYNTHROID) 88 mcg tablet, Take 88 mcg by mouth daily.  , Disp: , Rfl: 3    loperamide (IMODIUM) 2 mg capsule, Take 2 mg by mouth 2 (two) times a day as needed for diarrhea., Disp: , Rfl:     methylPREDNISolone (MEDROL) 4 mg tablet, Take 4 mg by mouth daily., Disp: , Rfl:     metroNIDAZOLE (METROGEL) 0.75 % (37.5mg/5 gram) vaginal gel, , Disp: , Rfl:     pantoprazole (PROTONIX) 40 mg EC tablet, Take 40 mg by mouth once daily., Disp: , Rfl:     atorvastatin (LIPITOR) 40 mg tablet, TAKE 1 TABLET BY MOUTH EVERY DAY, Disp: 90 tablet, Rfl: 3    clobetasoL (TEMOVATE) 0.05 % ointment, Apply topically 3 (three) times a week (Mon, Wed, Fri) for 14 days. Apply to vulva 3 times per week (Patient not taking: Reported on 11/14/2022), Disp: 60 g, Rfl: 11   Allergies   Allergen Reactions    Sulfamethoxazole-Trimethoprim Nausea Only    Adhesive Tape-Silicones      Causes bruising     Social History     Socioeconomic History    Marital  status:      Spouse name: Royal     Number of children: 4    Years of education: Not on file    Highest education level: Not on file   Occupational History    Occupation: retired/     Tobacco Use    Smoking status: Former     Packs/day: 1.00     Years: 40.00     Pack years: 40.00     Types: Cigarettes     Start date:      Quit date:      Years since quittin.9    Smokeless tobacco: Never   Vaping Use    Vaping Use: Never used   Substance and Sexual Activity    Alcohol use: Yes     Comment: occassional     Drug use: No    Sexual activity: Never     Comment:    Other Topics Concern    Not on file   Social History Narrative    Lives with  in a 2 story home     Social Determinants of Health     Financial Resource Strain: Not on file   Food Insecurity: No Food Insecurity    Worried About Running Out of Food in the Last Year: Never true    Ran Out of Food in the Last Year: Never true   Transportation Needs: Not on file   Physical Activity: Not on file   Stress: Not on file   Social Connections: Not on file   Intimate Partner Violence: Not on file   Housing Stability: Not on file      Family History   Problem Relation Age of Onset    Heart attack Biological Mother     Hypertension Biological Mother     Endometrial cancer Biological Father     Lung cancer Biological Father     Breast cancer Neg Hx     Cancer Neg Hx     Colon cancer Neg Hx     Ovarian cancer Neg Hx        REVIEW OF SYSTEMS: Unchanged    PHYSICAL EXAM:  GEN: On examination the patient is resting comfortably.  NEURO/PSYCH: Alert and oriented ×3  HEENT: Eyes: Sclera anicteric  CHEST: Equal rise and fall the chest.  No tenderness bilaterally.  SKIN: Warm and moist  NODES: No groin or cervical lymphadenopathy  EXT: No palpable edema of the bilateral lower extremities.  No clubbing.  GI: Abdomen soft, nontender, nondistended.  No palpable liver or spleen edge.  No ventral hernias, mass, or  tenderness.  RECTAL: Perianal skin is normal.  Digital exam reveals normal sphincter tone, no masses are palpable.  The anastomosis palpable and there is no palpable defect.  Flexible sigmoidoscopy: Carried out the level of the descending colon was limited by patient discomfort the anastomosis there is a small defect in the anterior aspect that has some fibropurulent exudate outside of this.  The remainder of the anastomosis appeared well-healed.        ASSESSMENT/PLAN:     Diverticulitis of large intestine with perforation and abscess with bleeding  The patient has defect in the anastomosis that remains in the anterior position.  At this point I feel that the most reasonable neck step is to have her seen in the operating room to debride the area locally, and performed and advancement of the anastomosis with a handsewn repair.  If this is not successful, the neck step after that would be laparoscopic mobilization of the colon and taught to procedure.  Certainly this would be less optimal.  That said, I explained this to the patient detail as well as her family who is with her today we will plan to proceed in this regard.  I will have her on the schedule for examination under anesthesia to come.

## 2022-11-14 NOTE — PROGRESS NOTES
HISTORY & PHYSICAL EXAM    HPI: Raegan is 2 months post EUA and repair of rectal anastomosis.  She went to the emergency room at the end of October for another UTI.  She reports she is finished the antibiotics and was also being treated for a yeast infection.  She states she taking 3 Lomotil a day, which is helping decrease her output.  She is very eager to discuss reversal.    Past Medical History:   Diagnosis Date    Anemia     Arthritis     Colovaginal fistula     COVID-19 vaccine series completed 02/26/2021    Disease of thyroid gland     Diverticulitis of colon     GERD (gastroesophageal reflux disease)     H/O ileostomy     Hiatal hernia     History of snoring     Hypertension     Hypothyroidism     Lung cancer (CMS/HCC)     Lung nodule     Vaginal cancer (CMS/HCC) 2018    s/p XRT x 7 weeks and chemo weekly x 5 weeks       Past Surgical History:   Procedure Laterality Date    ABDOMINAL SURGERY      BOWEL RESECTION      partial with ileostomy    CHOLECYSTECTOMY      COLONOSCOPY      DILATION AND CURETTAGE OF UTERUS  02/2018    ILEOSTOMY      LUNG SURGERY      ROTATOR CUFF REPAIR Right     TONSILLECTOMY      WISDOM TOOTH EXTRACTION      hx of       Current Outpatient Medications:     acetaminophen (TYLENOL) 325 mg tablet, Take 2 tablets (650 mg total) by mouth every 4 (four) hours as needed for pain for up to 30 doses., Disp: 30 tablet, Rfl: 0    ALPRAZolam (XANAX) 0.5 mg tablet, Take 1 tablet (0.5 mg total) by mouth 2 (two) times a day as needed for anxiety., Disp: 60 tablet, Rfl: 3    benazepril (LOTENSIN) 10 mg tablet, Take 10 mg by mouth daily., Disp: , Rfl:     bumetanide (BUMEX) 1 mg tablet, Take 1 mg by mouth as needed.  , Disp: , Rfl:     cephalexin (KEFLEX) 500 mg capsule, , Disp: , Rfl:     cholecalciferol, vitamin D3, 1,000 unit (25 mcg) tablet, Take 2,000 Units by mouth daily., Disp: , Rfl:     estradioL (ESTRACE) 0.01 % (0.1 mg/gram) vaginal cream, Insert 1g vaginally  twice weekly at bedtime (Patient taking differently: Insert into the vagina. Insert 1g vaginally twice weekly at bedtime), Disp: 42.5 g, Rfl: 3    fluconazole (DIFLUCAN) 150 mg tablet, 1 (ONE) TABLET BY MOUTH ONE TIME DOSE, MAY REPEAT AFTER 3 DAYS IF SYMPTOMS PERSIST., Disp: , Rfl:     glucosamine-D3-Boswellia serr 1,500-400-100 mg-unit-mg tablet, Take 1 tablet by mouth daily., Disp: , Rfl:     levothyroxine (SYNTHROID) 88 mcg tablet, Take 88 mcg by mouth daily.  , Disp: , Rfl: 3    loperamide (IMODIUM) 2 mg capsule, Take 2 mg by mouth 2 (two) times a day as needed for diarrhea., Disp: , Rfl:     methylPREDNISolone (MEDROL) 4 mg tablet, Take 4 mg by mouth daily., Disp: , Rfl:     metroNIDAZOLE (METROGEL) 0.75 % (37.5mg/5 gram) vaginal gel, , Disp: , Rfl:     pantoprazole (PROTONIX) 40 mg EC tablet, Take 40 mg by mouth once daily., Disp: , Rfl:     atorvastatin (LIPITOR) 40 mg tablet, TAKE 1 TABLET BY MOUTH EVERY DAY, Disp: 90 tablet, Rfl: 3    clobetasoL (TEMOVATE) 0.05 % ointment, Apply topically 3 (three) times a week (Mon, Wed, Fri) for 14 days. Apply to vulva 3 times per week (Patient not taking: Reported on 2022), Disp: 60 g, Rfl: 11   Allergies   Allergen Reactions    Sulfamethoxazole-Trimethoprim Nausea Only    Adhesive Tape-Silicones      Causes bruising     Social History     Socioeconomic History    Marital status:      Spouse name: Royal     Number of children: 4    Years of education: Not on file    Highest education level: Not on file   Occupational History    Occupation: retired/     Tobacco Use    Smoking status: Former     Packs/day: 1.00     Years: 40.00     Pack years: 40.00     Types: Cigarettes     Start date:      Quit date:      Years since quittin.9    Smokeless tobacco: Never   Vaping Use    Vaping Use: Never used   Substance and Sexual Activity    Alcohol use: Yes     Comment: occassional     Drug use: No    Sexual activity: Never      Comment:    Other Topics Concern    Not on file   Social History Narrative    Lives with  in a 2 story home     Social Determinants of Health     Financial Resource Strain: Not on file   Food Insecurity: No Food Insecurity    Worried About Running Out of Food in the Last Year: Never true    Ran Out of Food in the Last Year: Never true   Transportation Needs: Not on file   Physical Activity: Not on file   Stress: Not on file   Social Connections: Not on file   Intimate Partner Violence: Not on file   Housing Stability: Not on file      Family History   Problem Relation Age of Onset    Heart attack Biological Mother     Hypertension Biological Mother     Endometrial cancer Biological Father     Lung cancer Biological Father     Breast cancer Neg Hx     Cancer Neg Hx     Colon cancer Neg Hx     Ovarian cancer Neg Hx        REVIEW OF SYSTEMS: Unchanged    PHYSICAL EXAM:  GEN: On examination the patient is resting comfortably.  NEURO/PSYCH: Alert and oriented ×3  HEENT: Eyes: Sclera anicteric  CHEST: Equal rise and fall the chest.  No tenderness bilaterally.  SKIN: Warm and moist  NODES: No groin or cervical lymphadenopathy  EXT: No palpable edema of the bilateral lower extremities.  No clubbing.  GI: Abdomen soft, nontender, nondistended.  No palpable liver or spleen edge.  No ventral hernias, mass, or tenderness.  RECTAL: Perianal skin is normal.  Digital exam reveals normal sphincter tone, no masses are palpable.  The anastomosis palpable and there is no palpable defect.  Flexible sigmoidoscopy: Carried out the level of the descending colon was limited by patient discomfort the anastomosis there is a small defect in the anterior aspect that has some fibropurulent exudate outside of this.  The remainder of the anastomosis appeared well-healed.        ASSESSMENT/PLAN:     Diverticulitis of large intestine with perforation and abscess with bleeding  The patient has defect in the anastomosis that  remains in the anterior position.  At this point I feel that the most reasonable neck step is to have her seen in the operating room to debride the area locally, and performed and advancement of the anastomosis with a handsewn repair.  If this is not successful, the neck step after that would be laparoscopic mobilization of the colon and taught to procedure.  Certainly this would be less optimal.  That said, I explained this to the patient detail as well as her family who is with her today we will plan to proceed in this regard.  I will have her on the schedule for examination under anesthesia to come.

## 2022-11-15 ENCOUNTER — TELEPHONE (OUTPATIENT)
Dept: COLORECTAL SURGERY | Facility: CLINIC | Age: 77
End: 2022-11-15
Payer: MEDICARE

## 2022-11-15 NOTE — TELEPHONE ENCOUNTER
Pt called in ststing that she believe that she has another UTI . Patient was in yesterday.     Pt wanted to advised  that she is going to take her urine to  office's so they can test her and will have the results faxed over.

## 2022-11-17 RX ORDER — ATORVASTATIN CALCIUM 40 MG/1
TABLET, FILM COATED ORAL
Qty: 90 TABLET | Refills: 3 | Status: SHIPPED | OUTPATIENT
Start: 2022-11-17 | End: 2023-03-22

## 2022-11-17 NOTE — TELEPHONE ENCOUNTER
Refill request received from     Last appt w/ Dr. Sanchez     Upcoming appt on to be scheduled     Refill approved after chart review      Edda:  Please call and schedule patient for a follow up visit.

## 2022-11-20 NOTE — ASSESSMENT & PLAN NOTE
The patient has defect in the anastomosis that remains in the anterior position.  At this point I feel that the most reasonable neck step is to have her seen in the operating room to debride the area locally, and performed and advancement of the anastomosis with a handsewn repair.  If this is not successful, the neck step after that would be laparoscopic mobilization of the colon and taught to procedure.  Certainly this would be less optimal.  That said, I explained this to the patient detail as well as her family who is with her today we will plan to proceed in this regard.  I will have her on the schedule for examination under anesthesia to come.

## 2022-11-28 DIAGNOSIS — N82.4 COLOVAGINAL FISTULA: Primary | ICD-10-CM

## 2022-12-14 ENCOUNTER — OFFICE VISIT (OUTPATIENT)
Dept: GYNECOLOGIC ONCOLOGY | Facility: CLINIC | Age: 77
End: 2022-12-14
Attending: OBSTETRICS & GYNECOLOGY
Payer: MEDICARE

## 2022-12-14 VITALS
BODY MASS INDEX: 29.06 KG/M2 | TEMPERATURE: 98 F | HEART RATE: 100 BPM | HEIGHT: 60 IN | SYSTOLIC BLOOD PRESSURE: 132 MMHG | DIASTOLIC BLOOD PRESSURE: 76 MMHG | WEIGHT: 148 LBS

## 2022-12-14 DIAGNOSIS — Z85.44 HISTORY OF CANCER OF VAGINA: Primary | ICD-10-CM

## 2022-12-14 DIAGNOSIS — N82.4 COLOVAGINAL FISTULA: ICD-10-CM

## 2022-12-14 PROCEDURE — G8752 SYS BP LESS 140: HCPCS | Performed by: OBSTETRICS & GYNECOLOGY

## 2022-12-14 PROCEDURE — G8754 DIAS BP LESS 90: HCPCS | Performed by: OBSTETRICS & GYNECOLOGY

## 2022-12-14 PROCEDURE — 99213 OFFICE O/P EST LOW 20 MIN: CPT | Performed by: OBSTETRICS & GYNECOLOGY

## 2022-12-14 RX ORDER — CLOTRIMAZOLE AND BETAMETHASONE DIPROPIONATE 10; .64 MG/G; MG/G
CREAM TOPICAL 2 TIMES DAILY
Qty: 45 G | Refills: 1 | Status: SHIPPED | OUTPATIENT
Start: 2022-12-14 | End: 2023-01-13

## 2022-12-14 ASSESSMENT — PAIN SCALES - GENERAL: PAINLEVEL: 0-NO PAIN

## 2022-12-14 NOTE — LETTER
December 21, 2022    Patient: Raegan Young   YOB: 1945   Date of Visit: 12/14/2022       Dear Dr. Valadez:    The patient is seen back at the [facility] today in follow up with regard to her   1. History of cancer of vagina    2. Colovaginal fistula    . Below is my assessment and plan of care.          Assesment:   77-year-old female with a history of vaginal carcinoma, status post ileostomy secondary to [place] vaginal fistula.  It appears that the fistula is healing.  She shows no signs of recurrent vaginal cancer at this time.    I have asked Raegan to return again in 3 months.           Sincerely,        Trev Jimenez MD  CC: MD Akbar Mueller MD Henry P Schoonyoung, MD

## 2022-12-16 NOTE — PROGRESS NOTES
Raegan Young presents to the office for followup of vaginal carcinoma, colovaginal fistula.  She continues to have some modest amount of yellow discharge from vagina.  Denies fevers, chills, air through the vagina    Past Medical History:   Diagnosis Date   • Anemia    • Arthritis    • Colovaginal fistula    • COVID-19 vaccine series completed 02/26/2021   • Disease of thyroid gland    • Diverticulitis of colon    • GERD (gastroesophageal reflux disease)    • H/O ileostomy    • Hiatal hernia    • History of snoring    • Hypertension    • Hypothyroidism    • Lung cancer (CMS/HCC)    • Lung nodule    • Vaginal cancer (CMS/HCC) 2018    s/p XRT x 7 weeks and chemo weekly x 5 weeks       Past Surgical History:   Procedure Laterality Date   • ABDOMINAL SURGERY     • BOWEL RESECTION      partial with ileostomy   • CHOLECYSTECTOMY     • COLONOSCOPY     • DILATION AND CURETTAGE OF UTERUS  02/2018   • ILEOSTOMY     • LUNG SURGERY     • ROTATOR CUFF REPAIR Right    • TONSILLECTOMY     • WISDOM TOOTH EXTRACTION      hx of       Current Outpatient Medications:   •  acetaminophen (TYLENOL) 325 mg tablet, Take 2 tablets (650 mg total) by mouth every 4 (four) hours as needed for pain for up to 30 doses., Disp: 30 tablet, Rfl: 0  •  atorvastatin (LIPITOR) 40 mg tablet, TAKE 1 TABLET BY MOUTH EVERY DAY, Disp: 90 tablet, Rfl: 3  •  benazepril (LOTENSIN) 10 mg tablet, Take 10 mg by mouth daily., Disp: , Rfl:   •  bumetanide (BUMEX) 1 mg tablet, Take 1 mg by mouth as needed.  , Disp: , Rfl:   •  cephalexin (KEFLEX) 500 mg capsule, , Disp: , Rfl:   •  cholecalciferol, vitamin D3, 1,000 unit (25 mcg) tablet, Take 2,000 Units by mouth daily., Disp: , Rfl:   •  clotrimazole-betamethasone (LOTRISONE) 1-0.05 % cream, Apply topically 2 (two) times a day., Disp: 45 g, Rfl: 1  •  glucosamine-D3-Boswellia serr 1,500-400-100 mg-unit-mg tablet, Take 1 tablet by mouth daily., Disp: , Rfl:   •  levothyroxine (SYNTHROID) 88 mcg tablet, Take 88  mcg by mouth daily.  , Disp: , Rfl: 3  •  loperamide (IMODIUM) 2 mg capsule, Take 2 mg by mouth 2 (two) times a day as needed for diarrhea., Disp: , Rfl:   •  pantoprazole (PROTONIX) 40 mg EC tablet, Take 40 mg by mouth once daily., Disp: , Rfl:   Sulfamethoxazole-trimethoprim and Adhesive tape-silicones  Cancer-related family history includes Endometrial cancer in her biological father; Lung cancer in her biological father. There is no history of Breast cancer, Cancer, Colon cancer, or Ovarian cancer.  Social History     Tobacco Use   • Smoking status: Former     Packs/day: 1.00     Years: 40.00     Pack years: 40.00     Types: Cigarettes     Start date:      Quit date:      Years since quittin.9   • Smokeless tobacco: Never   Vaping Use   • Vaping Use: Never used   Substance Use Topics   • Alcohol use: Yes     Comment: occassional    • Drug use: No     ROS:[negative in all systems with the exception of the above]    Physical examination: [Well-developed female in no apparent distress]  Vitals:    22 1011   BP: 132/76   Pulse: 100   Temp: 36.7 °C (98 °F)     Body mass index is 28.9 kg/m².    Vitals: BP: 132/76  Temp: 36.7 °C (98 °F)  Temp Source: Temporal  Heart Rate: 100  Height: 152.4 cm (5')  Weight: 67.1 kg (148 lb) ( 1011)  HEENT: Normocephalic, atraumatic, nonicteric sclera  Neck: Supple no masses, thyroid normal nontender  Lymphatic: No inguinal or supraclavicular adenopathy  Heart: Regular rate no murmurs  Lungs: Clear to auscultation with good respiratory effort  Extremities: No clubbing, cyanosis, edema  Neuro: Oriented ×3 with normal mood and affect  Abdomen: Soft, nontender, nondistended. No hepatosplenomegaly. No rebound or guarding.  Gynecologic: Normal vulva vagina urethra meatus bladder.  Surgically absent cervix, uterus, adnexa.  No gross stool in the vagina, no discharge.  No signs of fistulous tract.    Assessment/Plan   Assesment:   77-year-old female with a history of  vaginal carcinoma, status post ileostomy secondary to [place] vaginal fistula.  It appears that the fistula is healing.  She shows no signs of recurrent vaginal cancer at this time.    I have asked Raegan to return again in 3 months.

## 2023-01-09 ENCOUNTER — PREP FOR CASE (OUTPATIENT)
Dept: COLORECTAL SURGERY | Facility: CLINIC | Age: 78
End: 2023-01-09
Payer: MEDICARE

## 2023-01-09 DIAGNOSIS — N82.4 COLOVAGINAL FISTULA: Primary | ICD-10-CM

## 2023-01-12 RX ORDER — DIPHENOXYLATE HYDROCHLORIDE AND ATROPINE SULFATE 2.5; .025 MG/1; MG/1
TABLET ORAL
Qty: 240 TABLET | Refills: 3 | Status: SHIPPED | OUTPATIENT
Start: 2023-01-12 | End: 2023-01-13

## 2023-01-13 RX ORDER — DIPHENOXYLATE HYDROCHLORIDE AND ATROPINE SULFATE 2.5; .025 MG/1; MG/1
TABLET ORAL
Qty: 240 TABLET | Refills: 3 | Status: SHIPPED | OUTPATIENT
Start: 2023-01-13 | End: 2023-12-28

## 2023-01-30 ENCOUNTER — APPOINTMENT (OUTPATIENT)
Dept: LAB | Facility: HOSPITAL | Age: 78
End: 2023-01-30
Attending: COLON & RECTAL SURGERY
Payer: MEDICARE

## 2023-01-30 ENCOUNTER — APPOINTMENT (OUTPATIENT)
Dept: PREADMISSION TESTING | Facility: HOSPITAL | Age: 78
End: 2023-01-30
Payer: MEDICARE

## 2023-01-30 ENCOUNTER — HOSPITAL ENCOUNTER (OUTPATIENT)
Dept: CARDIOLOGY | Facility: HOSPITAL | Age: 78
Discharge: HOME | End: 2023-01-30
Attending: COLON & RECTAL SURGERY
Payer: MEDICARE

## 2023-01-30 VITALS — BODY MASS INDEX: 29.06 KG/M2 | HEIGHT: 60 IN | WEIGHT: 148 LBS

## 2023-01-30 DIAGNOSIS — N82.4 COLOVAGINAL FISTULA: ICD-10-CM

## 2023-01-30 LAB
ALBUMIN SERPL-MCNC: 3.9 G/DL (ref 3.4–5)
ALP SERPL-CCNC: 91 IU/L (ref 35–126)
ALT SERPL-CCNC: 16 IU/L (ref 11–54)
ANION GAP SERPL CALC-SCNC: 12 MEQ/L (ref 3–15)
AST SERPL-CCNC: 19 IU/L (ref 15–41)
ATRIAL RATE: 93
BASOPHILS # BLD: 0.04 K/UL (ref 0.01–0.1)
BASOPHILS NFR BLD: 0.5 %
BILIRUB SERPL-MCNC: 0.4 MG/DL (ref 0.3–1.2)
BUN SERPL-MCNC: 36 MG/DL (ref 8–20)
CALCIUM SERPL-MCNC: 9.2 MG/DL (ref 8.9–10.3)
CHLORIDE SERPL-SCNC: 106 MEQ/L (ref 98–109)
CO2 SERPL-SCNC: 21 MEQ/L (ref 22–32)
CREAT SERPL-MCNC: 1.6 MG/DL (ref 0.6–1.1)
DIFFERENTIAL METHOD BLD: NORMAL
EOSINOPHIL # BLD: 0.15 K/UL (ref 0.04–0.36)
EOSINOPHIL NFR BLD: 1.8 %
ERYTHROCYTE [DISTWIDTH] IN BLOOD BY AUTOMATED COUNT: 13 % (ref 11.7–14.4)
GFR SERPL CREATININE-BSD FRML MDRD: 31.2 ML/MIN/1.73M*2
GLUCOSE SERPL-MCNC: 103 MG/DL (ref 70–99)
HCT VFR BLDCO AUTO: 36.7 % (ref 35–45)
HGB BLD-MCNC: 12 G/DL (ref 11.8–15.7)
IMM GRANULOCYTES # BLD AUTO: 0.03 K/UL (ref 0–0.08)
IMM GRANULOCYTES NFR BLD AUTO: 0.4 %
LYMPHOCYTES # BLD: 1.9 K/UL (ref 1.2–3.5)
LYMPHOCYTES NFR BLD: 22.4 %
MCH RBC QN AUTO: 30.2 PG (ref 28–33.2)
MCHC RBC AUTO-ENTMCNC: 32.7 G/DL (ref 32.2–35.5)
MCV RBC AUTO: 92.4 FL (ref 83–98)
MONOCYTES # BLD: 0.68 K/UL (ref 0.28–0.8)
MONOCYTES NFR BLD: 8 %
NEUTROPHILS # BLD: 5.69 K/UL (ref 1.7–7)
NEUTS SEG NFR BLD: 66.9 %
NRBC BLD-RTO: 0 %
P AXIS: 83
PDW BLD AUTO: 9.4 FL (ref 9.4–12.3)
PLATELET # BLD AUTO: 249 K/UL (ref 150–369)
POTASSIUM SERPL-SCNC: 4.4 MEQ/L (ref 3.6–5.1)
PR INTERVAL: 162
PROT SERPL-MCNC: 6.8 G/DL (ref 6–8.2)
QRS DURATION: 118
QT INTERVAL: 360
QTC CALCULATION(BAZETT): 447
R AXIS: 69
RBC # BLD AUTO: 3.97 M/UL (ref 3.93–5.22)
SARS-COV-2 RNA RESP QL NAA+PROBE: NEGATIVE
SODIUM SERPL-SCNC: 139 MEQ/L (ref 136–144)
T WAVE AXIS: 22
VENTRICULAR RATE: 93
WBC # BLD AUTO: 8.49 K/UL (ref 3.8–10.5)

## 2023-01-30 PROCEDURE — C9803 HOPD COVID-19 SPEC COLLECT: HCPCS

## 2023-01-30 PROCEDURE — 93005 ELECTROCARDIOGRAM TRACING: CPT | Performed by: COLON & RECTAL SURGERY

## 2023-01-30 PROCEDURE — 85025 COMPLETE CBC W/AUTO DIFF WBC: CPT

## 2023-01-30 PROCEDURE — 80053 COMPREHEN METABOLIC PANEL: CPT

## 2023-01-30 PROCEDURE — 93010 ELECTROCARDIOGRAM REPORT: CPT | Performed by: INTERNAL MEDICINE

## 2023-01-30 PROCEDURE — 36415 COLL VENOUS BLD VENIPUNCTURE: CPT

## 2023-01-30 PROCEDURE — U0003 INFECTIOUS AGENT DETECTION BY NUCLEIC ACID (DNA OR RNA); SEVERE ACUTE RESPIRATORY SYNDROME CORONAVIRUS 2 (SARS-COV-2) (CORONAVIRUS DISEASE [COVID-19]), AMPLIFIED PROBE TECHNIQUE, MAKING USE OF HIGH THROUGHPUT TECHNOLOGIES AS DESCRIBED BY CMS-2020-01-R: HCPCS

## 2023-01-30 ASSESSMENT — PAIN SCALES - GENERAL: PAINLEVEL: 0-NO PAIN

## 2023-01-30 NOTE — PRE-PROCEDURE INSTRUCTIONS
1.      You will be called between 3pm -7pm one business day before your procedure  February 2, 2023   to tell you where and when to report.  If you do not receive a phone call by 6pm, please call the Admissions office at [phone] to determine the arrival time for your procedure.      2.        Please report to Admissions or Short Procedure Unit, park in lot A, on the day of your procedure.  Detailed directions will be given to you when you are called with arrival time.      3.      No solid food for EIGHT HOURS prior to surgery- No food after midnight.          NPO after midnight as per Dr. Mandel's instructions.     4.      Early on the morning of the procedure please take your usual dose of the listed medications with a sip of water:  Synthroid    No NSAIDs, Supplements, Vitamins from 1/30/23 until after surgery.    May take Tylenol if needed.       5.      Other Instructions: You may brush your teeth the morning of the procedure. Rinse and spit, do not swallow.  Bring a list of your medications with dosages with you.  Use surgical wash as directed.  Dial Antibacterial Soap the night before and the morning of the procedure.   6.      If you develop a cold, cough, fever, rash, or other symptom prior to the data of the procedure, please report it to your physician immediately.     7.      If you need to cancel the procedure for any reason, please contact your physician.     8.      Make arrangements to have someone drive you home from the procedure. If you have not arranged for transportation home, your surgery may be cancelled.      9.      You may not take public transportation unless you are accompanied by a responsible person.     10.      You may not drive a car or operate complex or potentially dangerous machinery for 24 hours following anesthesia and/or sedation.      11.      12.      If it is medically necessary for you to have a longer stay, you will be informed as soon as the decision is made.               Only bring essential items to the hospital.  Do not wear or bring anything of value to the hospital including jewelry of any kind, money, or wallet. Do not wear make-up or contact lenses. Do not BRING MEDICATIONS FROM HOME unless instructed to do so.  DO bring your hearing aids, glasses, and a case.        13.      No lotion, creams, powders, or oils on skin the morning of procedure        14.      Dress in comfortable clothes.     15.      If instructed, please bring a copy of your Advanced Directive (Living Will/Durable Power of ) on the day of your procedure.      16.      17.            18.       19.       Patients need to quarantine from the time of PAT COVID test to day of surgery, regardless of COVID vaccine status.         Ensuring your safety at all times is a very important part of out [facility] Culture of Safety . After having surgery and sedation, you are at risk for falling and balance issues.  Although you may feel awake, the effects of the medication can last up to 24 hours after anesthesia.  If you need to use the bathroom during your recovery period, nursing staff will escort you there and stay with you to ensure your safety.          Refrain from drinking alcohol and smoking cigarettes for 24 hours prior to surgery.         Shower with antibacterial soap (DIAL) the night before and morning of your procedure.  If your procedure indicates the need for CHG antiseptic was (Bactoshield or Hibiclens), please use this instead and follow instructions as discussed at the time of your Pre-Admission Testing visit or phone interview.     Above instructions reviewed with patient and patient acknowledges understanding.

## 2023-02-02 ENCOUNTER — ANESTHESIA EVENT (OUTPATIENT)
Dept: OPERATING ROOM | Facility: HOSPITAL | Age: 78
Setting detail: HOSPITAL OUTPATIENT SURGERY
End: 2023-02-02
Payer: MEDICARE

## 2023-02-03 ENCOUNTER — HOSPITAL ENCOUNTER (OUTPATIENT)
Facility: HOSPITAL | Age: 78
Setting detail: HOSPITAL OUTPATIENT SURGERY
Discharge: HOME | End: 2023-02-03
Attending: COLON & RECTAL SURGERY | Admitting: COLON & RECTAL SURGERY
Payer: MEDICARE

## 2023-02-03 ENCOUNTER — ANESTHESIA (OUTPATIENT)
Dept: OPERATING ROOM | Facility: HOSPITAL | Age: 78
Setting detail: HOSPITAL OUTPATIENT SURGERY
End: 2023-02-03
Payer: MEDICARE

## 2023-02-03 VITALS
OXYGEN SATURATION: 99 % | TEMPERATURE: 97.5 F | RESPIRATION RATE: 18 BRPM | HEART RATE: 94 BPM | DIASTOLIC BLOOD PRESSURE: 62 MMHG | SYSTOLIC BLOOD PRESSURE: 112 MMHG

## 2023-02-03 PROCEDURE — 71000001 HC PACU PHASE 1 INITIAL 30MIN: Performed by: COLON & RECTAL SURGERY

## 2023-02-03 PROCEDURE — 0DJD7ZZ INSPECTION OF LOWER INTESTINAL TRACT, VIA NATURAL OR ARTIFICIAL OPENING: ICD-10-PCS | Performed by: COLON & RECTAL SURGERY

## 2023-02-03 PROCEDURE — 71000002 HC PACU PHASE 2 INITIAL 30MIN: Performed by: COLON & RECTAL SURGERY

## 2023-02-03 PROCEDURE — 36000003 HC OR LEVEL 3 INITIAL 30MIN: Performed by: COLON & RECTAL SURGERY

## 2023-02-03 PROCEDURE — 71000012 HC PACU PHASE 2 EA ADDL MIN: Performed by: COLON & RECTAL SURGERY

## 2023-02-03 PROCEDURE — 36000013 HC OR LEVEL 3 EA ADDL MIN: Performed by: COLON & RECTAL SURGERY

## 2023-02-03 PROCEDURE — 27200000 HC STERILE SUPPLY: Performed by: COLON & RECTAL SURGERY

## 2023-02-03 PROCEDURE — 71000011 HC PACU PHASE 1 EA ADDL MIN: Performed by: COLON & RECTAL SURGERY

## 2023-02-03 PROCEDURE — 63600000 HC DRUGS/DETAIL CODE: Performed by: COLON & RECTAL SURGERY

## 2023-02-03 PROCEDURE — 25000000 HC PHARMACY GENERAL: Performed by: DENTIST

## 2023-02-03 PROCEDURE — 37000001 HC ANESTHESIA GENERAL: Performed by: COLON & RECTAL SURGERY

## 2023-02-03 PROCEDURE — 45990 SURG DX EXAM ANORECTAL: CPT | Performed by: COLON & RECTAL SURGERY

## 2023-02-03 PROCEDURE — 63600000 HC DRUGS/DETAIL CODE: Performed by: DENTIST

## 2023-02-03 PROCEDURE — 25800000 HC PHARMACY IV SOLUTIONS: Performed by: DENTIST

## 2023-02-03 RX ORDER — DEXTROSE 50 % IN WATER (D50W) INTRAVENOUS SYRINGE
25 AS NEEDED
Status: DISCONTINUED | OUTPATIENT
Start: 2023-02-03 | End: 2023-02-03 | Stop reason: HOSPADM

## 2023-02-03 RX ORDER — ONDANSETRON HYDROCHLORIDE 2 MG/ML
INJECTION, SOLUTION INTRAVENOUS AS NEEDED
Status: DISCONTINUED | OUTPATIENT
Start: 2023-02-03 | End: 2023-02-03 | Stop reason: SURG

## 2023-02-03 RX ORDER — ACETAMINOPHEN 325 MG/1
650 TABLET ORAL ONCE AS NEEDED
Status: DISCONTINUED | OUTPATIENT
Start: 2023-02-03 | End: 2023-02-03 | Stop reason: HOSPADM

## 2023-02-03 RX ORDER — PHENYLEPHRINE HCL IN 0.9% NACL 1 MG/10 ML
SYRINGE (ML) INTRAVENOUS AS NEEDED
Status: DISCONTINUED | OUTPATIENT
Start: 2023-02-03 | End: 2023-02-03 | Stop reason: SURG

## 2023-02-03 RX ORDER — BUPIVACAINE HYDROCHLORIDE AND EPINEPHRINE 2.5; 5 MG/ML; UG/ML
30 INJECTION, SOLUTION EPIDURAL; INFILTRATION; INTRACAUDAL; PERINEURAL ONCE
Status: DISCONTINUED | OUTPATIENT
Start: 2023-02-03 | End: 2023-02-03 | Stop reason: HOSPADM

## 2023-02-03 RX ORDER — LIDOCAINE HYDROCHLORIDE 10 MG/ML
INJECTION, SOLUTION INFILTRATION; PERINEURAL AS NEEDED
Status: DISCONTINUED | OUTPATIENT
Start: 2023-02-03 | End: 2023-02-03 | Stop reason: SURG

## 2023-02-03 RX ORDER — DEXTROSE 40 %
15-30 GEL (GRAM) ORAL AS NEEDED
Status: DISCONTINUED | OUTPATIENT
Start: 2023-02-03 | End: 2023-02-03 | Stop reason: HOSPADM

## 2023-02-03 RX ORDER — OXYCODONE HYDROCHLORIDE 5 MG/1
5 TABLET ORAL ONCE AS NEEDED
Status: DISCONTINUED | OUTPATIENT
Start: 2023-02-03 | End: 2023-02-03 | Stop reason: HOSPADM

## 2023-02-03 RX ORDER — METRONIDAZOLE 500 MG/100ML
500 INJECTION, SOLUTION INTRAVENOUS ONCE
Status: DISCONTINUED | OUTPATIENT
Start: 2023-02-03 | End: 2023-02-03 | Stop reason: HOSPADM

## 2023-02-03 RX ORDER — FENTANYL CITRATE 50 UG/ML
INJECTION, SOLUTION INTRAMUSCULAR; INTRAVENOUS AS NEEDED
Status: DISCONTINUED | OUTPATIENT
Start: 2023-02-03 | End: 2023-02-03 | Stop reason: SURG

## 2023-02-03 RX ORDER — HYDROMORPHONE HYDROCHLORIDE 1 MG/ML
0.5 INJECTION, SOLUTION INTRAMUSCULAR; INTRAVENOUS; SUBCUTANEOUS
Status: DISCONTINUED | OUTPATIENT
Start: 2023-02-03 | End: 2023-02-03 | Stop reason: HOSPADM

## 2023-02-03 RX ORDER — ONDANSETRON HYDROCHLORIDE 2 MG/ML
4 INJECTION, SOLUTION INTRAVENOUS
Status: DISCONTINUED | OUTPATIENT
Start: 2023-02-03 | End: 2023-02-03 | Stop reason: HOSPADM

## 2023-02-03 RX ORDER — METRONIDAZOLE 500 MG/100ML
INJECTION, SOLUTION INTRAVENOUS AS NEEDED
Status: DISCONTINUED | OUTPATIENT
Start: 2023-02-03 | End: 2023-02-03 | Stop reason: SURG

## 2023-02-03 RX ORDER — FENTANYL CITRATE 50 UG/ML
50 INJECTION, SOLUTION INTRAMUSCULAR; INTRAVENOUS EVERY 5 MIN PRN
Status: DISCONTINUED | OUTPATIENT
Start: 2023-02-03 | End: 2023-02-03 | Stop reason: HOSPADM

## 2023-02-03 RX ORDER — IBUPROFEN 200 MG
16-32 TABLET ORAL AS NEEDED
Status: DISCONTINUED | OUTPATIENT
Start: 2023-02-03 | End: 2023-02-03 | Stop reason: HOSPADM

## 2023-02-03 RX ORDER — DEXAMETHASONE SODIUM PHOSPHATE 4 MG/ML
INJECTION, SOLUTION INTRA-ARTICULAR; INTRALESIONAL; INTRAMUSCULAR; INTRAVENOUS; SOFT TISSUE AS NEEDED
Status: DISCONTINUED | OUTPATIENT
Start: 2023-02-03 | End: 2023-02-03 | Stop reason: SURG

## 2023-02-03 RX ORDER — SODIUM CHLORIDE 9 MG/ML
INJECTION, SOLUTION INTRAVENOUS CONTINUOUS PRN
Status: DISCONTINUED | OUTPATIENT
Start: 2023-02-03 | End: 2023-02-03 | Stop reason: SURG

## 2023-02-03 RX ORDER — PROPOFOL 10 MG/ML
INJECTION, EMULSION INTRAVENOUS AS NEEDED
Status: DISCONTINUED | OUTPATIENT
Start: 2023-02-03 | End: 2023-02-03 | Stop reason: SURG

## 2023-02-03 RX ORDER — ROCURONIUM BROMIDE 10 MG/ML
INJECTION, SOLUTION INTRAVENOUS AS NEEDED
Status: DISCONTINUED | OUTPATIENT
Start: 2023-02-03 | End: 2023-02-03 | Stop reason: SURG

## 2023-02-03 RX ADMIN — METRONIDAZOLE 500 MG: 500 INJECTION, SOLUTION INTRAVENOUS at 11:22

## 2023-02-03 RX ADMIN — Medication 50 MCG: at 11:33

## 2023-02-03 RX ADMIN — SUGAMMADEX 200 MG: 100 INJECTION, SOLUTION INTRAVENOUS at 11:55

## 2023-02-03 RX ADMIN — SODIUM CHLORIDE: 9 INJECTION, SOLUTION INTRAVENOUS at 10:55

## 2023-02-03 RX ADMIN — ROCURONIUM BROMIDE 50 MG: 10 INJECTION, SOLUTION INTRAVENOUS at 11:01

## 2023-02-03 RX ADMIN — PROPOFOL 150 MG: 10 INJECTION, EMULSION INTRAVENOUS at 11:01

## 2023-02-03 RX ADMIN — DEXAMETHASONE SODIUM PHOSPHATE 4 MG: 4 INJECTION, SOLUTION INTRAMUSCULAR; INTRAVENOUS at 11:47

## 2023-02-03 RX ADMIN — CEFAZOLIN 2 G: 2 INJECTION, POWDER, FOR SOLUTION INTRAMUSCULAR; INTRAVENOUS at 11:10

## 2023-02-03 RX ADMIN — FENTANYL CITRATE 50 MCG: 50 INJECTION, SOLUTION INTRAMUSCULAR; INTRAVENOUS at 11:46

## 2023-02-03 RX ADMIN — ONDANSETRON HYDROCHLORIDE 4 MG: 2 SOLUTION INTRAMUSCULAR; INTRAVENOUS at 11:55

## 2023-02-03 RX ADMIN — FENTANYL CITRATE 50 MCG: 50 INJECTION, SOLUTION INTRAMUSCULAR; INTRAVENOUS at 12:11

## 2023-02-03 RX ADMIN — LIDOCAINE HYDROCHLORIDE 10 ML: 10 INJECTION, SOLUTION INFILTRATION; PERINEURAL at 11:01

## 2023-02-03 RX ADMIN — FENTANYL CITRATE 100 MCG: 50 INJECTION, SOLUTION INTRAMUSCULAR; INTRAVENOUS at 11:01

## 2023-02-03 NOTE — BRIEF OP NOTE
Rectal exam under anesthesia Procedure Note    Procedure:    Rectal exam under anesthesia  CPT(R) Code:  37350 - ME REPAIR  ANAL FISTULA,RECT ADV FLAP      Pre-op Diagnosis     * Colovaginal fistula [N82.4]       Post-op Diagnosis     * Colovaginal fistula [N82.4]    Surgeon(s) and Role:     * Gevoani Mandel MD - Primary     * Jacqui Bernal MD - Resident - Assisting     * Destiny Higginbotham DO - Resident - Assisting    Anesthesia: General    Staff:   Circulator: Jason Garcia RN  Scrub Person: Jw Mcnulty Emily Elizabeth, RN    Procedure Details   EUA, healed anterior defect     Estimated Blood Loss: No blood loss documented.    Specimens:                No specimens collected during this procedure.      Drains:   Ileostomy RLQ (Active)       Implants: * No implants in log *           Complications:  None; patient tolerated the procedure well.           Disposition: PACU - hemodynamically stable.           Condition: stable    Geovani Mandel MD  Phone Number: [phone]

## 2023-02-03 NOTE — ANESTHESIA POSTPROCEDURE EVALUATION
Patient: Raegan Young    Procedure Summary     Date: 02/03/23 Room / Location: [facility] OR [facility] / [facility] OR    Anesthesia Start: 1055 Anesthesia Stop: 1220    Procedure: Rectal exam under anesthesia Diagnosis:       Colovaginal fistula      (EUA, Anal Advancement Flap)    Surgeons: Geovani Mandel MD Responsible Provider: Kenyon Quinonez MD    Anesthesia Type: general ASA Status: 2          Anesthesia Type: general  PACU Vitals  2/3/2023 1208 - 2/3/2023 1239      2/3/2023  1215             BP: 119/68    Temp: 36.9 °C (98.4 °F)    Pulse: 92    Resp: 12    SpO2: 100 %            Anesthesia Post Evaluation    Pain management: adequate  Patient participation: complete - patient participated  Level of consciousness: awake and alert  Cardiovascular status: acceptable  Airway Patency: adequate  Respiratory status: acceptable  Hydration status: acceptable  Anesthetic complications: no

## 2023-02-03 NOTE — ANESTHESIA PROCEDURE NOTES
Airway  Urgency: elective    Start Time: 2/3/2023 11:03 AM    General Information and Staff    Patient location during procedure: OR    Indications and Patient Condition  Indications for airway management: anesthesia  Sedation level: deep  Preoxygenated: yes  Patient position: sniffing      Final Airway Details  Final airway type: endotracheal airway      Successful airway: ETT     Successful intubation technique: video laryngoscopy  Facilitating devices/methods: intubating stylet  Endotracheal tube insertion site: oral  Blade: Hayley  Blade size: #3  ETT size (mm): 7.0  Cormack-Lehane Classification: grade I - full view of glottis  Placement verified by: chest auscultation and capnometry   Measured from: lips  ETT to lips (cm): 22  Number of attempts at approach: 1  Number of other approaches attempted: 0  Atraumatic airway insertion

## 2023-02-03 NOTE — DISCHARGE INSTRUCTIONS
Follow-up: Please call Dr. Mandel's office at [phone] upon discharge to schedule your follow-up appointment for 2 weeks. Please call Dr. Mandel's office if you have any questions/concerns. Call if you experience the following: fevers (>101)/chills, nausea, vomiting, diarrhea, pain not controlled by prescribed medications, bleeding or other drainage from wound, headache, lightheadedness, dizziness or changes in vision.     Medications:   Please resume all of your previous home medications unless otherwise indicated.  AVOID blood thinning medications such as NSAIDS (Aleve, Advil, Ibuprofen, Motrin, Aspirin, ect)    Take TYLENOL 650mg every 4 hours as needed for mild pain.     Diet: Regular diet        Dr. Geovani Mandel  [facility]  [address]  [address] [place] [address]  [phone]

## 2023-02-03 NOTE — ANESTHESIOLOGIST PRE-PROCEDURE ATTESTATION
Pre-Procedure Patient Identification:  I am the Primary Anesthesiologist and have identified the patient on 02/03/23 at 8:42 AM.   I have confirmed the procedure(s) will be performed by the following surgeon/proceduralist Geovani Mandel MD.

## 2023-02-03 NOTE — OR SURGEON
Pre-Procedure patient identification:  I am the primary operating surgeon/proceduralist and I have identified the patient on 02/03/23 at 10:30 AM Geovani Mandel MD  Phone Number: [phone]

## 2023-02-03 NOTE — H&P
HISTORY & PHYSICAL EXAM     HPI: Raegan is 2 months post EUA and repair of rectal anastomosis.  She went to the emergency room at the end of October for another UTI.  She reports she is finished the antibiotics and was also being treated for a yeast infection.  She states she taking 3 Lomotil a day, which is helping decrease her output.  She is very eager to discuss reversal.     Medical History        Past Medical History:   Diagnosis Date   • Anemia     • Arthritis     • Colovaginal fistula     • COVID-19 vaccine series completed 02/26/2021   • Disease of thyroid gland     • Diverticulitis of colon     • GERD (gastroesophageal reflux disease)     • H/O ileostomy     • Hiatal hernia     • History of snoring     • Hypertension     • Hypothyroidism     • Lung cancer (CMS/HCC)     • Lung nodule     • Vaginal cancer (CMS/HCC) 2018     s/p XRT x 7 weeks and chemo weekly x 5 weeks           Surgical History         Past Surgical History:   Procedure Laterality Date   • ABDOMINAL SURGERY       • BOWEL RESECTION         partial with ileostomy   • CHOLECYSTECTOMY       • COLONOSCOPY       • DILATION AND CURETTAGE OF UTERUS   02/2018   • ILEOSTOMY       • LUNG SURGERY       • ROTATOR CUFF REPAIR Right     • TONSILLECTOMY       • WISDOM TOOTH EXTRACTION         hx of            Current Outpatient Medications:   •  acetaminophen (TYLENOL) 325 mg tablet, Take 2 tablets (650 mg total) by mouth every 4 (four) hours as needed for pain for up to 30 doses., Disp: 30 tablet, Rfl: 0  •  ALPRAZolam (XANAX) 0.5 mg tablet, Take 1 tablet (0.5 mg total) by mouth 2 (two) times a day as needed for anxiety., Disp: 60 tablet, Rfl: 3  •  benazepril (LOTENSIN) 10 mg tablet, Take 10 mg by mouth daily., Disp: , Rfl:   •  bumetanide (BUMEX) 1 mg tablet, Take 1 mg by mouth as needed.  , Disp: , Rfl:   •  cephalexin (KEFLEX) 500 mg capsule, , Disp: , Rfl:   •  cholecalciferol, vitamin D3, 1,000 unit (25 mcg) tablet, Take 2,000 Units by mouth  daily., Disp: , Rfl:   •  estradioL (ESTRACE) 0.01 % (0.1 mg/gram) vaginal cream, Insert 1g vaginally twice weekly at bedtime (Patient taking differently: Insert into the vagina. Insert 1g vaginally twice weekly at bedtime), Disp: 42.5 g, Rfl: 3  •  fluconazole (DIFLUCAN) 150 mg tablet, 1 (ONE) TABLET BY MOUTH ONE TIME DOSE, MAY REPEAT AFTER 3 DAYS IF SYMPTOMS PERSIST., Disp: , Rfl:   •  glucosamine-D3-Boswellia serr 1,500-400-100 mg-unit-mg tablet, Take 1 tablet by mouth daily., Disp: , Rfl:   •  levothyroxine (SYNTHROID) 88 mcg tablet, Take 88 mcg by mouth daily.  , Disp: , Rfl: 3  •  loperamide (IMODIUM) 2 mg capsule, Take 2 mg by mouth 2 (two) times a day as needed for diarrhea., Disp: , Rfl:   •  methylPREDNISolone (MEDROL) 4 mg tablet, Take 4 mg by mouth daily., Disp: , Rfl:   •  metroNIDAZOLE (METROGEL) 0.75 % (37.5mg/5 gram) vaginal gel, , Disp: , Rfl:   •  pantoprazole (PROTONIX) 40 mg EC tablet, Take 40 mg by mouth once daily., Disp: , Rfl:   •  atorvastatin (LIPITOR) 40 mg tablet, TAKE 1 TABLET BY MOUTH EVERY DAY, Disp: 90 tablet, Rfl: 3  •  clobetasoL (TEMOVATE) 0.05 % ointment, Apply topically 3 (three) times a week (Mon, Wed, Fri) for 14 days. Apply to vulva 3 times per week (Patient not taking: Reported on 2022), Disp: 60 g, Rfl: 11         Allergies   Allergen Reactions   • Sulfamethoxazole-Trimethoprim Nausea Only   • Adhesive Tape-Silicones         Causes bruising      Social History               Socioeconomic History   • Marital status:        Spouse name: Royal    • Number of children: 4   • Years of education: Not on file   • Highest education level: Not on file   Occupational History   • Occupation: retired/     Tobacco Use   • Smoking status: Former       Packs/day: 1.00       Years: 40.00       Pack years: 40.00       Types: Cigarettes       Start date:        Quit date:        Years since quittin.9   • Smokeless tobacco: Never   Vaping Use   • Vaping  Use: Never used   Substance and Sexual Activity   • Alcohol use: Yes       Comment: occassional    • Drug use: No   • Sexual activity: Never       Comment:    Other Topics Concern   • Not on file   Social History Narrative     Lives with  in a 2 story home      Social Determinants of Health          Financial Resource Strain: Not on file   Food Insecurity: No Food Insecurity   • Worried About Running Out of Food in the Last Year: Never true   • Ran Out of Food in the Last Year: Never true   Transportation Needs: Not on file   Physical Activity: Not on file   Stress: Not on file   Social Connections: Not on file   Intimate Partner Violence: Not on file   Housing Stability: Not on file               Family History   Problem Relation Age of Onset   • Heart attack Biological Mother     • Hypertension Biological Mother     • Endometrial cancer Biological Father     • Lung cancer Biological Father     • Breast cancer Neg Hx     • Cancer Neg Hx     • Colon cancer Neg Hx     • Ovarian cancer Neg Hx           REVIEW OF SYSTEMS: Unchanged     PHYSICAL EXAM:  GEN: On examination the patient is resting comfortably.  NEURO/PSYCH: Alert and oriented ×3  HEENT: Eyes: Sclera anicteric  CHEST: Equal rise and fall the chest.  No tenderness bilaterally.  SKIN: Warm and moist  NODES: No groin or cervical lymphadenopathy  EXT: No palpable edema of the bilateral lower extremities.  No clubbing.  GI: Abdomen soft, nontender, nondistended.  No palpable liver or spleen edge.  No ventral hernias, mass, or tenderness.  RECTAL: Perianal skin is normal.  Digital exam reveals normal sphincter tone, no masses are palpable.  The anastomosis palpable and there is no palpable defect.  Flexible sigmoidoscopy: Carried out the level of the descending colon was limited by patient discomfort the anastomosis there is a small defect in the anterior aspect that has some fibropurulent exudate outside of this.  The remainder of the anastomosis  appeared well-healed.           ASSESSMENT/PLAN:      Diverticulitis of large intestine with perforation and abscess with bleeding  The patient has defect in the anastomosis that remains in the anterior position.  At this point I feel that the most reasonable neck step is to have her seen in the operating room to debride the area locally, and performed and advancement of the anastomosis with a handsewn repair.  If this is not successful, the neck step after that would be laparoscopic mobilization of the colon and taught to procedure.  Certainly this would be less optimal.  That said, I explained this to the patient detail as well as her family who is with her today we will plan to proceed in this regard.  I will have her on the schedule for examination under anesthesia to come.

## 2023-02-03 NOTE — ANESTHESIA PREPROCEDURE EVALUATION
Relevant Problems   CARDIOVASCULAR   (+) Dyslipidemia   (+) Hypertension      GASTROINTESTINAL   (+) GERD (gastroesophageal reflux disease)      HEMATOLOGY   (+) Anemia      MUSCULOSKELETAL   (+) Primary osteoarthritis of left knee      URINARY SYSTEM   (+) Low magnesium level      Other   (+) Adenocarcinoma of left lung (CMS/HCC)   (+) Hypothyroidism         EKG 2023  Normal sinus rhythm   Low voltage QRS   Right bundle branch block   When compared with ECG of 06-JUL-2022 22:20,   Premature supraventricular complexes are no longer Present   Confirmed by Will Mendez ([id number]) on 1/30/2023 5:49:29 PM      TTE 2021  Interpretation Summary       • The left ventricle is normal in size. There is normal wall thickness. There is focal upper septal hypertrophy. An ultrasound enhancing agent was used. There is normal left ventricular systolic function. Left ventricular ejection fraction is 65% by visual estimate. There is normal wall motion. Diastolic function was not assessed due to mitral inflow merging.  • The left atrium is moderately dilated. The left atrial volume indexed to body surface area measures 48 mL/m2.  • The mitral valve is normal. There is trace mitral regurgitation.  • The aortic valve has three cusps. There is mild thickening and focal calcification of the cusps. There is no aortic stenosis. There is no aortic regurgitation.  • The aortic root is normal in size. The ascending aorta is normal in size. The aortic arch was not assessed.  • The right ventricle is normal in size. There is normal right ventricular systolic function. Right ventricular S' by tissue Doppler measures 20 cm/s.  • The right atrium is normal in size.  • The tricuspid valve is normal. There is trace tricuspid regurgitation. The estimated right ventricular systolic pressure = 34 mmHg.  • The pulmonic valve is normal. There is physiologic pulmonic regurgitation.  • The inferior vena cava measures less than 2.1 cm and collapses greater than  50% with inspiration. The estimated right atrial pressure is 0 -5 mmHg.  • There is no pericardial effusion.     There are no prior studies available for comparison.        Anesthesia ROS/MED HX      Cardiovascular   CAD   hypertension   Echocardiogram reviewed, Covid19 Test Reviewed and ECG reviewed  GI/Hepatic   Hiatal hernia   GERD  Endo/Other   Hypothyroidism       Past Surgical History:   Procedure Laterality Date   • ABDOMINAL SURGERY     • BOWEL RESECTION      partial with ileostomy   • CHOLECYSTECTOMY     • COLONOSCOPY     • DILATION AND CURETTAGE OF UTERUS  02/2018   • ILEOSTOMY     • LUNG SURGERY     • ROTATOR CUFF REPAIR Right    • TONSILLECTOMY     • WISDOM TOOTH EXTRACTION      hx of       Physical Exam    Airway   Mallampati: II   TM distance: >3 FB   Neck ROM: full  Cardiovascular - normal   Rhythm: regular   Rate: normalPulmonary - normal   clear to auscultation  Dental - normal        Anesthesia Plan    Plan: general    Technique: general endotracheal     Lines and Monitors: PIV     Airway: oral intubation   ASA 2  Anesthetic plan and risks discussed with: patient  Induction:    intravenous   Postop Plan:   Patient Disposition: phase II then home   Pain Management: IV analgesics

## 2023-02-04 NOTE — OP NOTE
Raegan Young  1945  [id number]  02/03/23        SURGEON: Geovani Mandel MD    ASSISTANT: Suki Higginbotham DO    PREOPERATIVE DIAGNOSIS: History of colovaginal fistula status post robotic ultralow anterior section with descending colon rectal anastomosis and irrelevantly ostomy, delayed anastomotic healing    POSTOPERATIVE DIAGNOSIS: Completely healed anastomosis    PROCEDURE PERFORMED:    1.  Rectal examination under anesthesia    SPECIMENS:    1.  None    ANESTHESIA: General.    COMPLICATIONS: None.    EBL: None    OPERATIVE INDICATIONS: Patient is a very nice 77-year-old woman who has a history of colovaginal fistula status post resection and anastomosis.  She had significant radiation injury to her vagina and distal rectum and had delayed healing of her anastomosis.  She has been diverted is here today to have an evaluation of this.  Discussed risk to proceed with her in detail including bleeding, infection, need for additional procedures, continued anastomotic dehiscence.  She understood and willing to proceed.    OPERATIVE DETAIL: Patient was brought to the operative room placed in prone jackknife position after general esthesia was induced.  Perianal area is prepped and draped in usual fashion using Betadine timeout was performed.  Rectal exam was done digitally the anastomosis appeared to be fully healed.  Small sore retractor was placed and the anastomosis was inspected and the anterior aspect was previously dehisced there was no evidence of irregularity in the anastomosis at all.  There is no evidence of abscess cavity or drainage.  This was irrigated with saline until clear and again looking with a medium sized where there was no evidence of irregularity anastomosis.  As a result of the examination was concluded.  She tolerated this well and was transferred to the PACU in extubated in stable condition.

## 2023-02-09 DIAGNOSIS — N82.4 COLOVAGINAL FISTULA: Primary | ICD-10-CM

## 2023-02-16 ENCOUNTER — HOSPITAL ENCOUNTER (OUTPATIENT)
Dept: RADIOLOGY | Facility: HOSPITAL | Age: 78
Discharge: HOME | End: 2023-02-16
Attending: COLON & RECTAL SURGERY
Payer: MEDICARE

## 2023-02-16 DIAGNOSIS — N82.4 COLOVAGINAL FISTULA: ICD-10-CM

## 2023-02-16 PROCEDURE — 25500000 HC DRUGS/INCIDENT RAD: Performed by: COLON & RECTAL SURGERY

## 2023-02-16 PROCEDURE — 63600105 HC IODINE BASED CONTRAST: Performed by: COLON & RECTAL SURGERY

## 2023-02-16 PROCEDURE — 74177 CT ABD & PELVIS W/CONTRAST: CPT | Mod: MG

## 2023-02-16 RX ADMIN — IOHEXOL 75 ML: 350 INJECTION, SOLUTION INTRAVENOUS at 11:24

## 2023-02-16 RX ADMIN — BARIUM SULFATE 900 ML: 21 SUSPENSION ORAL at 11:25

## 2023-02-20 ENCOUNTER — HOSPITAL ENCOUNTER (OUTPATIENT)
Dept: RADIOLOGY | Facility: HOSPITAL | Age: 78
Discharge: HOME | End: 2023-02-20
Attending: COLON & RECTAL SURGERY
Payer: MEDICARE

## 2023-02-20 DIAGNOSIS — N82.4 COLOVAGINAL FISTULA: ICD-10-CM

## 2023-02-20 PROCEDURE — 63600000 HC DRUGS/DETAIL CODE: Performed by: COLON & RECTAL SURGERY

## 2023-02-20 PROCEDURE — 74270 X-RAY XM COLON 1CNTRST STD: CPT

## 2023-02-20 RX ORDER — DIATRIZOATE MEGLUMINE AND DIATRIZOATE SODIUM 660; 100 MG/ML; MG/ML
360 SOLUTION ORAL; RECTAL ONCE
Status: COMPLETED | OUTPATIENT
Start: 2023-02-20 | End: 2023-02-20

## 2023-02-20 RX ADMIN — DIATRIZOATE MEGLUMINE AND DIATRIZOATE SODIUM 360 ML: 660; 100 LIQUID ORAL; RECTAL at 10:30

## 2023-02-23 ENCOUNTER — OFFICE VISIT (OUTPATIENT)
Dept: COLORECTAL SURGERY | Facility: CLINIC | Age: 78
End: 2023-02-23
Payer: MEDICARE

## 2023-02-23 VITALS — BODY MASS INDEX: 28.47 KG/M2 | HEIGHT: 60 IN | WEIGHT: 145 LBS

## 2023-02-23 DIAGNOSIS — N82.4 COLOVAGINAL FISTULA: Primary | ICD-10-CM

## 2023-02-23 DIAGNOSIS — Z43.2 ATTENTION TO ILEOSTOMY (CMS/HCC): ICD-10-CM

## 2023-02-23 PROCEDURE — 99214 OFFICE O/P EST MOD 30 MIN: CPT | Performed by: COLON & RECTAL SURGERY

## 2023-02-23 PROCEDURE — G8756 NO BP MEASURE DOC: HCPCS | Performed by: COLON & RECTAL SURGERY

## 2023-02-23 RX ORDER — CEPHALEXIN 500 MG/1
500 CAPSULE ORAL 4 TIMES DAILY
COMMUNITY
End: 2023-03-22

## 2023-02-23 NOTE — PROGRESS NOTES
HISTORY & PHYSICAL EXAM    HPI: He is here in continued follow-up.  She reports she developed a UTI on Monday and was started on Keflex per urgent care.  She denies recent nausea, vomiting, fever or chills.  She has normal output from her ostomy, though it is increased.    Past Medical History:   Diagnosis Date   • Anemia    • Arthritis    • Colovaginal fistula    • COVID-19 vaccine series completed 02/26/2021   • Disease of thyroid gland    • Diverticulitis of colon    • GERD (gastroesophageal reflux disease)    • H/O ileostomy    • Hiatal hernia    • History of snoring    • Hypertension    • Hypothyroidism    • Lung cancer (CMS/HCC)    • Lung nodule    • Vaginal cancer (CMS/HCC) 2018    s/p XRT x 7 weeks and chemo weekly x 5 weeks       Past Surgical History:   Procedure Laterality Date   • ABDOMINAL SURGERY     • BOWEL RESECTION      partial with ileostomy   • CHOLECYSTECTOMY     • COLONOSCOPY     • DILATION AND CURETTAGE OF UTERUS  02/2018   • ILEOSTOMY     • LUNG SURGERY     • ROTATOR CUFF REPAIR Right    • TONSILLECTOMY     • WISDOM TOOTH EXTRACTION      hx of       Current Outpatient Medications:   •  acetaminophen (TYLENOL) 325 mg tablet, Take 2 tablets (650 mg total) by mouth every 4 (four) hours as needed for pain for up to 30 doses., Disp: 30 tablet, Rfl: 0  •  atorvastatin (LIPITOR) 40 mg tablet, TAKE 1 TABLET BY MOUTH EVERY DAY (Patient taking differently: Take 40 mg by mouth daily.), Disp: 90 tablet, Rfl: 3  •  benazepril (LOTENSIN) 10 mg tablet, Take 10 mg by mouth daily., Disp: , Rfl:   •  bumetanide (BUMEX) 1 mg tablet, Take 1 mg by mouth daily., Disp: , Rfl:   •  cephalexin (KEFLEX) 500 mg capsule, Take 500 mg by mouth 4 (four) times a day., Disp: , Rfl:   •  cholecalciferol, vitamin D3, 1,000 unit (25 mcg) tablet, Take 2,000 Units by mouth daily., Disp: , Rfl:   •  diphenoxylate-atropine (LomotiL) 2.5-0.025 mg per tablet, 1-2 tabs PO QID, do not exceed 8 per day. (Patient taking differently: Take  2 tablets by mouth 4 (four) times a day as needed. 1-2 tabs PO QID, do not exceed 8 per day.), Disp: 240 tablet, Rfl: 3  •  glucosamine-D3-Boswellia serr 1,500-400-100 mg-unit-mg tablet, Take 1 tablet by mouth daily., Disp: , Rfl:   •  levothyroxine (SYNTHROID) 88 mcg tablet, Take 88 mcg by mouth daily.  , Disp: , Rfl: 3  •  pantoprazole (PROTONIX) 40 mg EC tablet, Take 40 mg by mouth once daily., Disp: , Rfl:    Allergies   Allergen Reactions   • Adhesive Tape-Silicones      Causes bruising   • Sulfamethoxazole-Trimethoprim Nausea Only     Social History     Socioeconomic History   • Marital status:      Spouse name: Royal    • Number of children: 4   • Years of education: Not on file   • Highest education level: Not on file   Occupational History   • Occupation: retired/     Tobacco Use   • Smoking status: Former     Packs/day: 1.00     Years: 40.00     Pack years: 40.00     Types: Cigarettes     Start date:      Quit date:      Years since quittin.1   • Smokeless tobacco: Never   Vaping Use   • Vaping Use: Never used   Substance and Sexual Activity   • Alcohol use: Yes     Comment: occassional    • Drug use: No   • Sexual activity: Never     Comment:    Other Topics Concern   • Not on file   Social History Narrative    Lives with  in a 2 story home     Social Determinants of Health     Financial Resource Strain: Not on file   Food Insecurity: No Food Insecurity   • Worried About Running Out of Food in the Last Year: Never true   • Ran Out of Food in the Last Year: Never true   Transportation Needs: Not on file   Physical Activity: Not on file   Stress: Not on file   Social Connections: Not on file   Intimate Partner Violence: Not on file   Housing Stability: Not on file      Family History   Problem Relation Age of Onset   • Heart attack Biological Mother    • Hypertension Biological Mother    • Endometrial cancer Biological Father    • Lung cancer Biological Father     • Breast cancer Neg Hx    • Cancer Neg Hx    • Colon cancer Neg Hx    • Ovarian cancer Neg Hx        REVIEW OF SYSTEMS: Unchanged    PHYSICAL EXAM:  GEN: On examination the patient is resting comfortably.  NEURO/PSYCH: Alert and oriented ×3  HEENT: Eyes: Sclera anicteric  CHEST: Equal rise and fall the chest.  No tenderness bilaterally.  SKIN: Warm and moist  NODES: No groin or cervical lymphadenopathy  EXT: No palpable edema of the bilateral lower extremities.  No clubbing.  GI: Abdomen soft, nontender, nondistended.  No palpable liver or spleen edge.  No ventral hernias, mass, or tenderness.  RECTAL: Deferred    XRAYS: I personally reviewed the current X rays and the pertinent findings are: Gastrografin enema images reviewed by me demonstrate no leak.      ASSESSMENT/PLAN:     Attention to ileostomy (CMS/[facility])  Raegan is looking fantastic.  I reviewed her Gastrografin enema through the distal end of her stoma demonstrates no evidence of leak or fistula.  I also reviewed this with the radiologist who interpreted the study and they agreed completely.  I visually inspected the anastomosis in the operating room and appeared to be fully healed anteriorly where the previous disruption was.  I explained to the patient that it is reasonable at this point to have her on the schedule to reverse her stoma and I explained to her that we will plan for flexible sigmoidoscopy at the time of the operation so she will be placed in yellowfin's for the procedure just to reevaluate this to the patient deferred examination today in the office.  I also explained the risks of procedure including bleeding, infection, anastomotic leak, need for additional procedures, MI, stroke, death.  She understood and would like to proceed.  We will make arrangements for this.

## 2023-02-23 NOTE — LETTER
February 25, 2023     Javon Valadez DO  [address]  [place] [address]    Patient: Raegan Young  YOB: 1945  Date of Visit: 2/23/2023      Dear Dr. Valadez:    Thank you for referring Raegan Young to me for evaluation. Below are my notes for this consultation.    If you have questions, please do not hesitate to call me. I look forward to following your patient along with you.         Sincerely,        Geovani Mandel MD        CC: MD Akbar Mueller MD David O Holtz, MD Patrick Ross, MD PhD  Tequila, Geovani CISNEROS MD  2/25/2023  1:32 PM  Signed  HISTORY & PHYSICAL EXAM    HPI: He is here in continued follow-up.  She reports she developed a UTI on Monday and was started on Keflex per urgent care.  She denies recent nausea, vomiting, fever or chills.  She has normal output from her ostomy, though it is increased.    Past Medical History:   Diagnosis Date   • Anemia    • Arthritis    • Colovaginal fistula    • COVID-19 vaccine series completed 02/26/2021   • Disease of thyroid gland    • Diverticulitis of colon    • GERD (gastroesophageal reflux disease)    • H/O ileostomy    • Hiatal hernia    • History of snoring    • Hypertension    • Hypothyroidism    • Lung cancer (CMS/HCC)    • Lung nodule    • Vaginal cancer (CMS/HCC) 2018    s/p XRT x 7 weeks and chemo weekly x 5 weeks       Past Surgical History:   Procedure Laterality Date   • ABDOMINAL SURGERY     • BOWEL RESECTION      partial with ileostomy   • CHOLECYSTECTOMY     • COLONOSCOPY     • DILATION AND CURETTAGE OF UTERUS  02/2018   • ILEOSTOMY     • LUNG SURGERY     • ROTATOR CUFF REPAIR Right    • TONSILLECTOMY     • WISDOM TOOTH EXTRACTION      hx of       Current Outpatient Medications:   •  acetaminophen (TYLENOL) 325 mg tablet, Take 2 tablets (650 mg total) by mouth every 4 (four) hours as needed for pain for up to 30 doses., Disp: 30 tablet, Rfl: 0  •  atorvastatin (LIPITOR) 40  mg tablet, TAKE 1 TABLET BY MOUTH EVERY DAY (Patient taking differently: Take 40 mg by mouth daily.), Disp: 90 tablet, Rfl: 3  •  benazepril (LOTENSIN) 10 mg tablet, Take 10 mg by mouth daily., Disp: , Rfl:   •  bumetanide (BUMEX) 1 mg tablet, Take 1 mg by mouth daily., Disp: , Rfl:   •  cephalexin (KEFLEX) 500 mg capsule, Take 500 mg by mouth 4 (four) times a day., Disp: , Rfl:   •  cholecalciferol, vitamin D3, 1,000 unit (25 mcg) tablet, Take 2,000 Units by mouth daily., Disp: , Rfl:   •  diphenoxylate-atropine (LomotiL) 2.5-0.025 mg per tablet, 1-2 tabs PO QID, do not exceed 8 per day. (Patient taking differently: Take 2 tablets by mouth 4 (four) times a day as needed. 1-2 tabs PO QID, do not exceed 8 per day.), Disp: 240 tablet, Rfl: 3  •  glucosamine-D3-Boswellia serr 1,500-400-100 mg-unit-mg tablet, Take 1 tablet by mouth daily., Disp: , Rfl:   •  levothyroxine (SYNTHROID) 88 mcg tablet, Take 88 mcg by mouth daily.  , Disp: , Rfl: 3  •  pantoprazole (PROTONIX) 40 mg EC tablet, Take 40 mg by mouth once daily., Disp: , Rfl:    Allergies   Allergen Reactions   • Adhesive Tape-Silicones      Causes bruising   • Sulfamethoxazole-Trimethoprim Nausea Only     Social History     Socioeconomic History   • Marital status:      Spouse name: Royal    • Number of children: 4   • Years of education: Not on file   • Highest education level: Not on file   Occupational History   • Occupation: retired/     Tobacco Use   • Smoking status: Former     Packs/day: 1.00     Years: 40.00     Pack years: 40.00     Types: Cigarettes     Start date:      Quit date:      Years since quittin.1   • Smokeless tobacco: Never   Vaping Use   • Vaping Use: Never used   Substance and Sexual Activity   • Alcohol use: Yes     Comment: occassional    • Drug use: No   • Sexual activity: Never     Comment:    Other Topics Concern   • Not on file   Social History Narrative    Lives with  in a 2 story home      Social Determinants of Health     Financial Resource Strain: Not on file   Food Insecurity: No Food Insecurity   • Worried About Running Out of Food in the Last Year: Never true   • Ran Out of Food in the Last Year: Never true   Transportation Needs: Not on file   Physical Activity: Not on file   Stress: Not on file   Social Connections: Not on file   Intimate Partner Violence: Not on file   Housing Stability: Not on file      Family History   Problem Relation Age of Onset   • Heart attack Biological Mother    • Hypertension Biological Mother    • Endometrial cancer Biological Father    • Lung cancer Biological Father    • Breast cancer Neg Hx    • Cancer Neg Hx    • Colon cancer Neg Hx    • Ovarian cancer Neg Hx        REVIEW OF SYSTEMS: Unchanged    PHYSICAL EXAM:  GEN: On examination the patient is resting comfortably.  NEURO/PSYCH: Alert and oriented ×3  HEENT: Eyes: Sclera anicteric  CHEST: Equal rise and fall the chest.  No tenderness bilaterally.  SKIN: Warm and moist  NODES: No groin or cervical lymphadenopathy  EXT: No palpable edema of the bilateral lower extremities.  No clubbing.  GI: Abdomen soft, nontender, nondistended.  No palpable liver or spleen edge.  No ventral hernias, mass, or tenderness.  RECTAL: Deferred    XRAYS: I personally reviewed the current X rays and the pertinent findings are: Gastrografin enema images reviewed by me demonstrate no leak.      ASSESSMENT/PLAN:     Attention to ileostomy (CMS/[facility])  Raegan is looking fantastic.  I reviewed her Gastrografin enema through the distal end of her stoma demonstrates no evidence of leak or fistula.  I also reviewed this with the radiologist who interpreted the study and they agreed completely.  I visually inspected the anastomosis in the operating room and appeared to be fully healed anteriorly where the previous disruption was.  I explained to the patient that it is reasonable at this point to have her on the schedule to reverse her stoma  and I explained to her that we will plan for flexible sigmoidoscopy at the time of the operation so she will be placed in yellowfin's for the procedure just to reevaluate this to the patient deferred examination today in the office.  I also explained the risks of procedure including bleeding, infection, anastomotic leak, need for additional procedures, MI, stroke, death.  She understood and would like to proceed.  We will make arrangements for this.

## 2023-02-24 ENCOUNTER — TELEPHONE (OUTPATIENT)
Dept: COLORECTAL SURGERY | Facility: CLINIC | Age: 78
End: 2023-02-24
Payer: MEDICARE

## 2023-02-24 NOTE — TELEPHONE ENCOUNTER
Called [facility] to place an order for a distal irrigation kit for this patient. Also sent an email with the order request.

## 2023-02-25 PROBLEM — Z43.2 ATTENTION TO ILEOSTOMY (CMS/HCC): Status: ACTIVE | Noted: 2023-02-25

## 2023-02-25 NOTE — ASSESSMENT & PLAN NOTE
Raegan is looking fantastic.  I reviewed her Gastrografin enema through the distal end of her stoma demonstrates no evidence of leak or fistula.  I also reviewed this with the radiologist who interpreted the study and they agreed completely.  I visually inspected the anastomosis in the operating room and appeared to be fully healed anteriorly where the previous disruption was.  I explained to the patient that it is reasonable at this point to have her on the schedule to reverse her stoma and I explained to her that we will plan for flexible sigmoidoscopy at the time of the operation so she will be placed in yellowfin's for the procedure just to reevaluate this to the patient deferred examination today in the office.  I also explained the risks of procedure including bleeding, infection, anastomotic leak, need for additional procedures, MI, stroke, death.  She understood and would like to proceed.  We will make arrangements for this.

## 2023-03-21 ENCOUNTER — TELEPHONE (OUTPATIENT)
Dept: SCHEDULING | Facility: CLINIC | Age: 78
End: 2023-03-21
Payer: MEDICARE

## 2023-03-21 NOTE — TELEPHONE ENCOUNTER
Pt calling schedule with Dr. Sanchez. Has questions regarding her medications and her BP, would like to see him.    Pt also has upcoming procedure on 4/19, says doesn't believe cc was needed for that.    Pt can be reached at [phone]

## 2023-03-22 ENCOUNTER — OFFICE VISIT (OUTPATIENT)
Dept: GYNECOLOGIC ONCOLOGY | Facility: CLINIC | Age: 78
End: 2023-03-22
Attending: OBSTETRICS & GYNECOLOGY
Payer: MEDICARE

## 2023-03-22 ENCOUNTER — OFFICE VISIT (OUTPATIENT)
Dept: CARDIOLOGY | Facility: CLINIC | Age: 78
End: 2023-03-22
Payer: MEDICARE

## 2023-03-22 VITALS
HEIGHT: 60 IN | OXYGEN SATURATION: 88 % | SYSTOLIC BLOOD PRESSURE: 119 MMHG | WEIGHT: 143 LBS | HEART RATE: 85 BPM | BODY MASS INDEX: 28.07 KG/M2 | DIASTOLIC BLOOD PRESSURE: 70 MMHG | TEMPERATURE: 97.8 F

## 2023-03-22 VITALS
HEART RATE: 85 BPM | DIASTOLIC BLOOD PRESSURE: 60 MMHG | BODY MASS INDEX: 27.98 KG/M2 | HEIGHT: 60 IN | WEIGHT: 142.5 LBS | SYSTOLIC BLOOD PRESSURE: 108 MMHG

## 2023-03-22 DIAGNOSIS — I10 PRIMARY HYPERTENSION: Primary | ICD-10-CM

## 2023-03-22 DIAGNOSIS — Z85.44 HISTORY OF CANCER OF VAGINA: Primary | ICD-10-CM

## 2023-03-22 DIAGNOSIS — E78.00 PURE HYPERCHOLESTEROLEMIA: ICD-10-CM

## 2023-03-22 DIAGNOSIS — R09.89 RIGHT CAROTID BRUIT: ICD-10-CM

## 2023-03-22 DIAGNOSIS — I65.23 ATHEROSCLEROSIS OF BOTH CAROTID ARTERIES: ICD-10-CM

## 2023-03-22 DIAGNOSIS — Z01.810 PRE-OPERATIVE CARDIOVASCULAR EXAMINATION: ICD-10-CM

## 2023-03-22 DIAGNOSIS — I25.10 CORONARY ARTERY CALCIFICATION SEEN ON CT SCAN: ICD-10-CM

## 2023-03-22 PROCEDURE — 99214 OFFICE O/P EST MOD 30 MIN: CPT | Performed by: INTERNAL MEDICINE

## 2023-03-22 PROCEDURE — G8754 DIAS BP LESS 90: HCPCS | Performed by: INTERNAL MEDICINE

## 2023-03-22 PROCEDURE — 99213 OFFICE O/P EST LOW 20 MIN: CPT | Performed by: OBSTETRICS & GYNECOLOGY

## 2023-03-22 PROCEDURE — G8752 SYS BP LESS 140: HCPCS | Performed by: INTERNAL MEDICINE

## 2023-03-22 PROCEDURE — G8752 SYS BP LESS 140: HCPCS | Performed by: OBSTETRICS & GYNECOLOGY

## 2023-03-22 PROCEDURE — 93000 ELECTROCARDIOGRAM COMPLETE: CPT | Performed by: INTERNAL MEDICINE

## 2023-03-22 PROCEDURE — G8754 DIAS BP LESS 90: HCPCS | Performed by: OBSTETRICS & GYNECOLOGY

## 2023-03-22 RX ORDER — ROSUVASTATIN CALCIUM 20 MG/1
20 TABLET, COATED ORAL DAILY
Qty: 90 TABLET | Refills: 1 | Status: SHIPPED | OUTPATIENT
Start: 2023-03-22 | End: 2023-03-22

## 2023-03-22 RX ORDER — CIPROFLOXACIN 250 MG/1
250 TABLET, FILM COATED ORAL 2 TIMES DAILY
Qty: 14 TABLET | Refills: 0 | Status: SHIPPED | OUTPATIENT
Start: 2023-03-22 | End: 2023-03-29

## 2023-03-22 RX ORDER — ROSUVASTATIN CALCIUM 20 MG/1
20 TABLET, COATED ORAL DAILY
Qty: 90 TABLET | Refills: 1 | Status: SHIPPED | OUTPATIENT
Start: 2023-03-22 | End: 2023-07-07

## 2023-03-22 RX ORDER — SILVER SULFADIAZINE 10 G/1000G
CREAM TOPICAL DAILY
Qty: 50 G | Refills: 0 | Status: SHIPPED | OUTPATIENT
Start: 2023-03-22 | End: 2023-12-26

## 2023-03-22 NOTE — PROGRESS NOTES
Sonny Sanchez MD [facility]  Cardiology    [facility]    [facility]  [address]  [address] [place] [address]    TEL  [phone]  [facility]     3/22/2023    Re:  Raegan Young  : 1945    Primary care: Dr. Javon Valadez    Referring Physician: Dr Geovani Mandel    Gynecology: Dr Trev Oh    Dear Althea Redd and Rory     It was a pleasure to meet Raegan Young in the office in follow up today.  She is here with her daughter.  Raegan is a 77-year-old former smoker who is scheduled to undergo surgery with Jessica Mandel and Kadeem on 2023.  She is here for preoperative cardiovascular risk stratification given her comorbidities of coronary artery calcification, essential hypertension, hyperlipidemia and former tobacco use.    Raegan tells me she is doing well, she is looking forward to getting the surgery done almost a year after it was initially planned.  Unfortunately she did not tolerate her atorvastatin medicine that was prescribed during the prior visit largely because it was giving her a metallic taste in her mouth.  For some reason over the last year she has become hypersensitive to tastes specifically things like salt is too sensitive to palate.  It may be a similar story with the atorvastatin medicine.  She is willing to try an alternative in light of her risk factors however.  She has lost 20 pounds in weight but her appetite remains good.  Blood pressure at times have been running low.    Her Duke activity status index score is 17, which confers 5 METS exercise capacity.  She uses a treadmill 5 minutes at a time at a pace of 2.2 mph and has no oppressive cardiopulmonary symptoms.  She does get short of breath but is slowly increasing her endurance levels.    PAST MEDICAL HISTORY:  History of vaginal cancer s/p chemotherapy and radiation. 2018.  Lobectomy for adenocarcinoma of left  lung  Essential HTN   Unilateral lower extremity edema of his left leg  Left knee osteoarthritis   Hypothyroidism   Coronary artery calcification on CT (9/02/2020)    FAMILY HISTORY:   There is a family history of premature coronary artery disease. Mother had a MI and sudden death at age 51. Otherwise there is no sudden cardiac death or cardiomyopathy      SOCIAL HISTORY:   She is a former smoker. She smoked a pack and half a day for about 30 years. She doesn't drink alcohol to excess. She is a retired  in [employer].   She lives with her  and has 4 healthy children.     MEDICATIONS:    Outpatient Encounter Medications as of 3/22/2023:   •  acetaminophen (TYLENOL) 325 mg tablet, Take 2 tablets (650 mg total) by mouth every 4 (four) hours as needed for pain for up to 30 doses.  •  bumetanide (BUMEX) 1 mg tablet, Take 1 mg by mouth daily.  •  cholecalciferol, vitamin D3, 1,000 unit (25 mcg) tablet, Take 2,000 Units by mouth daily.  •  ciprofloxacin (CIPRO) 250 mg tablet, Take 1 tablet (250 mg total) by mouth 2 (two) times a day for 7 days.  •  diphenoxylate-atropine (LomotiL) 2.5-0.025 mg per tablet, 1-2 tabs PO QID, do not exceed 8 per day. (Patient taking differently: Take 2 tablets by mouth 4 (four) times a day as needed. 1-2 tabs PO QID, do not exceed 8 per day.)  •  glucosamine-D3-Boswellia serr 1,500-400-100 mg-unit-mg tablet, Take 1 tablet by mouth daily.  •  levothyroxine (SYNTHROID) 88 mcg tablet, Take 88 mcg by mouth daily.    •  pantoprazole (PROTONIX) 40 mg EC tablet, Take 40 mg by mouth once daily.  •  rosuvastatin (CRESTOR) 20 mg tablet, Take 1 tablet (20 mg total) by mouth daily.  •  silver sulfadiazine (SILVADENE) 1 % cream, Apply topically daily.  •  [DISCONTINUED] atorvastatin (LIPITOR) 40 mg tablet, TAKE 1 TABLET BY MOUTH EVERY DAY (Patient taking differently: Take 40 mg by mouth daily.)  •  [DISCONTINUED] benazepril (LOTENSIN) 10 mg tablet, Take 10 mg by mouth daily.  •   [DISCONTINUED] rosuvastatin (CRESTOR) 20 mg tablet, Take 1 tablet (20 mg total) by mouth daily.    REVIEW OF SYSTEMS: Aside from what is mentioned above, a relevant complete review of systems was performed and was negative.    Visit Vitals  /60 (BP Location: Left upper arm, Patient Position: Sitting)   Pulse 85   Ht 1.524 m (5')   Wt 64.6 kg (142 lb 8 oz)   BMI 27.83 kg/m²   Body surface area is 1.65 meters squared.     PHYSICAL EXAMINATION:  General: Pleasant and in no acute distress.  HEENT: No corneal arcus or xanthelasmas.  Sclerae are anicteric.    Neck: Supple.  JVP is 6 cm/H2O.  Right carotid bruit.    Heart: Normal S1 and S2.  Soft systolic murmur, mid peaking at the RUSB.  Chest: Symmetrical.  Lungs:  Decreased air entry left lung field  Extremities: Changes consistent with osteoarthritis in fingers.  Lower extremity edema left ankle>right ankle.   Distal pulses are easily palpable.  Skin: Warm and dry and well perfused.  Neurologic: Alert and appropriate, moving all four extremities. Gait is normal  Psychiatric: Normal mood, affect & judgment.    DIAGNOSTIC DATA:     ECG 3/22/2023: Normal sinus rhythm, right bundle branch block, nonspecific T wave abnormality    ECG 4/18/2022: Normal sinus rhythm, normal ECG      ECG on 7/6/2021 was a normal ECG     ECG on 6/7/2021 also showed premature atrial complexes.    Labs:   Lab Results   Component Value Date     01/30/2023    K 4.4 01/30/2023    MG 1.6 (L) 07/08/2022    BUN 36 (H) 01/30/2023    CREATININE 1.6 (H) 01/30/2023    EGFR 31.2 (L) 01/30/2023    WBC 8.49 01/30/2023    HGB 12.0 01/30/2023     01/30/2023    ALT 16 01/30/2023    AST 19 01/30/2023    GLUCOSE 103 (H) 01/30/2023    TSH 1.03 09/21/2018       Lipid Profile 12/17/2020:    Total cholesterol 200   Triglycerides 210      HDL 51    Imaging:     CT chest abdomen pelvis 9/2/2020: There is moderate coronary calcification in the left coronary tree.  There is a moderate sized  hiatal hernia.    Transthoracic echo (TTE) complete with contrast 07/21/2021    Interpretation Summary  · The left ventricle is normal in size. There is normal wall thickness. There is focal upper septal hypertrophy. An ultrasound enhancing agent was used. There is normal left ventricular systolic function. Left ventricular ejection fraction is 65% by visual estimate. There is normal wall motion. Diastolic function was not assessed due to mitral inflow merging.  · The left atrium is moderately dilated. The left atrial volume indexed to body surface area measures 48 mL/m2.  · The mitral valve is normal. There is trace mitral regurgitation.  · The aortic valve has three cusps. There is mild thickening and focal calcification of the cusps. There is no aortic stenosis. There is no aortic regurgitation.  · The aortic root is normal in size. The ascending aorta is normal in size. The aortic arch was not assessed.  · The right ventricle is normal in size. There is normal right ventricular systolic function. Right ventricular S' by tissue Doppler measures 20 cm/s.  · The right atrium is normal in size.  · The tricuspid valve is normal. There is trace tricuspid regurgitation. The estimated right ventricular systolic pressure = 34 mmHg.  · The pulmonic valve is normal. There is physiologic pulmonic regurgitation.  · The inferior vena cava measures less than 2.1 cm and collapses greater than 50% with inspiration. The estimated right atrial pressure is 0 -5 mmHg.  · There is no pericardial effusion.    There are no prior studies available for comparison.    Ultrasound carotid bilateral 07/21/2021    Interpretation Summary  1. Doppler waveforms show mild spectral broadening and normal peak systolic velocities compatible with low end of 1-49% stenosis of both internal carotid arteries  2. Non-occlusive calcific atherosclerotic plaque of both carotid bulbs.  3. Antegrade vertebral artery flow  bilaterally    ASSESSMENT/PLAN:    1. Essential hypertension    She takes benazepril for hypertension, blood pressures have been well controlled and in fact sometimes low symptomatically so.  Renal function most recently checked showed a creatinine of 1.6 mg/dL which is above her baseline.  I have recommended that she hold off on her benazepril and keep a log of her blood pressures for about 10 days and to contact me with the results.    2. Coronary calcification on CT  3. Right carotid bruit  4. Pure hypercholesterolemia    She has established coronary and carotid atherosclerosis on prior imaging.  Unfortunately was not able to tolerate atorvastatin.  I am glad she is willing to try alternative therapies.  I have provided her with a prescription for rosuvastatin in the hopes that she will be able to tolerate this.    5.  Preoperative cardiovascular risk stratification    Raegan demonstrates > 4 METS exercise capacity based on her report of her day to day activities. She has no active cardiovascular condition (namely, acute or recent coronary syndrome, decompensated heart failure or valve disease, malignant ventricular arrhythmias) and her ECG does not demonstrate evidence of prior infarction. She has no history of a CVA, significant chronic kidney disease (creatinine <2 mg/dL) or diabetes requiring insulin.     Her Subramanian Perioperative Risk for Myocardial Infarction or Cardiac Arrest ([facility]ATSON) is less than 1 % for risk of myocardial infarction or cardiac arrest, intraoperatively or up to 30 days post-operatively.    From a cardiovascular standpoint she is at acceptable risk to undergo her planned procedure.  I have recommended that she do her best to tolerate rosuvastatin particularly in the lead up to surgery.    6.  New right bundle branch block on ECG    This is likely a consequence of her left lung surgery, not of any clinical concern.    Thank you for allowing me to share in the care of your patient.  I asked Raegan   to return on an as-needed basis .  If you have any further questions, please do not hesitate to contact me.    Sincerely,        Sonny Sanchez MD, [facility]     The total time for this visit was 30 minutes and was comprised of chart preparation/review, office visit (gathering HPI, examination, testing review, plan formulation/discussion and patient education), processing orders and completing documentation.

## 2023-03-22 NOTE — LETTER
April 6, 2023    Patient: Raegan Young   YOB: 1945   Date of Visit: 3/22/2023       Dear Dr. Valadez:    The patient is seen back at the [facility] today in follow up with regard to her   1. History of cancer of vagina    . Below is my assessment and plan of care.          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  Fistula seems to have resolved and patient is scheduled for ileostomy closure in April.    We discussed local skin care with A&E ointment or Silvadene.  I have encouraged her not to abrade the area with rigorous cleaning    Raegan will return again in 6 months           Sincerely,        Trev Jimenez MD  CC: MD Geovani Mac MD Patrick Ross, MD PhD

## 2023-03-22 NOTE — PROGRESS NOTES
Raegan Young presents to the office for followup of rectovaginal fistula, history of vaginal cancer.    Minimal vaginal discharge, no fevers or odors.  Some irritation of the left groin fold    Oncology History   History of cancer of vagina   9/12/2018 Cancer Staged    Staging form: Vagina, AJCC 8th Edition  - Clinical stage from 9/12/2018: FIGO Stage III (cT3, cN1, cM0)     9/21/2018 Initial Diagnosis    Vaginal cancer (CMS/HCC) (HCC)     9/21/2018 - 10/29/2018 Chemotherapy    CISplatin with Concurrent Radiation,        9/27/2018 - 11/28/2018 Radiation Therapy    IMRT concurrent Cisplatin complicated by N/V, dehydration, thrombocytopenia febrile neutropenia     1/15/2019 Surgery    Left groin node excision benign     9/20/2020 Imaging Significant Findings    CT: She had CT scan on 9/2/20: Significant sigmoid colonic wall thickening from chronic diverticulosis. Significant size hiatal hernia present.  Left upper lobe 5 mm lung nodule is stable. There is no grossly enlarged lymph node noted in the left groin on the CT.          Past Medical History:   Diagnosis Date   • Anemia    • Arthritis    • Colovaginal fistula    • COVID-19 vaccine series completed 02/26/2021   • Disease of thyroid gland    • Diverticulitis of colon    • GERD (gastroesophageal reflux disease)    • H/O ileostomy    • Hiatal hernia    • History of snoring    • Hypertension    • Hypothyroidism    • Lung cancer (CMS/HCC)    • Lung nodule    • Vaginal cancer (CMS/HCC) 2018    s/p XRT x 7 weeks and chemo weekly x 5 weeks       Past Surgical History:   Procedure Laterality Date   • ABDOMINAL SURGERY     • BOWEL RESECTION      partial with ileostomy   • CHOLECYSTECTOMY     • COLONOSCOPY     • DILATION AND CURETTAGE OF UTERUS  02/2018   • ILEOSTOMY     • LUNG SURGERY     • ROTATOR CUFF REPAIR Right    • TONSILLECTOMY     • WISDOM TOOTH EXTRACTION      hx of       Current Outpatient Medications:   •  acetaminophen (TYLENOL) 325 mg tablet, Take 2  tablets (650 mg total) by mouth every 4 (four) hours as needed for pain for up to 30 doses., Disp: 30 tablet, Rfl: 0  •  atorvastatin (LIPITOR) 40 mg tablet, TAKE 1 TABLET BY MOUTH EVERY DAY (Patient taking differently: Take 40 mg by mouth daily.), Disp: 90 tablet, Rfl: 3  •  benazepril (LOTENSIN) 10 mg tablet, Take 10 mg by mouth daily., Disp: , Rfl:   •  bumetanide (BUMEX) 1 mg tablet, Take 1 mg by mouth daily., Disp: , Rfl:   •  cephalexin (KEFLEX) 500 mg capsule, Take 500 mg by mouth 4 (four) times a day., Disp: , Rfl:   •  cholecalciferol, vitamin D3, 1,000 unit (25 mcg) tablet, Take 2,000 Units by mouth daily., Disp: , Rfl:   •  diphenoxylate-atropine (LomotiL) 2.5-0.025 mg per tablet, 1-2 tabs PO QID, do not exceed 8 per day. (Patient taking differently: Take 2 tablets by mouth 4 (four) times a day as needed. 1-2 tabs PO QID, do not exceed 8 per day.), Disp: 240 tablet, Rfl: 3  •  glucosamine-D3-Boswellia serr 1,500-400-100 mg-unit-mg tablet, Take 1 tablet by mouth daily., Disp: , Rfl:   •  levothyroxine (SYNTHROID) 88 mcg tablet, Take 88 mcg by mouth daily.  , Disp: , Rfl: 3  •  pantoprazole (PROTONIX) 40 mg EC tablet, Take 40 mg by mouth once daily., Disp: , Rfl:   Adhesive tape-silicones and Sulfamethoxazole-trimethoprim  Cancer-related family history includes Endometrial cancer in her biological father; Lung cancer in her biological father. There is no history of Breast cancer, Cancer, Colon cancer, or Ovarian cancer.  Social History     Tobacco Use   • Smoking status: Former     Packs/day: 1.00     Years: 40.00     Pack years: 40.00     Types: Cigarettes     Start date:      Quit date:      Years since quittin.2   • Smokeless tobacco: Never   Vaping Use   • Vaping status: Never Used   Substance Use Topics   • Alcohol use: Yes     Comment: occassional    • Drug use: No     ROS: Negative in all systems with the exception of the above    Physical examination: Well-developed female in no  apparent distress  Vitals:    03/22/23 1024   BP: 119/70   Pulse: 85   Temp: 36.6 °C (97.8 °F)   SpO2: (!) 88%     Body mass index is 27.93 kg/m².    Vitals: BP: 119/70  Temp: 36.6 °C (97.8 °F)  Temp Source: Oral  Heart Rate: 85  SpO2: 88 %  Height: 152.4 cm (5')  Weight: 64.9 kg (143 lb) (03/22 1024)  HEENT: Normocephalic, atraumatic, nonicteric sclera  Neck: Supple no masses, thyroid normal nontender  Lymphatic: No inguinal or supraclavicular adenopathy  Heart: Regular rate no murmurs  Lungs: Clear to auscultation with good respiratory effort  Extremities: No clubbing, cyanosis, edema  Neuro: Oriented ×3 with normal mood and affect  Abdomen: Soft, nontender, nondistended. No hepatosplenomegaly. No rebound or guarding.  Gynecologic: Normal vulva vagina urethra meatus bladder.  Absent cervix uterus adnexa, no ulcerations or fistulas noted at the vaginal cuff.      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  Fistula seems to have resolved and patient is scheduled for ileostomy closure in April.    We discussed local skin care with A&E ointment or Silvadene.  I have encouraged her not to abrade the area with rigorous cleaning    Raegan will return again in 6 months

## 2023-03-30 ENCOUNTER — TELEPHONE (OUTPATIENT)
Dept: COLORECTAL SURGERY | Facility: CLINIC | Age: 78
End: 2023-03-30
Payer: MEDICARE

## 2023-03-30 NOTE — TELEPHONE ENCOUNTER
Pt stated that she has been in constant pain and has not had an out put in a few days and wahst to know what to do.

## 2023-03-30 NOTE — TELEPHONE ENCOUNTER
Spoke w/ pt who states around three am she woke up with cramping abdominal pain. She noticed she has not had any output in bag during this time. She states she had one episode of vomiting as she was picking up my phone call. She continues to have cramping, nausea, has not noticed much air in bag, is belching frequently. Advised recommendation per Shila CROWLEY is to be seen in local ER. She verbalized understanding.

## 2023-03-31 NOTE — TELEPHONE ENCOUNTER
Spoke w/ pt who was admitted overnight to [facility] for observation, she states she is on clear liquid diet following recent CT scan. She states she has had additional episodes of vomiting, no measurable output for bag. Advised will request CT report, will keep us updated regarding plan of care.

## 2023-04-03 NOTE — TELEPHONE ENCOUNTER
Spoke w/ pt was discharged from hospital yesterday, 4/2. She states her output is now at baseline, tolerating diet well, recommendation to continue low residue diet as tolerated. She verbalized understanding.

## 2023-04-14 ENCOUNTER — OFFICE VISIT (OUTPATIENT)
Dept: PREADMISSION TESTING | Facility: HOSPITAL | Age: 78
End: 2023-04-14
Attending: COLON & RECTAL SURGERY
Payer: MEDICARE

## 2023-04-14 ENCOUNTER — APPOINTMENT (OUTPATIENT)
Dept: LAB | Facility: HOSPITAL | Age: 78
End: 2023-04-14
Attending: COLON & RECTAL SURGERY
Payer: MEDICARE

## 2023-04-14 VITALS
HEART RATE: 98 BPM | WEIGHT: 145 LBS | DIASTOLIC BLOOD PRESSURE: 73 MMHG | HEIGHT: 58 IN | SYSTOLIC BLOOD PRESSURE: 139 MMHG | TEMPERATURE: 98.3 F | BODY MASS INDEX: 30.44 KG/M2 | OXYGEN SATURATION: 97 % | RESPIRATION RATE: 16 BRPM

## 2023-04-14 DIAGNOSIS — Z43.2 ATTENTION TO ILEOSTOMY (CMS/HCC): ICD-10-CM

## 2023-04-14 DIAGNOSIS — Z01.818 PREOP EXAMINATION: Primary | ICD-10-CM

## 2023-04-14 DIAGNOSIS — E78.5 DYSLIPIDEMIA: ICD-10-CM

## 2023-04-14 DIAGNOSIS — N82.4 COLOVAGINAL FISTULA: ICD-10-CM

## 2023-04-14 DIAGNOSIS — I10 PRIMARY HYPERTENSION: ICD-10-CM

## 2023-04-14 PROBLEM — I45.10 RIGHT BUNDLE BRANCH BLOCK (RBBB) ON ELECTROCARDIOGRAPHY: Status: ACTIVE | Noted: 2023-04-14

## 2023-04-14 PROBLEM — K57.92 DIVERTICULITIS: Status: ACTIVE | Noted: 2023-04-14

## 2023-04-14 PROBLEM — R31.29 MICROSCOPIC HEMATURIA: Status: ACTIVE | Noted: 2023-04-14

## 2023-04-14 PROBLEM — E55.9 VITAMIN D DEFICIENCY: Status: ACTIVE | Noted: 2023-04-14

## 2023-04-14 PROBLEM — R42 VERTIGO: Status: ACTIVE | Noted: 2023-04-14

## 2023-04-14 PROBLEM — Z55.8 KNOWLEDGE DEFICIT ABOUT THERAPEUTIC DIET: Status: ACTIVE | Noted: 2023-04-14

## 2023-04-14 PROBLEM — K62.5 BRBPR (BRIGHT RED BLOOD PER RECTUM): Status: ACTIVE | Noted: 2023-04-14

## 2023-04-14 PROBLEM — N95.0 POSTMENOPAUSAL VAGINAL BLEEDING: Status: ACTIVE | Noted: 2023-04-14

## 2023-04-14 PROBLEM — L02.91 ABSCESS: Status: ACTIVE | Noted: 2023-04-14

## 2023-04-14 PROBLEM — C52 VAGINAL CANCER (CMS/HCC): Status: ACTIVE | Noted: 2023-04-14

## 2023-04-14 PROBLEM — R82.81 PYURIA: Status: ACTIVE | Noted: 2023-04-14

## 2023-04-14 PROBLEM — Z90.2 S/P PARTIAL LOBECTOMY OF LUNG: Status: ACTIVE | Noted: 2023-04-14

## 2023-04-14 PROBLEM — Z78.9 KNOWLEDGE DEFICIT ABOUT THERAPEUTIC DIET: Status: ACTIVE | Noted: 2023-04-14

## 2023-04-14 PROBLEM — N39.0 URINARY TRACT INFECTION: Status: ACTIVE | Noted: 2023-04-14

## 2023-04-14 PROBLEM — D72.829 LEUKOCYTOSIS: Status: ACTIVE | Noted: 2023-04-14

## 2023-04-14 PROBLEM — M19.90 OA (OSTEOARTHRITIS): Status: ACTIVE | Noted: 2023-04-14

## 2023-04-14 PROBLEM — M85.80 OSTEOPENIA: Status: ACTIVE | Noted: 2023-04-14

## 2023-04-14 PROBLEM — M10.9 GOUT: Status: ACTIVE | Noted: 2023-04-14

## 2023-04-14 PROBLEM — N39.0 ACUTE LOWER URINARY TRACT INFECTION: Status: ACTIVE | Noted: 2023-04-14

## 2023-04-14 PROBLEM — B37.31 VAGINAL CANDIDA: Status: ACTIVE | Noted: 2023-04-14

## 2023-04-14 PROBLEM — R39.9 UTI SYMPTOMS: Status: ACTIVE | Noted: 2023-04-14

## 2023-04-14 LAB
ABO + RH BLD: NORMAL
ALBUMIN SERPL-MCNC: 3.4 G/DL (ref 3.4–5)
ALP SERPL-CCNC: 67 IU/L (ref 35–126)
ALT SERPL-CCNC: 33 IU/L (ref 11–54)
ANION GAP SERPL CALC-SCNC: 9 MEQ/L (ref 3–15)
AST SERPL-CCNC: 35 IU/L (ref 15–41)
BASOPHILS # BLD: 0.03 K/UL (ref 0.01–0.1)
BASOPHILS NFR BLD: 0.4 %
BILIRUB SERPL-MCNC: 0.5 MG/DL (ref 0.3–1.2)
BLD GP AB SCN SERPL QL: NEGATIVE
BLOOD BANK CMNT PATIENT-IMP: NORMAL
BUN SERPL-MCNC: 15 MG/DL (ref 8–20)
CALCIUM SERPL-MCNC: 9 MG/DL (ref 8.9–10.3)
CHLORIDE SERPL-SCNC: 110 MEQ/L (ref 98–109)
CO2 SERPL-SCNC: 24 MEQ/L (ref 22–32)
CREAT SERPL-MCNC: 1.3 MG/DL (ref 0.6–1.1)
D AG BLD QL: NEGATIVE
DIFFERENTIAL METHOD BLD: ABNORMAL
EOSINOPHIL # BLD: 0.12 K/UL (ref 0.04–0.36)
EOSINOPHIL NFR BLD: 1.6 %
ERYTHROCYTE [DISTWIDTH] IN BLOOD BY AUTOMATED COUNT: 13.7 % (ref 11.7–14.4)
GFR SERPL CREATININE-BSD FRML MDRD: 39.6 ML/MIN/1.73M*2
GLUCOSE SERPL-MCNC: 132 MG/DL (ref 70–99)
HCT VFR BLDCO AUTO: 33.5 % (ref 35–45)
HGB BLD-MCNC: 10.7 G/DL (ref 11.8–15.7)
IMM GRANULOCYTES # BLD AUTO: 0.01 K/UL (ref 0–0.08)
IMM GRANULOCYTES NFR BLD AUTO: 0.1 %
LABORATORY COMMENT REPORT: NORMAL
LYMPHOCYTES # BLD: 1.19 K/UL (ref 1.2–3.5)
LYMPHOCYTES NFR BLD: 15.5 %
MCH RBC QN AUTO: 29.7 PG (ref 28–33.2)
MCHC RBC AUTO-ENTMCNC: 31.9 G/DL (ref 32.2–35.5)
MCV RBC AUTO: 93.1 FL (ref 83–98)
MONOCYTES # BLD: 0.56 K/UL (ref 0.28–0.8)
MONOCYTES NFR BLD: 7.3 %
NEUTROPHILS # BLD: 5.79 K/UL (ref 1.7–7)
NEUTS SEG NFR BLD: 75.1 %
NRBC BLD-RTO: 0 %
PDW BLD AUTO: 9.1 FL (ref 9.4–12.3)
PLATELET # BLD AUTO: 326 K/UL (ref 150–369)
POTASSIUM SERPL-SCNC: 4.6 MEQ/L (ref 3.6–5.1)
PROT SERPL-MCNC: 6 G/DL (ref 6–8.2)
RBC # BLD AUTO: 3.6 M/UL (ref 3.93–5.22)
SODIUM SERPL-SCNC: 143 MEQ/L (ref 136–144)
SPECIMEN EXP DATE BLD: NORMAL
WBC # BLD AUTO: 7.7 K/UL (ref 3.8–10.5)

## 2023-04-14 PROCEDURE — 80053 COMPREHEN METABOLIC PANEL: CPT

## 2023-04-14 PROCEDURE — 86901 BLOOD TYPING SEROLOGIC RH(D): CPT

## 2023-04-14 PROCEDURE — 99214 OFFICE O/P EST MOD 30 MIN: CPT | Performed by: INTERNAL MEDICINE

## 2023-04-14 PROCEDURE — G8754 DIAS BP LESS 90: HCPCS | Performed by: INTERNAL MEDICINE

## 2023-04-14 PROCEDURE — G8752 SYS BP LESS 140: HCPCS | Performed by: INTERNAL MEDICINE

## 2023-04-14 PROCEDURE — 36415 COLL VENOUS BLD VENIPUNCTURE: CPT

## 2023-04-14 PROCEDURE — 86850 RBC ANTIBODY SCREEN: CPT

## 2023-04-14 PROCEDURE — 85025 COMPLETE CBC W/AUTO DIFF WBC: CPT

## 2023-04-14 RX ORDER — DOXYCYCLINE 100 MG/1
CAPSULE ORAL
Qty: 2 CAPSULE | Refills: 0 | Status: SHIPPED | OUTPATIENT
Start: 2023-04-14 | End: 2023-04-28 | Stop reason: HOSPADM

## 2023-04-14 ASSESSMENT — PAIN SCALES - GENERAL: PAINLEVEL: 0-NO PAIN

## 2023-04-14 NOTE — PRE-PROCEDURE INSTRUCTIONS
1.      You will be called between 3pm -7pm one business day before your procedure  April 18, 2023   to tell you where and when to report.  If you do not receive a phone call by 6pm, please call the Admissions office at [phone] to determine the arrival time for your procedure.      2.        Please report to Admissions or Short Procedure Unit, park in lot A, on the day of your procedure.  Detailed directions will be given to you when you are called with arrival time.      3.      No solid food for EIGHT HOURS prior to surgery- No food after midnight.    Unlimited CLEAR liquids, meaning water or PLAIN black coffee (WITHOUT any milk, cream, sugar, or sweetener) are permitted up to TWO HOURS prior to arrival at the hospital.     4.      Early on the morning of the procedure please take your usual dose of the listed medications with a sip of water:  Hold bumex AM of surgery  Take synthroid, crestor and protonix AM of surgery  Please hold all vitamins/herbal supplements for 14 days prior to surgery.  Please hold all NSAIDs including ibuprofen, naproxen, aleve, mobic 7 days prior to surgery, you make take tylenol up to and including the day of surgery.     5.      Other Instructions: You may brush your teeth the morning of the procedure. Rinse and spit, do not swallow.  Bring a list of your medications with dosages with you.  Use surgical wash as directed.   6.      If you develop a cold, cough, fever, rash, or other symptom prior to the data of the procedure, please report it to your physician immediately.     7.      If you need to cancel the procedure for any reason, please contact your physician.     8.      Make arrangements to have someone drive you home from the procedure. If you have not arranged for transportation home, your surgery may be cancelled.      9.      You may not take public transportation unless you are accompanied by a responsible person.     10.      You may not drive a car or operate complex  or potentially dangerous machinery for 24 hours following anesthesia and/or sedation.      11.      12.      If it is medically necessary for you to have a longer stay, you will be informed as soon as the decision is made.              Only bring essential items to the hospital.  Do not wear or bring anything of value to the hospital including jewelry of any kind, money, or wallet. Do not wear make-up or contact lenses. Do not BRING MEDICATIONS FROM HOME unless instructed to do so.  DO bring your hearing aids, glasses, and a case.        13.      No lotion, creams, powders, or oils on skin the morning of procedure        14.      Dress in comfortable clothes.     15.      If instructed, please bring a copy of your Advanced Directive (Living Will/Durable Power of ) on the day of your procedure.      16.            17.       18.    Ensuring your safety at all times is a very important part of out [facility] Culture of Safety . After having surgery and sedation, you are at risk for falling and balance issues.  Although you may feel awake, the effects of the medication can last up to 24 hours after anesthesia.  If you need to use the bathroom during your recovery period, nursing staff will escort you there and stay with you to ensure your safety.          Refrain from drinking alcohol and smoking cigarettes and marijuana for 24 hours prior to surgery.         Shower with antibacterial soap (DIAL) the night before and morning of your procedure.  If your procedure indicates the need for CHG antiseptic was (Bactoshield or Hibiclens), please use this instead and follow instructions as discussed at the time of your Pre-Admission Testing visit or phone interview.     Above instructions reviewed with patient and patient acknowledges understanding.

## 2023-04-14 NOTE — CONSULTS
[facility] Medicine Service -  Pre-Operative Consultation       Patient Name: Raegan Young  Referring Surgeon: Dr. Mandel    Reason for Referral: Pre-Operative Evaluation  Surgical Procedure: open stoma closure / possible exploratory laparotomy  Operative Date: 4/19/23  Other Providers:      PCP: Javon Valadez DO     Cardiology: Sonny Hamilton  Thoracic Surgery: Dr. Gamez  Gynecology: Dr. Trev Jimenez     HISTORY OF PRESENT ILLNESS      Raegan Young is a 77 y.o. female presenting today to the [facility] at [facility] for pre-operative evaluation prior to planned surgery. I saw this patient on 5/2/22 here at [facility].    This patient has a history of diverticulitis and colovaginal fistula. She has a history of prior radiation therapy for vaginal cancer a few years prior to this. On 5/13/22 she underwent robotic proctectomy and sigmoidectomy with takedown of colouterine and rectovaginal fistula and coloanal anastomosis with diverting loop ileostomy as well as ALYSSA/BSO (Dr. Jimenez). On 9/15/22 during a rectal EUA she underwent debridement of anterior anastomotic dehiscence and primary repair. She now presents prior to open stoma closure and at the time of the operation for flexible sigmoidoscopy. Of note, she was in [facility] last week for a few days with nausea, vomiting, and abdominal pain -- managed conservatively and without evidence of obstruction -- with resolution of symptoms at this time and tolerating normal diet without issue.     In regards to medical history:  • She has hypertension and dyslipidemia. She has currently stopped her lisinopril -- she has had further elevated creatinine, reported blood pressures off her medication to Dr. Sanchez earlier this month, and at this time medication has been stopped. She has been started on rosuvastatin for her cholesterol after developing a metallic taste on atorvastatin.   • She  has coronary artery calcification on CT 9/2/2020.  • She is a former smoker.  • She has had lobectomy for adenocarcinoma of the left lung (10/21/21) and follows with Dr. Gamez.   • She has a history of vaginal cancer s/p chemotherapy and radiation in 2018.  • She has hypothyroidism.  • She has moderate right carotid artery stenosis by ultrasound (July 2021).   • She has GERD and a hiatal hernia.  • She has CKD stage III.    The patient denies any current or recent chest pain or pressure, dyspnea, cough, sputum, fevers, chills, abdominal pain, nausea, vomiting, diarrhea or other symptoms.     Functionally, the patient is able to ascend a flight or so of stairs with no dyspnea or chest pain.     The patient denies, on specific questioning, the following:  No history of MI, arrhythmia,or CHF.  No history of RAMON. STOP-Bang Total Score: 3  No history of DVT/PE.  No history of COPD.  No history of CVA.  No history of DM.     PAST MEDICAL AND SURGICAL HISTORY      Past Medical History:   Diagnosis Date   • Anemia    • Arthritis    • Colovaginal fistula    • COVID-19 vaccine series completed 02/26/2021   • Disease of thyroid gland    • Diverticulitis of colon    • GERD (gastroesophageal reflux disease)    • H/O ileostomy    • Hiatal hernia    • History of snoring    • Hypertension    • Hypothyroidism    • Lung cancer (CMS/HCC)    • Lung nodule    • Vaginal cancer (CMS/HCC) 2018    s/p XRT x 7 weeks and chemo weekly x 5 weeks         Past Surgical History:   Procedure Laterality Date   • ABDOMINAL SURGERY     • BOWEL RESECTION      partial with ileostomy   • CHOLECYSTECTOMY     • COLONOSCOPY     • DILATION AND CURETTAGE OF UTERUS  02/2018   • ILEOSTOMY     • LUNG SURGERY     • ROTATOR CUFF REPAIR Right    • TONSILLECTOMY     • WISDOM TOOTH EXTRACTION      hx of       MEDICATIONS        Current Outpatient Medications:   •  acetaminophen (TYLENOL) 325 mg tablet, Take 2 tablets (650 mg total) by mouth every 4 (four) hours as  needed for pain for up to 30 doses., Disp: 30 tablet, Rfl: 0  •  bumetanide (BUMEX) 1 mg tablet, Take 1 mg by mouth daily. Daily prn, Disp: , Rfl:   •  cholecalciferol, vitamin D3, 1,000 unit (25 mcg) tablet, Take 2,000 Units by mouth daily., Disp: , Rfl:   •  diphenoxylate-atropine (LomotiL) 2.5-0.025 mg per tablet, 1-2 tabs PO QID, do not exceed 8 per day. (Patient taking differently: Take 2 tablets by mouth 4 (four) times a day as needed. 1-2 tabs PO QID, do not exceed 8 per day.), Disp: 240 tablet, Rfl: 3  •  doxycycline hyclate (VIBRAMYCIN) 100 mg capsule, Take one capsule at 8AM and one capsule at 8PM the day before surgery., Disp: 2 capsule, Rfl: 0  •  glucosamine-D3-Boswellia serr 1,500-400-100 mg-unit-mg tablet, Take 1 tablet by mouth daily., Disp: , Rfl:   •  levothyroxine (SYNTHROID) 88 mcg tablet, Take 88 mcg by mouth daily.  , Disp: , Rfl: 3  •  pantoprazole (PROTONIX) 40 mg EC tablet, Take 40 mg by mouth once daily., Disp: , Rfl:   •  rosuvastatin (CRESTOR) 20 mg tablet, Take 1 tablet (20 mg total) by mouth daily., Disp: 90 tablet, Rfl: 1  •  silver sulfadiazine (SILVADENE) 1 % cream, Apply topically daily., Disp: 50 g, Rfl: 0    ALLERGIES      Adhesive tape-silicones and Sulfamethoxazole-trimethoprim    FAMILY HISTORY      family history includes Endometrial cancer in her biological father; Heart attack in her biological mother; Hypertension in her biological mother; Lung cancer in her biological father.    Denies any prior known family history of DVTs/PEs/clotting disorder    SOCIAL HISTORY      Social History     Tobacco Use   • Smoking status: Former     Packs/day: 1.00     Years: 40.00     Pack years: 40.00     Types: Cigarettes     Start date:      Quit date:      Years since quittin.3   • Smokeless tobacco: Never   Vaping Use   • Vaping status: Never Used   Substance Use Topics   • Alcohol use: Yes     Alcohol/week: 1.0 standard drink of alcohol     Types: 1 Glasses of wine per week  "    Comment: occassional    • Drug use: No       REVIEW OF SYSTEMS      All other systems reviewed and negative except as noted in HPI    PHYSICAL EXAMINATION      Visit Vitals  /73 (BP Location: Right upper arm, Patient Position: Sitting)   Pulse 98   Temp 36.8 °C (98.3 °F) (Temporal)   Resp 16   Ht 1.473 m (4' 10\")   Wt 65.8 kg (145 lb)   SpO2 97%   BMI 30.31 kg/m²     Body mass index is 30.31 kg/m².  GEN: well-developed and well-nourished; not in acute distress  HEENT: normocephalic; atraumatic  NECK: no JVD; no bruits  CARDIO: regular rate and rhythm; no murmurs or rubs  RESP: clear to auscultation bilaterally; no rales, rhonchi, or wheezes  ABD: soft, non-distended, non-tender, normal bowel sounds; ostomy noted  EXT: no cyanosis, clubbing; tr edema at ankles left > right  SKIN: clean, dry, warm, and intact  MUSCULOSKELETAL: no injury or deformity  NEURO: alert and oriented x 3; nonfocal  BEHAVIOR/EMOTIONAL: appropriate; cooperative    LABS / EKG        Labs  Lab Results   Component Value Date     04/14/2023    K 4.6 04/14/2023     (H) 04/14/2023    BUN 15 04/14/2023    CREATININE 1.3 (H) 04/14/2023    WBC 7.70 04/14/2023    HGB 10.7 (L) 04/14/2023    HCT 33.5 (L) 04/14/2023     04/14/2023    ALT 33 04/14/2023    AST 35 04/14/2023       ECG/Telemetry  SR; RBBB; nonspecific T wave abnormality    TTE 7/21/21  Interpretation Summary  · The left ventricle is normal in size. There is normal wall thickness. There is focal upper septal hypertrophy. An ultrasound enhancing agent was used. There is normal left ventricular systolic function. Left ventricular ejection fraction is 65% by visual estimate. There is normal wall motion. Diastolic function was not assessed due to mitral inflow merging.  · The left atrium is moderately dilated. The left atrial volume indexed to body surface area measures 48 mL/m2.  · The mitral valve is normal. There is trace mitral regurgitation.  · The aortic valve has " three cusps. There is mild thickening and focal calcification of the cusps. There is no aortic stenosis. There is no aortic regurgitation.  · The aortic root is normal in size. The ascending aorta is normal in size. The aortic arch was not assessed.  · The right ventricle is normal in size. There is normal right ventricular systolic function. Right ventricular S' by tissue Doppler measures 20 cm/s.  · The right atrium is normal in size.  · The tricuspid valve is normal. There is trace tricuspid regurgitation. The estimated right ventricular systolic pressure = 34 mmHg.  · The pulmonic valve is normal. There is physiologic pulmonic regurgitation.  · The inferior vena cava measures less than 2.1 cm and collapses greater than 50% with inspiration. The estimated right atrial pressure is 0 -5 mmHg.    ASSESSMENT AND PLAN         Preop examination  Medical management and romy-operative risk commentary noted as per this document's contents.     Colovaginal fistula  Surgery as scheduled.     Hypertension  She is off ACEi at this time as per HPI.  Monitor in-house.      Hypothyroidism  Continue levothyroxine.     Coronary artery calcification seen on CT scan  Continue statin.     History of cancer of vagina  Noted. As per HPI and mgmt per Dr. Jimenez.     Dyslipidemia  Continue statin.     History of lung cancer  s/p lobectomy. Mgmt per Dr. Gamez.     Stage 3b chronic kidney disease (CMS/HCC)  Due to the pre-existing condition of CKD, this patient may be at risk of acute renal failure. I would avoid nephrotoxins as able, avoid intra-operative relative hypotension as possible, avoid a net negative fluid balance, and follow the BMP closely. Ongoing mgmt per PCP.     In regards to perioperative cardiac risk:  Per cardiology.    Further comments:  Resume supplements when OK with surgical team.  The patient has anemia present prior to surgery. The value is acceptable for surgery. Recommend transfusing only if Hgb < 7.0; I do not  anticipate this occurring. Follow-up with PCP post-operatively.  I would encourage incentive spirometry to assist with minimizing romy-operative pulmonary risk.  DVT prophylaxis and timing of such per the discretion of the surgeon.     Please do not hesitate to contact [facility] during the upcoming hospitalization with any questions or concerns.     Jamal Andrade MD  4/15/2023

## 2023-04-18 ENCOUNTER — ANESTHESIA EVENT (INPATIENT)
Dept: OPERATING ROOM | Facility: HOSPITAL | Age: 78
DRG: 330 | End: 2023-04-18
Payer: MEDICARE

## 2023-04-18 ASSESSMENT — ENCOUNTER SYMPTOMS: DYSRHYTHMIAS: 1

## 2023-04-18 ASSESSMENT — LIFESTYLE VARIABLES: TOBACCO_USE: 1

## 2023-04-18 NOTE — ANESTHESIA PREPROCEDURE EVALUATION
Relevant Problems   CARDIOVASCULAR   (+) Dyslipidemia   (+) Hypertension      GASTROINTESTINAL   (+) BRBPR (bright red blood per rectum)   (+) GERD (gastroesophageal reflux disease)      HEMATOLOGY   (+) Anemia      MUSCULOSKELETAL   (+) OA (osteoarthritis)   (+) Primary osteoarthritis of left knee      NEUROLOGY   (+) Vertigo      URINARY SYSTEM   (+) Low magnesium level      Other   (+) Acute lower urinary tract infection   (+) Adenocarcinoma of left lung (CMS/HCC)   (+) Hypothyroidism   (+) Urinary tract infection   (+) Vaginal candida       Anesthesia ROS/MED HX      Pulmonary    history of tobacco use  Cardiovascular   BALBUENA   CAD   dyslipidemia   hypertension  Dysrhythmias (rbbb)   Cardiac clearance reviewed, echocardiogram reviewed, Covid19 Test Reviewed and ECG reviewed  Comments: Prior airway no issues   Hematological    no thrombocytopenia   anemia  GI/Hepatic   Hiatal hernia   GERD  Renal Disease   chronic renal insufficiency   electrolyte problem  Endo/Other   Hypothyroidism  History of cancer, history of chemotherapy, radiation treatment, colon cancer and lung cancer  Body Habitus: Obese  ROS/MED HX Comments:    Pulmonary: Partial lobectomy    Endo: No results found for: HGBA1C   Hematological: No results found for: INR, PROTIME  Lab Results       Component                Value               Date                       WBC                      7.70                04/14/2023                 HGB                      10.7 (L)            04/14/2023                 HCT                      33.5 (L)            04/14/2023                 MCV                      93.1                04/14/2023                 PLT                      326                 04/14/2023               Renal Disease:   Chemistry           Component                Value               Date/Time                  NA                       143                 04/14/2023 1047            K                        4.6                 04/14/2023  "1047            CL                       110 (H)             04/14/2023 1047            CO2                      24                  04/14/2023 1047            BUN                      15                  04/14/2023 1047            CREATININE               1.3 (H)             04/14/2023 1047              Component                Value               Date/Time                  CALCIUM                  9.0                 04/14/2023 1047            ALKPHOS                  67                  04/14/2023 1047            AST                      35                  04/14/2023 1047            ALT                      33                  04/14/2023 1047            BILITOT                  0.5                 04/14/2023 1047          ROS/Hx:     preop note:  \" 77 y.o. female presenting today to the [facility] at [facility] for pre-operative evaluation prior to planned surgery. I saw this patient on 5/2/22 here at [facility].     This patient has a history of diverticulitis and colovaginal fistula. She has a history of prior radiation therapy for vaginal cancer a few years prior to this. On 5/13/22 she underwent robotic proctectomy and sigmoidectomy with takedown of colouterine and rectovaginal fistula and coloanal anastomosis with diverting loop ileostomy as well as ALYSSA/BSO (Dr. Jimenez). On 9/15/22 during a rectal EUA she underwent debridement of anterior anastomotic dehiscence and primary repair. She now presents prior to open stoma closure and at the time of the operation for flexible sigmoidoscopy. Of note, she was in [facility] last week for a few days with nausea, vomiting, and abdominal pain -- managed conservatively and without evidence of obstruction -- with resolution of symptoms at this time and tolerating normal diet without issue.      In regards to medical history:  ? She has hypertension and dyslipidemia. She has currently stopped her " "lisinopril -- she has had further elevated creatinine, reported blood pressures off her medication to Dr. Sanchez earlier this month, and at this time medication has been stopped. She has been started on rosuvastatin for her cholesterol after developing a metallic taste on atorvastatin.   ? She has coronary artery calcification on CT 9/2/2020.  ? She is a former smoker.  ? She has had lobectomy for adenocarcinoma of the left lung (10/21/21) and follows with Dr. Gamez.   ? She has a history of vaginal cancer s/p chemotherapy and radiation in 2018.  ? She has hypothyroidism.  ? She has moderate right carotid artery stenosis by ultrasound (July 2021).   ? She has GERD and a hiatal hernia.  ? She has CKD stage III.     The patient denies any current or recent chest pain or pressure, dyspnea, cough, sputum, fevers, chills, abdominal pain, nausea, vomiting, diarrhea or other symptoms.      Functionally, the patient is able to ascend a flight or so of stairs with no dyspnea or chest pain.      The patient denies, on specific questioning, the following:  No history of MI, arrhythmia,or CHF.  No history of RAMON. STOP-Bang Total Score: 3  No history of DVT/PE.  No history of COPD.  No history of CVA.  No history of DM. \"       Past Surgical History:   Procedure Laterality Date   • ABDOMINAL SURGERY     • BOWEL RESECTION      partial with ileostomy   • CHOLECYSTECTOMY     • COLONOSCOPY     • DILATION AND CURETTAGE OF UTERUS  02/2018   • ILEOSTOMY     • LUNG SURGERY     • ROTATOR CUFF REPAIR Right    • TONSILLECTOMY     • WISDOM TOOTH EXTRACTION      hx of       Physical Exam    Airway   Mallampati: II   TM distance: >3 FB   Neck ROM: full  Cardiovascular - normal   Rhythm: regular   Rate: normalPulmonary - normal   clear to auscultation  Other Findings   Prior airway no issues   Dental - normal        Anesthesia Plan    Plan: general    Technique: general endotracheal and nerve block     Lines and Monitors: PIV and additional IV "     Airway: oral intubation and video laryngoscope       patient did not smoke on day of surgery   3 ASA  Blood Products:     Use of Blood Products Discussed: Yes     Consented to blood products  Anesthetic plan and risks discussed with: patient  Induction:    intravenous   Postop Plan:   Patient Disposition: inpatient floor planned admission   Pain Management: IV analgesics  Comments:    Plan: Consented for TAP block in the event pt needs an open ex lap     Patient Active Problem List   Diagnosis   • History of cancer of vagina   • Elevated serum creatinine   • Low magnesium level   • Anemia   • Lung nodule < 6cm on CT   • Colovaginal fistula   • Diverticulitis of large intestine with perforation and abscess with bleeding   • Diverticulitis of colon   • Hypertension   • Hypothyroidism   • GERD (gastroesophageal reflux disease)   • Coronary artery calcification seen on CT scan   • Primary osteoarthritis of left knee   • Abnormal PET scan, lung   • Adenocarcinoma of left lung (CMS/HCC)   • Adenoma of left lung   • Dyslipidemia   • History of lung cancer   • Stage 3b chronic kidney disease (CMS/HCC)   • Dysuria   • Vaginitis   • Abdominal pain   • Post-menopause atrophic vaginitis   • Tachycardia   • Attention to ileostomy (CMS/HCC)   • Knowledge deficit about therapeutic diet   • Abscess   • Acute lower urinary tract infection   • BRBPR (bright red blood per rectum)   • Diverticulitis   • Gout   • Leukocytosis   • Microscopic hematuria   • Osteopenia   • OA (osteoarthritis)   • Postmenopausal vaginal bleeding   • Pyuria   • Right bundle branch block (RBBB) on electrocardiography   • S/P partial lobectomy of lung   • Urinary tract infection   • Vaginal cancer (CMS/HCC)   • UTI symptoms   • Vertigo   • Vaginal candida   • Vitamin D deficiency       Current Facility-Administered Medications   Medication Dose Route Frequency   • acetaminophen  975 mg oral Once   • alvimopan  12 mg oral Once   • ceFAZolin  2 g intravenous  60 min Pre-Op    And   • metroNIDAZOLE  500 mg intravenous 60 min Pre-Op   • celecoxib  200 mg oral Once   • gabapentin  600 mg oral Once       Prior to Admission medications    Medication Sig Start Date End Date Taking? Authorizing Provider   acetaminophen (TYLENOL) 325 mg tablet Take 2 tablets (650 mg total) by mouth every 4 (four) hours as needed for pain for up to 30 doses. 8/5/22  Yes Ban Parisi MD   bumetanide (BUMEX) 1 mg tablet Take 1 mg by mouth daily. Daily prn 9/29/20  Yes Padma Mccarty MD   cholecalciferol, vitamin D3, 1,000 unit (25 mcg) tablet Take 2,000 Units by mouth daily.   Yes Padma Mccarty MD   diphenoxylate-atropine (LomotiL) 2.5-0.025 mg per tablet 1-2 tabs PO QID, do not exceed 8 per day.  Patient taking differently: Take 2 tablets by mouth 4 (four) times a day as needed. 1-2 tabs PO QID, do not exceed 8 per day. 1/13/23  Yes Shila Gilliland PA C   doxycycline hyclate (VIBRAMYCIN) 100 mg capsule Take one capsule at 8AM and one capsule at 8PM the day before surgery. 4/14/23  Yes Geovani Mandel MD   levothyroxine (SYNTHROID) 88 mcg tablet Take 88 mcg by mouth daily.   10/22/18  Yes Corey Simmons,    pantoprazole (PROTONIX) 40 mg EC tablet Take 40 mg by mouth once daily. 5/31/22  Yes Padma Mccarty MD   rosuvastatin (CRESTOR) 20 mg tablet Take 1 tablet (20 mg total) by mouth daily. 3/22/23 9/18/23 Yes Sonny Sanchez MD   fktacxpwjba-E9-Vhqdpscfj serr 1,500-400-100 mg-unit-mg tablet Take 1 tablet by mouth daily. 1/1/22   Padma Mccarty MD   silver sulfadiazine (SILVADENE) 1 % cream Apply topically daily. 3/22/23 3/21/24  Trev Jimenez MD       CBC Results       04/14/23 01/30/23 10/30/22     1047 1147 1106    WBC 7.70 8.49 9.28    RBC 3.60 3.97 4.03    HGB 10.7 12.0 11.9    HCT 33.5 36.7 36.5    MCV 93.1 92.4 90.6    MCH 29.7 30.2 29.5    MCHC 31.9 32.7 32.6     249 283          BMP Results       04/14/23 01/30/23 10/30/22     1048  1147 1106     139 138    K 4.6 4.4 3.9    Cl 110 106 107    CO2 24 21 22    Glucose 132 103 102    BUN 15 36 21    Creatinine 1.3 1.6 1.3    Calcium 9.0 9.2 9.2    Anion Gap 9 12 9    EGFR 39.6 31.2 39.7         Comment for K at 1106 on 10/30/22: Results obtained on plasma. Plasma Potassium values may be up to 0.4 mEQ/L less than serum values. The differences may be greater for patients with high platelet or white cell counts.          No results found for: HCGPREGUR, PREGSERUM, HCG, HCGQUANT          No orders to display

## 2023-04-19 ENCOUNTER — ANESTHESIA (INPATIENT)
Dept: OPERATING ROOM | Facility: HOSPITAL | Age: 78
DRG: 330 | End: 2023-04-19
Payer: MEDICARE

## 2023-04-19 ENCOUNTER — HOSPITAL ENCOUNTER (INPATIENT)
Facility: HOSPITAL | Age: 78
LOS: 9 days | Discharge: HOME HEALTH | DRG: 330 | End: 2023-04-28
Attending: COLON & RECTAL SURGERY | Admitting: COLON & RECTAL SURGERY
Payer: MEDICARE

## 2023-04-19 DIAGNOSIS — N82.4 COLOVAGINAL FISTULA: ICD-10-CM

## 2023-04-19 DIAGNOSIS — R00.0 TACHYCARDIA: Primary | ICD-10-CM

## 2023-04-19 PROCEDURE — 21400000 HC ROOM AND CARE CCU/INTERMEDIATE

## 2023-04-19 PROCEDURE — 37000001 HC ANESTHESIA GENERAL: Performed by: COLON & RECTAL SURGERY

## 2023-04-19 PROCEDURE — 88304 TISSUE EXAM BY PATHOLOGIST: CPT | Performed by: COLON & RECTAL SURGERY

## 2023-04-19 PROCEDURE — 0DJD8ZZ INSPECTION OF LOWER INTESTINAL TRACT, VIA NATURAL OR ARTIFICIAL OPENING ENDOSCOPIC: ICD-10-PCS | Performed by: COLON & RECTAL SURGERY

## 2023-04-19 PROCEDURE — 36000013 HC OR LEVEL 3 EA ADDL MIN: Performed by: COLON & RECTAL SURGERY

## 2023-04-19 PROCEDURE — 36000003 HC OR LEVEL 3 INITIAL 30MIN: Performed by: COLON & RECTAL SURGERY

## 2023-04-19 PROCEDURE — 44625 REPAIR BOWEL OPENING: CPT | Performed by: COLON & RECTAL SURGERY

## 2023-04-19 PROCEDURE — 71000011 HC PACU PHASE 1 EA ADDL MIN: Performed by: COLON & RECTAL SURGERY

## 2023-04-19 PROCEDURE — 63600000 HC DRUGS/DETAIL CODE: Performed by: STUDENT IN AN ORGANIZED HEALTH CARE EDUCATION/TRAINING PROGRAM

## 2023-04-19 PROCEDURE — 0DBB0ZZ EXCISION OF ILEUM, OPEN APPROACH: ICD-10-PCS | Performed by: COLON & RECTAL SURGERY

## 2023-04-19 PROCEDURE — 63700000 HC SELF-ADMINISTRABLE DRUG: Performed by: STUDENT IN AN ORGANIZED HEALTH CARE EDUCATION/TRAINING PROGRAM

## 2023-04-19 PROCEDURE — 63600000 HC DRUGS/DETAIL CODE

## 2023-04-19 PROCEDURE — 25800000 HC PHARMACY IV SOLUTIONS: Performed by: STUDENT IN AN ORGANIZED HEALTH CARE EDUCATION/TRAINING PROGRAM

## 2023-04-19 PROCEDURE — 25000000 HC PHARMACY GENERAL: Performed by: NURSE ANESTHETIST, CERTIFIED REGISTERED

## 2023-04-19 PROCEDURE — 27200000 HC STERILE SUPPLY: Performed by: COLON & RECTAL SURGERY

## 2023-04-19 PROCEDURE — 71000001 HC PACU PHASE 1 INITIAL 30MIN: Performed by: COLON & RECTAL SURGERY

## 2023-04-19 PROCEDURE — 63600000 HC DRUGS/DETAIL CODE: Performed by: NURSE ANESTHETIST, CERTIFIED REGISTERED

## 2023-04-19 PROCEDURE — 99024 POSTOP FOLLOW-UP VISIT: CPT | Performed by: COLON & RECTAL SURGERY

## 2023-04-19 PROCEDURE — 63600000 HC DRUGS/DETAIL CODE: Performed by: ANESTHESIOLOGY

## 2023-04-19 PROCEDURE — 25800000 HC PHARMACY IV SOLUTIONS: Performed by: NURSE ANESTHETIST, CERTIFIED REGISTERED

## 2023-04-19 RX ORDER — CELECOXIB 200 MG/1
200 CAPSULE ORAL ONCE
Status: DISCONTINUED | OUTPATIENT
Start: 2023-04-19 | End: 2023-04-19 | Stop reason: HOSPADM

## 2023-04-19 RX ORDER — IBUPROFEN 200 MG
16-32 TABLET ORAL AS NEEDED
Status: DISCONTINUED | OUTPATIENT
Start: 2023-04-19 | End: 2023-04-19 | Stop reason: HOSPADM

## 2023-04-19 RX ORDER — LIDOCAINE HYDROCHLORIDE 10 MG/ML
INJECTION, SOLUTION INFILTRATION; PERINEURAL AS NEEDED
Status: DISCONTINUED | OUTPATIENT
Start: 2023-04-19 | End: 2023-04-19 | Stop reason: SURG

## 2023-04-19 RX ORDER — DEXTROSE 40 %
15-30 GEL (GRAM) ORAL AS NEEDED
Status: DISCONTINUED | OUTPATIENT
Start: 2023-04-19 | End: 2023-04-19 | Stop reason: SDUPTHER

## 2023-04-19 RX ORDER — DEXTROSE 40 %
15-30 GEL (GRAM) ORAL AS NEEDED
Status: DISCONTINUED | OUTPATIENT
Start: 2023-04-19 | End: 2023-04-19 | Stop reason: HOSPADM

## 2023-04-19 RX ORDER — HYDROMORPHONE HYDROCHLORIDE 1 MG/ML
0.5 INJECTION, SOLUTION INTRAMUSCULAR; INTRAVENOUS; SUBCUTANEOUS
Status: DISCONTINUED | OUTPATIENT
Start: 2023-04-19 | End: 2023-04-19 | Stop reason: HOSPADM

## 2023-04-19 RX ORDER — METRONIDAZOLE 500 MG/100ML
INJECTION, SOLUTION INTRAVENOUS AS NEEDED
Status: DISCONTINUED | OUTPATIENT
Start: 2023-04-19 | End: 2023-04-19 | Stop reason: SURG

## 2023-04-19 RX ORDER — KETAMINE HYDROCHLORIDE 10 MG/ML
INJECTION, SOLUTION INTRAMUSCULAR; INTRAVENOUS AS NEEDED
Status: DISCONTINUED | OUTPATIENT
Start: 2023-04-19 | End: 2023-04-19 | Stop reason: SURG

## 2023-04-19 RX ORDER — ONDANSETRON HYDROCHLORIDE 2 MG/ML
INJECTION, SOLUTION INTRAVENOUS AS NEEDED
Status: DISCONTINUED | OUTPATIENT
Start: 2023-04-19 | End: 2023-04-19 | Stop reason: SURG

## 2023-04-19 RX ORDER — MIDAZOLAM HYDROCHLORIDE 2 MG/2ML
INJECTION, SOLUTION INTRAMUSCULAR; INTRAVENOUS AS NEEDED
Status: DISCONTINUED | OUTPATIENT
Start: 2023-04-19 | End: 2023-04-19 | Stop reason: SURG

## 2023-04-19 RX ORDER — GABAPENTIN 300 MG/1
300 CAPSULE ORAL 2 TIMES DAILY
Status: DISCONTINUED | OUTPATIENT
Start: 2023-04-19 | End: 2023-04-28 | Stop reason: HOSPADM

## 2023-04-19 RX ORDER — LIDOCAINE HYDROCHLORIDE ANHYDROUS AND DEXTROSE MONOHYDRATE .8; 5 G/100ML; G/100ML
INJECTION, SOLUTION INTRAVENOUS CONTINUOUS PRN
Status: DISCONTINUED | OUTPATIENT
Start: 2023-04-19 | End: 2023-04-19 | Stop reason: SURG

## 2023-04-19 RX ORDER — ACETAMINOPHEN 325 MG/1
975 TABLET ORAL ONCE
Status: DISCONTINUED | OUTPATIENT
Start: 2023-04-19 | End: 2023-04-19 | Stop reason: HOSPADM

## 2023-04-19 RX ORDER — PANTOPRAZOLE SODIUM 40 MG/10ML
40 INJECTION, POWDER, LYOPHILIZED, FOR SOLUTION INTRAVENOUS EVERY 24 HOURS
Status: DISCONTINUED | OUTPATIENT
Start: 2023-04-20 | End: 2023-04-28 | Stop reason: HOSPADM

## 2023-04-19 RX ORDER — DEXAMETHASONE SODIUM PHOSPHATE 4 MG/ML
INJECTION, SOLUTION INTRA-ARTICULAR; INTRALESIONAL; INTRAMUSCULAR; INTRAVENOUS; SOFT TISSUE AS NEEDED
Status: DISCONTINUED | OUTPATIENT
Start: 2023-04-19 | End: 2023-04-19 | Stop reason: SURG

## 2023-04-19 RX ORDER — PANTOPRAZOLE SODIUM 40 MG/1
40 TABLET, DELAYED RELEASE ORAL
Status: DISCONTINUED | OUTPATIENT
Start: 2023-04-20 | End: 2023-04-19

## 2023-04-19 RX ORDER — IBUPROFEN 200 MG
16-32 TABLET ORAL AS NEEDED
Status: DISCONTINUED | OUTPATIENT
Start: 2023-04-19 | End: 2023-04-19 | Stop reason: SDUPTHER

## 2023-04-19 RX ORDER — ROSUVASTATIN CALCIUM 20 MG/1
20 TABLET, COATED ORAL DAILY
Status: DISCONTINUED | OUTPATIENT
Start: 2023-04-20 | End: 2023-04-20

## 2023-04-19 RX ORDER — SODIUM CHLORIDE, SODIUM GLUCONATE, SODIUM ACETATE, POTASSIUM CHLORIDE AND MAGNESIUM CHLORIDE 30; 37; 368; 526; 502 MG/100ML; MG/100ML; MG/100ML; MG/100ML; MG/100ML
125 INJECTION, SOLUTION INTRAVENOUS CONTINUOUS
Status: DISCONTINUED | OUTPATIENT
Start: 2023-04-19 | End: 2023-04-21

## 2023-04-19 RX ORDER — ALVIMOPAN 12 MG/1
12 CAPSULE ORAL 2 TIMES DAILY
Status: DISCONTINUED | OUTPATIENT
Start: 2023-04-20 | End: 2023-04-21

## 2023-04-19 RX ORDER — SODIUM CHLORIDE, SODIUM GLUCONATE, SODIUM ACETATE, POTASSIUM CHLORIDE AND MAGNESIUM CHLORIDE 30; 37; 368; 526; 502 MG/100ML; MG/100ML; MG/100ML; MG/100ML; MG/100ML
INJECTION, SOLUTION INTRAVENOUS CONTINUOUS PRN
Status: DISCONTINUED | OUTPATIENT
Start: 2023-04-19 | End: 2023-04-19 | Stop reason: SURG

## 2023-04-19 RX ORDER — MEPERIDINE HYDROCHLORIDE 50 MG/ML
50 INJECTION INTRAMUSCULAR; INTRAVENOUS; SUBCUTANEOUS EVERY 4 HOURS PRN
Status: DISCONTINUED | OUTPATIENT
Start: 2023-04-19 | End: 2023-04-23

## 2023-04-19 RX ORDER — OXYCODONE HYDROCHLORIDE 5 MG/1
5 TABLET ORAL ONCE AS NEEDED
Status: DISCONTINUED | OUTPATIENT
Start: 2023-04-19 | End: 2023-04-19 | Stop reason: HOSPADM

## 2023-04-19 RX ORDER — ONDANSETRON HYDROCHLORIDE 2 MG/ML
4 INJECTION, SOLUTION INTRAVENOUS
Status: DISCONTINUED | OUTPATIENT
Start: 2023-04-19 | End: 2023-04-19 | Stop reason: HOSPADM

## 2023-04-19 RX ORDER — FENTANYL CITRATE 50 UG/ML
INJECTION, SOLUTION INTRAMUSCULAR; INTRAVENOUS AS NEEDED
Status: DISCONTINUED | OUTPATIENT
Start: 2023-04-19 | End: 2023-04-19 | Stop reason: SURG

## 2023-04-19 RX ORDER — PROPOFOL 10 MG/ML
INJECTION, EMULSION INTRAVENOUS AS NEEDED
Status: DISCONTINUED | OUTPATIENT
Start: 2023-04-19 | End: 2023-04-19 | Stop reason: SURG

## 2023-04-19 RX ORDER — ALVIMOPAN 12 MG/1
12 CAPSULE ORAL ONCE
Status: DISCONTINUED | OUTPATIENT
Start: 2023-04-19 | End: 2023-04-19 | Stop reason: HOSPADM

## 2023-04-19 RX ORDER — IBUPROFEN 600 MG/1
600 TABLET ORAL
Status: DISCONTINUED | OUTPATIENT
Start: 2023-04-21 | End: 2023-04-28 | Stop reason: HOSPADM

## 2023-04-19 RX ORDER — ACETAMINOPHEN 325 MG/1
650 TABLET ORAL ONCE AS NEEDED
Status: DISCONTINUED | OUTPATIENT
Start: 2023-04-19 | End: 2023-04-19 | Stop reason: HOSPADM

## 2023-04-19 RX ORDER — METRONIDAZOLE 500 MG/100ML
500 INJECTION, SOLUTION INTRAVENOUS
Status: DISCONTINUED | OUTPATIENT
Start: 2023-04-19 | End: 2023-04-19 | Stop reason: HOSPADM

## 2023-04-19 RX ORDER — HEPARIN SODIUM 5000 [USP'U]/ML
5000 INJECTION, SOLUTION INTRAVENOUS; SUBCUTANEOUS EVERY 8 HOURS
Status: DISCONTINUED | OUTPATIENT
Start: 2023-04-20 | End: 2023-04-28 | Stop reason: HOSPADM

## 2023-04-19 RX ORDER — METRONIDAZOLE 500 MG/100ML
500 INJECTION, SOLUTION INTRAVENOUS
Status: COMPLETED | OUTPATIENT
Start: 2023-04-19 | End: 2023-04-22

## 2023-04-19 RX ORDER — GABAPENTIN 300 MG/1
600 CAPSULE ORAL ONCE
Status: DISCONTINUED | OUTPATIENT
Start: 2023-04-19 | End: 2023-04-19 | Stop reason: HOSPADM

## 2023-04-19 RX ORDER — SODIUM CHLORIDE 9 MG/ML
INJECTION, SOLUTION INTRAVENOUS CONTINUOUS PRN
Status: DISCONTINUED | OUTPATIENT
Start: 2023-04-19 | End: 2023-04-19 | Stop reason: SURG

## 2023-04-19 RX ORDER — DEXTROSE 50 % IN WATER (D50W) INTRAVENOUS SYRINGE
25 AS NEEDED
Status: DISCONTINUED | OUTPATIENT
Start: 2023-04-19 | End: 2023-04-19 | Stop reason: SDUPTHER

## 2023-04-19 RX ORDER — DEXTROSE 50 % IN WATER (D50W) INTRAVENOUS SYRINGE
25 AS NEEDED
Status: DISCONTINUED | OUTPATIENT
Start: 2023-04-19 | End: 2023-04-19 | Stop reason: HOSPADM

## 2023-04-19 RX ORDER — DIPHENHYDRAMINE HCL 50 MG/ML
25 VIAL (ML) INJECTION EVERY 4 HOURS PRN
Status: DISCONTINUED | OUTPATIENT
Start: 2023-04-19 | End: 2023-04-23

## 2023-04-19 RX ORDER — ROCURONIUM BROMIDE 10 MG/ML
INJECTION, SOLUTION INTRAVENOUS AS NEEDED
Status: DISCONTINUED | OUTPATIENT
Start: 2023-04-19 | End: 2023-04-19 | Stop reason: SURG

## 2023-04-19 RX ORDER — KETOROLAC TROMETHAMINE 15 MG/ML
15 INJECTION, SOLUTION INTRAMUSCULAR; INTRAVENOUS
Status: DISPENSED | OUTPATIENT
Start: 2023-04-19 | End: 2023-04-21

## 2023-04-19 RX ORDER — DEXTROSE MONOHYDRATE, SODIUM CHLORIDE, AND POTASSIUM CHLORIDE 50; 1.49; 4.5 G/1000ML; G/1000ML; G/1000ML
INJECTION, SOLUTION INTRAVENOUS CONTINUOUS
Status: ACTIVE | OUTPATIENT
Start: 2023-04-19 | End: 2023-04-20

## 2023-04-19 RX ORDER — ACETAMINOPHEN 325 MG/1
975 TABLET ORAL
Status: DISCONTINUED | OUTPATIENT
Start: 2023-04-19 | End: 2023-04-26

## 2023-04-19 RX ORDER — LEVOTHYROXINE SODIUM 88 UG/1
88 TABLET ORAL
Status: DISCONTINUED | OUTPATIENT
Start: 2023-04-20 | End: 2023-04-28 | Stop reason: HOSPADM

## 2023-04-19 RX ORDER — FENTANYL CITRATE 50 UG/ML
25 INJECTION, SOLUTION INTRAMUSCULAR; INTRAVENOUS EVERY 5 MIN PRN
Status: COMPLETED | OUTPATIENT
Start: 2023-04-19 | End: 2023-04-19

## 2023-04-19 RX ADMIN — ONDANSETRON HYDROCHLORIDE 4 MG: 2 SOLUTION INTRAMUSCULAR; INTRAVENOUS at 16:49

## 2023-04-19 RX ADMIN — HYDROMORPHONE HYDROCHLORIDE 0.5 MG: 1 INJECTION, SOLUTION INTRAMUSCULAR; INTRAVENOUS; SUBCUTANEOUS at 18:27

## 2023-04-19 RX ADMIN — DEXAMETHASONE SODIUM PHOSPHATE 4 MG: 4 INJECTION, SOLUTION INTRAMUSCULAR; INTRAVENOUS at 15:03

## 2023-04-19 RX ADMIN — FENTANYL CITRATE 25 MCG: 50 INJECTION, SOLUTION INTRAMUSCULAR; INTRAVENOUS at 17:02

## 2023-04-19 RX ADMIN — POTASSIUM CHLORIDE, DEXTROSE MONOHYDRATE AND SODIUM CHLORIDE: 150; 5; 450 INJECTION, SOLUTION INTRAVENOUS at 17:13

## 2023-04-19 RX ADMIN — SUGAMMADEX 200 MG: 100 INJECTION, SOLUTION INTRAVENOUS at 16:38

## 2023-04-19 RX ADMIN — MIDAZOLAM HYDROCHLORIDE 2 MG: 1 INJECTION, SOLUTION INTRAMUSCULAR; INTRAVENOUS at 15:03

## 2023-04-19 RX ADMIN — CEFAZOLIN 2 G: 2 INJECTION, POWDER, FOR SOLUTION INTRAMUSCULAR; INTRAVENOUS at 15:15

## 2023-04-19 RX ADMIN — LIDOCAINE HYDROCHLORIDE 1 MG/MIN: 8 INJECTION, SOLUTION INTRAVENOUS at 15:06

## 2023-04-19 RX ADMIN — FENTANYL CITRATE 25 MCG: 50 INJECTION, SOLUTION INTRAMUSCULAR; INTRAVENOUS at 17:18

## 2023-04-19 RX ADMIN — Medication 50 MG: at 15:06

## 2023-04-19 RX ADMIN — CEFAZOLIN 1 G: 1 INJECTION, POWDER, FOR SOLUTION INTRAMUSCULAR; INTRAVENOUS at 23:17

## 2023-04-19 RX ADMIN — PROPOFOL 80 MG: 10 INJECTION, EMULSION INTRAVENOUS at 15:03

## 2023-04-19 RX ADMIN — HYDROMORPHONE HYDROCHLORIDE 0.5 MG: 1 INJECTION, SOLUTION INTRAMUSCULAR; INTRAVENOUS; SUBCUTANEOUS at 20:35

## 2023-04-19 RX ADMIN — HYDROMORPHONE HYDROCHLORIDE 0.5 MG: 1 INJECTION, SOLUTION INTRAMUSCULAR; INTRAVENOUS; SUBCUTANEOUS at 18:42

## 2023-04-19 RX ADMIN — KETOROLAC TROMETHAMINE 15 MG: 15 INJECTION, SOLUTION INTRAMUSCULAR; INTRAVENOUS at 23:14

## 2023-04-19 RX ADMIN — FENTANYL CITRATE 100 MCG: 50 INJECTION, SOLUTION INTRAMUSCULAR; INTRAVENOUS at 15:03

## 2023-04-19 RX ADMIN — FENTANYL CITRATE 25 MCG: 50 INJECTION, SOLUTION INTRAMUSCULAR; INTRAVENOUS at 17:05

## 2023-04-19 RX ADMIN — SODIUM CHLORIDE, SODIUM GLUCONATE, SODIUM ACETATE, POTASSIUM CHLORIDE AND MAGNESIUM CHLORIDE: 526; 502; 368; 37; 30 INJECTION, SOLUTION INTRAVENOUS at 15:03

## 2023-04-19 RX ADMIN — ROCURONIUM BROMIDE 50 MG: 10 INJECTION, SOLUTION INTRAVENOUS at 15:03

## 2023-04-19 RX ADMIN — FENTANYL CITRATE 25 MCG: 50 INJECTION, SOLUTION INTRAMUSCULAR; INTRAVENOUS at 17:11

## 2023-04-19 RX ADMIN — METRONIDAZOLE 500 MG: 500 INJECTION, SOLUTION INTRAVENOUS at 15:20

## 2023-04-19 RX ADMIN — SODIUM CHLORIDE: 9 INJECTION, SOLUTION INTRAVENOUS at 14:58

## 2023-04-19 RX ADMIN — LIDOCAINE HYDROCHLORIDE 5 ML: 10 INJECTION, SOLUTION INFILTRATION; PERINEURAL at 15:03

## 2023-04-19 RX ADMIN — GABAPENTIN 300 MG: 300 CAPSULE ORAL at 23:13

## 2023-04-19 RX ADMIN — DIPHENHYDRAMINE HYDROCHLORIDE 25 MG: 50 INJECTION INTRAMUSCULAR; INTRAVENOUS at 23:15

## 2023-04-19 RX ADMIN — MEPERIDINE HYDROCHLORIDE 50 MG: 50 INJECTION INTRAMUSCULAR; INTRAVENOUS; SUBCUTANEOUS at 23:25

## 2023-04-19 NOTE — H&P
HISTORY & PHYSICAL EXAM     HPI: He is here in continued follow-up.  She reports she developed a UTI on Monday and was started on Keflex per urgent care.  She denies recent nausea, vomiting, fever or chills.  She has normal output from her ostomy, though it is increased.     Medical History        Past Medical History:   Diagnosis Date   • Anemia     • Arthritis     • Colovaginal fistula     • COVID-19 vaccine series completed 02/26/2021   • Disease of thyroid gland     • Diverticulitis of colon     • GERD (gastroesophageal reflux disease)     • H/O ileostomy     • Hiatal hernia     • History of snoring     • Hypertension     • Hypothyroidism     • Lung cancer (CMS/HCC)     • Lung nodule     • Vaginal cancer (CMS/HCC) 2018     s/p XRT x 7 weeks and chemo weekly x 5 weeks           Surgical History         Past Surgical History:   Procedure Laterality Date   • ABDOMINAL SURGERY       • BOWEL RESECTION         partial with ileostomy   • CHOLECYSTECTOMY       • COLONOSCOPY       • DILATION AND CURETTAGE OF UTERUS   02/2018   • ILEOSTOMY       • LUNG SURGERY       • ROTATOR CUFF REPAIR Right     • TONSILLECTOMY       • WISDOM TOOTH EXTRACTION         hx of            Current Outpatient Medications:   •  acetaminophen (TYLENOL) 325 mg tablet, Take 2 tablets (650 mg total) by mouth every 4 (four) hours as needed for pain for up to 30 doses., Disp: 30 tablet, Rfl: 0  •  atorvastatin (LIPITOR) 40 mg tablet, TAKE 1 TABLET BY MOUTH EVERY DAY (Patient taking differently: Take 40 mg by mouth daily.), Disp: 90 tablet, Rfl: 3  •  benazepril (LOTENSIN) 10 mg tablet, Take 10 mg by mouth daily., Disp: , Rfl:   •  bumetanide (BUMEX) 1 mg tablet, Take 1 mg by mouth daily., Disp: , Rfl:   •  cephalexin (KEFLEX) 500 mg capsule, Take 500 mg by mouth 4 (four) times a day., Disp: , Rfl:   •  cholecalciferol, vitamin D3, 1,000 unit (25 mcg) tablet, Take 2,000 Units by mouth daily., Disp: , Rfl:   •  diphenoxylate-atropine (LomotiL)  2.5-0.025 mg per tablet, 1-2 tabs PO QID, do not exceed 8 per day. (Patient taking differently: Take 2 tablets by mouth 4 (four) times a day as needed. 1-2 tabs PO QID, do not exceed 8 per day.), Disp: 240 tablet, Rfl: 3  •  glucosamine-D3-Boswellia serr 1,500-400-100 mg-unit-mg tablet, Take 1 tablet by mouth daily., Disp: , Rfl:   •  levothyroxine (SYNTHROID) 88 mcg tablet, Take 88 mcg by mouth daily.  , Disp: , Rfl: 3  •  pantoprazole (PROTONIX) 40 mg EC tablet, Take 40 mg by mouth once daily., Disp: , Rfl:    Allergies   Allergen Reactions   • Adhesive Tape-Silicones         Causes bruising   • Sulfamethoxazole-Trimethoprim Nausea Only      Social History               Socioeconomic History   • Marital status:        Spouse name: Royal    • Number of children: 4   • Years of education: Not on file   • Highest education level: Not on file   Occupational History   • Occupation: retired/     Tobacco Use   • Smoking status: Former       Packs/day: 1.00       Years: 40.00       Pack years: 40.00       Types: Cigarettes       Start date:        Quit date:        Years since quittin.1   • Smokeless tobacco: Never   Vaping Use   • Vaping Use: Never used   Substance and Sexual Activity   • Alcohol use: Yes       Comment: occassional    • Drug use: No   • Sexual activity: Never       Comment:    Other Topics Concern   • Not on file   Social History Narrative     Lives with  in a 2 story home      Social Determinants of Health          Financial Resource Strain: Not on file   Food Insecurity: No Food Insecurity   • Worried About Running Out of Food in the Last Year: Never true   • Ran Out of Food in the Last Year: Never true   Transportation Needs: Not on file   Physical Activity: Not on file   Stress: Not on file   Social Connections: Not on file   Intimate Partner Violence: Not on file   Housing Stability: Not on file               Family History   Problem Relation Age of Onset    • Heart attack Biological Mother     • Hypertension Biological Mother     • Endometrial cancer Biological Father     • Lung cancer Biological Father     • Breast cancer Neg Hx     • Cancer Neg Hx     • Colon cancer Neg Hx     • Ovarian cancer Neg Hx           REVIEW OF SYSTEMS: Unchanged     PHYSICAL EXAM:  GEN: On examination the patient is resting comfortably.  NEURO/PSYCH: Alert and oriented ×3  HEENT: Eyes: Sclera anicteric  CHEST: Equal rise and fall the chest.  No tenderness bilaterally.  SKIN: Warm and moist  NODES: No groin or cervical lymphadenopathy  EXT: No palpable edema of the bilateral lower extremities.  No clubbing.  GI: Abdomen soft, nontender, nondistended.  No palpable liver or spleen edge.  No ventral hernias, mass, or tenderness.  RECTAL: Deferred     XRAYS: I personally reviewed the current X rays and the pertinent findings are: Gastrografin enema images reviewed by me demonstrate no leak.        ASSESSMENT/PLAN:      Attention to ileostomy (CMS/[facility])  Raegan is looking fantastic.  I reviewed her Gastrografin enema through the distal end of her stoma demonstrates no evidence of leak or fistula.  I also reviewed this with the radiologist who interpreted the study and they agreed completely.  I visually inspected the anastomosis in the operating room and appeared to be fully healed anteriorly where the previous disruption was.  I explained to the patient that it is reasonable at this point to have her on the schedule to reverse her stoma and I explained to her that we will plan for flexible sigmoidoscopy at the time of the operation so she will be placed in yellowfin's for the procedure just to reevaluate this to the patient deferred examination today in the office.  I also explained the risks of procedure including bleeding, infection, anastomotic leak, need for additional procedures, MI, stroke, death.  She understood and would like to proceed.  We will make arrangements for this.

## 2023-04-19 NOTE — ANESTHESIA PROCEDURE NOTES
Airway  Urgency: elective    Start Time: 4/19/2023 3:06 PM    General Information and Staff    Patient location during procedure: OR  Anesthesiologist: Madelin Horan MD  Resident/CRNA: Shirley Valente CRNA  Performed by: Erna Badillo CRNA  Authorized by: Madelin Horan MD      Indications and Patient Condition  Indications for airway management: anesthesia  Sedation level: deep  Preoxygenated: yes  Patient position: sniffing  Mask difficulty assessment: 1 - vent by mask    Final Airway Details  Final airway type: endotracheal airway      Successful airway: ETT  Cuffed: yes   Successful intubation technique: video laryngoscopy  Facilitating devices/methods: intubating stylet  Endotracheal tube insertion site: oral  Blade: Hayley  Blade size: #4  ETT size (mm): 7.0  Cormack-Lehane Classification: grade I - full view of glottis  Placement verified by: chest auscultation and capnometry   Measured from: lips  ETT to lips (cm): 22  Number of attempts at approach: 1  Number of other approaches attempted: 0  Atraumatic airway insertion

## 2023-04-19 NOTE — ANESTHESIOLOGIST PRE-PROCEDURE ATTESTATION
Pre-Procedure Patient Identification:  I am the Primary Anesthesiologist and have identified the patient on 04/19/23 at 2:37 PM.   I have confirmed the procedure(s) will be performed by the following surgeon/proceduralist Geovani Mandel MD.

## 2023-04-19 NOTE — ANESTHESIA POSTPROCEDURE EVALUATION
Patient: Raegan Young    Procedure Summary     Date: 04/19/23 Room / Location: [facility] OR 8 / [facility] OR    Anesthesia Start: 1458 Anesthesia Stop: 1703    Procedure: Stoma closure, flexible sigmoidoscopy (Abdomen) Diagnosis:       Colovaginal fistula      (Open Stoma Closure/Possible Exploratory Laparoscopy)    Surgeons: Geovani Mandel MD Responsible Provider: Madelin Horan MD    Anesthesia Type: general ASA Status: 3          Anesthesia Type: general  PACU Vitals  4/19/2023 1651 - 4/19/2023 1730      4/19/2023  1656 4/19/2023  1700          BP: 138/72 138/72      Temp: 36.3 °C (97.3 °F) --      Pulse: 88 88      Resp: 10 13      SpO2: 98 % --              Anesthesia Post Evaluation    Pain management: adequate  Patient location during evaluation: PACU  Patient participation: complete - patient participated  Level of consciousness: awake and alert  Cardiovascular status: acceptable  Airway Patency: adequate  Respiratory status: acceptable  Hydration status: acceptable  Anesthetic complications: no

## 2023-04-19 NOTE — OR SURGEON
Pre-Procedure patient identification:  I am the primary operating surgeon/proceduralist and I have identified the patient on 04/19/23 at 2:14 PM Geovani Mandel MD  Phone Number: [phone]

## 2023-04-20 LAB
ALBUMIN SERPL-MCNC: 3 G/DL (ref 3.4–5)
ANION GAP SERPL CALC-SCNC: 9 MEQ/L (ref 3–15)
BASOPHILS # BLD: 0.01 K/UL (ref 0.01–0.1)
BASOPHILS NFR BLD: 0.1 %
BUN SERPL-MCNC: 15 MG/DL (ref 8–20)
CALCIUM SERPL-MCNC: 8.5 MG/DL (ref 8.9–10.3)
CHLORIDE SERPL-SCNC: 107 MEQ/L (ref 98–109)
CO2 SERPL-SCNC: 21 MEQ/L (ref 22–32)
CREAT SERPL-MCNC: 1.3 MG/DL (ref 0.6–1.1)
CRP SERPL-MCNC: 7.18 MG/L
DIFFERENTIAL METHOD BLD: ABNORMAL
EOSINOPHIL # BLD: 0 K/UL (ref 0.04–0.36)
EOSINOPHIL NFR BLD: 0 %
ERYTHROCYTE [DISTWIDTH] IN BLOOD BY AUTOMATED COUNT: 13.3 % (ref 11.7–14.4)
GFR SERPL CREATININE-BSD FRML MDRD: 39.6 ML/MIN/1.73M*2
GLUCOSE SERPL-MCNC: 149 MG/DL (ref 70–99)
HCT VFR BLDCO AUTO: 31.2 % (ref 35–45)
HGB BLD-MCNC: 10.1 G/DL (ref 11.8–15.7)
IMM GRANULOCYTES # BLD AUTO: 0.05 K/UL (ref 0–0.08)
IMM GRANULOCYTES NFR BLD AUTO: 0.4 %
LYMPHOCYTES # BLD: 1.06 K/UL (ref 1.2–3.5)
LYMPHOCYTES NFR BLD: 9 %
MAGNESIUM SERPL-MCNC: 1.5 MG/DL (ref 1.8–2.5)
MCH RBC QN AUTO: 29.6 PG (ref 28–33.2)
MCHC RBC AUTO-ENTMCNC: 32.4 G/DL (ref 32.2–35.5)
MCV RBC AUTO: 91.5 FL (ref 83–98)
MONOCYTES # BLD: 0.76 K/UL (ref 0.28–0.8)
MONOCYTES NFR BLD: 6.4 %
NEUTROPHILS # BLD: 9.96 K/UL (ref 1.7–7)
NEUTS SEG NFR BLD: 84.1 %
NRBC BLD-RTO: 0 %
PDW BLD AUTO: 9 FL (ref 9.4–12.3)
PHOSPHATE SERPL-MCNC: 4 MG/DL (ref 2.4–4.7)
PLATELET # BLD AUTO: 262 K/UL (ref 150–369)
POTASSIUM SERPL-SCNC: 5.2 MEQ/L (ref 3.6–5.1)
PREALB SERPL-MCNC: 20.7 MG/DL (ref 18–38)
RBC # BLD AUTO: 3.41 M/UL (ref 3.93–5.22)
SODIUM SERPL-SCNC: 137 MEQ/L (ref 136–144)
WBC # BLD AUTO: 11.84 K/UL (ref 3.8–10.5)

## 2023-04-20 PROCEDURE — 63600000 HC DRUGS/DETAIL CODE: Performed by: STUDENT IN AN ORGANIZED HEALTH CARE EDUCATION/TRAINING PROGRAM

## 2023-04-20 PROCEDURE — 84134 ASSAY OF PREALBUMIN: CPT | Performed by: STUDENT IN AN ORGANIZED HEALTH CARE EDUCATION/TRAINING PROGRAM

## 2023-04-20 PROCEDURE — 80048 BASIC METABOLIC PNL TOTAL CA: CPT | Performed by: STUDENT IN AN ORGANIZED HEALTH CARE EDUCATION/TRAINING PROGRAM

## 2023-04-20 PROCEDURE — 85025 COMPLETE CBC W/AUTO DIFF WBC: CPT | Performed by: STUDENT IN AN ORGANIZED HEALTH CARE EDUCATION/TRAINING PROGRAM

## 2023-04-20 PROCEDURE — 84100 ASSAY OF PHOSPHORUS: CPT | Performed by: STUDENT IN AN ORGANIZED HEALTH CARE EDUCATION/TRAINING PROGRAM

## 2023-04-20 PROCEDURE — 25800000 HC PHARMACY IV SOLUTIONS: Performed by: STUDENT IN AN ORGANIZED HEALTH CARE EDUCATION/TRAINING PROGRAM

## 2023-04-20 PROCEDURE — 25000000 HC PHARMACY GENERAL

## 2023-04-20 PROCEDURE — 86140 C-REACTIVE PROTEIN: CPT | Performed by: STUDENT IN AN ORGANIZED HEALTH CARE EDUCATION/TRAINING PROGRAM

## 2023-04-20 PROCEDURE — 82040 ASSAY OF SERUM ALBUMIN: CPT | Performed by: STUDENT IN AN ORGANIZED HEALTH CARE EDUCATION/TRAINING PROGRAM

## 2023-04-20 PROCEDURE — 83735 ASSAY OF MAGNESIUM: CPT | Performed by: STUDENT IN AN ORGANIZED HEALTH CARE EDUCATION/TRAINING PROGRAM

## 2023-04-20 PROCEDURE — 25000000 HC PHARMACY GENERAL: Performed by: STUDENT IN AN ORGANIZED HEALTH CARE EDUCATION/TRAINING PROGRAM

## 2023-04-20 PROCEDURE — 99024 POSTOP FOLLOW-UP VISIT: CPT | Performed by: COLON & RECTAL SURGERY

## 2023-04-20 PROCEDURE — 63700000 HC SELF-ADMINISTRABLE DRUG: Performed by: STUDENT IN AN ORGANIZED HEALTH CARE EDUCATION/TRAINING PROGRAM

## 2023-04-20 PROCEDURE — 21400000 HC ROOM AND CARE CCU/INTERMEDIATE

## 2023-04-20 PROCEDURE — 36415 COLL VENOUS BLD VENIPUNCTURE: CPT | Performed by: STUDENT IN AN ORGANIZED HEALTH CARE EDUCATION/TRAINING PROGRAM

## 2023-04-20 RX ORDER — DEXTROSE MONOHYDRATE, SODIUM CHLORIDE, AND POTASSIUM CHLORIDE 50; 1.49; 4.5 G/1000ML; G/1000ML; G/1000ML
INJECTION, SOLUTION INTRAVENOUS CONTINUOUS
Status: DISCONTINUED | OUTPATIENT
Start: 2023-04-20 | End: 2023-04-21

## 2023-04-20 RX ORDER — ROSUVASTATIN CALCIUM 20 MG/1
20 TABLET, COATED ORAL NIGHTLY
Status: DISCONTINUED | OUTPATIENT
Start: 2023-04-21 | End: 2023-04-28 | Stop reason: HOSPADM

## 2023-04-20 RX ADMIN — POTASSIUM CHLORIDE, DEXTROSE MONOHYDRATE AND SODIUM CHLORIDE: 150; 5; 450 INJECTION, SOLUTION INTRAVENOUS at 03:07

## 2023-04-20 RX ADMIN — ALVIMOPAN 12 MG: 12 CAPSULE ORAL at 09:37

## 2023-04-20 RX ADMIN — HEPARIN SODIUM 5000 UNITS: 5000 INJECTION, SOLUTION INTRAVENOUS; SUBCUTANEOUS at 14:15

## 2023-04-20 RX ADMIN — POTASSIUM CHLORIDE, DEXTROSE MONOHYDRATE AND SODIUM CHLORIDE: 150; 5; 450 INJECTION, SOLUTION INTRAVENOUS at 18:51

## 2023-04-20 RX ADMIN — CEFAZOLIN 1 G: 1 INJECTION, POWDER, FOR SOLUTION INTRAMUSCULAR; INTRAVENOUS at 14:33

## 2023-04-20 RX ADMIN — KETOROLAC TROMETHAMINE 15 MG: 15 INJECTION, SOLUTION INTRAMUSCULAR; INTRAVENOUS at 03:06

## 2023-04-20 RX ADMIN — GABAPENTIN 300 MG: 300 CAPSULE ORAL at 09:38

## 2023-04-20 RX ADMIN — KETOROLAC TROMETHAMINE 15 MG: 15 INJECTION, SOLUTION INTRAMUSCULAR; INTRAVENOUS at 09:39

## 2023-04-20 RX ADMIN — LEVOTHYROXINE SODIUM 88 MCG: 88 TABLET ORAL at 07:05

## 2023-04-20 RX ADMIN — METRONIDAZOLE 500 MG: 5 INJECTION, SOLUTION INTRAVENOUS at 09:30

## 2023-04-20 RX ADMIN — GABAPENTIN 300 MG: 300 CAPSULE ORAL at 21:10

## 2023-04-20 RX ADMIN — POTASSIUM CHLORIDE, DEXTROSE MONOHYDRATE AND SODIUM CHLORIDE: 150; 5; 450 INJECTION, SOLUTION INTRAVENOUS at 12:36

## 2023-04-20 RX ADMIN — METRONIDAZOLE 500 MG: 5 INJECTION, SOLUTION INTRAVENOUS at 00:24

## 2023-04-20 RX ADMIN — CEFAZOLIN 1 G: 1 INJECTION, POWDER, FOR SOLUTION INTRAMUSCULAR; INTRAVENOUS at 22:49

## 2023-04-20 RX ADMIN — KETOROLAC TROMETHAMINE 15 MG: 15 INJECTION, SOLUTION INTRAMUSCULAR; INTRAVENOUS at 16:05

## 2023-04-20 RX ADMIN — CEFAZOLIN 1 G: 1 INJECTION, POWDER, FOR SOLUTION INTRAMUSCULAR; INTRAVENOUS at 07:05

## 2023-04-20 RX ADMIN — HEPARIN SODIUM 5000 UNITS: 5000 INJECTION, SOLUTION INTRAVENOUS; SUBCUTANEOUS at 07:05

## 2023-04-20 RX ADMIN — MAGNESIUM SULFATE HEPTAHYDRATE 4 G: 40 INJECTION, SOLUTION INTRAVENOUS at 12:36

## 2023-04-20 RX ADMIN — ALVIMOPAN 12 MG: 12 CAPSULE ORAL at 21:10

## 2023-04-20 RX ADMIN — METRONIDAZOLE 500 MG: 5 INJECTION, SOLUTION INTRAVENOUS at 16:05

## 2023-04-20 RX ADMIN — KETOROLAC TROMETHAMINE 15 MG: 15 INJECTION, SOLUTION INTRAMUSCULAR; INTRAVENOUS at 21:11

## 2023-04-20 RX ADMIN — PANTOPRAZOLE SODIUM 40 MG: 40 INJECTION, POWDER, LYOPHILIZED, FOR SOLUTION INTRAVENOUS at 00:27

## 2023-04-20 RX ADMIN — HEPARIN SODIUM 5000 UNITS: 5000 INJECTION, SOLUTION INTRAVENOUS; SUBCUTANEOUS at 22:49

## 2023-04-20 NOTE — POST-OP (NON-BILLABLE)
General Surgery Post-Operative Note    Raegan Young is a 77 y.o. female  s/p loop ileostomy reversal on 4/19    S  Patient says she is doing well. No acute distress. Denies passing gas or BM. Pain controlled. Denies chest pain, fever, chills, nausea, vomiting.      O  Vitals:  Blood pressure (!) 101/56, pulse 85, temperature 36.4 °C (97.5 °F), temperature source Oral, resp. rate 18, height 1.524 m (5'), weight 65.8 kg (145 lb), SpO2 98 %, not currently breastfeeding.    Physical Exam  Constitutional:       General: She is not in acute distress.  HENT:      Head: Normocephalic and atraumatic.   Eyes:      Extraocular Movements: Extraocular movements intact.   Cardiovascular:      Rate and Rhythm: Normal rate and regular rhythm.   Pulmonary:      Effort: Pulmonary effort is normal. No respiratory distress.   Abdominal:      General: There is distension.      Palpations: Abdomen is soft.      Tenderness: There is generalized abdominal tenderness.      Comments: mefix tape in place over previous ileostomy site with moderate strikethrough    Previous left inguinal surgical scar   Neurological:      General: No focal deficit present.      Mental Status: She is alert and oriented to person, place, and time.   Psychiatric:         Mood and Affect: Mood normal.           A/P  NPO with IVF and black coffee TID  Multi-modal pain control  IS/OOB  F/u AM labs   top dressing change POD1  daily packing changes starting POD2 4/21        Navya Billings, DO  Please page #[phone] with questions or concerns.  4/20/2023

## 2023-04-20 NOTE — PLAN OF CARE
Care Coordination Admission Assessment Note  Date: 4/20/2023   Time: 3:02 PM    Patient Name: Raegan Young  Medical Record Number: [id number]  YOB: 1945  Room/BED: 1017    General Information  Patient-Specific Goals (Include Timeframe)  Plan is to progress medically and d/c home with no needs.  PCP: Javon Valadez DO   Insurance Coverage: MEDICARE  Readmission Within the last 30 days  no previous admission in last 30 days    Advance Directives (for Healthcare)?  Does patient have advance directive?: Yes  Patient has Advance Directive: Advance Directive in EMR  Does patient have current OOH DNR form?: No  Patient does not have current OOH DNR form: Patient/Family declines further information  Does patient have current POLST?: No  Patient does not have current POLST: Patient/Family declines further information  Does patient have mental health advance directive?: No  Patient does not have Mental Health Advance Directive: Patient/Family declines further information        Information       Living Arrangements  Arrived From: home  Current Living Arrangements: home  People in Home: spouse  Able to Return to Prior Arrangements: yes    Housing Stability and Financial Resources (SDOH)  In the last 12 months, was there a time when you were not able to pay the mortgage or rent on time?: No  In the last 12 months, how many places have you lived?: 1  In the last 12 months, was there a time when you did not have a steady place to sleep or slept in a shelter (including now)?: No  How hard is it for you to pay for the very basics like food, housing, medical care, and heating?: Not hard at all    Current Outpatient/Agency/Support Group       Type of Current Home Care Services       Assistive Device/Animal Currently Used at Home  none    Functional Status Comments  Independent at baseline.    IADL Comments  No assistance with ADL    Employment/ Status  Employment Status:  retired  Employment/ Comments: unknown    Concerns to be Addressed  discharge planning, care coordination/care conferences    Current Discharge Risk       Anticipated Changes Related to Illness  none    Transportation Concerns (SDOH)  Transportation Concerns: car, none  Transportation Anticipated: family or friend will provide  In the past 12 months, has lack of transportation kept you from medical appointments or from getting medications?: No  In the past 12 months, has lack of transportation kept you from meetings, work, or from getting things needed for daily living?: No    Concerns Comments  loop ileostomy reversal on 4/19    Anticipated Clinical Needs       Anticipated Discharge Plan   Patient/Family Anticipates Transition to: home with family  Patient/Family Anticipated Services at Transition: none  Met with patient. Provided education and contact information for Care Coordination services.: yes  Screening complete: yes     Pt is known to [facility].

## 2023-04-20 NOTE — PROGRESS NOTES
[facility] Daily Progress Note  Pager: [phone]    Subjective  No issues overnight.  Pain well controlled.  No flatus or BM yet but minimally distended and denies nausea.    Objective     Vital signs in last 24 hours:  Temp:  [36.3 °C (97.3 °F)-36.7 °C (98 °F)] 36.7 °C (98 °F)  Heart Rate:  [80-96] 80  Resp:  [8-25] 18  BP: (101-140)/(56-72) 117/60      Intake/Output Summary (Last 24 hours) at 4/20/2023 0818  Last data filed at 4/20/2023 0705  Gross per 24 hour   Intake 1520.83 ml   Output 335 ml   Net 1185.83 ml     Intake/Output this shift:  I/O this shift:  In: 100 [IV Piggyback:100]  Out: -     Physical Exam    General appearance: alert, appears stated age and cooperative  Lungs: no increased WOB  Heart: regular rate and rhythm  Abdomen: soft, non tender, non distended, ostomy site top dressing with betadine streakthrough  Extremities: extremities normal, warm and well-perfused; no cyanosis, clubbing, or edema  Neurologic: Grossly normal      Assessment/Plan     76 y/o F s/p loop ileostomy reversal on 4/19    - NPO until bowel function  - [id number]  - ERAS pain meds  - Abx x 5d or until discharge  - home meds  - OOB, ambulating today  - [facility]    Alexandria Dalton MD

## 2023-04-21 LAB
ANION GAP SERPL CALC-SCNC: 10 MEQ/L (ref 3–15)
BASOPHILS # BLD: 0.03 K/UL (ref 0.01–0.1)
BASOPHILS NFR BLD: 0.3 %
BUN SERPL-MCNC: 13 MG/DL (ref 8–20)
CALCIUM SERPL-MCNC: 8.6 MG/DL (ref 8.9–10.3)
CASE RPRT: NORMAL
CHLORIDE SERPL-SCNC: 110 MEQ/L (ref 98–109)
CLINICAL INFO: NORMAL
CO2 SERPL-SCNC: 20 MEQ/L (ref 22–32)
CREAT SERPL-MCNC: 1.2 MG/DL (ref 0.6–1.1)
CRP SERPL-MCNC: 14.24 MG/L
DIFFERENTIAL METHOD BLD: ABNORMAL
EOSINOPHIL # BLD: 0.4 K/UL (ref 0.04–0.36)
EOSINOPHIL NFR BLD: 3.6 %
ERYTHROCYTE [DISTWIDTH] IN BLOOD BY AUTOMATED COUNT: 14 % (ref 11.7–14.4)
GFR SERPL CREATININE-BSD FRML MDRD: 43.4 ML/MIN/1.73M*2
GLUCOSE SERPL-MCNC: 84 MG/DL (ref 70–99)
HCT VFR BLDCO AUTO: 33.6 % (ref 35–45)
HGB BLD-MCNC: 10.7 G/DL (ref 11.8–15.7)
IMM GRANULOCYTES # BLD AUTO: 0.04 K/UL (ref 0–0.08)
IMM GRANULOCYTES NFR BLD AUTO: 0.4 %
LYMPHOCYTES # BLD: 1.36 K/UL (ref 1.2–3.5)
LYMPHOCYTES NFR BLD: 12.2 %
MAGNESIUM SERPL-MCNC: 2.6 MG/DL (ref 1.8–2.5)
MCH RBC QN AUTO: 29.6 PG (ref 28–33.2)
MCHC RBC AUTO-ENTMCNC: 31.8 G/DL (ref 32.2–35.5)
MCV RBC AUTO: 92.8 FL (ref 83–98)
MONOCYTES # BLD: 1 K/UL (ref 0.28–0.8)
MONOCYTES NFR BLD: 9 %
NEUTROPHILS # BLD: 8.34 K/UL (ref 1.7–7)
NEUTS SEG NFR BLD: 74.5 %
NRBC BLD-RTO: 0 %
PATH REPORT.FINAL DX SPEC: NORMAL
PATH REPORT.GROSS SPEC: NORMAL
PDW BLD AUTO: 9.2 FL (ref 9.4–12.3)
PHOSPHATE SERPL-MCNC: 2.5 MG/DL (ref 2.4–4.7)
PLATELET # BLD AUTO: 256 K/UL (ref 150–369)
POTASSIUM SERPL-SCNC: 4.7 MEQ/L (ref 3.6–5.1)
RBC # BLD AUTO: 3.62 M/UL (ref 3.93–5.22)
SODIUM SERPL-SCNC: 140 MEQ/L (ref 136–144)
WBC # BLD AUTO: 11.17 K/UL (ref 3.8–10.5)

## 2023-04-21 PROCEDURE — 36415 COLL VENOUS BLD VENIPUNCTURE: CPT | Performed by: STUDENT IN AN ORGANIZED HEALTH CARE EDUCATION/TRAINING PROGRAM

## 2023-04-21 PROCEDURE — 86140 C-REACTIVE PROTEIN: CPT | Performed by: STUDENT IN AN ORGANIZED HEALTH CARE EDUCATION/TRAINING PROGRAM

## 2023-04-21 PROCEDURE — 25000000 HC PHARMACY GENERAL

## 2023-04-21 PROCEDURE — 84100 ASSAY OF PHOSPHORUS: CPT | Performed by: STUDENT IN AN ORGANIZED HEALTH CARE EDUCATION/TRAINING PROGRAM

## 2023-04-21 PROCEDURE — 63700000 HC SELF-ADMINISTRABLE DRUG: Performed by: STUDENT IN AN ORGANIZED HEALTH CARE EDUCATION/TRAINING PROGRAM

## 2023-04-21 PROCEDURE — 85025 COMPLETE CBC W/AUTO DIFF WBC: CPT | Performed by: STUDENT IN AN ORGANIZED HEALTH CARE EDUCATION/TRAINING PROGRAM

## 2023-04-21 PROCEDURE — 21400000 HC ROOM AND CARE CCU/INTERMEDIATE

## 2023-04-21 PROCEDURE — 25000000 HC PHARMACY GENERAL: Performed by: STUDENT IN AN ORGANIZED HEALTH CARE EDUCATION/TRAINING PROGRAM

## 2023-04-21 PROCEDURE — 25800000 HC PHARMACY IV SOLUTIONS: Performed by: STUDENT IN AN ORGANIZED HEALTH CARE EDUCATION/TRAINING PROGRAM

## 2023-04-21 PROCEDURE — 63600000 HC DRUGS/DETAIL CODE: Performed by: STUDENT IN AN ORGANIZED HEALTH CARE EDUCATION/TRAINING PROGRAM

## 2023-04-21 PROCEDURE — 80048 BASIC METABOLIC PNL TOTAL CA: CPT | Performed by: STUDENT IN AN ORGANIZED HEALTH CARE EDUCATION/TRAINING PROGRAM

## 2023-04-21 PROCEDURE — 83735 ASSAY OF MAGNESIUM: CPT | Performed by: STUDENT IN AN ORGANIZED HEALTH CARE EDUCATION/TRAINING PROGRAM

## 2023-04-21 RX ORDER — DEXTROSE MONOHYDRATE, SODIUM CHLORIDE, AND POTASSIUM CHLORIDE 50; 1.49; 4.5 G/1000ML; G/1000ML; G/1000ML
INJECTION, SOLUTION INTRAVENOUS CONTINUOUS
Status: ACTIVE | OUTPATIENT
Start: 2023-04-21 | End: 2023-04-26

## 2023-04-21 RX ORDER — DEXTROSE MONOHYDRATE, SODIUM CHLORIDE, AND POTASSIUM CHLORIDE 50; 1.49; 4.5 G/1000ML; G/1000ML; G/1000ML
INJECTION, SOLUTION INTRAVENOUS CONTINUOUS
Status: DISCONTINUED | OUTPATIENT
Start: 2023-04-21 | End: 2023-04-21

## 2023-04-21 RX ADMIN — ROSUVASTATIN CALCIUM 20 MG: 20 TABLET, FILM COATED ORAL at 23:05

## 2023-04-21 RX ADMIN — METRONIDAZOLE 500 MG: 5 INJECTION, SOLUTION INTRAVENOUS at 08:28

## 2023-04-21 RX ADMIN — HEPARIN SODIUM 5000 UNITS: 5000 INJECTION, SOLUTION INTRAVENOUS; SUBCUTANEOUS at 23:04

## 2023-04-21 RX ADMIN — METRONIDAZOLE 500 MG: 5 INJECTION, SOLUTION INTRAVENOUS at 16:18

## 2023-04-21 RX ADMIN — LEVOTHYROXINE SODIUM 88 MCG: 88 TABLET ORAL at 05:34

## 2023-04-21 RX ADMIN — GABAPENTIN 300 MG: 300 CAPSULE ORAL at 08:28

## 2023-04-21 RX ADMIN — POTASSIUM CHLORIDE, DEXTROSE MONOHYDRATE AND SODIUM CHLORIDE: 150; 5; 450 INJECTION, SOLUTION INTRAVENOUS at 08:32

## 2023-04-21 RX ADMIN — KETOROLAC TROMETHAMINE 15 MG: 15 INJECTION, SOLUTION INTRAMUSCULAR; INTRAVENOUS at 10:30

## 2023-04-21 RX ADMIN — SODIUM PHOSPHATE, MONOBASIC, MONOHYDRATE AND SODIUM PHOSPHATE, DIBASIC, ANHYDROUS 15 MMOL: 276; 142 INJECTION, SOLUTION INTRAVENOUS at 21:14

## 2023-04-21 RX ADMIN — GABAPENTIN 300 MG: 300 CAPSULE ORAL at 21:12

## 2023-04-21 RX ADMIN — POTASSIUM CHLORIDE, DEXTROSE MONOHYDRATE AND SODIUM CHLORIDE: 150; 5; 450 INJECTION, SOLUTION INTRAVENOUS at 05:34

## 2023-04-21 RX ADMIN — IBUPROFEN 600 MG: 600 TABLET ORAL at 21:13

## 2023-04-21 RX ADMIN — CEFAZOLIN 1 G: 1 INJECTION, POWDER, FOR SOLUTION INTRAMUSCULAR; INTRAVENOUS at 14:22

## 2023-04-21 RX ADMIN — PANTOPRAZOLE SODIUM 40 MG: 40 INJECTION, POWDER, LYOPHILIZED, FOR SOLUTION INTRAVENOUS at 08:28

## 2023-04-21 RX ADMIN — HEPARIN SODIUM 5000 UNITS: 5000 INJECTION, SOLUTION INTRAVENOUS; SUBCUTANEOUS at 14:00

## 2023-04-21 RX ADMIN — Medication: at 21:13

## 2023-04-21 RX ADMIN — CEFAZOLIN 1 G: 1 INJECTION, POWDER, FOR SOLUTION INTRAMUSCULAR; INTRAVENOUS at 23:05

## 2023-04-21 RX ADMIN — METRONIDAZOLE 500 MG: 5 INJECTION, SOLUTION INTRAVENOUS at 00:33

## 2023-04-21 RX ADMIN — HEPARIN SODIUM 5000 UNITS: 5000 INJECTION, SOLUTION INTRAVENOUS; SUBCUTANEOUS at 05:34

## 2023-04-21 RX ADMIN — KETOROLAC TROMETHAMINE 15 MG: 15 INJECTION, SOLUTION INTRAMUSCULAR; INTRAVENOUS at 15:30

## 2023-04-21 RX ADMIN — CEFAZOLIN 1 G: 1 INJECTION, POWDER, FOR SOLUTION INTRAMUSCULAR; INTRAVENOUS at 08:26

## 2023-04-21 RX ADMIN — POTASSIUM CHLORIDE, DEXTROSE MONOHYDRATE AND SODIUM CHLORIDE: 150; 5; 450 INJECTION, SOLUTION INTRAVENOUS at 21:26

## 2023-04-21 RX ADMIN — KETOROLAC TROMETHAMINE 15 MG: 15 INJECTION, SOLUTION INTRAMUSCULAR; INTRAVENOUS at 03:04

## 2023-04-21 NOTE — PROGRESS NOTES
[facility] referral received from CC. Patient known to [facility] in the past. Will follow and complete referral for CEE 4/23 as per CC.    1420: Chart reviewed. Spoke with patient who agreed to home nursing when ready for discharge.  Please ensure that the patient is sent home with wound care supplies and wound care instructions.  Home care may not be able to provide a start of care admission until 48-72 hours post discharge. Thank you.

## 2023-04-21 NOTE — PROGRESS NOTES
[facility] ([facility]) Pager: [phone] Progress Note    Subjective   76 y/o F s/p loop ileostomy reversal on 4/19    Interval History: No acute events. Hemodynamically stable, afebrile.  Multiple bowel movements overnight.  No nausea or vomiting this morning.  Abdominal pain is controlled.  Top dressing was changed yesterday, will change packing today.    Objective     Vitals:    04/21/23 0500   BP: (!) 112/56   Pulse: 92   Resp: 18   Temp: 36.3 °C (97.3 °F)   SpO2: 98%         Intake/Output Summary (Last 24 hours) at 4/21/2023 0940  Last data filed at 4/21/2023 0828  Gross per 24 hour   Intake 4541.67 ml   Output 1200 ml   Net 3341.67 ml       I/O this shift:  In: 100 [IV Piggyback:100]  Out: 500 [Urine:500]    General: awake and alert  HEENT: EOM intact, conjunctiva clear, sclera nonicteric  CV: Regular rate and rhythm  Pulm: No increased work of breathing  Abd: soft, nontender, nondistended, packing in place at former ostomy site  Extr: no obvious abnormality  Neuro: Grossly normal    Labs:  Results from last 7 days   Lab Units 04/21/23  0753 04/20/23  0724 04/14/23  1047   WBC K/uL 11.17* 11.84* 7.70   HEMOGLOBIN g/dL 10.7* 10.1* 10.7*   HEMATOCRIT % 33.6* 31.2* 33.5*   PLATELETS K/uL 256 262 326     Results from last 7 days   Lab Units 04/21/23  0753 04/20/23  0724 04/14/23  1047   SODIUM mEQ/L 140 137 143   POTASSIUM mEQ/L 4.7 5.2* 4.6   CHLORIDE mEQ/L 110* 107 110*   CO2 mEQ/L 20* 21* 24   BUN mg/dL 13 15 15   CREATININE mg/dL 1.2* 1.3* 1.3*   CALCIUM mg/dL 8.6* 8.5* 9.0   ALBUMIN g/dL  --  3.0* 3.4   BILIRUBIN TOTAL mg/dL  --   --  0.5   ALK PHOS IU/L  --   --  67   ALT IU/L  --   --  33   AST IU/L  --   --  35   GLUCOSE mg/dL 84 149* 132*     Imaging:  I have reviewed the Imaging from the last 24 hrs.  No results found.    Assessment:  76 y/o F s/p loop ileostomy reversal on 4/19    Plan:  -Clear liquid diet  -IV fluids at 80 cc/HR  -DC Entereg as patient is having bowel  movements  -Multimodal pain regimen  -Periop antibiotics  -Home meds  -Activity as tolerated    Final recommendations pending attending attestation.    Dung Arias MD  General Surgery Resident PGY-1  [facility]  [facility]  Please page [address] [phone] with any questions or concerns.

## 2023-04-21 NOTE — PLAN OF CARE
Problem: Adult Inpatient Plan of Care  Goal: Plan of Care Review  Outcome: Progressing  Flowsheets (Taken 4/21/2023 0711)  Progress: improving  Plan of Care Reviewed With: patient  Outcome Summary: AAOx4. VSS. Pt is on room air. IVF rate has been decreased to 80ml/hr. Pt is indepedent able to ambulate OOB to bathroom. Had a few loose BM's overnight. RLQ incision packed. Pt is resting in the bed, call light w/in reach, pt had no further questions or concerns that needed to be address at this time.     Problem: Adult Inpatient Plan of Care  Goal: Optimal Comfort and Wellbeing  Outcome: Progressing

## 2023-04-21 NOTE — PLAN OF CARE
Care Coordination Discharge Plan Note   Date: 4/21/2023    Time: 12:23 PM    Patient Name: Raegan Young  Medical Record Number: [id number]  YOB: 1945  Room/BED: 1017    Discharge Assessment  Concerns to be Addressed: discharge planning, care coordination/care conferences  Anticipated Changes Related to Illness: none    Concerns Comments: loop ileostomy reversal on 4/19    Anticipated Discharge Plan  Anticipated Discharge Disposition: family member's home with services, home with home health  Type of Home Care Services: nursing  Patient/Family Anticipates Transition to: home with family, home with help/services  Patient/Family Anticipated Services at Transition: home health care      Patient Choice  Offered/Gave Vendor List: yes  Patient's Choice of Community Agency(s): [facility]    Patient and/or patient guardian/advocate was made aware of their right to choose a provider. A list of eligible providers was presented and reviewed with the patient and/or patient guardian/advocate in written and/or verbal form. The list delineates providers in the patient’s desired geographic area who are participating in the Medicare program and/or providers contracted with the patient’s primary insurance. The Medicare list and quality ratings were obtained from the Medicare.gov [medicare.gov] website.    Discharge Transportation   Does the patient need discharge transport arranged?: No        Discharge Barriers   Barriers to Discharge: Medical issues not resolved  Comment: Per update from DCP/surgical rounds ECE 4/23 pending progress with anticipated dc to home with home heatl services.  I met [facility] patient at the bedside to discuss pending dcp.  Patient will be staying with her dtr in [place] at the time of discharge.  Patient is agreeable to home care f/u and was referred to [facility] liaison per choice.  Care coordination to remain available until dc is complete.  Participants: ,  advanced practice provider, social work/services, nursing

## 2023-04-22 LAB
ANION GAP SERPL CALC-SCNC: 8 MEQ/L (ref 3–15)
BASOPHILS # BLD: 0.03 K/UL (ref 0.01–0.1)
BASOPHILS NFR BLD: 0.3 %
BUN SERPL-MCNC: 11 MG/DL (ref 8–20)
CALCIUM SERPL-MCNC: 8.4 MG/DL (ref 8.9–10.3)
CHLORIDE SERPL-SCNC: 115 MEQ/L (ref 98–109)
CO2 SERPL-SCNC: 19 MEQ/L (ref 22–32)
CREAT SERPL-MCNC: 1.1 MG/DL (ref 0.6–1.1)
CRP SERPL-MCNC: 47.08 MG/L
DIFFERENTIAL METHOD BLD: ABNORMAL
EOSINOPHIL # BLD: 0.57 K/UL (ref 0.04–0.36)
EOSINOPHIL NFR BLD: 6.6 %
ERYTHROCYTE [DISTWIDTH] IN BLOOD BY AUTOMATED COUNT: 14.3 % (ref 11.7–14.4)
GFR SERPL CREATININE-BSD FRML MDRD: 48 ML/MIN/1.73M*2
GLUCOSE SERPL-MCNC: 77 MG/DL (ref 70–99)
HCT VFR BLDCO AUTO: 30.3 % (ref 35–45)
HGB BLD-MCNC: 9.6 G/DL (ref 11.8–15.7)
IMM GRANULOCYTES # BLD AUTO: 0.03 K/UL (ref 0–0.08)
IMM GRANULOCYTES NFR BLD AUTO: 0.3 %
LYMPHOCYTES # BLD: 1.59 K/UL (ref 1.2–3.5)
LYMPHOCYTES NFR BLD: 18.5 %
MAGNESIUM SERPL-MCNC: 2.2 MG/DL (ref 1.8–2.5)
MCH RBC QN AUTO: 30.2 PG (ref 28–33.2)
MCHC RBC AUTO-ENTMCNC: 31.7 G/DL (ref 32.2–35.5)
MCV RBC AUTO: 95.3 FL (ref 83–98)
MONOCYTES # BLD: 0.87 K/UL (ref 0.28–0.8)
MONOCYTES NFR BLD: 10.1 %
NEUTROPHILS # BLD: 5.51 K/UL (ref 1.7–7)
NEUTS SEG NFR BLD: 64.2 %
NRBC BLD-RTO: 0 %
PDW BLD AUTO: 9.5 FL (ref 9.4–12.3)
PHOSPHATE SERPL-MCNC: 3.5 MG/DL (ref 2.4–4.7)
PLATELET # BLD AUTO: 232 K/UL (ref 150–369)
POTASSIUM SERPL-SCNC: 4.4 MEQ/L (ref 3.6–5.1)
RBC # BLD AUTO: 3.18 M/UL (ref 3.93–5.22)
SODIUM SERPL-SCNC: 142 MEQ/L (ref 136–144)
WBC # BLD AUTO: 8.6 K/UL (ref 3.8–10.5)

## 2023-04-22 PROCEDURE — 84100 ASSAY OF PHOSPHORUS: CPT | Performed by: STUDENT IN AN ORGANIZED HEALTH CARE EDUCATION/TRAINING PROGRAM

## 2023-04-22 PROCEDURE — 63600000 HC DRUGS/DETAIL CODE: Performed by: STUDENT IN AN ORGANIZED HEALTH CARE EDUCATION/TRAINING PROGRAM

## 2023-04-22 PROCEDURE — 85025 COMPLETE CBC W/AUTO DIFF WBC: CPT | Performed by: STUDENT IN AN ORGANIZED HEALTH CARE EDUCATION/TRAINING PROGRAM

## 2023-04-22 PROCEDURE — 83735 ASSAY OF MAGNESIUM: CPT | Performed by: STUDENT IN AN ORGANIZED HEALTH CARE EDUCATION/TRAINING PROGRAM

## 2023-04-22 PROCEDURE — 99024 POSTOP FOLLOW-UP VISIT: CPT | Performed by: COLON & RECTAL SURGERY

## 2023-04-22 PROCEDURE — 25800000 HC PHARMACY IV SOLUTIONS: Performed by: STUDENT IN AN ORGANIZED HEALTH CARE EDUCATION/TRAINING PROGRAM

## 2023-04-22 PROCEDURE — 63700000 HC SELF-ADMINISTRABLE DRUG: Performed by: STUDENT IN AN ORGANIZED HEALTH CARE EDUCATION/TRAINING PROGRAM

## 2023-04-22 PROCEDURE — 36415 COLL VENOUS BLD VENIPUNCTURE: CPT | Performed by: STUDENT IN AN ORGANIZED HEALTH CARE EDUCATION/TRAINING PROGRAM

## 2023-04-22 PROCEDURE — 25000000 HC PHARMACY GENERAL

## 2023-04-22 PROCEDURE — 25000000 HC PHARMACY GENERAL: Performed by: STUDENT IN AN ORGANIZED HEALTH CARE EDUCATION/TRAINING PROGRAM

## 2023-04-22 PROCEDURE — 86140 C-REACTIVE PROTEIN: CPT | Performed by: STUDENT IN AN ORGANIZED HEALTH CARE EDUCATION/TRAINING PROGRAM

## 2023-04-22 PROCEDURE — 21400000 HC ROOM AND CARE CCU/INTERMEDIATE

## 2023-04-22 PROCEDURE — 80048 BASIC METABOLIC PNL TOTAL CA: CPT | Performed by: STUDENT IN AN ORGANIZED HEALTH CARE EDUCATION/TRAINING PROGRAM

## 2023-04-22 RX ORDER — FUROSEMIDE 10 MG/ML
20 INJECTION INTRAMUSCULAR; INTRAVENOUS ONCE
Status: COMPLETED | OUTPATIENT
Start: 2023-04-22 | End: 2023-04-22

## 2023-04-22 RX ADMIN — HEPARIN SODIUM 5000 UNITS: 5000 INJECTION, SOLUTION INTRAVENOUS; SUBCUTANEOUS at 14:00

## 2023-04-22 RX ADMIN — FUROSEMIDE 20 MG: 10 INJECTION, SOLUTION INTRAMUSCULAR; INTRAVENOUS at 15:00

## 2023-04-22 RX ADMIN — ROSUVASTATIN CALCIUM 20 MG: 20 TABLET, FILM COATED ORAL at 22:10

## 2023-04-22 RX ADMIN — PANTOPRAZOLE SODIUM 40 MG: 40 INJECTION, POWDER, LYOPHILIZED, FOR SOLUTION INTRAVENOUS at 08:11

## 2023-04-22 RX ADMIN — METRONIDAZOLE 500 MG: 5 INJECTION, SOLUTION INTRAVENOUS at 01:00

## 2023-04-22 RX ADMIN — CEFAZOLIN 1 G: 1 INJECTION, POWDER, FOR SOLUTION INTRAMUSCULAR; INTRAVENOUS at 18:00

## 2023-04-22 RX ADMIN — DIPHENHYDRAMINE HYDROCHLORIDE 25 MG: 50 INJECTION INTRAMUSCULAR; INTRAVENOUS at 07:14

## 2023-04-22 RX ADMIN — GABAPENTIN 300 MG: 300 CAPSULE ORAL at 19:52

## 2023-04-22 RX ADMIN — GABAPENTIN 300 MG: 300 CAPSULE ORAL at 08:10

## 2023-04-22 RX ADMIN — CEFAZOLIN 1 G: 1 INJECTION, POWDER, FOR SOLUTION INTRAMUSCULAR; INTRAVENOUS at 06:59

## 2023-04-22 RX ADMIN — IBUPROFEN 600 MG: 600 TABLET ORAL at 15:00

## 2023-04-22 RX ADMIN — HEPARIN SODIUM 5000 UNITS: 5000 INJECTION, SOLUTION INTRAVENOUS; SUBCUTANEOUS at 06:59

## 2023-04-22 RX ADMIN — IBUPROFEN 600 MG: 600 TABLET ORAL at 11:00

## 2023-04-22 RX ADMIN — METRONIDAZOLE 500 MG: 5 INJECTION, SOLUTION INTRAVENOUS at 08:09

## 2023-04-22 RX ADMIN — HEPARIN SODIUM 5000 UNITS: 5000 INJECTION, SOLUTION INTRAVENOUS; SUBCUTANEOUS at 22:09

## 2023-04-22 RX ADMIN — LEVOTHYROXINE SODIUM 88 MCG: 88 TABLET ORAL at 06:59

## 2023-04-22 RX ADMIN — MEPERIDINE HYDROCHLORIDE 50 MG: 50 INJECTION INTRAMUSCULAR; INTRAVENOUS; SUBCUTANEOUS at 07:13

## 2023-04-22 RX ADMIN — METRONIDAZOLE 500 MG: 5 INJECTION, SOLUTION INTRAVENOUS at 16:49

## 2023-04-22 NOTE — PROGRESS NOTES
Fr. Yu provided sacrament of Anointing of the Sick to [place] patient. - Charted by Rev. Curt Chacko, Spiritual Care Coordinator, [phone] [phone]

## 2023-04-22 NOTE — PLAN OF CARE
Care Coordination Discharge Plan Summary   Date: 4/22/2023   Time: 12:04 PM    Patient Name: Raegan Young  Medical Record Number: [id number]  YOB: 1945  Room/BED: 1017    General Information  Patient-Specific Goals (Include Timeframe)  Plan is to progress medically and d/c home with no needs.  PCP: Javon Valadez DO   Insurance Coverage: MEDICARE  Readmission Within the last 30 days  no previous admission in last 30 days    Advance Directives (for Healthcare)?  Does patient have advance directive?: Yes  Patient has Advance Directive: Advance Directive in EMR  Does patient have current OOH DNR form?: No  Patient does not have current OOH DNR form: Patient/Family declines further information  Does patient have current POLST?: No  Patient does not have current POLST: Patient/Family declines further information  Does patient have mental health advance directive?: No  Patient does not have Mental Health Advance Directive: Patient/Family declines further information        Information       Living Arrangements  Arrived From: home  Current Living Arrangements: home  People in Home: spouse  Able to Return to Prior Arrangements: yes    Housing Stability and Financial Resources (SDOH)  In the last 12 months, was there a time when you were not able to pay the mortgage or rent on time?: No  In the last 12 months, how many places have you lived?: 1  In the last 12 months, was there a time when you did not have a steady place to sleep or slept in a shelter (including now)?: No  How hard is it for you to pay for the very basics like food, housing, medical care, and heating?: Not hard at all    Current Outpatient/Agency/Support Group       Type of Current Home Care Services       Assistive Device/Animal Currently Used at Home  none    Functional Status Comments  Independent at baseline.    IADL Comments  No assistance with ADL    Employment/ Status  Employment Status:  retired  Employment/ Comments: unknown    Concerns to be Addressed  discharge planning, care coordination/care conferences    Current Discharge Risk       Anticipated Changes Related to Illness  none    Transportation Concerns (SDOH)  Transportation Concerns: car, none  Transportation Anticipated: family or friend will provide  In the past 12 months, has lack of transportation kept you from medical appointments or from getting medications?: No  In the past 12 months, has lack of transportation kept you from meetings, work, or from getting things needed for daily living?: No    Concerns Comments  loop ileostomy reversal on 4/19    Anticipated Clinical Needs       Anticipated Discharge Plan   Anticipated Discharge Disposition: home with home health  Type of Home Care Services: nursing  Patient/Family Anticipates Transition to: home with help/services  Patient/Family Anticipated Services at Transition: home health care  Met with patient. Provided education and contact information for Care Coordination services.: yes  Screening complete: yes    Discharge Assessment  Concerns to be Addressed: discharge planning, care coordination/care conferences  Anticipated Changes Related to Illness: none    Concerns Comments: loop ileostomy reversal on 4/19    Patient Choice  Offered/Gave Vendor List: yes  Patient's Choice of Community Agency(s): [facility]    Patient and/or patient guardian/advocate was made aware of their right to choose a provider. A list of eligible providers was presented and reviewed with the patient and/or patient guardian/advocate in written and/or verbal form. The list delineates providers in the patient’s desired geographic area who are participating in the Medicare program and/or providers contracted with the patient’s primary insurance. The Medicare list and quality ratings were obtained from the Medicare.gov [medicare.gov] website.    Discharge Transportation   Does the patient  need discharge transport arranged?: No        Discharge Barriers   Barriers to Discharge: None  Comment: CM informed [facility] that patient to discharge today.  Participants: , advanced practice provider, social work/services, nursing

## 2023-04-22 NOTE — PROGRESS NOTES
[facility] ([facility]) Pager: [phone] Progress Note    Subjective   78 y/o F s/p loop ileostomy reversal on 4/19    Interval History: No acute events. Hemodynamically stable, afebrile.  Multiple bowel movements overnight.  No nausea or vomiting this morning.  Abdominal pain is controlled.  Will change packing today.    Objective     Vitals:    04/22/23 0300   BP: (!) 94/53   Resp: 18   Temp: 37.4 °C (99.3 °F)   SpO2: 96%         Intake/Output Summary (Last 24 hours) at 4/22/2023 0549  Last data filed at 4/22/2023 0100  Gross per 24 hour   Intake 1289.33 ml   Output 850 ml   Net 439.33 ml       I/O this shift:  In: 849.3 [I.V.:399.3; IV Piggyback:450]  Out: -     General: awake and alert  HEENT: EOM intact, conjunctiva clear, sclera nonicteric  CV: Regular rate and rhythm  Pulm: No increased work of breathing  Abd: soft, nontender, nondistended, packing in place at former ostomy site  Extr: no obvious abnormality  Neuro: Grossly normal    Labs:  Results from last 7 days   Lab Units 04/21/23  0753 04/20/23  0724   WBC K/uL 11.17* 11.84*   HEMOGLOBIN g/dL 10.7* 10.1*   HEMATOCRIT % 33.6* 31.2*   PLATELETS K/uL 256 262     Results from last 7 days   Lab Units 04/21/23  0753 04/20/23  0724   SODIUM mEQ/L 140 137   POTASSIUM mEQ/L 4.7 5.2*   CHLORIDE mEQ/L 110* 107   CO2 mEQ/L 20* 21*   BUN mg/dL 13 15   CREATININE mg/dL 1.2* 1.3*   CALCIUM mg/dL 8.6* 8.5*   ALBUMIN g/dL  --  3.0*   GLUCOSE mg/dL 84 149*     Imaging:  I have reviewed the Imaging from the last 24 hrs.  No results found.    Assessment:  78 y/o F s/p loop ileostomy reversal on 4/19    Plan:  -LR diet  -IV fluids at 80 cc/HR  -Multimodal pain regimen  -Periop antibiotics  -Home meds  -Activity as tolerated    Final recommendations pending attending attestation.    Ashley Ruzzi, DO  General Surgery Resident PGY-1  [facility]  [facility]  Please page [facility] [phone] with any questions or concerns.

## 2023-04-23 LAB
ANION GAP SERPL CALC-SCNC: 9 MEQ/L (ref 3–15)
BASOPHILS # BLD: 0.03 K/UL (ref 0.01–0.1)
BASOPHILS NFR BLD: 0.4 %
BUN SERPL-MCNC: 11 MG/DL (ref 8–20)
CALCIUM SERPL-MCNC: 8.5 MG/DL (ref 8.9–10.3)
CHLORIDE SERPL-SCNC: 112 MEQ/L (ref 98–109)
CO2 SERPL-SCNC: 19 MEQ/L (ref 22–32)
CREAT SERPL-MCNC: 1.2 MG/DL (ref 0.6–1.1)
CRP SERPL-MCNC: 36.56 MG/L
DIFFERENTIAL METHOD BLD: ABNORMAL
EOSINOPHIL # BLD: 0.55 K/UL (ref 0.04–0.36)
EOSINOPHIL NFR BLD: 6.4 %
ERYTHROCYTE [DISTWIDTH] IN BLOOD BY AUTOMATED COUNT: 14.2 % (ref 11.7–14.4)
GFR SERPL CREATININE-BSD FRML MDRD: 43.4 ML/MIN/1.73M*2
GLUCOSE SERPL-MCNC: 73 MG/DL (ref 70–99)
HCT VFR BLDCO AUTO: 31.3 % (ref 35–45)
HGB BLD-MCNC: 10 G/DL (ref 11.8–15.7)
IMM GRANULOCYTES # BLD AUTO: 0.02 K/UL (ref 0–0.08)
IMM GRANULOCYTES NFR BLD AUTO: 0.2 %
LYMPHOCYTES # BLD: 1.14 K/UL (ref 1.2–3.5)
LYMPHOCYTES NFR BLD: 13.3 %
MAGNESIUM SERPL-MCNC: 1.7 MG/DL (ref 1.8–2.5)
MCH RBC QN AUTO: 30.3 PG (ref 28–33.2)
MCHC RBC AUTO-ENTMCNC: 31.9 G/DL (ref 32.2–35.5)
MCV RBC AUTO: 94.8 FL (ref 83–98)
MONOCYTES # BLD: 0.71 K/UL (ref 0.28–0.8)
MONOCYTES NFR BLD: 8.3 %
NEUTROPHILS # BLD: 6.11 K/UL (ref 1.7–7)
NEUTS SEG NFR BLD: 71.4 %
NRBC BLD-RTO: 0 %
PDW BLD AUTO: 9.8 FL (ref 9.4–12.3)
PHOSPHATE SERPL-MCNC: 3.5 MG/DL (ref 2.4–4.7)
PLATELET # BLD AUTO: 234 K/UL (ref 150–369)
POTASSIUM SERPL-SCNC: 4.2 MEQ/L (ref 3.6–5.1)
RBC # BLD AUTO: 3.3 M/UL (ref 3.93–5.22)
SODIUM SERPL-SCNC: 140 MEQ/L (ref 136–144)
WBC # BLD AUTO: 8.56 K/UL (ref 3.8–10.5)

## 2023-04-23 PROCEDURE — 85025 COMPLETE CBC W/AUTO DIFF WBC: CPT | Performed by: STUDENT IN AN ORGANIZED HEALTH CARE EDUCATION/TRAINING PROGRAM

## 2023-04-23 PROCEDURE — 83735 ASSAY OF MAGNESIUM: CPT | Performed by: STUDENT IN AN ORGANIZED HEALTH CARE EDUCATION/TRAINING PROGRAM

## 2023-04-23 PROCEDURE — 63700000 HC SELF-ADMINISTRABLE DRUG: Performed by: STUDENT IN AN ORGANIZED HEALTH CARE EDUCATION/TRAINING PROGRAM

## 2023-04-23 PROCEDURE — 99024 POSTOP FOLLOW-UP VISIT: CPT | Performed by: COLON & RECTAL SURGERY

## 2023-04-23 PROCEDURE — 21400000 HC ROOM AND CARE CCU/INTERMEDIATE

## 2023-04-23 PROCEDURE — 84100 ASSAY OF PHOSPHORUS: CPT | Performed by: STUDENT IN AN ORGANIZED HEALTH CARE EDUCATION/TRAINING PROGRAM

## 2023-04-23 PROCEDURE — 63600000 HC DRUGS/DETAIL CODE: Performed by: STUDENT IN AN ORGANIZED HEALTH CARE EDUCATION/TRAINING PROGRAM

## 2023-04-23 PROCEDURE — 86140 C-REACTIVE PROTEIN: CPT | Performed by: STUDENT IN AN ORGANIZED HEALTH CARE EDUCATION/TRAINING PROGRAM

## 2023-04-23 PROCEDURE — 63600000 HC DRUGS/DETAIL CODE: Performed by: PHYSICIAN ASSISTANT

## 2023-04-23 PROCEDURE — 36415 COLL VENOUS BLD VENIPUNCTURE: CPT | Performed by: STUDENT IN AN ORGANIZED HEALTH CARE EDUCATION/TRAINING PROGRAM

## 2023-04-23 PROCEDURE — 80048 BASIC METABOLIC PNL TOTAL CA: CPT | Performed by: STUDENT IN AN ORGANIZED HEALTH CARE EDUCATION/TRAINING PROGRAM

## 2023-04-23 PROCEDURE — 63600000 HC DRUGS/DETAIL CODE

## 2023-04-23 PROCEDURE — 25000000 HC PHARMACY GENERAL

## 2023-04-23 RX ORDER — FUROSEMIDE 10 MG/ML
20 INJECTION INTRAMUSCULAR; INTRAVENOUS ONCE
Status: COMPLETED | OUTPATIENT
Start: 2023-04-23 | End: 2023-04-23

## 2023-04-23 RX ORDER — OXYCODONE HYDROCHLORIDE 5 MG/1
5 TABLET ORAL EVERY 6 HOURS PRN
Qty: 15 TABLET | Refills: 0 | Status: SHIPPED | OUTPATIENT
Start: 2023-04-23 | End: 2023-04-28 | Stop reason: HOSPADM

## 2023-04-23 RX ORDER — OXYCODONE HYDROCHLORIDE 5 MG/1
5 TABLET ORAL EVERY 6 HOURS PRN
Status: DISCONTINUED | OUTPATIENT
Start: 2023-04-23 | End: 2023-04-28 | Stop reason: HOSPADM

## 2023-04-23 RX ORDER — ONDANSETRON HYDROCHLORIDE 2 MG/ML
4 INJECTION, SOLUTION INTRAVENOUS ONCE
Status: ACTIVE | OUTPATIENT
Start: 2023-04-23 | End: 2023-04-24

## 2023-04-23 RX ADMIN — HEPARIN SODIUM 5000 UNITS: 5000 INJECTION, SOLUTION INTRAVENOUS; SUBCUTANEOUS at 22:01

## 2023-04-23 RX ADMIN — HEPARIN SODIUM 5000 UNITS: 5000 INJECTION, SOLUTION INTRAVENOUS; SUBCUTANEOUS at 13:37

## 2023-04-23 RX ADMIN — FUROSEMIDE 20 MG: 10 INJECTION, SOLUTION INTRAMUSCULAR; INTRAVENOUS at 08:43

## 2023-04-23 RX ADMIN — OXYCODONE HYDROCHLORIDE 5 MG: 5 TABLET ORAL at 08:41

## 2023-04-23 RX ADMIN — MAGNESIUM SULFATE HEPTAHYDRATE 2 G: 40 INJECTION, SOLUTION INTRAVENOUS at 13:37

## 2023-04-23 RX ADMIN — PANTOPRAZOLE SODIUM 40 MG: 40 INJECTION, POWDER, LYOPHILIZED, FOR SOLUTION INTRAVENOUS at 08:24

## 2023-04-23 RX ADMIN — GABAPENTIN 300 MG: 300 CAPSULE ORAL at 08:24

## 2023-04-23 RX ADMIN — HEPARIN SODIUM 5000 UNITS: 5000 INJECTION, SOLUTION INTRAVENOUS; SUBCUTANEOUS at 05:51

## 2023-04-23 RX ADMIN — LEVOTHYROXINE SODIUM 88 MCG: 88 TABLET ORAL at 05:51

## 2023-04-23 NOTE — DISCHARGE INSTRUCTIONS
[facility]  [facility] Discharge Instructions.     You had a reversal of your ileostomy with Dr. Mandel on 4/19/23.    Follow-up: Please call Dr. Mandel's office at [phone] upon discharge to schedule your follow-up appointment for 2 weeks.   Please call Dr. Mandel's office if you have any questions/concerns.     Call if you experience the following:   - fevers (>101)/chills  - nausea, vomiting, diarrhea  - pain not controlled by prescribed medications  - bleeding or other drainage from wound.   - Persistent lightheadedness, dizziness or changes in vision.     Please follow-up with your primary care physician within 1 month of discharge from the hospital to update them regarding your hospital stay.     Medications:   Please resume all of your previous home medications unless otherwise indicated.  AVOID blood thinning medications such as NSAIDS (Aleve, Advil, Ibuprofen, Motrin, Aspirin, ect)    You will continue on the antibiotic Doxycycline 100mg twice daily until 5/7/23.    PAIN: Take Oxycodone 1 -2 tablets every 4- 6 hours as needed for severe pain. Alternatively, take TYLENOL 650mg every 4 hours as needed for mild pain. Please do not take both medications together.     Diet: Low residue, low fiber. Try to avoid fresh fruits and vegetables, bran, whole grains, carbonated beverages and dairy products.    Wound: Please change your packing daily. Home care nursing has been arranged for the packing changes. Please dampen a piece of gauze or Kerlix and pack the wound, ensuring the packing reaches the bottom. Then cover with a dry piece of gauze or ABD. Please shower daily. No tub baths/hot tubs/swimming until seen in follow-up. No lotions or ointments to wounds.    Regarding your leg swelling, we recommend holding off on restarting your Bumex until 5/2/23. Please wear compression stockings daily, place them on when you first wake up in the morning. Please remove them prior to sleep.  Please elevate your legs to assist with swelling as well. Avoid foods high in sodium will also help improve the swelling.     Dr. Geovani Mandel  [facility]  [address]  [address] [place] [address] [phone]    Low Fiber Diet:    Food Group Foods Recommended Foods Not Recommended   Grains Choose grain products with less than 2 g fiber per serving, such as bread, rolls, hot or cold prepared (low fiber) breakfast cereals, crackers, and pasta made from white flour   Breads and starches made with white flour such as bagels, rolls, and hot or cold cereals Whole grains including whole wheat, barley, rye, buckwheat, corn, teff, quinoa, millet, amaranth, brown and wild rice, sorghum, and oats  Popcorn   Protein Foods Fresh or frozen red meat, including lean, trimmed cuts of beef, pork, or lamb  Fresh or frozen poultry, including skinless chicken or turkey,  Fresh, frozen, or canned seafood, including fish (salmon, herring, and sardines), shrimp, lobster, clams, and scallops  Eggs and egg substitutes  Smooth nut and seed butters, such as peanut butter, almond butter, and sunflower seed butter  Soy foods, such as tofu, tempeh, or soynuts  Meat alternatives, such as veggie burgers, and sausages based on plant protein, with less than 2 g fiber per serving Legumes, nuts, or seeds, such as peanuts, almonds, pistachios, and sunflower seeds, unsalted  [place] nut and seed butters, such as peanut butter, almond butter, and sunflower seed butter  Processed meat alternatives with greater than 2 g fiber per serving  Legumes, such as dried beans, lentils, or peas   Dairy Low-fat or fat-free milk, yogurt (low in added sugars), kefir, cottage cheese, and cheeses*, including lactose-free varieties  Frozen desserts made from low-fat milk*  Fortified soymilk Yogurt or ice cream* with granola, dried fruit, seeds, or nuts   Vegetables Most well-cooked vegetables without seeds or skins  Potatoes  without skin  Lettuce on a sandwich (unless stricture present)  Strained, low-sodium vegetable juice Broccoli, brussels sprouts, cabbage, cauliflower, ruben, mustard, and turnip greens, corn, dried beans, lima beans, mushrooms, okra, onions, potato skins, spinach  Fried or raw vegetables   Fruits Fruit juice without pulp (except for prune juice), fruit and vegetable juice blends, ripe bananas, melons, canned soft fruits (except pineapple),   100% fruit juice without pulp All fresh fruits except banana and melons  Dried fruits, including prunes and raisins  Fruit juice with pulp  Any fruits sweetened with sorbitol  Prune juice   Oils Unsaturated vegetable oils, including olive, peanut, and canola oils; margarines and spreads, which list liquid vegetable oil as the first ingredient and does not contain trans fats (partially hydrogenated oil); salad dressing and mayonnaise made from unsaturated vegetable oils Solid shortening or partially hydrogenated oils  Solid margarine made with hydrogenated or partially hydrogenated oils  Margarine that contains trans fats; butter   Beverages Decaf coffee, decaf tea (unsweetened), water, 100% fruit juice, rehydration beverages Beverages containing caffeine, including regular coffee, regular tea, soft drinks, and energy drinks  Limit beverages containing high-fructose corn syrup to 12 ounces per day  Avoid beverages sweetened with sorbitol or other sugar substitutes   Other Prepared foods, including soups, casseroles, salads, baked goods, and snacks made from recommended ingredients Sugary and/or fatty desserts, candy, and other sweets; salt and seasonings that contain salt  Sugar alcohols such as xylitol and sorbitol  Honey   *Contains milk and may worsen diarrhea associated with lactose intolerance  Fiber-Restricted Diet Sample 1 Day Menu  Meal Menu   Breakfast 1 cup puffed rice cereal (0.5 gram fiber)  1 cup lactose-free cow's milk  ½ ripe banana (1.5 grams fiber)   Morning  Snack 1 cup decaffeinated tea  6 ounces smooth yogurt (with no nuts, granola, or fruit) (1.5 grams fiber)   Lunch 2 cups chicken-rice soup  1/4 cup cooked carrots (2.0 grams fiber) added to soup  ½ cup applesauce (1.5 gram fiber)  ½ turkey sandwich made with:  1 slice white bread (0.5 gram fiber)  2 ounces chicken  1 teaspoon mayonnaise   Afternoon Snack 2 to 3 saltine crackers  1 cup Gatorade or Powerade or hot chocolate made with:  1 cup lactose-free milk  1 tablespoon chocolate syrup   Dinner ½ cup mashed potatoes without skin (1.5 grams fiber)  ½ cup green beans, cooked well (2 grams fiber)  1½ cups water or other caffeine-free drink  4 to 6 ounces baked fish  Bread crumbs (0.5 gram fiber) (to top fish)  Dash of lemon juice (for fish)  1 teaspoon margarine or butter (for fish)   Snack ½ cup sorbet (1 g fiber)

## 2023-04-23 NOTE — PROGRESS NOTES
[facility] ([facility] Pager: [phone] Progress Note    Subjective   78 y/o F s/p loop ileostomy reversal on 4/19    Interval History: No acute events. Hemodynamically stable, afebrile.  Patient's last bowel movement was yesterday morning, passing some gas.  No nausea or vomiting this morning.  Abdominal pain is controlled.  Will change packing today. Urinating well. LEs less distended then yesterday.  Weight has decreased to 67 on 69 kgs.    Objective     Vitals:    04/23/23 0341   BP: 116/67   Pulse: 100   Resp: 16   Temp: 37.1 °C (98.7 °F)   SpO2: 98%         Intake/Output Summary (Last 24 hours) at 4/23/2023 0548  Last data filed at 4/23/2023 0341  Gross per 24 hour   Intake 220 ml   Output 1150 ml   Net -930 ml       I/O this shift:  In: -   Out: 1150 [Urine:1150]    General: awake and alert  HEENT: EOM intact, conjunctiva clear, sclera nonicteric  CV: Regular rate and rhythm  Pulm: No increased work of breathing  Abd: soft, nontender, nondistended, packing in place at former ostomy site  Extr: no obvious abnormality, moderate pitting edema to the knee bilaterally  Neuro: Grossly normal    Labs:  Results from last 7 days   Lab Units 04/22/23  0712 04/21/23  0753 04/20/23  0724   WBC K/uL 8.60 11.17* 11.84*   HEMOGLOBIN g/dL 9.6* 10.7* 10.1*   HEMATOCRIT % 30.3* 33.6* 31.2*   PLATELETS K/uL 232 256 262     Results from last 7 days   Lab Units 04/22/23  0712 04/21/23  0753 04/20/23  0724   SODIUM mEQ/L 142 140 137   POTASSIUM mEQ/L 4.4 4.7 5.2*   CHLORIDE mEQ/L 115* 110* 107   CO2 mEQ/L 19* 20* 21*   BUN mg/dL 11 13 15   CREATININE mg/dL 1.1 1.2* 1.3*   CALCIUM mg/dL 8.4* 8.6* 8.5*   ALBUMIN g/dL  --   --  3.0*   GLUCOSE mg/dL 77 84 149*     Imaging:  I have reviewed the Imaging from the last 24 hrs.  No results found.    Assessment:  78 y/o F s/p loop ileostomy reversal on 4/19.  Was overloaded yesterday, continue to diuresis    Plan:  -LR diet  -Daily weights  -Lasix 20 mg  -Multimodal  pain regimen  -Periop antibiotics  -Home meds  -Activity as tolerated    Final recommendations pending attending attestation.    Ashley Davis,   General Surgery Resident PGY-1  [facility]  [facility]  Please page [address] [phone] with any questions or concerns.

## 2023-04-24 LAB
ALBUMIN SERPL-MCNC: 3.1 G/DL (ref 3.4–5)
ANION GAP SERPL CALC-SCNC: 11 MEQ/L (ref 3–15)
BASOPHILS # BLD: 0.02 K/UL (ref 0.01–0.1)
BASOPHILS NFR BLD: 0.2 %
BUN SERPL-MCNC: 12 MG/DL (ref 8–20)
CALCIUM SERPL-MCNC: 8.8 MG/DL (ref 8.9–10.3)
CHLORIDE SERPL-SCNC: 108 MEQ/L (ref 98–109)
CO2 SERPL-SCNC: 20 MEQ/L (ref 22–32)
CREAT SERPL-MCNC: 1.4 MG/DL (ref 0.6–1.1)
CRP SERPL-MCNC: 28.71 MG/L
DIFFERENTIAL METHOD BLD: ABNORMAL
EOSINOPHIL # BLD: 0.54 K/UL (ref 0.04–0.36)
EOSINOPHIL NFR BLD: 6.1 %
ERYTHROCYTE [DISTWIDTH] IN BLOOD BY AUTOMATED COUNT: 13.8 % (ref 11.7–14.4)
GFR SERPL CREATININE-BSD FRML MDRD: 36.4 ML/MIN/1.73M*2
GLUCOSE SERPL-MCNC: 63 MG/DL (ref 70–99)
HCT VFR BLDCO AUTO: 33.1 % (ref 35–45)
HGB BLD-MCNC: 10.6 G/DL (ref 11.8–15.7)
IMM GRANULOCYTES # BLD AUTO: 0.03 K/UL (ref 0–0.08)
IMM GRANULOCYTES NFR BLD AUTO: 0.3 %
LYMPHOCYTES # BLD: 1.64 K/UL (ref 1.2–3.5)
LYMPHOCYTES NFR BLD: 18.4 %
MAGNESIUM SERPL-MCNC: 1.9 MG/DL (ref 1.8–2.5)
MCH RBC QN AUTO: 29.9 PG (ref 28–33.2)
MCHC RBC AUTO-ENTMCNC: 32 G/DL (ref 32.2–35.5)
MCV RBC AUTO: 93.5 FL (ref 83–98)
MONOCYTES # BLD: 0.88 K/UL (ref 0.28–0.8)
MONOCYTES NFR BLD: 9.9 %
NEUTROPHILS # BLD: 5.8 K/UL (ref 1.7–7)
NEUTS SEG NFR BLD: 65.1 %
NRBC BLD-RTO: 0 %
PDW BLD AUTO: 9.7 FL (ref 9.4–12.3)
PHOSPHATE SERPL-MCNC: 3.5 MG/DL (ref 2.4–4.7)
PLATELET # BLD AUTO: 261 K/UL (ref 150–369)
POTASSIUM SERPL-SCNC: 4.3 MEQ/L (ref 3.6–5.1)
PREALB SERPL-MCNC: 14.9 MG/DL (ref 18–38)
RBC # BLD AUTO: 3.54 M/UL (ref 3.93–5.22)
SODIUM SERPL-SCNC: 139 MEQ/L (ref 136–144)
WBC # BLD AUTO: 8.91 K/UL (ref 3.8–10.5)

## 2023-04-24 PROCEDURE — 84100 ASSAY OF PHOSPHORUS: CPT | Performed by: STUDENT IN AN ORGANIZED HEALTH CARE EDUCATION/TRAINING PROGRAM

## 2023-04-24 PROCEDURE — 84134 ASSAY OF PREALBUMIN: CPT | Performed by: STUDENT IN AN ORGANIZED HEALTH CARE EDUCATION/TRAINING PROGRAM

## 2023-04-24 PROCEDURE — 86140 C-REACTIVE PROTEIN: CPT | Performed by: STUDENT IN AN ORGANIZED HEALTH CARE EDUCATION/TRAINING PROGRAM

## 2023-04-24 PROCEDURE — 21400000 HC ROOM AND CARE CCU/INTERMEDIATE

## 2023-04-24 PROCEDURE — 63600000 HC DRUGS/DETAIL CODE

## 2023-04-24 PROCEDURE — 63600000 HC DRUGS/DETAIL CODE: Performed by: STUDENT IN AN ORGANIZED HEALTH CARE EDUCATION/TRAINING PROGRAM

## 2023-04-24 PROCEDURE — 99024 POSTOP FOLLOW-UP VISIT: CPT | Performed by: COLON & RECTAL SURGERY

## 2023-04-24 PROCEDURE — 82310 ASSAY OF CALCIUM: CPT | Performed by: STUDENT IN AN ORGANIZED HEALTH CARE EDUCATION/TRAINING PROGRAM

## 2023-04-24 PROCEDURE — 63700000 HC SELF-ADMINISTRABLE DRUG: Performed by: STUDENT IN AN ORGANIZED HEALTH CARE EDUCATION/TRAINING PROGRAM

## 2023-04-24 PROCEDURE — 36415 COLL VENOUS BLD VENIPUNCTURE: CPT | Performed by: STUDENT IN AN ORGANIZED HEALTH CARE EDUCATION/TRAINING PROGRAM

## 2023-04-24 PROCEDURE — 83735 ASSAY OF MAGNESIUM: CPT | Performed by: STUDENT IN AN ORGANIZED HEALTH CARE EDUCATION/TRAINING PROGRAM

## 2023-04-24 PROCEDURE — 25000000 HC PHARMACY GENERAL

## 2023-04-24 PROCEDURE — 82040 ASSAY OF SERUM ALBUMIN: CPT | Performed by: STUDENT IN AN ORGANIZED HEALTH CARE EDUCATION/TRAINING PROGRAM

## 2023-04-24 PROCEDURE — 85025 COMPLETE CBC W/AUTO DIFF WBC: CPT | Performed by: STUDENT IN AN ORGANIZED HEALTH CARE EDUCATION/TRAINING PROGRAM

## 2023-04-24 RX ORDER — FUROSEMIDE 10 MG/ML
20 INJECTION INTRAMUSCULAR; INTRAVENOUS ONCE
Status: COMPLETED | OUTPATIENT
Start: 2023-04-24 | End: 2023-04-24

## 2023-04-24 RX ADMIN — LEVOTHYROXINE SODIUM 88 MCG: 88 TABLET ORAL at 05:41

## 2023-04-24 RX ADMIN — IBUPROFEN 600 MG: 600 TABLET ORAL at 21:01

## 2023-04-24 RX ADMIN — HEPARIN SODIUM 5000 UNITS: 5000 INJECTION, SOLUTION INTRAVENOUS; SUBCUTANEOUS at 05:41

## 2023-04-24 RX ADMIN — HEPARIN SODIUM 5000 UNITS: 5000 INJECTION, SOLUTION INTRAVENOUS; SUBCUTANEOUS at 14:28

## 2023-04-24 RX ADMIN — FUROSEMIDE 20 MG: 10 INJECTION, SOLUTION INTRAMUSCULAR; INTRAVENOUS at 10:02

## 2023-04-24 RX ADMIN — GABAPENTIN 300 MG: 300 CAPSULE ORAL at 21:00

## 2023-04-24 RX ADMIN — IBUPROFEN 600 MG: 600 TABLET ORAL at 14:30

## 2023-04-24 RX ADMIN — GABAPENTIN 300 MG: 300 CAPSULE ORAL at 08:19

## 2023-04-24 RX ADMIN — PANTOPRAZOLE SODIUM 40 MG: 40 INJECTION, POWDER, LYOPHILIZED, FOR SOLUTION INTRAVENOUS at 08:19

## 2023-04-24 RX ADMIN — IBUPROFEN 600 MG: 600 TABLET ORAL at 08:35

## 2023-04-24 RX ADMIN — MAGNESIUM SULFATE IN DEXTROSE 1 G: 10 INJECTION, SOLUTION INTRAVENOUS at 12:15

## 2023-04-24 RX ADMIN — HEPARIN SODIUM 5000 UNITS: 5000 INJECTION, SOLUTION INTRAVENOUS; SUBCUTANEOUS at 22:29

## 2023-04-24 NOTE — PLAN OF CARE
Care Coordination Discharge Plan Note   Date: 4/24/2023    Time: 5:38 PM    Patient Name: Raegan Young  Medical Record Number: [id number]  YOB: 1945  Room/BED: 1017    Discharge Assessment  Concerns to be Addressed: discharge planning, care coordination/care conferences  Anticipated Changes Related to Illness: none    Concerns Comments: - tentative CEE: 4/25     Anticipated Discharge Plan  Anticipated Discharge Disposition: home with home health  Type of Home Care Services: nursing  Patient/Family Anticipates Transition to: home with help/services  Patient/Family Anticipated Services at Transition: home health care      Patient Choice  Offered/Gave Vendor List: yes  Patient's Choice of Community Agency(s): [facility]    Patient and/or patient guardian/advocate was made aware of their right to choose a provider. A list of eligible providers was presented and reviewed with the patient and/or patient guardian/advocate in written and/or verbal form. The list delineates providers in the patient’s desired geographic area who are participating in the Medicare program and/or providers contracted with the patient’s primary insurance. The Medicare list and quality ratings were obtained from the Medicare.gov [medicare.gov] website.    Discharge Transportation   Does the patient need discharge transport arranged?: No        Discharge Barriers   Barriers to Discharge: None  Comment: CM informed [facility] that patient to discharge today.  Participants: physical therapy, social work/services, physician,

## 2023-04-24 NOTE — PLAN OF CARE
Plan of Care Review  Plan of Care Reviewed With: patient  Progress: improving  Outcome Summary: VSS, electrolytes  replaced, pain controlled, tolerating diet and voiding well.       Around 18:00 PT vomited,  made aware.

## 2023-04-24 NOTE — PROGRESS NOTES
[facility] ([facility]) Pager: [phone] Progress Note    Subjective   78 y/o F s/p loop ileostomy reversal on 4/19    Interval History: No acute events.  Diuresed with 20 of Lasix yesterday, will give another dose this morning.  Did have an episode of emesis measured at 100 cc when taking meds with applesauce yesterday, since then no nausea or vomiting.  Continues to tolerate a low residue diet.  Had a bowel movement yesterday, not passing much gas though.  No abdominal pain.  Lower extremity edema improved.  Ambulating and voiding without issue.    Objective     Vitals:    04/24/23 0700   BP: 132/60   Pulse: 90   Resp: 18   Temp: 37 °C (98.6 °F)   SpO2: 99%         Intake/Output Summary (Last 24 hours) at 4/24/2023 1009  Last data filed at 4/24/2023 0309  Gross per 24 hour   Intake --   Output 1800 ml   Net -1800 ml       No intake/output data recorded.    General: awake and alert  HEENT: EOM intact, conjunctiva clear, sclera nonicteric  CV: Regular rate and rhythm  Pulm: No increased work of breathing  Abd: soft, nontender, nondistended, packing in place at former ostomy site  Extr: no obvious abnormality, 1+ lower extremity edema bilaterally  Neuro: Grossly normal    Labs:  Results from last 7 days   Lab Units 04/24/23  0737 04/23/23  0658 04/22/23  0712   WBC K/uL 8.91 8.56 8.60   HEMOGLOBIN g/dL 10.6* 10.0* 9.6*   HEMATOCRIT % 33.1* 31.3* 30.3*   PLATELETS K/uL 261 234 232     Results from last 7 days   Lab Units 04/24/23  0737 04/23/23  0658 04/22/23  0712 04/21/23  0753 04/20/23  0724   SODIUM mEQ/L 139 140 142   < > 137   POTASSIUM mEQ/L 4.3 4.2 4.4   < > 5.2*   CHLORIDE mEQ/L 108 112* 115*   < > 107   CO2 mEQ/L 20* 19* 19*   < > 21*   BUN mg/dL 12 11 11   < > 15   CREATININE mg/dL 1.4* 1.2* 1.1   < > 1.3*   CALCIUM mg/dL 8.8* 8.5* 8.4*   < > 8.5*   ALBUMIN g/dL 3.1*  --   --   --  3.0*   GLUCOSE mg/dL 63* 73 77   < > 149*    < > = values in this interval not displayed.                Imaging:  I have reviewed the Imaging from the last 24 hrs.  No results found.    Assessment:  78 y/o F s/p loop ileostomy reversal on 4/19    Plan:  -LR diet, will provide some apple juice   -IVF at 40 cc/HR  -Lasix 20 mg  -Daily weights  -Multimodal pain regimen  -Home meds  -Activity as tolerated    Final recommendations pending attending attestation.    Dung Arias MD  General Surgery Resident PGY-1  [facility]  [facility]  Please page Cooper [phone] with any questions or concerns.

## 2023-04-25 LAB
ANION GAP SERPL CALC-SCNC: 13 MEQ/L (ref 3–15)
BASOPHILS # BLD: 0.03 K/UL (ref 0.01–0.1)
BASOPHILS NFR BLD: 0.3 %
BUN SERPL-MCNC: 15 MG/DL (ref 8–20)
CALCIUM SERPL-MCNC: 9 MG/DL (ref 8.9–10.3)
CHLORIDE SERPL-SCNC: 102 MEQ/L (ref 98–109)
CO2 SERPL-SCNC: 21 MEQ/L (ref 22–32)
CREAT SERPL-MCNC: 1.7 MG/DL (ref 0.6–1.1)
CRP SERPL-MCNC: 15.48 MG/L
DIFFERENTIAL METHOD BLD: ABNORMAL
EOSINOPHIL # BLD: 0.49 K/UL (ref 0.04–0.36)
EOSINOPHIL NFR BLD: 4.8 %
ERYTHROCYTE [DISTWIDTH] IN BLOOD BY AUTOMATED COUNT: 13.7 % (ref 11.7–14.4)
GFR SERPL CREATININE-BSD FRML MDRD: 29.1 ML/MIN/1.73M*2
GLUCOSE SERPL-MCNC: 122 MG/DL (ref 70–99)
HCT VFR BLDCO AUTO: 34.5 % (ref 35–45)
HGB BLD-MCNC: 11.2 G/DL (ref 11.8–15.7)
IMM GRANULOCYTES # BLD AUTO: 0.07 K/UL (ref 0–0.08)
IMM GRANULOCYTES NFR BLD AUTO: 0.7 %
LYMPHOCYTES # BLD: 1.63 K/UL (ref 1.2–3.5)
LYMPHOCYTES NFR BLD: 16.1 %
MAGNESIUM SERPL-MCNC: 2.2 MG/DL (ref 1.8–2.5)
MCH RBC QN AUTO: 29.7 PG (ref 28–33.2)
MCHC RBC AUTO-ENTMCNC: 32.5 G/DL (ref 32.2–35.5)
MCV RBC AUTO: 91.5 FL (ref 83–98)
MONOCYTES # BLD: 0.96 K/UL (ref 0.28–0.8)
MONOCYTES NFR BLD: 9.5 %
NEUTROPHILS # BLD: 6.93 K/UL (ref 1.7–7)
NEUTS SEG NFR BLD: 68.6 %
NRBC BLD-RTO: 0 %
PDW BLD AUTO: 9.6 FL (ref 9.4–12.3)
PHOSPHATE SERPL-MCNC: 3.7 MG/DL (ref 2.4–4.7)
PLATELET # BLD AUTO: 282 K/UL (ref 150–369)
POTASSIUM SERPL-SCNC: 4 MEQ/L (ref 3.6–5.1)
RBC # BLD AUTO: 3.77 M/UL (ref 3.93–5.22)
SODIUM SERPL-SCNC: 136 MEQ/L (ref 136–144)
WBC # BLD AUTO: 10.11 K/UL (ref 3.8–10.5)

## 2023-04-25 PROCEDURE — 80048 BASIC METABOLIC PNL TOTAL CA: CPT | Performed by: STUDENT IN AN ORGANIZED HEALTH CARE EDUCATION/TRAINING PROGRAM

## 2023-04-25 PROCEDURE — 84100 ASSAY OF PHOSPHORUS: CPT | Performed by: STUDENT IN AN ORGANIZED HEALTH CARE EDUCATION/TRAINING PROGRAM

## 2023-04-25 PROCEDURE — 63700000 HC SELF-ADMINISTRABLE DRUG: Performed by: STUDENT IN AN ORGANIZED HEALTH CARE EDUCATION/TRAINING PROGRAM

## 2023-04-25 PROCEDURE — 93005 ELECTROCARDIOGRAM TRACING: CPT

## 2023-04-25 PROCEDURE — 25000000 HC PHARMACY GENERAL

## 2023-04-25 PROCEDURE — 63600000 HC DRUGS/DETAIL CODE

## 2023-04-25 PROCEDURE — 93005 ELECTROCARDIOGRAM TRACING: CPT | Performed by: COLON & RECTAL SURGERY

## 2023-04-25 PROCEDURE — 63600000 HC DRUGS/DETAIL CODE: Performed by: STUDENT IN AN ORGANIZED HEALTH CARE EDUCATION/TRAINING PROGRAM

## 2023-04-25 PROCEDURE — 83735 ASSAY OF MAGNESIUM: CPT | Performed by: STUDENT IN AN ORGANIZED HEALTH CARE EDUCATION/TRAINING PROGRAM

## 2023-04-25 PROCEDURE — 86140 C-REACTIVE PROTEIN: CPT | Performed by: STUDENT IN AN ORGANIZED HEALTH CARE EDUCATION/TRAINING PROGRAM

## 2023-04-25 PROCEDURE — 21400000 HC ROOM AND CARE CCU/INTERMEDIATE

## 2023-04-25 PROCEDURE — 36415 COLL VENOUS BLD VENIPUNCTURE: CPT | Performed by: STUDENT IN AN ORGANIZED HEALTH CARE EDUCATION/TRAINING PROGRAM

## 2023-04-25 PROCEDURE — 63600000 HC DRUGS/DETAIL CODE: Performed by: COLON & RECTAL SURGERY

## 2023-04-25 PROCEDURE — 85025 COMPLETE CBC W/AUTO DIFF WBC: CPT | Performed by: STUDENT IN AN ORGANIZED HEALTH CARE EDUCATION/TRAINING PROGRAM

## 2023-04-25 RX ORDER — ONDANSETRON HYDROCHLORIDE 2 MG/ML
4 INJECTION, SOLUTION INTRAVENOUS ONCE
Status: COMPLETED | OUTPATIENT
Start: 2023-04-25 | End: 2023-04-25

## 2023-04-25 RX ORDER — ONDANSETRON HYDROCHLORIDE 2 MG/ML
4 INJECTION, SOLUTION INTRAVENOUS ONCE AS NEEDED
Status: COMPLETED | OUTPATIENT
Start: 2023-04-25 | End: 2023-04-25

## 2023-04-25 RX ADMIN — PANTOPRAZOLE SODIUM 40 MG: 40 INJECTION, POWDER, LYOPHILIZED, FOR SOLUTION INTRAVENOUS at 09:00

## 2023-04-25 RX ADMIN — ONDANSETRON 4 MG: 2 INJECTION INTRAMUSCULAR; INTRAVENOUS at 07:43

## 2023-04-25 RX ADMIN — HEPARIN SODIUM 5000 UNITS: 5000 INJECTION, SOLUTION INTRAVENOUS; SUBCUTANEOUS at 06:34

## 2023-04-25 RX ADMIN — LEVOTHYROXINE SODIUM 88 MCG: 88 TABLET ORAL at 06:32

## 2023-04-25 RX ADMIN — HEPARIN SODIUM 5000 UNITS: 5000 INJECTION, SOLUTION INTRAVENOUS; SUBCUTANEOUS at 21:45

## 2023-04-25 RX ADMIN — Medication: at 21:46

## 2023-04-25 RX ADMIN — Medication: at 14:24

## 2023-04-25 RX ADMIN — HEPARIN SODIUM 5000 UNITS: 5000 INJECTION, SOLUTION INTRAVENOUS; SUBCUTANEOUS at 14:24

## 2023-04-25 RX ADMIN — Medication: at 09:00

## 2023-04-25 RX ADMIN — ONDANSETRON 4 MG: 2 INJECTION INTRAMUSCULAR; INTRAVENOUS at 16:07

## 2023-04-25 RX ADMIN — IBUPROFEN 600 MG: 600 TABLET ORAL at 15:38

## 2023-04-25 NOTE — PROGRESS NOTES
[facility] ([facility]) Pager: [phone] Progress Note    Subjective   78 y/o F s/p loop ileostomy reversal on 4/19    Interval History: D[place].  Had emesis last night, again this AM with 500cc output. She feels mch better following. Endorses minimal flatus.  No abdominal pain.  Lower extremity edema improved.  Ambulating and voiding without issue.    Objective     Vitals:    04/25/23 0800   BP: 120/62   Pulse: 86   Resp: 16   Temp:    SpO2:          Intake/Output Summary (Last 24 hours) at 4/25/2023 1020  Last data filed at 4/25/2023 0800  Gross per 24 hour   Intake 100 ml   Output 1800 ml   Net -1700 ml       I/O this shift:  In: -   Out: 700 [Urine:200; Emesis/NG output:500]    General: awake and alert  HEENT: EOM intact, conjunctiva clear, sclera nonicteric  CV: Regular rate and rhythm  Pulm: No increased work of breathing  Abd: soft, nontender, nondistended, packing in place at former ostomy site  Extr: no obvious abnormality, 1+ lower extremity edema bilaterally  Neuro: Grossly normal    Labs:  Results from last 7 days   Lab Units 04/25/23  0731 04/24/23  0737 04/23/23  0658   WBC K/uL 10.11 8.91 8.56   HEMOGLOBIN g/dL 11.2* 10.6* 10.0*   HEMATOCRIT % 34.5* 33.1* 31.3*   PLATELETS K/uL 282 261 234     Results from last 7 days   Lab Units 04/25/23  0731 04/24/23  0737 04/23/23  0658 04/21/23  0753 04/20/23  0724   SODIUM mEQ/L 136 139 140   < > 137   POTASSIUM mEQ/L 4.0 4.3 4.2   < > 5.2*   CHLORIDE mEQ/L 102 108 112*   < > 107   CO2 mEQ/L 21* 20* 19*   < > 21*   BUN mg/dL 15 12 11   < > 15   CREATININE mg/dL 1.7* 1.4* 1.2*   < > 1.3*   CALCIUM mg/dL 9.0 8.8* 8.5*   < > 8.5*   ALBUMIN g/dL  --  3.1*  --   --  3.0*   GLUCOSE mg/dL 122* 63* 73   < > 149*    < > = values in this interval not displayed.               Imaging:  I have reviewed the Imaging from the last 24 hrs.  No results found.    Assessment:  78 y/o F s/p loop ileostomy reversal on 4/19    Plan:  -LR diet, self-regulate as able.    -IVF at 40 cc/HR  -Will hold further diuresis in setting of Cr bump.   -Daily weights  -Multimodal pain regimen  -Home meds  -Activity as tolerated    Final recommendations pending attending attestation.    Venkatesh Elmore MD  General Surgery Resident  [facility]  [facility]  Please page Cooper [phone] with any questions or concerns.

## 2023-04-26 ENCOUNTER — APPOINTMENT (INPATIENT)
Dept: RADIOLOGY | Facility: HOSPITAL | Age: 78
DRG: 330 | End: 2023-04-26
Payer: MEDICARE

## 2023-04-26 LAB
ANION GAP SERPL CALC-SCNC: 12 MEQ/L (ref 3–15)
ATRIAL RATE: 80
BASOPHILS # BLD: 0.03 K/UL (ref 0.01–0.1)
BASOPHILS NFR BLD: 0.3 %
BUN SERPL-MCNC: 19 MG/DL (ref 8–20)
CALCIUM SERPL-MCNC: 8.8 MG/DL (ref 8.9–10.3)
CHLORIDE SERPL-SCNC: 102 MEQ/L (ref 98–109)
CO2 SERPL-SCNC: 23 MEQ/L (ref 22–32)
CREAT SERPL-MCNC: 2.1 MG/DL (ref 0.6–1.1)
CREAT UR-MCNC: 82.8 MG/DL
CRP SERPL-MCNC: 9.44 MG/L
DIFFERENTIAL METHOD BLD: ABNORMAL
EOSINOPHIL # BLD: 0.55 K/UL (ref 0.04–0.36)
EOSINOPHIL NFR BLD: 4.9 %
ERYTHROCYTE [DISTWIDTH] IN BLOOD BY AUTOMATED COUNT: 13.7 % (ref 11.7–14.4)
GFR SERPL CREATININE-BSD FRML MDRD: 22.8 ML/MIN/1.73M*2
GLUCOSE SERPL-MCNC: 106 MG/DL (ref 70–99)
HCT VFR BLDCO AUTO: 32.8 % (ref 35–45)
HGB BLD-MCNC: 10.7 G/DL (ref 11.8–15.7)
IMM GRANULOCYTES # BLD AUTO: 0.11 K/UL (ref 0–0.08)
IMM GRANULOCYTES NFR BLD AUTO: 1 %
LYMPHOCYTES # BLD: 1.72 K/UL (ref 1.2–3.5)
LYMPHOCYTES NFR BLD: 15.4 %
MAGNESIUM SERPL-MCNC: 2.1 MG/DL (ref 1.8–2.5)
MCH RBC QN AUTO: 30.2 PG (ref 28–33.2)
MCHC RBC AUTO-ENTMCNC: 32.6 G/DL (ref 32.2–35.5)
MCV RBC AUTO: 92.7 FL (ref 83–98)
MONOCYTES # BLD: 1.32 K/UL (ref 0.28–0.8)
MONOCYTES NFR BLD: 11.8 %
NEUTROPHILS # BLD: 7.41 K/UL (ref 1.7–7)
NEUTS SEG NFR BLD: 66.6 %
NRBC BLD-RTO: 0 %
P AXIS: 62
PDW BLD AUTO: 10 FL (ref 9.4–12.3)
PHOSPHATE SERPL-MCNC: 4.1 MG/DL (ref 2.4–4.7)
PLATELET # BLD AUTO: 270 K/UL (ref 150–369)
POTASSIUM SERPL-SCNC: 3.8 MEQ/L (ref 3.6–5.1)
PR INTERVAL: 170
QRS DURATION: 118
QT INTERVAL: 396
QTC CALCULATION(BAZETT): 456
R AXIS: 67
RBC # BLD AUTO: 3.54 M/UL (ref 3.93–5.22)
SODIUM SERPL-SCNC: 137 MEQ/L (ref 136–144)
T WAVE AXIS: 11
UUN UR-MCNC: 232 MG/DL
VENTRICULAR RATE: 80
WBC # BLD AUTO: 11.14 K/UL (ref 3.8–10.5)

## 2023-04-26 PROCEDURE — 36415 COLL VENOUS BLD VENIPUNCTURE: CPT | Performed by: STUDENT IN AN ORGANIZED HEALTH CARE EDUCATION/TRAINING PROGRAM

## 2023-04-26 PROCEDURE — 84100 ASSAY OF PHOSPHORUS: CPT | Performed by: STUDENT IN AN ORGANIZED HEALTH CARE EDUCATION/TRAINING PROGRAM

## 2023-04-26 PROCEDURE — 82570 ASSAY OF URINE CREATININE: CPT | Performed by: STUDENT IN AN ORGANIZED HEALTH CARE EDUCATION/TRAINING PROGRAM

## 2023-04-26 PROCEDURE — 93010 ELECTROCARDIOGRAM REPORT: CPT | Performed by: INTERNAL MEDICINE

## 2023-04-26 PROCEDURE — G1004 CDSM NDSC: HCPCS

## 2023-04-26 PROCEDURE — 25000000 HC PHARMACY GENERAL

## 2023-04-26 PROCEDURE — 63600000 HC DRUGS/DETAIL CODE: Performed by: STUDENT IN AN ORGANIZED HEALTH CARE EDUCATION/TRAINING PROGRAM

## 2023-04-26 PROCEDURE — 83735 ASSAY OF MAGNESIUM: CPT | Performed by: STUDENT IN AN ORGANIZED HEALTH CARE EDUCATION/TRAINING PROGRAM

## 2023-04-26 PROCEDURE — 80048 BASIC METABOLIC PNL TOTAL CA: CPT | Performed by: STUDENT IN AN ORGANIZED HEALTH CARE EDUCATION/TRAINING PROGRAM

## 2023-04-26 PROCEDURE — 85025 COMPLETE CBC W/AUTO DIFF WBC: CPT | Performed by: STUDENT IN AN ORGANIZED HEALTH CARE EDUCATION/TRAINING PROGRAM

## 2023-04-26 PROCEDURE — 25800000 HC PHARMACY IV SOLUTIONS: Performed by: STUDENT IN AN ORGANIZED HEALTH CARE EDUCATION/TRAINING PROGRAM

## 2023-04-26 PROCEDURE — 86140 C-REACTIVE PROTEIN: CPT | Performed by: STUDENT IN AN ORGANIZED HEALTH CARE EDUCATION/TRAINING PROGRAM

## 2023-04-26 PROCEDURE — 21400000 HC ROOM AND CARE CCU/INTERMEDIATE

## 2023-04-26 PROCEDURE — 84540 ASSAY OF URINE/UREA-N: CPT | Performed by: STUDENT IN AN ORGANIZED HEALTH CARE EDUCATION/TRAINING PROGRAM

## 2023-04-26 PROCEDURE — A9698 NON-RAD CONTRAST MATERIALNOC: HCPCS

## 2023-04-26 PROCEDURE — 25500000 HC DRUGS/INCIDENT RAD

## 2023-04-26 PROCEDURE — 63600000 HC DRUGS/DETAIL CODE

## 2023-04-26 PROCEDURE — 63700000 HC SELF-ADMINISTRABLE DRUG: Performed by: STUDENT IN AN ORGANIZED HEALTH CARE EDUCATION/TRAINING PROGRAM

## 2023-04-26 RX ORDER — POTASSIUM CHLORIDE 14.9 MG/ML
20 INJECTION INTRAVENOUS ONCE
Status: COMPLETED | OUTPATIENT
Start: 2023-04-26 | End: 2023-04-26

## 2023-04-26 RX ORDER — ONDANSETRON HYDROCHLORIDE 2 MG/ML
4 INJECTION, SOLUTION INTRAVENOUS ONCE
Status: COMPLETED | OUTPATIENT
Start: 2023-04-26 | End: 2023-04-26

## 2023-04-26 RX ORDER — DEXTROSE MONOHYDRATE, SODIUM CHLORIDE, AND POTASSIUM CHLORIDE 50; 1.49; 4.5 G/1000ML; G/1000ML; G/1000ML
INJECTION, SOLUTION INTRAVENOUS CONTINUOUS
Status: DISCONTINUED | OUTPATIENT
Start: 2023-04-26 | End: 2023-04-28 | Stop reason: HOSPADM

## 2023-04-26 RX ORDER — POTASSIUM CHLORIDE 14.9 MG/ML
20 INJECTION INTRAVENOUS ONCE
Status: DISCONTINUED | OUTPATIENT
Start: 2023-04-26 | End: 2023-04-26

## 2023-04-26 RX ORDER — ONDANSETRON HYDROCHLORIDE 2 MG/ML
4 INJECTION, SOLUTION INTRAVENOUS ONCE AS NEEDED
Status: COMPLETED | OUTPATIENT
Start: 2023-04-26 | End: 2023-04-26

## 2023-04-26 RX ORDER — ACETAMINOPHEN 650 MG/20.3ML
650 LIQUID ORAL EVERY 6 HOURS PRN
Status: DISCONTINUED | OUTPATIENT
Start: 2023-04-26 | End: 2023-04-28 | Stop reason: HOSPADM

## 2023-04-26 RX ADMIN — IBUPROFEN 600 MG: 600 TABLET ORAL at 09:03

## 2023-04-26 RX ADMIN — HEPARIN SODIUM 5000 UNITS: 5000 INJECTION, SOLUTION INTRAVENOUS; SUBCUTANEOUS at 06:56

## 2023-04-26 RX ADMIN — GABAPENTIN 300 MG: 300 CAPSULE ORAL at 09:03

## 2023-04-26 RX ADMIN — POTASSIUM CHLORIDE 20 MEQ: 14.9 INJECTION, SOLUTION INTRAVENOUS at 17:50

## 2023-04-26 RX ADMIN — PANTOPRAZOLE SODIUM 40 MG: 40 INJECTION, POWDER, LYOPHILIZED, FOR SOLUTION INTRAVENOUS at 09:02

## 2023-04-26 RX ADMIN — POTASSIUM CHLORIDE, DEXTROSE MONOHYDRATE AND SODIUM CHLORIDE: 150; 5; 450 INJECTION, SOLUTION INTRAVENOUS at 17:22

## 2023-04-26 RX ADMIN — IOHEXOL 1000 ML: 9 SOLUTION ORAL at 13:40

## 2023-04-26 RX ADMIN — ONDANSETRON 4 MG: 2 INJECTION INTRAMUSCULAR; INTRAVENOUS at 18:02

## 2023-04-26 RX ADMIN — Medication: at 09:04

## 2023-04-26 RX ADMIN — POTASSIUM CHLORIDE, DEXTROSE MONOHYDRATE AND SODIUM CHLORIDE: 150; 5; 450 INJECTION, SOLUTION INTRAVENOUS at 12:06

## 2023-04-26 RX ADMIN — HEPARIN SODIUM 5000 UNITS: 5000 INJECTION, SOLUTION INTRAVENOUS; SUBCUTANEOUS at 13:45

## 2023-04-26 RX ADMIN — ONDANSETRON HYDROCHLORIDE 4 MG: 2 INJECTION, SOLUTION INTRAMUSCULAR; INTRAVENOUS at 09:59

## 2023-04-26 RX ADMIN — IBUPROFEN 600 MG: 600 TABLET ORAL at 17:15

## 2023-04-26 RX ADMIN — SODIUM CHLORIDE, POTASSIUM CHLORIDE, SODIUM LACTATE AND CALCIUM CHLORIDE 500 ML: 600; 310; 30; 20 INJECTION, SOLUTION INTRAVENOUS at 12:00

## 2023-04-26 RX ADMIN — GABAPENTIN 300 MG: 300 CAPSULE ORAL at 20:58

## 2023-04-26 RX ADMIN — LEVOTHYROXINE SODIUM 88 MCG: 88 TABLET ORAL at 06:56

## 2023-04-26 RX ADMIN — Medication: at 20:58

## 2023-04-26 RX ADMIN — HEPARIN SODIUM 5000 UNITS: 5000 INJECTION, SOLUTION INTRAVENOUS; SUBCUTANEOUS at 21:51

## 2023-04-26 NOTE — CONSULTS
Brief Nutrition Note    Recommendations   Continue current diet   Encouraged protein intake     Clinical Course: Patient is a 77 y.o. female who was admitted on 4/19/2023 with a diagnosis of Colovaginal fistula [N82.4]  Attention to ileostomy (CMS/HCC) [Z43.2].     Past Medical History:   Diagnosis Date   • Anemia    • Arthritis    • Colovaginal fistula    • COVID-19 vaccine series completed 02/26/2021   • Disease of thyroid gland    • Diverticulitis of colon    • GERD (gastroesophageal reflux disease)    • H/O ileostomy    • Hiatal hernia    • History of snoring    • Hypertension    • Hypothyroidism    • Lung cancer (CMS/HCC)    • Lung nodule    • Vaginal cancer (CMS/HCC) 2018    s/p XRT x 7 weeks and chemo weekly x 5 weeks       Past Surgical History:   Procedure Laterality Date   • ABDOMINAL SURGERY     • BOWEL RESECTION      partial with ileostomy   • CHOLECYSTECTOMY     • COLONOSCOPY     • DILATION AND CURETTAGE OF UTERUS  02/2018   • ILEOSTOMY     • LUNG SURGERY     • ROTATOR CUFF REPAIR Right    • TONSILLECTOMY     • WISDOM TOOTH EXTRACTION      hx of       Reason for Assessment  Reason For Assessment: identified at risk by screening criteria (LOS)     [facility] Nutrition Screen Tool  Has patient lost weight without trying?: 0-->No  Has patient been eating poorly due to decreased appetite?: 1-->Yes  [facility] Nutrition Screen Score: 1     Nutrition/Diet History  Intake (%): 100%    Physical Findings  Last Bowel Movement: 04/26/23  Skin: surgical incision, edema (1+ ankle and foot edema)     RETS18 Physical Appearance  Last Bowel Movement: 04/26/23  Skin: surgical incision, edema (1+ ankle and foot edema)     Nutrition Order  Nutrition Order: meets nutritional requirements  Nutrition Order Comments: low fiber, lactose free     Anthropometrics  Height: 152.4 cm (5')     Current Weight  Weight Method: Standing scale  Weight: 67.4 kg (148 lb 11.2 oz)     Ideal Body Weight (IBW)  Ideal Body Weight (IBW) (kg): 45.86  % Ideal  Body Weight: 159.3     Body Mass Index (BMI)  BMI (Calculated): 29     Labs/Procedures/Meds  Lab Results Reviewed: reviewed, pertinent   BMP Results       04/26/23 04/25/23 04/24/23     0654 0731 0737     136 139    K 3.8 4.0 4.3    Cl 102 102 108    CO2 23 21 20    Glucose 106 122 63    BUN 19 15 12    Creatinine 2.1 1.7 1.4    Calcium 8.8 9.0 8.8     Medications  Pertinent Medications Reviewed: reviewed, pertinent   Scheduled Meds:  • acetaminophen  975 mg oral q6h INT   • gabapentin  300 mg oral BID   • heparin (porcine)  5,000 Units subcutaneous q8h CADENCE   • ibuprofen  600 mg oral q6h INT   • iohexoL  1,000 mL oral Once   • lactated ringer's  500 mL intravenous Once   • levothyroxine  88 mcg oral Daily (6:30a)   • pantoprazole  40 mg intravenous q24h   • rosuvastatin  20 mg oral Nightly   • zinc oxide-cod liver oil   Topical TID     Continuous Infusions:  • potassium chloride-D5-0.45%NaCl   40 mL/hr at 04/21/23 2126     Clinical comments:  Pt admit for 4/19 loop ileostomy reversal. On low fiber diet, but has ileus. Reports eating small amounts, feeling very full. Appetite fair at home, never a big eater, but weight stable. Does not want any supplements.    Continue current diet. Encouraged protein intake     Monitor: PO intake, plan of care  Goals: meet 75% of needs via oral intake     Recommendations: See above       Date: 04/26/23  Signature: Cathy Samuels RD

## 2023-04-26 NOTE — PROGRESS NOTES
[facility] Progress Note  Pager: [phone]    Subjective     Patient endorses a little nausea this am, no emesis. Endorses bowel movements x4 and flatus. Denies abdominal pain, just stinging sensation at prior stoma site, dressing cdi. Ambulating without issue.     Objective     Vital signs in last 24 hours:  Temp:  [36.3 °C (97.3 °F)-36.7 °C (98.1 °F)] 36.7 °C (98.1 °F)  Heart Rate:  [] 91  Resp:  [16-18] 18  BP: (114-126)/(55-65) 123/58      Intake/Output Summary (Last 24 hours) at 4/26/2023 0832  Last data filed at 4/26/2023 0300  Gross per 24 hour   Intake 400 ml   Output 500 ml   Net -100 ml     Intake/Output this shift:  No intake/output data recorded.    Physical Exam    General: awake and alert  HEENT: EOM intact, conjunctiva clear, sclera nonicteric  CV: Regular rate and rhythm  Pulm: No increased work of breathing  Abd: soft, nontender, nondistended, packing in place at former ostomy site, dressing cdi   Extr: no obvious abnormality  Neuro: Grossly normal    VTE Assessment: I have reassessed and the patient's VTE risk and treatment plan is appropriate.    Labs  CBC Results       04/25/23 04/24/23 04/23/23     0731 0737 0658    WBC 10.11 8.91 8.56    RBC 3.77 3.54 3.30    HGB 11.2 10.6 10.0    HCT 34.5 33.1 31.3    MCV 91.5 93.5 94.8    MCH 29.7 29.9 30.3    MCHC 32.5 32.0 31.9     261 234        CMP Results       04/25/23 04/24/23 04/23/23     0731 0737 0658     139 140    K 4.0 4.3 4.2    Cl 102 108 112    CO2 21 20 19    Glucose 122 63 73    BUN 15 12 11    Creatinine 1.7 1.4 1.2    Calcium 9.0 8.8 8.5    Anion Gap 13 11 9    Albumin -- 3.1 --    EGFR 29.1 36.4 43.4        Assessment/Plan     78 y/o F s/p loop ileostomy reversal on 4/19     Plan:  - In light of ongoing morning emesis and nausea, will remain in hospital today, f/u for any am emesis today   -LR diet, self-regulate as able.   -IVF at 40 cc/HR  -Hold further diuresis in light of Cr bump.   -Daily  weights  -Multimodal pain regimen  -Home meds  -Activity as tolerated  -Will plan to check in following lunch, discussion of possible imaging ongoing    Final recommendations pending attending attestation    Please page [phone] with any questions/concerns.  ASAD Harris

## 2023-04-26 NOTE — PLAN OF CARE
Per information gathered at medical rounds CEE is TBD with DC to home with [facility] and wound care supplies and wound care. Will remind treatment team to provide same for patient when stable for DC. SW will remain available for emotional support and DC planning. EMMA Hidalgo

## 2023-04-27 LAB
ALBUMIN SERPL-MCNC: 3.2 G/DL (ref 3.4–5)
ANION GAP SERPL CALC-SCNC: 12 MEQ/L (ref 3–15)
ANION GAP SERPL CALC-SCNC: 9 MEQ/L (ref 3–15)
BASOPHILS # BLD: 0.06 K/UL (ref 0.01–0.1)
BASOPHILS NFR BLD: 0.5 %
BUN SERPL-MCNC: 20 MG/DL (ref 8–20)
BUN SERPL-MCNC: 21 MG/DL (ref 8–20)
CALCIUM SERPL-MCNC: 8.5 MG/DL (ref 8.9–10.3)
CALCIUM SERPL-MCNC: 8.8 MG/DL (ref 8.9–10.3)
CHLORIDE SERPL-SCNC: 104 MEQ/L (ref 98–109)
CHLORIDE SERPL-SCNC: 106 MEQ/L (ref 98–109)
CO2 SERPL-SCNC: 21 MEQ/L (ref 22–32)
CO2 SERPL-SCNC: 22 MEQ/L (ref 22–32)
CREAT SERPL-MCNC: 2.2 MG/DL (ref 0.6–1.1)
CREAT SERPL-MCNC: 2.2 MG/DL (ref 0.6–1.1)
CRP SERPL-MCNC: 7.6 MG/L
DIFFERENTIAL METHOD BLD: ABNORMAL
EOSINOPHIL # BLD: 0.5 K/UL (ref 0.04–0.36)
EOSINOPHIL NFR BLD: 3.9 %
ERYTHROCYTE [DISTWIDTH] IN BLOOD BY AUTOMATED COUNT: 13.8 % (ref 11.7–14.4)
GFR SERPL CREATININE-BSD FRML MDRD: 21.6 ML/MIN/1.73M*2
GFR SERPL CREATININE-BSD FRML MDRD: 21.6 ML/MIN/1.73M*2
GLUCOSE SERPL-MCNC: 118 MG/DL (ref 70–99)
GLUCOSE SERPL-MCNC: 93 MG/DL (ref 70–99)
HCT VFR BLDCO AUTO: 32.2 % (ref 35–45)
HGB BLD-MCNC: 10.6 G/DL (ref 11.8–15.7)
IMM GRANULOCYTES # BLD AUTO: 0.17 K/UL (ref 0–0.08)
IMM GRANULOCYTES NFR BLD AUTO: 1.3 %
LYMPHOCYTES # BLD: 1.99 K/UL (ref 1.2–3.5)
LYMPHOCYTES NFR BLD: 15.6 %
MAGNESIUM SERPL-MCNC: 1.9 MG/DL (ref 1.8–2.5)
MCH RBC QN AUTO: 30.1 PG (ref 28–33.2)
MCHC RBC AUTO-ENTMCNC: 32.9 G/DL (ref 32.2–35.5)
MCV RBC AUTO: 91.5 FL (ref 83–98)
MONOCYTES # BLD: 1.48 K/UL (ref 0.28–0.8)
MONOCYTES NFR BLD: 11.6 %
NEUTROPHILS # BLD: 8.58 K/UL (ref 1.7–7)
NEUTS SEG NFR BLD: 67.1 %
NRBC BLD-RTO: 0 %
PDW BLD AUTO: 9.8 FL (ref 9.4–12.3)
PHOSPHATE SERPL-MCNC: 3.6 MG/DL (ref 2.4–4.7)
PLATELET # BLD AUTO: 301 K/UL (ref 150–369)
POTASSIUM SERPL-SCNC: 3.9 MEQ/L (ref 3.6–5.1)
POTASSIUM SERPL-SCNC: 4.2 MEQ/L (ref 3.6–5.1)
PREALB SERPL-MCNC: 16.5 MG/DL (ref 18–38)
RBC # BLD AUTO: 3.52 M/UL (ref 3.93–5.22)
SODIUM SERPL-SCNC: 137 MEQ/L (ref 136–144)
SODIUM SERPL-SCNC: 137 MEQ/L (ref 136–144)
WBC # BLD AUTO: 12.78 K/UL (ref 3.8–10.5)

## 2023-04-27 PROCEDURE — 80048 BASIC METABOLIC PNL TOTAL CA: CPT | Performed by: STUDENT IN AN ORGANIZED HEALTH CARE EDUCATION/TRAINING PROGRAM

## 2023-04-27 PROCEDURE — 36415 COLL VENOUS BLD VENIPUNCTURE: CPT | Performed by: STUDENT IN AN ORGANIZED HEALTH CARE EDUCATION/TRAINING PROGRAM

## 2023-04-27 PROCEDURE — 84134 ASSAY OF PREALBUMIN: CPT | Performed by: STUDENT IN AN ORGANIZED HEALTH CARE EDUCATION/TRAINING PROGRAM

## 2023-04-27 PROCEDURE — 83735 ASSAY OF MAGNESIUM: CPT | Performed by: STUDENT IN AN ORGANIZED HEALTH CARE EDUCATION/TRAINING PROGRAM

## 2023-04-27 PROCEDURE — 25800000 HC PHARMACY IV SOLUTIONS: Performed by: STUDENT IN AN ORGANIZED HEALTH CARE EDUCATION/TRAINING PROGRAM

## 2023-04-27 PROCEDURE — 86140 C-REACTIVE PROTEIN: CPT | Performed by: STUDENT IN AN ORGANIZED HEALTH CARE EDUCATION/TRAINING PROGRAM

## 2023-04-27 PROCEDURE — 25000000 HC PHARMACY GENERAL

## 2023-04-27 PROCEDURE — 82040 ASSAY OF SERUM ALBUMIN: CPT | Performed by: STUDENT IN AN ORGANIZED HEALTH CARE EDUCATION/TRAINING PROGRAM

## 2023-04-27 PROCEDURE — 25000000 HC PHARMACY GENERAL: Performed by: STUDENT IN AN ORGANIZED HEALTH CARE EDUCATION/TRAINING PROGRAM

## 2023-04-27 PROCEDURE — 82374 ASSAY BLOOD CARBON DIOXIDE: CPT | Performed by: STUDENT IN AN ORGANIZED HEALTH CARE EDUCATION/TRAINING PROGRAM

## 2023-04-27 PROCEDURE — 21400000 HC ROOM AND CARE CCU/INTERMEDIATE

## 2023-04-27 PROCEDURE — 63700000 HC SELF-ADMINISTRABLE DRUG

## 2023-04-27 PROCEDURE — 84100 ASSAY OF PHOSPHORUS: CPT | Performed by: STUDENT IN AN ORGANIZED HEALTH CARE EDUCATION/TRAINING PROGRAM

## 2023-04-27 PROCEDURE — 85025 COMPLETE CBC W/AUTO DIFF WBC: CPT | Performed by: STUDENT IN AN ORGANIZED HEALTH CARE EDUCATION/TRAINING PROGRAM

## 2023-04-27 PROCEDURE — 99024 POSTOP FOLLOW-UP VISIT: CPT | Performed by: COLON & RECTAL SURGERY

## 2023-04-27 PROCEDURE — 63700000 HC SELF-ADMINISTRABLE DRUG: Performed by: STUDENT IN AN ORGANIZED HEALTH CARE EDUCATION/TRAINING PROGRAM

## 2023-04-27 PROCEDURE — 63600000 HC DRUGS/DETAIL CODE: Performed by: STUDENT IN AN ORGANIZED HEALTH CARE EDUCATION/TRAINING PROGRAM

## 2023-04-27 RX ADMIN — Medication: at 08:01

## 2023-04-27 RX ADMIN — DOXYCYCLINE 200 MG: 100 INJECTION, POWDER, LYOPHILIZED, FOR SOLUTION INTRAVENOUS at 22:34

## 2023-04-27 RX ADMIN — HEPARIN SODIUM 5000 UNITS: 5000 INJECTION, SOLUTION INTRAVENOUS; SUBCUTANEOUS at 16:42

## 2023-04-27 RX ADMIN — HEPARIN SODIUM 5000 UNITS: 5000 INJECTION, SOLUTION INTRAVENOUS; SUBCUTANEOUS at 05:58

## 2023-04-27 RX ADMIN — GABAPENTIN 300 MG: 300 CAPSULE ORAL at 21:00

## 2023-04-27 RX ADMIN — LEVOTHYROXINE SODIUM 88 MCG: 88 TABLET ORAL at 05:58

## 2023-04-27 RX ADMIN — ACETAMINOPHEN 650 MG: 650 SOLUTION ORAL at 11:34

## 2023-04-27 RX ADMIN — PANTOPRAZOLE SODIUM 40 MG: 40 INJECTION, POWDER, LYOPHILIZED, FOR SOLUTION INTRAVENOUS at 08:04

## 2023-04-27 RX ADMIN — HEPARIN SODIUM 5000 UNITS: 5000 INJECTION, SOLUTION INTRAVENOUS; SUBCUTANEOUS at 22:34

## 2023-04-27 RX ADMIN — Medication: at 14:30

## 2023-04-27 RX ADMIN — Medication: at 21:00

## 2023-04-27 RX ADMIN — GABAPENTIN 300 MG: 300 CAPSULE ORAL at 08:03

## 2023-04-27 RX ADMIN — POTASSIUM CHLORIDE, DEXTROSE MONOHYDRATE AND SODIUM CHLORIDE: 150; 5; 450 INJECTION, SOLUTION INTRAVENOUS at 16:42

## 2023-04-27 NOTE — PLAN OF CARE
Care Coordination Discharge Plan Note   Date: 4/27/2023    Time: 2:30 PM    Patient Name: Raegan Young  Medical Record Number: [id number]  YOB: 1945  Room/BED: 1017    Discharge Assessment  Current Discharge Risk: chronically ill  Concerns to be Addressed: discharge planning, care coordination/care conferences  Anticipated Changes Related to Illness: none    Concerns Comments: - POD #8 loop  ileostomy reversal; cr ^ 2.1, had episode emesis this a.m.. plan: ivf 100cc/h, iv abx, wound packing, monitor renal function.tentative CEE over weekend, updated [facility] liason    Anticipated Discharge Plan  Anticipated Discharge Disposition: assisted living facility  Type of Home Care Services: nursing  Type of Outpatient Services: ostomy care  Patient/Family Anticipates Transition to: home with family  Patient/Family Anticipated Services at Transition: complementary therapies      Patient Choice  Offered/Gave Vendor List: no  Patient's Choice of Community Agency(s): [facility]         Discharge Transportation   Does the patient need discharge transport arranged?: No        Discharge Barriers   Barriers to Discharge: None  Afternoon review needed?: Yes  Comment: CM informed [facility] that patient to discharge today.  Participants: physical therapy, social work/services, physician,

## 2023-04-27 NOTE — PROGRESS NOTES
[facility] Daily Progress Note  Pager: [phone]    Subjective     Overnight 325 emesis, Patient seated up in bed, states she feels better after emesis episode. Reviewed results of CT with patient. Continues to have frequent bowel movements, flatus. Without pain.     Objective     Vital signs in last 24 hours:  Temp:  [36.3 °C (97.4 °F)-37.1 °C (98.7 °F)] 36.7 °C (98.1 °F)  Heart Rate:  [] 90  Resp:  [16-18] 17  BP: (110-134)/(58-84) 110/63      Intake/Output Summary (Last 24 hours) at 4/27/2023 0738  Last data filed at 4/27/2023 0308  Gross per 24 hour   Intake 490.66 ml   Output 825 ml   Net -334.34 ml     Intake/Output this shift:  No intake/output data recorded.    Physical Exam    General: awake and alert  HEENT: EOM intact, conjunctiva clear, sclera nonicteric  CV: Regular rate and rhythm  Pulm: No increased work of breathing  Abd: soft, nontender, nondistended, packing in place at former ostomy site, dressing cdi   Extr: no obvious abnormality  Neuro: Grossly normal    VTE Assessment: I have reassessed and the patient's VTE risk and treatment plan is appropriate.    Labs  CBC Results       04/26/23 04/25/23 04/24/23     0654 0731 0737    WBC 11.14 10.11 8.91    RBC 3.54 3.77 3.54    HGB 10.7 11.2 10.6    HCT 32.8 34.5 33.1    MCV 92.7 91.5 93.5    MCH 30.2 29.7 29.9    MCHC 32.6 32.5 32.0     282 261        CMP Results       04/26/23 04/25/23 04/24/23     0654 0731 0737     136 139    K 3.8 4.0 4.3    Cl 102 102 108    CO2 23 21 20    Glucose 106 122 63    BUN 19 15 12    Creatinine 2.1 1.7 1.4    Calcium 8.8 9.0 8.8    Anion Gap 12 13 11    Albumin -- -- 3.1    EGFR 22.8 29.1 36.4          Assessment/Plan     76 y/o F s/p loop ileostomy reversal on 4/19     Plan:  -CT 4/26 reviewed  -LR diet, self-regulate as able, IVF at 40 cc/HR  -F/u BMP  -Multimodal pain regimen  -Home meds  -Daily packing changes   -Activity as tolerated    Please page [phone] with any  questions/concerns.  ASAD Harris C

## 2023-04-28 VITALS
OXYGEN SATURATION: 98 % | HEIGHT: 60 IN | WEIGHT: 149.2 LBS | HEART RATE: 75 BPM | SYSTOLIC BLOOD PRESSURE: 103 MMHG | BODY MASS INDEX: 29.29 KG/M2 | RESPIRATION RATE: 12 BRPM | TEMPERATURE: 97.8 F | DIASTOLIC BLOOD PRESSURE: 54 MMHG

## 2023-04-28 LAB
ANION GAP SERPL CALC-SCNC: 9 MEQ/L (ref 3–15)
BASOPHILS # BLD: 0.03 K/UL (ref 0.01–0.1)
BASOPHILS NFR BLD: 0.3 %
BUN SERPL-MCNC: 14 MG/DL (ref 8–20)
CALCIUM SERPL-MCNC: 8.2 MG/DL (ref 8.9–10.3)
CHLORIDE SERPL-SCNC: 110 MEQ/L (ref 98–109)
CO2 SERPL-SCNC: 20 MEQ/L (ref 22–32)
CREAT SERPL-MCNC: 1.5 MG/DL (ref 0.6–1.1)
CRP SERPL-MCNC: 9.37 MG/L
DIFFERENTIAL METHOD BLD: ABNORMAL
EOSINOPHIL # BLD: 0.37 K/UL (ref 0.04–0.36)
EOSINOPHIL NFR BLD: 4 %
ERYTHROCYTE [DISTWIDTH] IN BLOOD BY AUTOMATED COUNT: 14.2 % (ref 11.7–14.4)
GFR SERPL CREATININE-BSD FRML MDRD: 33.6 ML/MIN/1.73M*2
GLUCOSE SERPL-MCNC: 83 MG/DL (ref 70–99)
HCT VFR BLDCO AUTO: 27.2 % (ref 35–45)
HGB BLD-MCNC: 8.7 G/DL (ref 11.8–15.7)
IMM GRANULOCYTES # BLD AUTO: 0.08 K/UL (ref 0–0.08)
IMM GRANULOCYTES NFR BLD AUTO: 0.9 %
LYMPHOCYTES # BLD: 1.66 K/UL (ref 1.2–3.5)
LYMPHOCYTES NFR BLD: 17.8 %
MAGNESIUM SERPL-MCNC: 1.7 MG/DL (ref 1.8–2.5)
MCH RBC QN AUTO: 30.2 PG (ref 28–33.2)
MCHC RBC AUTO-ENTMCNC: 32 G/DL (ref 32.2–35.5)
MCV RBC AUTO: 94.4 FL (ref 83–98)
MONOCYTES # BLD: 1.29 K/UL (ref 0.28–0.8)
MONOCYTES NFR BLD: 13.8 %
NEUTROPHILS # BLD: 5.91 K/UL (ref 1.7–7)
NEUTS SEG NFR BLD: 63.2 %
NRBC BLD-RTO: 0 %
PDW BLD AUTO: 9.9 FL (ref 9.4–12.3)
PHOSPHATE SERPL-MCNC: 3.4 MG/DL (ref 2.4–4.7)
PLATELET # BLD AUTO: 241 K/UL (ref 150–369)
POTASSIUM SERPL-SCNC: 4.1 MEQ/L (ref 3.6–5.1)
RBC # BLD AUTO: 2.88 M/UL (ref 3.93–5.22)
SODIUM SERPL-SCNC: 139 MEQ/L (ref 136–144)
WBC # BLD AUTO: 9.34 K/UL (ref 3.8–10.5)

## 2023-04-28 PROCEDURE — 63700000 HC SELF-ADMINISTRABLE DRUG: Performed by: STUDENT IN AN ORGANIZED HEALTH CARE EDUCATION/TRAINING PROGRAM

## 2023-04-28 PROCEDURE — 85025 COMPLETE CBC W/AUTO DIFF WBC: CPT | Performed by: STUDENT IN AN ORGANIZED HEALTH CARE EDUCATION/TRAINING PROGRAM

## 2023-04-28 PROCEDURE — 63700000 HC SELF-ADMINISTRABLE DRUG

## 2023-04-28 PROCEDURE — 83735 ASSAY OF MAGNESIUM: CPT | Performed by: STUDENT IN AN ORGANIZED HEALTH CARE EDUCATION/TRAINING PROGRAM

## 2023-04-28 PROCEDURE — 84100 ASSAY OF PHOSPHORUS: CPT | Performed by: STUDENT IN AN ORGANIZED HEALTH CARE EDUCATION/TRAINING PROGRAM

## 2023-04-28 PROCEDURE — 63600000 HC DRUGS/DETAIL CODE: Performed by: STUDENT IN AN ORGANIZED HEALTH CARE EDUCATION/TRAINING PROGRAM

## 2023-04-28 PROCEDURE — 25000000 HC PHARMACY GENERAL

## 2023-04-28 PROCEDURE — 36415 COLL VENOUS BLD VENIPUNCTURE: CPT | Performed by: STUDENT IN AN ORGANIZED HEALTH CARE EDUCATION/TRAINING PROGRAM

## 2023-04-28 PROCEDURE — 99024 POSTOP FOLLOW-UP VISIT: CPT | Performed by: COLON & RECTAL SURGERY

## 2023-04-28 PROCEDURE — 86140 C-REACTIVE PROTEIN: CPT | Performed by: STUDENT IN AN ORGANIZED HEALTH CARE EDUCATION/TRAINING PROGRAM

## 2023-04-28 PROCEDURE — 80048 BASIC METABOLIC PNL TOTAL CA: CPT | Performed by: STUDENT IN AN ORGANIZED HEALTH CARE EDUCATION/TRAINING PROGRAM

## 2023-04-28 RX ORDER — OXYCODONE HYDROCHLORIDE 5 MG/1
5 TABLET ORAL EVERY 4 HOURS PRN
Qty: 10 TABLET | Refills: 0 | Status: SHIPPED | OUTPATIENT
Start: 2023-04-28 | End: 2023-11-27

## 2023-04-28 RX ORDER — DOXYCYCLINE 100 MG/1
100 CAPSULE ORAL 2 TIMES DAILY
Qty: 18 CAPSULE | Refills: 0 | Status: SHIPPED | OUTPATIENT
Start: 2023-04-28 | End: 2023-04-28 | Stop reason: SDUPTHER

## 2023-04-28 RX ORDER — DOXYCYCLINE 100 MG/1
100 CAPSULE ORAL 2 TIMES DAILY
Qty: 18 CAPSULE | Refills: 0 | Status: SHIPPED | OUTPATIENT
Start: 2023-04-28 | End: 2023-05-07

## 2023-04-28 RX ADMIN — ACETAMINOPHEN 650 MG: 650 SOLUTION ORAL at 08:43

## 2023-04-28 RX ADMIN — GABAPENTIN 300 MG: 300 CAPSULE ORAL at 08:43

## 2023-04-28 RX ADMIN — Medication: at 14:22

## 2023-04-28 RX ADMIN — HEPARIN SODIUM 5000 UNITS: 5000 INJECTION, SOLUTION INTRAVENOUS; SUBCUTANEOUS at 14:21

## 2023-04-28 RX ADMIN — Medication: at 08:43

## 2023-04-28 RX ADMIN — PANTOPRAZOLE SODIUM 40 MG: 40 INJECTION, POWDER, LYOPHILIZED, FOR SOLUTION INTRAVENOUS at 08:43

## 2023-04-28 RX ADMIN — LEVOTHYROXINE SODIUM 88 MCG: 88 TABLET ORAL at 06:11

## 2023-04-28 RX ADMIN — HEPARIN SODIUM 5000 UNITS: 5000 INJECTION, SOLUTION INTRAVENOUS; SUBCUTANEOUS at 06:11

## 2023-04-28 RX ADMIN — GABAPENTIN 300 MG: 300 CAPSULE ORAL at 19:43

## 2023-04-28 RX ADMIN — MAGNESIUM SULFATE HEPTAHYDRATE 2 G: 40 INJECTION, SOLUTION INTRAVENOUS at 12:28

## 2023-04-28 NOTE — DISCHARGE SUMMARY
Inpatient Discharge Summary    BRIEF OVERVIEW  Admitting Provider: Geovani Mandel MD  Discharge Provider: Geovani Mandel MD  Primary Care Physician at Discharge: Javon ValadezDO [phone] ***    Admission Date: 4/19/2023     Discharge Date: 4/28/2023    Primary Discharge Diagnosis  Attention to ileostomy (CMS/[facility])    Secondary Discharge Diagnosis  ***    Discharge Disposition  [facility]  Code Status at Discharge: Full Code    Discharge Medications     Medication List      START taking these medications    * oxyCODONE 5 mg immediate release tablet  Commonly known as: ROXICODONE  Take 1 tablet (5 mg total) by mouth every 6 (six) hours as needed for severe pain for up to 15 days.  Dose: 5 mg     * oxyCODONE 5 mg immediate release tablet  Commonly known as: ROXICODONE  Take 1 tablet (5 mg total) by mouth every 4 (four) hours as needed for severe pain.  Dose: 5 mg         * This list has 2 medication(s) that are the same as other medications prescribed for you. Read the directions carefully, and ask your doctor or other care provider to review them with you.            CHANGE how you take these medications    doxycycline hyclate 100 mg capsule  Commonly known as: VIBRAMYCIN  Take 1 capsule (100 mg total) by mouth 2 (two) times a day for 9 days.  Dose: 100 mg  What changed:   · how much to take  · how to take this  · when to take this  · additional instructions        CONTINUE taking these medications    acetaminophen 325 mg tablet  Commonly known as: TYLENOL  Take 2 tablets (650 mg total) by mouth every 4 (four) hours as needed for pain for up to 30 doses.  Dose: 650 mg     bumetanide 1 mg tablet  Commonly known as: BUMEX  Take 1 mg by mouth daily. Daily prn  Dose: 1 mg     cholecalciferol (vitamin D3) 1,000 unit (25 mcg) tablet  Take 2,000 Units by mouth daily.  Dose: 2,000 Units     diphenoxylate-atropine 2.5-0.025 mg per tablet  Commonly known as: LomotiL  1-2 tabs PO QID, do not  exceed 8 per day.     glucosamine-D3-Boswellia serr 1,500-400-100 mg-unit-mg tablet  Take 1 tablet by mouth daily.  Dose: 1 tablet     levothyroxine 88 mcg tablet  Commonly known as: SYNTHROID  Take 88 mcg by mouth daily.  Dose: 88 mcg     pantoprazole 40 mg EC tablet  Commonly known as: PROTONIX  Take 40 mg by mouth once daily.  Dose: 40 mg     rosuvastatin 20 mg tablet  Commonly known as: CRESTOR  Take 1 tablet (20 mg total) by mouth daily.  Dose: 20 mg     silver sulfadiazine 1 % cream  Commonly known as: SILVADENE  Apply topically daily.            Active Issues Requiring Follow-up  ***    Outpatient Follow-Up            In 1 week Owen Gamez MD PhD [facility] Thoracic Surgery at [facility]    In 4 months Sonny Sanchez MD [facility] General Cardiology at [facility]              Test Results Pending at Discharge  Unresulted Labs (From admission, onward)     Start     Ordered    04/28/23 0600  CBC and differential  Daily      Question:  Release to patient  Answer:  Immediate   Start Status   04/29/23 0600 Scheduled   04/30/23 0600 Scheduled   05/01/23 0600 Scheduled       04/27/23 0943    04/28/23 0600  Basic metabolic panel  Daily      Question:  Release to patient  Answer:  Immediate   Start Status   04/29/23 0600 Scheduled   04/30/23 0600 Scheduled   05/01/23 0600 Scheduled       04/27/23 0943    04/28/23 0600  Magnesium  Daily      Question:  Release to patient  Answer:  Immediate   Start Status   04/29/23 0600 Scheduled   04/30/23 0600 Scheduled   05/01/23 0600 Scheduled       04/27/23 0943    04/28/23 0600  Phosphorus  Daily      Question:  Release to patient  Answer:  Immediate   Start Status   04/29/23 0600 Scheduled   04/30/23 0600 Scheduled   05/01/23 0600 Scheduled       04/27/23 0943    04/20/23 0600  Albumin  Every Mon/Thu      Question:  Release to patient  Answer:  Immediate   Start Status   05/01/23 0600 Scheduled   05/04/23 0600 Scheduled    05/08/23 0600 Scheduled   05/11/23 0600 Scheduled       04/19/23 1424    04/20/23 0600  C-reactive protein  Daily      Question:  Release to patient  Answer:  Immediate   Start Status   04/29/23 0600 Scheduled   04/30/23 0600 Scheduled   05/01/23 0600 Scheduled   05/02/23 0600 Scheduled       04/19/23 1424    04/20/23 0600  Prealbumin  Every Mon/Thu      Question:  Release to patient  Answer:  Immediate   Start Status   05/01/23 0600 Scheduled   05/04/23 0600 Scheduled   05/08/23 0600 Scheduled   05/11/23 0600 Scheduled       04/19/23 1424                DETAILS OF HOSPITAL STAY    Presenting Problem/History of Present Illness  Colovaginal fistula [N82.4]  Attention to ileostomy (CMS/[facility]) [Z43.2]  ***    Operative Procedures Performed  Procedure(s):  Stoma closure, flexible sigmoidoscopy      Hospital Course  ***    4/27: IV doxy, feurea slightly pre-renal, IVF to 100 d/t KARL  4/26: 500cc bolus, CT AP. O/N: emesis  4/25: Emesis in AM. +Flatus PM. Held lasix.   4/24: apple juice, 20mg lasix, packing change. O/N 400cc emesis, encouraged self-regulate  4/23: 20 Lasix, 100cc emesis s/p apple sauce, nausea improved  4/22: lasix 20, daily wts  4/21: LR/IVF40 dinner, desitin  4/20: Fc dc (void 250, PVR 32),   4/19: OR, NPO/coffee tid         Discharge Disposition  Disposition: Home Health Care - [facility]  Destination: Home

## 2023-04-28 NOTE — PLAN OF CARE
Care Coordination Discharge Plan Note   Date: 4/28/2023    Time: 1:43 PM    Patient Name: Raegan Young  Medical Record Number: [id number]  YOB: 1945  Room/BED: 1017    Discharge Assessment  Current Discharge Risk: chronically ill  Concerns to be Addressed: discharge planning, care coordination/care conferences  Anticipated Changes Related to Illness: none    Concerns Comments: - POD #9; likely for dc over the weekend pending labs ( mild KARL). Updated [facility] liason    Anticipated Discharge Plan  Anticipated Discharge Disposition: assisted living facility  Type of Home Care Services: nursing  Type of Outpatient Services: ostomy care  Patient/Family Anticipates Transition to: home with family  Patient/Family Anticipated Services at Transition: complementary therapies      Patient Choice  Offered/Gave Vendor List: no  Patient's Choice of Community Agency(s): [facility]         Discharge Transportation   Does the patient need discharge transport arranged?: No        Discharge Barriers   Barriers to Discharge: None  Afternoon review needed?: Yes  Comment: CM informed [facility] that patient to discharge today.  Participants: social work/services, physical therapy, physician,

## 2023-04-28 NOTE — PROGRESS NOTES
[facility] Daily Progress Note  Pager: [phone]    Subjective   NAEO. Seen resting comfortably in bed. Endorses edema of lower extremities. Otherwise no pain.   Tolerating LRD. Endorses BM and flatus.  Ambulating throughout hallways. Multiple laps.     Denies chest pain, fever, chills, nausea, vomiting.       Objective     Vital signs in last 24 hours:  Temp:  [36.5 °C (97.7 °F)-37 °C (98.6 °F)] 36.6 °C (97.8 °F)  Heart Rate:  [75-95] 75  Resp:  [12-18] 12  BP: (103-124)/(53-60) 103/54      Intake/Output Summary (Last 24 hours) at 4/28/2023 1140  Last data filed at 4/28/2023 0609  Gross per 24 hour   Intake 3054.67 ml   Output 650 ml   Net 2404.67 ml     Intake/Output this shift:  No intake/output data recorded.    Physical Exam    General: awake and alert  HEENT: EOM intact, conjunctiva clear, sclera nonicteric  CV: Regular rate and rhythm  Pulm: No increased work of breathing  Abd: soft, nontender, nondistended, packing in place at former ostomy site, dressing cdi   Extr: no obvious abnormality  Neuro: Grossly normal    VTE Assessment: I have reassessed and the patient's VTE risk and treatment plan is appropriate.    Labs  CBC Results       04/26/23 04/25/23 04/24/23     0654 0731 0737    WBC 11.14 10.11 8.91    RBC 3.54 3.77 3.54    HGB 10.7 11.2 10.6    HCT 32.8 34.5 33.1    MCV 92.7 91.5 93.5    MCH 30.2 29.7 29.9    MCHC 32.6 32.5 32.0     282 261        CMP Results       04/26/23 04/25/23 04/24/23     0654 0731 0737     136 139    K 3.8 4.0 4.3    Cl 102 102 108    CO2 23 21 20    Glucose 106 122 63    BUN 19 15 12    Creatinine 2.1 1.7 1.4    Calcium 8.8 9.0 8.8    Anion Gap 12 13 11    Albumin -- -- 3.1    EGFR 22.8 29.1 36.4          Assessment/Plan     76 y/o F s/p loop ileostomy reversal on 4/19     Plan:  -CT 4/26 reviewed  -LR diet, self-regulate as able, IVF at 40 cc/HR  -F/u BMP  -Multimodal pain regimen  -Home meds  -Daily packing changes   - Creatinine  down-trending  -Activity as tolerated  - Anticipate discharge today.     Please page [phone] with any questions/concerns.  Venkatesh Elmore MD

## 2023-05-01 ENCOUNTER — TELEPHONE (OUTPATIENT)
Dept: COLORECTAL SURGERY | Facility: CLINIC | Age: 78
End: 2023-05-01
Payer: MEDICARE

## 2023-05-01 RX ORDER — MOXIFLOXACIN HYDROCHLORIDE 400 MG/1
400 TABLET ORAL DAILY
Qty: 7 TABLET | Refills: 0 | Status: SHIPPED | OUTPATIENT
Start: 2023-05-01 | End: 2023-05-08

## 2023-05-01 NOTE — TELEPHONE ENCOUNTER
Spoke w/ pt who feels she is not able to tolerate doxycyline well, self discontinued last night. Developed nausea and low appetite with each dose. Per Shila CROWLEY will send 7 day course of avelox, ask pt to please eat prior to medication and do not lie down for 30 minutes following. Asked her to please call back if symptoms persist or has additional concerns.

## 2023-05-09 ENCOUNTER — TELEPHONE (OUTPATIENT)
Dept: COLORECTAL SURGERY | Facility: CLINIC | Age: 78
End: 2023-05-09
Payer: MEDICARE

## 2023-05-09 NOTE — TELEPHONE ENCOUNTER
"[id number]: [id number] - 4 pills left                         [id number] - empty    Number of bottles dispensed:  2  Lot numbers:  [id number]                          [id number]    Spoke w/ pt who is having increased episodes of diarrhea and clustering since stoma reversal. She states the episodes occur about twice daily, mostly after meal times, can last up to two hours. She states she took imodium last evening which seemed to help. Advised pt can be common after reversal, per Shila CROWLEY will initiate fiber supplementation nightly, pt can slowly advance diet as tolerated, maintence  one imodium 30 minutes prior to breakfast and prior to dinner. She verbalized understanding, will call back with any ongoing questions or concerns.    Loren Jacobson RN

## 2023-05-15 ENCOUNTER — DOCUMENTATION (OUTPATIENT)
Dept: COLORECTAL SURGERY | Facility: CLINIC | Age: 78
End: 2023-05-15
Payer: MEDICARE

## 2023-05-15 NOTE — PROGRESS NOTES
1         2             3              4             5     LAST APPOINTMENT: 04/03/2024     TIME ARRIVED:     TIME ROOMED:     VISIT TYPE:   NP       POV     EPV     DISC:             YES         NO                                   PREPPED:  YES  NO     LABS:  [facility]  [facility]  [facility]  OTHER      PHARMACY ENTERED:  YES   NO     DIAGNOSIS: Low anterior resection syndrome      SURGERY  : EUA with biopsies, colonoscopy      DATE OF SURGERY:07/09/2021 Rectal EUA and flexible sigmoidoscopy        PATHOLOGY STAGE:   RADIATION :    Yes   No                                           DOSAGE:      LOCATION:   Anterior   Right Lateral   Left Lateral   Posterior   Circumferential      LEVEL:                        SIZE:     CT SCAN: 08/05/2024  FFC:    FFS:    CEA:    PET:    MRI:      OTHER:  BE  02/20/2023     FIBER:  NO  METAMUCIL  KONSYL  CITYRUCEL   FIBERCON  BENEFIBER OTHER  LOMOTIL:    NO    1  VS   2    QD    BID    TID    QID  ZANTAC:       Y / N  AMPHOGEL:    Y / N                                                                          START / STOP                                                 RAD ONC:                              N / A        MED ONC:                             N / A

## 2023-05-17 ENCOUNTER — OFFICE VISIT (OUTPATIENT)
Dept: COLORECTAL SURGERY | Facility: CLINIC | Age: 78
End: 2023-05-17
Payer: MEDICARE

## 2023-05-17 VITALS — BODY MASS INDEX: 28.47 KG/M2 | HEIGHT: 60 IN | WEIGHT: 145 LBS

## 2023-05-17 DIAGNOSIS — Z43.2 ATTENTION TO ILEOSTOMY (CMS/HCC): ICD-10-CM

## 2023-05-17 DIAGNOSIS — N82.4 COLOVAGINAL FISTULA: Primary | ICD-10-CM

## 2023-05-17 PROCEDURE — 99024 POSTOP FOLLOW-UP VISIT: CPT | Performed by: COLON & RECTAL SURGERY

## 2023-05-17 NOTE — LETTER
May 21, 2023     Javon Valadez DO  [address]  [place] [address]    Patient: Raegan Young  YOB: 1945  Date of Visit: 5/17/2023      Dear Dr. Valadez:    Thank you for referring Raegan Young to me for evaluation. Below are my notes for this consultation.    If you have questions, please do not hesitate to call me. I look forward to following your patient along with you.         Sincerely,        Geovani Mandel MD        CC: MD Trev Mac MD Schoonyoung, Henry P, MD  5/21/2023  2:14 PM  Signed  HISTORY & PHYSICAL EXAM    HPI: Raegan is here to be seen today for follow-up.  She had a stoma closure last month.  She is been doing well, has been having difficulty controlling her bowels and is having lots of liquid bowel movements that are recently becoming more mushy.  She has been on a low residue diet this whole time.  She has no fevers, chills, nausea, or vomiting.    Past Medical History:   Diagnosis Date   • Anemia    • Arthritis    • Colovaginal fistula    • COVID-19 vaccine series completed 02/26/2021   • Disease of thyroid gland    • Diverticulitis of colon    • GERD (gastroesophageal reflux disease)    • H/O ileostomy    • Hiatal hernia    • History of snoring    • Hypertension    • Hypothyroidism    • Lung cancer (CMS/HCC)    • Lung nodule    • Vaginal cancer (CMS/HCC) 2018    s/p XRT x 7 weeks and chemo weekly x 5 weeks       Past Surgical History:   Procedure Laterality Date   • ABDOMINAL SURGERY     • BOWEL RESECTION      partial with ileostomy   • CHOLECYSTECTOMY     • COLONOSCOPY     • DILATION AND CURETTAGE OF UTERUS  02/2018   • ILEOSTOMY     • LUNG SURGERY     • ROTATOR CUFF REPAIR Right    • TONSILLECTOMY     • WISDOM TOOTH EXTRACTION      hx of       Current Outpatient Medications:   •  acetaminophen (TYLENOL) 325 mg tablet, Take 2 tablets (650 mg total) by mouth every 4 (four) hours as needed for pain for up to 30  doses., Disp: 30 tablet, Rfl: 0  •  bumetanide (BUMEX) 1 mg tablet, Take 1 mg by mouth daily. Daily prn, Disp: , Rfl:   •  cholecalciferol, vitamin D3, 1,000 unit (25 mcg) tablet, Take 2,000 Units by mouth daily., Disp: , Rfl:   •  diphenoxylate-atropine (LomotiL) 2.5-0.025 mg per tablet, 1-2 tabs PO QID, do not exceed 8 per day., Disp: 240 tablet, Rfl: 3  •  glucosamine-D3-Boswellia serr 1,500-400-100 mg-unit-mg tablet, Take 1 tablet by mouth daily., Disp: , Rfl:   •  levothyroxine (SYNTHROID) 88 mcg tablet, Take 88 mcg by mouth daily.  , Disp: , Rfl: 3  •  pantoprazole (PROTONIX) 40 mg EC tablet, Take 40 mg by mouth once daily., Disp: , Rfl:   •  silver sulfadiazine (SILVADENE) 1 % cream, Apply topically daily., Disp: 50 g, Rfl: 0  •  oxyCODONE (ROXICODONE) 5 mg immediate release tablet, Take 1 tablet (5 mg total) by mouth every 4 (four) hours as needed for severe pain. (Patient not taking: Reported on 2023), Disp: 10 tablet, Rfl: 0  •  rosuvastatin (CRESTOR) 20 mg tablet, Take 1 tablet (20 mg total) by mouth daily. (Patient not taking: Reported on 2023), Disp: 90 tablet, Rfl: 1   Allergies   Allergen Reactions   • Adhesive Tape-Silicones      Causes bruising   • Sulfamethoxazole-Trimethoprim Nausea Only     Social History     Socioeconomic History   • Marital status:      Spouse name: Royal    • Number of children: 4   • Years of education: Not on file   • Highest education level: Not on file   Occupational History   • Occupation: retired/     Tobacco Use   • Smoking status: Former     Packs/day: 1.00     Years: 40.00     Pack years: 40.00     Types: Cigarettes     Start date:      Quit date:      Years since quittin.4   • Smokeless tobacco: Never   Vaping Use   • Vaping status: Never Used   Substance and Sexual Activity   • Alcohol use: Yes     Alcohol/week: 1.0 standard drink of alcohol     Types: 1 Glasses of wine per week     Comment: occassional    • Drug use: No   •  Sexual activity: Never     Comment:    Other Topics Concern   • Not on file   Social History Narrative    Lives with  in a 2 story home     Social Determinants of Health     Financial Resource Strain: Low Risk  (4/20/2023)    Overall Financial Resource Strain (CARDIA)    • Difficulty of Paying Living Expenses: Not hard at all   Food Insecurity: No Food Insecurity (10/30/2022)    Hunger Vital Sign    • Worried About Running Out of Food in the Last Year: Never true    • Ran Out of Food in the Last Year: Never true   Transportation Needs: No Transportation Needs (4/20/2023)    PRAPARE - Transportation    • Lack of Transportation (Medical): No    • Lack of Transportation (Non-Medical): No   Physical Activity: Not on file   Stress: Not on file   Social Connections: Not on file   Intimate Partner Violence: Not on file   Housing Stability: Low Risk  (4/20/2023)    Housing Stability Vital Sign    • Unable to Pay for Housing in the Last Year: No    • Number of Places Lived in the Last Year: 1    • Unstable Housing in the Last Year: No      Family History   Problem Relation Age of Onset   • Heart attack Biological Mother    • Hypertension Biological Mother    • Endometrial cancer Biological Father    • Lung cancer Biological Father    • Breast cancer Neg Hx    • Cancer Neg Hx    • Colon cancer Neg Hx    • Ovarian cancer Neg Hx        REVIEW OF SYSTEMS: Unchanged    PHYSICAL EXAM:  GEN: On examination the patient is resting comfortably.  NEURO/PSYCH: Alert and oriented ×3  HEENT: Eyes: Sclera anicteric  CHEST: Equal rise and fall the chest.  No tenderness bilaterally.  SKIN: Warm and moist  NODES: No groin or cervical lymphadenopathy  EXT: No palpable edema of the bilateral lower extremities.  No clubbing.  GI: Abdomen soft, nontender, nondistended.  No palpable liver or spleen edge.  No ventral hernias, mass, or tenderness.  The stoma closure wound is healing nicely stitch was removed.  RECTAL: Perianal skin is  normal with radiation changes present, stable.  Digital exam reveals normal sphincter tone, no masses palpable.      ASSESSMENT/PLAN:     Attention to ileostomy (CMS/[facility])  Raegan is doing fantastic.  Her bowels are uncontrolled at this point however they are getting more solid.  We will start her on fiber supplementation daily with Konsyl and have her on Lomotil to slow things down.  We will see her back in 3 months time hopefully at that point things have improved significantly.

## 2023-05-21 NOTE — PROGRESS NOTES
HISTORY & PHYSICAL EXAM    HPI: Raegan is here to be seen today for follow-up.  She had a stoma closure last month.  She is been doing well, has been having difficulty controlling her bowels and is having lots of liquid bowel movements that are recently becoming more mushy.  She has been on a low residue diet this whole time.  She has no fevers, chills, nausea, or vomiting.    Past Medical History:   Diagnosis Date   • Anemia    • Arthritis    • Colovaginal fistula    • COVID-19 vaccine series completed 02/26/2021   • Disease of thyroid gland    • Diverticulitis of colon    • GERD (gastroesophageal reflux disease)    • H/O ileostomy    • Hiatal hernia    • History of snoring    • Hypertension    • Hypothyroidism    • Lung cancer (CMS/HCC)    • Lung nodule    • Vaginal cancer (CMS/HCC) 2018    s/p XRT x 7 weeks and chemo weekly x 5 weeks       Past Surgical History:   Procedure Laterality Date   • ABDOMINAL SURGERY     • BOWEL RESECTION      partial with ileostomy   • CHOLECYSTECTOMY     • COLONOSCOPY     • DILATION AND CURETTAGE OF UTERUS  02/2018   • ILEOSTOMY     • LUNG SURGERY     • ROTATOR CUFF REPAIR Right    • TONSILLECTOMY     • WISDOM TOOTH EXTRACTION      hx of       Current Outpatient Medications:   •  acetaminophen (TYLENOL) 325 mg tablet, Take 2 tablets (650 mg total) by mouth every 4 (four) hours as needed for pain for up to 30 doses., Disp: 30 tablet, Rfl: 0  •  bumetanide (BUMEX) 1 mg tablet, Take 1 mg by mouth daily. Daily prn, Disp: , Rfl:   •  cholecalciferol, vitamin D3, 1,000 unit (25 mcg) tablet, Take 2,000 Units by mouth daily., Disp: , Rfl:   •  diphenoxylate-atropine (LomotiL) 2.5-0.025 mg per tablet, 1-2 tabs PO QID, do not exceed 8 per day., Disp: 240 tablet, Rfl: 3  •  glucosamine-D3-Boswellia serr 1,500-400-100 mg-unit-mg tablet, Take 1 tablet by mouth daily., Disp: , Rfl:   •  levothyroxine (SYNTHROID) 88 mcg tablet, Take 88 mcg by mouth daily.  , Disp: , Rfl: 3  •  pantoprazole  (PROTONIX) 40 mg EC tablet, Take 40 mg by mouth once daily., Disp: , Rfl:   •  silver sulfadiazine (SILVADENE) 1 % cream, Apply topically daily., Disp: 50 g, Rfl: 0  •  oxyCODONE (ROXICODONE) 5 mg immediate release tablet, Take 1 tablet (5 mg total) by mouth every 4 (four) hours as needed for severe pain. (Patient not taking: Reported on 2023), Disp: 10 tablet, Rfl: 0  •  rosuvastatin (CRESTOR) 20 mg tablet, Take 1 tablet (20 mg total) by mouth daily. (Patient not taking: Reported on 2023), Disp: 90 tablet, Rfl: 1   Allergies   Allergen Reactions   • Adhesive Tape-Silicones      Causes bruising   • Sulfamethoxazole-Trimethoprim Nausea Only     Social History     Socioeconomic History   • Marital status:      Spouse name: Royal    • Number of children: 4   • Years of education: Not on file   • Highest education level: Not on file   Occupational History   • Occupation: retired/     Tobacco Use   • Smoking status: Former     Packs/day: 1.00     Years: 40.00     Pack years: 40.00     Types: Cigarettes     Start date:      Quit date:      Years since quittin.4   • Smokeless tobacco: Never   Vaping Use   • Vaping status: Never Used   Substance and Sexual Activity   • Alcohol use: Yes     Alcohol/week: 1.0 standard drink of alcohol     Types: 1 Glasses of wine per week     Comment: occassional    • Drug use: No   • Sexual activity: Never     Comment:    Other Topics Concern   • Not on file   Social History Narrative    Lives with  in a 2 story home     Social Determinants of Health     Financial Resource Strain: Low Risk  (2023)    Overall Financial Resource Strain (CARDIA)    • Difficulty of Paying Living Expenses: Not hard at all   Food Insecurity: No Food Insecurity (10/30/2022)    Hunger Vital Sign    • Worried About Running Out of Food in the Last Year: Never true    • Ran Out of Food in the Last Year: Never true   Transportation Needs: No Transportation  Needs (4/20/2023)    PRAPARE - Transportation    • Lack of Transportation (Medical): No    • Lack of Transportation (Non-Medical): No   Physical Activity: Not on file   Stress: Not on file   Social Connections: Not on file   Intimate Partner Violence: Not on file   Housing Stability: Low Risk  (4/20/2023)    Housing Stability Vital Sign    • Unable to Pay for Housing in the Last Year: No    • Number of Places Lived in the Last Year: 1    • Unstable Housing in the Last Year: No      Family History   Problem Relation Age of Onset   • Heart attack Biological Mother    • Hypertension Biological Mother    • Endometrial cancer Biological Father    • Lung cancer Biological Father    • Breast cancer Neg Hx    • Cancer Neg Hx    • Colon cancer Neg Hx    • Ovarian cancer Neg Hx        REVIEW OF SYSTEMS: Unchanged    PHYSICAL EXAM:  GEN: On examination the patient is resting comfortably.  NEURO/PSYCH: Alert and oriented ×3  HEENT: Eyes: Sclera anicteric  CHEST: Equal rise and fall the chest.  No tenderness bilaterally.  SKIN: Warm and moist  NODES: No groin or cervical lymphadenopathy  EXT: No palpable edema of the bilateral lower extremities.  No clubbing.  GI: Abdomen soft, nontender, nondistended.  No palpable liver or spleen edge.  No ventral hernias, mass, or tenderness.  The stoma closure wound is healing nicely stitch was removed.  RECTAL: Perianal skin is normal with radiation changes present, stable.  Digital exam reveals normal sphincter tone, no masses palpable.      ASSESSMENT/PLAN:     Attention to ileostomy (CMS/HCC)  Raegan is doing fantastic.  Her bowels are uncontrolled at this point however they are getting more solid.  We will start her on fiber supplementation daily with Konsyl and have her on Lomotil to slow things down.  We will see her back in 3 months time hopefully at that point things have improved significantly.

## 2023-05-21 NOTE — ASSESSMENT & PLAN NOTE
Raegan is doing fantastic.  Her bowels are uncontrolled at this point however they are getting more solid.  We will start her on fiber supplementation daily with Konsyl and have her on Lomotil to slow things down.  We will see her back in 3 months time hopefully at that point things have improved significantly.

## 2023-06-21 ENCOUNTER — HOSPITAL ENCOUNTER (OUTPATIENT)
Dept: RADIOLOGY | Facility: HOSPITAL | Age: 78
Discharge: HOME | End: 2023-06-21
Attending: PHYSICIAN ASSISTANT
Payer: MEDICARE

## 2023-06-21 DIAGNOSIS — C34.12 MALIGNANT NEOPLASM OF UPPER LOBE OF LEFT LUNG (CMS/HCC): ICD-10-CM

## 2023-06-21 PROCEDURE — G1004 CDSM NDSC: HCPCS

## 2023-06-21 PROCEDURE — 71250 CT THORAX DX C-: CPT | Mod: ME

## 2023-06-26 NOTE — LETTER
September 15, 2022    Patient: Raegan Young   YOB: 1945   Date of Visit: 9/14/2022       Dear Dr. Valadez:    The patient is seen back at the [facility] today in follow up with regard to her   1. History of cancer of vagina    2. Post-menopause atrophic vaginitis    3. Colovaginal fistula    4. Acute vaginitis    . Below is my assessment and plan of care.    Her vaginal cuff is well-healed without evidence of fistula.  I am prescribing vaginal estrogen for her atrophy and irritative symptoms and she will return again in 3 months.       Sincerely,        Trev Jimenez MD  CC: Geovani Mandel MD     Olumiant Pregnancy And Lactation Text: Based on animal studies, Olumiant may cause embryo-fetal harm when administered to pregnant women.  The medication should not be used in pregnancy.  Breastfeeding is not recommended during treatment.

## 2023-07-07 RX ORDER — ROSUVASTATIN CALCIUM 20 MG/1
20 TABLET, COATED ORAL DAILY
Qty: 90 TABLET | Refills: 1 | Status: SHIPPED | OUTPATIENT
Start: 2023-07-07 | End: 2023-12-27

## 2023-07-12 ENCOUNTER — OFFICE VISIT (OUTPATIENT)
Dept: COLORECTAL SURGERY | Facility: CLINIC | Age: 78
End: 2023-07-12
Payer: MEDICARE

## 2023-07-12 VITALS — WEIGHT: 134 LBS | BODY MASS INDEX: 22.88 KG/M2 | HEIGHT: 64 IN

## 2023-07-12 DIAGNOSIS — N82.4 COLOVAGINAL FISTULA: Primary | ICD-10-CM

## 2023-07-12 PROCEDURE — 99024 POSTOP FOLLOW-UP VISIT: CPT

## 2023-07-12 RX ORDER — ROSUVASTATIN CALCIUM 20 MG/1
20 TABLET, COATED ORAL DAILY
Qty: 90 TABLET | Refills: 1 | OUTPATIENT
Start: 2023-07-12

## 2023-07-12 NOTE — TELEPHONE ENCOUNTER
Refill request received from     Last appt w/ Dr. Sanchez 9/22/23    Upcoming appt on 3/22/23    Refill denied, refilled for 6 months on 7/7/23

## 2023-07-12 NOTE — ASSESSMENT & PLAN NOTE
Raegan is doing very well.  We discussed regulating her Lomotil use to 30 minutes prior to eating breakfast lunch and dinner.  She can take additional as needed when she goes out.  She deferred rectal examination today.  She will follow-up with Dr. Mandel in 3 months time prepped with 2 Fleet enemas.

## 2023-07-12 NOTE — PROGRESS NOTES
HISTORY & PHYSICAL EXAM    HPI: Raegan is nearing 3 months post stoma closure.  She reports that things are slowly improving.  Her appetite has increased and her energy level has increased.  She states she is now going to the bathroom roughly 3 times a day with occasional clustering.  She is taking roughly 5 Lomotil's per day now.  She denies nausea, vomiting or fever, chills.    She does tell me she had 1 episode of rectal bleeding on July 1.  This occurred with a bowel movement and stopped after a bowel movement.    Past Medical History:   Diagnosis Date   • Anemia    • Arthritis    • Colovaginal fistula    • COVID-19 vaccine series completed 02/26/2021   • Disease of thyroid gland    • Diverticulitis of colon    • GERD (gastroesophageal reflux disease)    • H/O ileostomy    • Hiatal hernia    • History of snoring    • Hypertension    • Hypothyroidism    • Lung cancer (CMS/HCC)    • Lung nodule    • Vaginal cancer (CMS/HCC) 2018    s/p XRT x 7 weeks and chemo weekly x 5 weeks       Past Surgical History:   Procedure Laterality Date   • ABDOMINAL SURGERY     • BOWEL RESECTION      partial with ileostomy   • CHOLECYSTECTOMY     • COLONOSCOPY     • DILATION AND CURETTAGE OF UTERUS  02/2018   • ILEOSTOMY     • LUNG SURGERY     • ROTATOR CUFF REPAIR Right    • TONSILLECTOMY     • WISDOM TOOTH EXTRACTION      hx of       Current Outpatient Medications:   •  acetaminophen (TYLENOL) 325 mg tablet, Take 2 tablets (650 mg total) by mouth every 4 (four) hours as needed for pain for up to 30 doses., Disp: 30 tablet, Rfl: 0  •  bumetanide (BUMEX) 1 mg tablet, Take 1 mg by mouth daily. Daily prn, Disp: , Rfl:   •  cholecalciferol, vitamin D3, 1,000 unit (25 mcg) tablet, Take 2,000 Units by mouth daily., Disp: , Rfl:   •  glucosamine-D3-Boswellia serr 1,500-400-100 mg-unit-mg tablet, Take 1 tablet by mouth daily., Disp: , Rfl:   •  levothyroxine (SYNTHROID) 88 mcg tablet, Take 88 mcg by mouth daily.  , Disp: , Rfl: 3  •   pantoprazole (PROTONIX) 40 mg EC tablet, Take 40 mg by mouth once daily., Disp: , Rfl:   •  rosuvastatin (CRESTOR) 20 mg tablet, TAKE 1 TABLET BY MOUTH EVERY DAY, Disp: 90 tablet, Rfl: 1  •  silver sulfadiazine (SILVADENE) 1 % cream, Apply topically daily., Disp: 50 g, Rfl: 0  •  diphenoxylate-atropine (LomotiL) 2.5-0.025 mg per tablet, 1-2 tabs PO QID, do not exceed 8 per day. (Patient not taking: Reported on 2023), Disp: 240 tablet, Rfl: 3  •  oxyCODONE (ROXICODONE) 5 mg immediate release tablet, Take 1 tablet (5 mg total) by mouth every 4 (four) hours as needed for severe pain. (Patient not taking: Reported on 2023), Disp: 10 tablet, Rfl: 0   Allergies   Allergen Reactions   • Adhesive Tape-Silicones      Causes bruising   • Sulfamethoxazole-Trimethoprim Nausea Only     Social History     Socioeconomic History   • Marital status:      Spouse name: Royal    • Number of children: 4   • Years of education: Not on file   • Highest education level: Not on file   Occupational History   • Occupation: retired/     Tobacco Use   • Smoking status: Former     Packs/day: 1.00     Years: 40.00     Pack years: 40.00     Types: Cigarettes     Start date:      Quit date:      Years since quittin.5   • Smokeless tobacco: Never   Vaping Use   • Vaping Use: Never used   Substance and Sexual Activity   • Alcohol use: Yes     Alcohol/week: 1.0 standard drink of alcohol     Types: 1 Glasses of wine per week     Comment: occassional    • Drug use: No   • Sexual activity: Never     Comment:    Other Topics Concern   • Not on file   Social History Narrative    Lives with  in a 2 story home     Social Determinants of Health     Financial Resource Strain: Low Risk  (2023)    Overall Financial Resource Strain (CARDIA)    • Difficulty of Paying Living Expenses: Not hard at all   Food Insecurity: No Food Insecurity (10/30/2022)    Hunger Vital Sign    • Worried About Running Out of  Food in the Last Year: Never true    • Ran Out of Food in the Last Year: Never true   Transportation Needs: No Transportation Needs (4/20/2023)    PRAPARE - Transportation    • Lack of Transportation (Medical): No    • Lack of Transportation (Non-Medical): No   Physical Activity: Not on file   Stress: Not on file   Social Connections: Not on file   Intimate Partner Violence: Not on file   Housing Stability: Low Risk  (4/20/2023)    Housing Stability Vital Sign    • Unable to Pay for Housing in the Last Year: No    • Number of Places Lived in the Last Year: 1    • Unstable Housing in the Last Year: No      Family History   Problem Relation Age of Onset   • Heart attack Biological Mother    • Hypertension Biological Mother    • Endometrial cancer Biological Father    • Lung cancer Biological Father    • Breast cancer Neg Hx    • Cancer Neg Hx    • Colon cancer Neg Hx    • Ovarian cancer Neg Hx        REVIEW OF SYSTEMS: Pertinent positives and negatives as mentioned in HPI.    PHYSICAL EXAM:  GEN: On examination the patient is resting comfortably.  NEURO/PSYCH: Alert and oriented ×3  HEENT: Eyes: Sclera anicteric  CHEST: Equal rise and fall the chest.  No tenderness bilaterally.  SKIN: Warm and moist  NODES: No groin or cervical lymphadenopathy  EXT: No palpable edema of the bilateral lower extremities.  No clubbing.  GI: Abdomen soft, nontender, nondistended.  No palpable liver or spleen edge.  No ventral hernias, mass, or tenderness.  Stoma closure site fully healed.  RECTAL: Patient declined rectal examination today.          ASSESSMENT/PLAN:     Colovaginal fistula  Raegan is doing very well.  We discussed regulating her Lomotil use to 30 minutes prior to eating breakfast lunch and dinner.  She can take additional as needed when she goes out.  She deferred rectal examination today.  She will follow-up with Dr. Mandel in 3 months time prepped with 2 Fleet enemas.

## 2023-07-27 ENCOUNTER — OFFICE VISIT (OUTPATIENT)
Dept: THORACIC SURGERY | Facility: CLINIC | Age: 78
End: 2023-07-27
Payer: MEDICARE

## 2023-07-27 VITALS
RESPIRATION RATE: 20 BRPM | DIASTOLIC BLOOD PRESSURE: 63 MMHG | BODY MASS INDEX: 22.81 KG/M2 | OXYGEN SATURATION: 97 % | SYSTOLIC BLOOD PRESSURE: 142 MMHG | WEIGHT: 133.6 LBS | HEART RATE: 91 BPM | HEIGHT: 64 IN

## 2023-07-27 DIAGNOSIS — C34.92 ADENOCARCINOMA OF LEFT LUNG (CMS/HCC): Primary | ICD-10-CM

## 2023-07-27 PROCEDURE — G8753 SYS BP > OR = 140: HCPCS | Performed by: THORACIC SURGERY (CARDIOTHORACIC VASCULAR SURGERY)

## 2023-07-27 PROCEDURE — G8754 DIAS BP LESS 90: HCPCS | Performed by: THORACIC SURGERY (CARDIOTHORACIC VASCULAR SURGERY)

## 2023-07-27 PROCEDURE — 99214 OFFICE O/P EST MOD 30 MIN: CPT | Performed by: THORACIC SURGERY (CARDIOTHORACIC VASCULAR SURGERY)

## 2023-07-27 RX ORDER — ALLOPURINOL 200 MG/1
TABLET ORAL
COMMUNITY
Start: 2023-07-26

## 2023-07-27 NOTE — PROGRESS NOTES
Thoracic Surgery    Patient ID: Raegan Young                              : 1945  MRN: [id number]  Clinic: [facility]                                            Visit Date: 2023  Encounter Provider: Owen Gamez  Referring Provider: Geovani Mandel MD         Patient: Raegan Young  Chief Complaint: NSCLC follow-up - synchronous stage I adenocarcinomas of the lung treated with robot-assisted left upper lobectomy on 10/21/2021    Subjective     Raegan Young is a delightful 77 y.o. 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  Her resection was uneventful on 10/21/2021.  In the interim she has had repair of the rectovaginal fistula and then subsequent closure of the ileostomy (2023).     Her ROS is notable for absence of cough, dyspnea, hemoptysis, fever, and chills.  Her energy level is good.  He has had weight loss.  The weight loss seems to have stabilized.  She continues to have significant GI complaints with urgency and loose stools.  She is scheduled in November to meet with colorectal team.  She will be reaching out for an earlier appointment.         Past Medical History:  has a past medical history of Anemia, Arthritis, Colovaginal fistula, COVID-19 vaccine series completed (2021), Disease of thyroid gland, Diverticulitis of colon, GERD (gastroesophageal reflux disease), H/O ileostomy, Hiatal hernia, History of snoring, Hypertension, Hypothyroidism, Lung cancer (CMS/HCC), Lung nodule, and Vaginal cancer (CMS/HCC) ().    She has no past medical history of Diabetes mellitus (CMS/HCC) or Myocardial infarction (CMS/HCC).  Past Surgical History:  has a past surgical history that includes Cholecystectomy; Dilation and curettage of uterus (2018); Rotator cuff repair (Right); Colonoscopy; Lung surgery; Tonsillectomy; [place] tooth extraction; Bowel  resection; Abdominal surgery; and Ileostomy.  Social History:   Social History     Tobacco Use   • Smoking status: Former     Packs/day: 1.00     Years: 40.00     Pack years: 40.00     Types: Cigarettes     Start date:      Quit date:      Years since quittin.5   • Smokeless tobacco: Never   Vaping Use   • Vaping Use: Never used   Substance Use Topics   • Alcohol use: Yes     Alcohol/week: 1.0 standard drink of alcohol     Types: 1 Glasses of wine per week     Comment: occassional    • Drug use: No     Medications:   Current Outpatient Medications   Medication Sig Dispense Refill   • acetaminophen (TYLENOL) 325 mg tablet Take 2 tablets (650 mg total) by mouth every 4 (four) hours as needed for pain for up to 30 doses. 30 tablet 0   • allopurinoL 200 mg tablet      • bumetanide (BUMEX) 1 mg tablet Take 1 mg by mouth daily. Daily prn     • cholecalciferol, vitamin D3, 1,000 unit (25 mcg) tablet Take 2,000 Units by mouth daily.     • glucosamine-D3-Boswellia serr 1,500-400-100 mg-unit-mg tablet Take 1 tablet by mouth daily.     • levothyroxine (SYNTHROID) 88 mcg tablet Take 88 mcg by mouth daily.    3   • pantoprazole (PROTONIX) 40 mg EC tablet Take 40 mg by mouth once daily.     • silver sulfadiazine (SILVADENE) 1 % cream Apply topically daily. 50 g 0   • diphenoxylate-atropine (LomotiL) 2.5-0.025 mg per tablet 1-2 tabs PO QID, do not exceed 8 per day. (Patient not taking: Reported on 2023) 240 tablet 3   • oxyCODONE (ROXICODONE) 5 mg immediate release tablet Take 1 tablet (5 mg total) by mouth every 4 (four) hours as needed for severe pain. (Patient not taking: Reported on 2023) 10 tablet 0   • rosuvastatin (CRESTOR) 20 mg tablet TAKE 1 TABLET BY MOUTH EVERY DAY (Patient not taking: Reported on 2023) 90 tablet 1     No current facility-administered medications for this visit.       Allergies:   Allergies   Allergen Reactions   • Adhesive Tape-Silicones      Causes bruising   •  "Sulfamethoxazole-Trimethoprim Nausea Only          Objective   Vitals:   Visit Vitals  BP (!) 142/63 (BP Location: Left upper arm, Patient Position: Sitting)   Pulse 91   Resp 20   Ht 1.626 m (5' 4\")   Wt 60.6 kg (133 lb 9.6 oz)   SpO2 97%   BMI 22.93 kg/m²   Ms. Rousseau is here she had significant weight loss which has stabilized.        I had seen General: She is in no distress and appears younger than her stated age   Nodes: No cervical or supraclavicular adenopathy   Pulmonary: Lung fields are clear bilaterally   CV: Regular   Incisions: Well-healed   Extremities: No clubbing cyanosis or edema      Imaging Review: We have independently reviewed her imaging studies and the reports.  There is no evidence of disease progression or recurrence.      Assessment   This very pleasant 77-year-old woman has synchronous stage I adenocarcinomas of the left upper lobe resected 10/21/2021.  She is doing very well from the oncology point of view.  She will continue to follow with the colorectal team for her recovery from her repair of rectovaginal fistula.  We will plan to see her in 6 months for repeat CT scan.         "

## 2023-07-27 NOTE — LETTER
Re: Raegan Young  1945        Dear Geovani Mandel MD    I appreciate the opportunity to evaluate  your patient Raegan Young in the office today.    Raegan Young is a delightful 77 y.o. 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  Her resection was uneventful on 10/21/2021.  In the interim she has had repair of the rectovaginal fistula and then subsequent closure of the ileostomy (April19, 2023).     Her ROS is notable for absence of cough, dyspnea, hemoptysis, fever, and chills.  Her energy level is good.  He has had weight loss.  The weight loss seems to have stabilized.  She continues to have significant GI complaints with urgency and loose stools.  She is scheduled in November to meet with colorectal team.  She will be reaching out for an earlier appointment.        This very pleasant 77-year-old woman has synchronous stage I adenocarcinomas of the left upper lobe resected 10/21/2021.  She is doing very well from the oncology point of view.  She will continue to follow with the colorectal team for her recovery from her repair of rectovaginal fistula.  We will plan to see her in 6 months for repeat CT scan.      Thank you for the opportunity to participate in her care.    Owen Gamez MD PhD

## 2023-08-28 ENCOUNTER — TELEPHONE (OUTPATIENT)
Dept: COLORECTAL SURGERY | Facility: CLINIC | Age: 78
End: 2023-08-28
Payer: MEDICARE

## 2023-08-28 NOTE — TELEPHONE ENCOUNTER
Pt is asking for a call back states she has burning bowel movement. She has been taking imodium but stopped yesterday. She does have some rectal bleeding but states it's not that bad.

## 2023-09-06 ENCOUNTER — OFFICE VISIT (OUTPATIENT)
Dept: GYNECOLOGIC ONCOLOGY | Facility: CLINIC | Age: 78
End: 2023-09-06
Attending: OBSTETRICS & GYNECOLOGY
Payer: MEDICARE

## 2023-09-06 VITALS
BODY MASS INDEX: 26.21 KG/M2 | OXYGEN SATURATION: 99 % | HEART RATE: 98 BPM | DIASTOLIC BLOOD PRESSURE: 66 MMHG | RESPIRATION RATE: 20 BRPM | HEIGHT: 59 IN | SYSTOLIC BLOOD PRESSURE: 112 MMHG | WEIGHT: 130 LBS | TEMPERATURE: 97.8 F

## 2023-09-06 DIAGNOSIS — N90.89 VULVAL LESION: ICD-10-CM

## 2023-09-06 DIAGNOSIS — Z85.44 HISTORY OF CANCER OF VAGINA: ICD-10-CM

## 2023-09-06 DIAGNOSIS — N90.89 VULVAR LESION: Primary | ICD-10-CM

## 2023-09-06 PROBLEM — C52 VAGINAL CANCER (CMS/HCC): Status: RESOLVED | Noted: 2023-04-14 | Resolved: 2023-09-06

## 2023-09-06 PROCEDURE — 99213 OFFICE O/P EST LOW 20 MIN: CPT | Mod: 25 | Performed by: OBSTETRICS & GYNECOLOGY

## 2023-09-06 PROCEDURE — G8752 SYS BP LESS 140: HCPCS | Performed by: OBSTETRICS & GYNECOLOGY

## 2023-09-06 PROCEDURE — G8754 DIAS BP LESS 90: HCPCS | Performed by: OBSTETRICS & GYNECOLOGY

## 2023-09-06 PROCEDURE — 56605 BIOPSY OF VULVA/PERINEUM: CPT | Performed by: OBSTETRICS & GYNECOLOGY

## 2023-09-06 PROCEDURE — 88341 IMHCHEM/IMCYTCHM EA ADD ANTB: CPT | Mod: 59 | Performed by: OBSTETRICS & GYNECOLOGY

## 2023-09-06 NOTE — PROGRESS NOTES
Raegan Young is a 77 year old female that presents to the office for follow-up, history of vaginal cancer. She reports a painful raised bump in the vaginal area. Pt denies any other suspicious symptoms.     Past Medical History:   Diagnosis Date    Anemia     Arthritis     Colovaginal fistula     COVID-19 vaccine series completed 02/26/2021    Disease of thyroid gland     Diverticulitis of colon     GERD (gastroesophageal reflux disease)     H/O ileostomy     Hiatal hernia     History of snoring     Hypertension     Hypothyroidism     Lung cancer (CMS/HCC)     Lung nodule     Vaginal cancer (CMS/HCC) 2018    s/p XRT x 7 weeks and chemo weekly x 5 weeks       Past Surgical History:   Procedure Laterality Date    ABDOMINAL SURGERY      BOWEL RESECTION      partial with ileostomy    CHOLECYSTECTOMY      COLONOSCOPY      DILATION AND CURETTAGE OF UTERUS  02/2018    ILEOSTOMY      LUNG SURGERY      ROTATOR CUFF REPAIR Right     TONSILLECTOMY      WISDOM TOOTH EXTRACTION      hx of       Current Outpatient Medications:     acetaminophen (TYLENOL) 325 mg tablet, Take 2 tablets (650 mg total) by mouth every 4 (four) hours as needed for pain for up to 30 doses., Disp: 30 tablet, Rfl: 0    allopurinoL 200 mg tablet, , Disp: , Rfl:     bumetanide (BUMEX) 1 mg tablet, Take 1 mg by mouth daily. Daily prn, Disp: , Rfl:     cholecalciferol, vitamin D3, 1,000 unit (25 mcg) tablet, Take 2,000 Units by mouth daily., Disp: , Rfl:     diphenoxylate-atropine (LomotiL) 2.5-0.025 mg per tablet, 1-2 tabs PO QID, do not exceed 8 per day. (Patient not taking: Reported on 7/12/2023), Disp: 240 tablet, Rfl: 3    glucosamine-D3-Boswellia serr 1,500-400-100 mg-unit-mg tablet, Take 1 tablet by mouth daily., Disp: , Rfl:     levothyroxine (SYNTHROID) 88 mcg tablet, Take 88 mcg by mouth daily.  , Disp: , Rfl: 3    oxyCODONE (ROXICODONE) 5 mg immediate release tablet, Take 1 tablet (5 mg total) by mouth every 4  (four) hours as needed for severe pain. (Patient not taking: Reported on 2023), Disp: 10 tablet, Rfl: 0    pantoprazole (PROTONIX) 40 mg EC tablet, Take 40 mg by mouth once daily., Disp: , Rfl:     rosuvastatin (CRESTOR) 20 mg tablet, TAKE 1 TABLET BY MOUTH EVERY DAY (Patient not taking: Reported on 2023), Disp: 90 tablet, Rfl: 1    silver sulfadiazine (SILVADENE) 1 % cream, Apply topically daily., Disp: 50 g, Rfl: 0  Adhesive tape-silicones and Sulfamethoxazole-trimethoprim  Cancer-related family history includes Endometrial cancer in her biological father; Lung cancer in her biological father. There is no history of Breast cancer, Cancer, Colon cancer, or Ovarian cancer.  Social History     Tobacco Use    Smoking status: Former     Packs/day: 1.00     Years: 40.00     Pack years: 40.00     Types: Cigarettes     Start date:      Quit date:      Years since quittin.6    Smokeless tobacco: Never   Vaping Use    Vaping Use: Never used   Substance Use Topics    Alcohol use: Yes     Alcohol/week: 1.0 standard drink of alcohol     Types: 1 Glasses of wine per week     Comment: occassional     Drug use: No     ROS:[negative in all systems with the exception of the above]    Physical examination: [Well-developed female in no apparent distress]  There were no vitals filed for this visit.  There is no height or weight on file to calculate BMI.    Vitals:    HEENT: Normocephalic, atraumatic, nonicteric sclera  Neck: Supple no masses, thyroid normal nontender  Lymphatic: No inguinal or supraclavicular adenopathy  Heart: Regular rate no murmurs  Lungs: Clear to auscultation with good respiratory effort  Extremities: No clubbing, cyanosis, edema  Neuro: Oriented ×3 with normal mood and affect  Abdomen: Soft, nontender, nondistended. No hepatosplenomegaly. No rebound or guarding.  Gynecologic: Radiation changes bilateral groins. 4mm raised red nodule. Normal vulva vagina urethra meatus bladder.  Normal cervix, uterus, no cervical motion tenderness, normal bilateral adnexa  Rectovaginal: no masses/nodularity    After verbal consent was obtained, right vulvar skin was injected with 1% lidocaine mixed with quarter percent Marcaine.  Skin was prepped with Betadine.  2 passes of 5 mm Hansel punch used to obtain vulvar biopsy.  Normal bleeding was controlled with silver nitrate.  Patient taught procedure well  Physical Exam  Genitourinary:                 Assessment/Plan      Raegan Young is a 77 year old female that presents to the office for follow-up, history of vaginal cancer. Biopsy was sent to pathology, and I will call pt about results of biopsy. She should return to my office in 1 year for follow-up.         By signing my name below, I, Madeleine Garay, attest that this documentation has been prepared under the direction and in the presence of Trev Jimenez MD      9/6/2023 11:58 AM  Madeleine Garay   Attending addendum:  I personally interviewed, examined, and developed the plan for the patient.   I personally counseled the patient on her diagnosis and proposed treatment as documented above.  Note was scribed in my presence and I agree with the note above.

## 2023-09-08 ENCOUNTER — TELEPHONE (OUTPATIENT)
Dept: SCHEDULING | Facility: CLINIC | Age: 78
End: 2023-09-08
Payer: MEDICARE

## 2023-09-08 DIAGNOSIS — E78.5 DYSLIPIDEMIA: Primary | ICD-10-CM

## 2023-09-08 DIAGNOSIS — R09.89 BRUIT OF RIGHT CAROTID ARTERY: ICD-10-CM

## 2023-09-08 PROBLEM — N95.2 POST-MENOPAUSE ATROPHIC VAGINITIS: Status: RESOLVED | Noted: 2022-09-14 | Resolved: 2023-09-08

## 2023-09-08 PROBLEM — L02.91 ABSCESS: Status: RESOLVED | Noted: 2023-04-14 | Resolved: 2023-09-08

## 2023-09-08 PROBLEM — N82.4 COLOVAGINAL FISTULA: Status: RESOLVED | Noted: 2021-06-04 | Resolved: 2023-09-08

## 2023-09-08 PROBLEM — R10.9 ABDOMINAL PAIN: Status: RESOLVED | Noted: 2022-07-06 | Resolved: 2023-09-08

## 2023-09-08 PROBLEM — N90.89 VULVAL LESION: Status: ACTIVE | Noted: 2023-09-08

## 2023-09-08 PROBLEM — N95.0 POSTMENOPAUSAL VAGINAL BLEEDING: Status: RESOLVED | Noted: 2023-04-14 | Resolved: 2023-09-08

## 2023-09-08 PROBLEM — N76.0 VAGINITIS: Status: RESOLVED | Noted: 2022-06-24 | Resolved: 2023-09-08

## 2023-09-08 NOTE — TELEPHONE ENCOUNTER
HJ: TY    I called the pt and advised Dr. Sanchez would like her to repeat a b/l carotid ultrasound to see if there is any change in her known carotid disease and then he will determine if an expedited appt is warranted. She verbalized understanding.    Order placed.

## 2023-09-08 NOTE — TELEPHONE ENCOUNTER
Pt called states her PCP's NP heard something on pt's right side and ask the pt to schedule an appt with the cardiologists. Pt requests a call back regarding a sooner available appt.    Pt can be reached at [phone]

## 2023-09-08 NOTE — TELEPHONE ENCOUNTER
She has known carotid disease as of 2021, why don't we have her repeat a bilateral carotid ultrasound and then decide if expedited follow up is needed?

## 2023-09-08 NOTE — TELEPHONE ENCOUNTER
I called and s/w the pt. She had an appt at her PCP's office yesterday for a routine check up and was told the PA heard something in her R neck that made her recommend a sooner appt w/ Dr. Sanchez. I called Dr. Valadez's office and s/w Maricarmen who reports it was a carotid bruit.     HJ: She is inquiring if she can have a sooner appt. Currently scheduled 11/27. Pls advise. TY

## 2023-09-11 ENCOUNTER — TELEPHONE (OUTPATIENT)
Dept: CARDIOLOGY | Facility: HOSPITAL | Age: 78
End: 2023-09-11
Payer: MEDICARE

## 2023-09-12 ENCOUNTER — TELEPHONE (OUTPATIENT)
Dept: SCHEDULING | Facility: CLINIC | Age: 78
End: 2023-09-12
Payer: MEDICARE

## 2023-09-12 NOTE — TELEPHONE ENCOUNTER
Pt called, wants to reschedule 9/13 [facility]      Pt can be reached at [phone]

## 2023-09-18 ENCOUNTER — TELEPHONE (OUTPATIENT)
Dept: CARDIOLOGY | Facility: HOSPITAL | Age: 78
End: 2023-09-18
Payer: MEDICARE

## 2023-09-20 ENCOUNTER — HOSPITAL ENCOUNTER (OUTPATIENT)
Dept: CARDIOLOGY | Facility: HOSPITAL | Age: 78
Discharge: HOME | End: 2023-09-20
Attending: INTERNAL MEDICINE
Payer: MEDICARE

## 2023-09-20 VITALS — HEIGHT: 59 IN | BODY MASS INDEX: 26.21 KG/M2 | WEIGHT: 130 LBS

## 2023-09-20 DIAGNOSIS — E78.5 DYSLIPIDEMIA: ICD-10-CM

## 2023-09-20 DIAGNOSIS — R09.89 BRUIT OF RIGHT CAROTID ARTERY: ICD-10-CM

## 2023-09-20 LAB
BSA FOR ECHO PROCEDURE: 1.57 M2
LEFT CCA DIST DIAS: 14.96 CM/S
LEFT CCA DIST SYS: 59.56 CM/S
LEFT CCA MID DIAS: 8.12 CM/S
LEFT CCA MID SYS: 48.52 CM/S
LEFT CCA PROX DIAS: 9.09 CM/S
LEFT CCA PROX SYS: 52.57 CM/S
LEFT ICA DIST DIAS: 15.64 CM/S
LEFT ICA DIST SYS: 50.61 CM/S
LEFT ICA MID DIAS: 28.45 CM/S
LEFT ICA MID SYS: 91.68 CM/S
LEFT ICA PROX DIAS: 15.35 CM/S
LEFT ICA PROX SYS: 85.35 CM/S
LEFT ICA/CCA SYS: 1.54
LT BULB EDV: 16.09 CM/S
LT BULB PSV: 73.39 CM/S
LT ECA PROX EDV: 6.21 CM/S
LT ECA PROX PSV: 106.13 CM/S
LT VERTEBRAL MID EDV: 7.46 CM/S
LT VERTEBRAL MID PSV: 29.64 CM/S
RIGHT CCA DIST DIAS: 12.7 CM/S
RIGHT CCA DIST SYS: 62.38 CM/S
RIGHT CCA MID DIAS: 7.62 CM/S
RIGHT CCA MID SYS: 58.71 CM/S
RIGHT CCA PROX DIAS: 11.29 CM/S
RIGHT CCA PROX SYS: 86.71 CM/S
RIGHT ECA SYS: 97.55 CM/S
RIGHT ICA DIST DIAS: 12.83 CM/S
RIGHT ICA DIST SYS: 59.56 CM/S
RIGHT ICA MID DIAS: 17.39 CM/S
RIGHT ICA MID SYS: 53.29 CM/S
RIGHT ICA PROX DIAS: 12.19 CM/S
RIGHT ICA PROX SYS: 51.03 CM/S
RIGHT ICA/CCA SYS: 0.95
RT BULB EDV: 12.14 CM/S
RT BULB PSV: 57.58 CM/S
RT ECA PROC EDV: 6.32 CM/S
RT VERTEBRAL MID EDV: 9.03 CM/S
RT VERTEBRAL MID PSV: 36.41 CM/S

## 2023-09-20 PROCEDURE — 93880 EXTRACRANIAL BILAT STUDY: CPT

## 2023-09-20 PROCEDURE — 93880 EXTRACRANIAL BILAT STUDY: CPT | Mod: 26 | Performed by: INTERNAL MEDICINE

## 2023-11-14 NOTE — PROGRESS NOTES
CC:     HPI: Ms. Raegan Young is post a Left groin node excision. . She reports only 10 cc of DILLON drainage    Allergies: No known allergies    Medications:     Current Outpatient Prescriptions:   •  benazepril (LOTENSIN) 10 mg tablet, Take 10 mg by mouth daily., Disp: , Rfl:   •  levothyroxine (SYNTHROID) 88 mcg tablet, Take 88 mcg by mouth once daily., Disp: , Rfl: 3  •  omega-3 acid ethyl esters (LOVAZA) 1 gram capsule, take 2 capsule by oral route 2 times every day, Disp: , Rfl:   •  pantoprazole (PROTONIX) 40 mg EC tablet, Take 40 mg by mouth daily.  , Disp: , Rfl:   •  traMADol (ULTRAM) 50 mg tablet, Take 1-2 tablets ( mg total) by mouth every 6 (six) hours as needed for severe pain for up to 5 days., Disp: 12 tablet, Rfl: 0    Review of Systems  neg GI and Neg Gu    Vital Signs for the last 24 hours:  Heart Rate:  [71] 71  Resp:  [16] 16  BP: (151)/(71) 151/71    Physical Exam From Office:  General: well-developed, well-nourished, no apparent distress  GI: abdomen soft, non-distended, non-tender, no hepatosplenomegaly, no masses, Incisions CDI  DILLON removed without complication    Assessment/Plan     Ms. Raegan Young is a 73 y.o. lady post op. She can resume her usual activities. She should return in 3 months. We discussed stop care         
MED/SURG

## 2023-11-27 ENCOUNTER — OFFICE VISIT (OUTPATIENT)
Dept: CARDIOLOGY | Facility: CLINIC | Age: 78
End: 2023-11-27
Payer: MEDICARE

## 2023-11-27 VITALS
WEIGHT: 126.1 LBS | HEIGHT: 59 IN | DIASTOLIC BLOOD PRESSURE: 70 MMHG | SYSTOLIC BLOOD PRESSURE: 118 MMHG | BODY MASS INDEX: 25.42 KG/M2 | HEART RATE: 88 BPM | OXYGEN SATURATION: 98 %

## 2023-11-27 DIAGNOSIS — I10 PRIMARY HYPERTENSION: ICD-10-CM

## 2023-11-27 DIAGNOSIS — I65.23 ATHEROSCLEROSIS OF BOTH CAROTID ARTERIES: Primary | ICD-10-CM

## 2023-11-27 DIAGNOSIS — E78.00 PURE HYPERCHOLESTEROLEMIA: ICD-10-CM

## 2023-11-27 DIAGNOSIS — I25.10 CORONARY ARTERY CALCIFICATION SEEN ON CT SCAN: ICD-10-CM

## 2023-11-27 PROCEDURE — 99213 OFFICE O/P EST LOW 20 MIN: CPT | Performed by: INTERNAL MEDICINE

## 2023-11-27 PROCEDURE — G8754 DIAS BP LESS 90: HCPCS | Performed by: INTERNAL MEDICINE

## 2023-11-27 PROCEDURE — 93000 ELECTROCARDIOGRAM COMPLETE: CPT | Performed by: INTERNAL MEDICINE

## 2023-11-27 PROCEDURE — G8752 SYS BP LESS 140: HCPCS | Performed by: INTERNAL MEDICINE

## 2023-11-27 NOTE — PROGRESS NOTES
Sonny Sanchez MD [facility]  Cardiology    [facility]    [facility]  [facility] [address]  [address] [place] [address]    TEL  [phone]  [facility]     2023    Re:  Raegan Young  : 1945    Primary care: Dr. Javon Valadez    Referring Physician: Dr Geovani Mandel    Gynecology: Dr Trev Oh    Dear Althea Redd and Rory     It was a pleasure to meet Raegan Young in the office in follow up today.  She is here alone.  Raegan is a 78-year-old former smoker who is scheduled to undergo surgery with Jessica Mandel and Kadeem on 2023.  She is here in follow-up given her history of coronary and carotid atherosclerosis.    Raegan is struggling with challenges related to her stoma closure, she is having issues with so-called clustering and this has been an area of concern and is upsetting her to some degree.  She is hoping that things will recover over time.  She continues to lose weight and was recently diagnosed with possible iron deficiency.    Blood pressure has remained normal off benazepril.  She is tolerating rosuvastatin without any challenges.    PAST MEDICAL HISTORY:  History of vaginal cancer s/p chemotherapy and radiation. 2018.  Lobectomy for adenocarcinoma of left lung  Essential HTN   Unilateral lower extremity edema of his left leg  Left knee osteoarthritis   Hypothyroidism   Coronary artery calcification on CT (2020)    FAMILY HISTORY:   There is a family history of premature coronary artery disease. Mother had a MI and sudden death at age 51. Otherwise there is no sudden cardiac death or cardiomyopathy      SOCIAL HISTORY:   She is a former smoker. She smoked a pack and half a day for about 30 years. She doesn't drink alcohol to excess. She is a retired  in [employer].   She lives with her  and has 4 healthy children.     MEDICATIONS:    Outpatient Encounter  "Medications as of 11/27/2023:     acetaminophen (TYLENOL) 325 mg tablet, Take 2 tablets (650 mg total) by mouth every 4 (four) hours as needed for pain for up to 30 doses.    allopurinoL 200 mg tablet,     bumetanide (BUMEX) 1 mg tablet, Take 1 mg by mouth daily. Daily prn    cholecalciferol, vitamin D3, 1,000 unit (25 mcg) tablet, Take 2,000 Units by mouth daily.    diphenoxylate-atropine (LomotiL) 2.5-0.025 mg per tablet, 1-2 tabs PO QID, do not exceed 8 per day.    glucosamine-D3-Boswellia serr 1,500-400-100 mg-unit-mg tablet, Take 1 tablet by mouth daily.    levothyroxine (SYNTHROID) 88 mcg tablet, Take 88 mcg by mouth daily.      pantoprazole (PROTONIX) 40 mg EC tablet, Take 40 mg by mouth once daily.    rosuvastatin (CRESTOR) 20 mg tablet, TAKE 1 TABLET BY MOUTH EVERY DAY    oxyCODONE (ROXICODONE) 5 mg immediate release tablet, Take 1 tablet (5 mg total) by mouth every 4 (four) hours as needed for severe pain.    silver sulfadiazine (SILVADENE) 1 % cream, Apply topically daily. (Patient not taking: Reported on 11/27/2023)    REVIEW OF SYSTEMS: Aside from what is mentioned above, a relevant complete review of systems was performed and was negative.    Visit Vitals  /70   Pulse 88   Ht 1.499 m (4' 11\")   Wt 57.2 kg (126 lb 1.6 oz)   SpO2 98%   BMI 25.47 kg/m²   Body surface area is 1.54 meters squared.     PHYSICAL EXAMINATION:  General: Pleasant and in no acute distress.  HEENT: No corneal arcus or xanthelasmas.  Sclerae are anicteric.    Neck: Supple.  JVP is 6 cm/H2O.  Right carotid bruit.    Heart: Normal S1 and S2.  Soft systolic murmur, mid peaking at the RUSB.  Chest: Symmetrical.  Lungs:  Decreased air entry left lung field  Extremities: Changes consistent with osteoarthritis in fingers.  Lower extremity edema left ankle>right ankle.   Distal pulses are easily palpable.  Skin: Warm and dry and well perfused.  Neurologic: Alert and appropriate, moving all four extremities. Gait is " normal  Psychiatric: Normal mood, affect & judgment.    DIAGNOSTIC DATA:    ECG: Sinus rhythm, right bundle branch block     ECG 3/22/2023: Normal sinus rhythm, right bundle branch block, nonspecific T wave abnormality    ECG 4/18/2022: Normal sinus rhythm, normal ECG      ECG on 7/6/2021 was a normal ECG     ECG on 6/7/2021 also showed premature atrial complexes.    Labs:   Lab Results   Component Value Date     04/28/2023    K 4.1 04/28/2023    MG 1.7 (L) 04/28/2023    BUN 14 04/28/2023    CREATININE 1.5 (H) 04/28/2023    EGFR 33.6 (L) 04/28/2023    WBC 9.34 04/28/2023    HGB 8.7 (L) 04/28/2023     04/28/2023    ALT 33 04/14/2023    AST 35 04/14/2023    GLUCOSE 83 04/28/2023    TSH 1.03 09/21/2018       Lipid Profile 12/17/2020:    Total cholesterol 200   Triglycerides 210      HDL 51    Imaging:     CT chest abdomen pelvis 9/2/2020: There is moderate coronary calcification in the left coronary tree.  There is a moderate sized hiatal hernia.    Transthoracic echo (TTE) complete with contrast 07/21/2021    Interpretation Summary  · The left ventricle is normal in size. There is normal wall thickness. There is focal upper septal hypertrophy. An ultrasound enhancing agent was used. There is normal left ventricular systolic function. Left ventricular ejection fraction is 65% by visual estimate. There is normal wall motion. Diastolic function was not assessed due to mitral inflow merging.  · The left atrium is moderately dilated. The left atrial volume indexed to body surface area measures 48 mL/m2.  · The mitral valve is normal. There is trace mitral regurgitation.  · The aortic valve has three cusps. There is mild thickening and focal calcification of the cusps. There is no aortic stenosis. There is no aortic regurgitation.  · The aortic root is normal in size. The ascending aorta is normal in size. The aortic arch was not assessed.  · The right ventricle is normal in size. There is normal right  ventricular systolic function. Right ventricular S' by tissue Doppler measures 20 cm/s.  · The right atrium is normal in size.  · The tricuspid valve is normal. There is trace tricuspid regurgitation. The estimated right ventricular systolic pressure = 34 mmHg.  · The pulmonic valve is normal. There is physiologic pulmonic regurgitation.  · The inferior vena cava measures less than 2.1 cm and collapses greater than 50% with inspiration. The estimated right atrial pressure is 0 -5 mmHg.  · There is no pericardial effusion.    There are no prior studies available for comparison.    Ultrasound carotid bilateral 07/21/2021    Interpretation Summary  1. Doppler waveforms show mild spectral broadening and normal peak systolic velocities compatible with low end of 1-49% stenosis of both internal carotid arteries  2. Non-occlusive calcific atherosclerotic plaque of both carotid bulbs.  3. Antegrade vertebral artery flow bilaterally    9/20/2023 Ultrasound carotid:     1. Doppler waveforms show mild spectral broadening and normal peak systolic velocities compatible with 1-49% stenosis of both internal carotid arteries  2. Non-occlusive calcific atherosclerotic plaque of both carotid bulbs.  3. Antegrade vertebral artery flow bilaterally   4. Compared to study of 7/21/21, no significant change       ASSESSMENT/PLAN:    1. Essential hypertension    Blood pressure normalized. Continues to stay off benazepril     2. Coronary calcification on CT  3. Right carotid bruit  4. Pure hypercholesterolemia    Continue rosuvastatin 20 mg daily     5.  New right bundle branch block on ECG    This is likely a consequence of her left lung surgery, not of any clinical concern.    I empathized with Tania regarding her challenges with GI symptoms.  I am glad that she has follow-up scheduled with Dr. Araiza team in the near future.    Thank you for allowing me to share in the care of your patient.  I asked Raegan  to return on an as-needed  basis.  If you have any further questions, please do not hesitate to contact me.    Sincerely,        Sonny Sanchez MD, [facility]     The total time for this visit was 30 minutes and was comprised of chart preparation/review, office visit (gathering HPI, examination, testing review, plan formulation/discussion and patient education), processing orders and completing documentation.

## 2023-12-01 NOTE — TELEPHONE ENCOUNTER
Cardiac Clearance     Name of caller: [facility]     Relationship to patient: surgical scheduler     Name of patient: Raegan Young    Name of physician: Sonny Sanchez MD    Date of Surgery: 5/6/22    Type of Surgery: Low Anterior Resection     Name of surgeon: Geovani Mandel MD    Office contact number: [phone]    Office fax number: [phone]    Additional notes: Clearance appt scheduled 4/18 at 140PM.   
Noted, thanks.   
Neuro

## 2023-12-26 RX ORDER — SILVER SULFADIAZINE 10 G/1000G
CREAM TOPICAL
Qty: 50 G | Refills: 0 | Status: SHIPPED | OUTPATIENT
Start: 2023-12-26 | End: 2024-03-27

## 2023-12-27 RX ORDER — ROSUVASTATIN CALCIUM 20 MG/1
20 TABLET, COATED ORAL DAILY
Qty: 90 TABLET | Refills: 3 | Status: SHIPPED | OUTPATIENT
Start: 2023-12-27 | End: 2024-12-20

## 2023-12-27 NOTE — TELEPHONE ENCOUNTER
Refill request received from     Last appt w/ Dr. Sanchez 11/27/23    Upcoming appt on an as needed basis    Refill approved after chart review

## 2023-12-28 RX ORDER — DIPHENOXYLATE HYDROCHLORIDE AND ATROPINE SULFATE 2.5; .025 MG/1; MG/1
1-2 TABLET ORAL 4 TIMES DAILY PRN
Qty: 240 TABLET | Refills: 0 | Status: SHIPPED | OUTPATIENT
Start: 2023-12-28 | End: 2024-04-03

## 2024-01-03 ENCOUNTER — OFFICE VISIT (OUTPATIENT)
Dept: COLORECTAL SURGERY | Facility: CLINIC | Age: 79
End: 2024-01-03
Payer: MEDICARE

## 2024-01-03 VITALS — WEIGHT: 125 LBS | HEIGHT: 59 IN | BODY MASS INDEX: 25.2 KG/M2

## 2024-01-03 DIAGNOSIS — M79.89 LEG SWELLING: Primary | ICD-10-CM

## 2024-01-03 DIAGNOSIS — R60.0 LOCALIZED EDEMA: ICD-10-CM

## 2024-01-03 DIAGNOSIS — R19.8 LOW ANTERIOR RESECTION SYNDROME: Primary | ICD-10-CM

## 2024-01-03 PROCEDURE — 99213 OFFICE O/P EST LOW 20 MIN: CPT | Mod: 25 | Performed by: COLON & RECTAL SURGERY

## 2024-01-03 PROCEDURE — 45330 DIAGNOSTIC SIGMOIDOSCOPY: CPT | Performed by: COLON & RECTAL SURGERY

## 2024-01-03 PROCEDURE — G8756 NO BP MEASURE DOC: HCPCS | Performed by: COLON & RECTAL SURGERY

## 2024-01-03 RX ORDER — FERROUS SULFATE 325(65) MG
TABLET ORAL
COMMUNITY
Start: 2023-12-04 | End: 2024-03-27

## 2024-01-03 RX ORDER — NYSTATIN 100000 [USP'U]/G
POWDER TOPICAL
COMMUNITY
Start: 2023-12-27 | End: 2024-03-22

## 2024-01-03 NOTE — LETTER
January 3, 2024     Javon Valadez DO  [address]  [facility] [address]    Patient: Raegan Young  YOB: 1945  Date of Visit: 1/3/2024      Dear Dr. Valadez:    Thank you for referring Raegan Young to me for evaluation. Below are my notes for this consultation.    If you have questions, please do not hesitate to call me. I look forward to following your patient along with you.         Sincerely,        Geovani Mandel MD        CC: No Recipients    Geovani Mandel MD  1/3/2024  6:51 PM  Signed  HISTORY & PHYSICAL EXAM    HPI: Raegan returns in continued follow-up. She is now 10 months post stoma closure.  She reports that she has been having roughly 15 bowel movements a day on average.  She states she is right now taking 2 Imodium at 11 AM.  She typically moves small amounts throughout the day, reporting that she gets clustering episodes where she will be on the toilet for 3 to 4 hours roughly 3-4 times per week.  At the end of the clustering episodes she has significant perineum soreness as well as rectal bleeding.  She states stool caliber alternates between diarrhea and soft/formed.    She is not having any nausea, vomiting, fever or chills.    Past Medical History:   Diagnosis Date   • Anemia    • Arthritis    • Colovaginal fistula    • COVID-19 vaccine series completed 02/26/2021   • Disease of thyroid gland    • Diverticulitis of colon    • GERD (gastroesophageal reflux disease)    • H/O ileostomy    • Hiatal hernia    • History of snoring    • Hypertension    • Hypothyroidism    • Lung cancer (CMS/HCC)    • Lung nodule    • Vaginal cancer (CMS/HCC) 2018    s/p XRT x 7 weeks and chemo weekly x 5 weeks       Past Surgical History:   Procedure Laterality Date   • ABDOMINAL SURGERY     • BOWEL RESECTION      partial with ileostomy   • CHOLECYSTECTOMY     • COLONOSCOPY     • DILATION AND CURETTAGE OF UTERUS  02/2018   • ILEOSTOMY     • LUNG SURGERY     •  ROTATOR CUFF REPAIR Right    • TONSILLECTOMY     • WISDOM TOOTH EXTRACTION      hx of       Current Outpatient Medications:   •  acetaminophen (TYLENOL) 325 mg tablet, Take 2 tablets (650 mg total) by mouth every 4 (four) hours as needed for pain for up to 30 doses., Disp: 30 tablet, Rfl: 0  •  allopurinoL 200 mg tablet, , Disp: , Rfl:   •  bumetanide (BUMEX) 1 mg tablet, Take 1 mg by mouth daily. Daily prn, Disp: , Rfl:   •  cholecalciferol, vitamin D3, 1,000 unit (25 mcg) tablet, Take 2,000 Units by mouth daily., Disp: , Rfl:   •  diphenoxylate-atropine (LOMOTIL) 2.5-0.025 mg per tablet, Take 1-2 tablets by mouth 4 (four) times a day as needed for diarrhea. MAX 8 TABS PER DAY, Disp: 240 tablet, Rfl: 0  •  ferrous sulfate 325 mg (65 mg iron) tablet, , Disp: , Rfl:   •  glucosamine-D3-Boswellia serr 1,500-400-100 mg-unit-mg tablet, Take 1 tablet by mouth daily., Disp: , Rfl:   •  levothyroxine (SYNTHROID) 88 mcg tablet, Take 88 mcg by mouth daily.  , Disp: , Rfl: 3  •  nystatin (MYCOSTATIN) 100,000 unit/gram powder, , Disp: , Rfl:   •  pantoprazole (PROTONIX) 40 mg EC tablet, Take 40 mg by mouth once daily., Disp: , Rfl:   •  rosuvastatin (CRESTOR) 20 mg tablet, TAKE 1 TABLET BY MOUTH EVERY DAY, Disp: 90 tablet, Rfl: 3  •  mv-calcium-min-iron fm-FA-vitK 18 mg-400 mcg- 25 mcg tablet, , Disp: , Rfl:   •  silver sulfadiazine (SILVADENE) 1 % cream, APPLY TO AFFECTED AREA TOPICALLY EVERY DAY (Patient not taking: Reported on 1/3/2024), Disp: 50 g, Rfl: 0   Allergies   Allergen Reactions   • Adhesive Tape-Silicones      Causes bruising   • Sulfamethoxazole-Trimethoprim Nausea Only     Social History     Socioeconomic History   • Marital status:      Spouse name: Royal    • Number of children: 4   • Years of education: Not on file   • Highest education level: Not on file   Occupational History   • Occupation: retired/     Tobacco Use   • Smoking status: Former     Packs/day: 1.00     Years: 40.00      Additional pack years: 0.00     Total pack years: 40.00     Types: Cigarettes     Start date:      Quit date:      Years since quittin.0   • Smokeless tobacco: Never   Vaping Use   • Vaping Use: Never used   Substance and Sexual Activity   • Alcohol use: Yes     Alcohol/week: 1.0 standard drink of alcohol     Types: 1 Glasses of wine per week     Comment: occassional    • Drug use: No   • Sexual activity: Never     Comment:    Other Topics Concern   • Not on file   Social History Narrative    Lives with  in a 2 story home     Social Determinants of Health     Financial Resource Strain: Low Risk  (2023)    Overall Financial Resource Strain (CARDIA)    • Difficulty of Paying Living Expenses: Not hard at all   Food Insecurity: No Food Insecurity (10/30/2022)    Hunger Vital Sign    • Worried About Running Out of Food in the Last Year: Never true    • Ran Out of Food in the Last Year: Never true   Transportation Needs: No Transportation Needs (2023)    PRAPARE - Transportation    • Lack of Transportation (Medical): No    • Lack of Transportation (Non-Medical): No   Physical Activity: Not on file   Stress: Not on file   Social Connections: Not on file   Intimate Partner Violence: Not on file   Housing Stability: Low Risk  (2023)    Housing Stability Vital Sign    • Unable to Pay for Housing in the Last Year: No    • Number of Places Lived in the Last Year: 1    • Unstable Housing in the Last Year: No      Family History   Problem Relation Age of Onset   • Heart attack Biological Mother    • Hypertension Biological Mother    • Endometrial cancer Biological Father    • Lung cancer Biological Father    • Breast cancer Neg Hx    • Cancer Neg Hx    • Colon cancer Neg Hx    • Ovarian cancer Neg Hx        REVIEW OF SYSTEMS: Unchanged    PHYSICAL EXAM:  GEN: On examination the patient is resting comfortably.  NEURO/PSYCH: Alert and oriented ×3  HEENT: Eyes: Sclera anicteric  CHEST: Equal  rise and fall the chest.  No tenderness bilaterally.  SKIN: Warm and moist  NODES: No groin or cervical lymphadenopathy  EXT: No palpable edema of the bilateral lower extremities.  No clubbing.  GI: Abdomen soft, nontender, nondistended.  No palpable liver or spleen edge.  No ventral hernias, mass, or tenderness.  RECTAL: Perianal skin is normal.  Digital exam reveals normal sphincter tone no masses are palpable.  Flexible sigmoidoscopy: Carried out the level of the descending colon was limited by patient discomfort and stool demonstrates well-healed coloanal anastomosis        ASSESSMENT/PLAN:     Low anterior resection syndrome  The patient has major low anterior resection syndrome with significant clustering of her bowel movements.  I recommend at this point increasing the Lomotil to effect and titrate up from her current 2 tablets daily up to a maximum of 2 tablets 4 times a day.  She is going to do this slowly over the course of the next 3 months and will be in contact with her office prior to making any changes.  I would like to see her back in 3 months for follow-up.

## 2024-01-03 NOTE — PROGRESS NOTES
HISTORY & PHYSICAL EXAM    HPI: Raegan returns in continued follow-up. She is now 10 months post stoma closure.  She reports that she has been having roughly 15 bowel movements a day on average.  She states she is right now taking 2 Imodium at 11 AM.  She typically moves small amounts throughout the day, reporting that she gets clustering episodes where she will be on the toilet for 3 to 4 hours roughly 3-4 times per week.  At the end of the clustering episodes she has significant perineum soreness as well as rectal bleeding.  She states stool caliber alternates between diarrhea and soft/formed.    She is not having any nausea, vomiting, fever or chills.    Past Medical History:   Diagnosis Date   • Anemia    • Arthritis    • Colovaginal fistula    • COVID-19 vaccine series completed 02/26/2021   • Disease of thyroid gland    • Diverticulitis of colon    • GERD (gastroesophageal reflux disease)    • H/O ileostomy    • Hiatal hernia    • History of snoring    • Hypertension    • Hypothyroidism    • Lung cancer (CMS/HCC)    • Lung nodule    • Vaginal cancer (CMS/HCC) 2018    s/p XRT x 7 weeks and chemo weekly x 5 weeks       Past Surgical History:   Procedure Laterality Date   • ABDOMINAL SURGERY     • BOWEL RESECTION      partial with ileostomy   • CHOLECYSTECTOMY     • COLONOSCOPY     • DILATION AND CURETTAGE OF UTERUS  02/2018   • ILEOSTOMY     • LUNG SURGERY     • ROTATOR CUFF REPAIR Right    • TONSILLECTOMY     • WISDOM TOOTH EXTRACTION      hx of       Current Outpatient Medications:   •  acetaminophen (TYLENOL) 325 mg tablet, Take 2 tablets (650 mg total) by mouth every 4 (four) hours as needed for pain for up to 30 doses., Disp: 30 tablet, Rfl: 0  •  allopurinoL 200 mg tablet, , Disp: , Rfl:   •  bumetanide (BUMEX) 1 mg tablet, Take 1 mg by mouth daily. Daily prn, Disp: , Rfl:   •  cholecalciferol, vitamin D3, 1,000 unit (25 mcg) tablet, Take 2,000 Units by mouth daily., Disp: , Rfl:   •  diphenoxylate-atropine  (LOMOTIL) 2.5-0.025 mg per tablet, Take 1-2 tablets by mouth 4 (four) times a day as needed for diarrhea. MAX 8 TABS PER DAY, Disp: 240 tablet, Rfl: 0  •  ferrous sulfate 325 mg (65 mg iron) tablet, , Disp: , Rfl:   •  glucosamine-D3-Boswellia serr 1,500-400-100 mg-unit-mg tablet, Take 1 tablet by mouth daily., Disp: , Rfl:   •  levothyroxine (SYNTHROID) 88 mcg tablet, Take 88 mcg by mouth daily.  , Disp: , Rfl: 3  •  nystatin (MYCOSTATIN) 100,000 unit/gram powder, , Disp: , Rfl:   •  pantoprazole (PROTONIX) 40 mg EC tablet, Take 40 mg by mouth once daily., Disp: , Rfl:   •  rosuvastatin (CRESTOR) 20 mg tablet, TAKE 1 TABLET BY MOUTH EVERY DAY, Disp: 90 tablet, Rfl: 3  •  mv-calcium-min-iron fm-FA-vitK 18 mg-400 mcg- 25 mcg tablet, , Disp: , Rfl:   •  silver sulfadiazine (SILVADENE) 1 % cream, APPLY TO AFFECTED AREA TOPICALLY EVERY DAY (Patient not taking: Reported on 1/3/2024), Disp: 50 g, Rfl: 0   Allergies   Allergen Reactions   • Adhesive Tape-Silicones      Causes bruising   • Sulfamethoxazole-Trimethoprim Nausea Only     Social History     Socioeconomic History   • Marital status:      Spouse name: Royal    • Number of children: 4   • Years of education: Not on file   • Highest education level: Not on file   Occupational History   • Occupation: retired/     Tobacco Use   • Smoking status: Former     Packs/day: 1.00     Years: 40.00     Additional pack years: 0.00     Total pack years: 40.00     Types: Cigarettes     Start date:      Quit date:      Years since quittin.0   • Smokeless tobacco: Never   Vaping Use   • Vaping Use: Never used   Substance and Sexual Activity   • Alcohol use: Yes     Alcohol/week: 1.0 standard drink of alcohol     Types: 1 Glasses of wine per week     Comment: occassional    • Drug use: No   • Sexual activity: Never     Comment:    Other Topics Concern   • Not on file   Social History Narrative    Lives with  in a 2 story home     Social  Determinants of Health     Financial Resource Strain: Low Risk  (4/20/2023)    Overall Financial Resource Strain (CARDIA)    • Difficulty of Paying Living Expenses: Not hard at all   Food Insecurity: No Food Insecurity (10/30/2022)    Hunger Vital Sign    • Worried About Running Out of Food in the Last Year: Never true    • Ran Out of Food in the Last Year: Never true   Transportation Needs: No Transportation Needs (4/20/2023)    PRAPARE - Transportation    • Lack of Transportation (Medical): No    • Lack of Transportation (Non-Medical): No   Physical Activity: Not on file   Stress: Not on file   Social Connections: Not on file   Intimate Partner Violence: Not on file   Housing Stability: Low Risk  (4/20/2023)    Housing Stability Vital Sign    • Unable to Pay for Housing in the Last Year: No    • Number of Places Lived in the Last Year: 1    • Unstable Housing in the Last Year: No      Family History   Problem Relation Age of Onset   • Heart attack Biological Mother    • Hypertension Biological Mother    • Endometrial cancer Biological Father    • Lung cancer Biological Father    • Breast cancer Neg Hx    • Cancer Neg Hx    • Colon cancer Neg Hx    • Ovarian cancer Neg Hx        REVIEW OF SYSTEMS: Unchanged    PHYSICAL EXAM:  GEN: On examination the patient is resting comfortably.  NEURO/PSYCH: Alert and oriented ×3  HEENT: Eyes: Sclera anicteric  CHEST: Equal rise and fall the chest.  No tenderness bilaterally.  SKIN: Warm and moist  NODES: No groin or cervical lymphadenopathy  EXT: No palpable edema of the bilateral lower extremities.  No clubbing.  GI: Abdomen soft, nontender, nondistended.  No palpable liver or spleen edge.  No ventral hernias, mass, or tenderness.  RECTAL: Perianal skin is normal.  Digital exam reveals normal sphincter tone no masses are palpable.  Flexible sigmoidoscopy: Carried out the level of the descending colon was limited by patient discomfort and stool demonstrates well-healed coloanal  anastomosis        ASSESSMENT/PLAN:     Low anterior resection syndrome  The patient has major low anterior resection syndrome with significant clustering of her bowel movements.  I recommend at this point increasing the Lomotil to effect and titrate up from her current 2 tablets daily up to a maximum of 2 tablets 4 times a day.  She is going to do this slowly over the course of the next 3 months and will be in contact with her office prior to making any changes.  I would like to see her back in 3 months for follow-up.

## 2024-01-03 NOTE — ASSESSMENT & PLAN NOTE
The patient has major low anterior resection syndrome with significant clustering of her bowel movements.  I recommend at this point increasing the Lomotil to effect and titrate up from her current 2 tablets daily up to a maximum of 2 tablets 4 times a day.  She is going to do this slowly over the course of the next 3 months and will be in contact with her office prior to making any changes.  I would like to see her back in 3 months for follow-up.

## 2024-01-04 ENCOUNTER — HOSPITAL ENCOUNTER (OUTPATIENT)
Dept: RADIOLOGY | Facility: HOSPITAL | Age: 79
Discharge: HOME | End: 2024-01-04
Attending: COLON & RECTAL SURGERY
Payer: MEDICARE

## 2024-01-04 DIAGNOSIS — R60.0 LOCALIZED EDEMA: ICD-10-CM

## 2024-01-04 DIAGNOSIS — M79.89 LEG SWELLING: ICD-10-CM

## 2024-01-04 PROCEDURE — 93971 EXTREMITY STUDY: CPT | Mod: LT

## 2024-01-24 ENCOUNTER — HOSPITAL ENCOUNTER (OUTPATIENT)
Dept: RADIOLOGY | Facility: HOSPITAL | Age: 79
Discharge: HOME | End: 2024-01-24
Attending: PHYSICIAN ASSISTANT
Payer: MEDICARE

## 2024-01-24 DIAGNOSIS — C34.92 ADENOCARCINOMA OF LEFT LUNG (CMS/HCC): ICD-10-CM

## 2024-01-24 PROCEDURE — 71250 CT THORAX DX C-: CPT | Mod: ME

## 2024-02-01 ENCOUNTER — OFFICE VISIT (OUTPATIENT)
Dept: THORACIC SURGERY | Facility: CLINIC | Age: 79
End: 2024-02-01
Payer: MEDICARE

## 2024-02-01 VITALS
HEART RATE: 100 BPM | WEIGHT: 123.8 LBS | OXYGEN SATURATION: 98 % | DIASTOLIC BLOOD PRESSURE: 73 MMHG | BODY MASS INDEX: 24.96 KG/M2 | TEMPERATURE: 97.7 F | SYSTOLIC BLOOD PRESSURE: 141 MMHG | HEIGHT: 59 IN | RESPIRATION RATE: 20 BRPM

## 2024-02-01 DIAGNOSIS — C34.12 MALIGNANT NEOPLASM OF UPPER LOBE OF LEFT LUNG (CMS/HCC): Primary | ICD-10-CM

## 2024-02-01 PROCEDURE — G8753 SYS BP > OR = 140: HCPCS | Performed by: THORACIC SURGERY (CARDIOTHORACIC VASCULAR SURGERY)

## 2024-02-01 PROCEDURE — G8754 DIAS BP LESS 90: HCPCS | Performed by: THORACIC SURGERY (CARDIOTHORACIC VASCULAR SURGERY)

## 2024-02-01 PROCEDURE — 99214 OFFICE O/P EST MOD 30 MIN: CPT | Performed by: THORACIC SURGERY (CARDIOTHORACIC VASCULAR SURGERY)

## 2024-02-01 NOTE — PROGRESS NOTES
Thoracic Surgery    Patient ID: Raegan Young                              : 1945  MRN: [id number]  Clinic: [facility]                                            Visit Date: 2024  Encounter Provider: Owen Gamez  Referring Provider: Geovani Mandel MD         Patient: Raegan Young  Chief Complaint: Non-small cell lung cancer surveillance.  Synchronous stage I adenocarcinomas of the lung resected 10/21/2021      Subjective     Raegan Young is a delightful 78 y.o. old female presenting today for survivorship visit.  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     Her ROS is notable for continued episodes of diarrhea which are being managed with medical regimen.  Her weight is down again approximately 3 pounds.  She has poor appetite.  Her energy level is good.  She does not have dyspnea, cough, hemoptysis, pleuritic pain, fever, or chills.  She has arthritic pains in her knees.  The remainder of her review of systems is negative.         Past Medical History:  has a past medical history of Anemia, Arthritis, Colovaginal fistula, COVID-19 vaccine series completed (2021), Disease of thyroid gland, Diverticulitis of colon, GERD (gastroesophageal reflux disease), H/O ileostomy, Hiatal hernia, History of snoring, Hypertension, Hypothyroidism, Lung cancer (CMS/HCC), Lung nodule, and Vaginal cancer (CMS/HCC) (2018).    She has no past medical history of Diabetes mellitus (CMS/HCC) or Myocardial infarction (CMS/HCC).  Past Surgical History:  has a past surgical history that includes Cholecystectomy; Dilation and curettage of uterus (2018); Rotator cuff repair (Right); Colonoscopy; Lung surgery; Tonsillectomy; [place] tooth extraction; Bowel resection; Abdominal surgery; and Ileostomy.  Social History:   Social History     Tobacco Use   • Smoking status: Former      Packs/day: 1.00     Years: 40.00     Additional pack years: 0.00     Total pack years: 40.00     Types: Cigarettes     Start date:      Quit date:      Years since quittin.1   • Smokeless tobacco: Never   Vaping Use   • Vaping Use: Never used   Substance Use Topics   • Alcohol use: Yes     Alcohol/week: 1.0 standard drink of alcohol     Types: 1 Glasses of wine per week     Comment: occassional    • Drug use: No     Medications:   Current Outpatient Medications   Medication Sig Dispense Refill   • acetaminophen (TYLENOL) 325 mg tablet Take 2 tablets (650 mg total) by mouth every 4 (four) hours as needed for pain for up to 30 doses. 30 tablet 0   • allopurinoL 200 mg tablet      • bumetanide (BUMEX) 1 mg tablet Take 1 mg by mouth daily. Daily prn     • cholecalciferol, vitamin D3, 1,000 unit (25 mcg) tablet Take 2,000 Units by mouth daily.     • diphenoxylate-atropine (LOMOTIL) 2.5-0.025 mg per tablet Take 1-2 tablets by mouth 4 (four) times a day as needed for diarrhea. MAX 8 TABS PER  tablet 0   • glucosamine-D3-Boswellia serr 1,500-400-100 mg-unit-mg tablet Take 1 tablet by mouth daily.     • levothyroxine (SYNTHROID) 88 mcg tablet Take 88 mcg by mouth daily.    3   • mv-calcium-min-iron fm-FA-vitK 18 mg-400 mcg- 25 mcg tablet      • nystatin (MYCOSTATIN) 100,000 unit/gram powder      • pantoprazole (PROTONIX) 40 mg EC tablet Take 40 mg by mouth once daily.     • rosuvastatin (CRESTOR) 20 mg tablet TAKE 1 TABLET BY MOUTH EVERY DAY 90 tablet 3   • ferrous sulfate 325 mg (65 mg iron) tablet      • silver sulfadiazine (SILVADENE) 1 % cream APPLY TO AFFECTED AREA TOPICALLY EVERY DAY (Patient not taking: Reported on 1/3/2024) 50 g 0     No current facility-administered medications for this visit.       Allergies:   Allergies   Allergen Reactions   • Adhesive Tape-Silicones      Causes bruising   • Sulfamethoxazole-Trimethoprim Nausea Only          Objective   Vitals:   Visit Vitals  BP (!)  "141/73 (BP Location: Left upper arm, Patient Position: Sitting)   Pulse 100   Temp 36.5 °C (97.7 °F) (Oral)   Resp 20   Ht 1.499 m (4' 11\")   Wt 56.2 kg (123 lb 12.8 oz)   SpO2 98%   BMI 25.00 kg/m²      General: She is in no distress and appears younger than her stated age.  Nodes: No cervical or supraclavicular adenopathy   Pulmonary: Lung fields are clear bilaterally   CV: Regular   Extremities: No clubbing cyanosis or edema      Imaging Review: We have independently evaluated her imaging studies and reports.  There is a note on the scan of the indeterminate subcentimeter nodule in the left which may be new.  Overall her scan looks stable without evidence of recurrence or progression.      Assessment   This very pleasant 78-year-old woman with synchronous stage I adenocarcinoma left upper lobe is doing very well.  We will continue her surveillance scans at 6-month intervals.         "

## 2024-02-01 NOTE — LETTER
2/1/2024    Re: Raegan Young  1945        Dear Geovani Mandel MD    I appreciate the opportunity to evaluate  your patient Raegan Young in the office today.    Raegan Young is a delightful 78 y.o. old female presenting today for survivorship visit.  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     Her ROS is notable for continued episodes of diarrhea which are being managed with medical regimen.  Her weight is down again approximately 3 pounds.  She has poor appetite.  Her energy level is good.  She does not have dyspnea, cough, hemoptysis, pleuritic pain, fever, or chills.  She has arthritic pains in her knees.  The remainder of her review of systems is negative.        This very pleasant 78-year-old woman with synchronous stage I adenocarcinoma left upper lobe is doing very well.  We will continue her surveillance scans at 6-month intervals.      Thank you for the opportunity to participate in her care.    Owen Gamez MD PhD  ;

## 2024-03-22 ENCOUNTER — OFFICE VISIT (OUTPATIENT)
Dept: GYNECOLOGIC ONCOLOGY | Facility: CLINIC | Age: 79
End: 2024-03-22
Attending: OBSTETRICS & GYNECOLOGY
Payer: MEDICARE

## 2024-03-22 VITALS
WEIGHT: 124 LBS | TEMPERATURE: 97.4 F | HEIGHT: 58 IN | RESPIRATION RATE: 18 BRPM | DIASTOLIC BLOOD PRESSURE: 69 MMHG | HEART RATE: 89 BPM | OXYGEN SATURATION: 96 % | BODY MASS INDEX: 26.03 KG/M2 | SYSTOLIC BLOOD PRESSURE: 138 MMHG

## 2024-03-22 DIAGNOSIS — Z85.44 HISTORY OF CANCER OF VAGINA: Primary | ICD-10-CM

## 2024-03-22 DIAGNOSIS — D07.1 VIN III (VULVAR INTRAEPITHELIAL NEOPLASIA III): ICD-10-CM

## 2024-03-22 DIAGNOSIS — N89.8 ITCHING IN THE VAGINAL AREA: ICD-10-CM

## 2024-03-22 PROCEDURE — G8754 DIAS BP LESS 90: HCPCS | Performed by: OBSTETRICS & GYNECOLOGY

## 2024-03-22 PROCEDURE — G8752 SYS BP LESS 140: HCPCS | Performed by: OBSTETRICS & GYNECOLOGY

## 2024-03-22 PROCEDURE — 99214 OFFICE O/P EST MOD 30 MIN: CPT | Performed by: OBSTETRICS & GYNECOLOGY

## 2024-03-22 RX ORDER — CLOTRIMAZOLE AND BETAMETHASONE DIPROPIONATE 10; .64 MG/G; MG/G
CREAM TOPICAL 2 TIMES DAILY
Qty: 45 G | Refills: 3 | Status: SHIPPED | OUTPATIENT
Start: 2024-03-22 | End: 2024-04-21

## 2024-03-22 NOTE — PROGRESS NOTES
Raegan Young presents to the office for surveillance for hx of stage 3 vaginal cancer and recently dx ALY 3; pt had a 4mm raised red nodule on right groin that was biopsied in September showing HSIL, ALY 3. She presents for f/up today. She thinks she may feel a lump in midline vagina. Denies vaginal bleeding or pain. Requests refill for clotrimazole/betamethasone cream, which she uses prn for vaginal itching with + effect. Rarely needs it- reports having same tube for >1 yr     Currently experiencing diarrhea- History of rectovaginal fistula status post resection and anastomosis near end of ileostomy- follows with Dr. Meza. Taking imodium, which helps some. Notes she often gets a UTI when she has diarrhea, but denies current urinary complaints.     Oncology History   History of cancer of vagina   9/12/2018 Cancer Staged    FIGO Stage III (cT3, cN1, cM0)       9/21/2018 Initial Diagnosis    Vaginal cancer (CMS/HCC) (HCC)     9/21/2018 - 10/29/2018 Chemotherapy    CISplatin with Concurrent Radiation,        9/27/2018 - 11/28/2018 Radiation Therapy    IMRT concurrent Cisplatin complicated by N/V, dehydration, thrombocytopenia febrile neutropenia     1/15/2019 Surgery    Left groin node excision benign     9/20/2020 Imaging Significant Findings    CT: She had CT scan on 9/2/20: Significant sigmoid colonic wall thickening from chronic diverticulosis. Significant size hiatal hernia present.  Left upper lobe 5 mm lung nodule is stable. There is no grossly enlarged lymph node noted in the left groin on the CT.      5/13/2022 Surgery    Robotic TLH, BSO           Past Medical History:   Diagnosis Date    Anemia     Arthritis     Colovaginal fistula     COVID-19 vaccine series completed 02/26/2021    Disease of thyroid gland     Diverticulitis of colon     GERD (gastroesophageal reflux disease)     H/O ileostomy     Hiatal hernia     History of snoring     Hypertension     Hypothyroidism     Lung cancer (CMS/HCC)      Lung nodule     Vaginal cancer (CMS/HCC) 2018    s/p XRT x 7 weeks and chemo weekly x 5 weeks       Past Surgical History:   Procedure Laterality Date    ABDOMINAL SURGERY      BOWEL RESECTION      partial with ileostomy    CHOLECYSTECTOMY      COLONOSCOPY      DILATION AND CURETTAGE OF UTERUS  02/2018    ILEOSTOMY      LUNG SURGERY      ROTATOR CUFF REPAIR Right     TONSILLECTOMY      WISDOM TOOTH EXTRACTION      hx of       Current Outpatient Medications:     clotrimazole-betamethasone (LOTRISONE) 1-0.05 % cream, Apply topically 2 (two) times a day. Apply BID prn until itching resolves., Disp: 45 g, Rfl: 3    acetaminophen (TYLENOL) 325 mg tablet, Take 2 tablets (650 mg total) by mouth every 4 (four) hours as needed for pain for up to 30 doses., Disp: 30 tablet, Rfl: 0    allopurinoL 200 mg tablet, , Disp: , Rfl:     bumetanide (BUMEX) 1 mg tablet, Take 1 mg by mouth daily. Daily prn, Disp: , Rfl:     cholecalciferol, vitamin D3, 1,000 unit (25 mcg) tablet, Take 2,000 Units by mouth daily., Disp: , Rfl:     diphenoxylate-atropine (LOMOTIL) 2.5-0.025 mg per tablet, Take 1-2 tablets by mouth 4 (four) times a day as needed for diarrhea. MAX 8 TABS PER DAY, Disp: 240 tablet, Rfl: 0    ferrous sulfate 325 mg (65 mg iron) tablet, , Disp: , Rfl:     glucosamine-D3-Boswellia serr 1,500-400-100 mg-unit-mg tablet, Take 1 tablet by mouth daily., Disp: , Rfl:     levothyroxine (SYNTHROID) 88 mcg tablet, Take 88 mcg by mouth daily.  , Disp: , Rfl: 3    mv-calcium-min-iron fm-FA-vitK 18 mg-400 mcg- 25 mcg tablet, , Disp: , Rfl:     pantoprazole (PROTONIX) 40 mg EC tablet, Take 40 mg by mouth once daily., Disp: , Rfl:     rosuvastatin (CRESTOR) 20 mg tablet, TAKE 1 TABLET BY MOUTH EVERY DAY, Disp: 90 tablet, Rfl: 3    silver sulfadiazine (SILVADENE) 1 % cream, APPLY TO AFFECTED AREA TOPICALLY EVERY DAY (Patient not taking: Reported on 1/3/2024), Disp: 50 g, Rfl: 0  Adhesive tape-silicones and  "Sulfamethoxazole-trimethoprim  Cancer-related family history includes Endometrial cancer in her biological father; Lung cancer in her biological father. There is no history of Breast cancer, Cancer, Colon cancer, or Ovarian cancer.  Social History     Tobacco Use    Smoking status: Former     Packs/day: 1.00     Years: 40.00     Additional pack years: 0.00     Total pack years: 40.00     Types: Cigarettes     Start date:      Quit date:      Years since quittin.2    Smokeless tobacco: Never   Vaping Use    Vaping Use: Never used   Substance Use Topics    Alcohol use: Yes     Alcohol/week: 1.0 standard drink of alcohol     Types: 1 Glasses of wine per week     Comment: occassional     Drug use: No     ROS:[negative in all systems with the exception of the above]    Physical examination: [Well-developed female in no apparent distress]  Vitals:    24 1115   BP: 138/69   Pulse: 89   Resp: 18   Temp: 36.3 °C (97.4 °F)   SpO2: 96%     Body mass index is 25.92 kg/m².    Vitals: BP: 138/69  Temp: 36.3 °C (97.4 °F)  Temp Source: Oral  Heart Rate: 89  Resp: 18  SpO2: 96 %  Height: 147.3 cm (4' 10\")  Weight: 56.2 kg (124 lb) ( 111)  HEENT: Normocephalic, atraumatic, nonicteric sclera  Neck: Supple no masses, thyroid normal nontender  Lymphatic: No inguinal or supraclavicular adenopathy  Lungs: good respiratory effort  Extremities: No clubbing, cyanosis, edema  Neuro: Oriented ×3 with normal mood and affect  Abdomen: Soft, nontender, nondistended. No hepatosplenomegaly. No rebound or guarding.  Gynecologic: normal vulva, normal urethra, radiation changes to vaginal opening/vagina, shortened vagina, no masses or lesions, surgically absent cervix, uterus and adnexa, left groin scar from prior node excision stable   Rectovaginal: deferred     Dr. Jimenez present for exam     Assessment/Plan   Assesment:   Problem List Items Addressed This Visit          Other    History of cancer of vagina - Primary    " Overview     9/12/18 biopsy L vagina squamous cell ca  MRI pelvis shows L vaginal cancer with 4-5cm L inguinal node necrotic  PET shows uptake in vagina and L inguinal node         Current Assessment & Plan                Relevant Orders    Cytology, Thinprep Pap     Other Visit Diagnoses       Itching in the vaginal area        Relevant Medications    clotrimazole-betamethasone (LOTRISONE) 1-0.05 % cream    ALY III (vulvar intraepithelial neoplasia III)              78-year-old female with a history of vaginal carcinoma and ALY III. No evidence of disease on physical exam. Vaginal PAP collected- will contact pt with results.  Pt is aware to contact office if she should have vaginal bleeding, vaginal/vulvar irritation, pruritis, new lesions, pelvic or abd pain, swollen groin nodes, persistent cough, persistent back pain, bloating or any other concerns.   F/up 6 mos for surveillance     Bing Chan PA-C  Pt was seen and evaluated by Dr. Jimenez   Attending addendum:  The patient was seen and examined.  The PA's note was reviewed, and I agree with the assessment above.  I personally counseled the patient on her diagnosis and proposed treatment as documented above.

## 2024-03-28 ENCOUNTER — TELEPHONE (OUTPATIENT)
Dept: GYNECOLOGIC ONCOLOGY | Facility: CLINIC | Age: 79
End: 2024-03-28
Payer: MEDICARE

## 2024-03-28 LAB
CYTOLOGIST CVX/VAG CYTO: NORMAL
CYTOLOGY CVX/VAG DOC CYTO: NORMAL
CYTOLOGY CVX/VAG DOC THIN PREP: NORMAL
DX ICD CODE: NORMAL
HPV I/H RISK 4 DNA CVX QL PROBE+SIG AMP: NEGATIVE
LAB CORP NOTE:: NORMAL
LAB CORP QC REVIEWED BY:: NORMAL
LC HPV GENOTYPE REFLEX: NORMAL
OTHER STN SPEC: NORMAL
STAT OF ADQ CVX/VAG CYTO-IMP: NORMAL

## 2024-04-03 ENCOUNTER — OFFICE VISIT (OUTPATIENT)
Dept: COLORECTAL SURGERY | Facility: CLINIC | Age: 79
End: 2024-04-03
Payer: MEDICARE

## 2024-04-03 VITALS — BODY MASS INDEX: 25.82 KG/M2 | HEIGHT: 58 IN | WEIGHT: 123 LBS

## 2024-04-03 DIAGNOSIS — R19.8 LOW ANTERIOR RESECTION SYNDROME: Primary | ICD-10-CM

## 2024-04-03 PROCEDURE — 99214 OFFICE O/P EST MOD 30 MIN: CPT | Performed by: COLON & RECTAL SURGERY

## 2024-04-03 PROCEDURE — G8756 NO BP MEASURE DOC: HCPCS | Performed by: COLON & RECTAL SURGERY

## 2024-04-03 RX ORDER — DICYCLOMINE HYDROCHLORIDE 10 MG/1
10 CAPSULE ORAL 2 TIMES DAILY
Qty: 180 CAPSULE | Refills: 1 | Status: SHIPPED
Start: 2024-04-03 | End: 2024-04-03

## 2024-04-03 RX ORDER — LOPERAMIDE HYDROCHLORIDE 2 MG/1
2 CAPSULE ORAL 2 TIMES DAILY
Qty: 60 CAPSULE | Refills: 3 | Status: SHIPPED | OUTPATIENT
Start: 2024-04-03 | End: 2024-08-08

## 2024-04-03 RX ORDER — DICYCLOMINE HYDROCHLORIDE 10 MG/1
10 CAPSULE ORAL 2 TIMES DAILY
Qty: 180 CAPSULE | Refills: 1 | Status: SHIPPED | OUTPATIENT
Start: 2024-04-03 | End: 2024-04-03

## 2024-04-03 RX ORDER — DICYCLOMINE HYDROCHLORIDE 10 MG/1
10 CAPSULE ORAL
Qty: 360 CAPSULE | Refills: 1 | Status: SHIPPED | OUTPATIENT
Start: 2024-04-03 | End: 2024-11-01

## 2024-04-03 NOTE — PROGRESS NOTES
HPI: Raegan Young is here today for continued follow up. She is nearly 1 year post stoma closure.     Today she reports that her symptoms are largely unchanged. She was using lomotil previously taking 5 total daily but notes that it causes leg swelling which she wants to avoid. She states that she has to move her bowels every time she eats and moves her bowels up to 10 times daily, notes difficulty pushing out her stool. On top of this she will have episodes of clustering where she will have diarrhea leading to irritation of perianal skin. With each episode of diarrhea she will develop a UTI for which she is rx cipro from UC. Is currently taking fiber tablets at night. Denies N/V/F/C, abdominal pain, blood in stool. Admits to urgency which will occasionally lead to fecal accidents.         Past Medical History:   Diagnosis Date    Anemia     Arthritis     Colovaginal fistula     COVID-19 vaccine series completed 02/26/2021    Disease of thyroid gland     Diverticulitis of colon     GERD (gastroesophageal reflux disease)     H/O ileostomy     Hiatal hernia     History of snoring     Hypertension     Hypothyroidism     Lung cancer (CMS/HCC)     Lung nodule     Vaginal cancer (CMS/HCC) 2018    s/p XRT x 7 weeks and chemo weekly x 5 weeks       Past Surgical History:   Procedure Laterality Date    ABDOMINAL SURGERY      BOWEL RESECTION      partial with ileostomy    CHOLECYSTECTOMY      COLONOSCOPY      DILATION AND CURETTAGE OF UTERUS  02/2018    ILEOSTOMY      LUNG SURGERY      ROTATOR CUFF REPAIR Right     TONSILLECTOMY      WISDOM TOOTH EXTRACTION      hx of       Current Outpatient Medications:     acetaminophen (TYLENOL) 325 mg tablet, Take 2 tablets (650 mg total) by mouth every 4 (four) hours as needed for pain for up to 30 doses., Disp: 30 tablet, Rfl: 0    allopurinoL 200 mg tablet, , Disp: , Rfl:     bumetanide (BUMEX) 1 mg tablet, Take 1 mg by mouth daily. Daily prn, Disp: , Rfl:     cholecalciferol,  vitamin D3, 1,000 unit (25 mcg) tablet, Take 2,000 Units by mouth daily., Disp: , Rfl:     clotrimazole-betamethasone (LOTRISONE) 1-0.05 % cream, Apply topically 2 (two) times a day. Apply BID prn until itching resolves., Disp: 45 g, Rfl: 3    glucosamine-D3-Boswellia serr 1,500-400-100 mg-unit-mg tablet, Take 1 tablet by mouth daily., Disp: , Rfl:     levothyroxine (SYNTHROID) 88 mcg tablet, Take 88 mcg by mouth daily.  , Disp: , Rfl: 3    mv-calcium-min-iron fm-FA-vitK 18 mg-400 mcg- 25 mcg tablet, , Disp: , Rfl:     pantoprazole (PROTONIX) 40 mg EC tablet, Take 40 mg by mouth once daily., Disp: , Rfl:     rosuvastatin (CRESTOR) 20 mg tablet, TAKE 1 TABLET BY MOUTH EVERY DAY, Disp: 90 tablet, Rfl: 3    dicyclomine (BENTYL) 10 mg capsule, Take 1 capsule (10 mg total) by mouth 4 (four) times a day (before meals and nightly)., Disp: 360 capsule, Rfl: 1    loperamide (IMODIUM) 2 mg capsule, Take 1 capsule (2 mg total) by mouth 2 (two) times a day., Disp: 60 capsule, Rfl: 3   Allergies   Allergen Reactions    Adhesive Tape-Silicones      Causes bruising    Sulfamethoxazole-Trimethoprim Nausea Only     Social History     Socioeconomic History    Marital status:      Spouse name: Royal     Number of children: 4    Years of education: Not on file    Highest education level: Not on file   Occupational History    Occupation: retired/     Tobacco Use    Smoking status: Former     Packs/day: 1.00     Years: 40.00     Additional pack years: 0.00     Total pack years: 40.00     Types: Cigarettes     Start date:      Quit date:      Years since quittin.2    Smokeless tobacco: Never   Vaping Use    Vaping Use: Never used   Substance and Sexual Activity    Alcohol use: Yes     Alcohol/week: 1.0 standard drink of alcohol     Types: 1 Glasses of wine per week     Comment: occassional     Drug use: No    Sexual activity: Never     Comment:    Other Topics Concern    Not on file   Social History  Narrative    Lives with  in a 2 story home     Social Determinants of Health     Financial Resource Strain: Low Risk  (4/20/2023)    Overall Financial Resource Strain (CARDIA)     Difficulty of Paying Living Expenses: Not hard at all   Food Insecurity: No Food Insecurity (10/30/2022)    Hunger Vital Sign     Worried About Running Out of Food in the Last Year: Never true     Ran Out of Food in the Last Year: Never true   Transportation Needs: No Transportation Needs (4/20/2023)    PRAPARE - Transportation     Lack of Transportation (Medical): No     Lack of Transportation (Non-Medical): No   Physical Activity: Not on file   Stress: Not on file   Social Connections: Not on file   Intimate Partner Violence: Not on file   Housing Stability: Low Risk  (4/20/2023)    Housing Stability Vital Sign     Unable to Pay for Housing in the Last Year: No     Number of Places Lived in the Last Year: 1     Unstable Housing in the Last Year: No      Family History   Problem Relation Age of Onset    Heart attack Biological Mother     Hypertension Biological Mother     Endometrial cancer Biological Father     Lung cancer Biological Father     Breast cancer Neg Hx     Cancer Neg Hx     Colon cancer Neg Hx     Ovarian cancer Neg Hx        REVIEW OF SYSTEMS: see HPI    PHYSICAL EXAM:  GEN: On examination the patient is resting comfortably.  NEURO/PSYCH: Alert and oriented ×3  HEENT: Eyes: Sclera anicteric  CHEST: Equal rise and fall the chest.  No tenderness bilaterally.  SKIN: Warm and moist  NODES: No groin or cervical lymphadenopathy  EXT: No palpable edema of the bilateral lower extremities.  No clubbing.  GI: Abdomen soft, nontender, nondistended.  No palpable liver or spleen edge.  No ventral hernias, mass, or tenderness.  RECTAL: Perianal skin is normal.  Digital exam reveals slightly spastic sphincter tone no masses are palpable.    XRAYS: I personally reviewed the current X rays and the pertinent findings are: see  HPI    LABS: I reviewed the current labs and pertinent findings include: see HPI    ASSESSMENT/PLAN:     Low anterior resection syndrome  Raegan has been having lower extremity swelling which I do not feel is due to the lomotil, however at this point she may trial imodium and should take it on a routine basis rather than PRN. I would like her to try taking fiber powder supplement such as konsyl since she does not like the flavor of metamucil. I would also like her to begin a probiotic such as visbiome. Additionally I would like her to take bentyl for spasticity.      In short, I would like the patient to be on the following regimen:      1 tablet Imodium in the morning and before bedtime    1 teaspoon of Konsyl daily with orange juice or water    Bentyl around-the-clock    Visbiome probiotic    We will see her back in 2-3 months time to reassess how things are going and make changes as necessary.

## 2024-04-03 NOTE — LETTER
April 3, 2024     Javon Valadez DO  [address]  [facility] [address]    Patient: Raegan Young  YOB: 1945  Date of Visit: 4/3/2024      Dear Dr. Valadez:    Thank you for referring Raegan Yougn to me for evaluation. Below are my notes for this consultation.    If you have questions, please do not hesitate to call me. I look forward to following your patient along with you.         Sincerely,        Geovani Mandel MD        CC: MD Trev Mueller MD Patrick Ross, MD PhD    Tequila, Geovani CISNEROS MD  4/3/2024 11:13 AM  Sign when Signing Visit  HPI: Raegan Young is here today for continued follow up. She is nearly 1 year post stoma closure.     Today she reports that her symptoms are largely unchanged. She was using lomotil previously taking 5 total daily but notes that it causes leg swelling which she wants to avoid. She states that she has to move her bowels every time she eats and moves her bowels up to 10 times daily, notes difficulty pushing out her stool. On top of this she will have episodes of clustering where she will have diarrhea leading to irritation of perianal skin. With each episode of diarrhea she will develop a UTI for which she is rx cipro from UC. Is currently taking fiber tablets at night. Denies N/V/F/C, abdominal pain, blood in stool. Admits to urgency which will occasionally lead to fecal accidents.         Past Medical History:   Diagnosis Date   • Anemia    • Arthritis    • Colovaginal fistula    • COVID-19 vaccine series completed 02/26/2021   • Disease of thyroid gland    • Diverticulitis of colon    • GERD (gastroesophageal reflux disease)    • H/O ileostomy    • Hiatal hernia    • History of snoring    • Hypertension    • Hypothyroidism    • Lung cancer (CMS/HCC)    • Lung nodule    • Vaginal cancer (CMS/HCC) 2018    s/p XRT x 7 weeks and chemo weekly x 5 weeks       Past Surgical History:   Procedure Laterality  Date   • ABDOMINAL SURGERY     • BOWEL RESECTION      partial with ileostomy   • CHOLECYSTECTOMY     • COLONOSCOPY     • DILATION AND CURETTAGE OF UTERUS  02/2018   • ILEOSTOMY     • LUNG SURGERY     • ROTATOR CUFF REPAIR Right    • TONSILLECTOMY     • WISDOM TOOTH EXTRACTION      hx of       Current Outpatient Medications:   •  acetaminophen (TYLENOL) 325 mg tablet, Take 2 tablets (650 mg total) by mouth every 4 (four) hours as needed for pain for up to 30 doses., Disp: 30 tablet, Rfl: 0  •  allopurinoL 200 mg tablet, , Disp: , Rfl:   •  bumetanide (BUMEX) 1 mg tablet, Take 1 mg by mouth daily. Daily prn, Disp: , Rfl:   •  cholecalciferol, vitamin D3, 1,000 unit (25 mcg) tablet, Take 2,000 Units by mouth daily., Disp: , Rfl:   •  clotrimazole-betamethasone (LOTRISONE) 1-0.05 % cream, Apply topically 2 (two) times a day. Apply BID prn until itching resolves., Disp: 45 g, Rfl: 3  •  glucosamine-D3-Boswellia serr 1,500-400-100 mg-unit-mg tablet, Take 1 tablet by mouth daily., Disp: , Rfl:   •  levothyroxine (SYNTHROID) 88 mcg tablet, Take 88 mcg by mouth daily.  , Disp: , Rfl: 3  •  mv-calcium-min-iron fm-FA-vitK 18 mg-400 mcg- 25 mcg tablet, , Disp: , Rfl:   •  pantoprazole (PROTONIX) 40 mg EC tablet, Take 40 mg by mouth once daily., Disp: , Rfl:   •  rosuvastatin (CRESTOR) 20 mg tablet, TAKE 1 TABLET BY MOUTH EVERY DAY, Disp: 90 tablet, Rfl: 3  •  dicyclomine (BENTYL) 10 mg capsule, Take 1 capsule (10 mg total) by mouth 4 (four) times a day (before meals and nightly)., Disp: 360 capsule, Rfl: 1  •  loperamide (IMODIUM) 2 mg capsule, Take 1 capsule (2 mg total) by mouth 2 (two) times a day., Disp: 60 capsule, Rfl: 3   Allergies   Allergen Reactions   • Adhesive Tape-Silicones      Causes bruising   • Sulfamethoxazole-Trimethoprim Nausea Only     Social History     Socioeconomic History   • Marital status:      Spouse name: Royal    • Number of children: 4   • Years of education: Not on file   • Highest  education level: Not on file   Occupational History   • Occupation: retired/     Tobacco Use   • Smoking status: Former     Packs/day: 1.00     Years: 40.00     Additional pack years: 0.00     Total pack years: 40.00     Types: Cigarettes     Start date:      Quit date:      Years since quittin.2   • Smokeless tobacco: Never   Vaping Use   • Vaping Use: Never used   Substance and Sexual Activity   • Alcohol use: Yes     Alcohol/week: 1.0 standard drink of alcohol     Types: 1 Glasses of wine per week     Comment: occassional    • Drug use: No   • Sexual activity: Never     Comment:    Other Topics Concern   • Not on file   Social History Narrative    Lives with  in a 2 story home     Social Determinants of Health     Financial Resource Strain: Low Risk  (2023)    Overall Financial Resource Strain (CARDIA)    • Difficulty of Paying Living Expenses: Not hard at all   Food Insecurity: No Food Insecurity (10/30/2022)    Hunger Vital Sign    • Worried About Running Out of Food in the Last Year: Never true    • Ran Out of Food in the Last Year: Never true   Transportation Needs: No Transportation Needs (2023)    PRAPARE - Transportation    • Lack of Transportation (Medical): No    • Lack of Transportation (Non-Medical): No   Physical Activity: Not on file   Stress: Not on file   Social Connections: Not on file   Intimate Partner Violence: Not on file   Housing Stability: Low Risk  (2023)    Housing Stability Vital Sign    • Unable to Pay for Housing in the Last Year: No    • Number of Places Lived in the Last Year: 1    • Unstable Housing in the Last Year: No      Family History   Problem Relation Age of Onset   • Heart attack Biological Mother    • Hypertension Biological Mother    • Endometrial cancer Biological Father    • Lung cancer Biological Father    • Breast cancer Neg Hx    • Cancer Neg Hx    • Colon cancer Neg Hx    • Ovarian cancer Neg Hx        REVIEW OF  SYSTEMS: see HPI    PHYSICAL EXAM:  GEN: On examination the patient is resting comfortably.  NEURO/PSYCH: Alert and oriented ×3  HEENT: Eyes: Sclera anicteric  CHEST: Equal rise and fall the chest.  No tenderness bilaterally.  SKIN: Warm and moist  NODES: No groin or cervical lymphadenopathy  EXT: No palpable edema of the bilateral lower extremities.  No clubbing.  GI: Abdomen soft, nontender, nondistended.  No palpable liver or spleen edge.  No ventral hernias, mass, or tenderness.  RECTAL: Perianal skin is normal.  Digital exam reveals slightly spastic sphincter tone no masses are palpable.    XRAYS: I personally reviewed the current X rays and the pertinent findings are: see HPI    LABS: I reviewed the current labs and pertinent findings include: see HPI    ASSESSMENT/PLAN:     Low anterior resection syndrome  Raegan has been having lower extremity swelling which I do not feel is due to the lomotil, however at this point she may trial imodium and should take it on a routine basis rather than PRN. I would like her to try taking fiber powder supplement such as konsyl since she does not like the flavor of metamucil. I would also like her to begin a probiotic such as visbiome. Additionally I would like her to take bentyl for spasticity.      In short, I would like the patient to be on the following regimen:      1 tablet Imodium in the morning and before bedtime    1 teaspoon of Konsyl daily with orange juice or water    Bentyl around-the-clock    Visbiome probiotic    We will see her back in 2-3 months time to reassess how things are going and make changes as necessary.

## 2024-04-03 NOTE — ASSESSMENT & PLAN NOTE
Raegan has been having lower extremity swelling which I do not feel is due to the lomotil, however at this point she may trial imodium and should take it on a routine basis rather than PRN. I would like her to try taking fiber powder supplement such as konsyl since she does not like the flavor of metamucil. I would also like her to begin a probiotic such as visbiome. Additionally I would like her to take bentyl for spasticity.      In short, I would like the patient to be on the following regimen:      1 tablet Imodium in the morning and before bedtime    1 teaspoon of Konsyl daily with orange juice or water    Bentyl around-the-clock    Visbiome probiotic    We will see her back in 2-3 months time to reassess how things are going and make changes as necessary.

## 2024-07-22 DIAGNOSIS — C34.12 MALIGNANT NEOPLASM OF UPPER LOBE OF LEFT LUNG (CMS/HCC): Primary | ICD-10-CM

## 2024-08-05 ENCOUNTER — HOSPITAL ENCOUNTER (OUTPATIENT)
Dept: RADIOLOGY | Facility: HOSPITAL | Age: 79
Discharge: HOME | End: 2024-08-05
Attending: PHYSICIAN ASSISTANT
Payer: MEDICARE

## 2024-08-05 DIAGNOSIS — C34.12 MALIGNANT NEOPLASM OF UPPER LOBE OF LEFT LUNG (CMS/HCC): ICD-10-CM

## 2024-08-05 PROCEDURE — 71250 CT THORAX DX C-: CPT

## 2024-08-08 ENCOUNTER — OFFICE VISIT (OUTPATIENT)
Dept: THORACIC SURGERY | Facility: CLINIC | Age: 79
End: 2024-08-08
Payer: MEDICARE

## 2024-08-08 VITALS
HEART RATE: 79 BPM | RESPIRATION RATE: 18 BRPM | TEMPERATURE: 97.8 F | WEIGHT: 124 LBS | OXYGEN SATURATION: 98 % | SYSTOLIC BLOOD PRESSURE: 138 MMHG | DIASTOLIC BLOOD PRESSURE: 69 MMHG | BODY MASS INDEX: 26.03 KG/M2 | HEIGHT: 58 IN

## 2024-08-08 DIAGNOSIS — C34.12 MALIGNANT NEOPLASM OF UPPER LOBE OF LEFT LUNG (CMS/HCC): Primary | ICD-10-CM

## 2024-08-08 DIAGNOSIS — R91.8 MULTIPLE LUNG NODULES ON CT: ICD-10-CM

## 2024-08-08 PROCEDURE — 99214 OFFICE O/P EST MOD 30 MIN: CPT | Performed by: THORACIC SURGERY (CARDIOTHORACIC VASCULAR SURGERY)

## 2024-08-08 PROCEDURE — G8754 DIAS BP LESS 90: HCPCS | Performed by: THORACIC SURGERY (CARDIOTHORACIC VASCULAR SURGERY)

## 2024-08-08 PROCEDURE — G8752 SYS BP LESS 140: HCPCS | Performed by: THORACIC SURGERY (CARDIOTHORACIC VASCULAR SURGERY)

## 2024-08-08 RX ORDER — LEVOTHYROXINE SODIUM 75 UG/1
75 TABLET ORAL
COMMUNITY

## 2024-08-08 RX ORDER — ALPRAZOLAM 0.5 MG/1
0.5 TABLET ORAL NIGHTLY PRN
Qty: 30 TABLET | Refills: 3 | Status: SHIPPED | OUTPATIENT
Start: 2024-08-08

## 2024-08-08 NOTE — PROGRESS NOTES
Thoracic Surgery    Patient ID: Raegan Young                              : 1945  MRN: [id number]  Clinic: [facility]                                            Visit Date: 2024  Encounter Provider: Owen Gamez  Referring Provider: Geovani Mandel MD       Patient: Raegan Young  Chief Complaint: Follow-up      Subjective     Raegan Young is a very pleasant 78 y.o. old female presenting today for 6 month NSCLC survivorship/surveillance.  She had synchronous stage I adenocarcinomas of the left upper lobe resected with a robot-assisted lobectomy and thoracic lymphadenectomy on 10/21/2021.  She returns for interval follow-up and had a noncontrast chest CT completed 2024    She also has a history of stage III vaginal squamous cell carcinoma treated with cisplatin and concurrent radiation in .  She later developed colovaginal fistula.  After her pulmonary lesions were proven to not be metastatic and were limited stage adenocarcinomas she was able to have proctectomy/sigmoidectomy and coloanal anastomosis in May 2022.  She had a small anastomotic dehiscence that had to be repaired later that year but ultimately was able to have stoma closure 2023.  In March of this year, she had a small vulvar nodule removed showing ALY 3.    Her ROS is notable for ongoing but slightly improved issues with diarrhea and occasional fecal incontinence.  She started a new regimen around May of this year that has given her mild improvement.  She has decreased appetite and has had a total of 25 pound weight loss over the last year due to both of the above.  Anytime she has food intake, it stimulates her bowels despite the size of the meal (low anterior resection syndrome).  Over the last few weeks, she feels that her weight has stabilized.  No fevers, chills, cough, hemoptysis, shortness of breath or chest pain.  She continues to have mild anxiety at night and occasionally takes  alprazolam which helps her rest and decreases how many times she is up in the evening.         Past Medical History:  has a past medical history of Anemia, Arthritis, Colovaginal fistula, COVID-19 vaccine series completed (2021), Disease of thyroid gland, Diverticulitis of colon, GERD (gastroesophageal reflux disease), H/O ileostomy, Hiatal hernia, History of snoring, Hypertension, Hypothyroidism, Lung cancer (CMS/[facility]), Lung nodule, and Vaginal cancer (CMS/[facility], located within [facility]) (2018).    She has no past medical history of Diabetes mellitus (CMS/[facility] located [facility]) or Myocardial infarction (CMS/[facility], located [facility]).  Past Surgical History:  has a past surgical history that includes Cholecystectomy; Dilation and curettage of uterus (2018); Rotator cuff repair (Right); Colonoscopy; Lung surgery; Tonsillectomy; [place] tooth extraction; Bowel resection; Abdominal surgery; and Ileostomy.  Social History:   Social History     Tobacco Use    Smoking status: Former     Packs/day: 1.00     Years: 40.00     Additional pack years: 0.00     Total pack years: 40.00     Types: Cigarettes     Start date:      Quit date:      Years since quittin.6    Smokeless tobacco: Never   Vaping Use    Vaping Use: Never used   Substance Use Topics    Alcohol use: Yes     Alcohol/week: 1.0 standard drink of alcohol     Types: 1 Glasses of wine per week     Comment: occassional     Drug use: No     Medications:   Current Outpatient Medications   Medication Sig Dispense Refill    acetaminophen (TYLENOL) 325 mg tablet Take 2 tablets (650 mg total) by mouth every 4 (four) hours as needed for pain for up to 30 doses. 30 tablet 0    allopurinoL 200 mg tablet       bumetanide (BUMEX) 1 mg tablet Take 1 mg by mouth daily. Daily prn      cholecalciferol, vitamin D3, 1,000 unit (25 mcg) tablet Take 2,000 Units by mouth daily.      dicyclomine (BENTYL) 10 mg capsule Take 1 capsule (10 mg total) by mouth 4 (four) times a day (before meals and nightly). 360 capsule 1     "glucosamine-D3-Boswellia serr 1,500-400-100 mg-unit-mg tablet Take 1 tablet by mouth daily.      loperamide (IMODIUM) 2 mg capsule Take 1 capsule (2 mg total) by mouth 2 (two) times a day. 60 capsule 3    mv-calcium-min-iron fm-FA-vitK 18 mg-400 mcg- 25 mcg tablet       pantoprazole (PROTONIX) 40 mg EC tablet Take 40 mg by mouth once daily.      rosuvastatin (CRESTOR) 20 mg tablet TAKE 1 TABLET BY MOUTH EVERY DAY 90 tablet 3    levothyroxine (SYNTHROID) 88 mcg tablet Take 88 mcg by mouth daily.    3     No current facility-administered medications for this visit.       Allergies:   Allergies   Allergen Reactions    Adhesive Tape-Silicones      Causes bruising    Sulfamethoxazole-Trimethoprim Nausea Only          Objective   Vitals: Visit Vitals  Pulse 79   Temp 36.6 °C (97.8 °F) (Oral)   Resp 18   Ht 1.473 m (4' 10\")   Wt 56.2 kg (124 lb)   SpO2 98%   BMI 25.92 kg/m²      General: She appears well, is very pleasant and in no distress  She ambulates into the office independently with steady gait and no assistive device.  She is not on oxygen.  She has mild kyphosis of the upper thoracic spine   Pulmonary: Good air entry bilaterally and clear to auscultation   CV: Regular rate and rhythm without murmur appreciated   Extremities: Mild lower extremity edema, symmetric, good color and temperature.  No clubbing or cyanosis is noted.      Imaging Review: We have personally reviewed her images in detail today and compared them to those from her prior 2 CT scans.  She continues to have multiple subcentimeter nodules bilaterally and no adenopathy.  The left upper lobe mid to posterior lesion is measured at 3 mm and more prominent today (increased versus better visualization).  There are no acute findings.      Assessment   History of synchronous stage I adenocarcinoma status post left upper lobectomy 2021    History of vulvar cancer, stage III 2018    Colovaginal fistula status post resection and stoma reversal 2022-23    ALY " III (vulvar intraepithelial neoplasia III)     Mild anxiety, particularly noted at night    We have discussed her chest CT results in detail and will continue to monitor closely with another 6-month interval chest CT.  She continues to have follow-up with her colorectal and gynecology oncology teams.  I have reviewed PDMP and renewed her nightly as needed 0.5 mg alprazolam.  She is very appreciative and has been encouraged to call back in the meantime with any changes, question or concern.

## 2024-08-08 NOTE — LETTER
2024     Geovani Mandel MD  [address]  [address]  [place] [id number]    Patient: Raegan Young  YOB: 1945  Date of Visit: 2024      Dear Dr. Mandel:    Thank you for referring Raegan Young to me for evaluation. Below are my notes for this consultation.    If you have questions, please do not hesitate to call me. I look forward to following your patient along with you.         Sincerely,        Owen Gamez MD PhD        CC: DO Trev Richards MD Skabla, Patsy L, PA C  2024  4:47 PM  Sign when Signing Visit  Thoracic Surgery    Patient ID: Raegan Young                              : 1945  MRN: [id number]  Clinic: [facility]                                            Visit Date: 2024  Encounter Provider: Owen Gamez  Referring Provider: Geovani Mandel MD       Patient: Raegan Young  Chief Complaint: Follow-up      Subjective    Raegan Young is a very pleasant 78 y.o. old female presenting today for 6 month NSCLC survivorship/surveillance.  She had synchronous stage I adenocarcinomas of the left upper lobe resected with a robot-assisted lobectomy and thoracic lymphadenectomy on 10/21/2021.  She returns for interval follow-up and had a noncontrast chest CT completed 2024    She also has a history of stage III vaginal squamous cell carcinoma treated with cisplatin and concurrent radiation in 2018.  She later developed colovaginal fistula.  After her pulmonary lesions were proven to not be metastatic and were limited stage adenocarcinomas she was able to have proctectomy/sigmoidectomy and coloanal anastomosis in May 2022.  She had a small anastomotic dehiscence that had to be repaired later that year but ultimately was able to have stoma closure 2023.  In March of this year, she had a small vulvar nodule removed showing ALY 3.    Her ROS is notable for ongoing but slightly  improved issues with diarrhea and occasional fecal incontinence.  She started a new regimen around May of this year that has given her mild improvement.  She has decreased appetite and has had a total of 25 pound weight loss over the last year due to both of the above.  Anytime she has food intake, it stimulates her bowels despite the size of the meal (low anterior resection syndrome).  Over the last few weeks, she feels that her weight has stabilized.  No fevers, chills, cough, hemoptysis, shortness of breath or chest pain.  She continues to have mild anxiety at night and occasionally takes alprazolam which helps her rest and decreases how many times she is up in the evening.         Past Medical History:  has a past medical history of Anemia, Arthritis, Colovaginal fistula, COVID-19 vaccine series completed (2021), Disease of thyroid gland, Diverticulitis of colon, GERD (gastroesophageal reflux disease), H/O ileostomy, Hiatal hernia, History of snoring, Hypertension, Hypothyroidism, Lung cancer (CMS/HCC), Lung nodule, and Vaginal cancer (CMS/HCC) (2018).    She has no past medical history of Diabetes mellitus (CMS/HCC) or Myocardial infarction (CMS/HCC).  Past Surgical History:  has a past surgical history that includes Cholecystectomy; Dilation and curettage of uterus (2018); Rotator cuff repair (Right); Colonoscopy; Lung surgery; Tonsillectomy; [employer] tooth extraction; Bowel resection; Abdominal surgery; and Ileostomy.  Social History:   Social History     Tobacco Use   • Smoking status: Former     Packs/day: 1.00     Years: 40.00     Additional pack years: 0.00     Total pack years: 40.00     Types: Cigarettes     Start date:      Quit date:      Years since quittin.6   • Smokeless tobacco: Never   Vaping Use   • Vaping Use: Never used   Substance Use Topics   • Alcohol use: Yes     Alcohol/week: 1.0 standard drink of alcohol     Types: 1 Glasses of wine per week     Comment: occassional   "  • Drug use: No     Medications:   Current Outpatient Medications   Medication Sig Dispense Refill   • acetaminophen (TYLENOL) 325 mg tablet Take 2 tablets (650 mg total) by mouth every 4 (four) hours as needed for pain for up to 30 doses. 30 tablet 0   • allopurinoL 200 mg tablet      • bumetanide (BUMEX) 1 mg tablet Take 1 mg by mouth daily. Daily prn     • cholecalciferol, vitamin D3, 1,000 unit (25 mcg) tablet Take 2,000 Units by mouth daily.     • dicyclomine (BENTYL) 10 mg capsule Take 1 capsule (10 mg total) by mouth 4 (four) times a day (before meals and nightly). 360 capsule 1   • glucosamine-D3-Boswellia serr 1,500-400-100 mg-unit-mg tablet Take 1 tablet by mouth daily.     • loperamide (IMODIUM) 2 mg capsule Take 1 capsule (2 mg total) by mouth 2 (two) times a day. 60 capsule 3   • mv-calcium-min-iron fm-FA-vitK 18 mg-400 mcg- 25 mcg tablet      • pantoprazole (PROTONIX) 40 mg EC tablet Take 40 mg by mouth once daily.     • rosuvastatin (CRESTOR) 20 mg tablet TAKE 1 TABLET BY MOUTH EVERY DAY 90 tablet 3   • levothyroxine (SYNTHROID) 88 mcg tablet Take 88 mcg by mouth daily.    3     No current facility-administered medications for this visit.       Allergies:   Allergies   Allergen Reactions   • Adhesive Tape-Silicones      Causes bruising   • Sulfamethoxazole-Trimethoprim Nausea Only          Objective  Vitals: Visit Vitals  Pulse 79   Temp 36.6 °C (97.8 °F) (Oral)   Resp 18   Ht 1.473 m (4' 10\")   Wt 56.2 kg (124 lb)   SpO2 98%   BMI 25.92 kg/m²      General: She appears well, is very pleasant and in no distress  She ambulates into the office independently with steady gait and no assistive device.  She is not on oxygen.  She has mild kyphosis of the upper thoracic spine   Pulmonary: Good air entry bilaterally and clear to auscultation   CV: Regular rate and rhythm without murmur appreciated   Extremities: Mild lower extremity edema, symmetric, good color and temperature.  No clubbing or cyanosis is " noted.      Imaging Review: We have personally reviewed her images in detail today and compared them to those from her prior 2 CT scans.  She continues to have multiple subcentimeter nodules bilaterally and no adenopathy.  The left upper lobe mid to posterior lesion is measured at 3 mm and more prominent today (increased versus better visualization).  There are no acute findings.      Assessment  History of synchronous stage I adenocarcinoma status post left upper lobectomy 2021    History of vulvar cancer, stage III 2018    Colovaginal fistula status post resection and stoma reversal 2022-23    ALY III (vulvar intraepithelial neoplasia III)     Mild anxiety, particularly noted at night    We have discussed her chest CT results in detail and will continue to monitor closely with another 6-month interval chest CT.  She continues to have follow-up with her colorectal and gynecology oncology teams.  I have reviewed PDMP and renewed her nightly as needed 0.5 mg alprazolam.  She is very appreciative and has been encouraged to call back in the meantime with any changes, question or concern.

## 2024-09-13 ENCOUNTER — HOSPITAL ENCOUNTER (EMERGENCY)
Age: 79
Discharge: HOME | End: 2024-09-13
Payer: COMMERCIAL

## 2024-09-13 ENCOUNTER — APPOINTMENT (OUTPATIENT)
Dept: CT IMAGING | Age: 79
End: 2024-09-13
Payer: COMMERCIAL

## 2024-09-13 VITALS
HEIGHT: 60 IN | SYSTOLIC BLOOD PRESSURE: 154 MMHG | HEART RATE: 92 BPM | OXYGEN SATURATION: 96 % | WEIGHT: 121.2 LBS | RESPIRATION RATE: 18 BRPM | DIASTOLIC BLOOD PRESSURE: 79 MMHG | BODY MASS INDEX: 23.8 KG/M2 | TEMPERATURE: 98.1 F

## 2024-09-13 DIAGNOSIS — S51.812A SKIN TEAR OF LEFT FOREARM WITHOUT COMPLICATION, INITIAL ENCOUNTER: ICD-10-CM

## 2024-09-13 DIAGNOSIS — S09.90XA INJURY OF HEAD, INITIAL ENCOUNTER: ICD-10-CM
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
## 2024-09-13 DIAGNOSIS — S16.1XXA STRAIN OF NECK MUSCLE, INITIAL ENCOUNTER: ICD-10-CM

## 2024-09-13 DIAGNOSIS — S81.812A SKIN TEAR OF LEFT LOWER LEG WITHOUT COMPLICATION, INITIAL ENCOUNTER: ICD-10-CM

## 2024-09-13 PROCEDURE — 70450 CT HEAD/BRAIN W/O DYE: CPT

## 2024-09-13 PROCEDURE — 99284 EMERGENCY DEPT VISIT MOD MDM: CPT

## 2024-09-13 PROCEDURE — 72125 CT NECK SPINE W/O DYE: CPT

## 2024-09-13 RX ORDER — LEVOTHYROXINE SODIUM 88 UG/1
88 TABLET ORAL DAILY
COMMUNITY

## 2024-09-13 RX ORDER — LOPERAMIDE HCL 2 MG
2 CAPSULE ORAL 4 TIMES DAILY PRN
COMMUNITY

## 2024-09-13 RX ORDER — BUMETANIDE 1 MG/1
1 TABLET ORAL DAILY
COMMUNITY

## 2024-09-13 RX ORDER — FERROUS SULFATE 325(65) MG
325 TABLET ORAL
COMMUNITY

## 2024-09-13 RX ORDER — PANTOPRAZOLE SODIUM 20 MG/1
10 TABLET, DELAYED RELEASE ORAL DAILY
COMMUNITY

## 2024-09-13 RX ORDER — ROSUVASTATIN CALCIUM 20 MG/1
20 TABLET, COATED ORAL DAILY
COMMUNITY

## 2024-09-13 RX ORDER — ALLOPURINOL 100 MG/1
200 TABLET ORAL DAILY
COMMUNITY

## 2024-09-13 ASSESSMENT — PAIN - FUNCTIONAL ASSESSMENT: PAIN_FUNCTIONAL_ASSESSMENT: 0-10

## 2024-09-13 ASSESSMENT — PAIN SCALES - GENERAL: PAINLEVEL_OUTOF10: 0

## 2024-11-01 ENCOUNTER — TELEPHONE (OUTPATIENT)
Dept: COLORECTAL SURGERY | Facility: CLINIC | Age: 79
End: 2024-11-01
Payer: MEDICARE

## 2024-11-01 RX ORDER — DICYCLOMINE HYDROCHLORIDE 10 MG/1
CAPSULE ORAL
Qty: 360 CAPSULE | Refills: 1 | Status: SHIPPED | OUTPATIENT
Start: 2024-11-01

## 2024-11-01 NOTE — TELEPHONE ENCOUNTER
Return call to patient.  Patient currently admitted to [facility] for bright red bleeding per rectum on 10/30.  She reports the bleeding has resolved and started eating some solids today.  Plan is for patient to hopefully be discharged today.  We will follow up with patient on 12/4 in the office. .

## 2024-12-04 ENCOUNTER — OFFICE VISIT (OUTPATIENT)
Dept: COLORECTAL SURGERY | Facility: CLINIC | Age: 79
End: 2024-12-04
Payer: MEDICARE

## 2024-12-04 ENCOUNTER — OFFICE VISIT (OUTPATIENT)
Dept: GYNECOLOGIC ONCOLOGY | Facility: CLINIC | Age: 79
End: 2024-12-04
Attending: OBSTETRICS & GYNECOLOGY
Payer: MEDICARE

## 2024-12-04 VITALS
HEART RATE: 98 BPM | BODY MASS INDEX: 24.77 KG/M2 | WEIGHT: 118 LBS | TEMPERATURE: 97.5 F | OXYGEN SATURATION: 99 % | HEIGHT: 58 IN | DIASTOLIC BLOOD PRESSURE: 85 MMHG | SYSTOLIC BLOOD PRESSURE: 154 MMHG

## 2024-12-04 VITALS — WEIGHT: 118 LBS | HEIGHT: 58 IN | BODY MASS INDEX: 24.77 KG/M2

## 2024-12-04 DIAGNOSIS — R19.8 LOW ANTERIOR RESECTION SYNDROME: Primary | ICD-10-CM

## 2024-12-04 DIAGNOSIS — Z85.44 HISTORY OF CANCER OF VAGINA: Primary | ICD-10-CM

## 2024-12-04 PROCEDURE — 99213 OFFICE O/P EST LOW 20 MIN: CPT | Performed by: OBSTETRICS & GYNECOLOGY

## 2024-12-04 PROCEDURE — 99459 PELVIC EXAMINATION: CPT | Performed by: OBSTETRICS & GYNECOLOGY

## 2024-12-04 PROCEDURE — G8756 NO BP MEASURE DOC: HCPCS | Performed by: COLON & RECTAL SURGERY

## 2024-12-04 PROCEDURE — 99213 OFFICE O/P EST LOW 20 MIN: CPT | Performed by: COLON & RECTAL SURGERY

## 2024-12-04 PROCEDURE — G8753 SYS BP > OR = 140: HCPCS | Performed by: OBSTETRICS & GYNECOLOGY

## 2024-12-04 PROCEDURE — G8754 DIAS BP LESS 90: HCPCS | Performed by: OBSTETRICS & GYNECOLOGY

## 2024-12-04 PROCEDURE — G2211 COMPLEX E/M VISIT ADD ON: HCPCS | Performed by: OBSTETRICS & GYNECOLOGY

## 2024-12-04 NOTE — PROGRESS NOTES
HPI: Ms. Raegan Young is a 79 y.o. with a history of FIGO stage III cancer of the vagina.  Oncology History   History of cancer of vagina   9/12/2018 Cancer Staged    FIGO Stage III (cT3, cN1, cM0)       9/21/2018 Initial Diagnosis    Vaginal cancer (CMS/HCC) (HCC)     9/21/2018 - 10/29/2018 Chemotherapy    CISplatin with Concurrent Radiation,        9/27/2018 - 11/28/2018 Radiation Therapy    IMRT concurrent Cisplatin complicated by N/V, dehydration, thrombocytopenia febrile neutropenia     1/15/2019 Surgery    Left groin node excision benign     9/20/2020 Imaging Significant Findings    CT: She had CT scan on 9/2/20: Significant sigmoid colonic wall thickening from chronic diverticulosis. Significant size hiatal hernia present.  Left upper lobe 5 mm lung nodule is stable. There is no grossly enlarged lymph node noted in the left groin on the CT.      5/13/2022 Surgery    Robotic TLH, BSO         Problem List Items Addressed This Visit       History of cancer of vagina - Primary    Overview     9/12/18 biopsy L vagina squamous cell ca  MRI pelvis shows L vaginal cancer with 4-5cm L inguinal node necrotic  PET shows uptake in vagina and L inguinal node          She returns today for surveillance. Patient denies experiencing any pain but does report that she has had some tenderness in the pelvic area. She denies any vaginal bleeding but does report recently having hemorrhoids.    3/22/24 pap showed NILM, HPV negative.    Medical History:   Past Medical History:   Diagnosis Date    Anemia     Arthritis     Colovaginal fistula     COVID-19 vaccine series completed 02/26/2021    Disease of thyroid gland     Diverticulitis of colon     GERD (gastroesophageal reflux disease)     H/O ileostomy     Hiatal hernia     History of snoring     Hypertension     Hypothyroidism     Lung cancer (CMS/HCC)     Lung nodule     Vaginal cancer (CMS/HCC) 2018    s/p XRT x 7 weeks and chemo weekly x 5 weeks         Surgical History:    Past Surgical History   Procedure Laterality Date    1. Robotic proctectomy and sigmoidectomy with takedown of colouterine and rectovaginal fistula and coloanal anastomosis with diverting loop ileostomy 2. Robotic splenic flexure mobilization.  3. ALYSSA/BSO (Trev Jimenez primary) N/A 5/13/2022    Performed by Geovani Mandel MD at [facility]    Abdominal surgery      Bowel resection      partial with ileostomy    Cholecystectomy      Colonoscopy      Cystoscopy with Bilateral Ureteral Stent Placement Bilateral 5/13/2022    Performed by Jw Quan DO at [facility]    Dilation and curettage of uterus  02/2018    FLEXIBLE BRONCHOSCOPY,  ROBOT ASSISTED LEFT UPPER LOBE LOBECTOMY AND THORACIC LYMPHADENECTOMY Left 10/20/2021    Performed by Owen Gamez MD PhD at [facility]    HYSTERECTOMY ABDOMINAL TOTAL--TLH/LAVH LAPAROSCOPIC ROBOTIC (DA KASHMIR) N/A 5/13/2022    Performed by Trev Jimenez MD at [facility]    Ileostomy      ION NAVIGATIONAL BRONCHOSCOPY AND ENDOBRONCHIAL ULTRASOUND N/A 9/1/2021    Performed by Owen Gamez MD PhD at [facility]    Lung surgery      OPEN BIOPSY LEFT GROIN NODE Left 1/15/2019    Performed by Trev Jimenez MD at [facility]    KS SIGMOIDOSCOPY FLX DX W/COLLJ SPEC BR/WA IF PFRMD [67059 (CPT®)] N/A 7/26/2022    Performed by Geovani Mandel MD at [facility]    Rectal EUA  repair of rectal anastamosis N/A 9/15/2022    Performed by Geovani Mandel MD at [facility]    Rectal EUA and flexible sigmoidoscopy N/A 7/9/2021    Performed by Geovani Mandel MD at [facility]    Rectal exam under anesthesia N/A 2/3/2023    Performed by Geovani Mandel MD at [facility]    Rectal examination under anesthesia (EUA), flexible colonoscopy N/A 8/5/2022    Performed by Geovani Mandel MD at [facility]    Rotator cuff repair Right     Stoma closure, flexible sigmoidoscopy N/A 4/19/2023    Performed by Geovani Mandel MD at [facility]    Tonsillectomy      [place] tooth extraction      hx of       Social  "History:   Social History     Tobacco Use    Smoking status: Former     Current packs/day: 0.00     Average packs/day: 1 pack/day for 41.0 years (41.0 ttl pk-yrs)     Types: Cigarettes     Start date:      Quit date:      Years since quittin.9    Smokeless tobacco: Never   Vaping Use    Vaping status: Never Used   Substance Use Topics    Alcohol use: Yes     Alcohol/week: 1.0 standard drink of alcohol     Types: 1 Glasses of wine per week     Comment: occassional     Drug use: No         Family History: Cancer-related family history includes Endometrial cancer in her biological father; Lung cancer in her biological father. There is no history of Breast cancer, Cancer, Colon cancer, or Ovarian cancer.    Allergies: Aspirin, Furosemide, Adhesive tape-silicones, and Sulfamethoxazole-trimethoprim    Medications:   Current Outpatient Medications   Medication Instructions    acetaminophen (TYLENOL) 650 mg, oral, Every 4 hours PRN    allopurinoL 200 mg tablet No dose, route, or frequency recorded.    ALPRAZolam (XANAX) 0.5 mg, oral, Nightly PRN    bumetanide (BUMEX) 1 mg, Daily    cholecalciferol (vitamin D3) 2,000 Units, Daily    dicyclomine (BENTYL) 10 mg capsule TAKE 1 CAPSULE BY MOUTH 4 TIMES A DAY (BEFORE MEALS AND NIGHTLY).    glucosamine-D3-Boswellia serr 1,500-400-100 mg-unit-mg tablet 1 tablet, Daily    levothyroxine (SYNTHROID) 75 mcg, Daily (6:30a)    loperamide (IMODIUM) 2 mg, oral, 2 times daily    mv-calcium-min-iron fm-FA-vitK 18 mg-400 mcg- 25 mcg tablet No dose, route, or frequency recorded.    pantoprazole (PROTONIX) 40 mg, Daily (8a)    rosuvastatin (CRESTOR) 20 mg, oral, Daily        Review of Systems  All other systems reviewed and negative except as noted in the HPI.    Objective     Vital Signs for the last 24 hours:  BP: 154/85  Temp: 36.4 °C (97.5 °F)  Temp Source: Oral  Heart Rate: 98  SpO2: 99 %  Height: 147.3 cm (4' 10\")  Weight: 53.5 kg (118 lb) ( 1110)  Body mass index is " 24.66 kg/m².    Physical Exam From Office:  General: well-developed, well-nourished, no apparent distress  Neck: supple, no masses  Lymphatic: no supraclavicular, cervical, or inguinal adenopathy  Respiratory: lungs clear to auscultation bilaterally, normal respiratory effort  Cardiovascular: regular rate and rhythm, no murmurs, rubs, gallops.   Extremity: no clubbing, cyanosis, edema  GI: abdomen soft, non-distended, non-tender, no hepatosplenomegaly, no masses  Neurologic: alert & oriented x3, no gross deficits  Psychiatric: mood and affect normal  Musculoskeletal: no deformity or gross strength deficit  Gynecologic: normal external female genitalia.    Bimanual: normal vulva, urethra, surgically absent cervix and uterus, no palpable masses, foreshortened narrow vagina 1 cm  Rectal: no masses/nodularity  Patient was examined with chaperone    Assessment/Plan     Ms. Raegan Young is a 79 y.o. lady who has a history of FIGO Stage III vaginal cancer. She presents today with a family member. SHARON upon physical exam. She will return in 1 year for her next surveillance visit. The signs and symptoms of recurrent cancer were reviewed.     By signing my name below, I, Sunni Sanchez, attest that this documentation has been prepared under the direction and in the presence of Trev Jimenez MD      12/4/2024 11:30 AM  Sunni Sanchez  Attending addendum:  I personally interviewed, examined, and developed the plan for the patient.   I personally counseled the patient on her diagnosis and proposed treatment as documented above.  Note was scribed in my presence and I agree with the note above.       I attest that this visit supports the complexity inherent to evaluation and management associated with medical care services that serve as the continuing focal point for all needed health care services and/or medical care services that are part of ongoing care related to this patient's single, serious condition or a complex  condition.

## 2024-12-04 NOTE — LETTER
December 7, 2024     Javon Valadez DO  [address]  [place] [address]    Patient: Raegan Young  YOB: 1945  Date of Visit: 12/4/2024      Dear Dr. Valadez:    Thank you for referring Raegan Young to me for evaluation. Below are my notes for this consultation.    If you have questions, please do not hesitate to call me. I look forward to following your patient along with you.         Sincerely,        Geovani Mandel MD        CC: MD Tequila Chan, Geovani CISNEROS MD  12/7/2024  2:14 PM  Sign when Signing Visit  HISTORY & PHYSICAL EXAM      CC: Recent bowel obstruction    HPI: Raegan is here to be seen today for follow-up.  She was admitted to [facility] for a bowel obstruction that appears to be related to over using constipating agents.  This is resolved spontaneously and she has been doing well.  She is taken herself  off all Imodium, Lomotil, and also laxatives at the same degree.  She was taking constipating agents and then getting constipated so she was taking laxatives which were giving her liquid stools and overall made for difficult quality life.  Overall since stopping she notes things are much better.  She will go with some frequency but her bowels are regularly formed.  She is in good spirits today here with her daughter.  She was just seen by Dr. Jimenez.  She noted to me that she was having some vaginal odor that was unpleasant recently.  She did note that she did not express this to Dr. Jimenez.    Past Medical History:   Diagnosis Date   • Anemia    • Arthritis    • Colovaginal fistula    • COVID-19 vaccine series completed 02/26/2021   • Disease of thyroid gland    • Diverticulitis of colon    • GERD (gastroesophageal reflux disease)    • H/O ileostomy    • Hiatal hernia    • History of snoring    • Hypertension    • Hypothyroidism    • Lung cancer (CMS/HCC)    • Lung nodule    • Vaginal cancer (CMS/HCC) 2018    s/p XRT x 7 weeks and chemo  weekly x 5 weeks       Past Surgical History   Procedure Laterality Date   • 1. Robotic proctectomy and sigmoidectomy with takedown of colouterine and rectovaginal fistula and coloanal anastomosis with diverting loop ileostomy 2. Robotic splenic flexure mobilization.  3. ALYSSA/BSO (Trev Jimenez primary) N/A 5/13/2022    Performed by Geovani Mandel MD at [facility] [place]   • Abdominal surgery     • Bowel resection      partial with ileostomy   • Cholecystectomy     • Colonoscopy     • Cystoscopy with Bilateral Ureteral Stent Placement Bilateral 5/13/2022    Performed by Jw Quan DO at [facility]   • Dilation and curettage of uterus  02/2018   • FLEXIBLE BRONCHOSCOPY,  ROBOT ASSISTED LEFT UPPER LOBE LOBECTOMY AND THORACIC LYMPHADENECTOMY Left 10/20/2021    Performed by Owen Gamez MD PhD at [facility]   • HYSTERECTOMY ABDOMINAL TOTAL--TLH/LAVH LAPAROSCOPIC ROBOTIC (DA KASHMIR) N/A 5/13/2022    Performed by Trev Jimenez MD at [facility]   • Ileostomy     • ION NAVIGATIONAL BRONCHOSCOPY AND ENDOBRONCHIAL ULTRASOUND N/A 9/1/2021    Performed by Owen Gamez MD PhD at [facility]   • Lung surgery     • OPEN BIOPSY LEFT GROIN NODE Left 1/15/2019    Performed by Trev Jimenez MD at [facility] [place]   • LA SIGMOIDOSCOPY FLX DX W/COLLJ SPEC BR/WA IF PFRMD [56265 (CPT®)] N/A 7/26/2022    Performed by Geovani Mandel MD at [facility]   • Rectal EUA  repair of rectal anastamosis N/A 9/15/2022    Performed by Geovani Mandel MD at [facility] [place]   • Rectal EUA and flexible sigmoidoscopy N/A 7/9/2021    Performed by Geovani Mandel MD at [facility]   • Rectal exam under anesthesia N/A 2/3/2023    Performed by Geovani Mandel MD at [facility]   • Rectal examination under anesthesia (EUA), flexible colonoscopy N/A 8/5/2022    Performed by Geovani Mandel MD at [facility]   • Rotator cuff repair Right    • Stoma closure, flexible sigmoidoscopy N/A 4/19/2023    Performed by Geovani Mandel MD at [facility]   • Tonsillectomy     •  [place] tooth extraction      hx of       Current Outpatient Medications:   •  acetaminophen (TYLENOL) 325 mg tablet, Take 2 tablets (650 mg total) by mouth every 4 (four) hours as needed for pain for up to 30 doses., Disp: 30 tablet, Rfl: 0  •  allopurinoL 200 mg tablet, , Disp: , Rfl:   •  ALPRAZolam (XANAX) 0.5 mg tablet, Take 1 tablet (0.5 mg total) by mouth nightly as needed for anxiety., Disp: 30 tablet, Rfl: 3  •  bumetanide (BUMEX) 1 mg tablet, Take 1 mg by mouth daily. Daily prn, Disp: , Rfl:   •  cholecalciferol, vitamin D3, 1,000 unit (25 mcg) tablet, Take 2,000 Units by mouth daily., Disp: , Rfl:   •  dicyclomine (BENTYL) 10 mg capsule, TAKE 1 CAPSULE BY MOUTH 4 TIMES A DAY (BEFORE MEALS AND NIGHTLY)., Disp: 360 capsule, Rfl: 1  •  glucosamine-D3-Boswellia serr 1,500-400-100 mg-unit-mg tablet, Take 1 tablet by mouth daily., Disp: , Rfl:   •  levothyroxine (SYNTHROID) 75 mcg tablet, Take 75 mcg by mouth daily., Disp: , Rfl:   •  mv-calcium-min-iron fm-FA-vitK 18 mg-400 mcg- 25 mcg tablet, , Disp: , Rfl:   •  pantoprazole (PROTONIX) 40 mg EC tablet, Take 40 mg by mouth once daily., Disp: , Rfl:   •  rosuvastatin (CRESTOR) 20 mg tablet, TAKE 1 TABLET BY MOUTH EVERY DAY, Disp: 90 tablet, Rfl: 3  •  loperamide (IMODIUM) 2 mg capsule, Take 1 capsule (2 mg total) by mouth 2 (two) times a day., Disp: 60 capsule, Rfl: 3   Allergies   Allergen Reactions   • Aspirin    • Furosemide    • Adhesive Tape-Silicones      Causes bruising   • Sulfamethoxazole-Trimethoprim Nausea Only     Social History     Socioeconomic History   • Marital status:      Spouse name: Royal    • Number of children: 4   • Years of education: Not on file   • Highest education level: Not on file   Occupational History   • Occupation: retired/     Tobacco Use   • Smoking status: Former     Current packs/day: 0.00     Average packs/day: 1 pack/day for 41.0 years (41.0 ttl pk-yrs)     Types: Cigarettes     Start date: 1957     Quit  date:      Years since quittin.9   • Smokeless tobacco: Never   Vaping Use   • Vaping status: Never Used   Substance and Sexual Activity   • Alcohol use: Yes     Alcohol/week: 1.0 standard drink of alcohol     Types: 1 Glasses of wine per week     Comment: occassional    • Drug use: No   • Sexual activity: Never     Comment:    Other Topics Concern   • Not on file   Social History Narrative    Lives with  in a 2 story home     Social Drivers of Health     Financial Resource Strain: Low Risk  (2023)    Overall Financial Resource Strain (CARDIA)    • Difficulty of Paying Living Expenses: Not hard at all   Food Insecurity: No Food Insecurity (10/30/2022)    Hunger Vital Sign    • Worried About Running Out of Food in the Last Year: Never true    • Ran Out of Food in the Last Year: Never true   Transportation Needs: No Transportation Needs (2023)    PRAPARE - Transportation    • Lack of Transportation (Medical): No    • Lack of Transportation (Non-Medical): No   Physical Activity: Not on file   Stress: Not on file   Social Connections: Not on file   Intimate Partner Violence: Not on file   Housing Stability: Low Risk  (2023)    Housing Stability Vital Sign    • Unable to Pay for Housing in the Last Year: No    • Number of Places Lived in the Last Year: 1    • Unstable Housing in the Last Year: No      Family History   Problem Relation Name Age of Onset   • Heart attack Biological Mother     • Hypertension Biological Mother     • Endometrial cancer Biological Father     • Lung cancer Biological Father     • Breast cancer Neg Hx     • Cancer Neg Hx     • Colon cancer Neg Hx     • Ovarian cancer Neg Hx         REVIEW OF SYSTEMS: Unchanged    PHYSICAL EXAM:  GEN: On examination the patient is resting comfortably.  NEURO/PSYCH: Alert and oriented ×3  HEENT: Eyes: Sclera anicteric  CHEST: Equal rise and fall the chest.  No tenderness bilaterally.  SKIN: Warm and moist  NODES: No groin or  cervical lymphadenopathy  EXT: No palpable edema of the bilateral lower extremities.  No clubbing.  GI: Abdomen soft, nontender, nondistended.  No palpable liver or spleen edge.  No ventral hernias, mass, or tenderness.  RECTAL: Perianal skin is normal.  Digital exam reveals normal sphincter tone with no masses palpable.  The anastomosis palpable and widely patent without abnormality.        ASSESSMENT/PLAN:     Low anterior resection syndrome  The patient is taken herself off all Lomotil/Imodium, and also off of laxatives which I think is very reasonable.  She was in the hospital with a bowel obstruction at [facility] likely this is related to overuse of constipating agents.  She has a reasonably good quality life with no intervention medically.  She is going to continue a high-fiber diet and slowly increase fiber naturally in her regimen.  I will see her back in 6 months time.  Overall I am very happy with how things look at this point.

## 2024-12-04 NOTE — LETTER
December 4, 2024    Patient: Raegan Young   YOB: 1945   Date of Visit: 12/4/2024       Dear Dr. Valadez:    The patient is seen back at the [facility] today in follow up with regard to her   1. History of cancer of vagina    . Below is my assessment and plan of care:    Ms. Raegan Young is a 79 y.o. lady who has a history of FIGO Stage III vaginal cancer. She presents today with a family member. SHARON upon physical exam. She will return in 1 year for her next surveillance visit. The signs and symptoms of recurrent cancer were reviewed.         If you have any questions or concerns, please call me at [phone].    Sincerely,        Trev Jimenez MD  CC: Geovani Mandel MD

## 2024-12-04 NOTE — PROGRESS NOTES
HISTORY & PHYSICAL EXAM      CC: Recent bowel obstruction    HPI: Raegan is here to be seen today for follow-up.  She was admitted to [facility] for a bowel obstruction that appears to be related to over using constipating agents.  This is resolved spontaneously and she has been doing well.  She is taken herself  off all Imodium, Lomotil, and also laxatives at the same degree.  She was taking constipating agents and then getting constipated so she was taking laxatives which were giving her liquid stools and overall made for difficult quality life.  Overall since stopping she notes things are much better.  She will go with some frequency but her bowels are regularly formed.  She is in good spirits today here with her daughter.  She was just seen by Dr. Jimenez.  She noted to me that she was having some vaginal odor that was unpleasant recently.  She did note that she did not express this to Dr. Jimenez.    Past Medical History:   Diagnosis Date    Anemia     Arthritis     Colovaginal fistula     COVID-19 vaccine series completed 02/26/2021    Disease of thyroid gland     Diverticulitis of colon     GERD (gastroesophageal reflux disease)     H/O ileostomy     Hiatal hernia     History of snoring     Hypertension     Hypothyroidism     Lung cancer (CMS/HCC)     Lung nodule     Vaginal cancer (CMS/HCC) 2018    s/p XRT x 7 weeks and chemo weekly x 5 weeks       Past Surgical History   Procedure Laterality Date    1. Robotic proctectomy and sigmoidectomy with takedown of colouterine and rectovaginal fistula and coloanal anastomosis with diverting loop ileostomy 2. Robotic splenic flexure mobilization.  3. ALYSSA/BSO (Trev Jiemnez primary) N/A 5/13/2022    Performed by Geovani Mandel MD at [facility]    Abdominal surgery      Bowel resection      partial with ileostomy    Cholecystectomy      Colonoscopy      Cystoscopy with Bilateral Ureteral Stent Placement Bilateral 5/13/2022    Performed by Jw Quan DO  at [facility]    Dilation and curettage of uterus  02/2018    FLEXIBLE BRONCHOSCOPY,  ROBOT ASSISTED LEFT UPPER LOBE LOBECTOMY AND THORACIC LYMPHADENECTOMY Left 10/20/2021    Performed by Owen Gamez MD PhD at [facility]    HYSTERECTOMY ABDOMINAL TOTAL--TLH/LAVH LAPAROSCOPIC ROBOTIC (DA KASHMIR) N/A 5/13/2022    Performed by Trev Jimenez MD at [facility]    Ileostomy      ION NAVIGATIONAL BRONCHOSCOPY AND ENDOBRONCHIAL ULTRASOUND N/A 9/1/2021    Performed by Owen Gamez MD PhD at [facility]    Lung surgery      OPEN BIOPSY LEFT GROIN NODE Left 1/15/2019    Performed by Trev Jimenez MD at [facility]    SC SIGMOIDOSCOPY FLX DX W/COLLJ SPEC BR/WA IF PFRMD [37309 (CPT®)] N/A 7/26/2022    Performed by Geovani Mandel MD at [facility]    Rectal EUA  repair of rectal anastamosis N/A 9/15/2022    Performed by Geovani Mandel MD at [facility]    Rectal EUA and flexible sigmoidoscopy N/A 7/9/2021    Performed by Geovani Mandel MD at [facility]    Rectal exam under anesthesia N/A 2/3/2023    Performed by Geovani Mandel MD at [facility]    Rectal examination under anesthesia (EUA), flexible colonoscopy N/A 8/5/2022    Performed by Geovani Mandel MD at [facility]    Rotator cuff repair Right     Stoma closure, flexible sigmoidoscopy N/A 4/19/2023    Performed by Geovani Mandel MD at [facility]    Tonsillectomy      [place] tooth extraction      hx of       Current Outpatient Medications:     acetaminophen (TYLENOL) 325 mg tablet, Take 2 tablets (650 mg total) by mouth every 4 (four) hours as needed for pain for up to 30 doses., Disp: 30 tablet, Rfl: 0    allopurinoL 200 mg tablet, , Disp: , Rfl:     ALPRAZolam (XANAX) 0.5 mg tablet, Take 1 tablet (0.5 mg total) by mouth nightly as needed for anxiety., Disp: 30 tablet, Rfl: 3    bumetanide (BUMEX) 1 mg tablet, Take 1 mg by mouth daily. Daily prn, Disp: , Rfl:     cholecalciferol, vitamin D3, 1,000 unit (25 mcg) tablet, Take 2,000 Units by mouth daily., Disp: , Rfl:      dicyclomine (BENTYL) 10 mg capsule, TAKE 1 CAPSULE BY MOUTH 4 TIMES A DAY (BEFORE MEALS AND NIGHTLY)., Disp: 360 capsule, Rfl: 1    glucosamine-D3-Boswellia serr 1,500-400-100 mg-unit-mg tablet, Take 1 tablet by mouth daily., Disp: , Rfl:     levothyroxine (SYNTHROID) 75 mcg tablet, Take 75 mcg by mouth daily., Disp: , Rfl:     mv-calcium-min-iron fm-FA-vitK 18 mg-400 mcg- 25 mcg tablet, , Disp: , Rfl:     pantoprazole (PROTONIX) 40 mg EC tablet, Take 40 mg by mouth once daily., Disp: , Rfl:     rosuvastatin (CRESTOR) 20 mg tablet, TAKE 1 TABLET BY MOUTH EVERY DAY, Disp: 90 tablet, Rfl: 3    loperamide (IMODIUM) 2 mg capsule, Take 1 capsule (2 mg total) by mouth 2 (two) times a day., Disp: 60 capsule, Rfl: 3   Allergies   Allergen Reactions    Aspirin     Furosemide     Adhesive Tape-Silicones      Causes bruising    Sulfamethoxazole-Trimethoprim Nausea Only     Social History     Socioeconomic History    Marital status:      Spouse name: Royal     Number of children: 4    Years of education: Not on file    Highest education level: Not on file   Occupational History    Occupation: retired/     Tobacco Use    Smoking status: Former     Current packs/day: 0.00     Average packs/day: 1 pack/day for 41.0 years (41.0 ttl pk-yrs)     Types: Cigarettes     Start date:      Quit date:      Years since quittin.9    Smokeless tobacco: Never   Vaping Use    Vaping status: Never Used   Substance and Sexual Activity    Alcohol use: Yes     Alcohol/week: 1.0 standard drink of alcohol     Types: 1 Glasses of wine per week     Comment: occassional     Drug use: No    Sexual activity: Never     Comment:    Other Topics Concern    Not on file   Social History Narrative    Lives with  in a 2 story home     Social Drivers of Health     Financial Resource Strain: Low Risk  (2023)    Overall Financial Resource Strain (CARDIA)     Difficulty of Paying Living Expenses: Not hard at all    Food Insecurity: No Food Insecurity (10/30/2022)    Hunger Vital Sign     Worried About Running Out of Food in the Last Year: Never true     Ran Out of Food in the Last Year: Never true   Transportation Needs: No Transportation Needs (4/20/2023)    PRAPARE - Transportation     Lack of Transportation (Medical): No     Lack of Transportation (Non-Medical): No   Physical Activity: Not on file   Stress: Not on file   Social Connections: Not on file   Intimate Partner Violence: Not on file   Housing Stability: Low Risk  (4/20/2023)    Housing Stability Vital Sign     Unable to Pay for Housing in the Last Year: No     Number of Places Lived in the Last Year: 1     Unstable Housing in the Last Year: No      Family History   Problem Relation Name Age of Onset    Heart attack Biological Mother      Hypertension Biological Mother      Endometrial cancer Biological Father      Lung cancer Biological Father      Breast cancer Neg Hx      Cancer Neg Hx      Colon cancer Neg Hx      Ovarian cancer Neg Hx         REVIEW OF SYSTEMS: Unchanged    PHYSICAL EXAM:  GEN: On examination the patient is resting comfortably.  NEURO/PSYCH: Alert and oriented ×3  HEENT: Eyes: Sclera anicteric  CHEST: Equal rise and fall the chest.  No tenderness bilaterally.  SKIN: Warm and moist  NODES: No groin or cervical lymphadenopathy  EXT: No palpable edema of the bilateral lower extremities.  No clubbing.  GI: Abdomen soft, nontender, nondistended.  No palpable liver or spleen edge.  No ventral hernias, mass, or tenderness.  RECTAL: Perianal skin is normal.  Digital exam reveals normal sphincter tone with no masses palpable.  The anastomosis palpable and widely patent without abnormality.        ASSESSMENT/PLAN:     Low anterior resection syndrome  The patient is taken herself off all Lomotil/Imodium, and also off of laxatives which I think is very reasonable.  She was in the hospital with a bowel obstruction at [facility] likely this is  related to overuse of constipating agents.  She has a reasonably good quality life with no intervention medically.  She is going to continue a high-fiber diet and slowly increase fiber naturally in her regimen.  I will see her back in 6 months time.  Overall I am very happy with how things look at this point.

## 2024-12-07 NOTE — ASSESSMENT & PLAN NOTE
The patient is taken herself off all Lomotil/Imodium, and also off of laxatives which I think is very reasonable.  She was in the hospital with a bowel obstruction at [facility] likely this is related to overuse of constipating agents.  She has a reasonably good quality life with no intervention medically.  She is going to continue a high-fiber diet and slowly increase fiber naturally in her regimen.  I will see her back in 6 months time.  Overall I am very happy with how things look at this point.

## 2024-12-20 RX ORDER — ROSUVASTATIN CALCIUM 20 MG/1
20 TABLET, COATED ORAL DAILY
Qty: 90 TABLET | Refills: 0 | Status: SHIPPED | OUTPATIENT
Start: 2024-12-20 | End: 2025-01-27

## 2025-01-27 RX ORDER — ROSUVASTATIN CALCIUM 20 MG/1
20 TABLET, COATED ORAL DAILY
Qty: 90 TABLET | Refills: 0 | Status: SHIPPED | OUTPATIENT
Start: 2025-01-27 | End: 2025-04-27

## 2025-01-31 RX ORDER — CHOLESTYRAMINE 4 G/9G
1 POWDER, FOR SUSPENSION ORAL
Qty: 270 PACKET | Refills: 3 | Status: SHIPPED | OUTPATIENT
Start: 2025-01-31 | End: 2025-03-13 | Stop reason: ALTCHOICE

## 2025-01-31 NOTE — PROGRESS NOTES
Spoke with Raegan who has continued to have loose bowels typically after eating. She is very fearful of doing any anti-diarrheal agents as she has had multiple SBO's in that past. Plan to trial cholestyramine with meals. Will touch base with her to see how this helped.

## 2025-02-04 ENCOUNTER — HOSPITAL ENCOUNTER (OUTPATIENT)
Dept: RADIOLOGY | Facility: HOSPITAL | Age: 80
Discharge: HOME | End: 2025-02-04
Attending: PHYSICIAN ASSISTANT
Payer: MEDICARE

## 2025-02-04 DIAGNOSIS — C34.12 MALIGNANT NEOPLASM OF UPPER LOBE OF LEFT LUNG (CMS/HCC): ICD-10-CM

## 2025-02-04 DIAGNOSIS — R91.8 MULTIPLE LUNG NODULES ON CT: ICD-10-CM

## 2025-02-04 PROCEDURE — 71250 CT THORAX DX C-: CPT

## 2025-02-10 NOTE — ASSESSMENT & PLAN NOTE
- K 3.5 yesterday, received 60mEq yesterday  - For repeat BMP this AM   Patient: Macie Jorge    Procedure: Procedure(s):  ESOPHAGOGASTRODUODENOSCOPY, WITH BIOPSY       Anesthesia Type:  MAC    Note:  Disposition: Outpatient   Postop Pain Control: Uneventful            Sign Out: Well controlled pain   PONV: No   Neuro/Psych: Uneventful            Sign Out: Acceptable/Baseline neuro status   Airway/Respiratory: Uneventful            Sign Out: Acceptable/Baseline resp. status   CV/Hemodynamics: Uneventful            Sign Out: Acceptable CV status; No obvious hypovolemia; No obvious fluid overload   Other NRE: NONE   DID A NON-ROUTINE EVENT OCCUR? No           Last vitals:  Vitals Value Taken Time   /58 02/10/25 1149   Temp     Pulse 55 02/10/25 1150   Resp 95 02/10/25 1151   SpO2 96 % 02/10/25 1151   Vitals shown include unfiled device data.    Electronically Signed By: Salud Rosenthal MD  February 10, 2025  11:54 AM

## 2025-03-06 ENCOUNTER — OFFICE VISIT (OUTPATIENT)
Dept: THORACIC SURGERY | Facility: CLINIC | Age: 80
End: 2025-03-06
Payer: MEDICARE

## 2025-03-06 VITALS
HEIGHT: 58 IN | OXYGEN SATURATION: 97 % | WEIGHT: 123.4 LBS | RESPIRATION RATE: 18 BRPM | BODY MASS INDEX: 25.9 KG/M2 | TEMPERATURE: 97.6 F | DIASTOLIC BLOOD PRESSURE: 86 MMHG | SYSTOLIC BLOOD PRESSURE: 182 MMHG | HEART RATE: 82 BPM

## 2025-03-06 DIAGNOSIS — R91.8 MULTIPLE LUNG NODULES ON CT: ICD-10-CM

## 2025-03-06 DIAGNOSIS — C34.92 ADENOCARCINOMA OF LEFT LUNG (CMS/HCC): Primary | ICD-10-CM

## 2025-03-06 DIAGNOSIS — C34.12 MALIGNANT NEOPLASM OF UPPER LOBE OF LEFT LUNG (CMS/HCC): ICD-10-CM

## 2025-03-06 PROCEDURE — G8753 SYS BP > OR = 140: HCPCS | Performed by: PHYSICIAN ASSISTANT

## 2025-03-06 PROCEDURE — 99213 OFFICE O/P EST LOW 20 MIN: CPT | Performed by: PHYSICIAN ASSISTANT

## 2025-03-06 PROCEDURE — G8754 DIAS BP LESS 90: HCPCS | Performed by: PHYSICIAN ASSISTANT

## 2025-03-06 ASSESSMENT — PAIN SCALES - GENERAL: PAINLEVEL_OUTOF10: 0-NO PAIN

## 2025-03-06 NOTE — PROGRESS NOTES
Thoracic Surgery    Patient ID: Raegan Young                              : 1945  MRN: [id number]  Clinic: [facility]                                            Visit Date: 3/6/2025  Encounter Provider: Owen Gamez  Referring Provider: Geovani Mandel MD         Patient: Raegan Young  Chief Complaint: Follow-up      Subjective     Raegan Young is a very pleasant 79 y.o. old female presenting today for routine, 6-month surveillance/survivorship visit and chest CT review.   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  She later developed colovaginal fistula.  After her pulmonary lesions were proven to not be metastatic and were limited stage adenocarcinomas she was able to have proctectomy/sigmoidectomy and coloanal anastomosis in May 2022.  She had a small anastomotic dehiscence that had to be repaired later that year but ultimately was able to have stoma closure 2023.  In 2024, she had a small vulvar nodule removed showing ALY 3.     Her ROS is notable for improved GI symptoms but she still struggles with having to have a bowel movement soon after eating.  This is usually followed by a looser bowel movement which is then followed by a mostly liquid bowel movement.  She avoids eating prior to her appointment since she drives from [facility].  Her weight is slightly down from her baseline but overall has remained stable recently.  She has some anxiety today as she received a letter in the mail regarding contacting/reviewing CT results with her healthcare provider.  Otherwise, she states she is doing well with no fevers, chills, nausea, vomiting, shortness of breath, cough or hemoptysis.         Past Medical  History:  has a past medical history of Anemia, Arthritis, Colovaginal fistula, COVID-19 vaccine series completed (2021), Disease of thyroid gland, Diverticulitis of colon, GERD (gastroesophageal reflux disease), H/O ileostomy, Hiatal hernia, History of snoring, Hypertension, Hypothyroidism, Lung cancer (CMS/HCC), Lung nodule, and Vaginal cancer (CMS/HCC) (2018).    She has no past medical history of Diabetes mellitus (CMS/[facility]) or Myocardial infarction (CMS/[facility]).  Past Surgical History:  has a past surgical history that includes Cholecystectomy; Dilation and curettage of uterus (2018); Rotator cuff repair (Right); Colonoscopy; Lung surgery; Tonsillectomy; Nucla tooth extraction; Bowel resection; Abdominal surgery; and Ileostomy.  Social History:   Social History     Tobacco Use    Smoking status: Former     Current packs/day: 0.00     Average packs/day: 1 pack/day for 41.0 years (41.0 ttl pk-yrs)     Types: Cigarettes     Start date:      Quit date:      Years since quittin.2    Smokeless tobacco: Never   Vaping Use    Vaping status: Never Used   Substance Use Topics    Alcohol use: Yes     Alcohol/week: 1.0 standard drink of alcohol     Types: 1 Glasses of wine per week     Comment: occassional     Drug use: No     Medications:   Current Outpatient Medications   Medication Sig Dispense Refill    acetaminophen (TYLENOL) 325 mg tablet Take 2 tablets (650 mg total) by mouth every 4 (four) hours as needed for pain for up to 30 doses. 30 tablet 0    allopurinoL 200 mg tablet       ALPRAZolam (XANAX) 0.5 mg tablet Take 1 tablet (0.5 mg total) by mouth nightly as needed for anxiety. 30 tablet 3    bumetanide (BUMEX) 1 mg tablet Take 1 mg by mouth daily. Daily prn      cholecalciferol, vitamin D3, 1,000 unit (25 mcg) tablet Take 2,000 Units by mouth daily.      dicyclomine (BENTYL) 10 mg capsule TAKE 1 CAPSULE BY MOUTH 4 TIMES A DAY (BEFORE MEALS AND NIGHTLY). 360 capsule 1    levothyroxine  "(SYNTHROID) 75 mcg tablet Take 75 mcg by mouth daily.      mv-calcium-min-iron fm-FA-vitK 18 mg-400 mcg- 25 mcg tablet       pantoprazole (PROTONIX) 40 mg EC tablet Take 40 mg by mouth once daily.      rosuvastatin (CRESTOR) 20 mg tablet Take 1 tablet (20 mg total) by mouth daily. Please call [phone] to schedule an appointment with Dr. Sanchez 90 tablet 0    cholestyramine (QUESTRAN) 4 gram packet Take 1 packet by mouth 3 (three) times a day with meals. 270 packet 3    glucosamine-D3-Boswellia serr 1,500-400-100 mg-unit-mg tablet Take 1 tablet by mouth daily.      loperamide (IMODIUM) 2 mg capsule Take 1 capsule (2 mg total) by mouth 2 (two) times a day. 60 capsule 3     No current facility-administered medications for this visit.       Allergies:   Allergies   Allergen Reactions    Aspirin     Furosemide     Adhesive Tape-Silicones      Causes bruising    Sulfamethoxazole-Trimethoprim Nausea Only          Objective   Vitals: Visit Vitals  BP (!) 182/86 (BP Location: Right upper arm, Patient Position: Sitting)   Pulse 82   Temp 36.4 °C (97.6 °F) (Oral)   Resp 18   Ht 1.473 m (4' 10\")   Wt 56 kg (123 lb 6.4 oz)   SpO2 97%   BMI 25.79 kg/m²      General: She appears well and younger than her stated age.  She is in no distress  She ambulates into the office independently with steady gait.  She does not use supplemental oxygen.  She has mild-moderate kyphosis   Pulmonary: Good air entry bilaterally and clear to auscultation   CV: Regular rate and rhythm without murmur appreciated   Extremities: She has mild, stable bilateral lower extremity edema.  No cyanosis or clubbing appreciated.      Imaging Review: We have personally reviewed her images and compared them to prior.  We have also reviewed the radiographic interpretation.  There is very small, faint nodularity that may be consistent with mucous plugging.  There is no distinct nodule of concern and no adenopathy.      Assessment   History of synchronous stage I " adenocarcinoma status post left upper lobectomy 2021    Multiple, subcentimeter lung nodules     History of vulvar cancer, stage III 2018     Colovaginal fistula status post resection and stoma reversal 2022-23     ALY III (vulvar intraepithelial neoplasia III)     Her lung nodules range from 2 to 5 mm.  At this time she would be due to go on to annual surveillance.  However, we will follow-up with 6-month interval chest CT due to the indeterminant findings on her current imaging.  She is very appreciative of the extensive review and in agreement with the plan.  We have encouraged her to call the meantime with any changes, question or concern.

## 2025-05-27 RX ORDER — LOPERAMIDE HYDROCHLORIDE 2 MG/1
2 CAPSULE ORAL 2 TIMES DAILY
Qty: 60 CAPSULE | Refills: 3 | Status: SHIPPED | OUTPATIENT
Start: 2025-05-27 | End: 2025-06-26

## 2025-06-25 RX ORDER — HYDROCORTISONE 25 MG/G
CREAM TOPICAL 2 TIMES DAILY
Qty: 30 G | Refills: 0 | Status: SHIPPED | OUTPATIENT
Start: 2025-06-25 | End: 2025-07-16

## 2025-06-25 RX ORDER — COLESTIPOL HYDROCHLORIDE 1 G/1
1 TABLET ORAL 2 TIMES DAILY
Qty: 180 TABLET | Refills: 3 | Status: SHIPPED | OUTPATIENT
Start: 2025-06-25 | End: 2026-06-25

## 2025-07-03 DIAGNOSIS — N89.8 ITCHING IN THE VAGINAL AREA: ICD-10-CM

## 2025-07-03 RX ORDER — CLOTRIMAZOLE AND BETAMETHASONE DIPROPIONATE 10; .64 MG/G; MG/G
CREAM TOPICAL
Qty: 45 G | Refills: 3 | Status: SHIPPED | OUTPATIENT
Start: 2025-07-03
